# Patient Record
Sex: MALE | Race: WHITE | NOT HISPANIC OR LATINO
[De-identification: names, ages, dates, MRNs, and addresses within clinical notes are randomized per-mention and may not be internally consistent; named-entity substitution may affect disease eponyms.]

---

## 2017-01-23 ENCOUNTER — APPOINTMENT (OUTPATIENT)
Dept: SURGERY | Facility: CLINIC | Age: 62
End: 2017-01-23

## 2017-01-23 VITALS
RESPIRATION RATE: 15 BRPM | TEMPERATURE: 98.5 F | HEART RATE: 71 BPM | SYSTOLIC BLOOD PRESSURE: 111 MMHG | OXYGEN SATURATION: 95 % | DIASTOLIC BLOOD PRESSURE: 74 MMHG

## 2017-01-23 DIAGNOSIS — K40.90 UNILATERAL INGUINAL HERNIA, W/OUT OBSTRUCTION OR GANGRENE, NOT SPECIFIED AS RECURRENT: ICD-10-CM

## 2017-02-11 ENCOUNTER — EMERGENCY (EMERGENCY)
Facility: HOSPITAL | Age: 62
LOS: 1 days | Discharge: HOME | End: 2017-02-11
Admitting: EMERGENCY MEDICINE
Payer: COMMERCIAL

## 2017-02-11 PROCEDURE — 72040 X-RAY EXAM NECK SPINE 2-3 VW: CPT

## 2017-02-11 PROCEDURE — 72040 X-RAY EXAM NECK SPINE 2-3 VW: CPT | Mod: 26

## 2017-02-11 PROCEDURE — 99284 EMERGENCY DEPT VISIT MOD MDM: CPT | Mod: 25

## 2017-02-11 PROCEDURE — 99284 EMERGENCY DEPT VISIT MOD MDM: CPT

## 2017-02-11 PROCEDURE — 72070 X-RAY EXAM THORAC SPINE 2VWS: CPT

## 2017-02-11 PROCEDURE — 72070 X-RAY EXAM THORAC SPINE 2VWS: CPT | Mod: 26

## 2017-03-01 ENCOUNTER — FORM ENCOUNTER (OUTPATIENT)
Age: 62
End: 2017-03-01

## 2017-03-02 ENCOUNTER — OUTPATIENT (OUTPATIENT)
Dept: OUTPATIENT SERVICES | Facility: HOSPITAL | Age: 62
LOS: 1 days | End: 2017-03-02
Payer: MEDICARE

## 2017-03-02 ENCOUNTER — APPOINTMENT (OUTPATIENT)
Dept: CT IMAGING | Facility: IMAGING CENTER | Age: 62
End: 2017-03-02

## 2017-03-02 DIAGNOSIS — K40.90 UNILATERAL INGUINAL HERNIA, WITHOUT OBSTRUCTION OR GANGRENE, NOT SPECIFIED AS RECURRENT: ICD-10-CM

## 2017-03-02 DIAGNOSIS — K43.9 VENTRAL HERNIA WITHOUT OBSTRUCTION OR GANGRENE: ICD-10-CM

## 2017-03-02 PROCEDURE — 71250 CT THORAX DX C-: CPT

## 2017-05-19 ENCOUNTER — APPOINTMENT (OUTPATIENT)
Dept: SURGERY | Facility: CLINIC | Age: 62
End: 2017-05-19

## 2017-06-01 ENCOUNTER — OUTPATIENT (OUTPATIENT)
Dept: OUTPATIENT SERVICES | Facility: HOSPITAL | Age: 62
LOS: 1 days | End: 2017-06-01

## 2017-06-02 ENCOUNTER — INPATIENT (INPATIENT)
Facility: HOSPITAL | Age: 62
LOS: 5 days | Discharge: PSYCHIATRIC OR SUBSTANCE USE FACILITY | End: 2017-06-08
Attending: HOSPITALIST | Admitting: HOSPITALIST
Payer: MEDICARE

## 2017-06-02 VITALS
HEART RATE: 84 BPM | RESPIRATION RATE: 18 BRPM | SYSTOLIC BLOOD PRESSURE: 113 MMHG | DIASTOLIC BLOOD PRESSURE: 68 MMHG | OXYGEN SATURATION: 95 % | TEMPERATURE: 98 F

## 2017-06-02 DIAGNOSIS — G93.40 ENCEPHALOPATHY, UNSPECIFIED: ICD-10-CM

## 2017-06-02 LAB
ALBUMIN SERPL ELPH-MCNC: 4 G/DL — SIGNIFICANT CHANGE UP (ref 3.3–5)
ALP SERPL-CCNC: 65 U/L — SIGNIFICANT CHANGE UP (ref 40–120)
ALT FLD-CCNC: 28 U/L — SIGNIFICANT CHANGE UP (ref 4–41)
APAP SERPL-MCNC: < 15 UG/ML — LOW (ref 15–25)
APTT BLD: 30.8 SEC — SIGNIFICANT CHANGE UP (ref 27.5–37.4)
AST SERPL-CCNC: 26 U/L — SIGNIFICANT CHANGE UP (ref 4–40)
BARBITURATES MEASUREMENT: NEGATIVE — SIGNIFICANT CHANGE UP
BASOPHILS # BLD AUTO: 0.12 K/UL — SIGNIFICANT CHANGE UP (ref 0–0.2)
BASOPHILS NFR BLD AUTO: 0.9 % — SIGNIFICANT CHANGE UP (ref 0–2)
BASOPHILS NFR SPEC: 0 % — SIGNIFICANT CHANGE UP (ref 0–2)
BENZODIAZ SERPL-MCNC: NEGATIVE — SIGNIFICANT CHANGE UP
BILIRUB SERPL-MCNC: 0.3 MG/DL — SIGNIFICANT CHANGE UP (ref 0.2–1.2)
BUN SERPL-MCNC: 25 MG/DL — HIGH (ref 7–23)
CALCIUM SERPL-MCNC: 9.8 MG/DL — SIGNIFICANT CHANGE UP (ref 8.4–10.5)
CHLORIDE SERPL-SCNC: 94 MMOL/L — LOW (ref 98–107)
CO2 SERPL-SCNC: 30 MMOL/L — SIGNIFICANT CHANGE UP (ref 22–31)
CREAT SERPL-MCNC: 0.9 MG/DL — SIGNIFICANT CHANGE UP (ref 0.5–1.3)
EOSINOPHIL # BLD AUTO: 0.29 K/UL — SIGNIFICANT CHANGE UP (ref 0–0.5)
EOSINOPHIL NFR BLD AUTO: 2.1 % — SIGNIFICANT CHANGE UP (ref 0–6)
EOSINOPHIL NFR FLD: 5 % — SIGNIFICANT CHANGE UP (ref 0–6)
ETHANOL BLD-MCNC: < 10 MG/DL — SIGNIFICANT CHANGE UP
GLUCOSE SERPL-MCNC: 98 MG/DL — SIGNIFICANT CHANGE UP (ref 70–99)
HCT VFR BLD CALC: 47.7 % — SIGNIFICANT CHANGE UP (ref 39–50)
HGB BLD-MCNC: 15.4 G/DL — SIGNIFICANT CHANGE UP (ref 13–17)
IMM GRANULOCYTES NFR BLD AUTO: 4.4 % — HIGH (ref 0–1.5)
INR BLD: 1.04 — SIGNIFICANT CHANGE UP (ref 0.88–1.17)
LYMPHOCYTES # BLD AUTO: 14.9 % — SIGNIFICANT CHANGE UP (ref 13–44)
LYMPHOCYTES # BLD AUTO: 2.1 K/UL — SIGNIFICANT CHANGE UP (ref 1–3.3)
LYMPHOCYTES NFR SPEC AUTO: 11 % — LOW (ref 13–44)
MANUAL SMEAR VERIFICATION: SIGNIFICANT CHANGE UP
MCHC RBC-ENTMCNC: 27.6 PG — SIGNIFICANT CHANGE UP (ref 27–34)
MCHC RBC-ENTMCNC: 32.3 % — SIGNIFICANT CHANGE UP (ref 32–36)
MCV RBC AUTO: 85.5 FL — SIGNIFICANT CHANGE UP (ref 80–100)
MONOCYTES # BLD AUTO: 1.39 K/UL — HIGH (ref 0–0.9)
MONOCYTES NFR BLD AUTO: 9.9 % — SIGNIFICANT CHANGE UP (ref 2–14)
MONOCYTES NFR BLD: 12 % — HIGH (ref 2–9)
MORPHOLOGY BLD-IMP: SIGNIFICANT CHANGE UP
NEUTROPHIL AB SER-ACNC: 70 % — SIGNIFICANT CHANGE UP (ref 43–77)
NEUTROPHILS # BLD AUTO: 9.53 K/UL — HIGH (ref 1.8–7.4)
NEUTROPHILS NFR BLD AUTO: 67.8 % — SIGNIFICANT CHANGE UP (ref 43–77)
NEUTS BAND # BLD: 2 % — SIGNIFICANT CHANGE UP (ref 0–6)
PLATELET # BLD AUTO: 223 K/UL — SIGNIFICANT CHANGE UP (ref 150–400)
PLATELET COUNT - ESTIMATE: NORMAL — SIGNIFICANT CHANGE UP
PMV BLD: 9.3 FL — SIGNIFICANT CHANGE UP (ref 7–13)
POTASSIUM SERPL-MCNC: 3.8 MMOL/L — SIGNIFICANT CHANGE UP (ref 3.5–5.3)
POTASSIUM SERPL-SCNC: 3.8 MMOL/L — SIGNIFICANT CHANGE UP (ref 3.5–5.3)
PROT SERPL-MCNC: 8 G/DL — SIGNIFICANT CHANGE UP (ref 6–8.3)
PROTHROM AB SERPL-ACNC: 11.7 SEC — SIGNIFICANT CHANGE UP (ref 9.8–13.1)
RBC # BLD: 5.58 M/UL — SIGNIFICANT CHANGE UP (ref 4.2–5.8)
RBC # FLD: 17.1 % — HIGH (ref 10.3–14.5)
SALICYLATES SERPL-MCNC: < 5 MG/DL — LOW (ref 15–30)
SODIUM SERPL-SCNC: 140 MMOL/L — SIGNIFICANT CHANGE UP (ref 135–145)
VALPROATE SERPL-MCNC: 116.1 UG/ML — HIGH (ref 50–100)
WBC # BLD: 14.05 K/UL — HIGH (ref 3.8–10.5)
WBC # FLD AUTO: 14.05 K/UL — HIGH (ref 3.8–10.5)

## 2017-06-02 PROCEDURE — 70450 CT HEAD/BRAIN W/O DYE: CPT | Mod: 26

## 2017-06-02 RX ORDER — HALOPERIDOL DECANOATE 100 MG/ML
5 INJECTION INTRAMUSCULAR ONCE
Qty: 0 | Refills: 0 | Status: COMPLETED | OUTPATIENT
Start: 2017-06-02 | End: 2017-06-02

## 2017-06-02 RX ADMIN — HALOPERIDOL DECANOATE 5 MILLIGRAM(S): 100 INJECTION INTRAMUSCULAR at 22:35

## 2017-06-02 RX ADMIN — Medication 1 MILLIGRAM(S): at 22:35
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
## 2017-06-02 NOTE — ED PROVIDER NOTE - ATTENDING CONTRIBUTION TO CARE
I performed a face-to-face evaluation of the patient and performed a history and physical examination. I agree with the history and physical examination.      Middle-age man with sarcoidosis was admitted for two weeks at the [facility] for pneumonia and seizures. Was given Keppra. Was discharged yesterday to [facility] The patient has residual psychiatric deficits consisting of delusions, hallucinations, and non-stop talking. The family took him from the rehabilitation center to [facility] because the rehabilitation center was concerned the patient had acute alteration in mental status. The patient is demonstrating hallucinations, delusions, and aggressive behavior. His vital signs are stable. Other than altered mentation and aggression, he has no gross deficits on neuro examination, meaning he's able to move all extremities with full strength. Will sedate and get records from [facility] and admit to the medicine service with psychiatric consultation.

## 2017-06-02 NOTE — ED PROVIDER NOTE - CRITICAL CARE PROVIDED
consultation with other physicians/additional history taking/documentation/direct patient care (not related to procedure)/Long discussion with family and [facility] Supervising nurse Angel.

## 2017-06-02 NOTE — ED ADULT NURSE REASSESSMENT NOTE - NS ED NURSE REASSESS COMMENT FT1
Pt. becoming agitated, yelling, cursing, trying to get out of bed, putting his clothes on stating, "I'm getting ready leave," confused, unable to be redirected, and not following commands. Medicated as ordered. Placed on 1:1 constant observation for patient safety, risk of harm to self and risk for elopement. Pt. placed back into bed, positioned with pillows, placed on CM, side rails up for safety. Endorsed to primary RN.

## 2017-06-02 NOTE — ED PROVIDER NOTE - MEDICAL DECISION MAKING DETAILS
Zofia:   Middle-age man with sarcoidosis was admitted for two weeks at the [facility] for pneumonia and seizures. Was given Keppra. Was discharged yesterday to [facility] The patient has residual psychiatric deficits consisting of delusions, hallucinations, and non-stop talking. The family took him from the rehabilitation center to [facility] because the rehabilitation center was concerned the patient had acute alteration in mental status. The patient is demonstrating hallucinations, delusions, and aggressive behavior. His vital signs are stable. Other than altered mentation and aggression, he has no gross deficits on neuro examination, meaning he's able to move all extremities with full strength. Will sedate and get records from [facility] and admit to the medicine service with psychiatric consultation.

## 2017-06-03 DIAGNOSIS — Z93.3 COLOSTOMY STATUS: Chronic | ICD-10-CM

## 2017-06-03 DIAGNOSIS — S06.5X9A TRAUMATIC SUBDURAL HEMORRHAGE WITH LOSS OF CONSCIOUSNESS OF UNSPECIFIED DURATION, INITIAL ENCOUNTER: Chronic | ICD-10-CM

## 2017-06-03 DIAGNOSIS — Z90.49 ACQUIRED ABSENCE OF OTHER SPECIFIED PARTS OF DIGESTIVE TRACT: Chronic | ICD-10-CM

## 2017-06-03 DIAGNOSIS — Z98.890 OTHER SPECIFIED POSTPROCEDURAL STATES: Chronic | ICD-10-CM

## 2017-06-03 DIAGNOSIS — I82.90 ACUTE EMBOLISM AND THROMBOSIS OF UNSPECIFIED VEIN: ICD-10-CM

## 2017-06-03 DIAGNOSIS — R13.10 DYSPHAGIA, UNSPECIFIED: ICD-10-CM

## 2017-06-03 DIAGNOSIS — D86.9 SARCOIDOSIS, UNSPECIFIED: ICD-10-CM

## 2017-06-03 DIAGNOSIS — R41.0 DISORIENTATION, UNSPECIFIED: ICD-10-CM

## 2017-06-03 DIAGNOSIS — J96.01 ACUTE RESPIRATORY FAILURE WITH HYPOXIA: ICD-10-CM

## 2017-06-03 DIAGNOSIS — R56.9 UNSPECIFIED CONVULSIONS: ICD-10-CM

## 2017-06-03 LAB
AMMONIA BLD-MCNC: 32 UMOL/L — SIGNIFICANT CHANGE UP (ref 11–55)
APPEARANCE UR: CLEAR — SIGNIFICANT CHANGE UP
BASE EXCESS BLDA CALC-SCNC: 7 MMOL/L — SIGNIFICANT CHANGE UP
BASE EXCESS BLDA CALC-SCNC: 9.6 MMOL/L — SIGNIFICANT CHANGE UP
BASE EXCESS BLDA CALC-SCNC: 9.7 MMOL/L — SIGNIFICANT CHANGE UP
BASOPHILS # BLD AUTO: 0.11 K/UL — SIGNIFICANT CHANGE UP (ref 0–0.2)
BASOPHILS NFR BLD AUTO: 1 % — SIGNIFICANT CHANGE UP (ref 0–2)
BILIRUB UR-MCNC: NEGATIVE — SIGNIFICANT CHANGE UP
BLOOD UR QL VISUAL: NEGATIVE — SIGNIFICANT CHANGE UP
BUN SERPL-MCNC: 27 MG/DL — HIGH (ref 7–23)
CALCIUM SERPL-MCNC: 9.4 MG/DL — SIGNIFICANT CHANGE UP (ref 8.4–10.5)
CHLORIDE BLDA-SCNC: 104 MMOL/L — SIGNIFICANT CHANGE UP (ref 96–108)
CHLORIDE BLDA-SCNC: 99 MMOL/L — SIGNIFICANT CHANGE UP (ref 96–108)
CHLORIDE SERPL-SCNC: 93 MMOL/L — LOW (ref 98–107)
CO2 SERPL-SCNC: 30 MMOL/L — SIGNIFICANT CHANGE UP (ref 22–31)
COLOR SPEC: YELLOW — SIGNIFICANT CHANGE UP
CREAT BLDA-MCNC: 0.58 MG/DL — SIGNIFICANT CHANGE UP (ref 0.5–1.3)
CREAT BLDA-MCNC: 0.6 MG/DL — SIGNIFICANT CHANGE UP (ref 0.5–1.3)
CREAT SERPL-MCNC: 0.8 MG/DL — SIGNIFICANT CHANGE UP (ref 0.5–1.3)
EOSINOPHIL # BLD AUTO: 0.1 K/UL — SIGNIFICANT CHANGE UP (ref 0–0.5)
EOSINOPHIL NFR BLD AUTO: 0.9 % — SIGNIFICANT CHANGE UP (ref 0–6)
GLUCOSE BLDA-MCNC: 104 MG/DL — HIGH (ref 70–99)
GLUCOSE BLDA-MCNC: 146 MG/DL — HIGH (ref 70–99)
GLUCOSE SERPL-MCNC: 130 MG/DL — HIGH (ref 70–99)
GLUCOSE UR-MCNC: NEGATIVE — SIGNIFICANT CHANGE UP
HCO3 BLDA-SCNC: 29 MMOL/L — HIGH (ref 22–26)
HCO3 BLDA-SCNC: 32 MMOL/L — HIGH (ref 22–26)
HCO3 BLDA-SCNC: 32 MMOL/L — HIGH (ref 22–26)
HCT VFR BLD CALC: 47.6 % — SIGNIFICANT CHANGE UP (ref 39–50)
HCT VFR BLDA CALC: 41.3 % — SIGNIFICANT CHANGE UP (ref 39–51)
HCT VFR BLDA CALC: 44.4 % — SIGNIFICANT CHANGE UP (ref 39–51)
HGB BLD-MCNC: 15.1 G/DL — SIGNIFICANT CHANGE UP (ref 13–17)
HGB BLDA-MCNC: 13.5 G/DL — SIGNIFICANT CHANGE UP (ref 13–17)
HGB BLDA-MCNC: 14.5 G/DL — SIGNIFICANT CHANGE UP (ref 13–17)
HIV1 AG SER QL: SIGNIFICANT CHANGE UP
HIV1+2 AB SPEC QL: SIGNIFICANT CHANGE UP
IMM GRANULOCYTES NFR BLD AUTO: 5.7 % — HIGH (ref 0–1.5)
KETONES UR-MCNC: SIGNIFICANT CHANGE UP
LACTATE BLDA-SCNC: 0.6 MMOL/L — SIGNIFICANT CHANGE UP (ref 0.5–2)
LACTATE BLDA-SCNC: 0.7 MMOL/L — SIGNIFICANT CHANGE UP (ref 0.5–2)
LEUKOCYTE ESTERASE UR-ACNC: HIGH
LYMPHOCYTES # BLD AUTO: 1.07 K/UL — SIGNIFICANT CHANGE UP (ref 1–3.3)
LYMPHOCYTES # BLD AUTO: 9.7 % — LOW (ref 13–44)
MAGNESIUM SERPL-MCNC: 2.3 MG/DL — SIGNIFICANT CHANGE UP (ref 1.6–2.6)
MCHC RBC-ENTMCNC: 27.6 PG — SIGNIFICANT CHANGE UP (ref 27–34)
MCHC RBC-ENTMCNC: 31.7 % — LOW (ref 32–36)
MCV RBC AUTO: 86.9 FL — SIGNIFICANT CHANGE UP (ref 80–100)
MONOCYTES # BLD AUTO: 0.96 K/UL — HIGH (ref 0–0.9)
MONOCYTES NFR BLD AUTO: 8.7 % — SIGNIFICANT CHANGE UP (ref 2–14)
MUCOUS THREADS # UR AUTO: SIGNIFICANT CHANGE UP
NEUTROPHILS # BLD AUTO: 8.19 K/UL — HIGH (ref 1.8–7.4)
NEUTROPHILS NFR BLD AUTO: 74 % — SIGNIFICANT CHANGE UP (ref 43–77)
NITRITE UR-MCNC: NEGATIVE — SIGNIFICANT CHANGE UP
PCO2 BLDA: 56 MMHG — HIGH (ref 35–48)
PCO2 BLDA: 60 MMHG — HIGH (ref 35–48)
PCO2 BLDA: 67 MMHG — HIGH (ref 35–48)
PH BLDA: 7.31 PH — LOW (ref 7.35–7.45)
PH BLDA: 7.38 PH — SIGNIFICANT CHANGE UP (ref 7.35–7.45)
PH BLDA: 7.41 PH — SIGNIFICANT CHANGE UP (ref 7.35–7.45)
PH UR: 6 — SIGNIFICANT CHANGE UP (ref 4.6–8)
PHOSPHATE SERPL-MCNC: 5.1 MG/DL — HIGH (ref 2.5–4.5)
PLATELET # BLD AUTO: 178 K/UL — SIGNIFICANT CHANGE UP (ref 150–400)
PMV BLD: 9.1 FL — SIGNIFICANT CHANGE UP (ref 7–13)
PO2 BLDA: 185 MMHG — HIGH (ref 83–108)
PO2 BLDA: 92 MMHG — SIGNIFICANT CHANGE UP (ref 83–108)
PO2 BLDA: 94 MMHG — SIGNIFICANT CHANGE UP (ref 83–108)
POTASSIUM BLDA-SCNC: 3.5 MMOL/L — SIGNIFICANT CHANGE UP (ref 3.4–4.5)
POTASSIUM BLDA-SCNC: 3.6 MMOL/L — SIGNIFICANT CHANGE UP (ref 3.4–4.5)
POTASSIUM SERPL-MCNC: 4.9 MMOL/L — SIGNIFICANT CHANGE UP (ref 3.5–5.3)
POTASSIUM SERPL-SCNC: 4.9 MMOL/L — SIGNIFICANT CHANGE UP (ref 3.5–5.3)
PROT UR-MCNC: 30 — SIGNIFICANT CHANGE UP
RBC # BLD: 5.48 M/UL — SIGNIFICANT CHANGE UP (ref 4.2–5.8)
RBC # FLD: 17.1 % — HIGH (ref 10.3–14.5)
RBC CASTS # UR COMP ASSIST: SIGNIFICANT CHANGE UP (ref 0–?)
SAO2 % BLDA: 96.6 % — SIGNIFICANT CHANGE UP (ref 95–99)
SAO2 % BLDA: 97.4 % — SIGNIFICANT CHANGE UP (ref 95–99)
SAO2 % BLDA: 99.2 % — HIGH (ref 95–99)
SODIUM BLDA-SCNC: 134 MMOL/L — LOW (ref 136–146)
SODIUM BLDA-SCNC: 135 MMOL/L — LOW (ref 136–146)
SODIUM SERPL-SCNC: 139 MMOL/L — SIGNIFICANT CHANGE UP (ref 135–145)
SP GR SPEC: 1.02 — SIGNIFICANT CHANGE UP (ref 1–1.03)
T PALLIDUM AB TITR SER: NEGATIVE — SIGNIFICANT CHANGE UP
TSH SERPL-MCNC: 5.58 UIU/ML — HIGH (ref 0.27–4.2)
UROBILINOGEN FLD QL: NORMAL E.U. — SIGNIFICANT CHANGE UP (ref 0.1–0.2)
VIT B12 SERPL-MCNC: 1069 PG/ML — HIGH (ref 200–900)
WBC # BLD: 11.06 K/UL — HIGH (ref 3.8–10.5)
WBC # FLD AUTO: 11.06 K/UL — HIGH (ref 3.8–10.5)
WBC CLUMPS #/AREA URNS HPF: PRESENT — HIGH (ref 0–?)
WBC UR QL: SIGNIFICANT CHANGE UP (ref 0–?)

## 2017-06-03 PROCEDURE — 76937 US GUIDE VASCULAR ACCESS: CPT | Mod: 26

## 2017-06-03 PROCEDURE — 99291 CRITICAL CARE FIRST HOUR: CPT

## 2017-06-03 PROCEDURE — 76604 US EXAM CHEST: CPT | Mod: 26,GC

## 2017-06-03 PROCEDURE — 99223 1ST HOSP IP/OBS HIGH 75: CPT | Mod: GC

## 2017-06-03 PROCEDURE — 71010: CPT | Mod: 26

## 2017-06-03 RX ORDER — IPRATROPIUM/ALBUTEROL SULFATE 18-103MCG
3 AEROSOL WITH ADAPTER (GRAM) INHALATION EVERY 6 HOURS
Qty: 0 | Refills: 0 | Status: DISCONTINUED | OUTPATIENT
Start: 2017-06-03 | End: 2017-06-08

## 2017-06-03 RX ORDER — SODIUM CHLORIDE 9 MG/ML
1000 INJECTION INTRAMUSCULAR; INTRAVENOUS; SUBCUTANEOUS
Qty: 0 | Refills: 0 | Status: DISCONTINUED | OUTPATIENT
Start: 2017-06-03 | End: 2017-06-03

## 2017-06-03 RX ORDER — BUDESONIDE AND FORMOTEROL FUMARATE DIHYDRATE 160; 4.5 UG/1; UG/1
2 AEROSOL RESPIRATORY (INHALATION)
Qty: 0 | Refills: 0 | Status: DISCONTINUED | OUTPATIENT
Start: 2017-06-03 | End: 2017-06-08

## 2017-06-03 RX ORDER — VALPROIC ACID (AS SODIUM SALT) 250 MG/5ML
750 SOLUTION, ORAL ORAL EVERY 12 HOURS
Qty: 0 | Refills: 0 | Status: DISCONTINUED | OUTPATIENT
Start: 2017-06-03 | End: 2017-06-07

## 2017-06-03 RX ORDER — SODIUM CHLORIDE 9 MG/ML
1000 INJECTION INTRAMUSCULAR; INTRAVENOUS; SUBCUTANEOUS
Qty: 0 | Refills: 0 | Status: DISCONTINUED | OUTPATIENT
Start: 2017-06-03 | End: 2017-06-08

## 2017-06-03 RX ORDER — TAMSULOSIN HYDROCHLORIDE 0.4 MG/1
0.4 CAPSULE ORAL AT BEDTIME
Qty: 0 | Refills: 0 | Status: DISCONTINUED | OUTPATIENT
Start: 2017-06-03 | End: 2017-06-08

## 2017-06-03 RX ORDER — HEPARIN SODIUM 5000 [USP'U]/ML
5000 INJECTION INTRAVENOUS; SUBCUTANEOUS EVERY 8 HOURS
Qty: 0 | Refills: 0 | Status: DISCONTINUED | OUTPATIENT
Start: 2017-06-03 | End: 2017-06-06

## 2017-06-03 RX ADMIN — Medication 3 MILLILITER(S): at 02:50

## 2017-06-03 RX ADMIN — HEPARIN SODIUM 5000 UNIT(S): 5000 INJECTION INTRAVENOUS; SUBCUTANEOUS at 23:02

## 2017-06-03 RX ADMIN — BUDESONIDE AND FORMOTEROL FUMARATE DIHYDRATE 2 PUFF(S): 160; 4.5 AEROSOL RESPIRATORY (INHALATION) at 22:28

## 2017-06-03 RX ADMIN — SODIUM CHLORIDE 75 MILLILITER(S): 9 INJECTION INTRAMUSCULAR; INTRAVENOUS; SUBCUTANEOUS at 13:36

## 2017-06-03 RX ADMIN — Medication 3 MILLILITER(S): at 22:24

## 2017-06-03 RX ADMIN — HEPARIN SODIUM 5000 UNIT(S): 5000 INJECTION INTRAVENOUS; SUBCUTANEOUS at 05:25

## 2017-06-03 RX ADMIN — SODIUM CHLORIDE 75 MILLILITER(S): 9 INJECTION INTRAMUSCULAR; INTRAVENOUS; SUBCUTANEOUS at 03:27

## 2017-06-03 RX ADMIN — HEPARIN SODIUM 5000 UNIT(S): 5000 INJECTION INTRAVENOUS; SUBCUTANEOUS at 13:36

## 2017-06-03 RX ADMIN — TAMSULOSIN HYDROCHLORIDE 0.4 MILLIGRAM(S): 0.4 CAPSULE ORAL at 23:02

## 2017-06-03 RX ADMIN — Medication 3 MILLILITER(S): at 15:48

## 2017-06-03 RX ADMIN — Medication 28.75 MILLIGRAM(S): at 19:21

## 2017-06-03 RX ADMIN — Medication 3 MILLILITER(S): at 09:39

## 2017-06-03 RX ADMIN — SODIUM CHLORIDE 75 MILLILITER(S): 9 INJECTION INTRAMUSCULAR; INTRAVENOUS; SUBCUTANEOUS at 05:23

## 2017-06-03 NOTE — CHART NOTE - NSCHARTNOTEFT_GEN_A_CORE
: Dr. Chan    INDICATION: hypoxia    PROCEDURE:  [ ] LIMITED ECHO  [x] LIMITED CHEST  [ ] LIMITED RETROPERITONEAL  [ ] LIMITED ABDOMINAL  [ ] LIMITED DVT  [ ] NEEDLE GUIDANCE VASCULAR  [ ] NEEDLE GUIDANCE THORACENTESIS  [ ] NEEDLE GUIDANCE PARACENTESIS  [ ] NEEDLE GUIDANCE PERICARDIOCENTESIS  [ ] OTHER    FINDINGS:  - focal B lines with lumpy bumpy pleura    INTERPRETATION:  Lung ultrasound consistent with known pulmonary disease  No focal consolidation to suggest PNA : Dr. Chan    INDICATION: hypoxia    PROCEDURE:  [ ] LIMITED ECHO  [x] LIMITED CHEST  [ ] LIMITED RETROPERITONEAL  [ ] LIMITED ABDOMINAL  [ ] LIMITED DVT  [ ] NEEDLE GUIDANCE VASCULAR  [ ] NEEDLE GUIDANCE THORACENTESIS  [ ] NEEDLE GUIDANCE PARACENTESIS  [ ] NEEDLE GUIDANCE PERICARDIOCENTESIS  [ ] OTHER    FINDINGS:  - focal B lines with lumpy bumpy pleura    INTERPRETATION:  Lung ultrasound consistent with known pulmonary disease   No focal consolidation to suggest PNA

## 2017-06-03 NOTE — H&P ADULT - PSH
Bilateral subdural hematomas    Status post cholecystectomy    Status post Bright's procedure    Status post inguinal hernia repair

## 2017-06-03 NOTE — H&P ADULT - PROBLEM SELECTOR PLAN 1
Likely 2/2 sarcoidosis c/b ILD and sedation. Pt received IV haldol 5mg and IV ativan 1mg in the ED for psychosis and aggressive behavior. Afterwards, pt became hypoxic to the 80s on 4L NC with intermittent apneic episodes while heavily sedated. Also noted to be using accessory muscles.  - Pt placed on BiPAP, initially with settings of FiO2 50% and 12/6, now on FiO2 30% and 12/4  - ABG: pH 7.31, pCO2 67, pO2 185, HCO3 29, O2 sat 97%  - MICU consulted and evaluated pt, will be transferred to MICU for monitoring Multifactorial 2/2 sarcoidosis c/b ILD and sedation. Pt received IV haldol 5mg and IV ativan 1mg in the ED for psychosis and aggressive behavior. Afterwards, pt became hypoxic to the 80s on 4L NC with intermittent apneic episodes while heavily sedated. Also noted to be using accessory muscles.  - Pt placed on BiPAP, initially with settings of FiO2 50% and 12/6, now on FiO2 30% and 12/4  - ABG: pH 7.31, pCO2 67, pO2 185, HCO3 29, O2 sat 97%  - MICU consulted and evaluated pt, will be transferred to MICU for monitoring  - NPO for now  - Hold non essential oral medications while on BiPAP

## 2017-06-03 NOTE — H&P ADULT - ASSESSMENT
60 yo Male with complicated medical/surgical history including sarcoidosis c/b ILD, bronchiectasis, ?rheumatoid arthritis, cholecystectomy, perforated diverticulitis s/p Bright procedure and reversal, b/l SDH s/p surgery, R inguinal hernia repair with mesh, and recent 2-week admission (5/14/17 - 6/1/17) at [place] for new-onset generalized tonic-clonic seizures presents from [facility] with altered mental status, s/p IV haldol 5mg and IV ativan 1mg in the ED for psychosis and aggressive behavior, now with acute hypoxic respiratory failure 2/2 sarcoidosis c/b ILD and sedation. 60 yo Male with complicated medical/surgical history including sarcoidosis c/b ILD, bronchiectasis, ?rheumatoid arthritis, cholecystectomy, perforated diverticulitis s/p Bright procedure and reversal, b/l SDH s/p surgery, R inguinal hernia repair with mesh, and recent 2-week admission (5/14/17 - 6/1/17) at [place] for new-onset generalized tonic-clonic seizures presents from [facility] with altered mental status, s/p IV haldol 5mg and IV ativan 1mg in the ED for psychosis and aggressive behavior, now with acute hypoxic respiratory failure of multifactorial etiology;

## 2017-06-03 NOTE — H&P ADULT - PROBLEM SELECTOR PLAN 3
New-onset GTC seizures in mid-May requiring intubation at OSH. 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 Now on depakote.   - VPA level 116. Will need to hold depakote and check VPA level in AM.  - Will do ativan PRN for recurrent seizures. Responded to ativan 2mg at OSH.  - Will need Neurology consult New-onset GTC seizures in mid-May requiring intubation at OSH. 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 Now on Depakote.   - VPA level 116. Will need to hold Depakote and check VPA level in AM.  - Will do ativan PRN for recurrent seizures. Responded to ativan 2mg at OSH.  - Will need Neurology consult New-onset GTC seizures in mid-May requiring intubation at OSH. 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 Now on Depakote.   - VPA level 116. Will need to hold Depakote and check VPA level in AM.  - Will do ativan PRN for recurrent seizures. Responded to ativan 2mg at OSH.  - Will need Neurology consult - concerned for Neurologic sarcoidosis (the pt is not on steroids)

## 2017-06-03 NOTE — PROCEDURE NOTE - NSPROCDETAILS_GEN_ALL_CORE
flushes easily/blood seen on insertion/dressing applied/ultrasound utilization/location identified, draped/prepped, sterile technique used/secured in place/sterile technique, catheter placed

## 2017-06-03 NOTE — CONSULT NOTE ADULT - SUBJECTIVE AND OBJECTIVE BOX
CHIEF COMPLAINT:    HPI: 61 year old male with history of ICH and Sarcoidosis presents from  Rehab with altered mental status. Patient was aggressive and uncooperative in the ED and was given 1mg IV ativan and 5mg IV haldol. Patient was transferred to the floor, where he was evaluated by the medicine team who noted him to be lethargic, hypoxic with increased work of breathing. Patient was placed on bipap and micu was called for further evaluation.  History obtained from outside records. Patient was transferred to [facility] one day prior after prolonged hospital stay at [facility] where he was treated for new onset seizure requiring intubation. Patient underwent extensive work up including MRI (negative for focal source of seizure) LP negative for acute bacterial infection HSV or VZV. During that hospitalization records indicate a normal wbc and elevated serum bicarb. Patient underwent CT chest suggestive of sarcoidosis.     PAST MEDICAL & SURGICAL HISTORY:  Inguinal hernia  Subdural hematoma  Diverticulitis  Bronchiectasis  Sarcoid  DM (diabetes mellitus)  No pertinent past medical history  Status post inguinal hernia repair  Status post cholecystectomy  Bilateral subdural hematomas  Status post Bright&#x27;s procedure  No significant past surgical history      FAMILY HISTORY:  No pertinent family history in first degree relatives      SOCIAL HISTORY: unable to assess 2/2 ams  Smoking: __ packs x ___ years  EtOH Use:  Marital Status:  Occupation:  Recent Travel:  Country of Birth:  Advance Directives:    Allergies    Keppra (Other (Severe))  penicillin (Angioedema)  penicillins (Unknown)    Intolerances        HOME MEDICATIONS:    REVIEW OF SYSTEMS:  Constitutional:   Eyes:  ENT:  CV:  Resp:  GI:  :  MSK:  Integumentary:  Neurological:  Psychiatric:  Endocrine:  Hematologic/Lymphatic:  Allergic/Immunologic:  [ ] All other systems negative  [ ] Unable to assess ROS because ams    OBJECTIVE:  ICU Vital Signs Last 24 Hrs  T(C): 36.5, Max: 36.6 (06-02 @ 20:16)  T(F): 97.7, Max: 97.8 (06-02 @ 20:16)  HR: 79 (79 - 86)  BP: 141/91 (102/76 - 141/91)  BP(mean): --  ABP: --  ABP(mean): --  RR: 19 (18 - 22)  SpO2: 95% (95% - 97%)        CAPILLARY BLOOD GLUCOSE      PHYSICAL EXAM:  General: lethargic tachypneic on bipap  Neck:   Respiratory: anteriorly coarse breath sounds  Cardiovascular: rate regular rhythm regular  Abdomen: soft multiple abdominal incisions bowel sounds present  Extremities: dry warm no lesions presents  Skin:   Neurological: lethargic arousable to painful stimuli, moving all extremities    Psychiatry: unable to assess    HOSPITAL MEDICATIONS:  MEDICATIONS  (STANDING):  heparin  Injectable 5000Unit(s) SubCutaneous every 8 hours  ALBUTerol/ipratropium for Nebulization 3milliLiter(s) Nebulizer every 6 hours  buDESOnide 160 MICROgram(s)/formoterol 4.5 MICROgram(s) Inhaler 2Puff(s) Inhalation two times a day  sodium chloride 0.9%. 1000milliLiter(s) IV Continuous <Continuous>    MEDICATIONS  (PRN):      LABS:                        15.4   14.05 )-----------( 223      ( 02 Jun 2017 21:42 )             47.7     06-02    140  |  94<L>  |  25<H>  ----------------------------<  98  3.8   |  30  |  0.90    Ca    9.8      02 Jun 2017 21:42    TPro  8.0  /  Alb  4.0  /  TBili  0.3  /  DBili  x   /  AST  26  /  ALT  28  /  AlkPhos  65  06-02    PT/INR - ( 02 Jun 2017 21:42 )   PT: 11.7 SEC;   INR: 1.04          PTT - ( 02 Jun 2017 21:42 )  PTT:30.8 SEC    Arterial Blood Gas:  06-03 @ 02:18  7.31/67/185/29/99.2/7.0  ABG lactate: 0.6        MICROBIOLOGY:     RADIOLOGY:  [ ] Reviewed and interpreted by me    EKG: CHIEF COMPLAINT:    HPI: 61 year old male with history of ICH and Sarcoidosis presents from  Rehab with altered mental status. Patient was aggressive and uncooperative in the ED and was given 1mg IV ativan and 5mg IV haldol. Patient was transferred to the floor, where he was evaluated by the medicine team who noted him to be lethargic, hypoxic with increased work of breathing. Patient was placed on bipap and micu was called for further evaluation.  History obtained from outside records. Patient was transferred to [facility] one day prior after prolonged hospital stay at [facility] where he was treated for new onset seizure requiring intubation. Patient underwent extensive work up including MRI (negative for focal source of seizure) LP negative for acute bacterial infection HSV or VZV. During that hospitalization records indicate a normal wbc and elevated serum bicarb. Patient underwent CT chest suggestive of sarcoidosis.     PAST MEDICAL & SURGICAL HISTORY:  Inguinal hernia  Subdural hematoma  Diverticulitis  Bronchiectasis  Sarcoid  DM (diabetes mellitus)  No pertinent past medical history  Status post inguinal hernia repair  Status post cholecystectomy  Bilateral subdural hematomas  Status post Bright&#x27;s procedure  No significant past surgical history      FAMILY HISTORY:  No pertinent family history in first degree relatives      SOCIAL HISTORY: unable to assess 2/2 ams  Smoking: __ packs x ___ years  EtOH Use:  Marital Status:  Occupation:  Recent Travel:  Country of Birth:  Advance Directives:    Allergies    Keppra (Other (Severe))  penicillin (Angioedema)  penicillins (Unknown)    Intolerances        HOME MEDICATIONS:    REVIEW OF SYSTEMS:  Constitutional:   Eyes:  ENT:  CV:  Resp:  GI:  :  MSK:  Integumentary:  Neurological:  Psychiatric:  Endocrine:  Hematologic/Lymphatic:  Allergic/Immunologic:  [ ] All other systems negative  [ ] Unable to assess ROS because ams    OBJECTIVE:  ICU Vital Signs Last 24 Hrs  T(C): 36.5, Max: 36.6 (06-02 @ 20:16)  T(F): 97.7, Max: 97.8 (06-02 @ 20:16)  HR: 79 (79 - 86)  BP: 141/91 (102/76 - 141/91)  BP(mean): --  ABP: --  ABP(mean): --  RR: 19 (18 - 22)  SpO2: 95% (95% - 97%)        CAPILLARY BLOOD GLUCOSE      PHYSICAL EXAM:  General: lethargic tachypneic on bipap  Neck:   Respiratory: anteriorly coarse breath sounds  Cardiovascular: rate regular rhythm regular  Abdomen: soft multiple abdominal incisions bowel sounds present  Extremities: dry warm no lesions presents  Skin:   Neurological: lethargic arousable to painful stimuli, moving all extremities    Psychiatry: unable to assess    HOSPITAL MEDICATIONS:  MEDICATIONS  (STANDING):  heparin  Injectable 5000Unit(s) SubCutaneous every 8 hours  ALBUTerol/ipratropium for Nebulization 3milliLiter(s) Nebulizer every 6 hours  buDESOnide 160 MICROgram(s)/formoterol 4.5 MICROgram(s) Inhaler 2Puff(s) Inhalation two times a day  sodium chloride 0.9%. 1000milliLiter(s) IV Continuous <Continuous>    MEDICATIONS  (PRN):      LABS:                        15.4   14.05 )-----------( 223      ( 02 Jun 2017 21:42 )             47.7     06-02    140  |  94<L>  |  25<H>  ----------------------------<  98  3.8   |  30  |  0.90    Ca    9.8      02 Jun 2017 21:42    TPro  8.0  /  Alb  4.0  /  TBili  0.3  /  DBili  x   /  AST  26  /  ALT  28  /  AlkPhos  65  06-02    PT/INR - ( 02 Jun 2017 21:42 )   PT: 11.7 SEC;   INR: 1.04          PTT - ( 02 Jun 2017 21:42 )  PTT:30.8 SEC    Arterial Blood Gas:  06-03 @ 02:18  7.31/67/185/29/99.2/7.0  ABG lactate: 0.6        MICROBIOLOGY:     RADIOLOGY:  [ ] Reviewed and interpreted by me    EKG: sinus CHIEF COMPLAINT:    HPI: 61 year old male with history of ICH and Sarcoidosis presents from  Rehab with altered mental status. Patient was aggressive and uncooperative in the ED and was given 1mg IV ativan and 5mg IV haldol. Patient was transferred to the floor, where he was evaluated by the medicine team who noted him to be lethargic, hypoxic with increased work of breathing. Patient was placed on bipap and micu was called for further evaluation.  History obtained from outside records. Patient was transferred to [facility] one day prior after prolonged hospital stay at [facility] where he was treated for new onset seizure requiring intubation. Patient underwent extensive work up including MRI (negative for focal source of seizure) LP negative for acute bacterial infection HSV or VZV. During that hospitalization records indicate a normal wbc and elevated serum bicarb. Patient underwent CT chest suggestive of sarcoidosis. He was eventually discharged to rehab on oxygen therapy.    PAST MEDICAL & SURGICAL HISTORY:  Inguinal hernia  Subdural hematoma  Diverticulitis  Bronchiectasis  Sarcoid  DM (diabetes mellitus)  No pertinent past medical history  Status post inguinal hernia repair  Status post cholecystectomy  Bilateral subdural hematomas  Status post Bright&#x27;s procedure  No significant past surgical history      FAMILY HISTORY:  No pertinent family history in first degree relatives      SOCIAL HISTORY: unable to assess 2/2 ams  Smoking: __ packs x ___ years  EtOH Use:  Marital Status:  Occupation:  Recent Travel:  Country of Birth:  Advance Directives:    Allergies    Keppra (Other (Severe))  penicillin (Angioedema)  penicillins (Unknown)    Intolerances        HOME MEDICATIONS:    REVIEW OF SYSTEMS:  Constitutional:   Eyes:  ENT:  CV:  Resp:  GI:  :  MSK:  Integumentary:  Neurological:  Psychiatric:  Endocrine:  Hematologic/Lymphatic:  Allergic/Immunologic:  [ ] All other systems negative  [ ] Unable to assess ROS because ams    OBJECTIVE:  ICU Vital Signs Last 24 Hrs  T(C): 36.5, Max: 36.6 (06-02 @ 20:16)  T(F): 97.7, Max: 97.8 (06-02 @ 20:16)  HR: 79 (79 - 86)  BP: 141/91 (102/76 - 141/91)  BP(mean): --  ABP: --  ABP(mean): --  RR: 19 (18 - 22)  SpO2: 95% (95% - 97%)        CAPILLARY BLOOD GLUCOSE      PHYSICAL EXAM:  General: lethargic tachypneic on bipap  Neck:   Respiratory: anteriorly coarse breath sounds  Cardiovascular: rate regular rhythm regular  Abdomen: soft multiple abdominal incisions bowel sounds present  Extremities: dry warm no lesions presents  Skin:   Neurological: lethargic arousable to painful stimuli, moving all extremities    Psychiatry: unable to assess    HOSPITAL MEDICATIONS:  MEDICATIONS  (STANDING):  heparin  Injectable 5000Unit(s) SubCutaneous every 8 hours  ALBUTerol/ipratropium for Nebulization 3milliLiter(s) Nebulizer every 6 hours  buDESOnide 160 MICROgram(s)/formoterol 4.5 MICROgram(s) Inhaler 2Puff(s) Inhalation two times a day  sodium chloride 0.9%. 1000milliLiter(s) IV Continuous <Continuous>    MEDICATIONS  (PRN):      LABS:                        15.4   14.05 )-----------( 223      ( 02 Jun 2017 21:42 )             47.7     06-02    140  |  94<L>  |  25<H>  ----------------------------<  98  3.8   |  30  |  0.90    Ca    9.8      02 Jun 2017 21:42    TPro  8.0  /  Alb  4.0  /  TBili  0.3  /  DBili  x   /  AST  26  /  ALT  28  /  AlkPhos  65  06-02    PT/INR - ( 02 Jun 2017 21:42 )   PT: 11.7 SEC;   INR: 1.04          PTT - ( 02 Jun 2017 21:42 )  PTT:30.8 SEC    Arterial Blood Gas:  06-03 @ 02:18  7.31/67/185/29/99.2/7.0  ABG lactate: 0.6        MICROBIOLOGY:     RADIOLOGY:  [ ] Reviewed and interpreted by me    EKG: sinus

## 2017-06-03 NOTE — H&P ADULT - NSHPLABSRESULTS_GEN_ALL_CORE
15.4   14.05 )-----------( 223      ( 02 Jun 2017 21:42 )             47.7     06-02    140  |  94<L>  |  25<H>  ----------------------------<  98  3.8   |  30  |  0.90    Ca    9.8      02 Jun 2017 21:42    TPro  8.0  /  Alb  4.0  /  TBili  0.3  /  DBili  x   /  AST  26  /  ALT  28  /  AlkPhos  65  06-02 15.4   14.05 )-----------( 223      ( 02 Jun 2017 21:42 )             47.7     06-02    140  |  94<L>  |  25<H>  ----------------------------<  98  3.8   |  30  |  0.90    Ca    9.8      02 Jun 2017 21:42    TPro  8.0  /  Alb  4.0  /  TBili  0.3  /  DBili  x   /  AST  26  /  ALT  28  /  AlkPhos  65  06-02    NSR, 81bpm, qtc 434, no acute Tw or ST changes - my reading

## 2017-06-03 NOTE — H&P ADULT - NSHPPHYSICALEXAM_GEN_ALL_CORE
PHYSICAL EXAM:  GENERAL: No acute distress  EYES: EOMI, PERRLA, conjunctiva and sclera clear  NECK: supple, no JVD  CHEST/LUNG: clear to auscultation bilaterally; no wheezing/rales/rhonchi  HEART: regular rate and rhythm; no murmurs, rubs, or gallops  ABDOMEN: bowel sounds present, soft, non tender, non distended  EXTREMITIES:  no edema, no clubbing or cyanosis, 2+ DP pulses  PSYCH: AAOx3  NEUROLOGY: no focal deficits  SKIN: warm and dry, no rashes or lesions PHYSICAL EXAM:  GENERAL: Heavily sedated, barely arousable to noxious stimuli  EYES: PERRL, conjunctiva and sclera clear  NECK: supple, no JVD, thick neck with considerable subQ fat  CHEST/LUNG: scattered coarse breath sounds, no wheezes or crackles, using accessory muscles for breathing  HEART: regular rate and rhythm; no murmurs, rubs, or gallops  ABDOMEN: bowel sounds present, soft, non tender, distended, extensive old surgical scars from Bright's procedure  EXTREMITIES:  no edema, no clubbing or cyanosis, 2+ DP pulses  PSYCH: heavily sedated  NEUROLOGY: unable to perform, pt unable to follow commands  SKIN: warm and dry, no rashes or lesions

## 2017-06-03 NOTE — H&P ADULT - PMH
Bronchiectasis    Diverticulitis    DM (diabetes mellitus)    Inguinal hernia    Sarcoid    Subdural hematoma

## 2017-06-03 NOTE — H&P ADULT - ATTENDING COMMENTS
Reached out to PGY2 regarding status of the pt; Resident stated that the pt is desaturating to 80s on RA, verbally responsive and being started on BiPAP by Resp therapist; Suspect multifactorial etiology of acute hypercarbic respiratory failure due to Haldol, Ativan, chronic lung disease;   -BiPAP  -ABG  -CXR  -MICU team notified Reached out to PGY2 regarding status of the pt; Resident stated that the pt is desaturating to 80s on RA, verbally responsive and being started on BiPAP by Resp therapist; Suspect multifactorial etiology of acute hypoxemic respiratory failure due to sedation (Haldol, Ativan) and chronic lung disease as well as elevated level of Valproic acid;   -BiPAP  -ABG  -CXR  -MICU team notified (the pt to be transferred to ICU for monitoring)

## 2017-06-03 NOTE — H&P ADULT - LYMPHATIC
anterior cervical L/supraclavicular L/anterior cervical R/posterior cervical R/supraclavicular R/posterior cervical L

## 2017-06-03 NOTE — ED ADULT NURSE REASSESSMENT NOTE - NS ED NURSE REASSESS COMMENT FT1
pt. asleep but arousable, calm at this time, in NAD. as per MD Lowe, pt. no longer needs CO. CO discontinued. pt. admitted, with bed. report given to CHOLO Gillespie.

## 2017-06-03 NOTE — CONSULT NOTE ADULT - ASSESSMENT
61 year old male with history of ICH, Sarcoidosis, Diverticulitis s/p resection presents with altered mental status complicated by acute hypoxic respiratory failure  NEURO: lethargic, arousable to painful stimuli, not following commands, mental status may be secondary to medication, holding valproic acid 2/2 elevated levels  CVS: BP stable   RENAL   RESP acute on chronic respiratory failure requiring bipap, patient may require intubation repeat abg to assess for improvment in hypercarbia on bipap  PSYCH holding additional sedative agents check ammonia level in am, 61 year old male with history of ICH, Sarcoidosis, Diverticulitis s/p resection presents with altered mental status complicated by acute hypoxic respiratory failure  NEURO: lethargic, arousable to painful stimuli, not following commands, mental status may be secondary to medication, holding valproic acid 2/2 elevated levels  CVS: BP stable   RENAL stable monitor lytes and volume status, patient required lasix iv during most recent hospitalization  RESP acute on chronic respiratory failure requiring bipap, patient may require intubation repeat abg to assess for improvement in hypercarbia on bipap  PSYCH holding additional sedative agents check ammonia level in am  ID leukocytosis of unclear etiology, check blood cultures, ua cxr and pocus, monitor of abx unless clinical status deteriorates

## 2017-06-03 NOTE — H&P ADULT - PROBLEM SELECTOR PLAN 2
Likely 2/2 valproic acid intoxication with VPA level elevated to 116, though other etiologies, including infectious, need to be worked up.  - Check VPA level in AM  - Will hold dose of depakote and dose by level when appropriate  - Will need Neurology consult  - CXR pending  - F/u blood cxs, UA Likely 2/2 valproic acid intoxication with VPA level elevated to 116, though other etiologies, including infectious, need to be worked up.  - Check VPA level in AM  - Will hold dose of Depakote and dose by level when appropriate  - Will need Neurology consult once respiratory failure resolved   - CXR pending  - F/u blood cxs, UA

## 2017-06-03 NOTE — H&P ADULT - PROBLEM SELECTOR PLAN 4
C/b chronic interstitial lung disease. O2 dependent now. Not on any steroids currently.  - Will need Rheum and Pulm consults   - C/w mike   - Start prednisone, but will need monitoring for S/S of any psychosis from steroid use C/b chronic interstitial lung disease. O2 dependent now. Not on any steroids currently.  - Will need Rheum and Pulm consults   - C/w Symbicort

## 2017-06-03 NOTE — CONSULT NOTE ADULT - ATTENDING COMMENTS
Patient seen and examined, agree with resident exam, assessment, and plan.  61M sent in from [facility] for agitation, delirium, and combativeness.  Following haldol and ativan, patient became obtunded and found hypoxic down to 80% on the floors- following placement on bipap at 12/6/50%, oxygen saturation improved to 96% and there was slight improvement in mental status.  ABG revealed a slightly compensated chronic respiratory acidosis and hypercapnea- pH of 7.31 with a pCO2 of 67; a serum bicarbonate chronically elevated to 30-35, and a pO2 of 185 while on 50% FiO2.  Patient is status post hospitalization for new onset seizures and hypoxic respiratory failure with known background of sarcoidosis and bronchiectasis- he required mechanical ventilation and initiation of antiepileptics.  Patient eventually discharged to [facility] with oxygen therapy and valproate for seizures. Patient seen and examined, agree with resident exam, assessment, and plan.  61M sent in from [facility] for agitation, delirium, and combativeness.  Following haldol and ativan, patient became obtunded and found hypoxic down to 80% on the floors- following placement on bipap at 12/6/50%, oxygen saturation improved to 96% and there was some improvement in mental status.  ABG revealed a slightly compensated chronic respiratory acidosis and hypercapnia- pH of 7.31 with a pCO2 of 67; a serum bicarbonate chronically elevated to 30-35, and a pO2 of 185 while on 50% FiO2.  Patient is status post hospitalization for new onset seizures and hypoxic respiratory failure with known background of sarcoidosis and bronchiectasis- he required mechanical ventilation and initiation of antiepileptics.  Patient eventually discharged to [facility] with oxygen therapy and valproate for seizures.  Will transfer to MICU for further airway monitoring while on bipap- if mental status does not improve or blood gases deteriorate, patient may require mechanical ventilation.  Will contact previous providers at [facility] regarding details of seizure workup- in the meantime valproate found to be supratherapeutic, will check levels prior to redosing.

## 2017-06-03 NOTE — H&P ADULT - HISTORY OF PRESENT ILLNESS
62 yo M with complicated medical/surgical history including sarcoidosis, bronchiectasis, ?rheumatoid arthritis, cholecystectomy, perforated diverticulitis s/p Bright procedure and reversal, b/l SDH s/p surgery, R inguinal hernia repair with mesh, and recent 2-week admission at [facility] for PNA and seizures [facility] with altered mental status.     In the ED, T 97.8, HR 84, /68, RR 18, O2 Sat 95% RA. Labs: leukocytosis with WBC 14.05, 2% bands. CMP with Na, K, and Ca wnl, HCO3 30, BUN 25, Cr 0.9. Serum tox screen negative. Got IV haldol 5mg x 1 and IV ativan 1mg x 1. 60 yo M with complicated medical/surgical history including sarcoidosis, bronchiectasis, ?rheumatoid arthritis, cholecystectomy, perforated diverticulitis s/p Bright procedure and reversal, b/l SDH s/p surgery, R inguinal hernia repair with mesh, and recent 2-week admission (5/14/17 - 6/1/17) at [facility] for new-onset generalized tonic-clonic seizures presents from [facility] with altered mental status.     Notes from [facility] personally reviewed.  According to the notes, pt was transferred from [facility] in [place] to [facility] on 5/14/17 for further management of new-onset generalized tonic-clonic seizures. Pt was driving with his girlfriend and when they had an argument in the car, pt's speech suddenly became unintelligible and stuttering. Pt then tipped his head back and became unresponsive, so his girlfriend maneuvered the car to the curb. Pt then appeared to be alert and back at his baseline. Pt was taken to [facility], and at that hospital, had a witness GTC seizure lasting 1 minute and given ativan 2mg. However, pt became apneic and was intubated and loaded with Keppra 3g. Head CT at [facility] was normal.    At [facility], pt's hospital course was complicated by     In the ED, T 97.8, HR 84, /68, RR 18, O2 Sat 95% RA. Labs: leukocytosis with WBC 14.05, 2% bands. CMP with Na, K, and Ca wnl, HCO3 30, BUN 25, Cr 0.9. Serum tox screen negative. Got IV haldol 5mg x 1 and IV ativan 1mg x 1. 62 yo M with complicated medical/surgical history including sarcoidosis, bronchiectasis, ?rheumatoid arthritis, cholecystectomy, perforated diverticulitis s/p Bright procedure and reversal, b/l SDH s/p surgery, R inguinal hernia repair with mesh, and recent 2-week admission (5/14/17 - 6/1/17) at [place] for new-onset generalized tonic-clonic seizures presents from [facility] with altered mental status.     Notes from [place] personally reviewed.  According to the notes, pt was transferred from [facility] in [place] to [place] [facility] ICU on 5/14/17 for further management of new-onset GTC seizures. Pt was driving with his girlfriend and when they had an argument in the car, pt's speech suddenly became unintelligible and stuttering. Pt then tipped his head back and became unresponsive, so his girlfriend maneuvered the car to the curb. Pt then appeared to be alert and back at his baseline. Pt was taken to [facility], and at that hospital, had a witness GTC seizure lasting 1 minute and given ativan 2mg. However, pt became apneic and was intubated and loaded with Keppra 3g. Head CT at [facility] was normal.    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 CT chest at [place] showed central peribronchial fibrosis with air trapping with basilar groundglass opacity and small pleural effusions. 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 The trigger for pt's seizures was not able to be determined despite various workup, including LP (5/14: glucose 89, protein 45, WBC 0, VZV and HSV negative), MRI brain, autoimmune and paraneoplastic panel, and check of heavy metals, mercury, and vitamin B12. Pt's continuous EEG showed no epileptiform activity. Pt was transferred from the Neuro ICU to the Medicine service on 5/21/17, and upon improvement in pt's delirium and psychosis (only exhibiting confabulations), was discharged on 6/1/17 to [facility].     At [facility], however, pt was demonstrating delusions, hallucinations, and aggressive behavior, so pt was transferred to [facility] for evaluation of altered mental status.    In the ED, T 97.8, HR 84, /68, RR 18, O2 Sat 95% RA. Labs: leukocytosis with WBC 14.05, 2% bands. CMP with Na, K, and Ca wnl, HCO3 30, BUN 25, Cr 0.9. Serum tox screen negative. Got IV haldol 5mg x 1 and IV ativan 1mg x 1. 60 yo M with complicated medical/surgical history including sarcoidosis, bronchiectasis, ?rheumatoid arthritis, cholecystectomy, perforated diverticulitis s/p Bright procedure and reversal, b/l SDH s/p surgery, R inguinal hernia repair with mesh, and recent 2-week admission (5/14/17 - 6/1/17) at [place] for new-onset generalized tonic-clonic seizures presents from [facility] with altered mental status.     Notes from [place] personally reviewed.  According to the notes, pt was transferred from [facility] in [place] to [place] [facility] ICU on 5/14/17 for further management of new-onset GTC seizures. Pt was driving with his girlfriend and when they had an argument in the car, pt's speech suddenly became unintelligible and stuttering. Pt then tipped his head back and became unresponsive, so his girlfriend maneuvered the car to the curb. Pt then appeared to be alert and back at his baseline. Pt was taken to [facility], and at that hospital, had a witness GTC seizure lasting 1 minute and given ativan 2mg. However, pt became apneic and was intubated and loaded with Keppra 3g. Head CT at [facility] was normal.    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 CT chest at [place] showed central peribronchial fibrosis with air trapping with basilar groundglass opacity and small pleural effusions. 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 The trigger for pt's seizures was not able to be determined despite various workup, including LP (5/14: glucose 89, protein 45, WBC 0, VZV and HSV negative), MRI brain, autoimmune and paraneoplastic panel, and check of heavy metals, mercury, and vitamin B12. Pt's continuous EEG showed no epileptiform activity. Pt was transferred from the Neuro ICU to the Medicine service on 5/21/17, and upon improvement in pt's delirium and psychosis (only exhibiting confabulations), was discharged on 6/1/17 to [facility].     At [facility], however, pt was demonstrating delusions, hallucinations, and aggressive behavior, so pt was transferred to [facility] for evaluation of altered mental status.    HPI unable to be obtained from pt, as pt sedated with IV haldol and ativan in the ED. Pt    In the ED, T 97.8, HR 84, /68, RR 18, O2 Sat 95% RA. Labs: leukocytosis with WBC 14.05, 2% bands. CMP with Na, K, and Ca wnl, HCO3 30, BUN 25, Cr 0.9. Serum tox screen negative. Got IV haldol 5mg x 1 and IV ativan 1mg x 1. 60 yo Male with complicated medical/surgical history including sarcoidosis, bronchiectasis, ?rheumatoid arthritis, cholecystectomy, perforated diverticulitis s/p Bright procedure and reversal, b/l SDH s/p surgery, R inguinal hernia repair with mesh, and recent 2-week admission (5/14/17 - 6/1/17) at [place] for new-onset generalized tonic-clonic seizures presents from [facility] with altered mental status.     Notes from [place] personally reviewed.  According to the notes, pt was transferred from [facility] in [place] to [place] [facility] ICU on 5/14/17 for further management of new-onset GTC seizures. Pt was driving with his girlfriend and when they had an argument in the car, pt's speech suddenly became unintelligible and stuttering. Pt then tipped his head back and became unresponsive, so his girlfriend maneuvered the car to the curb. Pt then appeared to be alert and back at his baseline. Pt was taken to [facility], and at that hospital, had a witness GTC seizure lasting 1 minute and given ativan 2mg. However, pt became apneic and was intubated and loaded with Keppra 3g. Head CT at [facility] was normal.    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 CT chest at [place] showed central peribronchial fibrosis with air trapping with basilar groundglass opacity and small pleural effusions. 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 The trigger for pt's seizures was not able to be determined despite various workup, including LP (5/14: glucose 89, protein 45, WBC 0, VZV and HSV negative), MRI brain, autoimmune and paraneoplastic panel, and check of heavy metals, mercury, and vitamin B12. Pt's continuous EEG showed no epileptiform activity. Pt was transferred from the Neuro ICU to the Medicine service on 5/21/17, and upon improvement in pt's delirium and psychosis (only exhibiting confabulations), was discharged on 6/1/17 to [facility].     At [facility], however, pt was demonstrating delusions, hallucinations, and aggressive behavior, so pt was transferred to [facility] for evaluation of altered mental status.    HPI unable to be obtained from pt, as pt sedated with IV haldol and ativan in the ED. Pt    In the ED, T 97.8, HR 84, /68, RR 18, O2 Sat 95% RA. Labs: leukocytosis with WBC 14.05, 2% bands. CMP with Na, K, and Ca wnl, HCO3 30, BUN 25, Cr 0.9. Serum tox screen negative. Got IV haldol 5mg x 1 and IV ativan 1mg x 1. 60 yo Male with complicated medical/surgical history including sarcoidosis, bronchiectasis, ?rheumatoid arthritis, cholecystectomy, perforated diverticulitis s/p Bright procedure and reversal, b/l SDH s/p surgery, R inguinal hernia repair with mesh, and recent 2-week admission (5/14/17 - 6/1/17) at [place] for new-onset generalized tonic-clonic seizures presents from [facility] with altered mental status.     Notes from [place] personally reviewed.  According to the notes, pt was transferred from [facility] in [place] to [place] [facility] ICU on 5/14/17 for further management of new-onset GTC seizures. Pt was driving with his girlfriend and when they had an argument in the car, pt's speech suddenly became unintelligible and stuttering. Pt then tipped his head back and became unresponsive, so his girlfriend maneuvered the car to the curb. Pt then appeared to be alert and back at his baseline. Pt was taken to [facility], and at that hospital, had a witness GTC seizure lasting 1 minute and given ativan 2mg. However, pt became apneic and was intubated and loaded with Keppra 3g. Head CT at [facility] was normal.    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 CT chest at [place] showed central peribronchial fibrosis with air trapping with basilar groundglass opacity and small pleural effusions. 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 The trigger for pt's seizures was not able to be determined despite various workup, including LP (5/14: glucose 89, protein 45, WBC 0, VZV and HSV negative), MRI brain, autoimmune and paraneoplastic panel, and check of heavy metals, mercury, and vitamin B12. Pt's continuous EEG showed no epileptiform activity. Pt was transferred from the Neuro ICU to the Medicine service on 5/21/17, and upon improvement in pt's delirium and psychosis (only exhibiting confabulations), was discharged on 6/1/17 to [facility].     At [facility], however, pt was demonstrating delusions, hallucinations, and aggressive behavior, so pt was transferred to [facility] for evaluation of altered mental status.    HPI unable to be obtained from pt, as pt heavily sedated with IV haldol and ativan in the ED. Several attempts to contact pt's sister by phone for collateral information. However, pt's sister unreachable, and VM left.     In the ED, T 97.8, HR 84, /68, RR 18, O2 Sat 95% RA. Labs: leukocytosis with WBC 14.05, 2% bands. CMP with Na, K, and Ca wnl, HCO3 30, BUN 25, Cr 0.9. Serum tox screen negative. Got IV haldol 5mg x 1 and IV ativan 1mg x 1.

## 2017-06-03 NOTE — H&P ADULT - PROBLEM SELECTOR PLAN 5
- On chopped diet with honey thickened liquids  - Obtain S&S eval - On chopped diet with honey thickened liquids once off BiPAP  - Obtain S&S eval

## 2017-06-03 NOTE — H&P ADULT - NSHPREVIEWOFSYSTEMS_GEN_ALL_CORE
REVIEW OF SYSTEMS:    CONSTITUTIONAL: No fevers, no chilss, no weakness, no weight loss  EYES/ENT: No visual changes;  No dizziness/vertigo or throat pain   NECK: No pain or stiffness  RESPIRATORY: No shortness of breath, no cough or wheezing   CARDIOVASCULAR: No chest pain or palpitations, no dyspnea on exertion  GASTROINTESTINAL: No nausea/vomiting/diarrhea, no abdominal pain, no melena or BRBPR, no constipation  GENITOURINARY: No dysuria, increased frequency or hematuria  NEUROLOGICAL: No numbness or weakness  SKIN: No itching, rashes, or lesions   All other review of systems is negative unless indicated above Pt heavily sedated. Unable to obtain.

## 2017-06-04 LAB
ALBUMIN SERPL ELPH-MCNC: 3 G/DL — LOW (ref 3.3–5)
ALP SERPL-CCNC: 53 U/L — SIGNIFICANT CHANGE UP (ref 40–120)
ALT FLD-CCNC: 29 U/L — SIGNIFICANT CHANGE UP (ref 4–41)
AST SERPL-CCNC: 26 U/L — SIGNIFICANT CHANGE UP (ref 4–40)
BASE EXCESS BLDA CALC-SCNC: 9.6 MMOL/L — SIGNIFICANT CHANGE UP
BASOPHILS # BLD AUTO: 0.06 K/UL — SIGNIFICANT CHANGE UP (ref 0–0.2)
BASOPHILS NFR BLD AUTO: 0.8 % — SIGNIFICANT CHANGE UP (ref 0–2)
BILIRUB SERPL-MCNC: 0.2 MG/DL — SIGNIFICANT CHANGE UP (ref 0.2–1.2)
BUN SERPL-MCNC: 21 MG/DL — SIGNIFICANT CHANGE UP (ref 7–23)
CALCIUM SERPL-MCNC: 8.9 MG/DL — SIGNIFICANT CHANGE UP (ref 8.4–10.5)
CHLORIDE BLDA-SCNC: 105 MMOL/L — SIGNIFICANT CHANGE UP (ref 96–108)
CHLORIDE SERPL-SCNC: 101 MMOL/L — SIGNIFICANT CHANGE UP (ref 98–107)
CO2 SERPL-SCNC: 31 MMOL/L — SIGNIFICANT CHANGE UP (ref 22–31)
CREAT BLDA-MCNC: 0.67 MG/DL — SIGNIFICANT CHANGE UP (ref 0.5–1.3)
CREAT SERPL-MCNC: 0.67 MG/DL — SIGNIFICANT CHANGE UP (ref 0.5–1.3)
EOSINOPHIL # BLD AUTO: 0.35 K/UL — SIGNIFICANT CHANGE UP (ref 0–0.5)
EOSINOPHIL NFR BLD AUTO: 4.5 % — SIGNIFICANT CHANGE UP (ref 0–6)
GLUCOSE BLDA-MCNC: 121 MG/DL — HIGH (ref 70–99)
GLUCOSE SERPL-MCNC: 121 MG/DL — HIGH (ref 70–99)
HCO3 BLDA-SCNC: 32 MMOL/L — HIGH (ref 22–26)
HCT VFR BLD CALC: 42.2 % — SIGNIFICANT CHANGE UP (ref 39–50)
HCT VFR BLDA CALC: 40.8 % — SIGNIFICANT CHANGE UP (ref 39–51)
HGB BLD-MCNC: 13 G/DL — SIGNIFICANT CHANGE UP (ref 13–17)
HGB BLDA-MCNC: 13.3 G/DL — SIGNIFICANT CHANGE UP (ref 13–17)
IMM GRANULOCYTES NFR BLD AUTO: 3.9 % — HIGH (ref 0–1.5)
LACTATE BLDA-SCNC: 0.8 MMOL/L — SIGNIFICANT CHANGE UP (ref 0.5–2)
LYMPHOCYTES # BLD AUTO: 1.34 K/UL — SIGNIFICANT CHANGE UP (ref 1–3.3)
LYMPHOCYTES # BLD AUTO: 17.3 % — SIGNIFICANT CHANGE UP (ref 13–44)
MAGNESIUM SERPL-MCNC: 2.2 MG/DL — SIGNIFICANT CHANGE UP (ref 1.6–2.6)
MCHC RBC-ENTMCNC: 27.1 PG — SIGNIFICANT CHANGE UP (ref 27–34)
MCHC RBC-ENTMCNC: 30.8 % — LOW (ref 32–36)
MCV RBC AUTO: 88.1 FL — SIGNIFICANT CHANGE UP (ref 80–100)
MONOCYTES # BLD AUTO: 0.91 K/UL — HIGH (ref 0–0.9)
MONOCYTES NFR BLD AUTO: 11.8 % — SIGNIFICANT CHANGE UP (ref 2–14)
NEUTROPHILS # BLD AUTO: 4.77 K/UL — SIGNIFICANT CHANGE UP (ref 1.8–7.4)
NEUTROPHILS NFR BLD AUTO: 61.7 % — SIGNIFICANT CHANGE UP (ref 43–77)
PCO2 BLDA: 57 MMHG — HIGH (ref 35–48)
PH BLDA: 7.4 PH — SIGNIFICANT CHANGE UP (ref 7.35–7.45)
PHOSPHATE SERPL-MCNC: 4.1 MG/DL — SIGNIFICANT CHANGE UP (ref 2.5–4.5)
PLATELET # BLD AUTO: 146 K/UL — LOW (ref 150–400)
PMV BLD: 8.9 FL — SIGNIFICANT CHANGE UP (ref 7–13)
PO2 BLDA: 125 MMHG — HIGH (ref 83–108)
POTASSIUM BLDA-SCNC: 4.1 MMOL/L — SIGNIFICANT CHANGE UP (ref 3.4–4.5)
POTASSIUM SERPL-MCNC: 4.5 MMOL/L — SIGNIFICANT CHANGE UP (ref 3.5–5.3)
POTASSIUM SERPL-SCNC: 4.5 MMOL/L — SIGNIFICANT CHANGE UP (ref 3.5–5.3)
PROT SERPL-MCNC: 6.1 G/DL — SIGNIFICANT CHANGE UP (ref 6–8.3)
RBC # BLD: 4.79 M/UL — SIGNIFICANT CHANGE UP (ref 4.2–5.8)
RBC # FLD: 17.3 % — HIGH (ref 10.3–14.5)
SAO2 % BLDA: 98.8 % — SIGNIFICANT CHANGE UP (ref 95–99)
SODIUM BLDA-SCNC: 134 MMOL/L — LOW (ref 136–146)
SODIUM SERPL-SCNC: 142 MMOL/L — SIGNIFICANT CHANGE UP (ref 135–145)
SPECIMEN SOURCE: SIGNIFICANT CHANGE UP
WBC # BLD: 7.73 K/UL — SIGNIFICANT CHANGE UP (ref 3.8–10.5)
WBC # FLD AUTO: 7.73 K/UL — SIGNIFICANT CHANGE UP (ref 3.8–10.5)

## 2017-06-04 PROCEDURE — 99233 SBSQ HOSP IP/OBS HIGH 50: CPT | Mod: GC

## 2017-06-04 RX ORDER — INSULIN LISPRO 100/ML
VIAL (ML) SUBCUTANEOUS
Qty: 0 | Refills: 0 | Status: DISCONTINUED | OUTPATIENT
Start: 2017-06-04 | End: 2017-06-08

## 2017-06-04 RX ORDER — DEXTROSE 50 % IN WATER 50 %
25 SYRINGE (ML) INTRAVENOUS ONCE
Qty: 0 | Refills: 0 | Status: DISCONTINUED | OUTPATIENT
Start: 2017-06-04 | End: 2017-06-08

## 2017-06-04 RX ORDER — GLUCAGON INJECTION, SOLUTION 0.5 MG/.1ML
1 INJECTION, SOLUTION SUBCUTANEOUS ONCE
Qty: 0 | Refills: 0 | Status: DISCONTINUED | OUTPATIENT
Start: 2017-06-04 | End: 2017-06-08

## 2017-06-04 RX ORDER — DEXTROSE 50 % IN WATER 50 %
12.5 SYRINGE (ML) INTRAVENOUS ONCE
Qty: 0 | Refills: 0 | Status: DISCONTINUED | OUTPATIENT
Start: 2017-06-04 | End: 2017-06-08

## 2017-06-04 RX ORDER — SODIUM CHLORIDE 9 MG/ML
1000 INJECTION, SOLUTION INTRAVENOUS
Qty: 0 | Refills: 0 | Status: DISCONTINUED | OUTPATIENT
Start: 2017-06-04 | End: 2017-06-08

## 2017-06-04 RX ORDER — DEXTROSE 50 % IN WATER 50 %
1 SYRINGE (ML) INTRAVENOUS ONCE
Qty: 0 | Refills: 0 | Status: DISCONTINUED | OUTPATIENT
Start: 2017-06-04 | End: 2017-06-08

## 2017-06-04 RX ADMIN — HEPARIN SODIUM 5000 UNIT(S): 5000 INJECTION INTRAVENOUS; SUBCUTANEOUS at 13:55

## 2017-06-04 RX ADMIN — Medication 3 MILLILITER(S): at 21:44

## 2017-06-04 RX ADMIN — Medication 3 MILLILITER(S): at 09:41

## 2017-06-04 RX ADMIN — BUDESONIDE AND FORMOTEROL FUMARATE DIHYDRATE 2 PUFF(S): 160; 4.5 AEROSOL RESPIRATORY (INHALATION) at 09:40

## 2017-06-04 RX ADMIN — TAMSULOSIN HYDROCHLORIDE 0.4 MILLIGRAM(S): 0.4 CAPSULE ORAL at 21:49

## 2017-06-04 RX ADMIN — HEPARIN SODIUM 5000 UNIT(S): 5000 INJECTION INTRAVENOUS; SUBCUTANEOUS at 05:51

## 2017-06-04 RX ADMIN — Medication 28.75 MILLIGRAM(S): at 18:15

## 2017-06-04 RX ADMIN — Medication 28.75 MILLIGRAM(S): at 05:51

## 2017-06-04 RX ADMIN — Medication 3 MILLILITER(S): at 15:41

## 2017-06-04 RX ADMIN — BUDESONIDE AND FORMOTEROL FUMARATE DIHYDRATE 2 PUFF(S): 160; 4.5 AEROSOL RESPIRATORY (INHALATION) at 21:49

## 2017-06-04 RX ADMIN — HEPARIN SODIUM 5000 UNIT(S): 5000 INJECTION INTRAVENOUS; SUBCUTANEOUS at 21:48

## 2017-06-04 RX ADMIN — Medication 3 MILLILITER(S): at 04:13

## 2017-06-04 NOTE — PROGRESS NOTE ADULT - ASSESSMENT
61M with sarcoidosis, bronchiectasis, chronic CO2 retention, perforated diverticulitis s/p Bright procedure and reversal, b/l SDH s/p surgery (2010), R inguinal hernia repair with mesh, and recent 2-week admission (5/14/17 - 6/1/17) at [facility] for new-onset generalized tonic-clonic seizures presented 6/2 from [facility] after 1 day stay with altered mental status. Pt was transferred to the MICU for lethargy and hypercarbic respiratory failure after receiving ativam 1mg x1 and haldol 5mg x1.    # Hypercarbic Respiratory Failure: resolved, ABG suggestive of chronic hypercarbia, saturating high 90s on room air  - Monitor off BIPAP  - Pt needs outpatient pulmonology evaluation for CO2 retention: LAMBERT?, sarcoidosis, restrictive lung disease  - Avoid benzos    # Encephalopathy: unclear origin, currently AAOx3 but inappropriate tangential speech, negative LP and unremarkable MRI at [facility]  - Pt's home olanzapine has been held since admission but has remained calm  - Obtain further records from [facility]: differential included keppra induced psychosis, undiagnosed psychiatric disease, but cause remains unclear    # Seizure Disorder: documented seizure history, but need to obtain further records from [facility]  - Continue depakote  - Consider neurology consult    # Urinary Retention: c/w flomax, [id number] with sarcoidosis, bronchiectasis, chronic CO2 retention, perforated diverticulitis s/p Bright procedure and reversal, b/l SDH s/p surgery (2010), R inguinal hernia repair with mesh, and recent 2-week admission (5/14/17 - 6/1/17) at [facility] for new-onset generalized tonic-clonic seizures presented 6/2 from [facility] after 1 day stay with altered mental status. Pt was transferred to the MICU for lethargy and hypercarbic respiratory failure after receiving ativam 1mg x1 and haldol 5mg x1. Pt was started on BIPAP with improvement of his acidosis and hypercarbia on repeat ABG. Pt became more alert and refused BIPAP but was successfully transitioned to room air. He was placed on BIPAP overnight but ABG in the AM was unchanged from prior ABG yesterday while on nasal canula. Pt continues to saturate in the high 90s on room air with otherwise normal vitals signs since transitioned to room air yesterday. He was continued on valproate antiepileptic therapy as pt was documented to have     # DVT ppx: heparin sc

## 2017-06-04 NOTE — PROGRESS NOTE ADULT - SUBJECTIVE AND OBJECTIVE BOX
CHIEF COMPLAINT: Patient is a 61y old  Male who presents with a chief complaint of AMS (2017 00:13)    Interval Events:    REVIEW OF SYSTEMS:  Constitutional:   Eyes:  ENT:  CV:  Resp:  GI:  :  MSK:  Integumentary:  Neurological:  Psychiatric:  Endocrine:  Hematologic/Lymphatic:  Allergic/Immunologic:  [ ] All other systems negative  [ ] Unable to assess ROS because ________    OBJECTIVE:  ICU Vital Signs Last 24 Hrs  T(C): 36.3, Max: 36.3 ( @ 04:00)  T(F): 97.4, Max: 97.4 ( @ 08:00)  HR: 79 (54 - 79)  BP: 101/55 (86/49 - 132/77)  BP(mean): 66 (56 - 89)  ABP: --  ABP(mean): --  RR: 15 (12 - 20)  SpO2: 93% (92% - 100%)        I & Os for current day (as of  @ 08:29)  =============================================  IN: 1785 ml / OUT: 960 ml / NET: 825 ml    CAPILLARY BLOOD GLUCOSE  108 (2017 05:53)      PHYSICAL EXAM:  General:   HEENT:   Lymph Nodes:  Neck:   Respiratory:   Cardiovascular:   Abdomen:   Extremities:   Skin:   Neurological:  Psychiatry:    HOSPITAL MEDICATIONS:  MEDICATIONS  (STANDING):  heparin  Injectable 5000Unit(s) SubCutaneous every 8 hours  ALBUTerol/ipratropium for Nebulization 3milliLiter(s) Nebulizer every 6 hours  buDESOnide 160 MICROgram(s)/formoterol 4.5 MICROgram(s) Inhaler 2Puff(s) Inhalation two times a day  sodium chloride 0.9%. 1000milliLiter(s) IV Continuous <Continuous>  valproate sodium IVPB 750milliGRAM(s) IV Intermittent every 12 hours  tamsulosin 0.4milliGRAM(s) Oral at bedtime  insulin lispro (HumaLOG) corrective regimen sliding scale  SubCutaneous three times a day before meals  dextrose 5%. 1000milliLiter(s) IV Continuous <Continuous>  dextrose 50% Injectable 12.5Gram(s) IV Push once  dextrose 50% Injectable 25Gram(s) IV Push once  dextrose 50% Injectable 25Gram(s) IV Push once    MEDICATIONS  (PRN):  dextrose Gel 1Dose(s) Oral once PRN Blood Glucose LESS THAN 70 milliGRAM(s)/deciliter  glucagon  Injectable 1milliGRAM(s) IntraMuscular once PRN Glucose LESS THAN 70 milligrams/deciliter      LABS:  ( @ 04:25)                        13.0  7.73 )-----------( 146                 42.2    Neutrophils = 4.77 (61.7%)  Lymphocytes = 1.34 (17.3%)  Eosinophils = 0.35 (4.5%)  Basophils = 0.06 (0.8%)  Monocytes = 0.91 (11.8%)  Bands = --%    WBC Trend: 7.73<--, 11.06<--, 14.05<--  Hb Trend: 13.0<--, 15.1<--, 15.4<--  Plt Trend: 146<--, 178<--, 223<--  06-04    142  |  101  |  21  ----------------------------<  121<H>  4.5   |  31  |  0.67    Ca    8.9      2017 04:25  Phos  4.1     06-04  Mg     2.2     06-04    TPro  6.1  /  Alb  3.0<L>  /  TBili  0.2  /  DBili  x   /  AST  26  /  ALT  29  /  AlkPhos  53  06-04    Creatinine Trend: 0.67<--, 0.80<--, 0.90<--  PT/INR - ( 2017 21:42 )   PT: 11.7 SEC;   INR: 1.04          PTT - ( 2017 21:42 )  PTT:30.8 SEC  Urinalysis Basic - ( 2017 18:08 )    Color: YELLOW / Appearance: CLEAR / S.022 / pH: 6.0  Gluc: NEGATIVE / Ketone: TRACE  / Bili: NEGATIVE / Urobili: NORMAL E.U.   Blood: NEGATIVE / Protein: 30 / Nitrite: NEGATIVE   Leuk Esterase: SMALL / RBC: 0-2 / WBC 10-25   Sq Epi: x / Non Sq Epi: x / Bacteria: x      Arterial Blood Gas:   @ 04:25  7.40/57/125/32/98.8/9.6  ABG lactate: 0.8  Arterial Blood Gas:   @ 15:34  7.41/56/94/32/97.4/9.7  ABG lactate: 0.7  Arterial Blood Gas:   @ 09:26  7.38/60/92/32/96.6/9.6  ABG lactate: --  Arterial Blood Gas:   @ 02:18  7.31/67/185/29/99.2/7.0  ABG lactate: 0.6            MICROBIOLOGY:   Blood Cx:  Urine Cx:  Sputum Cx:  Legionella:  RVP:    RADIOLOGY:  X Ray:  CT:  MRI:  Ultrasound:  [ ] Reviewed and interpreted by me    EKG: CHIEF COMPLAINT: Lethargy, Hypercarbic Respiratory Failure    Interval Events: No events. Pt was on BIPAP overnight but now cotmfortable on nasal canula saturating in high 90s on nasal canula. ABG same on BIPAP as on nasal canula yesterday.     REVIEW OF SYSTEMS:  Constitutional: no fever  Eyes: no eye pain or blurred vision  ENT: no nasal congestion  CV: no chest pain or palpitations  Resp: no dyspnea or cough  GI: no abdominal pain or diarrhea  : haq in place, mild discomfort  MSK: no arthralgia or myalgias  Integumentary: no rash  Neurological: no headache  Psychiatric: reports good mood  Endocrine:  Hematologic/Lymphatic: no bleeding  Allergic/Immunologic:  [ ] All other systems negative  [ ] Unable to assess ROS because ________    OBJECTIVE:  ICU Vital Signs Last 24 Hrs  T(C): 36.3, Max: 36.3 ( @ 04:00)  T(F): 97.4, Max: 97.4 ( @ 08:00)  HR: 79 (54 - 79)  BP: 101/55 (86/49 - 132/77)  BP(mean): 66 (56 - 89)  ABP: --  ABP(mean): --  RR: 15 (12 - 20)  SpO2: 93% (92% - 100%)        I & Os for current day (as of  @ 08:29)  =============================================  IN: 1785 ml / OUT: 960 ml / NET: 825 ml    CAPILLARY BLOOD GLUCOSE  108 (2017 05:53)      PHYSICAL EXAM:  General: NAD  HEENT: PERRL, EOMI  Lymph Nodes: no cervical lymphadenopathy  Neck: no JVD  Respiratory: mild crackles at bases  Cardiovascular: RRR, no murmurs  Abdomen: soft, NTND, +BS, surgical scar  Extremities: trace pedal edema  Skin: no rashes  Neurological: AAOx3, nonfocal, documented gait instability but not tested  Psychiatry: euthymic, tangential and inappropriate speech    HOSPITAL MEDICATIONS:  MEDICATIONS  (STANDING):  heparin  Injectable 5000Unit(s) SubCutaneous every 8 hours  ALBUTerol/ipratropium for Nebulization 3milliLiter(s) Nebulizer every 6 hours  buDESOnide 160 MICROgram(s)/formoterol 4.5 MICROgram(s) Inhaler 2Puff(s) Inhalation two times a day  sodium chloride 0.9%. 1000milliLiter(s) IV Continuous <Continuous>  valproate sodium IVPB 750milliGRAM(s) IV Intermittent every 12 hours  tamsulosin 0.4milliGRAM(s) Oral at bedtime  insulin lispro (HumaLOG) corrective regimen sliding scale  SubCutaneous three times a day before meals  dextrose 5%. 1000milliLiter(s) IV Continuous <Continuous>  dextrose 50% Injectable 12.5Gram(s) IV Push once  dextrose 50% Injectable 25Gram(s) IV Push once  dextrose 50% Injectable 25Gram(s) IV Push once    MEDICATIONS  (PRN):  dextrose Gel 1Dose(s) Oral once PRN Blood Glucose LESS THAN 70 milliGRAM(s)/deciliter  glucagon  Injectable 1milliGRAM(s) IntraMuscular once PRN Glucose LESS THAN 70 milligrams/deciliter      LABS:  ( @ 04:25)                        13.0  7.73 )-----------( 146                 42.2    Neutrophils = 4.77 (61.7%)  Lymphocytes = 1.34 (17.3%)  Eosinophils = 0.35 (4.5%)  Basophils = 0.06 (0.8%)  Monocytes = 0.91 (11.8%)  Bands = --%    WBC Trend: 7.73<--, 11.06<--, 14.05<--  Hb Trend: 13.0<--, 15.1<--, 15.4<--  Plt Trend: 146<--, 178<--, 223<--  06-04    142  |  101  |  21  ----------------------------<  121<H>  4.5   |  31  |  0.67    Ca    8.9      2017 04:25  Phos  4.1     06-04  Mg     2.2     06-04    TPro  6.1  /  Alb  3.0<L>  /  TBili  0.2  /  DBili  x   /  AST  26  /  ALT  29  /  AlkPhos  53  06-04    Creatinine Trend: 0.67<--, 0.80<--, 0.90<--  PT/INR - ( 2017 21:42 )   PT: 11.7 SEC;   INR: 1.04          PTT - ( 2017 21:42 )  PTT:30.8 SEC  Urinalysis Basic - ( 2017 18:08 )    Color: YELLOW / Appearance: CLEAR / S.022 / pH: 6.0  Gluc: NEGATIVE / Ketone: TRACE  / Bili: NEGATIVE / Urobili: NORMAL E.U.   Blood: NEGATIVE / Protein: 30 / Nitrite: NEGATIVE   Leuk Esterase: SMALL / RBC: 0-2 / WBC 10-25   Sq Epi: x / Non Sq Epi: x / Bacteria: x      Arterial Blood Gas:   @ 04:25  7.40/57/125/32/98.8/9.6  ABG lactate: 0.8  Arterial Blood Gas:   @ 15:34  7.41/56/94/32/97.4/9.7  ABG lactate: 0.7  Arterial Blood Gas:   @ 09:26  7.38/60/92/32/96.6/9.6  ABG lactate: --  Arterial Blood Gas:   @ 02:18  7.31/67/185/29/99.2/7.0  ABG lactate: 0.6            MICROBIOLOGY:   Blood Cx: negative   Urine Cx:  Sputum Cx:  Legionella:  RVP:    RADIOLOGY:  X Ray:  CT:  MRI:  Ultrasound:  [ ] Reviewed and interpreted by me    EKG:

## 2017-06-04 NOTE — CHART NOTE - NSCHARTNOTEFT_GEN_A_CORE
61M with sarcoidosis, bronchiectasis, chronic CO2 retention, perforated diverticulitis s/p Bright procedure and reversal, b/l SDH s/p surgery (2010), R inguinal hernia repair with mesh, and recent 2-week admission (5/14/17 - 6/1/17) at [facility] for new-onset generalized tonic-clonic seizures presents from [facility] with altered mental status.     Notes from [facility] personally reviewed.  According to the notes, pt was transferred from [facility] in [place] to [facility] ICU on 5/14/17 for further management of new-onset GTC seizures. Pt was driving with his girlfriend and when they had an argument in the car, pt's speech suddenly became unintelligible and stuttering. Pt then tipped his head back and became unresponsive, so his girlfriend maneuvered the car to the curb. Pt then appeared to be alert and back at his baseline. Pt was taken to [facility], and at that hospital, had a witness GTC seizure lasting 1 minute and given ativan 2mg. However, pt became apneic and was intubated and loaded with Keppra 3g. Head CT at [facility] was normal.    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 CT chest at [facility] showed central peribronchial fibrosis with air trapping with basilar groundglass opacity and small pleural effusions. 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 The trigger for pt's seizures was not able to be determined despite various workup, including LP (5/14: glucose 89, protein 45, WBC 0, VZV and HSV negative), MRI brain, autoimmune and paraneoplastic panel, and check of heavy metals, mercury, and vitamin B12. Pt's continuous EEG showed no epileptiform activity. Pt was transferred from the Neuro ICU to the Medicine service on 5/21/17, and upon improvement in pt's delirium and psychosis (only exhibiting confabulations), was discharged on 6/1/17 to [facility].     At [facility], however, pt was demonstrating delusions, hallucinations, and aggressive behavior, so pt was transferred to [facility] for evaluation of altered mental status.    HPI unable to be obtained from pt, as pt heavily sedated with IV haldol and ativan in the ED. Several attempts to contact pt's sister by phone for collateral information. However, pt's sister unreachable, and VM left. 61M with sarcoidosis, bronchiectasis, chronic CO2 retention, perforated diverticulitis s/p Bright procedure and reversal, b/l SDH s/p surgery (2010), R inguinal hernia repair with mesh, and recent 2-week admission (5/14/17 - 6/1/17) at [place] for new-onset generalized tonic-clonic seizures presented [facility] after 1 day stay with altered mental status. Pt was transferred to the MICU for lethargy and hypercarbic respiratory failure after receiving ativam 1mg x1 and haldol 5mg x1. Pt was started on BIPAP with improvement of his acidosis and hypercarbia on repeat ABG. Pt became more alert and refused BIPAP but was successfully transitioned to room air. He was placed on BIPAP overnight but ABG in the AM was unchanged from prior ABG yesterday while on nasal canula. Pt continues to saturate in the high 90s on room air with otherwise normal vitals signs since transitioned to room air yesterday. He was continued on valproate antiepileptic therapy as pt was documented to have     Notes from [place] personally reviewed.  According to the notes, pt was transferred from [facility] in [place] to [place] [facility] ICU on 5/14/17 for further management of new-onset GTC seizures. Pt was driving with his girlfriend and when they had an argument in the car, pt's speech suddenly became unintelligible and stuttering. Pt then tipped his head back and became unresponsive, so his girlfriend maneuvered the car to the curb. Pt then appeared to be alert and back at his baseline. Pt was taken to [facility], and at that hospital, had a witness GTC seizure lasting 1 minute and given ativan 2mg. However, pt became apneic and was intubated and loaded with Keppra 3g. Head CT at [facility] was normal.    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 CT chest at [facility] showed central peribronchial fibrosis with air trapping with basilar groundglass opacity and small pleural effusions. 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 The trigger for pt's seizures was not able to be determined despite various workup, including LP (5/14: glucose 89, protein 45, WBC 0, VZV and HSV negative), MRI brain, autoimmune and paraneoplastic panel, and check of heavy metals, mercury, and vitamin B12. Pt's continuous EEG showed no epileptiform activity. Pt was transferred from the Neuro ICU to the Medicine service on 5/21/17, and upon improvement in pt's delirium and psychosis (only exhibiting confabulations), was discharged on 6/1/17 to [facility].     At [facility], however, pt was demonstrating delusions, hallucinations, and aggressive behavior, so pt was transferred to [facility] for evaluation of altered mental status.    HPI unable to be obtained from pt, as pt heavily sedated with IV haldol and ativan in the ED. Several attempts to contact pt's sister by phone for collateral information. However, pt's sister unreachable, and VM left. 61M with sarcoidosis, bronchiectasis, chronic CO2 retention, perforated diverticulitis s/p Bright procedure and reversal, b/l SDH s/p surgery (2010), R inguinal hernia repair with mesh, and recent 2-week admission (5/14/17 - 6/1/17) at [facility] for new-onset generalized tonic-clonic seizures [facility] day stay with altered mental status. Pt was transferred to the MICU for lethargy and hypercarbic respiratory failure after receiving ativam 1mg x1 and haldol 5mg x1. Olanzapine was held. Pt was started on BIPAP with improvement of his acidosis and hypercarbia on repeat ABG. Pt became more alert and refused BIPAP but was successfully transitioned to room air. He was placed on BIPAP overnight but ABG in the AM was unchanged from prior ABG yesterday while on nasal canula. Pt continues to saturate in the high 90s on room air with otherwise normal vitals signs since transitioned to room air yesterday. He was continued on valproate antiepileptic therapy as pt was documented to have witnessed seizure per notes from [facility]. Pt is currently AAOx3 and conversant but is tangential with inappropriate speech. Per pt's sister, his mental status is similar from the time of discharge from [facility] but is by no means at baseline as he was fully functional prior to his hospitalization at [facility], where chart notes document normal LP and unremarkable MRI brain. Further records could not be obtained from [facility] as medical records office was closed. Pt also had urinary retention requiring haq placement with 400cc urine output; flomax was restarted. Pt is currently stable for transfer to the medicine floor.    # Hypercarbic Respiratory Failure: resolved, ABG suggestive of chronic hypercarbia, saturating high 90s on room air  - Monitor off BIPAP  - Pt needs outpatient pulmonology evaluation for CO2 retention: LAMBERT?, sarcoidosis, restrictive lung disease  - Avoid benzos    # Encephalopathy: unclear origin, currently AAOx3 but inappropriate tangential speech, negative LP and unremarkable MRI at [facility]  - Pt's home olanzapine has been held since admission but has remained calm  - Obtain further records from [facility]: differential included keppra induced psychosis, undiagnosed psychiatric disease, but cause remains unclear    # Seizure Disorder: documented seizure history, but need to obtain further records from [facility]  - Continue depakote  - Consider neurology consult    # Urinary Retention: c/w flomax, TOV

## 2017-06-05 DIAGNOSIS — G93.40 ENCEPHALOPATHY, UNSPECIFIED: ICD-10-CM

## 2017-06-05 DIAGNOSIS — Z29.9 ENCOUNTER FOR PROPHYLACTIC MEASURES, UNSPECIFIED: ICD-10-CM

## 2017-06-05 DIAGNOSIS — R33.9 RETENTION OF URINE, UNSPECIFIED: ICD-10-CM

## 2017-06-05 DIAGNOSIS — G40.909 EPILEPSY, UNSPECIFIED, NOT INTRACTABLE, WITHOUT STATUS EPILEPTICUS: ICD-10-CM

## 2017-06-05 LAB
BUN SERPL-MCNC: 18 MG/DL — SIGNIFICANT CHANGE UP (ref 7–23)
CALCIUM SERPL-MCNC: 8.7 MG/DL — SIGNIFICANT CHANGE UP (ref 8.4–10.5)
CHLORIDE SERPL-SCNC: 101 MMOL/L — SIGNIFICANT CHANGE UP (ref 98–107)
CO2 SERPL-SCNC: 29 MMOL/L — SIGNIFICANT CHANGE UP (ref 22–31)
CREAT SERPL-MCNC: 0.64 MG/DL — SIGNIFICANT CHANGE UP (ref 0.5–1.3)
GLUCOSE SERPL-MCNC: 99 MG/DL — SIGNIFICANT CHANGE UP (ref 70–99)
HBA1C BLD-MCNC: 6.1 % — HIGH (ref 4–5.6)
HCT VFR BLD CALC: 39.4 % — SIGNIFICANT CHANGE UP (ref 39–50)
HGB BLD-MCNC: 12.4 G/DL — LOW (ref 13–17)
MAGNESIUM SERPL-MCNC: 2 MG/DL — SIGNIFICANT CHANGE UP (ref 1.6–2.6)
MCHC RBC-ENTMCNC: 27.4 PG — SIGNIFICANT CHANGE UP (ref 27–34)
MCHC RBC-ENTMCNC: 31.5 % — LOW (ref 32–36)
MCV RBC AUTO: 87.2 FL — SIGNIFICANT CHANGE UP (ref 80–100)
PHOSPHATE SERPL-MCNC: 2.8 MG/DL — SIGNIFICANT CHANGE UP (ref 2.5–4.5)
PLATELET # BLD AUTO: 120 K/UL — LOW (ref 150–400)
PMV BLD: 9.5 FL — SIGNIFICANT CHANGE UP (ref 7–13)
POTASSIUM SERPL-MCNC: 4.1 MMOL/L — SIGNIFICANT CHANGE UP (ref 3.5–5.3)
POTASSIUM SERPL-SCNC: 4.1 MMOL/L — SIGNIFICANT CHANGE UP (ref 3.5–5.3)
RBC # BLD: 4.52 M/UL — SIGNIFICANT CHANGE UP (ref 4.2–5.8)
RBC # FLD: 17.4 % — HIGH (ref 10.3–14.5)
SODIUM SERPL-SCNC: 141 MMOL/L — SIGNIFICANT CHANGE UP (ref 135–145)
WBC # BLD: 8.51 K/UL — SIGNIFICANT CHANGE UP (ref 3.8–10.5)
WBC # FLD AUTO: 8.51 K/UL — SIGNIFICANT CHANGE UP (ref 3.8–10.5)

## 2017-06-05 PROCEDURE — 99223 1ST HOSP IP/OBS HIGH 75: CPT

## 2017-06-05 PROCEDURE — 99233 SBSQ HOSP IP/OBS HIGH 50: CPT | Mod: GC

## 2017-06-05 RX ORDER — HALOPERIDOL DECANOATE 100 MG/ML
2.5 INJECTION INTRAMUSCULAR EVERY 6 HOURS
Qty: 0 | Refills: 0 | Status: DISCONTINUED | OUTPATIENT
Start: 2017-06-05 | End: 2017-06-08

## 2017-06-05 RX ORDER — SODIUM CHLORIDE 9 MG/ML
500 INJECTION INTRAMUSCULAR; INTRAVENOUS; SUBCUTANEOUS ONCE
Qty: 0 | Refills: 0 | Status: COMPLETED | OUTPATIENT
Start: 2017-06-05 | End: 2017-06-05

## 2017-06-05 RX ORDER — HALOPERIDOL DECANOATE 100 MG/ML
2.5 INJECTION INTRAMUSCULAR
Qty: 0 | Refills: 0 | Status: DISCONTINUED | OUTPATIENT
Start: 2017-06-05 | End: 2017-06-06

## 2017-06-05 RX ADMIN — HEPARIN SODIUM 5000 UNIT(S): 5000 INJECTION INTRAVENOUS; SUBCUTANEOUS at 05:42

## 2017-06-05 RX ADMIN — BUDESONIDE AND FORMOTEROL FUMARATE DIHYDRATE 2 PUFF(S): 160; 4.5 AEROSOL RESPIRATORY (INHALATION) at 22:39

## 2017-06-05 RX ADMIN — BUDESONIDE AND FORMOTEROL FUMARATE DIHYDRATE 2 PUFF(S): 160; 4.5 AEROSOL RESPIRATORY (INHALATION) at 09:20

## 2017-06-05 RX ADMIN — SODIUM CHLORIDE 1000 MILLILITER(S): 9 INJECTION INTRAMUSCULAR; INTRAVENOUS; SUBCUTANEOUS at 21:57

## 2017-06-05 RX ADMIN — Medication 28.75 MILLIGRAM(S): at 05:42

## 2017-06-05 RX ADMIN — HEPARIN SODIUM 5000 UNIT(S): 5000 INJECTION INTRAVENOUS; SUBCUTANEOUS at 22:39

## 2017-06-05 RX ADMIN — HALOPERIDOL DECANOATE 2.5 MILLIGRAM(S): 100 INJECTION INTRAMUSCULAR at 18:01

## 2017-06-05 RX ADMIN — HEPARIN SODIUM 5000 UNIT(S): 5000 INJECTION INTRAVENOUS; SUBCUTANEOUS at 13:37

## 2017-06-05 RX ADMIN — Medication 3 MILLILITER(S): at 21:28

## 2017-06-05 RX ADMIN — Medication 3 MILLILITER(S): at 10:40

## 2017-06-05 RX ADMIN — TAMSULOSIN HYDROCHLORIDE 0.4 MILLIGRAM(S): 0.4 CAPSULE ORAL at 22:38

## 2017-06-05 RX ADMIN — Medication 28.75 MILLIGRAM(S): at 18:01

## 2017-06-05 RX ADMIN — Medication 3 MILLILITER(S): at 15:27

## 2017-06-05 NOTE — PROGRESS NOTE ADULT - SUBJECTIVE AND OBJECTIVE BOX
Patient is a 61y old  Male who presents with a chief complaint of AMS (2017 00:13)      SUBJECTIVE / OVERNIGHT EVENTS: No acute events O/N. Patient denies HA, CP, cough, SOB; Denies N/V/D/C. Reports abdominal scar from Soriano's/reversal has given him chronic issues, healed poorly.     MEDICATIONS  (STANDING):  heparin  Injectable 5000Unit(s) SubCutaneous every 8 hours  ALBUTerol/ipratropium for Nebulization 3milliLiter(s) Nebulizer every 6 hours  buDESOnide 160 MICROgram(s)/formoterol 4.5 MICROgram(s) Inhaler 2Puff(s) Inhalation two times a day  sodium chloride 0.9%. 1000milliLiter(s) IV Continuous <Continuous>  valproate sodium IVPB 750milliGRAM(s) IV Intermittent every 12 hours  tamsulosin 0.4milliGRAM(s) Oral at bedtime  insulin lispro (HumaLOG) corrective regimen sliding scale  SubCutaneous three times a day before meals  dextrose 5%. 1000milliLiter(s) IV Continuous <Continuous>  dextrose 50% Injectable 12.5Gram(s) IV Push once  dextrose 50% Injectable 25Gram(s) IV Push once  dextrose 50% Injectable 25Gram(s) IV Push once    MEDICATIONS  (PRN):  dextrose Gel 1Dose(s) Oral once PRN Blood Glucose LESS THAN 70 milliGRAM(s)/deciliter  glucagon  Injectable 1milliGRAM(s) IntraMuscular once PRN Glucose LESS THAN 70 milligrams/deciliter      Vital Signs Last 24 Hrs  T(C): 36.5, Max: 36.9 (- @ 21:52)  HR: 68 (65 - 84)  BP: 94/55 (94/55 - 108/70)  RR: 19 (18 - 19)  SpO2: 95% (95% - 99%)  Wt(kg): --  CAPILLARY BLOOD GLUCOSE  94 (2017 08:43)  122 (2017 21:52)  100 (2017 17:14)    I&O's Summary    I & Os for current day (as of 2017 13:00)  =============================================  IN: 0 ml / OUT: 1400 ml / NET: -1400 ml      PHYSICAL EXAM:  GENERAL: NAD, well-developed. Patient AO x 3 but with vidual/auditory hallucinations  HEAD:  Atraumatic, Normocephalic  EYES: EOMI, PERRLA, conjunctiva and sclera clear  NECK: Supple, No JVD  CHEST/LUNG: Clear to auscultation bilaterally; No wheeze  HEART: Regular rate and rhythm; No murmurs, rubs, or gallops  ABDOMEN: Soft, Nontender, Nondistended; Bowel sounds present. Midline scar from Soriano's.  EXTREMITIES:  2+ Peripheral Pulses, No clubbing, cyanosis, or edema  PSYCH: AAOx3. Patient reports receiving messages from "[employer]" through phone from [employer], [employer], appears to report visual/auditory hallucinations  NEUROLOGY: non-focal  SKIN: Midline scar abdomen, not an open wound.    LABS:                        12.4   8.51  )-----------( 120      ( 2017 06:55 )             39.4     06-05    141  |  101  |  18  ----------------------------<  99  4.1   |  29  |  0.64    Ca    8.7      2017 06:55  Phos  2.8     06-05  Mg     2.0     06-05    TPro  6.1  /  Alb  3.0<L>  /  TBili  0.2  /  DBili  x   /  AST  26  /  ALT  29  /  AlkPhos  53  06-04          Urinalysis Basic - ( 2017 18:08 )    Color: YELLOW / Appearance: CLEAR / S.022 / pH: 6.0  Gluc: NEGATIVE / Ketone: TRACE  / Bili: NEGATIVE / Urobili: NORMAL E.U.   Blood: NEGATIVE / Protein: 30 / Nitrite: NEGATIVE   Leuk Esterase: SMALL / RBC: 0-2 / WBC 10-25   Sq Epi: x / Non Sq Epi: x / Bacteria: x        RADIOLOGY & ADDITIONAL TESTS:   CT Head w/o contrast  IMPRESSION:      Examination is moderately motion degraded. Within the limitations of the   examination, there is no evidence of acute intracranial hemorrhage or   mass effect.    If clinical symptoms persist or worsen, more sensitive evaluation with   brain MRI may be obtained, if no contraindications exist.      EXAM:  RAD CHEST PORTABLE URGENT        PROCEDURE DATE:  Moustapha  3 2017         INTERPRETATION:  COMPARISON:     CLINICAL INDICATION: Respiratory failure. Seizure.    Impression:  Portable chest dated 6/3 at 0031 hours shows stable chronic changeswith   bronchiectasis especially in upper lobes. No new infiltrates are seen  No evidence of pleural effusion or pneumothorax.   Heart and mediastinum are within normal limits.    Imaging Personally Reviewed:    Consultant(s) Notes Reviewed:      Care Discussed with Consultants/Other Providers: Patient is a 61y old  Male who presents with a chief complaint of AMS (2017 00:13)    CC: Follow up for respiratory failure    SUBJECTIVE / OVERNIGHT EVENTS: No acute events O/N. Patient denies HA, CP, cough, SOB; Denies N/V/D/C. Reports abdominal scar from Soriano's/reversal has given him chronic issues, healed poorly.     MEDICATIONS  (STANDING):  heparin  Injectable 5000Unit(s) SubCutaneous every 8 hours  ALBUTerol/ipratropium for Nebulization 3milliLiter(s) Nebulizer every 6 hours  buDESOnide 160 MICROgram(s)/formoterol 4.5 MICROgram(s) Inhaler 2Puff(s) Inhalation two times a day  sodium chloride 0.9%. 1000milliLiter(s) IV Continuous <Continuous>  valproate sodium IVPB 750milliGRAM(s) IV Intermittent every 12 hours  tamsulosin 0.4milliGRAM(s) Oral at bedtime  insulin lispro (HumaLOG) corrective regimen sliding scale  SubCutaneous three times a day before meals  dextrose 5%. 1000milliLiter(s) IV Continuous <Continuous>  dextrose 50% Injectable 12.5Gram(s) IV Push once  dextrose 50% Injectable 25Gram(s) IV Push once  dextrose 50% Injectable 25Gram(s) IV Push once    MEDICATIONS  (PRN):  dextrose Gel 1Dose(s) Oral once PRN Blood Glucose LESS THAN 70 milliGRAM(s)/deciliter  glucagon  Injectable 1milliGRAM(s) IntraMuscular once PRN Glucose LESS THAN 70 milligrams/deciliter      Vital Signs Last 24 Hrs  T(C): 36.5, Max: 36.9 ( @ 21:52)  HR: 68 (65 - 84)  BP: 94/55 (94/55 - 108/70)  RR: 19 (18 - 19)  SpO2: 95% (95% - 99%)  Wt(kg): --  CAPILLARY BLOOD GLUCOSE  94 (2017 08:43)  122 (2017 21:52)  100 (2017 17:14)    I&O's Summary    I & Os for current day (as of 2017 13:00)  =============================================  IN: 0 ml / OUT: 1400 ml / NET: -1400 ml      PHYSICAL EXAM:  GENERAL: NAD, well-developed. Patient AO x 3 but with visual/auditory hallucinations  HEAD:  Atraumatic, Normocephalic  EYES: EOMI, PERRLA, conjunctiva and sclera clear  NECK: Supple, No JVD  CHEST/LUNG: Clear to auscultation bilaterally; No wheeze  HEART: Regular rate and rhythm; No murmurs, rubs, or gallops  ABDOMEN: Soft, Nontender, Nondistended; Bowel sounds present. Midline scar from Soriano's.  EXTREMITIES:  2+ Peripheral Pulses, No clubbing, cyanosis, or edema  PSYCH: AAOx3. Patient reports receiving messages from "[employer]" through phone from [employer], [employer], appears to report visual/auditory hallucinations  NEUROLOGY: non-focal  SKIN: Midline scar abdomen, not an open wound.    LABS:                        12.4   8.51  )-----------( 120      ( 2017 06:55 )             39.4     06-05    141  |  101  |  18  ----------------------------<  99  4.1   |  29  |  0.64    Ca    8.7      2017 06:55  Phos  2.8     06-05  Mg     2.0     06-05    TPro  6.1  /  Alb  3.0<L>  /  TBili  0.2  /  DBili  x   /  AST  26  /  ALT  29  /  AlkPhos  53  06-04          Urinalysis Basic - ( 2017 18:08 )    Color: YELLOW / Appearance: CLEAR / S.022 / pH: 6.0  Gluc: NEGATIVE / Ketone: TRACE  / Bili: NEGATIVE / Urobili: NORMAL E.U.   Blood: NEGATIVE / Protein: 30 / Nitrite: NEGATIVE   Leuk Esterase: SMALL / RBC: 0-2 / WBC 10-25   Sq Epi: x / Non Sq Epi: x / Bacteria: x        RADIOLOGY & ADDITIONAL TESTS:   CT Head w/o contrast  IMPRESSION:      Examination is moderately motion degraded. Within the limitations of the   examination, there is no evidence of acute intracranial hemorrhage or   mass effect.    If clinical symptoms persist or worsen, more sensitive evaluation with   brain MRI may be obtained, if no contraindications exist.      EXAM:  RAD CHEST PORTABLE URGENT        PROCEDURE DATE:  Moustapha  3 2017         INTERPRETATION:  COMPARISON:     CLINICAL INDICATION: Respiratory failure. Seizure.    Impression:  Portable chest dated 6/3 at 0031 hours shows stable chronic changeswith   bronchiectasis especially in upper lobes. No new infiltrates are seen  No evidence of pleural effusion or pneumothorax.   Heart and mediastinum are within normal limits.    Imaging Personally Reviewed:    Consultant(s) Notes Reviewed:      Care Discussed with Consultants/Other Providers:

## 2017-06-05 NOTE — SWALLOW BEDSIDE ASSESSMENT ADULT - ASR SWALLOW RECOMMEND DIAG
if there are concerns for aspiration; consider cinesophagram to objectively rule out silent aspiration./VFSS/MBS

## 2017-06-05 NOTE — PROGRESS NOTE ADULT - PROBLEM SELECTOR PLAN 1
resolved, ABG suggestive of chronic hypercarbia, saturating high 90s on room air  - Monitor off BIPAP  - Pt needs outpatient pulmonology evaluation for CO2 retention: LAMBERT?, sarcoidosis, restrictive lung disease  - Avoid benzos resolved, ABG suggestive of chronic hypercarbia, saturating high 90s on room air  - Monitor off BIPAP  - Pt needs outpatient pulmonology evaluation for CO2 retention: LAMBERT?, sarcoidosis, restrictive lung disease  - Avoid benzos - likely the contributing factor to his respiratory failure

## 2017-06-05 NOTE — PROGRESS NOTE ADULT - PROBLEM SELECTOR PLAN 2
unclear origin, currently AAOx3 but inappropriate tangential speech, negative LP and unremarkable MRI at [place]  - Pt's home olanzapine has been held since admission but has remained calm. - Psych c/s, Restart home olanzapine.   - Obtain further records from [place]: differential included keppra induced psychosis, undiagnosed psychiatric disease, but cause remains unclear unclear origin, currently AAOx3 but inappropriate tangential speech, negative LP and unremarkable MRI at [place]  - Pt's home olanzapine has been held since admission but has remained calm. - Psych c/s, Restart home olanzapine.   - Obtain further records from [place]: differential included keppra induced psychosis, undiagnosed psychiatric disease, but cause remains unclear  - suspicious for worsening psychiatric condition due to prolonged hospitalization

## 2017-06-05 NOTE — PROGRESS NOTE ADULT - ASSESSMENT
61M with sarcoidosis, bronchiectasis, chronic CO2 retention, perforated diverticulitis s/p Bright procedure and reversal, b/l SDH s/p surgery (2010), R inguinal hernia repair with mesh, and recent 2-week admission (5/14/17 - 6/1/17) at [facility] for new-onset generalized tonic-clonic seizures presented 6/2 from [facility] after 1 day stay with altered mental status. Pt was transferred to the MICU for lethargy and hypercarbic respiratory failure after receiving ativan 1mg x1 and haldol 5mg x1, trated in MICU w/ BiPAP, now transferred to floor, breathing comfortably on room air AO x3 but reporting visual/auditory hallucinations.     # Urinary Retention: 61M with sarcoidosis, bronchiectasis, chronic CO2 retention, perforated diverticulitis s/p Bright procedure and reversal, b/l SDH s/p surgery (2010), R inguinal hernia repair with mesh, and recent 2-week admission (5/14/17 - 6/1/17) at [facility] for new-onset generalized tonic-clonic seizures presented 6/2 from [facility] after 1 day stay with altered mental status. Pt was transferred to the MICU for lethargy and hypercarbic respiratory failure after receiving ativan 1mg x1 and haldol 5mg x1, trated in MICU w/ BiPAP, now transferred to floor, breathing comfortably on room air AO x3 but reporting visual/auditory hallucinations.

## 2017-06-05 NOTE — PROGRESS NOTE ADULT - PROBLEM SELECTOR PLAN 3
documented seizure history, but need to obtain further records from [place]  - Continue depakote  - Consider neurology consult

## 2017-06-05 NOTE — SWALLOW BEDSIDE ASSESSMENT ADULT - SWALLOW EVAL: DIAGNOSIS
Patient presents with functional oral and pharyngeal stage swallowing mechanism characterized by adequate oral containment, adequate chewing for solids, adequate bolus manipulation and transport with adequate oral clearance. There is laryngeal elevation upon palpation, initiation of the pharyngeal swallow. There were no overt signs of impaired airway protection for thin liquids; although silent aspiration cannot be ruled out.

## 2017-06-05 NOTE — SWALLOW BEDSIDE ASSESSMENT ADULT - COMMENTS
Dx: acute on chronic hypercarbic respiratory failure, AMS; Patient is a 61M sarcoidosis, b/l SDH 2/2 trauma in distant past, DM2, s/p colon resection for diverticulitis, new onset seizure disorder presents from rehab with ams 2/2 ativan and haldol for agitation complicated by acute on chronic hypercarbic resp failur.    Patient is from Nursing Home Facility with baseline diet of Chopped Consistency with Honey Thick Liquids as per documentation.     Upon chart review. Provider Hand Off indicate that patient has been drinking thin liquids at Nursing Home which is not consistent with NH Documentation for Honey Thick Liquids. Patient declined thickened liquid trials. Dx: acute on chronic hypercarbic respiratory failure, AMS; Patient is a 61M sarcoidosis, b/l SDH 2/2 trauma in distant past, DM2, s/p colon resection for diverticulitis, new onset seizure disorder presents from rehab with ams 2/2 ativan and haldol for agitation complicated by acute on chronic hypercarbic resp failure.     Patient is from Nursing Home Facility with baseline diet of Chopped Consistency with Honey Thick Liquids as per documentation.     Upon chart review. Provider Hand Off indicate that patient has been drinking thin liquids at Nursing Home which is not consistent with NH Documentation for Honey Thick Liquids.    Portable chest dated 6/3 at 0031 hours shows stable chronic changes with bronchiectasis especially in upper lobes. No new infiltrates are seen. No evidence of pleural effusion or pneumothorax. Heart and mediastinum are within normal limits. Dx: acute on chronic hypercarbic respiratory failure, AMS; Patient is a 61M sarcoidosis, b/l SDH 2/2 trauma in distant past, DM2, s/p colon resection for diverticulitis, new onset seizure disorder presents from rehab with ams 2/2 ativan and haldol for agitation complicated by acute on chronic hypercarbic resp failure.     Patient is from Nursing Home Facility with baseline diet of Chopped Consistency with Honey Thick Liquids as per documentation.     Upon chart review. Provider Hand Off indicate that patient/family report has been drinking thin liquids at Nursing Home which is not consistent with NH Documentation for Honey Thick Liquids.    Portable chest dated 6/3 at 0031 hours shows stable chronic changes with bronchiectasis especially in upper lobes. No new infiltrates are seen. No evidence of pleural effusion or pneumothorax. Heart and mediastinum are within normal limits.

## 2017-06-06 LAB
BASOPHILS # BLD AUTO: 0.07 K/UL — SIGNIFICANT CHANGE UP (ref 0–0.2)
BASOPHILS NFR BLD AUTO: 1.1 % — SIGNIFICANT CHANGE UP (ref 0–2)
BASOPHILS NFR SPEC: 1.7 % — SIGNIFICANT CHANGE UP (ref 0–2)
BUN SERPL-MCNC: 17 MG/DL — SIGNIFICANT CHANGE UP (ref 7–23)
CALCIUM SERPL-MCNC: 8.8 MG/DL — SIGNIFICANT CHANGE UP (ref 8.4–10.5)
CHLORIDE SERPL-SCNC: 102 MMOL/L — SIGNIFICANT CHANGE UP (ref 98–107)
CO2 SERPL-SCNC: 28 MMOL/L — SIGNIFICANT CHANGE UP (ref 22–31)
CREAT SERPL-MCNC: 0.65 MG/DL — SIGNIFICANT CHANGE UP (ref 0.5–1.3)
EOSINOPHIL # BLD AUTO: 0.22 K/UL — SIGNIFICANT CHANGE UP (ref 0–0.5)
EOSINOPHIL NFR BLD AUTO: 3.4 % — SIGNIFICANT CHANGE UP (ref 0–6)
EOSINOPHIL NFR FLD: 0.9 % — SIGNIFICANT CHANGE UP (ref 0–6)
GLUCOSE SERPL-MCNC: 108 MG/DL — HIGH (ref 70–99)
HCT VFR BLD CALC: 40.6 % — SIGNIFICANT CHANGE UP (ref 39–50)
HGB BLD-MCNC: 12.7 G/DL — LOW (ref 13–17)
IMM GRANULOCYTES NFR BLD AUTO: 4.5 % — HIGH (ref 0–1.5)
LYMPHOCYTES # BLD AUTO: 1.42 K/UL — SIGNIFICANT CHANGE UP (ref 1–3.3)
LYMPHOCYTES # BLD AUTO: 21.9 % — SIGNIFICANT CHANGE UP (ref 13–44)
LYMPHOCYTES NFR SPEC AUTO: 18.3 % — SIGNIFICANT CHANGE UP (ref 13–44)
MAGNESIUM SERPL-MCNC: 2 MG/DL — SIGNIFICANT CHANGE UP (ref 1.6–2.6)
MCHC RBC-ENTMCNC: 27.1 PG — SIGNIFICANT CHANGE UP (ref 27–34)
MCHC RBC-ENTMCNC: 31.3 % — LOW (ref 32–36)
MCV RBC AUTO: 86.8 FL — SIGNIFICANT CHANGE UP (ref 80–100)
METAMYELOCYTES # FLD: 1.7 % — HIGH (ref 0–1)
MONOCYTES # BLD AUTO: 0.8 K/UL — SIGNIFICANT CHANGE UP (ref 0–0.9)
MONOCYTES NFR BLD AUTO: 12.3 % — SIGNIFICANT CHANGE UP (ref 2–14)
MONOCYTES NFR BLD: 15.7 % — HIGH (ref 2–9)
MORPHOLOGY BLD-IMP: NORMAL — SIGNIFICANT CHANGE UP
MYELOCYTES NFR BLD: 3.5 % — HIGH (ref 0–0)
NEUTROPHIL AB SER-ACNC: 55.6 % — SIGNIFICANT CHANGE UP (ref 43–77)
NEUTROPHILS # BLD AUTO: 3.69 K/UL — SIGNIFICANT CHANGE UP (ref 1.8–7.4)
NEUTROPHILS NFR BLD AUTO: 56.8 % — SIGNIFICANT CHANGE UP (ref 43–77)
PHOSPHATE SERPL-MCNC: 3.5 MG/DL — SIGNIFICANT CHANGE UP (ref 2.5–4.5)
PLATELET # BLD AUTO: 113 K/UL — LOW (ref 150–400)
PLATELET COUNT - ESTIMATE: SIGNIFICANT CHANGE UP
PMV BLD: 9.2 FL — SIGNIFICANT CHANGE UP (ref 7–13)
POTASSIUM SERPL-MCNC: 4.3 MMOL/L — SIGNIFICANT CHANGE UP (ref 3.5–5.3)
POTASSIUM SERPL-SCNC: 4.3 MMOL/L — SIGNIFICANT CHANGE UP (ref 3.5–5.3)
RBC # BLD: 4.68 M/UL — SIGNIFICANT CHANGE UP (ref 4.2–5.8)
RBC # FLD: 17.6 % — HIGH (ref 10.3–14.5)
SODIUM SERPL-SCNC: 143 MMOL/L — SIGNIFICANT CHANGE UP (ref 135–145)
VARIANT LYMPHS # BLD: 2.6 % — SIGNIFICANT CHANGE UP
WBC # BLD: 6.49 K/UL — SIGNIFICANT CHANGE UP (ref 3.8–10.5)
WBC # FLD AUTO: 6.49 K/UL — SIGNIFICANT CHANGE UP (ref 3.8–10.5)

## 2017-06-06 PROCEDURE — 99233 SBSQ HOSP IP/OBS HIGH 50: CPT

## 2017-06-06 PROCEDURE — 99233 SBSQ HOSP IP/OBS HIGH 50: CPT | Mod: GC

## 2017-06-06 RX ORDER — HALOPERIDOL DECANOATE 100 MG/ML
5 INJECTION INTRAMUSCULAR AT BEDTIME
Qty: 0 | Refills: 0 | Status: DISCONTINUED | OUTPATIENT
Start: 2017-06-06 | End: 2017-06-07

## 2017-06-06 RX ORDER — HALOPERIDOL DECANOATE 100 MG/ML
2.5 INJECTION INTRAMUSCULAR
Qty: 0 | Refills: 0 | Status: DISCONTINUED | OUTPATIENT
Start: 2017-06-06 | End: 2017-06-07

## 2017-06-06 RX ORDER — HALOPERIDOL DECANOATE 100 MG/ML
2.5 INJECTION INTRAMUSCULAR ONCE
Qty: 0 | Refills: 0 | Status: COMPLETED | OUTPATIENT
Start: 2017-06-06 | End: 2017-06-06

## 2017-06-06 RX ADMIN — Medication 3 MILLILITER(S): at 15:28

## 2017-06-06 RX ADMIN — Medication 3 MILLILITER(S): at 21:41

## 2017-06-06 RX ADMIN — BUDESONIDE AND FORMOTEROL FUMARATE DIHYDRATE 2 PUFF(S): 160; 4.5 AEROSOL RESPIRATORY (INHALATION) at 22:45

## 2017-06-06 RX ADMIN — HEPARIN SODIUM 5000 UNIT(S): 5000 INJECTION INTRAVENOUS; SUBCUTANEOUS at 05:50

## 2017-06-06 RX ADMIN — HALOPERIDOL DECANOATE 2.5 MILLIGRAM(S): 100 INJECTION INTRAMUSCULAR at 18:45

## 2017-06-06 RX ADMIN — BUDESONIDE AND FORMOTEROL FUMARATE DIHYDRATE 2 PUFF(S): 160; 4.5 AEROSOL RESPIRATORY (INHALATION) at 12:38

## 2017-06-06 RX ADMIN — HALOPERIDOL DECANOATE 2.5 MILLIGRAM(S): 100 INJECTION INTRAMUSCULAR at 05:51

## 2017-06-06 RX ADMIN — Medication 3 MILLILITER(S): at 10:07

## 2017-06-06 RX ADMIN — Medication 28.75 MILLIGRAM(S): at 18:45

## 2017-06-06 RX ADMIN — Medication 3 MILLILITER(S): at 03:26

## 2017-06-06 RX ADMIN — TAMSULOSIN HYDROCHLORIDE 0.4 MILLIGRAM(S): 0.4 CAPSULE ORAL at 22:45

## 2017-06-06 RX ADMIN — HALOPERIDOL DECANOATE 2.5 MILLIGRAM(S): 100 INJECTION INTRAMUSCULAR at 16:19

## 2017-06-06 RX ADMIN — HALOPERIDOL DECANOATE 5 MILLIGRAM(S): 100 INJECTION INTRAMUSCULAR at 22:45

## 2017-06-06 RX ADMIN — Medication 28.75 MILLIGRAM(S): at 05:51

## 2017-06-06 NOTE — CONSULT NOTE ADULT - SUBJECTIVE AND OBJECTIVE BOX
Neurology Consult    Patient is a 61y old  Male who presents with a chief complaint of AMS (03 Jun 2017 00:13)      HPI:  62 yo Male with complicated medical/surgical history including sarcoidosis, bronchiectasis, ?rheumatoid arthritis, cholecystectomy, perforated diverticulitis s/p Bright procedure and reversal, b/l SDH s/p surgery, R inguinal hernia repair with mesh, and recent 2-week admission (5/14/17 - 6/1/17) at [facility] for new-onset generalized tonic-clonic seizures presents from [facility] with altered mental status.     Notes from [facility] personally reviewed.  According to the notes, pt was transferred from [facility] in [place] to [facility] ICU on 5/14/17 for further management of new-onset GTC seizures. Pt was driving with his girlfriend and when they had an argument in the car, pt's speech suddenly became unintelligible and stuttering. Pt then tipped his head back and became unresponsive, so his girlfriend maneuvered the car to the curb. Pt then appeared to be alert and back at his baseline. Pt was taken to [facility], and at that hospital, had a witness GTC seizure lasting 1 minute and given ativan 2mg. However, pt became apneic and was intubated and loaded with Keppra 3g. Head CT at [facility] was normal.    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 CT chest at [facility] showed central peribronchial fibrosis with air trapping with basilar groundglass opacity and small pleural effusions. 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 The trigger for pt's seizures was not able to be determined despite various workup, including LP (5/14: glucose 89, protein 45, WBC 0, VZV and HSV negative), MRI brain, autoimmune and paraneoplastic panel, and check of heavy metals, mercury, and vitamin B12. Pt's continuous EEG showed no epileptiform activity. Pt was transferred from the Neuro ICU to the Medicine service on 5/21/17, and upon improvement in pt's delirium and psychosis (only exhibiting confabulations), was discharged on 6/1/17 to [facility].     At [facility], however, pt was demonstrating delusions, hallucinations, and aggressive behavior, so pt was transferred to [facility] for evaluation of altered mental status.    **History obtained from sister, patient and friend. Pt unsure why he is in the hospital. As per friend, pt did not have GTC in car, but became altered after an argument and passed out, becoming blue after. Prior to this, patient has always had a "vivid imagination," but denies delusions, hallucinations or difficulty w/ ADLs. Worked as a , denies car accidents or difficulty w/ driving. At [facility], was given Keppra, later became agitated, delusional, aggressive towards staff and with pressured speech.       PAST MEDICAL & SURGICAL HISTORY:  Inguinal hernia  Subdural hematoma  Diverticulitis  Bronchiectasis  Sarcoid  DM (diabetes mellitus)  No pertinent past medical history  Status post inguinal hernia repair  Status post cholecystectomy  Bilateral subdural hematomas  Status post Bright&#x27;s procedure  No significant past surgical history      Allergies    Keppra (Other (Severe))  penicillin (Angioedema)  penicillins (Unknown)    Intolerances        MEDICATIONS  (STANDING):  ALBUTerol/ipratropium for Nebulization 3milliLiter(s) Nebulizer every 6 hours  buDESOnide 160 MICROgram(s)/formoterol 4.5 MICROgram(s) Inhaler 2Puff(s) Inhalation two times a day  sodium chloride 0.9%. 1000milliLiter(s) IV Continuous <Continuous>  valproate sodium IVPB 750milliGRAM(s) IV Intermittent every 12 hours  tamsulosin 0.4milliGRAM(s) Oral at bedtime  insulin lispro (HumaLOG) corrective regimen sliding scale  SubCutaneous three times a day before meals  dextrose 5%. 1000milliLiter(s) IV Continuous <Continuous>  dextrose 50% Injectable 12.5Gram(s) IV Push once  dextrose 50% Injectable 25Gram(s) IV Push once  dextrose 50% Injectable 25Gram(s) IV Push once  haloperidol     Tablet 2.5milliGRAM(s) Oral two times a day    MEDICATIONS  (PRN):  dextrose Gel 1Dose(s) Oral once PRN Blood Glucose LESS THAN 70 milliGRAM(s)/deciliter  glucagon  Injectable 1milliGRAM(s) IntraMuscular once PRN Glucose LESS THAN 70 milligrams/deciliter  haloperidol     Tablet 2.5milliGRAM(s) Oral every 6 hours PRN Agitation  haloperidol    Injectable 2.5milliGRAM(s) IV Push every 6 hours PRN Agitation  haloperidol    Injectable 2.5milliGRAM(s) IntraMuscular every 6 hours PRN Agitation      Social History: Denies tob, EtOH use     FAMILY HISTORY:  No pertinent family history in first degree relatives      Review of Systems:  CONSTITUTIONAL:  No weight loss, fever, chills, weakness or fatigue.  HEENT:  Eyes:  No visual loss, blurred vision, double vision or yellow sclerae. Ears, Nose, Throat:  No hearing loss, sneezing, congestion, runny nose or sore throat.  SKIN:  No rash or itching.  CARDIOVASCULAR:  No chest pain, chest pressure or chest discomfort. No palpitations or edema.  RESPIRATORY:  No shortness of breath, cough or sputum.  GASTROINTESTINAL:  No anorexia, nausea, vomiting or diarrhea. No abdominal pain or blood.  NEUROLOGICAL:  No headache, dizziness, syncope, paralysis, ataxia, numbness or tingling in the extremities. No change in bowel or bladder control.  MUSCULOSKELETAL:  No muscle, back pain, joint pain or stiffness.  PSYCHIATRIC:  as per HPI  ENDOCRINOLOGIC:  No reports of sweating, cold or heat intolerance. No polyuria or polydipsia.      Physical Exam:   Vital Signs Last 24 Hrs  T(C): 35.6, Max: 36.8 (06-05 @ 21:43)  T(F): 96, Max: 98.3 (06-05 @ 21:43)  HR: 80 (68 - 80)  BP: 131/78 (91/60 - 131/78)  BP(mean): --  RR: 18 (17 - 18)  SpO2: 92% (92% - 98%)    General Exam:   General appearance: No acute distress, calm, conversative. Low implanted ears, elongated face, small jaw.   Neurological Exam:  Mental Status: AAOx3, fluent speech, pressured, recent and remote memory intact, deviates easily from topic of conversation  Cranial Nerves: EOMI, PERRL, VFF, V1-V3 intact, facial symmetric, no dysarthria, tongue midline  Motor: strength 5/5 throughout. No drift x4  Sensation: Intact to LT throughout  Coordination: FTN intact b/l  Reflexes: 1+ bilateral biceps, brachioradialis, patellar and ankle  Gait: normal and stable.      Labs:     CBC Full  -  ( 06 Jun 2017 06:40 )  WBC Count : 6.49 K/uL  Hemoglobin : 12.7 g/dL  Hematocrit : 40.6 %  Platelet Count - Automated : 113 K/uL  Mean Cell Volume : 86.8 fL  Mean Cell Hemoglobin : 27.1 pg  Mean Cell Hemoglobin Concentration : 31.3 %  Auto Neutrophil # : 3.69 K/uL  Auto Lymphocyte # : 1.42 K/uL  Auto Monocyte # : 0.80 K/uL  Auto Eosinophil # : 0.22 K/uL  Auto Basophil # : 0.07 K/uL  Auto Neutrophil % : 56.8 %  Auto Lymphocyte % : 21.9 %  Auto Monocyte % : 12.3 %  Auto Eosinophil % : 3.4 %  Auto Basophil % : 1.1 %    06-06    143  |  102  |  17  ----------------------------<  108<H>  4.3   |  28  |  0.65    Ca    8.8      06 Jun 2017 06:40  Phos  3.5     06-06  Mg     2.0     06-06

## 2017-06-06 NOTE — PROGRESS NOTE ADULT - SUBJECTIVE AND OBJECTIVE BOX
Patient is a 61y old  Male who presents with a chief complaint of AMS (03 Jun 2017 00:13)      SUBJECTIVE / OVERNIGHT EVENTS:    MEDICATIONS  (STANDING):  heparin  Injectable 5000Unit(s) SubCutaneous every 8 hours  ALBUTerol/ipratropium for Nebulization 3milliLiter(s) Nebulizer every 6 hours  buDESOnide 160 MICROgram(s)/formoterol 4.5 MICROgram(s) Inhaler 2Puff(s) Inhalation two times a day  sodium chloride 0.9%. 1000milliLiter(s) IV Continuous <Continuous>  valproate sodium IVPB 750milliGRAM(s) IV Intermittent every 12 hours  tamsulosin 0.4milliGRAM(s) Oral at bedtime  insulin lispro (HumaLOG) corrective regimen sliding scale  SubCutaneous three times a day before meals  dextrose 5%. 1000milliLiter(s) IV Continuous <Continuous>  dextrose 50% Injectable 12.5Gram(s) IV Push once  dextrose 50% Injectable 25Gram(s) IV Push once  dextrose 50% Injectable 25Gram(s) IV Push once  haloperidol     Tablet 2.5milliGRAM(s) Oral two times a day    MEDICATIONS  (PRN):  dextrose Gel 1Dose(s) Oral once PRN Blood Glucose LESS THAN 70 milliGRAM(s)/deciliter  glucagon  Injectable 1milliGRAM(s) IntraMuscular once PRN Glucose LESS THAN 70 milligrams/deciliter  haloperidol     Tablet 2.5milliGRAM(s) Oral every 6 hours PRN Agitation  haloperidol    Injectable 2.5milliGRAM(s) IV Push every 6 hours PRN Agitation  haloperidol    Injectable 2.5milliGRAM(s) IntraMuscular every 6 hours PRN Agitation      Vital Signs Last 24 Hrs  T(C): 36.6, Max: 36.8 (06-05 @ 21:43)  HR: 72 (68 - 78)  BP: 101/62 (87/57 - 108/71)  RR: 18 (17 - 19)  SpO2: 97% (93% - 98%)  Wt(kg): --  CAPILLARY BLOOD GLUCOSE  94 (05 Jun 2017 08:43)    I&O's Summary    I & Os for current day (as of 06 Jun 2017 07:18)  =============================================  IN: 900 ml / OUT: 800 ml / NET: 100 ml      PHYSICAL EXAM:  GENERAL: NAD, well-developed  HEAD:  Atraumatic, Normocephalic  EYES: EOMI, PERRLA, conjunctiva and sclera clear  NECK: Supple, No JVD  CHEST/LUNG: Clear to auscultation bilaterally; No wheeze  HEART: Regular rate and rhythm; No murmurs, rubs, or gallops  ABDOMEN: Soft, Nontender, Nondistended; Bowel sounds present  EXTREMITIES:  2+ Peripheral Pulses, No clubbing, cyanosis, or edema  PSYCH: AAOx3  NEUROLOGY: non-focal  SKIN: No rashes or lesions    LABS:                        12.4   8.51  )-----------( 120      ( 05 Jun 2017 06:55 )             39.4     06-05    141  |  101  |  18  ----------------------------<  99  4.1   |  29  |  0.64    Ca    8.7      05 Jun 2017 06:55  Phos  2.8     06-05  Mg     2.0     06-05                RADIOLOGY & ADDITIONAL TESTS:    Imaging Personally Reviewed:    Consultant(s) Notes Reviewed:      Care Discussed with Consultants/Other Providers: Patient is a 61y old  Male who presents with a chief complaint of AMS (03 Jun 2017 00:13)      SUBJECTIVE / OVERNIGHT EVENTS: No acute O/N events. Patient disruptive with staff this am, insulting medical assistants/nurses. Demands discharge, threatens to leave AMA, demands private room. Accepted evening and morning haldol as prescribed by Psych. Denies HA/CP/SOB/Abd Pain/myaglia/Arthralgia, denies F/C/N/V. Denies dysuria, diarrhea, constipation.    MEDICATIONS  (STANDING):  heparin  Injectable 5000Unit(s) SubCutaneous every 8 hours  ALBUTerol/ipratropium for Nebulization 3milliLiter(s) Nebulizer every 6 hours  buDESOnide 160 MICROgram(s)/formoterol 4.5 MICROgram(s) Inhaler 2Puff(s) Inhalation two times a day  sodium chloride 0.9%. 1000milliLiter(s) IV Continuous <Continuous>  valproate sodium IVPB 750milliGRAM(s) IV Intermittent every 12 hours  tamsulosin 0.4milliGRAM(s) Oral at bedtime  insulin lispro (HumaLOG) corrective regimen sliding scale  SubCutaneous three times a day before meals  dextrose 5%. 1000milliLiter(s) IV Continuous <Continuous>  dextrose 50% Injectable 12.5Gram(s) IV Push once  dextrose 50% Injectable 25Gram(s) IV Push once  dextrose 50% Injectable 25Gram(s) IV Push once  haloperidol     Tablet 2.5milliGRAM(s) Oral two times a day    MEDICATIONS  (PRN):  dextrose Gel 1Dose(s) Oral once PRN Blood Glucose LESS THAN 70 milliGRAM(s)/deciliter  glucagon  Injectable 1milliGRAM(s) IntraMuscular once PRN Glucose LESS THAN 70 milligrams/deciliter  haloperidol     Tablet 2.5milliGRAM(s) Oral every 6 hours PRN Agitation  haloperidol    Injectable 2.5milliGRAM(s) IV Push every 6 hours PRN Agitation  haloperidol    Injectable 2.5milliGRAM(s) IntraMuscular every 6 hours PRN Agitation      Vital Signs Last 24 Hrs  T(C): 36.6, Max: 36.8 (06-05 @ 21:43)  HR: 72 (68 - 78)  BP: 101/62 (87/57 - 108/71)  RR: 18 (17 - 19)  SpO2: 97% (93% - 98%)  Wt(kg): --  CAPILLARY BLOOD GLUCOSE  94 (05 Jun 2017 08:43)    I&O's Summary    I & Os for current day (as of 06 Jun 2017 07:18)  =============================================  IN: 900 ml / OUT: 800 ml / NET: 100 ml      PHYSICAL EXAM:  GENERAL: NAD, well-developed. Patient AO x 3 with pressured speech  HEAD:  Atraumatic, Normocephalic  EYES: EOMI, PERRLA, conjunctiva and sclera clear  NECK: Supple, No JVD  CHEST/LUNG: Clear to auscultation bilaterally; No wheeze  HEART: Regular rate and rhythm; No murmurs, rubs, or gallops  ABDOMEN: Soft, Nontender, Nondistended; Bowel sounds present. Midline scar from Soriano's.  EXTREMITIES:  2+ Peripheral Pulses, No clubbing, cyanosis, or edema  PSYCH: AAOx3. Pressured speech, insulting medical assistants, threatening to leave AMA  NEUROLOGY: non-focal  SKIN: Midline scar abdomen, not an open wound.    LABS:                        12.4   8.51  )-----------( 120      ( 05 Jun 2017 06:55 )             39.4     06-05    141  |  101  |  18  ----------------------------<  99  4.1   |  29  |  0.64    Ca    8.7      05 Jun 2017 06:55  Phos  2.8     06-05  Mg     2.0     06-05                RADIOLOGY & ADDITIONAL TESTS:    Imaging Personally Reviewed:    Consultant(s) Notes Reviewed:      Care Discussed with Consultants/Other Providers: Patient is a 61y old  Male who presents with a chief complaint of AMS (03 Jun 2017 00:13)      SUBJECTIVE / OVERNIGHT EVENTS: No acute O/N events. Patient disruptive with staff this am, insulting medical assistants/nurses. Demands discharge, threatens to leave AMA, demands private room. Accepted evening and morning haldol as prescribed by Psych. Denies HA/CP/SOB/Abd Pain/myaglia/Arthralgia, denies F/C/N/V. Denies dysuria, diarrhea, constipation.    MEDICATIONS  (STANDING):  heparin  Injectable 5000Unit(s) SubCutaneous every 8 hours  ALBUTerol/ipratropium for Nebulization 3milliLiter(s) Nebulizer every 6 hours  buDESOnide 160 MICROgram(s)/formoterol 4.5 MICROgram(s) Inhaler 2Puff(s) Inhalation two times a day  sodium chloride 0.9%. 1000milliLiter(s) IV Continuous <Continuous>  valproate sodium IVPB 750milliGRAM(s) IV Intermittent every 12 hours  tamsulosin 0.4milliGRAM(s) Oral at bedtime  insulin lispro (HumaLOG) corrective regimen sliding scale  SubCutaneous three times a day before meals  dextrose 5%. 1000milliLiter(s) IV Continuous <Continuous>  dextrose 50% Injectable 12.5Gram(s) IV Push once  dextrose 50% Injectable 25Gram(s) IV Push once  dextrose 50% Injectable 25Gram(s) IV Push once  haloperidol     Tablet 2.5milliGRAM(s) Oral two times a day    MEDICATIONS  (PRN):  dextrose Gel 1Dose(s) Oral once PRN Blood Glucose LESS THAN 70 milliGRAM(s)/deciliter  glucagon  Injectable 1milliGRAM(s) IntraMuscular once PRN Glucose LESS THAN 70 milligrams/deciliter  haloperidol     Tablet 2.5milliGRAM(s) Oral every 6 hours PRN Agitation  haloperidol    Injectable 2.5milliGRAM(s) IV Push every 6 hours PRN Agitation  haloperidol    Injectable 2.5milliGRAM(s) IntraMuscular every 6 hours PRN Agitation      Vital Signs Last 24 Hrs  T(C): 36.6, Max: 36.8 (06-05 @ 21:43)  HR: 72 (68 - 78)  BP: 101/62 (87/57 - 108/71)  RR: 18 (17 - 19)  SpO2: 97% (93% - 98%)  Wt(kg): --  CAPILLARY BLOOD GLUCOSE  94 (05 Jun 2017 08:43)    I&O's Summary    I & Os for current day (as of 06 Jun 2017 07:18)  =============================================  IN: 900 ml / OUT: 800 ml / NET: 100 ml      PHYSICAL EXAM:  GENERAL: NAD, well-developed. Patient AO x 3 with pressured speech  HEAD:  Atraumatic, Normocephalic  EYES: EOMI, PERRLA, conjunctiva and sclera clear  NECK: Supple, No JVD  CHEST/LUNG: Clear to auscultation bilaterally; No wheeze  HEART: Regular rate and rhythm; No murmurs, rubs, or gallops  ABDOMEN: Soft, Nontender, Nondistended; Bowel sounds present. Midline scar from Soriano's.  EXTREMITIES:  2+ Peripheral Pulses, No clubbing, cyanosis, or edema  PSYCH: AAOx3. Pressured speech, insulting medical assistants, threatening to leave AMA  NEUROLOGY: non-focal  SKIN: Midline scar abdomen, not an open wound.    LABS:  Complete Blood Count + Automated Diff (06.06.17 @ 06:40)    WBC Count: 6.49 K/uL    RBC Count: 4.68 M/uL    Hemoglobin: 12.7 g/dL    Hematocrit: 40.6 %    Mean Cell Volume: 86.8 fL    Mean Cell Hemoglobin: 27.1 pg    Mean Cell Hemoglobin Conc: 31.3 %    Red Cell Distrib Width: 17.6 %    Platelet Count - Automated: 113 K/uL    MPV: 9.2 fl    Auto Neutrophil #: 3.69 K/uL    Auto Lymphocyte #: 1.42 K/uL    Auto Monocyte #: 0.80 K/uL    Auto Eosinophil #: 0.22 K/uL    Auto Basophil #: 0.07 K/uL    Auto Neutrophil %: 56.8 %    Auto Lymphocyte %: 21.9 %    Auto Monocyte %: 12.3 %    Auto Eosinophil %: 3.4 %    Auto Basophil %: 1.1 %    Auto Immature Granulocyte %: 4.5: (Includes meta, myelo and promyelocytes) %    Neutrophils %: 55.6 %    Lymphocytes %: 18.3 %    Monocytes %: 15.7 %    Eosinophils %: 0.9 %    Basophils %: 1.7 %    Reactive Lymphocytes %: 2.6 %    Metamyelocytes %: 1.7 %    Myelocytes %: 3.5 %    Platelet Count - Estimate: DECREASED    Morphology Normal: NORMAL    Basic Metabolic Panel (06.06.17 @ 06:40)    Sodium, Serum: 143 mmol/L    Potassium, Serum: 4.3 mmol/L    Chloride, Serum: 102 mmol/L    Carbon Dioxide, Serum: 28 mmol/L    Blood Urea Nitrogen, Serum: 17 mg/dL    Creatinine, Serum: 0.65 mg/dL    Glucose, Serum: 108 mg/dL    Calcium, Total Serum: 8.8 mg/dL    eGFR if Non : 104: The units for eGFR are ml/min/1.73m2 (normalized body  surface area). The eGFR is calculated from a serum  creatinine using the CKD-EPI equation. Other variables  required for calculation are race, age and sex. Among  patients with chronic kidney [id number]: ase (CKD), the eGFR is  useful in determining the stage of disease according to  KDOQI CKD classification. All eGFR results are reported  numerically with the following interpretation.    GFR  (ml/min/1.73 m2)          W/KIDNEY DAMAGE    W/O KIDNEY : G  ==========================================================  >= 90.......................Stage 1..............Normal  60-89.......................Stage 2...........Decreased GFR  30-59.......................Stage 3..............Stage 3  15-29.......104: ................Stage 4..............Stage 4  < 15........................Stage 5..............Stage 5    Each stage of CKD assumes that the associated GFR level  has been in effect for at least 3 months. Determination of  stages one and two (with [id number]: R > 59ml/min/m2) requires  estimation of kidney damage for at least 3 months as  defined by structural or functional abnormalities.    Limitations: All estimates of GFR will be less accurate  for patients at extremes of muscle mass (including but  [id number]:  limited to frail elderly, critically ill, or cancer  patients), those with unusual diets, and those with  conditions associated with reduced secretion or  extrarenal elimination of creatinine. The eGFR equation  is not recommended for use in [id number]:  with unstable  creatinine levels. mL/min    eGFR if : 121 mL/min    Consultant(s) Notes Reviewed:  Neuro, psych

## 2017-06-06 NOTE — CONSULT NOTE ADULT - ASSESSMENT
62 y/o M w/ multiple medical problems p/w new onset seizures and psychiatric manifestations of unknown etiology. Had extensive w/u at OSH as per primary team w/ paraneoplastic, infectious and autoimmune etiologies ruled out. Physical features (club foot, elongated face, low implanted ears, micrognatia) could be associated, however, given patient's age and presentation, unlikely genetic etiology. Would attempt to obtain workup at OSH results (imaging, LP, serology) to avoid sending same workup.

## 2017-06-06 NOTE — PROGRESS NOTE ADULT - PROBLEM SELECTOR PLAN 1
unclear etiology, currently AAOx3 but inappropriate tangential speech, negative LP and unremarkable MRI at [facility]. Keppra induced psychosis on differential dx.  - Pt's home olanzapine has been held since admission but has remained calm. - per Psych c/s, haldol 5mg PO bid   - Obtain further records from [facility]: differential included keppra induced psychosis, undiagnosed psychiatric disease, but cause remains unclear  - suspicious for worsening psychiatric condition due to prolonged hospitalization.

## 2017-06-06 NOTE — PROGRESS NOTE ADULT - PROBLEM SELECTOR PLAN 2
Resolved, ABG suggestive of chronic hypercarbia, saturating high 90s on room air  - Monitor off BIPAP  - Pt needs outpatient pulmonology evaluation for CO2 retention: LAMBERT?, sarcoidosis, restrictive lung disease hx.  - Avoid benzos - likely the contributing factor to his respiratory failure requiring MICU stay

## 2017-06-06 NOTE — CONSULT NOTE ADULT - ATTENDING COMMENTS
This is a 62 yo M who presented from a rehab with worsening encephalopathy in the setting of an extensive outpatient workup for a possible limbic encephalitis, that was negative.  He is currently at his baseline on a new PO Haldol regimen.  Records of his previous workup are to be obtained.  There is no further inpatient neurologic workup required at this time.

## 2017-06-06 NOTE — PROGRESS NOTE ADULT - ASSESSMENT
61M with sarcoidosis, bronchiectasis, chronic CO2 retention, perforated diverticulitis s/p Bright procedure and reversal, b/l SDH s/p surgery (2010), R inguinal hernia repair with mesh, and recent 2-week admission (5/14/17 - 6/1/17) at [place] for new-onset generalized tonic-clonic seizures presented 6/2 from [facility] after 1 day stay with altered mental status. Pt was transferred to the MICU for lethargy and hypercarbic respiratory failure after receiving ativan 1mg x1 and haldol 5mg x1, treated in MICU w/ BiPAP, now downgraded to floor, breathing comfortably on room air AO x 3, s/p TOV w/ psych following for visual/auditory hallucinations.      61M with sarcoidosis, bronchiectasis, chronic CO2 retention, perforated diverticulitis s/p Bright procedure and reversal, b/l SDH s/p surgery (2010), R inguinal hernia repair with mesh, and recent 2-week admission (5/14/17 - 6/1/17) at [place] for new-onset generalized tonic-clonic seizures presented 6/2 from [facility] after 1 day stay with altered mental status. Pt was transferred to the MICU for lethargy and hypercarbic respiratory failure after receiving ativan 1mg x1 and haldol 5mg x1, trated in MICU w/ BiPAP, now transferred to floor, breathing comfortably on room air AO x3 but reporting visual/auditory hallucinations.     # Urinary Retention:    Problem/Plan - 1:  ·  Problem: Acute respiratory failure with hypoxia.  Plan:         Problem/Plan - 4:  ·  Problem: Urinary retention.  Plan: - c/w flomax, TOV.     Problem/Plan - 5:  ·  Problem: Prophylactic measure.  Plan: DVT PPx: HSQ.

## 2017-06-07 DIAGNOSIS — F29 UNSPECIFIED PSYCHOSIS NOT DUE TO A SUBSTANCE OR KNOWN PHYSIOLOGICAL CONDITION: ICD-10-CM

## 2017-06-07 DIAGNOSIS — D69.6 THROMBOCYTOPENIA, UNSPECIFIED: ICD-10-CM

## 2017-06-07 LAB
BASOPHILS # BLD AUTO: 0.04 K/UL — SIGNIFICANT CHANGE UP (ref 0–0.2)
BASOPHILS NFR BLD AUTO: 0.4 % — SIGNIFICANT CHANGE UP (ref 0–2)
BUN SERPL-MCNC: 20 MG/DL — SIGNIFICANT CHANGE UP (ref 7–23)
CALCIUM SERPL-MCNC: 9.2 MG/DL — SIGNIFICANT CHANGE UP (ref 8.4–10.5)
CHLORIDE SERPL-SCNC: 101 MMOL/L — SIGNIFICANT CHANGE UP (ref 98–107)
CO2 SERPL-SCNC: 28 MMOL/L — SIGNIFICANT CHANGE UP (ref 22–31)
CREAT SERPL-MCNC: 0.71 MG/DL — SIGNIFICANT CHANGE UP (ref 0.5–1.3)
EOSINOPHIL # BLD AUTO: 0.28 K/UL — SIGNIFICANT CHANGE UP (ref 0–0.5)
EOSINOPHIL NFR BLD AUTO: 3.1 % — SIGNIFICANT CHANGE UP (ref 0–6)
GLUCOSE SERPL-MCNC: 117 MG/DL — HIGH (ref 70–99)
HCT VFR BLD CALC: 43 % — SIGNIFICANT CHANGE UP (ref 39–50)
HEPARIN CF II AG PPP-ACNC: 0.33 — SIGNIFICANT CHANGE UP (ref 0–0.39)
HGB BLD-MCNC: 13.6 G/DL — SIGNIFICANT CHANGE UP (ref 13–17)
IMM GRANULOCYTES NFR BLD AUTO: 2.9 % — HIGH (ref 0–1.5)
LYMPHOCYTES # BLD AUTO: 1.23 K/UL — SIGNIFICANT CHANGE UP (ref 1–3.3)
LYMPHOCYTES # BLD AUTO: 13.7 % — SIGNIFICANT CHANGE UP (ref 13–44)
MAGNESIUM SERPL-MCNC: 2 MG/DL — SIGNIFICANT CHANGE UP (ref 1.6–2.6)
MCHC RBC-ENTMCNC: 27.5 PG — SIGNIFICANT CHANGE UP (ref 27–34)
MCHC RBC-ENTMCNC: 31.6 % — LOW (ref 32–36)
MCV RBC AUTO: 86.9 FL — SIGNIFICANT CHANGE UP (ref 80–100)
MONOCYTES # BLD AUTO: 0.87 K/UL — SIGNIFICANT CHANGE UP (ref 0–0.9)
MONOCYTES NFR BLD AUTO: 9.7 % — SIGNIFICANT CHANGE UP (ref 2–14)
NEUTROPHILS # BLD AUTO: 6.31 K/UL — SIGNIFICANT CHANGE UP (ref 1.8–7.4)
NEUTROPHILS NFR BLD AUTO: 70.2 % — SIGNIFICANT CHANGE UP (ref 43–77)
PHOSPHATE SERPL-MCNC: 3.8 MG/DL — SIGNIFICANT CHANGE UP (ref 2.5–4.5)
PLATELET # BLD AUTO: 95 K/UL — LOW (ref 150–400)
PMV BLD: 8.9 FL — SIGNIFICANT CHANGE UP (ref 7–13)
POTASSIUM SERPL-MCNC: 4.5 MMOL/L — SIGNIFICANT CHANGE UP (ref 3.5–5.3)
POTASSIUM SERPL-SCNC: 4.5 MMOL/L — SIGNIFICANT CHANGE UP (ref 3.5–5.3)
RBC # BLD: 4.95 M/UL — SIGNIFICANT CHANGE UP (ref 4.2–5.8)
RBC # FLD: 17.5 % — HIGH (ref 10.3–14.5)
SODIUM SERPL-SCNC: 141 MMOL/L — SIGNIFICANT CHANGE UP (ref 135–145)
WBC # BLD: 8.99 K/UL — SIGNIFICANT CHANGE UP (ref 3.8–10.5)
WBC # FLD AUTO: 8.99 K/UL — SIGNIFICANT CHANGE UP (ref 3.8–10.5)

## 2017-06-07 PROCEDURE — 99232 SBSQ HOSP IP/OBS MODERATE 35: CPT

## 2017-06-07 PROCEDURE — 99233 SBSQ HOSP IP/OBS HIGH 50: CPT | Mod: GC

## 2017-06-07 RX ORDER — DIVALPROEX SODIUM 500 MG/1
750 TABLET, DELAYED RELEASE ORAL EVERY 12 HOURS
Qty: 0 | Refills: 0 | Status: DISCONTINUED | OUTPATIENT
Start: 2017-06-07 | End: 2017-06-08

## 2017-06-07 RX ORDER — HALOPERIDOL DECANOATE 100 MG/ML
5 INJECTION INTRAMUSCULAR
Qty: 0 | Refills: 0 | Status: DISCONTINUED | OUTPATIENT
Start: 2017-06-07 | End: 2017-06-08

## 2017-06-07 RX ADMIN — Medication 28.75 MILLIGRAM(S): at 07:18

## 2017-06-07 RX ADMIN — BUDESONIDE AND FORMOTEROL FUMARATE DIHYDRATE 2 PUFF(S): 160; 4.5 AEROSOL RESPIRATORY (INHALATION) at 09:18

## 2017-06-07 RX ADMIN — HALOPERIDOL DECANOATE 2.5 MILLIGRAM(S): 100 INJECTION INTRAMUSCULAR at 21:50

## 2017-06-07 RX ADMIN — TAMSULOSIN HYDROCHLORIDE 0.4 MILLIGRAM(S): 0.4 CAPSULE ORAL at 21:50

## 2017-06-07 RX ADMIN — HALOPERIDOL DECANOATE 2.5 MILLIGRAM(S): 100 INJECTION INTRAMUSCULAR at 13:35

## 2017-06-07 RX ADMIN — DIVALPROEX SODIUM 750 MILLIGRAM(S): 500 TABLET, DELAYED RELEASE ORAL at 17:41

## 2017-06-07 RX ADMIN — BUDESONIDE AND FORMOTEROL FUMARATE DIHYDRATE 2 PUFF(S): 160; 4.5 AEROSOL RESPIRATORY (INHALATION) at 21:56

## 2017-06-07 RX ADMIN — HALOPERIDOL DECANOATE 5 MILLIGRAM(S): 100 INJECTION INTRAMUSCULAR at 17:41

## 2017-06-07 RX ADMIN — Medication 3 MILLILITER(S): at 11:36

## 2017-06-07 RX ADMIN — HALOPERIDOL DECANOATE 2.5 MILLIGRAM(S): 100 INJECTION INTRAMUSCULAR at 07:17

## 2017-06-07 NOTE — DISCHARGE NOTE ADULT - PLAN OF CARE
Medical Management Please continue on Zyprexa 2.5mg every morning and 5mg at bedtime. Haldol is prescribed as needed for agitation. Follow up with Dr. Mcnulty as an outpatient for treatment, and medication modification/renewal. Resolution and prevention Please avoid benzodiazepenes which put you at risk for respiratory failure due to respiratory suppression. Continue to use symbicort and albuterol/ipratropium as prescribed. If you become SOB, BiPAP can be used to assist your breathing. Please follow up with Dr. Patrick your Pulmonologist as an outpatient. Surveillance and Medical Management Please follow up with Dr. Patrick to review your hospitalization, optimize your treatment and renew/modify necessary medications. Resolution You experienced urinary retention during this admission requiring a haq catheter. This problem resolved prior to discharge. Please continue to take flomax as prescribed. Medical Management and prevention Please continue to take depakote as prescribed, and follow up with Dr. Nissenbaum at neurology clinic to review your hospitalization, optimize management and renew/modify necessary medications. Dietary modification Please continue to eat a dysphagia 2, mechanical soft/thin liquid diet to avoid aspiration risks.

## 2017-06-07 NOTE — PROGRESS NOTE BEHAVIORAL HEALTH - SUMMARY
Patient is 61 years old man with no pph and pmh of sarcoidosis, perforated diverticulitis, cholecystectomy, presented with AMS. Patient was found to be delusional with pressured speech likely due to Keppra. Today, patient more linear and AAOX3, denies AH and VH, denies SI and HI. No delusions elicited though has some referential thoughts.  Able to have coherent conversation. Will continue haldol for psychosis. Will follow  Case discussed with primary team and recs. given

## 2017-06-07 NOTE — DISCHARGE NOTE ADULT - CARE PROVIDER_API CALL
Fausto Patrick), Critical Care Medicine; Internal Medicine; Pulmonary Disease; Sleep Medicine  [address] [address]  Phone: [phone]  Fax: [phone] Fausto Patrick), Critical Care Medicine; Internal Medicine; Pulmonary Disease; Sleep Medicine  [address] [facility] [address]  [place] [address]  Phone: [phone]  Fax: [phone]    Nissenbaum, Michael A (MD), Neurology  [address] [facility] [address]  [place] [address]  Phone: [phone]  Fax: [phone]    Sarah Mcnulty (JAM), Psych  [facility]  [address] [place] [address]  Phone: [phone]  Fax: [phone]

## 2017-06-07 NOTE — PROGRESS NOTE ADULT - SUBJECTIVE AND OBJECTIVE BOX
Patient is a 61y old  Male who presents with a chief complaint of AMS (03 Jun 2017 00:13)      SUBJECTIVE / OVERNIGHT EVENTS:    MEDICATIONS  (STANDING):  ALBUTerol/ipratropium for Nebulization 3milliLiter(s) Nebulizer every 6 hours  buDESOnide 160 MICROgram(s)/formoterol 4.5 MICROgram(s) Inhaler 2Puff(s) Inhalation two times a day  sodium chloride 0.9%. 1000milliLiter(s) IV Continuous <Continuous>  valproate sodium IVPB 750milliGRAM(s) IV Intermittent every 12 hours  tamsulosin 0.4milliGRAM(s) Oral at bedtime  insulin lispro (HumaLOG) corrective regimen sliding scale  SubCutaneous three times a day before meals  dextrose 5%. 1000milliLiter(s) IV Continuous <Continuous>  dextrose 50% Injectable 12.5Gram(s) IV Push once  dextrose 50% Injectable 25Gram(s) IV Push once  dextrose 50% Injectable 25Gram(s) IV Push once  haloperidol     Tablet 5milliGRAM(s) Oral two times a day    MEDICATIONS  (PRN):  dextrose Gel 1Dose(s) Oral once PRN Blood Glucose LESS THAN 70 milliGRAM(s)/deciliter  glucagon  Injectable 1milliGRAM(s) IntraMuscular once PRN Glucose LESS THAN 70 milligrams/deciliter  haloperidol     Tablet 2.5milliGRAM(s) Oral every 6 hours PRN Agitation  haloperidol    Injectable 2.5milliGRAM(s) IV Push every 6 hours PRN Agitation  haloperidol    Injectable 2.5milliGRAM(s) IntraMuscular every 6 hours PRN Agitation      Vital Signs Last 24 Hrs  T(C): 36.3, Max: 36.6 (06-06 @ 19:50)  HR: 81 (70 - 81)  BP: 109/72 (101/63 - 131/78)  RR: 18 (18 - 18)  SpO2: 96% (92% - 96%)  Wt(kg): --  CAPILLARY BLOOD GLUCOSE  81 (06 Jun 2017 22:12)  124 (06 Jun 2017 17:28)  102 (06 Jun 2017 12:30)  85 (06 Jun 2017 08:36)    I&O's Summary    I & Os for current day (as of 07 Jun 2017 07:58)  =============================================  IN: 500 ml / OUT: 1250 ml / NET: -750 ml      PHYSICAL EXAM:  GENERAL: NAD, well-developed  HEAD:  Atraumatic, Normocephalic  EYES: EOMI, PERRLA, conjunctiva and sclera clear  NECK: Supple, No JVD  CHEST/LUNG: Clear to auscultation bilaterally; No wheeze  HEART: Regular rate and rhythm; No murmurs, rubs, or gallops  ABDOMEN: Soft, Nontender, Nondistended; Bowel sounds present  EXTREMITIES:  2+ Peripheral Pulses, No clubbing, cyanosis, or edema  PSYCH: AAOx3  NEUROLOGY: non-focal  SKIN: No rashes or lesions    LABS:                        12.7   6.49  )-----------( 113      ( 06 Jun 2017 06:40 )             40.6     06-06    143  |  102  |  17  ----------------------------<  108<H>  4.3   |  28  |  0.65    Ca    8.8      06 Jun 2017 06:40  Phos  3.5     06-06  Mg     2.0     06-06                RADIOLOGY & ADDITIONAL TESTS:    Imaging Personally Reviewed:    Consultant(s) Notes Reviewed:      Care Discussed with Consultants/Other Providers: Patient is a 61y old  Male who presents with a chief complaint of AMS (03 Jun 2017 00:13)      SUBJECTIVE / OVERNIGHT EVENTS: Pt reports feeling calmer, more like himself. Pt denies HA, CP, SOB, Abd Pain, dysuria, arthralgia, myalgia. yesterday patient disruptive with staff, today not disruptive. Patient Ao x 3 but states he would like to replace his current hands with those generated from 3-D printer, and asks what the cost would be.     MEDICATIONS  (STANDING):  ALBUTerol/ipratropium for Nebulization 3milliLiter(s) Nebulizer every 6 hours  buDESOnide 160 MICROgram(s)/formoterol 4.5 MICROgram(s) Inhaler 2Puff(s) Inhalation two times a day  sodium chloride 0.9%. 1000milliLiter(s) IV Continuous <Continuous>  valproate sodium IVPB 750milliGRAM(s) IV Intermittent every 12 hours  tamsulosin 0.4milliGRAM(s) Oral at bedtime  insulin lispro (HumaLOG) corrective regimen sliding scale  SubCutaneous three times a day before meals  dextrose 5%. 1000milliLiter(s) IV Continuous <Continuous>  dextrose 50% Injectable 12.5Gram(s) IV Push once  dextrose 50% Injectable 25Gram(s) IV Push once  dextrose 50% Injectable 25Gram(s) IV Push once  haloperidol     Tablet 5milliGRAM(s) Oral two times a day    MEDICATIONS  (PRN):  dextrose Gel 1Dose(s) Oral once PRN Blood Glucose LESS THAN 70 milliGRAM(s)/deciliter  glucagon  Injectable 1milliGRAM(s) IntraMuscular once PRN Glucose LESS THAN 70 milligrams/deciliter  haloperidol     Tablet 2.5milliGRAM(s) Oral every 6 hours PRN Agitation  haloperidol    Injectable 2.5milliGRAM(s) IV Push every 6 hours PRN Agitation  haloperidol    Injectable 2.5milliGRAM(s) IntraMuscular every 6 hours PRN Agitation      Vital Signs Last 24 Hrs  T(C): 36.3, Max: 36.6 (06-06 @ 19:50)  HR: 81 (70 - 81)  BP: 109/72 (101/63 - 131/78)  RR: 18 (18 - 18)  SpO2: 96% (92% - 96%)  Wt(kg): --  CAPILLARY BLOOD GLUCOSE  81 (06 Jun 2017 22:12)  124 (06 Jun 2017 17:28)  102 (06 Jun 2017 12:30)  85 (06 Jun 2017 08:36)    I&O's Summary    I & Os for current day (as of 07 Jun 2017 07:58)  =============================================  IN: 500 ml / OUT: 1250 ml / NET: -750 ml      PHYSICAL EXAM:  GENERAL: NAD, well-developed. Patient AO x 3, calmer than yesterday but with inappropriate speech  HEAD:  Atraumatic, Normocephalic  EYES: EOMI, PERRLA, conjunctiva and sclera clear  NECK: Supple, No JVD  CHEST/LUNG: Clear to auscultation bilaterally; No wheeze  HEART: Regular rate and rhythm; No murmurs, rubs, or gallops  ABDOMEN: Soft, Nontender, Nondistended; Bowel sounds present. Midline scar from Soriano's.  EXTREMITIES:  2+ Peripheral Pulses, No clubbing, cyanosis, or edema  PSYCH: AAOx3. In the middle of appropriate conversation, Patient states he would like a 3D printer to print our new hands so he can replace his current hands.  NEUROLOGY: non-focal  SKIN: Midline scar abdomen, not an open wound.      LABS:                        12.7   6.49  )-----------( 113      ( 06 Jun 2017 06:40 )             40.6     06-06    143  |  102  |  17  ----------------------------<  108<H>  4.3   |  28  |  0.65    Ca    8.8      06 Jun 2017 06:40  Phos  3.5     06-06  Mg     2.0     06-06                RADIOLOGY & ADDITIONAL TESTS:    Imaging Personally Reviewed:    Consultant(s) Notes Reviewed:      Care Discussed with Consultants/Other Providers: Patient is a 61y old  Male who presents with a chief complaint of AMS (03 Jun 2017 00:13)      SUBJECTIVE / OVERNIGHT EVENTS: Pt reports feeling calmer, more like himself. Pt denies HA, CP, SOB, Abd Pain, dysuria, arthralgia, myalgia. yesterday patient disruptive with staff, today not disruptive. Patient Ao x 3 but states he would like to replace his current hands with those generated from 3-D printer, and asks what the cost would be.     MEDICATIONS  (STANDING):  ALBUTerol/ipratropium for Nebulization 3milliLiter(s) Nebulizer every 6 hours  buDESOnide 160 MICROgram(s)/formoterol 4.5 MICROgram(s) Inhaler 2Puff(s) Inhalation two times a day  sodium chloride 0.9%. 1000milliLiter(s) IV Continuous <Continuous>  valproate sodium IVPB 750milliGRAM(s) IV Intermittent every 12 hours  tamsulosin 0.4milliGRAM(s) Oral at bedtime  insulin lispro (HumaLOG) corrective regimen sliding scale  SubCutaneous three times a day before meals  dextrose 5%. 1000milliLiter(s) IV Continuous <Continuous>  dextrose 50% Injectable 12.5Gram(s) IV Push once  dextrose 50% Injectable 25Gram(s) IV Push once  dextrose 50% Injectable 25Gram(s) IV Push once  haloperidol     Tablet 5milliGRAM(s) Oral two times a day    MEDICATIONS  (PRN):  dextrose Gel 1Dose(s) Oral once PRN Blood Glucose LESS THAN 70 milliGRAM(s)/deciliter  glucagon  Injectable 1milliGRAM(s) IntraMuscular once PRN Glucose LESS THAN 70 milligrams/deciliter  haloperidol     Tablet 2.5milliGRAM(s) Oral every 6 hours PRN Agitation  haloperidol    Injectable 2.5milliGRAM(s) IV Push every 6 hours PRN Agitation  haloperidol    Injectable 2.5milliGRAM(s) IntraMuscular every 6 hours PRN Agitation      Vital Signs Last 24 Hrs  T(C): 36.3, Max: 36.6 (06-06 @ 19:50)  HR: 81 (70 - 81)  BP: 109/72 (101/63 - 131/78)  RR: 18 (18 - 18)  SpO2: 96% (92% - 96%)  Wt(kg): --  CAPILLARY BLOOD GLUCOSE  81 (06 Jun 2017 22:12)  124 (06 Jun 2017 17:28)  102 (06 Jun 2017 12:30)  85 (06 Jun 2017 08:36)    I&O's Summary    I & Os for current day (as of 07 Jun 2017 07:58)  =============================================  IN: 500 ml / OUT: 1250 ml / NET: -750 ml      PHYSICAL EXAM:  GENERAL: NAD, well-developed. Patient AO x 3, calmer than yesterday but with inappropriate speech  HEAD:  Atraumatic, Normocephalic  EYES: EOMI, PERRLA, conjunctiva and sclera clear  NECK: Supple, No JVD  CHEST/LUNG: Clear to auscultation bilaterally; No wheeze  HEART: Regular rate and rhythm; No murmurs, rubs, or gallops  ABDOMEN: Soft, Nontender, Nondistended; Bowel sounds present. Midline scar from Soriano's.  EXTREMITIES:  2+ Peripheral Pulses, No clubbing, cyanosis, or edema  PSYCH: AAOx3. In the middle of appropriate conversation, Patient states he would like a 3D printer to print our new hands so he can replace his current hands.  NEUROLOGY: non-focal  SKIN: Midline scar abdomen, not an open wound.      LABS:                        12.7   6.49  )-----------( 113      ( 06 Jun 2017 06:40 )             40.6     06-06    143  |  102  |  17  ----------------------------<  108<H>  4.3   |  28  |  0.65    Ca    8.8      06 Jun 2017 06:40  Phos  3.5     06-06  Mg     2.0     06-06    Care Discussed with Consultants/Other Providers: Neurology and Psychiatry.

## 2017-06-07 NOTE — PROGRESS NOTE ADULT - PROBLEM SELECTOR PLAN 3
s/p TOV  - continue flomax, monitor for signs Urinary retention - continue flomax, monitor for signs Urinary retention

## 2017-06-07 NOTE — PROGRESS NOTE ADULT - ASSESSMENT
61M with sarcoidosis, bronchiectasis, chronic CO2 retention, perforated diverticulitis s/p Bright procedure and reversal, b/l SDH s/p surgery (2010), R inguinal hernia repair with mesh, and recent 2-week admission (5/14/17 - 6/1/17) at [facility] for new-onset generalized tonic-clonic seizures presented 6/2 from [facility] after 1 day stay with altered mental status. Pt was transferred to the MICU for lethargy and hypercarbic respiratory failure after receiving ativan 1mg x1 and haldol 5mg x1, treated in MICU w/ BiPAP, now downgraded to floor, breathing comfortably on room air AO x 3, s/p TOV w/ psych following for visual/auditory hallucinations and neuro following for hx of seizures.    61M with sarcoidosis, bronchiectasis, chronic CO2 retention, perforated diverticulitis s/p Bright procedure and reversal, b/l SDH s/p surgery (2010), R inguinal hernia repair with mesh, and recent 2-week admission (5/14/17 - 6/1/17) at [facility] for new-onset generalized tonic-clonic seizures presented 6/2 from [facility] after 1 day stay with altered mental status. Pt was transferred to the MICU for lethargy and hypercarbic respiratory failure after receiving ativan 1mg x1 and haldol 5mg x1, trated in MICU w/ BiPAP, now transferred to floor, breathing comfortably on room air AO x3 but reporting visual/auditory hallucinations.     # Urinary Retention:

## 2017-06-07 NOTE — PROGRESS NOTE ADULT - PROBLEM SELECTOR PLAN 4
50% reduction since admission, with recent Hospitalization at OSH.  - F/U HIT panel, hold heparin 50% reduction since admission, with recent Hospitalization at OSH.  - F/U HIT panel, d/c heparin

## 2017-06-07 NOTE — DISCHARGE NOTE ADULT - HOSPITAL COURSE
62 yo Male with complicated medical/surgical history including sarcoidosis, bronchiectasis, ?rheumatoid arthritis, cholecystectomy, perforated diverticulitis s/p Bright procedure and reversal, b/l SDH s/p surgery, R inguinal hernia repair with mesh, and recent 2-week admission (5/14/17 - 6/1/17) at [facility] for new-onset generalized tonic-clonic seizures presents from [facility] with altered mental status.     Notes from [place] personally reviewed.  According to the notes, pt was transferred from [facility] in [place] to [place] [facility] ICU on 5/14/17 for further management of new-onset GTC seizures. Pt was driving with his girlfriend and when they had an argument in the car, pt's speech suddenly became unintelligible and stuttering. Pt then tipped his head back and became unresponsive, so his girlfriend maneuvered the car to the curb. Pt then appeared to be alert and back at his baseline. Pt was taken to [facility], and at that hospital, had a witness GTC seizure lasting 1 minute and given ativan 2mg. However, pt became apneic and was intubated and loaded with Keppra 3g. Head CT at [facility] was normal.    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 CT chest at [place] showed central peribronchial fibrosis with air trapping with basilar groundglass opacity and small pleural effusions. Pt had no additional seizures while at [facility], and pt was tapered off Keppra due to presumed Keppra-induced psychosis and switched to valproic acid instead. The trigger for pt's seizures was not able to be determined despite various workup, including LP (5/14: glucose 89, protein 45, WBC 0, VZV and HSV negative), MRI brain, autoimmune and paraneoplastic panel, and check of heavy metals, mercury, and vitamin B12. Pt's continuous EEG showed no epileptiform activity. Pt was transferred from the Neuro ICU to the Medicine service on 5/21/17, and upon improvement in pt's delirium and psychosis (only exhibiting confabulations), was discharged on 6/1/17 to [facility].     At [facility], however, pt was demonstrating delusions, hallucinations, and aggressive behavior, so pt was transferred to [facility] for evaluation of altered mental status.    HPI unable to be obtained from pt, as pt heavily sedated with IV haldol and ativan in the ED. Several attempts to contact pt's sister by phone for collateral information. However, pt's sister unreachable, and VM left.     In the ED, T 97.8, HR 84, /68, RR 18, O2 Sat 95% RA. Labs: leukocytosis with WBC 14.05, 2% bands. CMP with Na, K, and Ca wnl, HCO3 30, BUN 25, Cr 0.9. Serum tox screen negative. Got IV haldol 5mg x 1 and IV ativan 1mg x 1.     Hospital Course  # Acute respiratory failure with hypoxia.  Plan: Multifactorial 2/2 sarcoidosis c/b ILD and sedation. Pt received IV haldol 5mg and IV ativan 1mg in the ED for psychosis and aggressive behavior. Afterwards, pt became hypoxic to the 80s on 4L NC with intermittent apneic episodes while heavily sedated. Also noted to be using accessory muscles.  - Pt placed on BiPAP, initially with settings of FiO2 50% and 12/6, now on FiO2 30% and 12/4  - ABG: pH 7.31, pCO2 67, pO2 185, HCO3 29, O2 sat 97%  - MICU consulted and evaluated pt, will be transferred to MICU for monitoring  - NPO for now  - Hold non essential oral medications while on BiPAP.     # Delirium.  Plan: Likely 2/2 valproic acid intoxication with VPA level elevated to 116, though other etiologies, including infectious, need to be worked up.  - Check VPA level in AM  - Will hold dose of Depakote and dose by level when appropriate  - Will need Neurology consult once respiratory failure resolved   - CXR pending  - F/u blood cxs, UA.     # Seizure.  Plan: New-onset GTC seizures in mid-May requiring intubation at OSH. 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 Now on Depakote.   - VPA level 116. Will need to hold Depakote and check VPA level in AM.  - Will do ativan PRN for recurrent seizures. Responded to ativan 2mg at OSH.  - Will need Neurology consult - concerned for Neurologic sarcoidosis (the pt is not on steroids)New-onset GTC seizures in mid-May requiring intubation at OSH. 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 Now on Depakote.   - VPA level 116. Will need to hold Depakote and check VPA level in AM.  - Will do ativan PRN for recurrent seizures. Responded to ativan 2mg at OSH.  - Will need Neurology consult  #Sarcoid.  Plan: C/b chronic interstitial lung disease. O2 dependent now. Not on any steroids currently.  - Will need Rheum and Pulm consults   - C/w Symbicort.     # Dysphagia.  Plan: - On chopped diet with honey thickened liquids once off BiPAP  - Obtain S&S eval.     #: Prophylactic use of unfractionated heparin for venous thromboembolism. Plan: - HSQ. 60 yo Male with complicated medical/surgical history including sarcoidosis, bronchiectasis, ?rheumatoid arthritis, cholecystectomy, perforated diverticulitis s/p Bright procedure and reversal, b/l SDH s/p surgery, R inguinal hernia repair with mesh, and recent 2-week admission (5/14/17 - 6/1/17) at [facility] for new-onset generalized tonic-clonic seizures presents from [facility] with altered mental status.     Notes from [facility] personally reviewed.  According to the notes, pt was transferred from [facility] in [place] to [place] [facility] ICU on 5/14/17 for further management of new-onset GTC seizures. Pt was driving with his girlfriend and when they had an argument in the car, pt's speech suddenly became unintelligible and stuttering. Pt then tipped his head back and became unresponsive, so his girlfriend maneuvered the car to the curb. Pt then appeared to be alert and back at his baseline. Pt was taken to [facility], and at that hospital, had a witness GTC seizure lasting 1 minute and given ativan 2mg. However, pt became apneic and was intubated and loaded with Keppra 3g. Head CT at [facility] was normal.    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 CT chest at [facility] showed central peribronchial fibrosis with air trapping with basilar groundglass opacity and small pleural effusions. 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 The trigger for pt's seizures was not able to be determined despite various workup, including LP (5/14: glucose 89, protein 45, WBC 0, VZV and HSV negative), MRI brain, autoimmune and paraneoplastic panel, and check of heavy metals, mercury, and vitamin B12. Pt's continuous EEG showed no epileptiform activity. Pt was transferred from the Neuro ICU to the Medicine service on 5/21/17, and upon improvement in pt's delirium and psychosis (only exhibiting confabulations), was discharged on 6/1/17 to [facility].     At [facility], however, pt was demonstrating delusions, hallucinations, and aggressive behavior, so pt was transferred to [facility] for evaluation of altered mental status.    HPI unable to be obtained from pt, as pt heavily sedated with IV haldol and ativan in the ED. Several attempts to contact pt's sister by phone for collateral information. However, pt's sister unreachable, and VM left.     In the ED, T 97.8, HR 84, /68, RR 18, O2 Sat 95% RA. Labs: leukocytosis with WBC 14.05, 2% bands. CMP with Na, K, and Ca wnl, HCO3 30, BUN 25, Cr 0.9. Serum tox screen negative. Got IV haldol 5mg x 1 and IV ativan 1mg x 1.     Hospital Course  # Acute respiratory failure with hypoxia:  Hypoxia was Multifactorial 2/2 sarcoidosis c/b ILD and sedation. Pt received IV haldol 5mg and IV ativan 1mg in the ED for psychosis and aggressive behavior. Afterwards, pt became hypoxic to the 80s on 4L NC with intermittent apneic episodes while heavily sedated. Also noted to be using accessory muscles. Pt was placed on BiPAP, initially with settings of FiO2 50% and 12/6, then FiO2 30% and 12/4. ABG showed acute on chronic hypercapnia; patient was transferred to MICU for respiratory failure and weaned from BiPAP. Benzos contraindicated. His respiratory function improved and pt was discharged from MICU to floors where he remained stable on room air, breathing comfortably for remainder of hospitalization. Continued on symbicort and duonebs throughout hospitalization.    # Psychosis with behavioral disturbance: Several contributing factors per neuro and psych consults, including valproic acid intoxication with VPA level elevated to 116, anoxic brain injury not seen on imaging, keppra induced psychosis, hospital induced delirium after prolonged and complex hospitalizations at multiple facilities, as well exacerbation of underlying disorder that preceded hospitalization. Bl cx (-), CTH (-) for acute findings, EEG (-), MRI/ CSF studies (-) at [facility]. Patient AO x 3 but displayed persistent tangential inappropriate speech with paranoid delusions and hallucinations. Neurology determined patient optimized for discharge from neurologic perspective, Psych recommended haldol initially for behavioral disturbance then transitioned to standing zyprexa with haldol PRN and advised transfer to psychiatric facility for observation and management.       # Seizure.  New-onset GTC seizures in mid-May required intubation at OSH. 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 Maintained on depakote during this hospitalization without new onset of seizures. VPA level elevated on presentation, held until therapeutic. Received ativan in ED possibly contributing to respiratory suppression, thereafter Ativan and other Benzos contraindicated in patient d/t hx CO2 retention, respiratory failure. Maintained on 750 depakote q12.     #Sarcoid. c/b chronic interstitial lung disease, initially O2 dependent requiring BiPAP in MICU, but weaned and comfortable on RA for rest of admission. Continued on symbicort and duonebs throughout hospitalization, not on any other steroid treatment. Follows with Dr. Patrick, Pulmonology as outpatient.    # Dysphagia.  Maintained on mechanical soft/thin liquid diet per Speech & Swallow recommendations.    # Urinary Retention: Patient developed urinary retention in MICU requiring Herrera catheter. he subsequently passes a TOV and continued on Flomax.    # Thrombocytopenia: Patient platelets dropped over course of hospitalization by 50% to calvin of 95. Hep discontinued, HIT panel immunoassay < 0.40, 4T score 3, low risk. Patient on SCDs for DVT PPx.

## 2017-06-07 NOTE — DISCHARGE NOTE ADULT - MEDICATION SUMMARY - MEDICATIONS TO STOP TAKING
I will STOP taking the medications listed below when I get home from the hospital:    Milk of Magnesia 8% oral suspension  -- 15 milliliter(s) by mouth once a day, As Needed    MiraLax oral powder for reconstitution  -- 17 gram(s) by mouth once a day    Ventolin HFA 90 mcg/inh inhalation aerosol  -- 2 puff(s) inhaled 4 times a day, As Needed    acetaminophen 650 mg oral tablet  -- 650 milligram(s) by mouth 4 times a day, As Needed

## 2017-06-07 NOTE — DISCHARGE NOTE ADULT - CARE PLAN
Principal Discharge DX:	Psychosis, unspecified psychosis type  Goal:	Medical Management  Instructions for follow-up, activity and diet:	Please continue on Zyprexa 2.5mg every morning and 5mg at bedtime. Haldol is prescribed as needed for agitation. Follow up with Dr. Mcnulty as an outpatient for treatment, and medication modification/renewal.  Secondary Diagnosis:	Acute respiratory failure with hypoxia  Goal:	Resolution and prevention  Instructions for follow-up, activity and diet:	Please avoid benzodiazepenes which put you at risk for respiratory failure due to respiratory suppression. Continue to use symbicort and albuterol/ipratropium as prescribed. If you become SOB, BiPAP can be used to assist your breathing. Please follow up with Dr. Patrick your Pulmonologist as an outpatient.  Secondary Diagnosis:	Sarcoid  Goal:	Surveillance and Medical Management  Instructions for follow-up, activity and diet:	Please follow up with Dr. Patrick to review your hospitalization, optimize your treatment and renew/modify necessary medications.  Secondary Diagnosis:	Urinary retention  Goal:	Resolution  Instructions for follow-up, activity and diet:	You experienced urinary retention during this admission requiring a haq catheter. This problem resolved prior to discharge. Please continue to take flomax as prescribed.  Secondary Diagnosis:	Seizure disorder  Goal:	Medical Management and prevention  Instructions for follow-up, activity and diet:	Please continue to take depakote as prescribed, and follow up with Dr. Nissenbaum at neurology clinic to review your hospitalization, optimize management and renew/modify necessary medications.  Secondary Diagnosis:	Dysphagia  Goal:	Dietary modification  Instructions for follow-up, activity and diet:	Please continue to eat a dysphagia 2, mechanical soft/thin liquid diet to avoid aspiration risks.

## 2017-06-07 NOTE — PROGRESS NOTE ADULT - PROBLEM SELECTOR PLAN 2
Resolved, ABG suggestive of chronic hypercarbia, saturating high 90s on room air  - Monitor off BIPAP  - Pt needs outpatient pulmonology evaluation for CO2 retention: LAMBERT?, sarcoidosis, restrictive lung disease hx.  - Avoid benzos - likely the contributing factor to his respiratory failure requiring MICU stay.

## 2017-06-07 NOTE — DISCHARGE NOTE ADULT - MEDICATION SUMMARY - MEDICATIONS TO CHANGE
I will SWITCH the dose or number of times a day I take the medications listed below when I get home from the hospital:    OLANZapine 7.5 mg oral tablet  -- 1 tab(s) by mouth once a day

## 2017-06-07 NOTE — DISCHARGE NOTE ADULT - PATIENT PORTAL LINK FT
“You can access the Follow[facility] Patient Portal, offered by [facility], by registering with the following website: [email or web address]”

## 2017-06-07 NOTE — DISCHARGE NOTE ADULT - MEDICATION SUMMARY - MEDICATIONS TO TAKE
I will START or STAY ON the medications listed below when I get home from the hospital:    tamsulosin 0.4 mg oral capsule  -- 1 cap(s) by mouth once a day (at bedtime)  -- Indication: For Urinary retention    divalproex sodium 250 mg oral delayed release tablet  -- 3 tab(s) by mouth every 12 hours  -- Indication: For Seizure disorder    haloperidol 5 mg oral tablet  -- 1 tab(s) by mouth every 6 hours, As needed, agitation  -- Indication: For Psychosis, unspecified psychosis type    haloperidol  -- 5 milligram(s) intravenous every 6 hours, As Needed for agitation if patient cannot tolerate PO  -- Indication: For Psychosis, unspecified psychosis type    haloperidol  -- 5 milligram(s) intramuscular every 6 hours, As Needed for agitation if patient cannot accept PO or IV  -- Indication: For Psychosis, unspecified psychosis type    ZyPREXA 2.5 mg oral tablet  -- 1 tab(s) by mouth once a day in the morning  -- Indication: For Psychosis, unspecified psychosis type    ZyPREXA 5 mg oral tablet  -- 1 tab(s) by mouth once a day (at bedtime)  -- Indication: For Psychosis, unspecified psychosis type    Symbicort 160 mcg-4.5 mcg/inh inhalation aerosol  -- 2 puff(s) inhaled 2 times a day  -- Indication: For Dyspnea    albuterol-ipratropium 2.5 mg-0.5 mg/3 mL inhalation solution  -- 3 milliliter(s) inhaled every 6 hours  -- Indication: For Dyspnea

## 2017-06-07 NOTE — PHYSICAL THERAPY INITIAL EVALUATION ADULT - PERTINENT HX OF CURRENT PROBLEM, REHAB EVAL
Pt is a 60 y/o male who presented with new-onset generalized tonic-clonic seizures from [facility]. Pt also presented with altered mental status.

## 2017-06-08 ENCOUNTER — INPATIENT (INPATIENT)
Facility: HOSPITAL | Age: 62
LOS: 14 days | Discharge: HOME | End: 2017-06-23
Attending: PSYCHIATRY & NEUROLOGY | Admitting: STUDENT IN AN ORGANIZED HEALTH CARE EDUCATION/TRAINING PROGRAM
Payer: MEDICARE

## 2017-06-08 VITALS — OXYGEN SATURATION: 95 % | RESPIRATION RATE: 18 BRPM

## 2017-06-08 VITALS
RESPIRATION RATE: 18 BRPM | OXYGEN SATURATION: 98 % | SYSTOLIC BLOOD PRESSURE: 106 MMHG | DIASTOLIC BLOOD PRESSURE: 69 MMHG | HEART RATE: 78 BPM | TEMPERATURE: 97 F

## 2017-06-08 DIAGNOSIS — Z90.49 ACQUIRED ABSENCE OF OTHER SPECIFIED PARTS OF DIGESTIVE TRACT: Chronic | ICD-10-CM

## 2017-06-08 DIAGNOSIS — S06.5X9A TRAUMATIC SUBDURAL HEMORRHAGE WITH LOSS OF CONSCIOUSNESS OF UNSPECIFIED DURATION, INITIAL ENCOUNTER: Chronic | ICD-10-CM

## 2017-06-08 DIAGNOSIS — Z93.3 COLOSTOMY STATUS: Chronic | ICD-10-CM

## 2017-06-08 DIAGNOSIS — F29 UNSPECIFIED PSYCHOSIS NOT DUE TO A SUBSTANCE OR KNOWN PHYSIOLOGICAL CONDITION: ICD-10-CM

## 2017-06-08 DIAGNOSIS — Z98.890 OTHER SPECIFIED POSTPROCEDURAL STATES: Chronic | ICD-10-CM

## 2017-06-08 PROBLEM — E11.9 TYPE 2 DIABETES MELLITUS WITHOUT COMPLICATIONS: Chronic | Status: ACTIVE | Noted: 2017-06-02

## 2017-06-08 LAB
BACTERIA BLD CULT: SIGNIFICANT CHANGE UP
BASOPHILS # BLD AUTO: 0.03 K/UL — SIGNIFICANT CHANGE UP (ref 0–0.2)
BASOPHILS NFR BLD AUTO: 0.4 % — SIGNIFICANT CHANGE UP (ref 0–2)
BUN SERPL-MCNC: 19 MG/DL — SIGNIFICANT CHANGE UP (ref 7–23)
CALCIUM SERPL-MCNC: 9.1 MG/DL — SIGNIFICANT CHANGE UP (ref 8.4–10.5)
CHLORIDE SERPL-SCNC: 99 MMOL/L — SIGNIFICANT CHANGE UP (ref 98–107)
CO2 SERPL-SCNC: 28 MMOL/L — SIGNIFICANT CHANGE UP (ref 22–31)
CREAT SERPL-MCNC: 0.68 MG/DL — SIGNIFICANT CHANGE UP (ref 0.5–1.3)
EOSINOPHIL # BLD AUTO: 0.33 K/UL — SIGNIFICANT CHANGE UP (ref 0–0.5)
EOSINOPHIL NFR BLD AUTO: 3.9 % — SIGNIFICANT CHANGE UP (ref 0–6)
GLUCOSE SERPL-MCNC: 102 MG/DL — HIGH (ref 70–99)
HCT VFR BLD CALC: 44.1 % — SIGNIFICANT CHANGE UP (ref 39–50)
HGB BLD-MCNC: 13.5 G/DL — SIGNIFICANT CHANGE UP (ref 13–17)
IMM GRANULOCYTES NFR BLD AUTO: 2.8 % — HIGH (ref 0–1.5)
LYMPHOCYTES # BLD AUTO: 1.32 K/UL — SIGNIFICANT CHANGE UP (ref 1–3.3)
LYMPHOCYTES # BLD AUTO: 15.5 % — SIGNIFICANT CHANGE UP (ref 13–44)
MAGNESIUM SERPL-MCNC: 2 MG/DL — SIGNIFICANT CHANGE UP (ref 1.6–2.6)
MANUAL SMEAR VERIFICATION: SIGNIFICANT CHANGE UP
MCHC RBC-ENTMCNC: 27.2 PG — SIGNIFICANT CHANGE UP (ref 27–34)
MCHC RBC-ENTMCNC: 30.6 % — LOW (ref 32–36)
MCV RBC AUTO: 88.7 FL — SIGNIFICANT CHANGE UP (ref 80–100)
MONOCYTES # BLD AUTO: 1.59 K/UL — HIGH (ref 0–0.9)
MONOCYTES NFR BLD AUTO: 18.6 % — HIGH (ref 2–14)
NEUTROPHILS # BLD AUTO: 5.03 K/UL — SIGNIFICANT CHANGE UP (ref 1.8–7.4)
NEUTROPHILS NFR BLD AUTO: 58.8 % — SIGNIFICANT CHANGE UP (ref 43–77)
PHOSPHATE SERPL-MCNC: 3.8 MG/DL — SIGNIFICANT CHANGE UP (ref 2.5–4.5)
PLATELET # BLD AUTO: 98 K/UL — LOW (ref 150–400)
PMV BLD: 9.5 FL — SIGNIFICANT CHANGE UP (ref 7–13)
POTASSIUM SERPL-MCNC: 4.5 MMOL/L — SIGNIFICANT CHANGE UP (ref 3.5–5.3)
POTASSIUM SERPL-SCNC: 4.5 MMOL/L — SIGNIFICANT CHANGE UP (ref 3.5–5.3)
RBC # BLD: 4.97 M/UL — SIGNIFICANT CHANGE UP (ref 4.2–5.8)
RBC # FLD: 18.1 % — HIGH (ref 10.3–14.5)
SODIUM SERPL-SCNC: 139 MMOL/L — SIGNIFICANT CHANGE UP (ref 135–145)
SRA INTERP SER-IMP: SIGNIFICANT CHANGE UP
WBC # BLD: 8.54 K/UL — SIGNIFICANT CHANGE UP (ref 3.8–10.5)
WBC # FLD AUTO: 8.54 K/UL — SIGNIFICANT CHANGE UP (ref 3.8–10.5)

## 2017-06-08 PROCEDURE — 99233 SBSQ HOSP IP/OBS HIGH 50: CPT

## 2017-06-08 PROCEDURE — 99239 HOSP IP/OBS DSCHRG MGMT >30: CPT

## 2017-06-08 RX ORDER — HALOPERIDOL DECANOATE 100 MG/ML
5 INJECTION INTRAMUSCULAR EVERY 6 HOURS
Qty: 0 | Refills: 0 | Status: DISCONTINUED | OUTPATIENT
Start: 2017-06-08 | End: 2017-06-08

## 2017-06-08 RX ORDER — DIVALPROEX SODIUM 500 MG/1
750 TABLET, DELAYED RELEASE ORAL EVERY 12 HOURS
Qty: 0 | Refills: 0 | Status: DISCONTINUED | OUTPATIENT
Start: 2017-06-08 | End: 2017-06-09

## 2017-06-08 RX ORDER — OLANZAPINE 15 MG/1
1 TABLET, FILM COATED ORAL
Qty: 0 | Refills: 0 | COMMUNITY
Start: 2017-06-08

## 2017-06-08 RX ORDER — INSULIN LISPRO 100/ML
VIAL (ML) SUBCUTANEOUS
Qty: 0 | Refills: 0 | Status: DISCONTINUED | OUTPATIENT
Start: 2017-06-08 | End: 2017-06-09

## 2017-06-08 RX ORDER — TIOTROPIUM BROMIDE 18 UG/1
1 CAPSULE ORAL; RESPIRATORY (INHALATION) EVERY 6 HOURS
Qty: 0 | Refills: 0 | Status: DISCONTINUED | OUTPATIENT
Start: 2017-06-08 | End: 2017-06-23

## 2017-06-08 RX ORDER — SODIUM CHLORIDE 9 MG/ML
1000 INJECTION, SOLUTION INTRAVENOUS
Qty: 0 | Refills: 0 | Status: DISCONTINUED | OUTPATIENT
Start: 2017-06-08 | End: 2017-06-08

## 2017-06-08 RX ORDER — ALBUTEROL 90 UG/1
2 AEROSOL, METERED ORAL EVERY 6 HOURS
Qty: 0 | Refills: 0 | Status: DISCONTINUED | OUTPATIENT
Start: 2017-06-08 | End: 2017-06-23

## 2017-06-08 RX ORDER — DIVALPROEX SODIUM 500 MG/1
3 TABLET, DELAYED RELEASE ORAL
Qty: 0 | Refills: 0 | COMMUNITY

## 2017-06-08 RX ORDER — IPRATROPIUM/ALBUTEROL SULFATE 18-103MCG
3 AEROSOL WITH ADAPTER (GRAM) INHALATION
Qty: 0 | Refills: 0 | COMMUNITY
Start: 2017-06-08

## 2017-06-08 RX ORDER — HALOPERIDOL DECANOATE 100 MG/ML
5 INJECTION INTRAMUSCULAR
Qty: 0 | Refills: 0 | COMMUNITY
Start: 2017-06-08

## 2017-06-08 RX ORDER — INSULIN LISPRO 100/ML
VIAL (ML) SUBCUTANEOUS AT BEDTIME
Qty: 0 | Refills: 0 | Status: DISCONTINUED | OUTPATIENT
Start: 2017-06-08 | End: 2017-06-09

## 2017-06-08 RX ORDER — DEXTROSE 50 % IN WATER 50 %
25 SYRINGE (ML) INTRAVENOUS ONCE
Qty: 0 | Refills: 0 | Status: DISCONTINUED | OUTPATIENT
Start: 2017-06-08 | End: 2017-06-09

## 2017-06-08 RX ORDER — TAMSULOSIN HYDROCHLORIDE 0.4 MG/1
0.4 CAPSULE ORAL AT BEDTIME
Qty: 0 | Refills: 0 | Status: DISCONTINUED | OUTPATIENT
Start: 2017-06-08 | End: 2017-06-23

## 2017-06-08 RX ORDER — HALOPERIDOL DECANOATE 100 MG/ML
1 INJECTION INTRAMUSCULAR
Qty: 0 | Refills: 0 | COMMUNITY
Start: 2017-06-08

## 2017-06-08 RX ORDER — DEXTROSE 50 % IN WATER 50 %
25 SYRINGE (ML) INTRAVENOUS ONCE
Qty: 0 | Refills: 0 | Status: DISCONTINUED | OUTPATIENT
Start: 2017-06-08 | End: 2017-06-08

## 2017-06-08 RX ORDER — SODIUM CHLORIDE 9 MG/ML
1000 INJECTION, SOLUTION INTRAVENOUS
Qty: 0 | Refills: 0 | Status: DISCONTINUED | OUTPATIENT
Start: 2017-06-08 | End: 2017-06-09

## 2017-06-08 RX ORDER — TAMSULOSIN HYDROCHLORIDE 0.4 MG/1
1 CAPSULE ORAL
Qty: 0 | Refills: 0 | COMMUNITY

## 2017-06-08 RX ORDER — HALOPERIDOL DECANOATE 100 MG/ML
2 INJECTION INTRAMUSCULAR ONCE
Qty: 0 | Refills: 0 | Status: DISCONTINUED | OUTPATIENT
Start: 2017-06-08 | End: 2017-06-23

## 2017-06-08 RX ORDER — TAMSULOSIN HYDROCHLORIDE 0.4 MG/1
1 CAPSULE ORAL
Qty: 0 | Refills: 0 | COMMUNITY
Start: 2017-06-08

## 2017-06-08 RX ORDER — GLUCAGON INJECTION, SOLUTION 0.5 MG/.1ML
1 INJECTION, SOLUTION SUBCUTANEOUS ONCE
Qty: 0 | Refills: 0 | Status: DISCONTINUED | OUTPATIENT
Start: 2017-06-08 | End: 2017-06-08

## 2017-06-08 RX ORDER — DEXTROSE 50 % IN WATER 50 %
1 SYRINGE (ML) INTRAVENOUS ONCE
Qty: 0 | Refills: 0 | Status: DISCONTINUED | OUTPATIENT
Start: 2017-06-08 | End: 2017-06-08

## 2017-06-08 RX ORDER — INSULIN LISPRO 100/ML
VIAL (ML) SUBCUTANEOUS AT BEDTIME
Qty: 0 | Refills: 0 | Status: DISCONTINUED | OUTPATIENT
Start: 2017-06-08 | End: 2017-06-08

## 2017-06-08 RX ORDER — DEXTROSE 50 % IN WATER 50 %
12.5 SYRINGE (ML) INTRAVENOUS ONCE
Qty: 0 | Refills: 0 | Status: DISCONTINUED | OUTPATIENT
Start: 2017-06-08 | End: 2017-06-08

## 2017-06-08 RX ORDER — DEXTROSE 50 % IN WATER 50 %
1 SYRINGE (ML) INTRAVENOUS ONCE
Qty: 0 | Refills: 0 | Status: DISCONTINUED | OUTPATIENT
Start: 2017-06-08 | End: 2017-06-09

## 2017-06-08 RX ORDER — MAGNESIUM HYDROXIDE 400 MG/1
15 TABLET, CHEWABLE ORAL
Qty: 0 | Refills: 0 | COMMUNITY

## 2017-06-08 RX ORDER — INSULIN LISPRO 100/ML
VIAL (ML) SUBCUTANEOUS
Qty: 0 | Refills: 0 | Status: DISCONTINUED | OUTPATIENT
Start: 2017-06-08 | End: 2017-06-08

## 2017-06-08 RX ORDER — GLUCAGON INJECTION, SOLUTION 0.5 MG/.1ML
1 INJECTION, SOLUTION SUBCUTANEOUS ONCE
Qty: 0 | Refills: 0 | Status: DISCONTINUED | OUTPATIENT
Start: 2017-06-08 | End: 2017-06-09

## 2017-06-08 RX ORDER — BUDESONIDE AND FORMOTEROL FUMARATE DIHYDRATE 160; 4.5 UG/1; UG/1
1 AEROSOL RESPIRATORY (INHALATION)
Qty: 0 | Refills: 0 | Status: DISCONTINUED | OUTPATIENT
Start: 2017-06-08 | End: 2017-06-23

## 2017-06-08 RX ORDER — HALOPERIDOL DECANOATE 100 MG/ML
2 INJECTION INTRAMUSCULAR EVERY 6 HOURS
Qty: 0 | Refills: 0 | Status: DISCONTINUED | OUTPATIENT
Start: 2017-06-08 | End: 2017-06-23

## 2017-06-08 RX ORDER — DIPHENHYDRAMINE HCL 50 MG
50 CAPSULE ORAL EVERY 6 HOURS
Qty: 0 | Refills: 0 | Status: DISCONTINUED | OUTPATIENT
Start: 2017-06-08 | End: 2017-06-09

## 2017-06-08 RX ORDER — OLANZAPINE 15 MG/1
5 TABLET, FILM COATED ORAL AT BEDTIME
Qty: 0 | Refills: 0 | Status: DISCONTINUED | OUTPATIENT
Start: 2017-06-08 | End: 2017-06-09

## 2017-06-08 RX ORDER — OLANZAPINE 15 MG/1
2.5 TABLET, FILM COATED ORAL
Qty: 0 | Refills: 0 | Status: DISCONTINUED | OUTPATIENT
Start: 2017-06-08 | End: 2017-06-08

## 2017-06-08 RX ORDER — OLANZAPINE 15 MG/1
2.5 TABLET, FILM COATED ORAL ONCE
Qty: 0 | Refills: 0 | Status: COMPLETED | OUTPATIENT
Start: 2017-06-08 | End: 2017-06-08

## 2017-06-08 RX ORDER — DIVALPROEX SODIUM 500 MG/1
3 TABLET, DELAYED RELEASE ORAL
Qty: 0 | Refills: 0 | COMMUNITY
Start: 2017-06-08

## 2017-06-08 RX ORDER — DEXTROSE 50 % IN WATER 50 %
12.5 SYRINGE (ML) INTRAVENOUS ONCE
Qty: 0 | Refills: 0 | Status: DISCONTINUED | OUTPATIENT
Start: 2017-06-08 | End: 2017-06-09

## 2017-06-08 RX ORDER — OLANZAPINE 15 MG/1
5 TABLET, FILM COATED ORAL AT BEDTIME
Qty: 0 | Refills: 0 | Status: DISCONTINUED | OUTPATIENT
Start: 2017-06-08 | End: 2017-06-08

## 2017-06-08 RX ORDER — POLYETHYLENE GLYCOL 3350 17 G/17G
17 POWDER, FOR SOLUTION ORAL
Qty: 0 | Refills: 0 | COMMUNITY

## 2017-06-08 RX ORDER — ACETAMINOPHEN 500 MG
650 TABLET ORAL
Qty: 0 | Refills: 0 | COMMUNITY

## 2017-06-08 RX ORDER — OLANZAPINE 15 MG/1
2.5 TABLET, FILM COATED ORAL DAILY
Qty: 0 | Refills: 0 | Status: DISCONTINUED | OUTPATIENT
Start: 2017-06-08 | End: 2017-06-11

## 2017-06-08 RX ORDER — OLANZAPINE 15 MG/1
1 TABLET, FILM COATED ORAL
Qty: 0 | Refills: 0 | COMMUNITY

## 2017-06-08 RX ORDER — ALBUTEROL 90 UG/1
2 AEROSOL, METERED ORAL
Qty: 0 | Refills: 0 | COMMUNITY

## 2017-06-08 RX ADMIN — DIVALPROEX SODIUM 750 MILLIGRAM(S): 500 TABLET, DELAYED RELEASE ORAL at 22:21

## 2017-06-08 RX ADMIN — DIVALPROEX SODIUM 750 MILLIGRAM(S): 500 TABLET, DELAYED RELEASE ORAL at 06:11

## 2017-06-08 RX ADMIN — BUDESONIDE AND FORMOTEROL FUMARATE DIHYDRATE 2 PUFF(S): 160; 4.5 AEROSOL RESPIRATORY (INHALATION) at 09:10

## 2017-06-08 RX ADMIN — OLANZAPINE 2.5 MILLIGRAM(S): 15 TABLET, FILM COATED ORAL at 12:48

## 2017-06-08 RX ADMIN — OLANZAPINE 5 MILLIGRAM(S): 15 TABLET, FILM COATED ORAL at 22:21

## 2017-06-08 RX ADMIN — Medication 3 MILLILITER(S): at 04:33

## 2017-06-08 RX ADMIN — BUDESONIDE AND FORMOTEROL FUMARATE DIHYDRATE 1 PUFF(S): 160; 4.5 AEROSOL RESPIRATORY (INHALATION) at 22:21

## 2017-06-08 RX ADMIN — Medication 3 MILLILITER(S): at 10:41

## 2017-06-08 RX ADMIN — TAMSULOSIN HYDROCHLORIDE 0.4 MILLIGRAM(S): 0.4 CAPSULE ORAL at 22:21

## 2017-06-08 NOTE — PROGRESS NOTE ADULT - PROBLEM SELECTOR PLAN 4
HIT panel immunoassay < 0.40, therefore HIT excluded.  - Platelets stable at 98, CTM daily CBC. No transfusion at this time HIT panel immunoassay < 0.40, serotonin releasing assay still pending. 4T score 3, low risk.  - Platelets stable at 98, CTM daily CBC. No transfusion at this time HIT panel immunoassay < 0.40, serotonin releasing assay still pending. 4T score 3, low risk. - no HIT.  - Platelets stable at 98, CTM daily CBC. No transfusion at this time

## 2017-06-08 NOTE — PROGRESS NOTE BEHAVIORAL HEALTH - SUMMARY
60 y/o male with psychosis NOS, was recently in the hospital at [facility] and started on Keppra for seizures. Since his admission at [facility], patient has been psychotic, delusional, disorganized, racing thoughts, not sleeping, scared that somebody is after him to kill him. Pt denies SI/HI, denies AH/VH. Pt needs psychiatric admission for safety and inability to care for himself.

## 2017-06-08 NOTE — PROGRESS NOTE BEHAVIORAL HEALTH - NSBHCHARTREVIEWLAB_PSY_A_CORE FT
CBC Full  -  ( 07 Jun 2017 07:53 )  WBC Count : 8.99 K/uL  Hemoglobin : 13.6 g/dL  Hematocrit : 43.0 %  Platelet Count - Automated : 95 K/uL  Mean Cell Volume : 86.9 fL  Mean Cell Hemoglobin : 27.5 pg  Mean Cell Hemoglobin Concentration : 31.6 %  Auto Neutrophil # : 6.31 K/uL  Auto Lymphocyte # : 1.23 K/uL  Auto Monocyte # : 0.87 K/uL  Auto Eosinophil # : 0.28 K/uL  Auto Basophil # : 0.04 K/uL  Auto Neutrophil % : 70.2 %  Auto Lymphocyte % : 13.7 %  Auto Monocyte % : 9.7 %  Auto Eosinophil % : 3.1 %  Auto Basophil % : 0.4 %
CBC Full  -  ( 08 Jun 2017 05:30 )  WBC Count : 8.54 K/uL  Hemoglobin : 13.5 g/dL  Hematocrit : 44.1 %  Platelet Count - Automated : 98 K/uL  Mean Cell Volume : 88.7 fL  Mean Cell Hemoglobin : 27.2 pg  Mean Cell Hemoglobin Concentration : 30.6 %  Auto Neutrophil # : 5.03 K/uL  Auto Lymphocyte # : 1.32 K/uL  Auto Monocyte # : 1.59 K/uL  Auto Eosinophil # : 0.33 K/uL  Auto Basophil # : 0.03 K/uL  Auto Neutrophil % : 58.8 %  Auto Lymphocyte % : 15.5 %  Auto Monocyte % : 18.6 %  Auto Eosinophil % : 3.9 %  Auto Basophil % : 0.4 %

## 2017-06-08 NOTE — PROGRESS NOTE BEHAVIORAL HEALTH - NSBHCONSULTPRIMARYDISCUSSYES_PSY_A_CORE FT
Disposition and further medication management  Will transfer to [facility] under 2PC

## 2017-06-08 NOTE — PROGRESS NOTE ADULT - SUBJECTIVE AND OBJECTIVE BOX
Patient is a 61y old  Male who presents with a chief complaint of AMS (07 Jun 2017 15:36)      SUBJECTIVE / OVERNIGHT EVENTS:    MEDICATIONS  (STANDING):  ALBUTerol/ipratropium for Nebulization 3milliLiter(s) Nebulizer every 6 hours  buDESOnide 160 MICROgram(s)/formoterol 4.5 MICROgram(s) Inhaler 2Puff(s) Inhalation two times a day  sodium chloride 0.9%. 1000milliLiter(s) IV Continuous <Continuous>  tamsulosin 0.4milliGRAM(s) Oral at bedtime  insulin lispro (HumaLOG) corrective regimen sliding scale  SubCutaneous three times a day before meals  dextrose 5%. 1000milliLiter(s) IV Continuous <Continuous>  dextrose 50% Injectable 12.5Gram(s) IV Push once  dextrose 50% Injectable 25Gram(s) IV Push once  dextrose 50% Injectable 25Gram(s) IV Push once  haloperidol     Tablet 5milliGRAM(s) Oral two times a day  diVALproex DR 750milliGRAM(s) Oral every 12 hours    MEDICATIONS  (PRN):  dextrose Gel 1Dose(s) Oral once PRN Blood Glucose LESS THAN 70 milliGRAM(s)/deciliter  glucagon  Injectable 1milliGRAM(s) IntraMuscular once PRN Glucose LESS THAN 70 milligrams/deciliter  haloperidol     Tablet 2.5milliGRAM(s) Oral every 6 hours PRN Agitation  haloperidol    Injectable 2.5milliGRAM(s) IV Push every 6 hours PRN Agitation  haloperidol    Injectable 2.5milliGRAM(s) IntraMuscular every 6 hours PRN Agitation      Vital Signs Last 24 Hrs  T(C): 36.7, Max: 37.1 (06-07 @ 10:15)  HR: 71 (71 - 87)  BP: 109/68 (99/61 - 126/82)  RR: 18 (18 - 18)  SpO2: 96% (89% - 99%)  Wt(kg): --  CAPILLARY BLOOD GLUCOSE  91 (07 Jun 2017 21:55)  100 (07 Jun 2017 17:18)  106 (07 Jun 2017 12:24)  132 (07 Jun 2017 08:25)    I&O's Summary    I & Os for current day (as of 08 Jun 2017 07:59)  =============================================  IN: 0 ml / OUT: 700 ml / NET: -700 ml      PHYSICAL EXAM:  GENERAL: NAD, well-developed  HEAD:  Atraumatic, Normocephalic  EYES: EOMI, PERRLA, conjunctiva and sclera clear  NECK: Supple, No JVD  CHEST/LUNG: Clear to auscultation bilaterally; No wheeze  HEART: Regular rate and rhythm; No murmurs, rubs, or gallops  ABDOMEN: Soft, Nontender, Nondistended; Bowel sounds present  EXTREMITIES:  2+ Peripheral Pulses, No clubbing, cyanosis, or edema  PSYCH: AAOx3  NEUROLOGY: non-focal  SKIN: No rashes or lesions    LABS:                        13.5   8.54  )-----------( 98       ( 08 Jun 2017 05:30 )             44.1     06-08    139  |  99  |  19  ----------------------------<  102<H>  4.5   |  28  |  0.68    Ca    9.1      08 Jun 2017 05:30  Phos  3.8     06-08  Mg     2.0     06-08                RADIOLOGY & ADDITIONAL TESTS:    Imaging Personally Reviewed:    Consultant(s) Notes Reviewed:      Care Discussed with Consultants/Other Providers: Patient is a 61y old  Male who presents with a chief complaint of AMS (07 Jun 2017 15:36)      SUBJECTIVE / OVERNIGHT EVENTS: No acute events overnight. Now on haldol 5 bid, patient appears sleepier than on previous days. Denies HA, CP, SOB, Abd pain.    MEDICATIONS  (STANDING):  ALBUTerol/ipratropium for Nebulization 3milliLiter(s) Nebulizer every 6 hours  buDESOnide 160 MICROgram(s)/formoterol 4.5 MICROgram(s) Inhaler 2Puff(s) Inhalation two times a day  sodium chloride 0.9%. 1000milliLiter(s) IV Continuous <Continuous>  tamsulosin 0.4milliGRAM(s) Oral at bedtime  insulin lispro (HumaLOG) corrective regimen sliding scale  SubCutaneous three times a day before meals  dextrose 5%. 1000milliLiter(s) IV Continuous <Continuous>  dextrose 50% Injectable 12.5Gram(s) IV Push once  dextrose 50% Injectable 25Gram(s) IV Push once  dextrose 50% Injectable 25Gram(s) IV Push once  haloperidol     Tablet 5milliGRAM(s) Oral two times a day  diVALproex DR 750milliGRAM(s) Oral every 12 hours    MEDICATIONS  (PRN):  dextrose Gel 1Dose(s) Oral once PRN Blood Glucose LESS THAN 70 milliGRAM(s)/deciliter  glucagon  Injectable 1milliGRAM(s) IntraMuscular once PRN Glucose LESS THAN 70 milligrams/deciliter  haloperidol     Tablet 2.5milliGRAM(s) Oral every 6 hours PRN Agitation  haloperidol    Injectable 2.5milliGRAM(s) IV Push every 6 hours PRN Agitation  haloperidol    Injectable 2.5milliGRAM(s) IntraMuscular every 6 hours PRN Agitation      Vital Signs Last 24 Hrs  T(C): 36.7, Max: 37.1 (06-07 @ 10:15)  HR: 71 (71 - 87)  BP: 109/68 (99/61 - 126/82)  RR: 18 (18 - 18)  SpO2: 96% (89% - 99%)  Wt(kg): --  CAPILLARY BLOOD GLUCOSE  91 (07 Jun 2017 21:55)  100 (07 Jun 2017 17:18)  106 (07 Jun 2017 12:24)  132 (07 Jun 2017 08:25)    I&O's Summary    I & Os for current day (as of 08 Jun 2017 07:59)  =============================================  IN: 0 ml / OUT: 700 ml / NET: -700 ml      PHYSICAL EXAM:  GENERAL: NAD, well-developed. Patient AO x 3, sleepy and much calmer than yesterday.  HEAD:  Atraumatic, Normocephalic  EYES: EOMI, PERRLA, conjunctiva and sclera clear  NECK: Supple, No JVD  CHEST/LUNG: Clear to auscultation bilaterally; No wheeze  HEART: Regular rate and rhythm; No murmurs, rubs, or gallops  ABDOMEN: Soft, Nontender, Nondistended; Bowel sounds present. Midline scar from Soriano's.  EXTREMITIES:  2+ Peripheral Pulses, No clubbing, cyanosis, or edema  PSYCH: AAOx3. Sleepy.  NEUROLOGY: non-focal  SKIN: Midline scar abdomen, not an open wound.    LABS:                        13.5   8.54  )-----------( 98       ( 08 Jun 2017 05:30 )             44.1     06-08    139  |  99  |  19  ----------------------------<  102<H>  4.5   |  28  |  0.68    Ca    9.1      08 Jun 2017 05:30  Phos  3.8     06-08  Mg     2.0     06-08                RADIOLOGY & ADDITIONAL TESTS:    Imaging Personally Reviewed:    Consultant(s) Notes Reviewed:      Care Discussed with Consultants/Other Providers: Patient is a 61y old  Male who presents with a chief complaint of AMS (07 Jun 2017 15:36)      SUBJECTIVE / OVERNIGHT EVENTS: Patient called 911 and reported persecution overnight, exhibiting paranoia; pt received additional PRN doses of haldol. Now on haldol 5 bid, patient appears sleepier this morning than on previous days. Continues to be delusional and paranoid. Denies HA, CP, SOB, Abd pain.    MEDICATIONS  (STANDING):  ALBUTerol/ipratropium for Nebulization 3milliLiter(s) Nebulizer every 6 hours  buDESOnide 160 MICROgram(s)/formoterol 4.5 MICROgram(s) Inhaler 2Puff(s) Inhalation two times a day  sodium chloride 0.9%. 1000milliLiter(s) IV Continuous <Continuous>  tamsulosin 0.4milliGRAM(s) Oral at bedtime  insulin lispro (HumaLOG) corrective regimen sliding scale  SubCutaneous three times a day before meals  dextrose 5%. 1000milliLiter(s) IV Continuous <Continuous>  dextrose 50% Injectable 12.5Gram(s) IV Push once  dextrose 50% Injectable 25Gram(s) IV Push once  dextrose 50% Injectable 25Gram(s) IV Push once  haloperidol     Tablet 5milliGRAM(s) Oral two times a day  diVALproex DR 750milliGRAM(s) Oral every 12 hours    MEDICATIONS  (PRN):  dextrose Gel 1Dose(s) Oral once PRN Blood Glucose LESS THAN 70 milliGRAM(s)/deciliter  glucagon  Injectable 1milliGRAM(s) IntraMuscular once PRN Glucose LESS THAN 70 milligrams/deciliter  haloperidol     Tablet 2.5milliGRAM(s) Oral every 6 hours PRN Agitation  haloperidol    Injectable 2.5milliGRAM(s) IV Push every 6 hours PRN Agitation  haloperidol    Injectable 2.5milliGRAM(s) IntraMuscular every 6 hours PRN Agitation      Vital Signs Last 24 Hrs  T(C): 36.7, Max: 37.1 (06-07 @ 10:15)  HR: 71 (71 - 87)  BP: 109/68 (99/61 - 126/82)  RR: 18 (18 - 18)  SpO2: 96% (89% - 99%)  Wt(kg): --  CAPILLARY BLOOD GLUCOSE  91 (07 Jun 2017 21:55)  100 (07 Jun 2017 17:18)  106 (07 Jun 2017 12:24)  132 (07 Jun 2017 08:25)    I&O's Summary    I & Os for current day (as of 08 Jun 2017 07:59)  =============================================  IN: 0 ml / OUT: 700 ml / NET: -700 ml      PHYSICAL EXAM:  GENERAL: NAD, well-developed. Patient AO x 3, sleepy.  HEAD:  Atraumatic, Normocephalic  EYES: EOMI, PERRLA, conjunctiva and sclera clear  NECK: Supple, No JVD  CHEST/LUNG: Clear to auscultation bilaterally; No wheeze  HEART: Regular rate and rhythm; No murmurs, rubs, or gallops  ABDOMEN: Soft, Nontender, Nondistended; Bowel sounds present. Midline scar from Soriano's.  EXTREMITIES:  2+ Peripheral Pulses, No clubbing, cyanosis, or edema  PSYCH: AAOx3. Sleepy, c/o persecution and fears hospital workers will harm him, displaying paranoia and delusion.   NEUROLOGY: non-focal  SKIN: Midline scar abdomen, not an open wound.    LABS:                        13.5   8.54  )-----------( 98       ( 08 Jun 2017 05:30 )             44.1     06-08    139  |  99  |  19  ----------------------------<  102<H>  4.5   |  28  |  0.68    Ca    9.1      08 Jun 2017 05:30  Phos  3.8     06-08  Mg     2.0     06-08                RADIOLOGY & ADDITIONAL TESTS:    Imaging Personally Reviewed:    Consultant(s) Notes Reviewed:      Care Discussed with Consultants/Other Providers: Patient is a 61y old  Male who presents with a chief complaint of AMS (07 Jun 2017 15:36)      SUBJECTIVE / OVERNIGHT EVENTS: Patient called 911 and reported persecution overnight, exhibiting paranoia; pt received additional PRN doses of haldol. Now on haldol 5 bid, patient appears sleepier this morning than on previous days. Continues to be delusional and paranoid. Denies HA, CP, SOB, Abd pain.    MEDICATIONS  (STANDING):  ALBUTerol/ipratropium for Nebulization 3milliLiter(s) Nebulizer every 6 hours  buDESOnide 160 MICROgram(s)/formoterol 4.5 MICROgram(s) Inhaler 2Puff(s) Inhalation two times a day  sodium chloride 0.9%. 1000milliLiter(s) IV Continuous <Continuous>  tamsulosin 0.4milliGRAM(s) Oral at bedtime  insulin lispro (HumaLOG) corrective regimen sliding scale  SubCutaneous three times a day before meals  dextrose 5%. 1000milliLiter(s) IV Continuous <Continuous>  dextrose 50% Injectable 12.5Gram(s) IV Push once  dextrose 50% Injectable 25Gram(s) IV Push once  dextrose 50% Injectable 25Gram(s) IV Push once  haloperidol     Tablet 5milliGRAM(s) Oral two times a day  diVALproex DR 750milliGRAM(s) Oral every 12 hours    MEDICATIONS  (PRN):  dextrose Gel 1Dose(s) Oral once PRN Blood Glucose LESS THAN 70 milliGRAM(s)/deciliter  glucagon  Injectable 1milliGRAM(s) IntraMuscular once PRN Glucose LESS THAN 70 milligrams/deciliter  haloperidol     Tablet 2.5milliGRAM(s) Oral every 6 hours PRN Agitation  haloperidol    Injectable 2.5milliGRAM(s) IV Push every 6 hours PRN Agitation  haloperidol    Injectable 2.5milliGRAM(s) IntraMuscular every 6 hours PRN Agitation      Vital Signs Last 24 Hrs  T(C): 36.7, Max: 37.1 (06-07 @ 10:15)  HR: 71 (71 - 87)  BP: 109/68 (99/61 - 126/82)  RR: 18 (18 - 18)  SpO2: 96% (89% - 99%)  Wt(kg): --  CAPILLARY BLOOD GLUCOSE  91 (07 Jun 2017 21:55)  100 (07 Jun 2017 17:18)  106 (07 Jun 2017 12:24)  132 (07 Jun 2017 08:25)    I&O's Summary    I & Os for current day (as of 08 Jun 2017 07:59)  =============================================  IN: 0 ml / OUT: 700 ml / NET: -700 ml      PHYSICAL EXAM:  GENERAL: NAD, well-developed. Patient AO x 3, sleepy.  HEAD:  Atraumatic, Normocephalic  EYES: EOMI, PERRLA, conjunctiva and sclera clear  NECK: Supple, No JVD  CHEST/LUNG: Clear to auscultation bilaterally; No wheeze  HEART: Regular rate and rhythm; No murmurs, rubs, or gallops  ABDOMEN: Soft, Nontender, Nondistended; Bowel sounds present. Midline scar from Soriano's.  EXTREMITIES:  2+ Peripheral Pulses, No clubbing, cyanosis, or edema  PSYCH: AAOx3. Sleepy, c/o persecution and fears hospital workers will harm him, displaying paranoia and delusion.   NEUROLOGY: non-focal  SKIN: Midline scar abdomen, not an open wound.    LABS:                        13.5   8.54  )-----------( 98       ( 08 Jun 2017 05:30 )             44.1     06-08    139  |  99  |  19  ----------------------------<  102<H>  4.5   |  28  |  0.68    Ca    9.1      08 Jun 2017 05:30  Phos  3.8     06-08  Mg     2.0     06-08        Care Discussed with Consultants/Other Providers: Psychiatry, neurology.

## 2017-06-08 NOTE — PROGRESS NOTE ADULT - PROBLEM SELECTOR PLAN 2
Resolved, ABG suggestive of chronic hypercarbia, saturating high 90s on room air  - Pt needs outpatient pulmonology evaluation for CO2 retention: LAMBERT?, sarcoidosis, restrictive lung disease hx. Follows with Dr. Patrick, Pulmonology.  - Avoid benzos - likely the contributing factor to his respiratory failure requiring MICU stay. Resolved  - Pt needs outpatient pulmonology evaluation for CO2 retention: LAMBERT?, sarcoidosis, restrictive lung disease hx. Follows with Dr. Patrick, Pulmonology.  - Avoid benzos - likely the contributing factor to his respiratory failure requiring MICU stay.

## 2017-06-08 NOTE — PROGRESS NOTE BEHAVIORAL HEALTH - NSBHFUPINTERVALHXFT_PSY_A_CORE
Pt. seen and evaluated, chart reviewed. Today patient's mental status has improved. AA0X3, more linear and coherent. Pt. reported that he was admitted to hospital in [place] for seizures and was on keppra and then sent to [facility] rehab. Patient onofre reported that he is aware that he was not himself for the last few days and stated that he thinks it was due to keppra. Patient denies si and hi, he denies AH and VH, no delusions elicited, has some referential thoughts.
Overnight, patient called 911 saying he had received messages from terrorist groups who knew where he was and that they were trying to kill him. Patient remains psychotic, agitated, delusional. Patient was A+O x3. Reported poor sleep, racing thoughts, and scared for his life.   Collateral obtained from sister Kristin, who collaborated with presented. Reported that brother has been delusional, psychotic, unable to function at home. Agrees with plan for psychiatric admission

## 2017-06-08 NOTE — PROGRESS NOTE ADULT - PROBLEM SELECTOR PLAN 1
Currently AAOx3 but inappropriate tangential speech, negative LP and unremarkable MRI at [facility]. 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 Patient may have underlying personality disorder exacerbated by anoxic injury, keppra psychosis, hospital delirium.   - per Psych c/s, haldol 5mg PO bid - no indication for in-patient psychiatry admission at this time, Psych will reevaluate today.   - EKG to eval QTc  - suspicious for worsening psychiatric condition due to prolonged hospitalization. Currently AAOx3 but inappropriate tangential speech, negative LP and unremarkable MRI at [place]. 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 Patient may have underlying personality disorder exacerbated by anoxic injury, keppra psychosis, hospital delirium.   - per Psych c/s, D/C standing haldol, switch to zyprexa bid. Also increase PRN haldol.  Will go to inpatient psychiatric facility.    - EKG to eval QTc  - suspicious for worsening psychiatric condition due to prolonged hospitalization. Currently AAOx3 but inappropriate tangential speech, negative LP and unremarkable MRI at [place]. 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 Patient may have underlying personality disorder exacerbated by anoxic injury, keppra psychosis, hospital delirium.   - per Psych c/s, D/C standing haldol, switch to zyprexa bid. Also increase PRN haldol.  Medically optimized for transfer to inpatient psychiatric facility.

## 2017-06-08 NOTE — PROGRESS NOTE ADULT - ASSESSMENT
61M with sarcoidosis, bronchiectasis, chronic CO2 retention, perforated diverticulitis s/p Bright procedure and reversal, b/l SDH s/p surgery (2010), R inguinal hernia repair with mesh, and recent 2-week admission (5/14/17 - 6/1/17) at [place] for new-onset generalized tonic-clonic seizures presented 6/2 from [facility] after 1 day stay with altered mental status. Pt was transferred to the MICU for lethargy and hypercarbic respiratory failure after receiving ativan 1mg x1 and haldol 5mg x1, treated in MICU w/ BiPAP, now downgraded to floor, breathing comfortably on room air AO x 3, s/p TOV, behavioral disturbances resolving with standing haldol and depakote.61M with sarcoidosis, bronchiectasis, chronic CO2 retention, perforated diverticulitis s/p Bright procedure and reversal, b/l SDH s/p surgery (2010), R inguinal hernia repair with mesh, and recent 2-week admission (5/14/17 - 6/1/17) at [place] for new-onset generalized tonic-clonic seizures presented 6/2 from [facility] after 1 day stay with altered mental status. Pt was transferred to the MICU for lethargy and hypercarbic respiratory failure after receiving ativan 1mg x1 and haldol 5mg x1, trated in MICU w/ BiPAP, now transferred to floor, breathing comfortably on room air AO x3 but reporting visual/auditory hallucinations.     # Urinary Retention: 61M with sarcoidosis, bronchiectasis, chronic CO2 retention, perforated diverticulitis s/p Bright procedure and reversal, b/l SDH s/p surgery (2010), R inguinal hernia repair with mesh, and recent 2-week admission (5/14/17 - 6/1/17) at [place] for new-onset generalized tonic-clonic seizures presented 6/2 from [facility] after 1 day stay with altered mental status. Pt was transferred to the MICU for lethargy and hypercarbic respiratory failure after receiving ativan 1mg x1 and haldol 5mg x1, treated in MICU w/ BiPAP, now downgraded to floor, breathing comfortably on room air AO x 3, s/p TOV, exhibiting delusional and paranoid behavior on standing haldol and depakote.61M with sarcoidosis, bronchiectasis, chronic CO2 retention, perforated diverticulitis s/p Bright procedure and reversal, b/l SDH s/p surgery (2010), R inguinal hernia repair with mesh, and recent 2-week admission (5/14/17 - 6/1/17) at [place] for new-onset generalized tonic-clonic seizures presented 6/2 from [facility] after 1 day stay with altered mental status. Pt was transferred to the MICU for lethargy and hypercarbic respiratory failure after receiving ativan 1mg x1 and haldol 5mg x1, trated in MICU w/ BiPAP, now transferred to floor, breathing comfortably on room air AO x3 but reporting visual/auditory hallucinations.     # Urinary Retention:

## 2017-06-08 NOTE — PROGRESS NOTE ADULT - ATTENDING COMMENTS
61M with sarcoidosis, bronchiectasis, chronic CO2 retention, perforated diverticulitis s/p Bright procedure and reversal, b/l SDH s/p surgery (2010), R inguinal hernia repair with mesh, and recent 2-week admission (5/14/17 - 6/1/17) at [place] for new-onset generalized tonic-clonic seizures presented [facility] after 1 day stay with altered mental status. Pt was transferred to the MICU for lethargy and hypercarbic respiratory failure after receiving ativan 1mg x1 and haldol 5mg x1. Today, more alert, with improvement in Hypercarbic Respiratory Failure. He does have chronic hypercapnia - should get evaluation as outpatient for LAMBERT/OHS, Bilevel prn. Neurologic workup pending to evaluate etiology of delirium. ?Neurologic sarcoidosis? Would need MRI. Need to also get records from [facility] to see what exactly was worked up. Today, pt is eligible for medical floor, no need for MICU at this time.
patient seen and examined.  Agree with resident note.  61M acute respiratory failure due to chronic CO2 retention, bronchiectasis and benzo use in ED complicated by progressive encephalopathy of unclear origins with high suspicion for progressive psychiatric disorder suggestive of schizophrenia.  Psych C/S pending. Avoid benzos for mood control. D/C severino.
patient seen and examined.  Agree with resident note.  61M acute respiratory failure due to chronic CO2 retention, bronchiectasis and benzo use in ED complicated by progressive encephalopathy - high suspicion for keppra induced psychosis and progressive psychiatric disorder suggestive of schizophrenia.  Psych C/S - haldol BID. Avoid benzos for mood control. Neuro consult appreciated.  PT tommy LACKEY. WIll discuss with consultants about optimization for DARYA.
patient seen and examined.  Agree with resident note.  61M acute respiratory failure due to chronic CO2 retention, bronchiectasis and benzo use in ED complicated by progressive encephalopathy of unclear origins with high suspicion for progressive psychiatric disorder suggestive of schizophrenia.  Psych C/S pending. Avoid benzos for mood control. Neuro consult appreciated.  Spoke at length with sister on a phone. Answered all the questions.
patient seen and examined.  Agree with resident note.  61M acute respiratory failure due to chronic CO2 retention, bronchiectasis and benzo use in ED complicated by progressive encephalopathy - high suspicion for keppra induced psychosis and progressive psychiatric disorder suggestive of schizophrenia.  Psych C/S - haldol BID. Avoid benzos for mood control. Neuro consult appreciated.  Medically optimized for transfer to in-patient psychiatry hospitalization.  Discharge planning 40 minutes - d/w patient, family and consultants.

## 2017-06-08 NOTE — PROGRESS NOTE BEHAVIORAL HEALTH - NSBHCHARTREVIEWVS_PSY_A_CORE FT
Vital Signs Last 24 Hrs  T(C): 37.1, Max: 37.1 (06-07 @ 10:15)  T(F): 98.8, Max: 98.8 (06-07 @ 10:15)  HR: 84 (71 - 87)  BP: 99/61 (99/61 - 113/71)  BP(mean): --  RR: 18 (18 - 18)  SpO2: 94% (89% - 96%)
ICU Vital Signs Last 24 Hrs  T(C): 36.7, Max: 36.8 (06-07 @ 21:45)  T(F): 98.1, Max: 98.2 (06-07 @ 21:45)  HR: 78 (71 - 89)  BP: 109/68 (101/68 - 126/82)  BP(mean): --  ABP: --  ABP(mean): --  RR: 18 (18 - 18)  SpO2: 97% (94% - 99%)

## 2017-06-09 DIAGNOSIS — G40.909 EPILEPSY, UNSPECIFIED, NOT INTRACTABLE, WITHOUT STATUS EPILEPTICUS: ICD-10-CM

## 2017-06-09 DIAGNOSIS — D69.6 THROMBOCYTOPENIA, UNSPECIFIED: ICD-10-CM

## 2017-06-09 DIAGNOSIS — D86.9 SARCOIDOSIS, UNSPECIFIED: ICD-10-CM

## 2017-06-09 DIAGNOSIS — J96.12 CHRONIC RESPIRATORY FAILURE WITH HYPERCAPNIA: ICD-10-CM

## 2017-06-09 DIAGNOSIS — E11.9 TYPE 2 DIABETES MELLITUS WITHOUT COMPLICATIONS: ICD-10-CM

## 2017-06-09 LAB
ALBUMIN SERPL ELPH-MCNC: 3.2 G/DL — LOW (ref 3.3–5)
ALP SERPL-CCNC: 57 U/L — SIGNIFICANT CHANGE UP (ref 40–120)
ALT FLD-CCNC: 23 U/L — SIGNIFICANT CHANGE UP (ref 4–41)
ANISOCYTOSIS BLD QL: SLIGHT — SIGNIFICANT CHANGE UP
AST SERPL-CCNC: 22 U/L — SIGNIFICANT CHANGE UP (ref 4–40)
BASOPHILS # BLD AUTO: 0.07 K/UL — SIGNIFICANT CHANGE UP (ref 0–0.2)
BASOPHILS NFR BLD AUTO: 1 % — SIGNIFICANT CHANGE UP (ref 0–2)
BASOPHILS NFR SPEC: 0.9 % — SIGNIFICANT CHANGE UP (ref 0–2)
BILIRUB SERPL-MCNC: 0.3 MG/DL — SIGNIFICANT CHANGE UP (ref 0.2–1.2)
BUN SERPL-MCNC: 22 MG/DL — SIGNIFICANT CHANGE UP (ref 7–23)
CALCIUM SERPL-MCNC: 8.8 MG/DL — SIGNIFICANT CHANGE UP (ref 8.4–10.5)
CHLORIDE SERPL-SCNC: 102 MMOL/L — SIGNIFICANT CHANGE UP (ref 98–107)
CO2 SERPL-SCNC: 29 MMOL/L — SIGNIFICANT CHANGE UP (ref 22–31)
CREAT SERPL-MCNC: 0.76 MG/DL — SIGNIFICANT CHANGE UP (ref 0.5–1.3)
EOSINOPHIL # BLD AUTO: 0.31 K/UL — SIGNIFICANT CHANGE UP (ref 0–0.5)
EOSINOPHIL NFR BLD AUTO: 4.6 % — SIGNIFICANT CHANGE UP (ref 0–6)
EOSINOPHIL NFR FLD: 3.5 % — SIGNIFICANT CHANGE UP (ref 0–6)
GIANT PLATELETS BLD QL SMEAR: PRESENT — SIGNIFICANT CHANGE UP
GLUCOSE SERPL-MCNC: 102 MG/DL — HIGH (ref 70–99)
HCT VFR BLD CALC: 41.7 % — SIGNIFICANT CHANGE UP (ref 39–50)
HGB BLD-MCNC: 13.1 G/DL — SIGNIFICANT CHANGE UP (ref 13–17)
IMM GRANULOCYTES NFR BLD AUTO: 3.5 % — HIGH (ref 0–1.5)
LYMPHOCYTES # BLD AUTO: 1.22 K/UL — SIGNIFICANT CHANGE UP (ref 1–3.3)
LYMPHOCYTES # BLD AUTO: 17.9 % — SIGNIFICANT CHANGE UP (ref 13–44)
LYMPHOCYTES NFR SPEC AUTO: 14.9 % — SIGNIFICANT CHANGE UP (ref 13–44)
MACROCYTES BLD QL: SLIGHT — SIGNIFICANT CHANGE UP
MAGNESIUM SERPL-MCNC: 2 MG/DL — SIGNIFICANT CHANGE UP (ref 1.6–2.6)
MCHC RBC-ENTMCNC: 27.5 PG — SIGNIFICANT CHANGE UP (ref 27–34)
MCHC RBC-ENTMCNC: 31.4 % — LOW (ref 32–36)
MCV RBC AUTO: 87.6 FL — SIGNIFICANT CHANGE UP (ref 80–100)
MONOCYTES # BLD AUTO: 1.36 K/UL — HIGH (ref 0–0.9)
MONOCYTES NFR BLD AUTO: 20 % — HIGH (ref 2–14)
MONOCYTES NFR BLD: 14 % — HIGH (ref 2–9)
MYELOCYTES NFR BLD: 1.8 % — HIGH (ref 0–0)
NEUTROPHIL AB SER-ACNC: 62.3 % — SIGNIFICANT CHANGE UP (ref 43–77)
NEUTROPHILS # BLD AUTO: 3.61 K/UL — SIGNIFICANT CHANGE UP (ref 1.8–7.4)
NEUTROPHILS NFR BLD AUTO: 53 % — SIGNIFICANT CHANGE UP (ref 43–77)
OVALOCYTES BLD QL SMEAR: SLIGHT — SIGNIFICANT CHANGE UP
PHOSPHATE SERPL-MCNC: 4 MG/DL — SIGNIFICANT CHANGE UP (ref 2.5–4.5)
PLATELET # BLD AUTO: 88 K/UL — LOW (ref 150–400)
PLATELET COUNT - ESTIMATE: SIGNIFICANT CHANGE UP
PMV BLD: 9.6 FL — SIGNIFICANT CHANGE UP (ref 7–13)
POIKILOCYTOSIS BLD QL AUTO: SLIGHT — SIGNIFICANT CHANGE UP
POTASSIUM SERPL-MCNC: 4.4 MMOL/L — SIGNIFICANT CHANGE UP (ref 3.5–5.3)
POTASSIUM SERPL-SCNC: 4.4 MMOL/L — SIGNIFICANT CHANGE UP (ref 3.5–5.3)
PROT SERPL-MCNC: 6.4 G/DL — SIGNIFICANT CHANGE UP (ref 6–8.3)
RBC # BLD: 4.76 M/UL — SIGNIFICANT CHANGE UP (ref 4.2–5.8)
RBC # FLD: 17.6 % — HIGH (ref 10.3–14.5)
SODIUM SERPL-SCNC: 142 MMOL/L — SIGNIFICANT CHANGE UP (ref 135–145)
VALPROATE SERPL-MCNC: 48.2 UG/ML — LOW (ref 50–100)
VARIANT LYMPHS # BLD: 2.6 % — SIGNIFICANT CHANGE UP
WBC # BLD: 6.81 K/UL — SIGNIFICANT CHANGE UP (ref 3.8–10.5)
WBC # FLD AUTO: 6.81 K/UL — SIGNIFICANT CHANGE UP (ref 3.8–10.5)

## 2017-06-09 PROCEDURE — 99233 SBSQ HOSP IP/OBS HIGH 50: CPT | Mod: GC

## 2017-06-09 PROCEDURE — 99223 1ST HOSP IP/OBS HIGH 75: CPT

## 2017-06-09 RX ORDER — DIVALPROEX SODIUM 500 MG/1
1000 TABLET, DELAYED RELEASE ORAL AT BEDTIME
Qty: 0 | Refills: 0 | Status: DISCONTINUED | OUTPATIENT
Start: 2017-06-09 | End: 2017-06-20

## 2017-06-09 RX ORDER — OLANZAPINE 15 MG/1
7.5 TABLET, FILM COATED ORAL
Qty: 0 | Refills: 0 | Status: DISCONTINUED | OUTPATIENT
Start: 2017-06-09 | End: 2017-06-09

## 2017-06-09 RX ORDER — OLANZAPINE 15 MG/1
7.5 TABLET, FILM COATED ORAL
Qty: 0 | Refills: 0 | Status: DISCONTINUED | OUTPATIENT
Start: 2017-06-09 | End: 2017-06-14

## 2017-06-09 RX ORDER — DIVALPROEX SODIUM 500 MG/1
750 TABLET, DELAYED RELEASE ORAL DAILY
Qty: 0 | Refills: 0 | Status: DISCONTINUED | OUTPATIENT
Start: 2017-06-09 | End: 2017-06-20

## 2017-06-09 RX ORDER — HEPARIN SODIUM 5000 [USP'U]/ML
5000 INJECTION INTRAVENOUS; SUBCUTANEOUS EVERY 12 HOURS
Qty: 0 | Refills: 0 | Status: DISCONTINUED | OUTPATIENT
Start: 2017-06-09 | End: 2017-06-12

## 2017-06-09 RX ORDER — LANOLIN ALCOHOL/MO/W.PET/CERES
3 CREAM (GRAM) TOPICAL AT BEDTIME
Qty: 0 | Refills: 0 | Status: DISCONTINUED | OUTPATIENT
Start: 2017-06-09 | End: 2017-06-23

## 2017-06-09 RX ADMIN — HEPARIN SODIUM 5000 UNIT(S): 5000 INJECTION INTRAVENOUS; SUBCUTANEOUS at 22:05

## 2017-06-09 RX ADMIN — BUDESONIDE AND FORMOTEROL FUMARATE DIHYDRATE 1 PUFF(S): 160; 4.5 AEROSOL RESPIRATORY (INHALATION) at 09:24

## 2017-06-09 RX ADMIN — DIVALPROEX SODIUM 1000 MILLIGRAM(S): 500 TABLET, DELAYED RELEASE ORAL at 22:05

## 2017-06-09 RX ADMIN — DIVALPROEX SODIUM 750 MILLIGRAM(S): 500 TABLET, DELAYED RELEASE ORAL at 09:19

## 2017-06-09 RX ADMIN — OLANZAPINE 2.5 MILLIGRAM(S): 15 TABLET, FILM COATED ORAL at 09:19

## 2017-06-09 RX ADMIN — TAMSULOSIN HYDROCHLORIDE 0.4 MILLIGRAM(S): 0.4 CAPSULE ORAL at 22:05

## 2017-06-09 RX ADMIN — Medication 3 MILLIGRAM(S): at 22:05

## 2017-06-09 RX ADMIN — BUDESONIDE AND FORMOTEROL FUMARATE DIHYDRATE 1 PUFF(S): 160; 4.5 AEROSOL RESPIRATORY (INHALATION) at 22:05

## 2017-06-09 RX ADMIN — OLANZAPINE 7.5 MILLIGRAM(S): 15 TABLET, FILM COATED ORAL at 16:51

## 2017-06-09 NOTE — CONSULT NOTE ADULT - PROBLEM SELECTOR RECOMMENDATION 5
-HIT negative. Ok to be on HSQ until platelets <50K. Likely from depakote  -Trend CBC biweekly until platelets stabilize.
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
## 2017-06-09 NOTE — CONSULT NOTE ADULT - ASSESSMENT
60 yo M with including sarcoidosis, hx of bilateral SDH (2010) 2/2 fall since resolved, DM2, chronic CO2 retention of unclear etiology (being worked up as outpatient by pulmonologist, Dr. Sargent), schizophrenia, recent prolonged ICU stay at [place] for new-onset seizures admitted to [facility] for psychiatric stabilization. Pt chronically ill with multiple medical conditions but stable.

## 2017-06-10 PROCEDURE — 99232 SBSQ HOSP IP/OBS MODERATE 35: CPT

## 2017-06-10 RX ADMIN — DIVALPROEX SODIUM 1000 MILLIGRAM(S): 500 TABLET, DELAYED RELEASE ORAL at 21:41

## 2017-06-10 RX ADMIN — Medication 3 MILLIGRAM(S): at 21:41

## 2017-06-10 RX ADMIN — BUDESONIDE AND FORMOTEROL FUMARATE DIHYDRATE 1 PUFF(S): 160; 4.5 AEROSOL RESPIRATORY (INHALATION) at 21:41

## 2017-06-10 RX ADMIN — HEPARIN SODIUM 5000 UNIT(S): 5000 INJECTION INTRAVENOUS; SUBCUTANEOUS at 08:03

## 2017-06-10 RX ADMIN — OLANZAPINE 2.5 MILLIGRAM(S): 15 TABLET, FILM COATED ORAL at 08:53

## 2017-06-10 RX ADMIN — TAMSULOSIN HYDROCHLORIDE 0.4 MILLIGRAM(S): 0.4 CAPSULE ORAL at 21:41

## 2017-06-10 RX ADMIN — DIVALPROEX SODIUM 750 MILLIGRAM(S): 500 TABLET, DELAYED RELEASE ORAL at 08:53

## 2017-06-10 RX ADMIN — HEPARIN SODIUM 5000 UNIT(S): 5000 INJECTION INTRAVENOUS; SUBCUTANEOUS at 21:41

## 2017-06-10 RX ADMIN — BUDESONIDE AND FORMOTEROL FUMARATE DIHYDRATE 1 PUFF(S): 160; 4.5 AEROSOL RESPIRATORY (INHALATION) at 08:53

## 2017-06-10 RX ADMIN — OLANZAPINE 7.5 MILLIGRAM(S): 15 TABLET, FILM COATED ORAL at 16:57

## 2017-06-11 PROCEDURE — 99232 SBSQ HOSP IP/OBS MODERATE 35: CPT

## 2017-06-11 RX ADMIN — DIVALPROEX SODIUM 1000 MILLIGRAM(S): 500 TABLET, DELAYED RELEASE ORAL at 22:12

## 2017-06-11 RX ADMIN — BUDESONIDE AND FORMOTEROL FUMARATE DIHYDRATE 1 PUFF(S): 160; 4.5 AEROSOL RESPIRATORY (INHALATION) at 22:55

## 2017-06-11 RX ADMIN — OLANZAPINE 7.5 MILLIGRAM(S): 15 TABLET, FILM COATED ORAL at 17:02

## 2017-06-11 RX ADMIN — Medication 3 MILLIGRAM(S): at 22:12

## 2017-06-11 RX ADMIN — TAMSULOSIN HYDROCHLORIDE 0.4 MILLIGRAM(S): 0.4 CAPSULE ORAL at 22:12

## 2017-06-11 RX ADMIN — BUDESONIDE AND FORMOTEROL FUMARATE DIHYDRATE 1 PUFF(S): 160; 4.5 AEROSOL RESPIRATORY (INHALATION) at 09:24

## 2017-06-11 RX ADMIN — DIVALPROEX SODIUM 750 MILLIGRAM(S): 500 TABLET, DELAYED RELEASE ORAL at 09:24

## 2017-06-11 RX ADMIN — HEPARIN SODIUM 5000 UNIT(S): 5000 INJECTION INTRAVENOUS; SUBCUTANEOUS at 22:12

## 2017-06-11 RX ADMIN — HEPARIN SODIUM 5000 UNIT(S): 5000 INJECTION INTRAVENOUS; SUBCUTANEOUS at 09:24

## 2017-06-12 LAB
BASOPHILS # BLD AUTO: 0.05 K/UL — SIGNIFICANT CHANGE UP (ref 0–0.2)
BASOPHILS NFR BLD AUTO: 0.9 % — SIGNIFICANT CHANGE UP (ref 0–2)
EOSINOPHIL # BLD AUTO: 0.26 K/UL — SIGNIFICANT CHANGE UP (ref 0–0.5)
EOSINOPHIL NFR BLD AUTO: 4.7 % — SIGNIFICANT CHANGE UP (ref 0–6)
HCT VFR BLD CALC: 41 % — SIGNIFICANT CHANGE UP (ref 39–50)
HGB BLD-MCNC: 12.5 G/DL — LOW (ref 13–17)
IMM GRANULOCYTES NFR BLD AUTO: 3.4 % — HIGH (ref 0–1.5)
LYMPHOCYTES # BLD AUTO: 1.06 K/UL — SIGNIFICANT CHANGE UP (ref 1–3.3)
LYMPHOCYTES # BLD AUTO: 19.2 % — SIGNIFICANT CHANGE UP (ref 13–44)
MCHC RBC-ENTMCNC: 26.7 PG — LOW (ref 27–34)
MCHC RBC-ENTMCNC: 30.5 % — LOW (ref 32–36)
MCV RBC AUTO: 87.6 FL — SIGNIFICANT CHANGE UP (ref 80–100)
MONOCYTES # BLD AUTO: 0.92 K/UL — HIGH (ref 0–0.9)
MONOCYTES NFR BLD AUTO: 16.7 % — HIGH (ref 2–14)
NEUTROPHILS # BLD AUTO: 3.03 K/UL — SIGNIFICANT CHANGE UP (ref 1.8–7.4)
NEUTROPHILS NFR BLD AUTO: 55.1 % — SIGNIFICANT CHANGE UP (ref 43–77)
PLATELET # BLD AUTO: 107 K/UL — LOW (ref 150–400)
PMV BLD: 9 FL — SIGNIFICANT CHANGE UP (ref 7–13)
RBC # BLD: 4.68 M/UL — SIGNIFICANT CHANGE UP (ref 4.2–5.8)
RBC # FLD: 17.7 % — HIGH (ref 10.3–14.5)
WBC # BLD: 5.51 K/UL — SIGNIFICANT CHANGE UP (ref 3.8–10.5)
WBC # FLD AUTO: 5.51 K/UL — SIGNIFICANT CHANGE UP (ref 3.8–10.5)

## 2017-06-12 PROCEDURE — 99232 SBSQ HOSP IP/OBS MODERATE 35: CPT | Mod: GC

## 2017-06-12 RX ADMIN — Medication 3 MILLIGRAM(S): at 21:14

## 2017-06-12 RX ADMIN — DIVALPROEX SODIUM 1000 MILLIGRAM(S): 500 TABLET, DELAYED RELEASE ORAL at 21:14

## 2017-06-12 RX ADMIN — BUDESONIDE AND FORMOTEROL FUMARATE DIHYDRATE 1 PUFF(S): 160; 4.5 AEROSOL RESPIRATORY (INHALATION) at 09:41

## 2017-06-12 RX ADMIN — BUDESONIDE AND FORMOTEROL FUMARATE DIHYDRATE 1 PUFF(S): 160; 4.5 AEROSOL RESPIRATORY (INHALATION) at 21:14

## 2017-06-12 RX ADMIN — DIVALPROEX SODIUM 750 MILLIGRAM(S): 500 TABLET, DELAYED RELEASE ORAL at 09:41

## 2017-06-12 RX ADMIN — OLANZAPINE 7.5 MILLIGRAM(S): 15 TABLET, FILM COATED ORAL at 17:02

## 2017-06-12 RX ADMIN — TAMSULOSIN HYDROCHLORIDE 0.4 MILLIGRAM(S): 0.4 CAPSULE ORAL at 21:14

## 2017-06-12 RX ADMIN — HEPARIN SODIUM 5000 UNIT(S): 5000 INJECTION INTRAVENOUS; SUBCUTANEOUS at 09:41

## 2017-06-13 PROCEDURE — 99232 SBSQ HOSP IP/OBS MODERATE 35: CPT

## 2017-06-13 RX ORDER — BACITRACIN ZINC 500 UNIT/G
1 OINTMENT IN PACKET (EA) TOPICAL DAILY
Qty: 0 | Refills: 0 | Status: DISCONTINUED | OUTPATIENT
Start: 2017-06-13 | End: 2017-06-22

## 2017-06-13 RX ADMIN — DIVALPROEX SODIUM 750 MILLIGRAM(S): 500 TABLET, DELAYED RELEASE ORAL at 09:04

## 2017-06-13 RX ADMIN — DIVALPROEX SODIUM 1000 MILLIGRAM(S): 500 TABLET, DELAYED RELEASE ORAL at 21:07

## 2017-06-13 RX ADMIN — BUDESONIDE AND FORMOTEROL FUMARATE DIHYDRATE 1 PUFF(S): 160; 4.5 AEROSOL RESPIRATORY (INHALATION) at 09:04

## 2017-06-13 RX ADMIN — BUDESONIDE AND FORMOTEROL FUMARATE DIHYDRATE 1 PUFF(S): 160; 4.5 AEROSOL RESPIRATORY (INHALATION) at 21:07

## 2017-06-13 RX ADMIN — Medication 3 MILLIGRAM(S): at 21:07

## 2017-06-13 RX ADMIN — OLANZAPINE 7.5 MILLIGRAM(S): 15 TABLET, FILM COATED ORAL at 16:50

## 2017-06-13 RX ADMIN — TAMSULOSIN HYDROCHLORIDE 0.4 MILLIGRAM(S): 0.4 CAPSULE ORAL at 21:07

## 2017-06-14 LAB
APPEARANCE UR: SIGNIFICANT CHANGE UP
BILIRUB UR-MCNC: NEGATIVE — SIGNIFICANT CHANGE UP
BLOOD UR QL VISUAL: HIGH
COLOR SPEC: SIGNIFICANT CHANGE UP
GLUCOSE UR-MCNC: NEGATIVE — SIGNIFICANT CHANGE UP
KETONES UR-MCNC: NEGATIVE — SIGNIFICANT CHANGE UP
LEUKOCYTE ESTERASE UR-ACNC: HIGH
NITRITE UR-MCNC: POSITIVE — HIGH
NON-SQ EPI CELLS # UR AUTO: 1 — SIGNIFICANT CHANGE UP
PH UR: 6 — SIGNIFICANT CHANGE UP (ref 4.6–8)
PROT UR-MCNC: 10 — SIGNIFICANT CHANGE UP
RBC CASTS # UR COMP ASSIST: SIGNIFICANT CHANGE UP (ref 0–?)
SP GR SPEC: 1.02 — SIGNIFICANT CHANGE UP (ref 1–1.03)
UROBILINOGEN FLD QL: NORMAL E.U. — SIGNIFICANT CHANGE UP (ref 0.1–0.2)
WBC CLUMPS #/AREA URNS HPF: PRESENT — HIGH (ref 0–?)
WBC UR QL: >50 — HIGH (ref 0–?)

## 2017-06-14 PROCEDURE — 99232 SBSQ HOSP IP/OBS MODERATE 35: CPT

## 2017-06-14 RX ORDER — OLANZAPINE 15 MG/1
7.5 TABLET, FILM COATED ORAL
Qty: 0 | Refills: 0 | Status: DISCONTINUED | OUTPATIENT
Start: 2017-06-14 | End: 2017-06-14

## 2017-06-14 RX ORDER — OLANZAPINE 15 MG/1
10 TABLET, FILM COATED ORAL
Qty: 0 | Refills: 0 | Status: DISCONTINUED | OUTPATIENT
Start: 2017-06-14 | End: 2017-06-20

## 2017-06-14 RX ADMIN — Medication 3 MILLIGRAM(S): at 21:45

## 2017-06-14 RX ADMIN — TAMSULOSIN HYDROCHLORIDE 0.4 MILLIGRAM(S): 0.4 CAPSULE ORAL at 21:45

## 2017-06-14 RX ADMIN — BUDESONIDE AND FORMOTEROL FUMARATE DIHYDRATE 1 PUFF(S): 160; 4.5 AEROSOL RESPIRATORY (INHALATION) at 08:57

## 2017-06-14 RX ADMIN — BUDESONIDE AND FORMOTEROL FUMARATE DIHYDRATE 1 PUFF(S): 160; 4.5 AEROSOL RESPIRATORY (INHALATION) at 21:45

## 2017-06-14 RX ADMIN — Medication 1 APPLICATION(S): at 08:08

## 2017-06-14 RX ADMIN — DIVALPROEX SODIUM 750 MILLIGRAM(S): 500 TABLET, DELAYED RELEASE ORAL at 08:56

## 2017-06-14 RX ADMIN — OLANZAPINE 10 MILLIGRAM(S): 15 TABLET, FILM COATED ORAL at 16:55

## 2017-06-14 RX ADMIN — DIVALPROEX SODIUM 1000 MILLIGRAM(S): 500 TABLET, DELAYED RELEASE ORAL at 21:45

## 2017-06-15 DIAGNOSIS — N30.00 ACUTE CYSTITIS WITHOUT HEMATURIA: ICD-10-CM

## 2017-06-15 LAB
BASOPHILS # BLD AUTO: 0.06 K/UL — SIGNIFICANT CHANGE UP (ref 0–0.2)
BASOPHILS NFR BLD AUTO: 0.5 % — SIGNIFICANT CHANGE UP (ref 0–2)
EOSINOPHIL # BLD AUTO: 0.24 K/UL — SIGNIFICANT CHANGE UP (ref 0–0.5)
EOSINOPHIL NFR BLD AUTO: 2.2 % — SIGNIFICANT CHANGE UP (ref 0–6)
HCT VFR BLD CALC: 43 % — SIGNIFICANT CHANGE UP (ref 39–50)
HGB BLD-MCNC: 13.4 G/DL — SIGNIFICANT CHANGE UP (ref 13–17)
IMM GRANULOCYTES NFR BLD AUTO: 1.7 % — HIGH (ref 0–1.5)
LYMPHOCYTES # BLD AUTO: 1.22 K/UL — SIGNIFICANT CHANGE UP (ref 1–3.3)
LYMPHOCYTES # BLD AUTO: 11 % — LOW (ref 13–44)
MCHC RBC-ENTMCNC: 27.2 PG — SIGNIFICANT CHANGE UP (ref 27–34)
MCHC RBC-ENTMCNC: 31.2 % — LOW (ref 32–36)
MCV RBC AUTO: 87.4 FL — SIGNIFICANT CHANGE UP (ref 80–100)
MONOCYTES # BLD AUTO: 1.29 K/UL — HIGH (ref 0–0.9)
MONOCYTES NFR BLD AUTO: 11.6 % — SIGNIFICANT CHANGE UP (ref 2–14)
NEUTROPHILS # BLD AUTO: 8.13 K/UL — HIGH (ref 1.8–7.4)
NEUTROPHILS NFR BLD AUTO: 73 % — SIGNIFICANT CHANGE UP (ref 43–77)
PLATELET # BLD AUTO: 112 K/UL — LOW (ref 150–400)
PMV BLD: 9.1 FL — SIGNIFICANT CHANGE UP (ref 7–13)
RBC # BLD: 4.92 M/UL — SIGNIFICANT CHANGE UP (ref 4.2–5.8)
RBC # FLD: 18 % — HIGH (ref 10.3–14.5)
SPECIMEN SOURCE: SIGNIFICANT CHANGE UP
VALPROATE SERPL-MCNC: 88.5 UG/ML — SIGNIFICANT CHANGE UP (ref 50–100)
WBC # BLD: 11.13 K/UL — HIGH (ref 3.8–10.5)
WBC # FLD AUTO: 11.13 K/UL — HIGH (ref 3.8–10.5)

## 2017-06-15 PROCEDURE — 99232 SBSQ HOSP IP/OBS MODERATE 35: CPT

## 2017-06-15 PROCEDURE — 99233 SBSQ HOSP IP/OBS HIGH 50: CPT

## 2017-06-15 RX ORDER — CIPROFLOXACIN LACTATE 400MG/40ML
500 VIAL (ML) INTRAVENOUS EVERY 12 HOURS
Qty: 0 | Refills: 0 | Status: COMPLETED | OUTPATIENT
Start: 2017-06-15 | End: 2017-06-22

## 2017-06-15 RX ADMIN — Medication 3 MILLIGRAM(S): at 21:30

## 2017-06-15 RX ADMIN — Medication 500 MILLIGRAM(S): at 21:30

## 2017-06-15 RX ADMIN — BUDESONIDE AND FORMOTEROL FUMARATE DIHYDRATE 1 PUFF(S): 160; 4.5 AEROSOL RESPIRATORY (INHALATION) at 21:30

## 2017-06-15 RX ADMIN — OLANZAPINE 10 MILLIGRAM(S): 15 TABLET, FILM COATED ORAL at 17:09

## 2017-06-15 RX ADMIN — DIVALPROEX SODIUM 1000 MILLIGRAM(S): 500 TABLET, DELAYED RELEASE ORAL at 21:30

## 2017-06-15 RX ADMIN — TAMSULOSIN HYDROCHLORIDE 0.4 MILLIGRAM(S): 0.4 CAPSULE ORAL at 21:30

## 2017-06-15 RX ADMIN — Medication 1 APPLICATION(S): at 08:47

## 2017-06-15 RX ADMIN — Medication 500 MILLIGRAM(S): at 12:53

## 2017-06-15 RX ADMIN — BUDESONIDE AND FORMOTEROL FUMARATE DIHYDRATE 1 PUFF(S): 160; 4.5 AEROSOL RESPIRATORY (INHALATION) at 09:16

## 2017-06-15 RX ADMIN — DIVALPROEX SODIUM 750 MILLIGRAM(S): 500 TABLET, DELAYED RELEASE ORAL at 08:48

## 2017-06-15 NOTE — PROGRESS NOTE ADULT - PROBLEM SELECTOR PLAN 1
-Complicated acute cystitis given recent history of haq catheter  -urine culture with GNR  -Will start cipro 500BID. Follow up urine specificity and sensitivities.   -Given patient is on zyprexa and on fluoroquinolones, will monitor QTC. Currently QTC stable at 434

## 2017-06-15 NOTE — PROGRESS NOTE ADULT - SUBJECTIVE AND OBJECTIVE BOX
CC/Reason for Consult: UTI    SUBJECTIVE / OVERNIGHT EVENTS: Patient seen and examined. He c/o slight dysuria today but denies increased frequency, abdominal pain or suprapubic tenderness. States he had a haq catheter placed for urinary retention during his ICU stay. He denies any fevers or chills. No other concerns today. States he develops a rash with penicillin. Has tried cipro in past without any issues.     MEDICATIONS  (STANDING):  tamsulosin 0.4milliGRAM(s) Oral at bedtime  buDESOnide 160 MICROgram(s)/formoterol 4.5 MICROgram(s) Inhaler 1Puff(s) Inhalation two times a day  diVALproex DR 750milliGRAM(s) Oral daily  diVALproex DR 1000milliGRAM(s) Oral at bedtime  melatonin. 3milliGRAM(s) Oral at bedtime  BACItracin   Ointment 1Application(s) Topical daily  OLANZapine 10milliGRAM(s) Oral <User Schedule>  ciprofloxacin     Tablet 500milliGRAM(s) Oral every 12 hours    MEDICATIONS  (PRN):  ALBUTerol    90 MICROgram(s) HFA Inhaler 2Puff(s) Inhalation every 6 hours PRN Shortness of Breath and/or Wheezing  tiotropium 18 MICROgram(s) Capsule 1Capsule(s) Inhalation every 6 hours PRN Shortness of breath and or wheezing  haloperidol     Tablet 2milliGRAM(s) Oral every 6 hours PRN agitation  haloperidol    Injectable 2milliGRAM(s) IntraMuscular once PRN severe agitation      Vital Signs Last 24 Hrs  T(C): 36.6, Max: 36.6 (06-15 @ 06:37)  HR: --70  BP: --110/60  RR: 18 (17 - 18)  SpO2: 93% (92% - 93%)  Wt(kg): --  CAPILLARY BLOOD GLUCOSE        PHYSICAL EXAM:  GENERAL: NAD, well-developed  HEAD:  Atraumatic, Normocephalic  EYES: EOMI, PERRLA, conjunctiva and sclera clear  CHEST/LUNG: Clear to auscultation bilaterally; No wheeze  HEART: Regular rate and rhythm;   ABDOMEN: Soft, Nontender, Nondistended; Bowel sounds present  EXTREMITIES:  2+ Peripheral Pulses, No clubbing, cyanosis, or edema  PSYCH: AAOx3  NEUROLOGY: non-focal  MSK: ambulates with walker.     LABS:                        13.4   11.13 )-----------( 112      ( 15 Moustapha 2017 08:30 )             43.0       Urinalysis Basic - ( 2017 06:30 )    Color: PLYEL / Appearance: HAZY / S.016 / pH: 6.0  Gluc: NEGATIVE / Ketone: NEGATIVE  / Bili: NEGATIVE / Urobili: NORMAL E.U.   Blood: SMALL / Protein: 10 / Nitrite: POSITIVE   Leuk Esterase: LARGE / RBC: 10-25 / WBC >50   Sq Epi: x / Non Sq Epi: x / Bacteria: x

## 2017-06-16 LAB
-  AMIKACIN: SIGNIFICANT CHANGE UP
-  AMPICILLIN/SULBACTAM: SIGNIFICANT CHANGE UP
-  AMPICILLIN: SIGNIFICANT CHANGE UP
-  AZTREONAM: SIGNIFICANT CHANGE UP
-  CEFAZOLIN: SIGNIFICANT CHANGE UP
-  CEFEPIME: SIGNIFICANT CHANGE UP
-  CEFOXITIN: SIGNIFICANT CHANGE UP
-  CEFTAZIDIME: SIGNIFICANT CHANGE UP
-  CEFTRIAXONE: SIGNIFICANT CHANGE UP
-  CIPROFLOXACIN: SIGNIFICANT CHANGE UP
-  ERTAPENEM: SIGNIFICANT CHANGE UP
-  GENTAMICIN: SIGNIFICANT CHANGE UP
-  IMIPENEM: SIGNIFICANT CHANGE UP
-  LEVOFLOXACIN: SIGNIFICANT CHANGE UP
-  MEROPENEM: SIGNIFICANT CHANGE UP
-  NITROFURANTOIN: SIGNIFICANT CHANGE UP
-  PIPERACILLIN/TAZOBACTAM: SIGNIFICANT CHANGE UP
-  TIGECYCLINE: SIGNIFICANT CHANGE UP
-  TOBRAMYCIN: SIGNIFICANT CHANGE UP
-  TRIMETHOPRIM/SULFAMETHOXAZOLE: SIGNIFICANT CHANGE UP
BACTERIA UR CULT: SIGNIFICANT CHANGE UP
METHOD TYPE: SIGNIFICANT CHANGE UP
ORGANISM # SPEC MICROSCOPIC CNT: SIGNIFICANT CHANGE UP
ORGANISM # SPEC MICROSCOPIC CNT: SIGNIFICANT CHANGE UP

## 2017-06-16 PROCEDURE — 93010 ELECTROCARDIOGRAM REPORT: CPT

## 2017-06-16 PROCEDURE — 99232 SBSQ HOSP IP/OBS MODERATE 35: CPT

## 2017-06-16 RX ORDER — TETANUS TOXOID, REDUCED DIPHTHERIA TOXOID AND ACELLULAR PERTUSSIS VACCINE, ADSORBED 5; 2.5; 8; 8; 2.5 [IU]/.5ML; [IU]/.5ML; UG/.5ML; UG/.5ML; UG/.5ML
0.5 SUSPENSION INTRAMUSCULAR ONCE
Qty: 0 | Refills: 0 | Status: COMPLETED | OUTPATIENT
Start: 2017-06-16 | End: 2017-06-16

## 2017-06-16 RX ORDER — BACITRACIN ZINC 500 UNIT/G
1 OINTMENT IN PACKET (EA) TOPICAL DAILY
Qty: 0 | Refills: 0 | Status: DISCONTINUED | OUTPATIENT
Start: 2017-06-16 | End: 2017-06-22

## 2017-06-16 RX ORDER — TETANUS AND DIPHTHERIA TOXOIDS ADSORBED 2; 2 [LF]/.5ML; [LF]/.5ML
0.5 INJECTION INTRAMUSCULAR ONCE
Qty: 0 | Refills: 0 | Status: DISCONTINUED | OUTPATIENT
Start: 2017-06-16 | End: 2017-06-16

## 2017-06-16 RX ADMIN — BUDESONIDE AND FORMOTEROL FUMARATE DIHYDRATE 1 PUFF(S): 160; 4.5 AEROSOL RESPIRATORY (INHALATION) at 09:46

## 2017-06-16 RX ADMIN — Medication 1 APPLICATION(S): at 21:06

## 2017-06-16 RX ADMIN — DIVALPROEX SODIUM 1000 MILLIGRAM(S): 500 TABLET, DELAYED RELEASE ORAL at 21:05

## 2017-06-16 RX ADMIN — DIVALPROEX SODIUM 750 MILLIGRAM(S): 500 TABLET, DELAYED RELEASE ORAL at 09:46

## 2017-06-16 RX ADMIN — BUDESONIDE AND FORMOTEROL FUMARATE DIHYDRATE 1 PUFF(S): 160; 4.5 AEROSOL RESPIRATORY (INHALATION) at 21:05

## 2017-06-16 RX ADMIN — OLANZAPINE 10 MILLIGRAM(S): 15 TABLET, FILM COATED ORAL at 18:16

## 2017-06-16 RX ADMIN — Medication 500 MILLIGRAM(S): at 09:46

## 2017-06-16 RX ADMIN — Medication 1 APPLICATION(S): at 09:46

## 2017-06-16 RX ADMIN — Medication 3 MILLIGRAM(S): at 21:05

## 2017-06-16 RX ADMIN — TETANUS TOXOID, REDUCED DIPHTHERIA TOXOID AND ACELLULAR PERTUSSIS VACCINE, ADSORBED 0.5 MILLILITER(S): 5; 2.5; 8; 8; 2.5 SUSPENSION INTRAMUSCULAR at 21:35

## 2017-06-16 RX ADMIN — TAMSULOSIN HYDROCHLORIDE 0.4 MILLIGRAM(S): 0.4 CAPSULE ORAL at 21:05

## 2017-06-16 RX ADMIN — Medication 500 MILLIGRAM(S): at 21:05

## 2017-06-16 NOTE — CHART NOTE - NSCHARTNOTEFT_GEN_A_CORE
Patient sustained laceration on dorsum of L hand from his walker. On exam there is ecchymosis and superficial laceration to dorsum of L wrist, does not require stiches. Patient unsure of tetanus status. Will give ppx Tdap(Td not available in pharmacy), protective tape/padding applied to walker by staff, wound care will consist of daily bacitracin and gauze changes.

## 2017-06-17 PROCEDURE — 99232 SBSQ HOSP IP/OBS MODERATE 35: CPT

## 2017-06-17 RX ADMIN — Medication 3 MILLIGRAM(S): at 20:36

## 2017-06-17 RX ADMIN — Medication 500 MILLIGRAM(S): at 08:30

## 2017-06-17 RX ADMIN — BUDESONIDE AND FORMOTEROL FUMARATE DIHYDRATE 1 PUFF(S): 160; 4.5 AEROSOL RESPIRATORY (INHALATION) at 20:37

## 2017-06-17 RX ADMIN — BUDESONIDE AND FORMOTEROL FUMARATE DIHYDRATE 1 PUFF(S): 160; 4.5 AEROSOL RESPIRATORY (INHALATION) at 08:28

## 2017-06-17 RX ADMIN — Medication 500 MILLIGRAM(S): at 20:36

## 2017-06-17 RX ADMIN — OLANZAPINE 10 MILLIGRAM(S): 15 TABLET, FILM COATED ORAL at 18:13

## 2017-06-17 RX ADMIN — TAMSULOSIN HYDROCHLORIDE 0.4 MILLIGRAM(S): 0.4 CAPSULE ORAL at 20:36

## 2017-06-17 RX ADMIN — Medication 1 APPLICATION(S): at 08:29

## 2017-06-17 RX ADMIN — DIVALPROEX SODIUM 1000 MILLIGRAM(S): 500 TABLET, DELAYED RELEASE ORAL at 20:36

## 2017-06-17 RX ADMIN — DIVALPROEX SODIUM 750 MILLIGRAM(S): 500 TABLET, DELAYED RELEASE ORAL at 08:30

## 2017-06-18 DIAGNOSIS — M25.473 EFFUSION, UNSPECIFIED ANKLE: ICD-10-CM

## 2017-06-18 PROCEDURE — 99232 SBSQ HOSP IP/OBS MODERATE 35: CPT

## 2017-06-18 PROCEDURE — 99233 SBSQ HOSP IP/OBS HIGH 50: CPT

## 2017-06-18 RX ADMIN — TAMSULOSIN HYDROCHLORIDE 0.4 MILLIGRAM(S): 0.4 CAPSULE ORAL at 20:44

## 2017-06-18 RX ADMIN — DIVALPROEX SODIUM 750 MILLIGRAM(S): 500 TABLET, DELAYED RELEASE ORAL at 09:43

## 2017-06-18 RX ADMIN — Medication 500 MILLIGRAM(S): at 09:43

## 2017-06-18 RX ADMIN — BUDESONIDE AND FORMOTEROL FUMARATE DIHYDRATE 1 PUFF(S): 160; 4.5 AEROSOL RESPIRATORY (INHALATION) at 09:43

## 2017-06-18 RX ADMIN — DIVALPROEX SODIUM 1000 MILLIGRAM(S): 500 TABLET, DELAYED RELEASE ORAL at 20:44

## 2017-06-18 RX ADMIN — Medication 3 MILLIGRAM(S): at 20:44

## 2017-06-18 RX ADMIN — Medication 1 APPLICATION(S): at 09:43

## 2017-06-18 RX ADMIN — Medication 500 MILLIGRAM(S): at 20:44

## 2017-06-18 RX ADMIN — BUDESONIDE AND FORMOTEROL FUMARATE DIHYDRATE 1 PUFF(S): 160; 4.5 AEROSOL RESPIRATORY (INHALATION) at 20:44

## 2017-06-18 NOTE — PROGRESS NOTE ADULT - PROBLEM SELECTOR PLAN 2
-Complicated acute cystitis given recent history of haq catheter  -urine culture with klebsiella, sensi to cipro  -Will start cipro 500BID for 7 day course  -Given patient is on zyprexa and on fluoroquinolones, will monitor QTC. Currently QTC stable at 434
-Stable platelets.  -MULU negative.

## 2017-06-18 NOTE — PROGRESS NOTE ADULT - SUBJECTIVE AND OBJECTIVE BOX
CC: mild ankle edema    SUBJECTIVE / OVERNIGHT EVENTS:  Asked by psychiatry to see patient for ankle edema.    MEDICATIONS  (STANDING):  tamsulosin 0.4milliGRAM(s) Oral at bedtime  buDESOnide 160 MICROgram(s)/formoterol 4.5 MICROgram(s) Inhaler 1Puff(s) Inhalation two times a day  diVALproex DR 750milliGRAM(s) Oral daily  diVALproex DR 1000milliGRAM(s) Oral at bedtime  melatonin. 3milliGRAM(s) Oral at bedtime  BACItracin   Ointment 1Application(s) Topical daily  OLANZapine 10milliGRAM(s) Oral <User Schedule>  ciprofloxacin     Tablet 500milliGRAM(s) Oral every 12 hours  BACItracin   Ointment 1Application(s) Topical daily    MEDICATIONS  (PRN):  ALBUTerol    90 MICROgram(s) HFA Inhaler 2Puff(s) Inhalation every 6 hours PRN Shortness of Breath and/or Wheezing  tiotropium 18 MICROgram(s) Capsule 1Capsule(s) Inhalation every 6 hours PRN Shortness of breath and or wheezing  haloperidol     Tablet 2milliGRAM(s) Oral every 6 hours PRN agitation  haloperidol    Injectable 2milliGRAM(s) IntraMuscular once PRN severe agitation      Vital Signs Last 24 Hrs  T(C): 36.2, Max: 36.2 (06-17 @ 15:58)  HR: --  BP: 134/81  RR: 16 (14 - 16)  SpO2: 96% (95% - 96%)  Wt(kg): --  CAPILLARY BLOOD GLUCOSE    PHYSICAL EXAM:  GENERAL: NAD, well-developed  HEAD:  Atraumatic, Normocephalic  EYES: EOMI, PERRLA, conjunctiva and sclera clear  NECK: Supple, No JVD  CHEST/LUNG: Clear to auscultation bilaterally; No wheeze  HEART: Regular rate and rhythm; No murmurs, rubs, or gallops  ABDOMEN: Soft, Nontender, Nondistended; Bowel sounds present  EXTREMITIES:  2+ Peripheral Pulses, No clubbing, cyanosis, or edema  PSYCH: AAOx3  NEUROLOGY: non-focal  SKIN: No rashes or lesions    LABS:    6/15:  WBC 11.13    6/14:  UA positive    6/9:  Cr 0.76 CC: ankle edema    SUBJECTIVE / OVERNIGHT EVENTS:  Asked by psychiatry to see patient for ankle edema.    Patient states he has had ankle edema like this for over 10 years. It does not look any worse now than before. Does not limit his mobilization. Denies any unilateral swelling, pain, fever, chills.     MEDICATIONS  (STANDING):  tamsulosin 0.4milliGRAM(s) Oral at bedtime  buDESOnide 160 MICROgram(s)/formoterol 4.5 MICROgram(s) Inhaler 1Puff(s) Inhalation two times a day  diVALproex DR 750milliGRAM(s) Oral daily  diVALproex DR 1000milliGRAM(s) Oral at bedtime  melatonin. 3milliGRAM(s) Oral at bedtime  BACItracin   Ointment 1Application(s) Topical daily  OLANZapine 10milliGRAM(s) Oral <User Schedule>  ciprofloxacin     Tablet 500milliGRAM(s) Oral every 12 hours  BACItracin   Ointment 1Application(s) Topical daily    MEDICATIONS  (PRN):  ALBUTerol    90 MICROgram(s) HFA Inhaler 2Puff(s) Inhalation every 6 hours PRN Shortness of Breath and/or Wheezing  tiotropium 18 MICROgram(s) Capsule 1Capsule(s) Inhalation every 6 hours PRN Shortness of breath and or wheezing  haloperidol     Tablet 2milliGRAM(s) Oral every 6 hours PRN agitation  haloperidol    Injectable 2milliGRAM(s) IntraMuscular once PRN severe agitation      Vital Signs Last 24 Hrs  T(C): 36.2, Max: 36.2 (06-17 @ 15:58)  HR: --  BP: 134/81  RR: 16 (14 - 16)  SpO2: 96% (95% - 96%)  Wt(kg): --  CAPILLARY BLOOD GLUCOSE    PHYSICAL EXAM:  GENERAL: NAD  HEAD:  Atraumatic, Normocephalic  EYES: EOMI, PERRLA, conjunctiva and sclera clear  NECK: Supple, No JVD  CHEST/LUNG: Clear to auscultation bilaterally; No wheeze  HEART: Regular rate and rhythm; No murmurs, rubs, or gallops  ABDOMEN: Soft, Nontender, Nondistended; Bowel sounds present  EXTREMITIES:  2+ Peripheral Pulses, + b/l equal LE edema with chronic venous stasis changes  NEUROLOGY: non-focal  SKIN: abrasion on L hand, non cellulitic   LABS:    6/15:  WBC 11.13    6/14:  UA positive    6/9:  Cr 0.76

## 2017-06-19 PROCEDURE — 99232 SBSQ HOSP IP/OBS MODERATE 35: CPT | Mod: GC

## 2017-06-19 RX ADMIN — DIVALPROEX SODIUM 750 MILLIGRAM(S): 500 TABLET, DELAYED RELEASE ORAL at 11:32

## 2017-06-19 RX ADMIN — Medication 1 APPLICATION(S): at 11:31

## 2017-06-19 RX ADMIN — Medication 3 MILLIGRAM(S): at 21:21

## 2017-06-19 RX ADMIN — TAMSULOSIN HYDROCHLORIDE 0.4 MILLIGRAM(S): 0.4 CAPSULE ORAL at 21:21

## 2017-06-19 RX ADMIN — Medication 1 APPLICATION(S): at 11:32

## 2017-06-19 RX ADMIN — BUDESONIDE AND FORMOTEROL FUMARATE DIHYDRATE 1 PUFF(S): 160; 4.5 AEROSOL RESPIRATORY (INHALATION) at 11:32

## 2017-06-19 RX ADMIN — Medication 500 MILLIGRAM(S): at 21:21

## 2017-06-19 RX ADMIN — DIVALPROEX SODIUM 1000 MILLIGRAM(S): 500 TABLET, DELAYED RELEASE ORAL at 21:21

## 2017-06-19 RX ADMIN — BUDESONIDE AND FORMOTEROL FUMARATE DIHYDRATE 1 PUFF(S): 160; 4.5 AEROSOL RESPIRATORY (INHALATION) at 21:21

## 2017-06-19 RX ADMIN — Medication 500 MILLIGRAM(S): at 11:32

## 2017-06-20 PROCEDURE — 99232 SBSQ HOSP IP/OBS MODERATE 35: CPT | Mod: GC

## 2017-06-20 RX ORDER — OLANZAPINE 15 MG/1
5 TABLET, FILM COATED ORAL
Qty: 0 | Refills: 0 | Status: DISCONTINUED | OUTPATIENT
Start: 2017-06-21 | End: 2017-06-23

## 2017-06-20 RX ORDER — DIVALPROEX SODIUM 500 MG/1
1500 TABLET, DELAYED RELEASE ORAL AT BEDTIME
Qty: 0 | Refills: 0 | Status: DISCONTINUED | OUTPATIENT
Start: 2017-06-21 | End: 2017-06-23

## 2017-06-20 RX ORDER — OLANZAPINE 15 MG/1
7.5 TABLET, FILM COATED ORAL
Qty: 0 | Refills: 0 | Status: DISCONTINUED | OUTPATIENT
Start: 2017-06-20 | End: 2017-06-20

## 2017-06-20 RX ORDER — DIVALPROEX SODIUM 500 MG/1
1000 TABLET, DELAYED RELEASE ORAL AT BEDTIME
Qty: 0 | Refills: 0 | Status: COMPLETED | OUTPATIENT
Start: 2017-06-20 | End: 2017-06-20

## 2017-06-20 RX ADMIN — Medication 500 MILLIGRAM(S): at 21:25

## 2017-06-20 RX ADMIN — Medication 500 MILLIGRAM(S): at 08:25

## 2017-06-20 RX ADMIN — Medication 3 MILLIGRAM(S): at 21:25

## 2017-06-20 RX ADMIN — TAMSULOSIN HYDROCHLORIDE 0.4 MILLIGRAM(S): 0.4 CAPSULE ORAL at 21:25

## 2017-06-20 RX ADMIN — Medication 1 APPLICATION(S): at 08:24

## 2017-06-20 RX ADMIN — DIVALPROEX SODIUM 750 MILLIGRAM(S): 500 TABLET, DELAYED RELEASE ORAL at 08:25

## 2017-06-20 RX ADMIN — BUDESONIDE AND FORMOTEROL FUMARATE DIHYDRATE 1 PUFF(S): 160; 4.5 AEROSOL RESPIRATORY (INHALATION) at 08:58

## 2017-06-20 RX ADMIN — BUDESONIDE AND FORMOTEROL FUMARATE DIHYDRATE 1 PUFF(S): 160; 4.5 AEROSOL RESPIRATORY (INHALATION) at 21:25

## 2017-06-20 RX ADMIN — DIVALPROEX SODIUM 1000 MILLIGRAM(S): 500 TABLET, DELAYED RELEASE ORAL at 21:25

## 2017-06-21 PROCEDURE — 99232 SBSQ HOSP IP/OBS MODERATE 35: CPT

## 2017-06-21 RX ADMIN — BUDESONIDE AND FORMOTEROL FUMARATE DIHYDRATE 1 PUFF(S): 160; 4.5 AEROSOL RESPIRATORY (INHALATION) at 10:06

## 2017-06-21 RX ADMIN — BUDESONIDE AND FORMOTEROL FUMARATE DIHYDRATE 1 PUFF(S): 160; 4.5 AEROSOL RESPIRATORY (INHALATION) at 21:52

## 2017-06-21 RX ADMIN — DIVALPROEX SODIUM 1500 MILLIGRAM(S): 500 TABLET, DELAYED RELEASE ORAL at 21:52

## 2017-06-21 RX ADMIN — Medication 500 MILLIGRAM(S): at 10:05

## 2017-06-21 RX ADMIN — Medication 500 MILLIGRAM(S): at 21:52

## 2017-06-21 RX ADMIN — TAMSULOSIN HYDROCHLORIDE 0.4 MILLIGRAM(S): 0.4 CAPSULE ORAL at 21:52

## 2017-06-21 RX ADMIN — Medication 3 MILLIGRAM(S): at 21:52

## 2017-06-21 RX ADMIN — OLANZAPINE 5 MILLIGRAM(S): 15 TABLET, FILM COATED ORAL at 17:59

## 2017-06-22 PROCEDURE — 99232 SBSQ HOSP IP/OBS MODERATE 35: CPT | Mod: GC

## 2017-06-22 RX ORDER — DIVALPROEX SODIUM 500 MG/1
3 TABLET, DELAYED RELEASE ORAL
Qty: 90 | Refills: 1 | OUTPATIENT
Start: 2017-06-22 | End: 2017-08-20

## 2017-06-22 RX ORDER — OLANZAPINE 15 MG/1
1 TABLET, FILM COATED ORAL
Qty: 14 | Refills: 0 | OUTPATIENT
Start: 2017-06-22 | End: 2017-07-06

## 2017-06-22 RX ADMIN — Medication 3 MILLIGRAM(S): at 20:39

## 2017-06-22 RX ADMIN — BUDESONIDE AND FORMOTEROL FUMARATE DIHYDRATE 1 PUFF(S): 160; 4.5 AEROSOL RESPIRATORY (INHALATION) at 20:39

## 2017-06-22 RX ADMIN — TAMSULOSIN HYDROCHLORIDE 0.4 MILLIGRAM(S): 0.4 CAPSULE ORAL at 20:39

## 2017-06-22 RX ADMIN — Medication 1 APPLICATION(S): at 08:47

## 2017-06-22 RX ADMIN — DIVALPROEX SODIUM 1500 MILLIGRAM(S): 500 TABLET, DELAYED RELEASE ORAL at 20:39

## 2017-06-22 RX ADMIN — OLANZAPINE 5 MILLIGRAM(S): 15 TABLET, FILM COATED ORAL at 17:29

## 2017-06-22 RX ADMIN — Medication 500 MILLIGRAM(S): at 08:47

## 2017-06-22 RX ADMIN — BUDESONIDE AND FORMOTEROL FUMARATE DIHYDRATE 1 PUFF(S): 160; 4.5 AEROSOL RESPIRATORY (INHALATION) at 08:44

## 2017-06-23 VITALS — TEMPERATURE: 97 F

## 2017-06-23 LAB — VALPROATE SERPL-MCNC: 64.8 UG/ML — SIGNIFICANT CHANGE UP (ref 50–100)

## 2017-06-23 PROCEDURE — 99239 HOSP IP/OBS DSCHRG MGMT >30: CPT | Mod: GC

## 2017-06-23 RX ORDER — OLANZAPINE 15 MG/1
1 TABLET, FILM COATED ORAL
Qty: 0 | Refills: 0 | COMMUNITY
Start: 2017-06-23

## 2017-06-23 RX ORDER — TAMSULOSIN HYDROCHLORIDE 0.4 MG/1
1 CAPSULE ORAL
Qty: 0 | Refills: 0 | DISCHARGE
Start: 2017-06-23

## 2017-06-23 RX ORDER — TAMSULOSIN HYDROCHLORIDE 0.4 MG/1
1 CAPSULE ORAL
Qty: 30 | Refills: 1 | OUTPATIENT
Start: 2017-06-23 | End: 2017-08-21

## 2017-06-23 RX ORDER — DIVALPROEX SODIUM 500 MG/1
3 TABLET, DELAYED RELEASE ORAL
Qty: 0 | Refills: 0 | DISCHARGE
Start: 2017-06-23

## 2017-06-23 RX ORDER — TIOTROPIUM BROMIDE 18 UG/1
1 CAPSULE ORAL; RESPIRATORY (INHALATION)
Qty: 0 | Refills: 0 | COMMUNITY
Start: 2017-06-23

## 2017-06-23 RX ADMIN — BUDESONIDE AND FORMOTEROL FUMARATE DIHYDRATE 1 PUFF(S): 160; 4.5 AEROSOL RESPIRATORY (INHALATION) at 09:59

## 2017-06-26 ENCOUNTER — INPATIENT (INPATIENT)
Facility: HOSPITAL | Age: 62
LOS: 2 days | Discharge: HOME | DRG: 303 | End: 2017-06-29
Attending: INTERNAL MEDICINE | Admitting: HOSPITALIST
Payer: MEDICARE

## 2017-06-26 VITALS
DIASTOLIC BLOOD PRESSURE: 74 MMHG | SYSTOLIC BLOOD PRESSURE: 111 MMHG | TEMPERATURE: 98 F | OXYGEN SATURATION: 96 % | HEART RATE: 72 BPM | RESPIRATION RATE: 20 BRPM

## 2017-06-26 DIAGNOSIS — S06.5X9A TRAUMATIC SUBDURAL HEMORRHAGE WITH LOSS OF CONSCIOUSNESS OF UNSPECIFIED DURATION, INITIAL ENCOUNTER: Chronic | ICD-10-CM

## 2017-06-26 DIAGNOSIS — Z93.3 COLOSTOMY STATUS: Chronic | ICD-10-CM

## 2017-06-26 DIAGNOSIS — Z98.890 OTHER SPECIFIED POSTPROCEDURAL STATES: Chronic | ICD-10-CM

## 2017-06-26 DIAGNOSIS — Z90.49 ACQUIRED ABSENCE OF OTHER SPECIFIED PARTS OF DIGESTIVE TRACT: Chronic | ICD-10-CM

## 2017-06-26 LAB
ALBUMIN SERPL ELPH-MCNC: 3.6 G/DL — SIGNIFICANT CHANGE UP (ref 3.3–5)
ALP SERPL-CCNC: 65 U/L — SIGNIFICANT CHANGE UP (ref 40–120)
ALT FLD-CCNC: 14 U/L RC — SIGNIFICANT CHANGE UP (ref 10–45)
ANION GAP SERPL CALC-SCNC: 13 MMOL/L — SIGNIFICANT CHANGE UP (ref 5–17)
AST SERPL-CCNC: 32 U/L — SIGNIFICANT CHANGE UP (ref 10–40)
BASOPHILS # BLD AUTO: 0.1 K/UL — SIGNIFICANT CHANGE UP (ref 0–0.2)
BASOPHILS NFR BLD AUTO: 0.8 % — SIGNIFICANT CHANGE UP (ref 0–2)
BILIRUB SERPL-MCNC: 0.3 MG/DL — SIGNIFICANT CHANGE UP (ref 0.2–1.2)
BUN SERPL-MCNC: 29 MG/DL — HIGH (ref 7–23)
CALCIUM SERPL-MCNC: 8.6 MG/DL — SIGNIFICANT CHANGE UP (ref 8.4–10.5)
CHLORIDE SERPL-SCNC: 103 MMOL/L — SIGNIFICANT CHANGE UP (ref 96–108)
CK MB CFR SERPL CALC: 1 NG/ML — SIGNIFICANT CHANGE UP (ref 0–6.7)
CK SERPL-CCNC: 65 U/L — SIGNIFICANT CHANGE UP (ref 30–200)
CO2 SERPL-SCNC: 26 MMOL/L — SIGNIFICANT CHANGE UP (ref 22–31)
CREAT SERPL-MCNC: 0.73 MG/DL — SIGNIFICANT CHANGE UP (ref 0.5–1.3)
EOSINOPHIL # BLD AUTO: 0.5 K/UL — SIGNIFICANT CHANGE UP (ref 0–0.5)
EOSINOPHIL NFR BLD AUTO: 7.1 % — HIGH (ref 0–6)
GAS PNL BLDV: SIGNIFICANT CHANGE UP
GLUCOSE SERPL-MCNC: 113 MG/DL — HIGH (ref 70–99)
HCT VFR BLD CALC: 40.3 % — SIGNIFICANT CHANGE UP (ref 39–50)
HGB BLD-MCNC: 13.6 G/DL — SIGNIFICANT CHANGE UP (ref 13–17)
LYMPHOCYTES # BLD AUTO: 1.5 K/UL — SIGNIFICANT CHANGE UP (ref 1–3.3)
LYMPHOCYTES # BLD AUTO: 22 % — SIGNIFICANT CHANGE UP (ref 13–44)
MCHC RBC-ENTMCNC: 29.8 PG — SIGNIFICANT CHANGE UP (ref 27–34)
MCHC RBC-ENTMCNC: 33.7 GM/DL — SIGNIFICANT CHANGE UP (ref 32–36)
MCV RBC AUTO: 88.5 FL — SIGNIFICANT CHANGE UP (ref 80–100)
MONOCYTES # BLD AUTO: 0.9 K/UL — SIGNIFICANT CHANGE UP (ref 0–0.9)
MONOCYTES NFR BLD AUTO: 12.7 % — SIGNIFICANT CHANGE UP (ref 2–14)
NEUTROPHILS # BLD AUTO: 3.9 K/UL — SIGNIFICANT CHANGE UP (ref 1.8–7.4)
NEUTROPHILS NFR BLD AUTO: 57.5 % — SIGNIFICANT CHANGE UP (ref 43–77)
NT-PROBNP SERPL-SCNC: 86 PG/ML — SIGNIFICANT CHANGE UP (ref 0–300)
PLATELET # BLD AUTO: 115 K/UL — LOW (ref 150–400)
POTASSIUM SERPL-MCNC: 5.2 MMOL/L — SIGNIFICANT CHANGE UP (ref 3.5–5.3)
POTASSIUM SERPL-SCNC: 5.2 MMOL/L — SIGNIFICANT CHANGE UP (ref 3.5–5.3)
PROT SERPL-MCNC: 7.2 G/DL — SIGNIFICANT CHANGE UP (ref 6–8.3)
RBC # BLD: 4.55 M/UL — SIGNIFICANT CHANGE UP (ref 4.2–5.8)
RBC # FLD: 15.3 % — HIGH (ref 10.3–14.5)
SODIUM SERPL-SCNC: 142 MMOL/L — SIGNIFICANT CHANGE UP (ref 135–145)
TROPONIN T SERPL-MCNC: <0.01 NG/ML — SIGNIFICANT CHANGE UP (ref 0–0.06)
VALPROATE SERPL-MCNC: 54 UG/ML — SIGNIFICANT CHANGE UP (ref 50–100)
WBC # BLD: 6.9 K/UL — SIGNIFICANT CHANGE UP (ref 3.8–10.5)
WBC # FLD AUTO: 6.9 K/UL — SIGNIFICANT CHANGE UP (ref 3.8–10.5)

## 2017-06-26 PROCEDURE — 99285 EMERGENCY DEPT VISIT HI MDM: CPT | Mod: 25

## 2017-06-26 PROCEDURE — 93010 ELECTROCARDIOGRAM REPORT: CPT

## 2017-06-26 NOTE — ED PROVIDER NOTE - OBJECTIVE STATEMENT
Apoorva Godfrey M.D: 61yoM hx 'lung dz' Apoorva Godfrey M.D: 61yoM hx 'lung dz', seizures, no hx chf, no hx liver dzp/w LE edema. occurred over the last few days. denies any SOB, no CP, no abd pain no fevers/chills. visiting nurse came today to evaluate pt and saw legs and recommended pt be evaluated in ER for CHF  pt w/o PMD at this time.

## 2017-06-26 NOTE — ED ADULT NURSE NOTE - DISTAL EXTREMITY COLOR
color consistent with ethnicity/race color consistent with ethnicity/race/RLE with mild discoloration/redness

## 2017-06-26 NOTE — ED ADULT NURSE NOTE - OBJECTIVE STATEMENT
pt states "worsening swelling to bilat LE- sent by urgent care for r/o CHF. No CP/SOB/pain" pts sister at bedside and state that he was just discharged from [facility] on friday after having his medications readjusted following an admission in [place] post seizure and reaction to Keppra.

## 2017-06-26 NOTE — ED ADULT NURSE NOTE - CHPI ED SYMPTOMS NEG
no syncope/no shortness of breath/no fever/no chest pain/no diaphoresis/no vomiting/no cough/no back pain/no dizziness/no chills/no nausea

## 2017-06-26 NOTE — ED PROVIDER NOTE - FAMILY HISTORY
No pertinent family history in first degree relatives Father  Still living? Unknown  Family history of diabetes mellitus, Age at diagnosis: Age Unknown

## 2017-06-26 NOTE — ED PROVIDER NOTE - MEDICAL DECISION MAKING DETAILS
Apoorva Godfrey M.D: 61yoM hx lung dz (suspect COPD), seizure d/o p/w LE edema. on exam also ntoed to have crackles and JVD concerning for CHF vs cirrhosis. will check labs, ekg, cxr, cardiacs reeval. likely TBA

## 2017-06-26 NOTE — ED PROVIDER NOTE - ATTENDING CONTRIBUTION TO CARE
I have seen and evaluated this patient with the resident.   I agree with the findings  unless other wise stated.  I have made appropriate changes in documentations where needed, After my face to face bedside evaluation, I am further  notinyoM hx lung dz (suspect COPD), seizure d/o p/w LE edema. on exam also ntoed to have crackles and JVD concerning for CHF vs cirrhosis. will check labs, ekg, cxr, cardiacs reeval. admit--[place]es[place]

## 2017-06-27 DIAGNOSIS — G40.909 EPILEPSY, UNSPECIFIED, NOT INTRACTABLE, WITHOUT STATUS EPILEPTICUS: ICD-10-CM

## 2017-06-27 DIAGNOSIS — D86.9 SARCOIDOSIS, UNSPECIFIED: ICD-10-CM

## 2017-06-27 DIAGNOSIS — E11.9 TYPE 2 DIABETES MELLITUS WITHOUT COMPLICATIONS: ICD-10-CM

## 2017-06-27 DIAGNOSIS — N39.0 URINARY TRACT INFECTION, SITE NOT SPECIFIED: ICD-10-CM

## 2017-06-27 DIAGNOSIS — R60.0 LOCALIZED EDEMA: ICD-10-CM

## 2017-06-27 DIAGNOSIS — F29 UNSPECIFIED PSYCHOSIS NOT DUE TO A SUBSTANCE OR KNOWN PHYSIOLOGICAL CONDITION: ICD-10-CM

## 2017-06-27 DIAGNOSIS — N40.0 BENIGN PROSTATIC HYPERPLASIA WITHOUT LOWER URINARY TRACT SYMPTOMS: ICD-10-CM

## 2017-06-27 DIAGNOSIS — Z29.9 ENCOUNTER FOR PROPHYLACTIC MEASURES, UNSPECIFIED: ICD-10-CM

## 2017-06-27 DIAGNOSIS — D72.1 EOSINOPHILIA: ICD-10-CM

## 2017-06-27 DIAGNOSIS — R82.71 BACTERIURIA: ICD-10-CM

## 2017-06-27 LAB
ALBUMIN SERPL ELPH-MCNC: 3.2 G/DL — LOW (ref 3.3–5)
ALP SERPL-CCNC: 58 U/L — SIGNIFICANT CHANGE UP (ref 40–120)
ALT FLD-CCNC: 12 U/L RC — SIGNIFICANT CHANGE UP (ref 10–45)
ANION GAP SERPL CALC-SCNC: 7 MMOL/L — SIGNIFICANT CHANGE UP (ref 5–17)
AST SERPL-CCNC: 13 U/L — SIGNIFICANT CHANGE UP (ref 10–40)
BILIRUB SERPL-MCNC: 0.1 MG/DL — LOW (ref 0.2–1.2)
BUN SERPL-MCNC: 25 MG/DL — HIGH (ref 7–23)
CALCIUM SERPL-MCNC: 8.2 MG/DL — LOW (ref 8.4–10.5)
CHLORIDE SERPL-SCNC: 106 MMOL/L — SIGNIFICANT CHANGE UP (ref 96–108)
CO2 SERPL-SCNC: 29 MMOL/L — SIGNIFICANT CHANGE UP (ref 22–31)
CREAT SERPL-MCNC: 0.69 MG/DL — SIGNIFICANT CHANGE UP (ref 0.5–1.3)
GLUCOSE SERPL-MCNC: 115 MG/DL — HIGH (ref 70–99)
HCT VFR BLD CALC: 36.3 % — LOW (ref 39–50)
HGB BLD-MCNC: 12.2 G/DL — LOW (ref 13–17)
MAGNESIUM SERPL-MCNC: 2.1 MG/DL — SIGNIFICANT CHANGE UP (ref 1.6–2.6)
MCHC RBC-ENTMCNC: 30.1 PG — SIGNIFICANT CHANGE UP (ref 27–34)
MCHC RBC-ENTMCNC: 33.7 GM/DL — SIGNIFICANT CHANGE UP (ref 32–36)
MCV RBC AUTO: 89.2 FL — SIGNIFICANT CHANGE UP (ref 80–100)
PHOSPHATE SERPL-MCNC: 3.6 MG/DL — SIGNIFICANT CHANGE UP (ref 2.5–4.5)
PLATELET # BLD AUTO: 80 K/UL — LOW (ref 150–400)
POTASSIUM SERPL-MCNC: 4.2 MMOL/L — SIGNIFICANT CHANGE UP (ref 3.5–5.3)
POTASSIUM SERPL-SCNC: 4.2 MMOL/L — SIGNIFICANT CHANGE UP (ref 3.5–5.3)
PROT SERPL-MCNC: 6.1 G/DL — SIGNIFICANT CHANGE UP (ref 6–8.3)
RBC # BLD: 4.07 M/UL — LOW (ref 4.2–5.8)
RBC # FLD: 15.1 % — HIGH (ref 10.3–14.5)
SODIUM SERPL-SCNC: 142 MMOL/L — SIGNIFICANT CHANGE UP (ref 135–145)
WBC # BLD: 6.2 K/UL — SIGNIFICANT CHANGE UP (ref 3.8–10.5)
WBC # FLD AUTO: 6.2 K/UL — SIGNIFICANT CHANGE UP (ref 3.8–10.5)

## 2017-06-27 PROCEDURE — 76705 ECHO EXAM OF ABDOMEN: CPT | Mod: 26,RT

## 2017-06-27 PROCEDURE — 71010: CPT | Mod: 26

## 2017-06-27 PROCEDURE — 99223 1ST HOSP IP/OBS HIGH 75: CPT | Mod: AI,GC

## 2017-06-27 PROCEDURE — 93970 EXTREMITY STUDY: CPT | Mod: 26

## 2017-06-27 RX ORDER — OLANZAPINE 15 MG/1
5 TABLET, FILM COATED ORAL ONCE
Qty: 0 | Refills: 0 | Status: COMPLETED | OUTPATIENT
Start: 2017-06-27 | End: 2017-06-27

## 2017-06-27 RX ORDER — DEXTROSE 50 % IN WATER 50 %
12.5 SYRINGE (ML) INTRAVENOUS ONCE
Qty: 0 | Refills: 0 | Status: DISCONTINUED | OUTPATIENT
Start: 2017-06-27 | End: 2017-06-29

## 2017-06-27 RX ORDER — FUROSEMIDE 40 MG
40 TABLET ORAL DAILY
Qty: 0 | Refills: 0 | Status: DISCONTINUED | OUTPATIENT
Start: 2017-06-27 | End: 2017-06-29

## 2017-06-27 RX ORDER — DEXTROSE 50 % IN WATER 50 %
25 SYRINGE (ML) INTRAVENOUS ONCE
Qty: 0 | Refills: 0 | Status: DISCONTINUED | OUTPATIENT
Start: 2017-06-27 | End: 2017-06-29

## 2017-06-27 RX ORDER — TIOTROPIUM BROMIDE 18 UG/1
1 CAPSULE ORAL; RESPIRATORY (INHALATION) DAILY
Qty: 0 | Refills: 0 | Status: DISCONTINUED | OUTPATIENT
Start: 2017-06-27 | End: 2017-06-29

## 2017-06-27 RX ORDER — FUROSEMIDE 40 MG
20 TABLET ORAL EVERY 12 HOURS
Qty: 0 | Refills: 0 | Status: DISCONTINUED | OUTPATIENT
Start: 2017-06-27 | End: 2017-06-27

## 2017-06-27 RX ORDER — ALBUTEROL 90 UG/1
2 AEROSOL, METERED ORAL EVERY 6 HOURS
Qty: 0 | Refills: 0 | Status: DISCONTINUED | OUTPATIENT
Start: 2017-06-27 | End: 2017-06-29

## 2017-06-27 RX ORDER — INSULIN LISPRO 100/ML
VIAL (ML) SUBCUTANEOUS
Qty: 0 | Refills: 0 | Status: DISCONTINUED | OUTPATIENT
Start: 2017-06-27 | End: 2017-06-29

## 2017-06-27 RX ORDER — BUDESONIDE AND FORMOTEROL FUMARATE DIHYDRATE 160; 4.5 UG/1; UG/1
2 AEROSOL RESPIRATORY (INHALATION)
Qty: 0 | Refills: 0 | Status: DISCONTINUED | OUTPATIENT
Start: 2017-06-27 | End: 2017-06-29

## 2017-06-27 RX ORDER — DIVALPROEX SODIUM 500 MG/1
500 TABLET, DELAYED RELEASE ORAL AT BEDTIME
Qty: 0 | Refills: 0 | Status: DISCONTINUED | OUTPATIENT
Start: 2017-06-28 | End: 2017-06-28

## 2017-06-27 RX ORDER — DIVALPROEX SODIUM 500 MG/1
1500 TABLET, DELAYED RELEASE ORAL ONCE
Qty: 0 | Refills: 0 | Status: COMPLETED | OUTPATIENT
Start: 2017-06-27 | End: 2017-06-27

## 2017-06-27 RX ORDER — DEXTROSE 50 % IN WATER 50 %
1 SYRINGE (ML) INTRAVENOUS ONCE
Qty: 0 | Refills: 0 | Status: DISCONTINUED | OUTPATIENT
Start: 2017-06-27 | End: 2017-06-29

## 2017-06-27 RX ORDER — FUROSEMIDE 40 MG
20 TABLET ORAL ONCE
Qty: 0 | Refills: 0 | Status: COMPLETED | OUTPATIENT
Start: 2017-06-27 | End: 2017-06-27

## 2017-06-27 RX ORDER — SODIUM CHLORIDE 9 MG/ML
1000 INJECTION, SOLUTION INTRAVENOUS
Qty: 0 | Refills: 0 | Status: DISCONTINUED | OUTPATIENT
Start: 2017-06-27 | End: 2017-06-29

## 2017-06-27 RX ORDER — HEPARIN SODIUM 5000 [USP'U]/ML
5000 INJECTION INTRAVENOUS; SUBCUTANEOUS EVERY 8 HOURS
Qty: 0 | Refills: 0 | Status: DISCONTINUED | OUTPATIENT
Start: 2017-06-27 | End: 2017-06-27

## 2017-06-27 RX ORDER — GLUCAGON INJECTION, SOLUTION 0.5 MG/.1ML
1 INJECTION, SOLUTION SUBCUTANEOUS ONCE
Qty: 0 | Refills: 0 | Status: DISCONTINUED | OUTPATIENT
Start: 2017-06-27 | End: 2017-06-29

## 2017-06-27 RX ORDER — INSULIN LISPRO 100/ML
VIAL (ML) SUBCUTANEOUS AT BEDTIME
Qty: 0 | Refills: 0 | Status: DISCONTINUED | OUTPATIENT
Start: 2017-06-27 | End: 2017-06-29

## 2017-06-27 RX ORDER — OLANZAPINE 15 MG/1
5 TABLET, FILM COATED ORAL AT BEDTIME
Qty: 0 | Refills: 0 | Status: DISCONTINUED | OUTPATIENT
Start: 2017-06-28 | End: 2017-06-29

## 2017-06-27 RX ORDER — TIOTROPIUM BROMIDE 18 UG/1
1 CAPSULE ORAL; RESPIRATORY (INHALATION) EVERY 6 HOURS
Qty: 0 | Refills: 0 | Status: DISCONTINUED | OUTPATIENT
Start: 2017-06-27 | End: 2017-06-27

## 2017-06-27 RX ORDER — TAMSULOSIN HYDROCHLORIDE 0.4 MG/1
0.4 CAPSULE ORAL AT BEDTIME
Qty: 0 | Refills: 0 | Status: DISCONTINUED | OUTPATIENT
Start: 2017-06-27 | End: 2017-06-29

## 2017-06-27 RX ORDER — ENOXAPARIN SODIUM 100 MG/ML
40 INJECTION SUBCUTANEOUS DAILY
Qty: 0 | Refills: 0 | Status: DISCONTINUED | OUTPATIENT
Start: 2017-06-27 | End: 2017-06-29

## 2017-06-27 RX ADMIN — ALBUTEROL 2 PUFF(S): 90 AEROSOL, METERED ORAL at 06:17

## 2017-06-27 RX ADMIN — ALBUTEROL 2 PUFF(S): 90 AEROSOL, METERED ORAL at 18:17

## 2017-06-27 RX ADMIN — BUDESONIDE AND FORMOTEROL FUMARATE DIHYDRATE 2 PUFF(S): 160; 4.5 AEROSOL RESPIRATORY (INHALATION) at 18:17

## 2017-06-27 RX ADMIN — BUDESONIDE AND FORMOTEROL FUMARATE DIHYDRATE 2 PUFF(S): 160; 4.5 AEROSOL RESPIRATORY (INHALATION) at 06:22

## 2017-06-27 RX ADMIN — TAMSULOSIN HYDROCHLORIDE 0.4 MILLIGRAM(S): 0.4 CAPSULE ORAL at 21:37

## 2017-06-27 RX ADMIN — DIVALPROEX SODIUM 1500 MILLIGRAM(S): 500 TABLET, DELAYED RELEASE ORAL at 03:37

## 2017-06-27 RX ADMIN — ENOXAPARIN SODIUM 40 MILLIGRAM(S): 100 INJECTION SUBCUTANEOUS at 18:15

## 2017-06-27 RX ADMIN — OLANZAPINE 5 MILLIGRAM(S): 15 TABLET, FILM COATED ORAL at 03:37

## 2017-06-27 NOTE — PROGRESS NOTE ADULT - PROBLEM SELECTOR PLAN 7
Patient with urinary retention during last admission  - Will continue with tamsulosin  -monitor I's, O's Finger sticks and SSI. Will follow-up A1c.

## 2017-06-27 NOTE — H&P ADULT - HISTORY OF PRESENT ILLNESS
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 Patient was tapered off Keppra and switched to Valproic acid, no trigger to seizures identified), followed by a recent admission to [facility] (June 3rd - June 8th) for altered mental status (presented with delusions, hallucinations, and aggressive behavior. After haldol/ativan, became hypoxic requiring MICU to wean off BiPAP. Patient thought to have valproate intoxication vs delirium from multiple hospitalizations, placed on zyprexa and depakote, concern for HIT).   The patient presents today with worsening LE edema. Per last hospitalization, he was seen with some LE edema in the feet and ankles attributed to venous stasis. Over the past 2 days, he endorses significant increase in LE edema. Reportedly, his visiting nurse came in today and saw his legs and recommended he come to the ER. 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 Patient was tapered off Keppra and switched to Valproic acid, no trigger to seizures identified), followed by a recent admission to [facility] (June 3rd - June 8th) for altered mental status (presented with delusions, hallucinations, and aggressive behavior. After haldol/ativan, became hypoxic requiring MICU to wean off BiPAP. Patient thought to have valproate intoxication vs delirium from multiple hospitalizations, placed on zyprexa and depakote, concern for HIT).   The patient presents today with worsening LE edema. Per last hospitalization, he was seen with some LE edema in the feet and ankles attributed to venous stasis. Over the past 2 days, he endorses significant increase in LE edema. Reportedly, his visiting nurse came in today and saw his legs and recommended he come to the ER.  Patient denies any SOB, orthopnia, PND, palpitations, abdominal pain, jaundice, N/V, dysuria, hematuria, urinary frequency, dizziness, vertigo, new rash.    ED Vitals: T 97.3, HR 63, /74, RR 17, SpO2 99 on NC  Initial labs: WBC 6.9, Hgb 13.6, K 5.2, Cr .73, peripheral eosinophils 7.1%, Trop < .01, VBG Lactate 2.6, pCO2 49, HCO3 29, pro BNP 86.  CXR preliminary read demonstrates reticular opacities with associated bronchiectasis is seen in the bilateral upper lobes and lower lobes, as seen on 3/2/17 CT chest 61M PMH sarcoidosis, bronchiectasis, ?rheumatoid arthritis, cholecystectomy, perforated diverticulitis s/p Bright procedure and reversal, b/l SDH s/p surgery, R inguinal hernia repair with mesh, recent 2-week admission (5/14/17 - 6/1/17) at [place] for new-onset generalized tonic-clonic seizures followed by a recent admission to [facility] (June 3rd - June 8th) for altered mental status, who presents today with worsening LE edema. Per last hospitalization, he was seen with some LE edema in the feet and ankles attributed to venous stasis. Over the past 2 days, he endorses significant increase in LE edema. Reportedly, his visiting nurse came in today and saw his legs and recommended he come to the ER.  Patient denies any SOB, orthopnia, PND, palpitations, abdominal pain, jaundice, N/V, dysuria, hematuria, urinary frequency, dizziness, vertigo, new rash.    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 Patient was tapered off Keppra and switched to Valproic acid, no trigger to seizures identified  [facility] -  presented with delusions, hallucinations, and aggressive behavior. After haldol/ativan, became hypoxic requiring MICU to wean off BiPAP. Patient thought to have valproate intoxication vs delirium from multiple hospitalizations, placed on zyprexa and depakote, concern for HIT    ED Vitals: T 97.3, HR 63, /74, RR 17, SpO2 99 on NC  Initial labs: WBC 6.9, Hgb 13.6, K 5.2, Cr .73, peripheral eosinophils 7.1%, Trop < .01, VBG Lactate 2.6, pCO2 49, HCO3 29, pro BNP 86.  CXR preliminary read demonstrates reticular opacities with associated bronchiectasis is seen in the bilateral upper lobes and lower lobes, as seen on 3/2/17 CT chest

## 2017-06-27 NOTE — H&P ADULT - ATTENDING COMMENTS
61M PMH sarcoidosis, bronchiectasis, DM?, cholecystectomy, perforated diverticulitis s/p Bright procedure and reversal, b/l SDH s/p surgery, R inguinal hernia repair with mesh, recent admission for seizures, and recent admission for psychosis, presents with increasing LE edema 61M PMH sarcoidosis, bronchiectasis, DM?, cholecystectomy, h/o perforated diverticulitis  recent admission for seizures and psychosis which olanzapine and Depakote were started, p/w increasing LE edema x one week , exam with 3+ pitting edema not warm or tender, overlying stasis dermatitis , labs with negative cardiac enzyme BNP wnl , creatinine wnl , no leukocytosis , does have Eosinophilia and thrombocytopenia; Will obtain Vascular duplex to r/o DVT, can also check TTE to r/o right sided failure / phtn  which is less likely given nl BNP, no evidence of renal or liver dysfunction; Symptoms correlate with recent addition of antipsychotic medications which have peripheral edema as listed as adverse effect 3-8%, if work up non revealing would discuss with psychiatry and neurology if alternative regimen may offer better side effect profile

## 2017-06-27 NOTE — H&P ADULT - PROBLEM SELECTOR PLAN 1
Recently seen with concern for venous stasis. In the setting of rapid increase, would want to rule out cardiac, hepatologic, and renal causes.   - Less likely hepatic in setting of normal albumin and appropriate LFTs. Will check RUQ US to evaluate  - Less likely renal in setting of minimal protein loss in most recent U/A. Will check repeat U/A for this admission.  - May be cardiac with RHF in setting of chronic pulmonary disease. Will follow-up TTE. Recently seen with concern for venous stasis. In the setting of rapid increase, would want to rule out cardiac, hepatologic, and renal causes.   - Less likely hepatic in setting of normal albumin and appropriate LFTs. Will check RUQ US to evaluate  - Less likely renal in setting of minimal protein loss in most recent U/A. Will check repeat U/A for this admission.  - May be cardiac with RHF in setting of chronic pulmonary disease. Will follow-up TTE.  - 20 IV lasix for symptoms and to monitor effect.  - Consider onset in setting of new medications. Olanzapine associated w/ peripheral edema in 3% of cases. Depakote associated w/ peripheral edema in 1-8% of cases.

## 2017-06-27 NOTE — PROGRESS NOTE ADULT - PROBLEM SELECTOR PLAN 4
Continue with depakote Reticular opacities and bilateral bronchiectasis noted on CXR, attributed to sarcoidosis  - Continue with proventil and symbicort at this time  - Patient will likely need further eval in setting of possible RHF

## 2017-06-27 NOTE — H&P ADULT - PROBLEM SELECTOR PLAN 3
Continue with depakote Reticular opacities and bilateral bronchiectasis noted on CXR, attributed to sarcoid.  - Continue with proventil and symbicort at this time  - Patient will likely need further eval in setting of possible RHF

## 2017-06-27 NOTE — H&P ADULT - NSHPPHYSICALEXAM_GEN_ALL_CORE
.  VITAL SIGNS:  T(C): 36.3, Max: 36.7 (06-26 @ 20:10)  T(F): 97.3, Max: 98.1 (06-26 @ 20:10)  HR: 63 (60 - 72)  BP: 113/74 (102/68 - 135/95)  BP(mean): --  RR: 17 (17 - 20)  SpO2: 99% (90% - 99%)  Wt(kg): --    PHYSICAL EXAM:    Constitutional: WDWN resting comfortably in bed; NAD  Head: NC/AT  Eyes: PERRL, EOMI, anicteric sclera  ENT: no nasal discharge; uvula midline, poor dentition  Neck: supple, no JVD  Respiratory: CTA B/L; no W/R/R, no retractions  Cardiac: +S1/S2; RRR, no murmur  Gastrointestinal: soft, NT/ND; no rebound or guarding  Extremities: 3+ pitting edema  Musculoskeletal: NROM x4; no joint swelling  Dermatologic: spider veins diffusely throughout face and lower extremities.  Lymphatic: no submandibular or cervical LAD  Neurologic: AAOx3; No focal deficits  Psychiatric: affect and characteristics of appearance, verbalizations, behaviors are appropriate

## 2017-06-27 NOTE — PROGRESS NOTE ADULT - PROBLEM SELECTOR PLAN 3
Reticular opacities and bilateral bronchiectasis noted on CXR, attributed to sarcoidosis  - Continue with proventil and symbicort at this time  - Patient will likely need further eval in setting of possible RHF UA + leuk esterase + nitrite + WBC  Patient was currently on course of cipro for UTI during last hospital admission earlier in June 2017. Start antibiotics. UA + leuk esterase + nitrite + WBC   Patient was currently on course of cipro for UTI during last hospital admission earlier in June 2017. Currently asymptomatic, denies dysuria and hematuria. Monitor off of antibiotic  UA + leuk esterase + nitrite + WBC   Patient was currently on course of cipro for UTI during last hospital admission earlier in June 2017. Currently asymptomatic, denies dysuria and hematuria.

## 2017-06-27 NOTE — H&P ADULT - ASSESSMENT
61M PMH sarcoidosis, bronchiectasis, DM?, cholecystectomy, perforated diverticulitis s/p Bright procedure and reversal, b/l SDH s/p surgery, R inguinal hernia repair with mesh, recent admission for seizures, and recent admission for psychosis, presents with increasing LE edema

## 2017-06-27 NOTE — PROGRESS NOTE ADULT - PROBLEM SELECTOR PLAN 9
-SCDs  - Patient had concern for possible HIT during last admission due to decrease in platelets to less than half. Will hold lovenox for now. -SCDs  - Patient had concern for possible HIT during last admission due to decrease in platelets to less than half, however was HIT negative. Will start on ppx lovenox

## 2017-06-27 NOTE — PROGRESS NOTE ADULT - PROBLEM SELECTOR PLAN 8
-SCDs  - Patient had concern for possible HIT during last admission due to decrease in platelets to less than half. Will hold lovenox for now. Patient with urinary retention during last admission  - Will continue with tamsulosin  -monitor I's, O's

## 2017-06-27 NOTE — H&P ADULT - NSHPLABSRESULTS_GEN_ALL_CORE
Labs reviewed, radiology reviewed    WBC 6.9, Hgb 13.6, K 5.2, Cr .73, peripheral eosinophils 7.1%, Trop < .01, VBG Lactate 2.6, pCO2 49, HCO3 29, pro BNP 86.  CXR preliminary read demonstrates reticular opacities with associated bronchiectasis is seen in the bilateral upper lobes and lower lobes, as seen on 3/2/17 CT chest

## 2017-06-27 NOTE — H&P ADULT - PROBLEM SELECTOR PLAN 2
Reticular opacities and bilateral bronchiectasis noted on CXR, attributed to sarcoid.  - Continue with proventil and symbicort at this time  - Patient will likely need further eval in setting of possible RHF Unclear source. Patient does have history of recent UTI tx with Cipro, treatment finished last week  - Will trend

## 2017-06-27 NOTE — PROGRESS NOTE ADULT - PROBLEM SELECTOR PLAN 5
Continue with olanzapine.   - Will consider psychiatry follow up if psychosis develops, however currently patient denies visual or auditory hallucinations. Continue with depakote Continue with depakote  - Valproic acid level

## 2017-06-27 NOTE — H&P ADULT - PROBLEM SELECTOR PLAN 7
SCDs, will consider lovenox.  - Patient had concern for possible HIT during last admission due to decrease in platelets to less than half. Patient with urinary retention during last admission  - Will continue with tamsulosin.

## 2017-06-27 NOTE — PROGRESS NOTE ADULT - SUBJECTIVE AND OBJECTIVE BOX
Patient is a 61y old  Male who presents with a chief complaint of LE swelling (27 Jun 2017 04:54)        SUBJECTIVE / OVERNIGHT EVENTS:      MEDICATIONS  (STANDING):  tamsulosin 0.4 milliGRAM(s) Oral at bedtime  ALBUTerol    90 MICROgram(s) HFA Inhaler 2 Puff(s) Inhalation every 6 hours  tiotropium 18 MICROgram(s) Capsule 1 Capsule(s) Inhalation daily  buDESOnide 160 MICROgram(s)/formoterol 4.5 MICROgram(s) Inhaler 2 Puff(s) Inhalation two times a day  insulin lispro (HumaLOG) corrective regimen sliding scale   SubCutaneous three times a day before meals  insulin lispro (HumaLOG) corrective regimen sliding scale   SubCutaneous at bedtime  dextrose 5%. 1000 milliLiter(s) (50 mL/Hr) IV Continuous <Continuous>  dextrose 50% Injectable 12.5 Gram(s) IV Push once  dextrose 50% Injectable 25 Gram(s) IV Push once  dextrose 50% Injectable 25 Gram(s) IV Push once  furosemide   Injectable 20 milliGRAM(s) IV Push once    MEDICATIONS  (PRN):  dextrose Gel 1 Dose(s) Oral once PRN Blood Glucose LESS THAN 70 milliGRAM(s)/deciliter  glucagon  Injectable 1 milliGRAM(s) IntraMuscular once PRN Glucose LESS THAN 70 milligrams/deciliter      Vital Signs Last 24 Hrs  T(C): 36.4 (06-27-17 @ 07:31), Max: 36.7 (06-26-17 @ 20:10)  HR: 68 (06-27-17 @ 07:31) (60 - 72)  BP: 93/59 (06-27-17 @ 07:31) (93/59 - 135/95)  RR: 18 (06-27-17 @ 07:31) (17 - 20)  SpO2: 99% (06-27-17 @ 07:31) (90% - 99%)  CAPILLARY BLOOD GLUCOSE  102 (27 Jun 2017 08:36)        I&O's Summary      PHYSICAL EXAM  GENERAL: NAD, well-developed  HEAD:  Atraumatic, Normocephalic  EYES: EOMI, PERRLA, conjunctiva and sclera clear  NECK: Supple, No JVD  CHEST/LUNG: Clear to auscultation bilaterally; No wheeze  HEART: Regular rate and rhythm; No murmurs, rubs, or gallops  ABDOMEN: Soft, Nontender, Nondistended; Bowel sounds present  EXTREMITIES:  2+ Peripheral Pulses, No clubbing, cyanosis, or edema  PSYCH: AAOx3  SKIN: No rashes or lesions    LABS:                        12.2   6.2   )-----------( 80       ( 27 Jun 2017 06:12 )             36.3     06-27    142  |  106  |  25<H>  ----------------------------<  115<H>  4.2   |  29  |  0.69    Ca    8.2<L>      27 Jun 2017 06:12  Phos  3.6     06-27  Mg     2.1     06-27    TPro  6.1  /  Alb  3.2<L>  /  TBili  0.1<L>  /  DBili  x   /  AST  13  /  ALT  12  /  AlkPhos  58  06-27      CARDIAC MARKERS ( 26 Jun 2017 23:13 )  x     / <0.01 ng/mL / 65 U/L / x     / 1.0 ng/mL          RADIOLOGY & ADDITIONAL TESTS:  Patient is a 61y old  Male who presents with a chief complaint of LE swelling (27 Jun 2017 04:54)        SUBJECTIVE / OVERNIGHT EVENTS:      MEDICATIONS  (STANDING):  tamsulosin 0.4 milliGRAM(s) Oral at bedtime  ALBUTerol    90 MICROgram(s) HFA Inhaler 2 Puff(s) Inhalation every 6 hours  tiotropium 18 MICROgram(s) Capsule 1 Capsule(s) Inhalation daily  buDESOnide 160 MICROgram(s)/formoterol 4.5 MICROgram(s) Inhaler 2 Puff(s) Inhalation two times a day  insulin lispro (HumaLOG) corrective regimen sliding scale   SubCutaneous three times a day before meals  insulin lispro (HumaLOG) corrective regimen sliding scale   SubCutaneous at bedtime  dextrose 5%. 1000 milliLiter(s) (50 mL/Hr) IV Continuous <Continuous>  dextrose 50% Injectable 12.5 Gram(s) IV Push once  dextrose 50% Injectable 25 Gram(s) IV Push once  dextrose 50% Injectable 25 Gram(s) IV Push once  furosemide   Injectable 20 milliGRAM(s) IV Push once    MEDICATIONS  (PRN):  dextrose Gel 1 Dose(s) Oral once PRN Blood Glucose LESS THAN 70 milliGRAM(s)/deciliter  glucagon  Injectable 1 milliGRAM(s) IntraMuscular once PRN Glucose LESS THAN 70 milligrams/deciliter      Vital Signs Last 24 Hrs  T(C): 36.4 (06-27-17 @ 07:31), Max: 36.7 (06-26-17 @ 20:10)  HR: 68 (06-27-17 @ 07:31) (60 - 72)  BP: 93/59 (06-27-17 @ 07:31) (93/59 - 135/95)  RR: 18 (06-27-17 @ 07:31) (17 - 20)  SpO2: 99% (06-27-17 @ 07:31) (90% - 99%)  CAPILLARY BLOOD GLUCOSE  102 (27 Jun 2017 08:36)        I&O's Summary      PHYSICAL EXAM  GENERAL: NAD, well-developed  HEAD:  Atraumatic, Normocephalic  EYES: EOMI, PERRLA, conjunctiva and sclera clear  NECK: Supple, No JVD  CHEST/LUNG: Clear to auscultation bilaterally; No wheeze  HEART: Regular rate and rhythm; No murmurs, rubs, or gallops  ABDOMEN: Soft, Nontender, Nondistended; Bowel sounds present  EXTREMITIES:  2+ Peripheral Pulses, No clubbing, cyanosis, or edema  PSYCH: AAOx3  SKIN: No rashes or lesions    LABS:                        12.2   6.2   )-----------( 80       ( 27 Jun 2017 06:12 )             36.3     06-27    142  |  106  |  25<H>  ----------------------------<  115<H>  4.2   |  29  |  0.69    Ca    8.2<L>      27 Jun 2017 06:12  Phos  3.6     06-27  Mg     2.1     06-27    TPro  6.1  /  Alb  3.2<L>  /  TBili  0.1<L>  /  DBili  x   /  AST  13  /  ALT  12  /  AlkPhos  58  06-27    Urinalysis (06.14.17 @ 06:30)    Color: PLYEL    Urine Appearance: HAZY    Glucose: NEGATIVE    Bilirubin: NEGATIVE    Ketone - Urine: NEGATIVE    Specific Gravity: 1.016    Blood: SMALL    pH - Urine: 6.0    Protein, Urine: 10    Urobilinogen: NORMAL E.U.    Nitrite: POSITIVE    Leukocyte Esterase Concentration: LARGE    Red Blood Cell - Urine: 10-25    White Blood Cell - Urine: >50    White Blood Cell Clump: PRESENT    Non-Squamous Epithelial: 1      CARDIAC MARKERS ( 26 Jun 2017 23:13 )  x     / <0.01 ng/mL / 65 U/L / x     / 1.0 ng/mL Patient is a 61y old  Male who presents with a chief complaint of LE swelling (27 Jun 2017 04:54)        SUBJECTIVE / OVERNIGHT EVENTS:      MEDICATIONS  (STANDING):  tamsulosin 0.4 milliGRAM(s) Oral at bedtime  ALBUTerol    90 MICROgram(s) HFA Inhaler 2 Puff(s) Inhalation every 6 hours  tiotropium 18 MICROgram(s) Capsule 1 Capsule(s) Inhalation daily  buDESOnide 160 MICROgram(s)/formoterol 4.5 MICROgram(s) Inhaler 2 Puff(s) Inhalation two times a day  insulin lispro (HumaLOG) corrective regimen sliding scale   SubCutaneous three times a day before meals  insulin lispro (HumaLOG) corrective regimen sliding scale   SubCutaneous at bedtime  dextrose 5%. 1000 milliLiter(s) (50 mL/Hr) IV Continuous <Continuous>  dextrose 50% Injectable 12.5 Gram(s) IV Push once  dextrose 50% Injectable 25 Gram(s) IV Push once  dextrose 50% Injectable 25 Gram(s) IV Push once  furosemide   Injectable 20 milliGRAM(s) IV Push once    MEDICATIONS  (PRN):  dextrose Gel 1 Dose(s) Oral once PRN Blood Glucose LESS THAN 70 milliGRAM(s)/deciliter  glucagon  Injectable 1 milliGRAM(s) IntraMuscular once PRN Glucose LESS THAN 70 milligrams/deciliter      Vital Signs Last 24 Hrs  T(C): 36.4 (06-27-17 @ 07:31), Max: 36.7 (06-26-17 @ 20:10)  HR: 68 (06-27-17 @ 07:31) (60 - 72)  BP: 93/59 (06-27-17 @ 07:31) (93/59 - 135/95)  RR: 18 (06-27-17 @ 07:31) (17 - 20)  SpO2: 99% (06-27-17 @ 07:31) (90% - 99%)  CAPILLARY BLOOD GLUCOSE  102 (27 Jun 2017 08:36)        I&O's Summary      PHYSICAL EXAM  GENERAL: NAD, well-developed  HEAD:  Atraumatic, Normocephalic  EYES: EOMI, PERRLA, conjunctiva and sclera clear  NECK: Supple, No JVD  CHEST/LUNG: Clear to auscultation bilaterally; No wheeze  HEART: Regular rate and rhythm; No murmurs, rubs, or gallops  ABDOMEN: Soft, Nontender, Nondistended; Bowel sounds present  EXTREMITIES:  2+ Peripheral Pulses, No clubbing, cyanosis, or edema  PSYCH: AAOx3  SKIN: No rashes or lesions    LABS:                        12.2   6.2   )-----------( 80       ( 27 Jun 2017 06:12 )             36.3     06-27    142  |  106  |  25<H>  ----------------------------<  115<H>  4.2   |  29  |  0.69    Ca    8.2<L>      27 Jun 2017 06:12  Phos  3.6     06-27  Mg     2.1     06-27    TPro  6.1  /  Alb  3.2<L>  /  TBili  0.1<L>  /  DBili  x   /  AST  13  /  ALT  12  /  AlkPhos  58  06-27      CARDIAC MARKERS ( 26 Jun 2017 23:13 )  x     / <0.01 ng/mL / 65 U/L / x     / 1.0 ng/mL          RADIOLOGY & ADDITIONAL TESTS:  Patient is a 61y old  Male who presents with a chief complaint of LE swelling (27 Jun 2017 04:54)        SUBJECTIVE / OVERNIGHT EVENTS:      MEDICATIONS  (STANDING):  tamsulosin 0.4 milliGRAM(s) Oral at bedtime  ALBUTerol    90 MICROgram(s) HFA Inhaler 2 Puff(s) Inhalation every 6 hours  tiotropium 18 MICROgram(s) Capsule 1 Capsule(s) Inhalation daily  buDESOnide 160 MICROgram(s)/formoterol 4.5 MICROgram(s) Inhaler 2 Puff(s) Inhalation two times a day  insulin lispro (HumaLOG) corrective regimen sliding scale   SubCutaneous three times a day before meals  insulin lispro (HumaLOG) corrective regimen sliding scale   SubCutaneous at bedtime  dextrose 5%. 1000 milliLiter(s) (50 mL/Hr) IV Continuous <Continuous>  dextrose 50% Injectable 12.5 Gram(s) IV Push once  dextrose 50% Injectable 25 Gram(s) IV Push once  dextrose 50% Injectable 25 Gram(s) IV Push once  furosemide   Injectable 20 milliGRAM(s) IV Push once    MEDICATIONS  (PRN):  dextrose Gel 1 Dose(s) Oral once PRN Blood Glucose LESS THAN 70 milliGRAM(s)/deciliter  glucagon  Injectable 1 milliGRAM(s) IntraMuscular once PRN Glucose LESS THAN 70 milligrams/deciliter      Vital Signs Last 24 Hrs  T(C): 36.4 (06-27-17 @ 07:31), Max: 36.7 (06-26-17 @ 20:10)  HR: 68 (06-27-17 @ 07:31) (60 - 72)  BP: 93/59 (06-27-17 @ 07:31) (93/59 - 135/95)  RR: 18 (06-27-17 @ 07:31) (17 - 20)  SpO2: 99% (06-27-17 @ 07:31) (90% - 99%)  CAPILLARY BLOOD GLUCOSE  102 (27 Jun 2017 08:36)        I&O's Summary      PHYSICAL EXAM  GENERAL: NAD, well-developed, resting comfortably on stretcher  HEAD:  Atraumatic, Normocephalic  EYES: EOMI, PERRLA, conjunctiva and sclera clear  NECK: Supple, No JVD  CHEST/LUNG: Clear to auscultation bilaterally; No wheezes  HEART: Regular rate and rhythm; No murmurs, rubs, or gallops  ABDOMEN: Soft, Nontender, Nondistended; Bowel sounds present  EXTREMITIES:  2+ Peripheral Pulses, No clubbing, cyanosis. + Lower ext edema, nonpitting, R>L, skin red and warm to touch b/l  PSYCH: AAOx3  SKIN: No rashes or lesions    LABS:                        12.2   6.2   )-----------( 80       ( 27 Jun 2017 06:12 )             36.3     06-27    142  |  106  |  25<H>  ----------------------------<  115<H>  4.2   |  29  |  0.69    Ca    8.2<L>      27 Jun 2017 06:12  Phos  3.6     06-27  Mg     2.1     06-27    TPro  6.1  /  Alb  3.2<L>  /  TBili  0.1<L>  /  DBili  x   /  AST  13  /  ALT  12  /  AlkPhos  58  06-27    Urinalysis (06.14.17 @ 06:30)    Color: PLYEL    Urine Appearance: HAZY    Glucose: NEGATIVE    Bilirubin: NEGATIVE    Ketone - Urine: NEGATIVE    Specific Gravity: 1.016    Blood: SMALL    pH - Urine: 6.0    Protein, Urine: 10    Urobilinogen: NORMAL E.U.    Nitrite: POSITIVE    Leukocyte Esterase Concentration: LARGE    Red Blood Cell - Urine: 10-25    White Blood Cell - Urine: >50    White Blood Cell Clump: PRESENT    Non-Squamous Epithelial: 1 Patient is a 61y old  Male who presents with a chief complaint of LE swelling (27 Jun 2017 04:54)        SUBJECTIVE / OVERNIGHT EVENTS:  Patient slept a few hours in ED. He does not feel short of breath, No chest pain.    MEDICATIONS  (STANDING):  tamsulosin 0.4 milliGRAM(s) Oral at bedtime  ALBUTerol    90 MICROgram(s) HFA Inhaler 2 Puff(s) Inhalation every 6 hours  tiotropium 18 MICROgram(s) Capsule 1 Capsule(s) Inhalation daily  buDESOnide 160 MICROgram(s)/formoterol 4.5 MICROgram(s) Inhaler 2 Puff(s) Inhalation two times a day  insulin lispro (HumaLOG) corrective regimen sliding scale   SubCutaneous three times a day before meals  insulin lispro (HumaLOG) corrective regimen sliding scale   SubCutaneous at bedtime  dextrose 5%. 1000 milliLiter(s) (50 mL/Hr) IV Continuous <Continuous>  dextrose 50% Injectable 12.5 Gram(s) IV Push once  dextrose 50% Injectable 25 Gram(s) IV Push once  dextrose 50% Injectable 25 Gram(s) IV Push once  furosemide   Injectable 20 milliGRAM(s) IV Push once    MEDICATIONS  (PRN):  dextrose Gel 1 Dose(s) Oral once PRN Blood Glucose LESS THAN 70 milliGRAM(s)/deciliter  glucagon  Injectable 1 milliGRAM(s) IntraMuscular once PRN Glucose LESS THAN 70 milligrams/deciliter      Vital Signs Last 24 Hrs  T(C): 36.4 (06-27-17 @ 07:31), Max: 36.7 (06-26-17 @ 20:10)  HR: 68 (06-27-17 @ 07:31) (60 - 72)  BP: 93/59 (06-27-17 @ 07:31) (93/59 - 135/95)  RR: 18 (06-27-17 @ 07:31) (17 - 20)  SpO2: 99% (06-27-17 @ 07:31) (90% - 99%)  CAPILLARY BLOOD GLUCOSE  102 (27 Jun 2017 08:36)        I&O's Summary      PHYSICAL EXAM  GENERAL: NAD, well-developed  HEAD:  Atraumatic, Normocephalic  EYES: EOMI, PERRLA, conjunctiva and sclera clear  NECK: Supple, No JVD  CHEST/LUNG: Clear to auscultation bilaterally; No wheeze  HEART: Regular rate and rhythm; No murmurs, rubs, or gallops  ABDOMEN: Soft, Nontender, Nondistended; Bowel sounds present  EXTREMITIES:  2+ Peripheral Pulses, No clubbing, cyanosis, or edema  PSYCH: AAOx3  SKIN: No rashes or lesions    LABS:                        12.2   6.2   )-----------( 80       ( 27 Jun 2017 06:12 )             36.3     06-27    142  |  106  |  25<H>  ----------------------------<  115<H>  4.2   |  29  |  0.69    Ca    8.2<L>      27 Jun 2017 06:12  Phos  3.6     06-27  Mg     2.1     06-27    TPro  6.1  /  Alb  3.2<L>  /  TBili  0.1<L>  /  DBili  x   /  AST  13  /  ALT  12  /  AlkPhos  58  06-27      CARDIAC MARKERS ( 26 Jun 2017 23:13 )  x     / <0.01 ng/mL / 65 U/L / x     / 1.0 ng/mL          RADIOLOGY & ADDITIONAL TESTS:  Patient is a 61y old  Male who presents with a chief complaint of LE swelling (27 Jun 2017 04:54)        SUBJECTIVE / OVERNIGHT EVENTS:      MEDICATIONS  (STANDING):  tamsulosin 0.4 milliGRAM(s) Oral at bedtime  ALBUTerol    90 MICROgram(s) HFA Inhaler 2 Puff(s) Inhalation every 6 hours  tiotropium 18 MICROgram(s) Capsule 1 Capsule(s) Inhalation daily  buDESOnide 160 MICROgram(s)/formoterol 4.5 MICROgram(s) Inhaler 2 Puff(s) Inhalation two times a day  insulin lispro (HumaLOG) corrective regimen sliding scale   SubCutaneous three times a day before meals  insulin lispro (HumaLOG) corrective regimen sliding scale   SubCutaneous at bedtime  dextrose 5%. 1000 milliLiter(s) (50 mL/Hr) IV Continuous <Continuous>  dextrose 50% Injectable 12.5 Gram(s) IV Push once  dextrose 50% Injectable 25 Gram(s) IV Push once  dextrose 50% Injectable 25 Gram(s) IV Push once  furosemide   Injectable 20 milliGRAM(s) IV Push once    MEDICATIONS  (PRN):  dextrose Gel 1 Dose(s) Oral once PRN Blood Glucose LESS THAN 70 milliGRAM(s)/deciliter  glucagon  Injectable 1 milliGRAM(s) IntraMuscular once PRN Glucose LESS THAN 70 milligrams/deciliter      Vital Signs Last 24 Hrs  T(C): 36.4 (06-27-17 @ 07:31), Max: 36.7 (06-26-17 @ 20:10)  HR: 68 (06-27-17 @ 07:31) (60 - 72)  BP: 93/59 (06-27-17 @ 07:31) (93/59 - 135/95)  RR: 18 (06-27-17 @ 07:31) (17 - 20)  SpO2: 99% (06-27-17 @ 07:31) (90% - 99%)  CAPILLARY BLOOD GLUCOSE  102 (27 Jun 2017 08:36)        I&O's Summary      PHYSICAL EXAM  GENERAL: NAD, well-developed, resting comfortably on stretcher  HEAD:  Atraumatic, Normocephalic  EYES: EOMI, PERRLA, conjunctiva and sclera clear  NECK: Supple, No JVD  CHEST/LUNG: Clear to auscultation bilaterally; No wheezes  HEART: Regular rate and rhythm; No murmurs, rubs, or gallops  ABDOMEN: Soft, Nontender, Nondistended; Bowel sounds present  EXTREMITIES:  2+ Peripheral Pulses, No clubbing, cyanosis. + Lower ext edema, nonpitting, R>L, skin red and warm to touch b/l  PSYCH: AAOx3  SKIN: No rashes or lesions    LABS:                        12.2   6.2   )-----------( 80       ( 27 Jun 2017 06:12 )             36.3     06-27    142  |  106  |  25<H>  ----------------------------<  115<H>  4.2   |  29  |  0.69    Ca    8.2<L>      27 Jun 2017 06:12  Phos  3.6     06-27  Mg     2.1     06-27    TPro  6.1  /  Alb  3.2<L>  /  TBili  0.1<L>  /  DBili  x   /  AST  13  /  ALT  12  /  AlkPhos  58  06-27    Urinalysis (06.14.17 @ 06:30)    Color: PLYEL    Urine Appearance: HAZY    Glucose: NEGATIVE    Bilirubin: NEGATIVE    Ketone - Urine: NEGATIVE    Specific Gravity: 1.016    Blood: SMALL    pH - Urine: 6.0    Protein, Urine: 10    Urobilinogen: NORMAL E.U.    Nitrite: POSITIVE    Leukocyte Esterase Concentration: LARGE    Red Blood Cell - Urine: 10-25    White Blood Cell - Urine: >50    White Blood Cell Clump: PRESENT    Non-Squamous Epithelial: 1 Patient is a 61y old  Male who presents with a chief complaint of LE swelling (27 Jun 2017 04:54)    SUBJECTIVE / OVERNIGHT EVENTS:  Patient slept a few hours in ED. He does not feel short of breath, No chest pain.    MEDICATIONS  (STANDING):  tamsulosin 0.4 milliGRAM(s) Oral at bedtime  ALBUTerol    90 MICROgram(s) HFA Inhaler 2 Puff(s) Inhalation every 6 hours  tiotropium 18 MICROgram(s) Capsule 1 Capsule(s) Inhalation daily  buDESOnide 160 MICROgram(s)/formoterol 4.5 MICROgram(s) Inhaler 2 Puff(s) Inhalation two times a day  insulin lispro (HumaLOG) corrective regimen sliding scale   SubCutaneous three times a day before meals  insulin lispro (HumaLOG) corrective regimen sliding scale   SubCutaneous at bedtime  dextrose 5%. 1000 milliLiter(s) (50 mL/Hr) IV Continuous <Continuous>  dextrose 50% Injectable 12.5 Gram(s) IV Push once  dextrose 50% Injectable 25 Gram(s) IV Push once  dextrose 50% Injectable 25 Gram(s) IV Push once  furosemide   Injectable 20 milliGRAM(s) IV Push once    MEDICATIONS  (PRN):  dextrose Gel 1 Dose(s) Oral once PRN Blood Glucose LESS THAN 70 milliGRAM(s)/deciliter  glucagon  Injectable 1 milliGRAM(s) IntraMuscular once PRN Glucose LESS THAN 70 milligrams/deciliter      Vital Signs Last 24 Hrs  T(C): 36.4 (06-27-17 @ 07:31), Max: 36.7 (06-26-17 @ 20:10)  HR: 68 (06-27-17 @ 07:31) (60 - 72)  BP: 93/59 (06-27-17 @ 07:31) (93/59 - 135/95)  RR: 18 (06-27-17 @ 07:31) (17 - 20)  SpO2: 99% (06-27-17 @ 07:31) (90% - 99%)  CAPILLARY BLOOD GLUCOSE  102 (27 Jun 2017 08:36)        I&O's Summary      PHYSICAL EXAM  GENERAL: NAD, well-developed  HEAD:  Atraumatic, Normocephalic  EYES: EOMI, PERRLA, conjunctiva and sclera clear  NECK: Supple, No JVD  CHEST/LUNG: Clear to auscultation bilaterally; No wheeze  HEART: Regular rate and rhythm; No murmurs, rubs, or gallops  ABDOMEN: Soft, Nontender, Nondistended; Bowel sounds present  EXTREMITIES:  2+ Peripheral Pulses, No clubbing, cyanosis, or edema  PSYCH: AAOx3  SKIN: No rashes or lesions    LABS:                        12.2   6.2   )-----------( 80       ( 27 Jun 2017 06:12 )             36.3     06-27    142  |  106  |  25<H>  ----------------------------<  115<H>  4.2   |  29  |  0.69    Ca    8.2<L>      27 Jun 2017 06:12  Phos  3.6     06-27  Mg     2.1     06-27    TPro  6.1  /  Alb  3.2<L>  /  TBili  0.1<L>  /  DBili  x   /  AST  13  /  ALT  12  /  AlkPhos  58  06-27      CARDIAC MARKERS ( 26 Jun 2017 23:13 )  x     / <0.01 ng/mL / 65 U/L / x     / 1.0 ng/mL          RADIOLOGY & ADDITIONAL TESTS:  Patient is a 61y old  Male who presents with a chief complaint of LE swelling (27 Jun 2017 04:54)        SUBJECTIVE / OVERNIGHT EVENTS:      MEDICATIONS  (STANDING):  tamsulosin 0.4 milliGRAM(s) Oral at bedtime  ALBUTerol    90 MICROgram(s) HFA Inhaler 2 Puff(s) Inhalation every 6 hours  tiotropium 18 MICROgram(s) Capsule 1 Capsule(s) Inhalation daily  buDESOnide 160 MICROgram(s)/formoterol 4.5 MICROgram(s) Inhaler 2 Puff(s) Inhalation two times a day  insulin lispro (HumaLOG) corrective regimen sliding scale   SubCutaneous three times a day before meals  insulin lispro (HumaLOG) corrective regimen sliding scale   SubCutaneous at bedtime  dextrose 5%. 1000 milliLiter(s) (50 mL/Hr) IV Continuous <Continuous>  dextrose 50% Injectable 12.5 Gram(s) IV Push once  dextrose 50% Injectable 25 Gram(s) IV Push once  dextrose 50% Injectable 25 Gram(s) IV Push once  furosemide   Injectable 20 milliGRAM(s) IV Push once    MEDICATIONS  (PRN):  dextrose Gel 1 Dose(s) Oral once PRN Blood Glucose LESS THAN 70 milliGRAM(s)/deciliter  glucagon  Injectable 1 milliGRAM(s) IntraMuscular once PRN Glucose LESS THAN 70 milligrams/deciliter      Vital Signs Last 24 Hrs  T(C): 36.4 (06-27-17 @ 07:31), Max: 36.7 (06-26-17 @ 20:10)  HR: 68 (06-27-17 @ 07:31) (60 - 72)  BP: 93/59 (06-27-17 @ 07:31) (93/59 - 135/95)  RR: 18 (06-27-17 @ 07:31) (17 - 20)  SpO2: 99% (06-27-17 @ 07:31) (90% - 99%)  CAPILLARY BLOOD GLUCOSE  102 (27 Jun 2017 08:36)        I&O's Summary      PHYSICAL EXAM  GENERAL: NAD, well-developed, resting comfortably on stretcher  HEAD:  Atraumatic, Normocephalic  EYES: EOMI, PERRLA, conjunctiva and sclera clear  NECK: Supple, No JVD  CHEST/LUNG: Clear to auscultation bilaterally; No wheezes  HEART: Regular rate and rhythm; No murmurs, rubs, or gallops  ABDOMEN: Soft, Nontender, Nondistended; Bowel sounds present  EXTREMITIES:  2+ Peripheral Pulses, No clubbing, cyanosis. + Lower ext edema, nonpitting, R>L, skin red and warm to touch b/l  PSYCH: AAOx3  SKIN: No rashes or lesions    LABS:                        12.2   6.2   )-----------( 80       ( 27 Jun 2017 06:12 )             36.3     06-27    142  |  106  |  25<H>  ----------------------------<  115<H>  4.2   |  29  |  0.69    Ca    8.2<L>      27 Jun 2017 06:12  Phos  3.6     06-27  Mg     2.1     06-27    TPro  6.1  /  Alb  3.2<L>  /  TBili  0.1<L>  /  DBili  x   /  AST  13  /  ALT  12  /  AlkPhos  58  06-27    Urinalysis (06.14.17 @ 06:30)    Color: PLYEL    Urine Appearance: HAZY    Glucose: NEGATIVE    Bilirubin: NEGATIVE    Ketone - Urine: NEGATIVE    Specific Gravity: 1.016    Blood: SMALL    pH - Urine: 6.0    Protein, Urine: 10    Urobilinogen: NORMAL E.U.    Nitrite: POSITIVE    Leukocyte Esterase Concentration: LARGE    Red Blood Cell - Urine: 10-25    White Blood Cell - Urine: >50    White Blood Cell Clump: PRESENT    Non-Squamous Epithelial: 1    Imaging:  Abdominal US:   < from: US Abdomen Upper Quadrant Right (06.27.17 @ 10:44) >  Liver: Suggestion of hepatic steatosis.  Bile ducts: Normal caliber. Common hepatic duct measures 5 mm.   Gallbladder: Cholecystectomy.  2 cm cystic structure in the gallbladder   fossa again noted, possibly a dilated cystic duct.  Pancreas: Poorly visualized.  Right kidney: 12 cm. No hydronephrosis.  Ascites: None.  IVC: Visualized portions are within normal limits.    IMPRESSION:     Suggestion of hepatic steatosis.

## 2017-06-27 NOTE — H&P ADULT - NSHPSOCIALHISTORY_GEN_ALL_CORE
Just returned home from rehab, has visiting nurse.   Does not smoke, drink, or use recreational drugs

## 2017-06-27 NOTE — H&P ADULT - PROBLEM SELECTOR PLAN 5
Finger sticks and SSI. Will follow-up A1c Continue with olanzapine.   - Will consider psychiatry follow-up in AM

## 2017-06-27 NOTE — PROGRESS NOTE ADULT - PROBLEM SELECTOR PLAN 1
B/l edema, R>L, with venous stasis changes.  -R/o new onset heart failure, given patient has chronic pumonary hypertension which may lead to R. heart failure. Plan for TTE.  - Less likely renal in setting of minimal protein loss as seen in UA.  -Less likely hepatic in setting of normal albumin and appropriate LFTs. Will check RUQ US to evaluate.  -Patient was given 20 mg IV lasix. Consider increase to 20 mg IV BID.  -Patient was recently started on several medications that may be associated with edema.  Olanzapine associated w/ peripheral edema in 3% of cases. Depakote associated w/ peripheral edema in 1-8% of cases. If other causes of fluid overload are ruled out, can consider psych consult for med adjustment and hold 1 or both psych meds. B/l edema, R>L, with venous stasis changes.  -Consider R. Lower ext doppler U/S to rule out DVT  -R/o new onset heart failure, given patient has chronic pumonary hypertension which may lead to R. heart failure. Plan for TTE.  - Less likely renal in setting of minimal protein loss as seen in UA.  -Less likely hepatic in setting of normal albumin and appropriate LFTs. Will check RUQ US to evaluate.  -Patient was given 20 mg IV lasix. Consider increase to 20 mg IV BID.  -Patient was recently started on several medications that may be associated with edema.  Olanzapine associated w/ peripheral edema in 3% of cases. Depakote associated w/ peripheral edema in 1-8% of cases. If other causes of fluid overload are ruled out, can consider psych consult for med adjustment and hold 1 or both psych meds. B/l edema, R>L, with venous stasis changes.  -Consider b/l Lower ext doppler U/S to rule out DVT  -R/o new onset heart failure, given patient has chronic pumonary hypertension which may lead to R. heart failure. Plan for TTE.  - Less likely renal in setting of minimal protein loss as seen in UA.  -Less likely hepatic in setting of normal albumin and appropriate LFTs. Will check RUQ US to evaluate.  -Patient was given 20 mg IV lasix. Consider increase to 20 mg IV BID.  -Patient was recently started on several medications that may be associated with edema.  Olanzapine associated w/ peripheral edema in 3% of cases. Depakote associated w/ peripheral edema in 1-8% of cases. If other causes of fluid overload are ruled out, can consider psych consult for med adjustment and hold 1 or both psych meds. B/l edema, R>L, with venous stasis changes.  -Consider b/l Lower ext doppler U/S to rule out DVT  -R/o new onset heart failure, given patient has chronic pumonary hypertension which may lead to R. heart failure. Plan for TTE.  - Less likely renal in setting of minimal protein loss as seen in UA.  -Less likely hepatic in setting of normal albumin and appropriate LFTs. Will check RUQ US to evaluate.  - Lasix 40mg IV daily   -Patient was recently started on several medications that may be associated with edema.  Olanzapine associated w/ peripheral edema in 3% of cases. Depakote associated w/ peripheral edema in 1-8% of cases. If other causes of fluid overload are ruled out, can consider psych consult for med adjustment and hold 1 or both psych meds.  - Valproic acid level   - Psych consult

## 2017-06-27 NOTE — H&P ADULT - NSHPREVIEWOFSYSTEMS_GEN_ALL_CORE
Increased LE swelling Increased LE swelling  Full ROS reviewed, patient unable to provide accurate history at time of interview

## 2017-06-27 NOTE — H&P ADULT - PROBLEM SELECTOR PLAN 8
SCDs, will consider lovenox.  - Patient had concern for possible HIT during last admission due to decrease in platelets to less than half.

## 2017-06-27 NOTE — H&P ADULT - PROBLEM SELECTOR PLAN 6
Patient with urinary retention during last admission  - Will continue with tamsulosin. Finger sticks and SSI. Will follow-up A1c

## 2017-06-28 ENCOUNTER — APPOINTMENT (OUTPATIENT)
Dept: FAMILY MEDICINE | Facility: CLINIC | Age: 62
End: 2017-06-28

## 2017-06-28 DIAGNOSIS — R69 ILLNESS, UNSPECIFIED: ICD-10-CM

## 2017-06-28 DIAGNOSIS — F23 BRIEF PSYCHOTIC DISORDER: ICD-10-CM

## 2017-06-28 LAB
ALBUMIN SERPL ELPH-MCNC: 2.6 G/DL — LOW (ref 3.3–5)
ALP SERPL-CCNC: 55 U/L — SIGNIFICANT CHANGE UP (ref 40–120)
ALT FLD-CCNC: 10 U/L — SIGNIFICANT CHANGE UP (ref 10–45)
ANION GAP SERPL CALC-SCNC: 10 MMOL/L — SIGNIFICANT CHANGE UP (ref 5–17)
AST SERPL-CCNC: 23 U/L — SIGNIFICANT CHANGE UP (ref 10–40)
BASOPHILS # BLD AUTO: 0.03 K/UL — SIGNIFICANT CHANGE UP (ref 0–0.2)
BASOPHILS NFR BLD AUTO: 0.5 % — SIGNIFICANT CHANGE UP (ref 0–2)
BILIRUB SERPL-MCNC: 0.2 MG/DL — SIGNIFICANT CHANGE UP (ref 0.2–1.2)
BUN SERPL-MCNC: 20 MG/DL — SIGNIFICANT CHANGE UP (ref 7–23)
CALCIUM SERPL-MCNC: 8.2 MG/DL — LOW (ref 8.4–10.5)
CHLORIDE SERPL-SCNC: 104 MMOL/L — SIGNIFICANT CHANGE UP (ref 96–108)
CO2 SERPL-SCNC: 27 MMOL/L — SIGNIFICANT CHANGE UP (ref 22–31)
CREAT SERPL-MCNC: 0.62 MG/DL — SIGNIFICANT CHANGE UP (ref 0.5–1.3)
EOSINOPHIL # BLD AUTO: 0.35 K/UL — SIGNIFICANT CHANGE UP (ref 0–0.5)
EOSINOPHIL NFR BLD AUTO: 6 % — SIGNIFICANT CHANGE UP (ref 0–6)
GLUCOSE SERPL-MCNC: 95 MG/DL — SIGNIFICANT CHANGE UP (ref 70–99)
HCT VFR BLD CALC: 37.7 % — LOW (ref 39–50)
HGB BLD-MCNC: 11.5 G/DL — LOW (ref 13–17)
IMM GRANULOCYTES NFR BLD AUTO: 1.2 % — SIGNIFICANT CHANGE UP (ref 0–1.5)
LYMPHOCYTES # BLD AUTO: 1.16 K/UL — SIGNIFICANT CHANGE UP (ref 1–3.3)
LYMPHOCYTES # BLD AUTO: 19.8 % — SIGNIFICANT CHANGE UP (ref 13–44)
MAGNESIUM SERPL-MCNC: 2.1 MG/DL — SIGNIFICANT CHANGE UP (ref 1.6–2.6)
MCHC RBC-ENTMCNC: 27 PG — SIGNIFICANT CHANGE UP (ref 27–34)
MCHC RBC-ENTMCNC: 30.5 GM/DL — LOW (ref 32–36)
MCV RBC AUTO: 88.5 FL — SIGNIFICANT CHANGE UP (ref 80–100)
MONOCYTES # BLD AUTO: 0.85 K/UL — SIGNIFICANT CHANGE UP (ref 0–0.9)
MONOCYTES NFR BLD AUTO: 14.5 % — HIGH (ref 2–14)
NEUTROPHILS # BLD AUTO: 3.39 K/UL — SIGNIFICANT CHANGE UP (ref 1.8–7.4)
NEUTROPHILS NFR BLD AUTO: 58 % — SIGNIFICANT CHANGE UP (ref 43–77)
PHOSPHATE SERPL-MCNC: 3.7 MG/DL — SIGNIFICANT CHANGE UP (ref 2.5–4.5)
PLATELET # BLD AUTO: 102 K/UL — LOW (ref 150–400)
POTASSIUM SERPL-MCNC: 4.5 MMOL/L — SIGNIFICANT CHANGE UP (ref 3.5–5.3)
POTASSIUM SERPL-SCNC: 4.5 MMOL/L — SIGNIFICANT CHANGE UP (ref 3.5–5.3)
PROT SERPL-MCNC: 5.9 G/DL — LOW (ref 6–8.3)
RBC # BLD: 4.26 M/UL — SIGNIFICANT CHANGE UP (ref 4.2–5.8)
RBC # FLD: 17.9 % — HIGH (ref 10.3–14.5)
SODIUM SERPL-SCNC: 141 MMOL/L — SIGNIFICANT CHANGE UP (ref 135–145)
VALPROATE SERPL-MCNC: 49 UG/ML — LOW (ref 50–100)
WBC # BLD: 5.85 K/UL — SIGNIFICANT CHANGE UP (ref 3.8–10.5)
WBC # FLD AUTO: 5.85 K/UL — SIGNIFICANT CHANGE UP (ref 3.8–10.5)

## 2017-06-28 PROCEDURE — 99232 SBSQ HOSP IP/OBS MODERATE 35: CPT | Mod: GC

## 2017-06-28 PROCEDURE — 93306 TTE W/DOPPLER COMPLETE: CPT | Mod: 26

## 2017-06-28 PROCEDURE — 99222 1ST HOSP IP/OBS MODERATE 55: CPT

## 2017-06-28 RX ORDER — LISINOPRIL 2.5 MG/1
5 TABLET ORAL DAILY
Qty: 0 | Refills: 0 | Status: DISCONTINUED | OUTPATIENT
Start: 2017-06-28 | End: 2017-06-28

## 2017-06-28 RX ORDER — DIVALPROEX SODIUM 500 MG/1
1500 TABLET, DELAYED RELEASE ORAL AT BEDTIME
Qty: 0 | Refills: 0 | Status: DISCONTINUED | OUTPATIENT
Start: 2017-06-28 | End: 2017-06-29

## 2017-06-28 RX ADMIN — ENOXAPARIN SODIUM 40 MILLIGRAM(S): 100 INJECTION SUBCUTANEOUS at 12:21

## 2017-06-28 RX ADMIN — TAMSULOSIN HYDROCHLORIDE 0.4 MILLIGRAM(S): 0.4 CAPSULE ORAL at 22:43

## 2017-06-28 RX ADMIN — TIOTROPIUM BROMIDE 1 CAPSULE(S): 18 CAPSULE ORAL; RESPIRATORY (INHALATION) at 17:31

## 2017-06-28 RX ADMIN — DIVALPROEX SODIUM 1500 MILLIGRAM(S): 500 TABLET, DELAYED RELEASE ORAL at 22:43

## 2017-06-28 RX ADMIN — ALBUTEROL 2 PUFF(S): 90 AEROSOL, METERED ORAL at 05:58

## 2017-06-28 RX ADMIN — ALBUTEROL 2 PUFF(S): 90 AEROSOL, METERED ORAL at 02:13

## 2017-06-28 RX ADMIN — BUDESONIDE AND FORMOTEROL FUMARATE DIHYDRATE 2 PUFF(S): 160; 4.5 AEROSOL RESPIRATORY (INHALATION) at 05:58

## 2017-06-28 RX ADMIN — BUDESONIDE AND FORMOTEROL FUMARATE DIHYDRATE 2 PUFF(S): 160; 4.5 AEROSOL RESPIRATORY (INHALATION) at 17:31

## 2017-06-28 RX ADMIN — Medication 40 MILLIGRAM(S): at 05:57

## 2017-06-28 RX ADMIN — ALBUTEROL 2 PUFF(S): 90 AEROSOL, METERED ORAL at 12:21

## 2017-06-28 RX ADMIN — OLANZAPINE 5 MILLIGRAM(S): 15 TABLET, FILM COATED ORAL at 22:43

## 2017-06-28 RX ADMIN — ALBUTEROL 2 PUFF(S): 90 AEROSOL, METERED ORAL at 17:31

## 2017-06-28 NOTE — BEHAVIORAL HEALTH ASSESSMENT NOTE - NSBHCONSULTFOLLOWAFTERCARE_PSY_A_CORE FT
D/C home with outpatient follow-up with Dr. Hampton at [facility] clinic on 6/30/17 at 12 pm

## 2017-06-28 NOTE — BEHAVIORAL HEALTH ASSESSMENT NOTE - NSBHCHARTREVIEWVS_PSY_A_CORE FT
T(C): 36.7 (06-28-17 @ 04:10), Max: 36.7 (06-28-17 @ 04:10)  HR: 67 (06-28-17 @ 04:10) (62 - 86)  BP: 111/74 (06-28-17 @ 04:10) (106/61 - 112/66)  RR: 18 (06-28-17 @ 04:10) (18 - 20)  SpO2: 98% (06-28-17 @ 04:10) (95% - 98%)

## 2017-06-28 NOTE — PHYSICAL THERAPY INITIAL EVALUATION ADULT - PERTINENT HX OF CURRENT PROBLEM, REHAB EVAL
Pt is a 62 y/o male admitted to [facility] On 6/27/17 presents today with worsening LE edema. Per last hospitalization, he was seen with some LE edema in the feet and ankles attributed to venous stasis. Over the past 2 days, he endorses significant increase in LE edema. Reportedly, his visiting nurse came in today and saw his legs and recommended he come to the ER.

## 2017-06-28 NOTE — BEHAVIORAL HEALTH ASSESSMENT NOTE - HPI (INCLUDE ILLNESS QUALITY, SEVERITY, DURATION, TIMING, CONTEXT, MODIFYING FACTORS, ASSOCIATED SIGNS AND SYMPTOMS)
Patient is a 60 y/o male with PMH of sarcoidosis, bronchiectasis, rheumatoid arthritis, cholecystectomy, perforated diverticulitis s/p Bright procedure and reversal, b/l SDH s/p surgery, R inguinal hernia repair with mesh, recent 2-week admission (5/14/17 - 6/1/17) at [place] for new-onset generalized tonic-clonic seizures followed by a recent admission to [facility] (June 3rd - June 8th) for altered mental status and with one previous psych hospitalization In June 2017 discharged from [facility] on 6/23/17 who presents today with worsening LE edema.   Consult to r/o antipsychotic as cause of LE edema, it was also reported it happened at the same time his Zyprexa was switched to Olanzapine.    Patient seen at bedside, A & OX3. Reports he is feeling better now. No mood or psychotic symptoms. No SI/HI/hallucinations reported at this time. No delusions elicited. Patient reports stability since his discharge from [facility].  No prior psych history before [facility] admission.  No h/o suicidality  No h/o substance abuse  Multiple medical co-morbidities, he had SDH drainage in 2010 and was on anticonvulsants for few months at that time, no seizures until May 2017.  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 He is not sure exact number of days he had the swelling, but reports he went to the clinic when he noticed. This rules out swelling happening with start of Zyprexa which was earlier this month.  The likelihood of edema with antipsychotics is very little as data is limited. Patient has h/o recent florid psychotic episode and is not safe to D/C antipsychotic abruptly. Risks vs benefits explained to the patient. he also has concerns about other meds including Depakote. Advised to discuss that with Neurologist at his appointment tomorrow, who can recommend a different anticonvulsant if there are any concerns. Also has psych f/up appointment with Dr. Leandro Hampton at [facility] on Friday at noon. Encouraged to discuss the psychotropic medication, including risks, adverse effects and benefits, and also how long he needs to stay on antipsychotic.  Called Dr. Hampton to confirm appointment and discuss case. She also feels the antipsychotic does not need to be discontinued at this time, and she will monitor for adverse effects and discuss the plan with patient at next appointment.

## 2017-06-28 NOTE — BEHAVIORAL HEALTH ASSESSMENT NOTE - RISK ASSESSMENT
Patient has 1 psych admission, no prior psych history, no h/o suicidality, no substance abuse.   One psych admission related to Keppra use.   Patient has multiple medical issues. Limited social support. Recent psychiatric admission and started on antipsychotic.  Not a risk to self or others at this time.

## 2017-06-28 NOTE — PROGRESS NOTE ADULT - PROBLEM SELECTOR PLAN 8
Patient with urinary retention during last admission  - Will continue with tamsulosin  -monitor I's, O's

## 2017-06-28 NOTE — BEHAVIORAL HEALTH ASSESSMENT NOTE - AXIS III
sarcoidosis, bronchiectasis, rheumatoid arthritis, cholecystectomy, perforated diverticulitis s/p Bright procedure and reversal, b/l SDH s/p surgery, R inguinal hernia repair with mesh, new-onset generalized tonic-clonic seizures

## 2017-06-28 NOTE — DISCHARGE NOTE ADULT - CARE PROVIDERS DIRECT ADDRESSES
,DirectAddress_Unknown ,DirectAddress_Unknown,DirectAddress_Unknown ,DirectAddress_Unknown,DirectAddress_Unknown,[email or web address]

## 2017-06-28 NOTE — PROGRESS NOTE ADULT - PROBLEM SELECTOR PLAN 9
-ppx lovenox  - Patient had concern for possible HIT during last admission due to decrease in platelets to less than half, however was HIT negative.

## 2017-06-28 NOTE — PROGRESS NOTE ADULT - PROBLEM SELECTOR PLAN 6
Continue with olanzapine.   - Will consider psychiatry follow up if psychosis develops, however currently patient denies visual or auditory hallucinations.  - Psych consult Continue with olanzapine.   - Will consider psychiatry follow up if psychosis develops, however currently patient denies visual or auditory hallucinations.  - D/W Psych

## 2017-06-28 NOTE — DISCHARGE NOTE ADULT - OTHER SIGNIFICANT FINDINGS
Echo:   < from: Transthoracic Echocardiogram (06.28.17 @ 15:36) >  1. Normal left ventricular internal dimensions and wall  thicknesses.  2. Normal left ventricular systolic function. No segmental  wall motion abnormalities.  3. Normal diastolic function  4. Normal right ventricular size and function.  *** Compared with echocardiogram of 8/17/2009, no  significant changes noted.    < end of copied text >

## 2017-06-28 NOTE — DISCHARGE NOTE ADULT - CARE PROVIDER_API CALL
[facility],   [address] [place] [address]  Phone: [phone]  Fax: (   )    - [facility],   [address] [facility] [address]  Phone: [phone]  Fax: (   )    -    Leandro Sauceda), Geriatric Psychiatry; Psychiatry  [address] [place] [address]  Phone: [phone]  Fax: [phone] Leandro Sauceda), Geriatric Psychiatry; Psychiatry  [address] [place] [address]  Phone: [phone]  Fax: [phone]    Nafisa Faye), Neurology  [address] [place] [address]  Phone: [phone]  Fax: [phone]    Brian Gonsalves), Family Medicine; Geriatric Medicine  [address] [place] [address]  Phone: [phone]  Fax: [phone]

## 2017-06-28 NOTE — DISCHARGE NOTE ADULT - HOSPITAL COURSE
61M PMH sarcoidosis, bronchiectasis, ?rheumatoid arthritis, cholecystectomy, perforated diverticulitis s/p Bright procedure and reversal, b/l SDH s/p surgery, R inguinal hernia repair with mesh, recent 2-week admission (5/14/17 - 6/1/17) at [place] for new-onset generalized tonic-clonic seizures followed by a recent admission to [facility] (June 3rd - June 8th) for altered mental status, who presents today with worsening LE edema. Per last hospitalization, he was seen with some LE edema in the feet and ankles attributed to venous stasis. Over the past 2 days, he endorses significant increase in LE edema. Reportedly, his visiting nurse came in today and saw his legs and recommended he come to the ER.  Patient denies any SOB, orthopnia, PND, palpitations, abdominal pain, jaundice, N/V, dysuria, hematuria, urinary frequency, dizziness, vertigo, new rash.    Pt was started on IV lasix 40 once a day, with improvement in his leg edema. TTE was done and performed, and results will be followed up as an outpatient. Psychiatry was consulted for concerns of medication induced (olanzapine and depakote) edema however it was deemed that the medications were likely not the cause and pt was encouraged to stay on Zyprexa and discuss possible taper and outpatient follow up with Dr Hampton this friday. Pt is deemed stable for discharge. 61M PMH sarcoidosis, bronchiectasis, ?rheumatoid arthritis, cholecystectomy, perforated diverticulitis s/p Bright procedure and reversal, b/l SDH s/p surgery, R inguinal hernia repair with mesh, recent 2-week admission (5/14/17 - 6/1/17) at [place] for new-onset generalized tonic-clonic seizures followed by a recent admission to [facility] (June 3rd - June 8th) for altered mental status, who presents today with worsening LE edema. Per last hospitalization, he was seen with some LE edema in the feet and ankles attributed to venous stasis. Over the past 2 days, he endorses significant increase in LE edema. Reportedly, his visiting nurse came in today and saw his legs and recommended he come to the ER.  Patient denies any SOB, orthopnia, PND, palpitations, abdominal pain, jaundice, N/V, dysuria, hematuria, urinary frequency, dizziness, vertigo, new rash.    Pt was started on IV lasix 40 once a day, with improvement in his leg edema. TTE was done and performed which showed ____. Psychiatry was consulted for concerns of medication induced (olanzapine and depakote) edema however it was deemed that the medications were likely not the cause and pt was encouraged to stay on Zyprexa and discuss possible taper and outpatient follow up with Dr Hampton this friday. Over the course of hospital stay, pt remained hemodynamically stable and is deemed stable for discharge. 61M PMH sarcoidosis, bronchiectasis, ?rheumatoid arthritis, cholecystectomy, perforated diverticulitis s/p Bright procedure and reversal, b/l SDH s/p surgery, R inguinal hernia repair with mesh, recent 2-week admission (5/14/17 - 6/1/17) at [place] for new-onset generalized tonic-clonic seizures followed by a recent admission to [facility] (June 3rd - June 8th) for altered mental status, who presents today with worsening LE edema. Per last hospitalization, he was seen with some LE edema in the feet and ankles attributed to venous stasis. Over the past 2 days, he endorses significant increase in LE edema. Reportedly, his visiting nurse came in today and saw his legs and recommended he come to the ER.  Patient denies any SOB, orthopnia, PND, palpitations, abdominal pain, jaundice, N/V, dysuria, hematuria, urinary frequency, dizziness, vertigo, new rash.    Pt was started on IV lasix 40 once a day, with improvement in his leg edema. TTE was done. Psychiatry was consulted for concerns of medication induced (olanzapine and depakote) edema however it was deemed that the medications were likely not the cause and pt was encouraged to stay on Zyprexa and discuss possible taper and outpatient follow up with Dr Hampton this friday. Over the course of hospital stay, pt remained hemodynamically stable and is deemed stable for discharge.

## 2017-06-28 NOTE — PROGRESS NOTE ADULT - SUBJECTIVE AND OBJECTIVE BOX
Patient is a 61y old  Male who presents with a chief complaint of LE swelling (27 Jun 2017 04:54)        SUBJECTIVE / OVERNIGHT EVENTS:      MEDICATIONS  (STANDING):  tamsulosin 0.4 milliGRAM(s) Oral at bedtime  diVALproex  milliGRAM(s) Oral at bedtime  OLANZapine 5 milliGRAM(s) Oral at bedtime  ALBUTerol    90 MICROgram(s) HFA Inhaler 2 Puff(s) Inhalation every 6 hours  tiotropium 18 MICROgram(s) Capsule 1 Capsule(s) Inhalation daily  buDESOnide 160 MICROgram(s)/formoterol 4.5 MICROgram(s) Inhaler 2 Puff(s) Inhalation two times a day  insulin lispro (HumaLOG) corrective regimen sliding scale   SubCutaneous three times a day before meals  insulin lispro (HumaLOG) corrective regimen sliding scale   SubCutaneous at bedtime  dextrose 5%. 1000 milliLiter(s) (50 mL/Hr) IV Continuous <Continuous>  dextrose 50% Injectable 12.5 Gram(s) IV Push once  dextrose 50% Injectable 25 Gram(s) IV Push once  dextrose 50% Injectable 25 Gram(s) IV Push once  furosemide   Injectable 40 milliGRAM(s) IV Push daily  enoxaparin Injectable 40 milliGRAM(s) SubCutaneous daily    MEDICATIONS  (PRN):  dextrose Gel 1 Dose(s) Oral once PRN Blood Glucose LESS THAN 70 milliGRAM(s)/deciliter  glucagon  Injectable 1 milliGRAM(s) IntraMuscular once PRN Glucose LESS THAN 70 milligrams/deciliter      Vital Signs Last 24 Hrs  T(C): 36.7 (06-28-17 @ 04:10), Max: 36.7 (06-28-17 @ 04:10)  HR: 67 (06-28-17 @ 04:10) (62 - 86)  BP: 111/74 (06-28-17 @ 04:10) (106/61 - 112/66)  RR: 18 (06-28-17 @ 04:10) (18 - 20)  SpO2: 98% (06-28-17 @ 04:10) (95% - 98%)  CAPILLARY BLOOD GLUCOSE  121 (27 Jun 2017 21:53)  85 (27 Jun 2017 17:43)  103 (27 Jun 2017 12:29)  102 (27 Jun 2017 08:36)        I&O's Summary    27 Jun 2017 07:01 - 28 Jun 2017 07:00  --------------------------------------------------------  IN: 600 mL / OUT: 0 mL / NET: 600 mL        PHYSICAL EXAM  GENERAL: NAD, well-developed  HEAD:  Atraumatic, Normocephalic  EYES: EOMI, PERRLA, conjunctiva and sclera clear  NECK: Supple, No JVD  CHEST/LUNG: Clear to auscultation bilaterally; No wheeze  HEART: Regular rate and rhythm; No murmurs, rubs, or gallops  ABDOMEN: Soft, Nontender, Nondistended; Bowel sounds present  EXTREMITIES:  2+ Peripheral Pulses, No clubbing, cyanosis, or edema  PSYCH: AAOx3  SKIN: No rashes or lesions    LABS:                        12.2   6.2   )-----------( 80       ( 27 Jun 2017 06:12 )             36.3     06-27    142  |  106  |  25<H>  ----------------------------<  115<H>  4.2   |  29  |  0.69    Ca    8.2<L>      27 Jun 2017 06:12  Phos  3.6     06-27  Mg     2.1     06-27    TPro  6.1  /  Alb  3.2<L>  /  TBili  0.1<L>  /  DBili  x   /  AST  13  /  ALT  12  /  AlkPhos  58  06-27      CARDIAC MARKERS ( 26 Jun 2017 23:13 )  x     / <0.01 ng/mL / 65 U/L / x     / 1.0 ng/mL        RADIOLOGY & ADDITIONAL TESTS:      < from: VA Duplex Lower Ext Vein Scan, Bilat (06.27.17 @ 15:01) >  IMPRESSION:     No evidence of bilateral lower extremity deep venous thrombosis.        < end of copied text >< from: US Abdomen Upper Quadrant Right (06.27.17 @ 10:44) >  IMPRESSION:     Suggestion of hepatic steatosis.    < end of copied text > Patient is a 61y old  Male who presents with a chief complaint of LE swelling (27 Jun 2017 04:54)        SUBJECTIVE / OVERNIGHT EVENTS:  Patient seen and examined at bedside. Patient reports improvement in his lower extremities swelling. No nausea, no vomiting, no dizziness.    Review of Systems:  Unable to evaluate ROS due to: cognitive deficits.    MEDICATIONS  (STANDING):  tamsulosin 0.4 milliGRAM(s) Oral at bedtime  diVALproex  milliGRAM(s) Oral at bedtime  OLANZapine 5 milliGRAM(s) Oral at bedtime  ALBUTerol    90 MICROgram(s) HFA Inhaler 2 Puff(s) Inhalation every 6 hours  tiotropium 18 MICROgram(s) Capsule 1 Capsule(s) Inhalation daily  buDESOnide 160 MICROgram(s)/formoterol 4.5 MICROgram(s) Inhaler 2 Puff(s) Inhalation two times a day  insulin lispro (HumaLOG) corrective regimen sliding scale   SubCutaneous three times a day before meals  insulin lispro (HumaLOG) corrective regimen sliding scale   SubCutaneous at bedtime  dextrose 5%. 1000 milliLiter(s) (50 mL/Hr) IV Continuous <Continuous>  dextrose 50% Injectable 12.5 Gram(s) IV Push once  dextrose 50% Injectable 25 Gram(s) IV Push once  dextrose 50% Injectable 25 Gram(s) IV Push once  furosemide   Injectable 40 milliGRAM(s) IV Push daily  enoxaparin Injectable 40 milliGRAM(s) SubCutaneous daily    MEDICATIONS  (PRN):  dextrose Gel 1 Dose(s) Oral once PRN Blood Glucose LESS THAN 70 milliGRAM(s)/deciliter  glucagon  Injectable 1 milliGRAM(s) IntraMuscular once PRN Glucose LESS THAN 70 milligrams/deciliter      Vital Signs Last 24 Hrs  T(C): 36.7 (06-28-17 @ 04:10), Max: 36.7 (06-28-17 @ 04:10)  HR: 67 (06-28-17 @ 04:10) (62 - 86)  BP: 111/74 (06-28-17 @ 04:10) (106/61 - 112/66)  RR: 18 (06-28-17 @ 04:10) (18 - 20)  SpO2: 98% (06-28-17 @ 04:10) (95% - 98%)  CAPILLARY BLOOD GLUCOSE  121 (27 Jun 2017 21:53)  85 (27 Jun 2017 17:43)  103 (27 Jun 2017 12:29)  102 (27 Jun 2017 08:36)        I&O's Summary    27 Jun 2017 07:01  -  28 Jun 2017 07:00  --------------------------------------------------------  IN: 600 mL / OUT: 0 mL / NET: 600 mL        PHYSICAL EXAM  GENERAL: NAD, well-developed  HEAD:  Atraumatic, Normocephalic  EYES: EOMI, PERRLA, conjunctiva and sclera clear  NECK: Supple, No JVD  CHEST/LUNG: Clear to auscultation bilaterally; No wheeze  HEART: Regular rate and rhythm; No murmurs, rubs, or gallops  ABDOMEN: Soft, Nontender, Nondistended; Bowel sounds present  EXTREMITIES:  2+ Peripheral Pulses, No clubbing, cyanosis, or edema  PSYCH: AAOx3  SKIN: No rashes or lesions    LABS:                        12.2   6.2   )-----------( 80       ( 27 Jun 2017 06:12 )             36.3     06-27    142  |  106  |  25<H>  ----------------------------<  115<H>  4.2   |  29  |  0.69    Ca    8.2<L>      27 Jun 2017 06:12  Phos  3.6     06-27  Mg     2.1     06-27    TPro  6.1  /  Alb  3.2<L>  /  TBili  0.1<L>  /  DBili  x   /  AST  13  /  ALT  12  /  AlkPhos  58  06-27      CARDIAC MARKERS ( 26 Jun 2017 23:13 )  x     / <0.01 ng/mL / 65 U/L / x     / 1.0 ng/mL        RADIOLOGY & ADDITIONAL TESTS:      < from: VA Duplex Lower Ext Vein Scan, Bilat (06.27.17 @ 15:01) >  IMPRESSION:     No evidence of bilateral lower extremity deep venous thrombosis.    < end of copied text >< from: US Abdomen Upper Quadrant Right (06.27.17 @ 10:44) >  IMPRESSION:     Suggestion of hepatic steatosis.    < end of copied text >

## 2017-06-28 NOTE — DISCHARGE NOTE ADULT - CARE PLAN
Principal Discharge DX:	Bilateral edema of lower extremity  Goal:	resolution of edema  Instructions for follow-up, activity and diet:	You were found to have lower extremity edema likely secondary to venous stasis. We aggressively diuresed you with IV lasix and your edema improved significantly. You also had a echocardiogram of the heart during this hospital stay. Please follow up with your primary care provider or [address] [place] [address] Ramiro within 1 week of discharge for the result of the echocardiogram. Please also follow up with a cardiologist (info below) within 1 week of discharge for further work up of your lower extremity edema.  Secondary Diagnosis:	Benign prostatic hyperplasia, presence of lower urinary tract symptoms unspecified, unspecified morphology  Instructions for follow-up, activity and diet:	Please continue taking tamsulosin.  Secondary Diagnosis:	Psychosis, unspecified psychosis type  Instructions for follow-up, activity and diet:	Please continue taking olanzapine and depakote as instructed. Principal Discharge DX:	Bilateral edema of lower extremity  Goal:	resolution of edema  Instructions for follow-up, activity and diet:	You were found to have lower extremity edema likely secondary to venous stasis. We aggressively diuresed you with IV lasix and your edema improved significantly. You also had a echocardiogram of the heart during this hospital stay. Please follow up with your primary care provider or [address] [place] [address] Ramiro within 1 week of discharge for the result of the echocardiogram. Please also follow up with a cardiologist (info below) within 1 week of discharge for further work up of your lower extremity edema.  Secondary Diagnosis:	Benign prostatic hyperplasia, presence of lower urinary tract symptoms unspecified, unspecified morphology  Instructions for follow-up, activity and diet:	Please continue taking tamsulosin.  Secondary Diagnosis:	Psychosis, unspecified psychosis type  Instructions for follow-up, activity and diet:	Please continue taking olanzapine and depakote as instructed. Please follow up with Dr Ruggiero this friday for further management of your medications. Principal Discharge DX:	Bilateral edema of lower extremity  Goal:	resolution of edema  Instructions for follow-up, activity and diet:	You were found to have lower extremity edema likely secondary to venous stasis. We aggressively diuresed you with IV lasix and your edema improved significantly. You also had a echocardiogram of the heart during this hospital stay. Please follow up with your primary care provider or [address] [place] [address] [place]ami[place] within 1 week of discharge for the result of the echocardiogram. Please also follow up with a cardiologist (info below) within 1 week of discharge for further work up of your lower extremity edema.  Secondary Diagnosis:	Benign prostatic hyperplasia, presence of lower urinary tract symptoms unspecified, unspecified morphology  Instructions for follow-up, activity and diet:	Please continue taking tamsulosin.  Secondary Diagnosis:	Psychosis, unspecified psychosis type  Instructions for follow-up, activity and diet:	Please continue taking olanzapine and depakote as instructed. Please follow up with Dr Ruggiero this friday for further management of your medications. Principal Discharge DX:	Bilateral edema of lower extremity  Goal:	resolution of edema  Instructions for follow-up, activity and diet:	You were found to have lower extremity edema likely secondary to venous stasis. We aggressively diuresed you with IV lasix and your edema improved significantly. You also had a echocardiogram of the heart during this hospital stay. Please follow up with your primary care provider or [address] [place] [address] Ramiro within 1 week of discharge for the result of the echocardiogram. Please also follow up with a cardiologist (info below) within 1 week of discharge for further work up of your lower extremity edema.  Secondary Diagnosis:	Benign prostatic hyperplasia, presence of lower urinary tract symptoms unspecified, unspecified morphology  Instructions for follow-up, activity and diet:	Please continue taking tamsulosin.  Secondary Diagnosis:	Psychosis, unspecified psychosis type  Instructions for follow-up, activity and diet:	Please continue taking olanzapine and depakote as instructed. Please follow up with Dr Ruggiero this friday at noon for further management of your medications. Principal Discharge DX:	Bilateral edema of lower extremity  Goal:	resolution of edema  Instructions for follow-up, activity and diet:	You were found to have lower extremity edema likely secondary to venous stasis. We aggressively diuresed you with IV lasix and your edema improved significantly. You also had a echocardiogram of the heart during this hospital stay. Please follow up with your primary care provider or [address] [place] [address] Ramiro within 1 week of discharge for the result of the echocardiogram. Please also follow up with a cardiologist (info below) within 1 week of discharge for further work up of your lower extremity edema.  Secondary Diagnosis:	Benign prostatic hyperplasia, presence of lower urinary tract symptoms unspecified, unspecified morphology  Instructions for follow-up, activity and diet:	Please continue taking tamsulosin.  Secondary Diagnosis:	Psychosis, unspecified psychosis type  Instructions for follow-up, activity and diet:	Please continue taking olanzapine and depakote as instructed. Please follow up with Dr Ruggiero this friday at noon for further management of your medications. Principal Discharge DX:	Bilateral edema of lower extremity  Goal:	resolution of edema  Instructions for follow-up, activity and diet:	You were found to have lower extremity edema likely secondary to venous stasis. We  diuresed you with IV lasix and your edema improved significantly. It is important to exercise and maintain a healthy, low-sodium diet to help prevent accumulation of fluid.  You also had a echocardiogram during your hospital stay, which did not show abnormalities of your heart. Please follow up with your new PCP Dr. Galarza within one week of discharge.  Secondary Diagnosis:	Benign prostatic hyperplasia, presence of lower urinary tract symptoms unspecified, unspecified morphology  Instructions for follow-up, activity and diet:	Please continue taking tamsulosin.  Secondary Diagnosis:	Psychosis, unspecified psychosis type  Instructions for follow-up, activity and diet:	Please continue taking olanzapine and depakote as instructed. Please follow up with Dr Ruggiero this Friday 6/30/17 at 12:00  for further management of your medications. Please also see your neurologist Dr. Faye within one week of discharge. Principal Discharge DX:	Bilateral edema of lower extremity  Goal:	resolution of edema  Instructions for follow-up, activity and diet:	You were found to have lower extremity edema likely secondary to venous stasis. We  diuresed you with IV lasix and your edema improved significantly. It is important to exercise and maintain a healthy, low-sodium diet to help prevent accumulation of fluid.  You also had a echocardiogram during your hospital stay, which did not show abnormalities of your heart. Please take 40 mg lasix every day for now, and follow up with your new PCP Dr. Galarza within one week of discharge. Make sure to keep legs well moisturized with vaseline or aquaphor or other moisturizer. Wear compression stockings as much as possible during the day to help improve circulation in your legs.  Secondary Diagnosis:	Benign prostatic hyperplasia, presence of lower urinary tract symptoms unspecified, unspecified morphology  Instructions for follow-up, activity and diet:	Please continue taking tamsulosin.  Secondary Diagnosis:	Psychosis, unspecified psychosis type  Instructions for follow-up, activity and diet:	Please continue taking olanzapine and depakote as instructed. Please follow up with Dr Ruggiero this Friday 6/30/17 at 12:00  for further management of your medications. Please also see your neurologist Dr. Faye within one week of discharge. Principal Discharge DX:	Bilateral edema of lower extremity  Goal:	resolution of edema  Instructions for follow-up, activity and diet:	You were found to have lower extremity edema likely secondary to venous stasis. We  diuresed you with IV lasix and your edema improved significantly. It is important to exercise and maintain a healthy, low-sodium diet to help prevent accumulation of fluid.  You also had a echocardiogram during your hospital stay, which did not show abnormalities of your heart. Please take 40 mg lasix every day for now, and follow up with your new PCP Dr. Galarza within one week of discharge to determine how long you will require this medication. Make sure to keep legs well moisturized with vaseline or aquaphor or other moisturizer. Wear compression stockings as much as possible during the day to help improve circulation in your legs.  Secondary Diagnosis:	Benign prostatic hyperplasia, presence of lower urinary tract symptoms unspecified, unspecified morphology  Instructions for follow-up, activity and diet:	Please continue taking tamsulosin.  Secondary Diagnosis:	Psychosis, unspecified psychosis type  Instructions for follow-up, activity and diet:	Please continue taking olanzapine as instructed. Please follow up with Dr Ruggiero this Friday 6/30/17 at 12:00  for further management of your medications.  Secondary Diagnosis:	Seizure disorder  Instructions for follow-up, activity and diet:	Please continue to take depakote as prescribed. Please also see your neurologist Dr. Faye within one week of discharge.

## 2017-06-28 NOTE — BEHAVIORAL HEALTH ASSESSMENT NOTE - NSBHCHARTREVIEWLAB_PSY_A_CORE FT
CBC Full  -  ( 28 Jun 2017 07:17 )  WBC Count : 5.85 K/uL  Hemoglobin : 11.5 g/dL  Hematocrit : 37.7 %  Platelet Count - Automated : 102 K/uL  Mean Cell Volume : 88.5 fl  Mean Cell Hemoglobin : 27.0 pg  Mean Cell Hemoglobin Concentration : 30.5 gm/dL  Auto Neutrophil # : 3.39 K/uL  Auto Lymphocyte # : 1.16 K/uL  Auto Monocyte # : 0.85 K/uL  Auto Eosinophil # : 0.35 K/uL  Auto Basophil # : 0.03 K/uL  Auto Neutrophil % : 58.0 %  Auto Lymphocyte % : 19.8 %  Auto Monocyte % : 14.5 %  Auto Eosinophil % : 6.0 %  Auto Basophil % : 0.5 %    06-28    141  |  104  |  20  ----------------------------<  95  4.5   |  27  |  0.62    Ca    8.2<L>      28 Jun 2017 07:35  Phos  3.7     06-28  Mg     2.1     06-28    TPro  5.9<L>  /  Alb  2.6<L>  /  TBili  0.2  /  DBili  x   /  AST  23  /  ALT  10  /  AlkPhos  55  06-28

## 2017-06-28 NOTE — PHYSICAL THERAPY INITIAL EVALUATION ADULT - PRECAUTIONS/LIMITATIONS, REHAB EVAL
PMH sarcoidosis, bronchiectasis, ?rheumatoid arthritis, cholecystectomy, perforated diverticulitis s/p Bright procedure and reversal, b/l SDH s/p surgery, R inguinal hernia repair with mesh, recent 2-week admission (5/14/17 - 6/1/17) at [facility] for new-onset generalized tonic-clonic seizures followed by a recent admission to [facility] (June 3rd - June 8th) for altered mental status PMH sarcoidosis, bronchiectasis, ?rheumatoid arthritis, cholecystectomy, perforated diverticulitis s/p Bright procedure and reversal, b/l SDH s/p surgery, R inguinal hernia repair with mesh, recent 2-week admission (5/14/17 - 6/1/17) at [facility] for new-onset generalized tonic-clonic seizures followed by a recent admission to [facility] (June 3rd - June 8th) for altered mental status.  - [facility] Duplex BLE.

## 2017-06-28 NOTE — BEHAVIORAL HEALTH ASSESSMENT NOTE - NSBHCHARTREVIEWINVESTIGATE_PSY_A_CORE FT
< from: 12 Lead ECG (06.26.17 @ 23:13) >    Ventricular Rate 71 BPM    Atrial Rate 71 BPM    P-R Interval 158 ms    QRS Duration 80 ms     ms    QTc 408 ms    P Axis 88 degrees    R Axis -9 degrees    T Axis -8 degrees    Diagnosis Line NORMAL SINUS RHYTHM  NONSPECIFIC ST AND T WAVE ABNORMALITY  ABNORMAL ECG

## 2017-06-28 NOTE — DISCHARGE NOTE ADULT - MEDICATION SUMMARY - MEDICATIONS TO TAKE
I will START or STAY ON the medications listed below when I get home from the hospital:    tamsulosin 0.4 mg oral capsule  -- 1 cap(s) by mouth once a day (at bedtime)  -- Indication: For BPH (benign prostatic hyperplasia)    divalproex sodium 500 mg oral tablet, extended release  -- 3 tab(s) by mouth once a day (at bedtime)  -- Indication: For Brief psychotic disorder    OLANZapine 5 mg oral tablet  -- 1 tab(s) by mouth once a day (at bedtime)  -- Indication: For Brief psychotic disorder    Ventolin HFA 90 mcg/inh inhalation aerosol  -- 2 puff(s) inhaled 4 times a day, As Needed  -- Indication: For Asthma    Symbicort 160 mcg-4.5 mcg/inh inhalation aerosol  -- 2 puff(s) inhaled 2 times a day  -- Indication: For Asthma I will START or STAY ON the medications listed below when I get home from the hospital:    tamsulosin 0.4 mg oral capsule  -- 1 cap(s) by mouth once a day (at bedtime)  -- Indication: For BPH (benign prostatic hyperplasia)    divalproex sodium 500 mg oral tablet, extended release  -- 3 tab(s) by mouth once a day (at bedtime)  -- Indication: For Brief psychotic disorder    OLANZapine 5 mg oral tablet  -- 1 tab(s) by mouth once a day (at bedtime)  -- Indication: For Brief psychotic disorder    Ventolin HFA 90 mcg/inh inhalation aerosol  -- 2 puff(s) inhaled 4 times a day, As Needed  -- Indication: For Asthma    Symbicort 160 mcg-4.5 mcg/inh inhalation aerosol  -- 2 puff(s) inhaled 2 times a day  -- Indication: For Asthma    furosemide 40 mg oral tablet  -- 1 tab(s) by mouth once a day  -- Avoid prolonged or excessive exposure to direct and/or artificial sunlight while taking this medication.  It is very important that you take or use this exactly as directed.  Do not skip doses or discontinue unless directed by your doctor.  It may be advisable to drink a full glass orange juice or eat a banana daily while taking this medication.    -- Indication: For Leg swelling I will START or STAY ON the medications listed below when I get home from the hospital:    compression stockings  -- Apply on skin to affected area once a day  -- Indication: For Leg swelling    tamsulosin 0.4 mg oral capsule  -- 1 cap(s) by mouth once a day (at bedtime)  -- Indication: For BPH (benign prostatic hyperplasia)    divalproex sodium 500 mg oral tablet, extended release  -- 3 tab(s) by mouth once a day (at bedtime)  -- Indication: For Brief psychotic disorder    OLANZapine 5 mg oral tablet  -- 1 tab(s) by mouth once a day (at bedtime)  -- Indication: For Brief psychotic disorder    Ventolin HFA 90 mcg/inh inhalation aerosol  -- 2 puff(s) inhaled 4 times a day, As Needed  -- Indication: For Asthma    Symbicort 160 mcg-4.5 mcg/inh inhalation aerosol  -- 2 puff(s) inhaled 2 times a day  -- Indication: For Asthma    furosemide 40 mg oral tablet  -- 1 tab(s) by mouth once a day  -- Avoid prolonged or excessive exposure to direct and/or artificial sunlight while taking this medication.  It is very important that you take or use this exactly as directed.  Do not skip doses or discontinue unless directed by your doctor.  It may be advisable to drink a full glass orange juice or eat a banana daily while taking this medication.    -- Indication: For Leg swelling

## 2017-06-28 NOTE — PROGRESS NOTE ADULT - PROBLEM SELECTOR PLAN 4
Reticular opacities and bilateral bronchiectasis noted on CXR, attributed to sarcoidosis  - Continue with proventil and symbicort at this time  - Patient will likely need further eval in setting of possible RHF

## 2017-06-28 NOTE — DISCHARGE NOTE ADULT - HOME CARE AGENCY
Your home care services has been referred to [facility] ([phone]).  Please call them to inquire about  time of Registered Nurse visit.

## 2017-06-28 NOTE — PROGRESS NOTE ADULT - PROBLEM SELECTOR PLAN 1
B/l edema, R>L, with venous stasis changes.  -Consider b/l Lower ext doppler U/S to rule out DVT  -R/o new onset heart failure, given patient has chronic pumonary hypertension which may lead to R. heart failure. Plan for TTE.  - Less likely renal in setting of minimal protein loss as seen in UA.  -Less likely hepatic in setting of normal albumin and appropriate LFTs.  - Lasix 40mg IV daily   -Patient was recently started on several medications that may be associated with edema.  Olanzapine associated w/ peripheral edema in 3% of cases. Depakote associated w/ peripheral edema in 1-8% of cases. Psych consult called today for med adjustment.  -continue to hold  olanzapine for now B/l edema, R>L, with venous stasis changes.  -Consider b/l Lower ext doppler U/S to rule out DVT  -R/o new onset heart failure, given patient has chronic pumonary hypertension which may lead to R. heart failure. Plan for TTE.  - Less likely renal in setting of minimal protein loss as seen in UA.  - Less likely hepatic in setting of normal albumin and appropriate LFTs.  - Lasix 40mg IV daily   - D/W Psychiatry, less likely due to medications   - Continue with Olanzapine and Valproic acid

## 2017-06-28 NOTE — DISCHARGE NOTE ADULT - PROVIDER TOKENS
FREE:[LAST:[[facility]],PHONE:[phone]],FAX:[(   )    -],ADDRESS:[[address] [place] [address]]] FREE:[LAST:[[facility]],PHONE:[phone]],FAX:[(   )    -],ADDRESS:[[address] [place] [address]]],TOKEN:'[id number]' TOKEN:'[id number]:'[id number]:'[id number]'

## 2017-06-28 NOTE — BEHAVIORAL HEALTH ASSESSMENT NOTE - CASE SUMMARY
62 y/o male with multiple medical comorbidities and one psych admission due to a psychotic episode secondary to Keppra, came in with LE edema, consulted for med mgt, concern zyprexa may have caused le edema. Pt denies mood symptoms, no si/hi, no psychosis, no iker sx, unlikely edema related to zyprexa given timeline of medication, cont current meds, pt will f/u at [facility]

## 2017-06-28 NOTE — DISCHARGE NOTE ADULT - SECONDARY DIAGNOSIS.
Benign prostatic hyperplasia, presence of lower urinary tract symptoms unspecified, unspecified morphology Psychosis, unspecified psychosis type Seizure disorder

## 2017-06-28 NOTE — DISCHARGE NOTE ADULT - PLAN OF CARE
resolution of edema You were found to have lower extremity edema likely secondary to venous stasis. We aggressively diuresed you with IV lasix and your edema improved significantly. You also had a echocardiogram of the heart during this hospital stay. Please follow up with your primary care provider or [address] within 1 week of discharge for the result of the echocardiogram. Please also follow up with a cardiologist (info below) within 1 week of discharge for further work up of your lower extremity edema. Please continue taking tamsulosin. Please continue taking olanzapine and depakote as instructed. Please continue taking olanzapine and depakote as instructed. Please follow up with Dr Ruggiero this friday for further management of your medications. Please continue taking olanzapine and depakote as instructed. Please follow up with Dr Ruggiero this friday at noon for further management of your medications. You were found to have lower extremity edema likely secondary to venous stasis. We  diuresed you with IV lasix and your edema improved significantly. It is important to exercise and maintain a healthy, low-sodium diet to help prevent accumulation of fluid.  You also had a echocardiogram during your hospital stay, which did not show abnormalities of your heart. Please follow up with your new PCP Dr. Galarza within one week of discharge. Please continue taking olanzapine and depakote as instructed. Please follow up with Dr Ruggiero this Friday 6/30/17 at 12:00  for further management of your medications. Please also see your neurologist Dr. Faye within one week of discharge. You were found to have lower extremity edema likely secondary to venous stasis. We  diuresed you with IV lasix and your edema improved significantly. It is important to exercise and maintain a healthy, low-sodium diet to help prevent accumulation of fluid.  You also had a echocardiogram during your hospital stay, which did not show abnormalities of your heart. Please take 40 mg lasix every day for now, and follow up with your new PCP Dr. Galarza within one week of discharge. Make sure to keep legs well moisturized with vaseline or aquaphor or other moisturizer. Wear compression stockings as much as possible during the day to help improve circulation in your legs. Please continue to take depakote as prescribed. Please also see your neurologist Dr. Faye within one week of discharge. Please continue taking olanzapine as instructed. Please follow up with Dr Ruggiero this Friday 6/30/17 at 12:00  for further management of your medications. You were found to have lower extremity edema likely secondary to venous stasis. We  diuresed you with IV lasix and your edema improved significantly. It is important to exercise and maintain a healthy, low-sodium diet to help prevent accumulation of fluid.  You also had a echocardiogram during your hospital stay, which did not show abnormalities of your heart. Please take 40 mg lasix every day for now, and follow up with your new PCP Dr. Galarza within one week of discharge to determine how long you will require this medication. Make sure to keep legs well moisturized with vaseline or aquaphor or other moisturizer. Wear compression stockings as much as possible during the day to help improve circulation in your legs.

## 2017-06-28 NOTE — BEHAVIORAL HEALTH ASSESSMENT NOTE - NSBHCHARTREVIEWIMAGING_PSY_A_CORE FT
< from: Xray Chest 1 View AP/PA (06.27.17 @ 00:18) >    EXAM:  CHEST SINGLE AP OR PA                            PROCEDURE DATE:  06/27/2017            INTERPRETATION:  CLINICAL INFORMATION: Shortness of breath    TECHNIQUE: AP chest radiograph    COMPARISON: Chest radiograph performed Tania 3, 2017. CT chest performed   March 2, 2017.    FINDINGS:    There is redemonstration of reticular opacities with associated   bronchiectasis, predominantly in the bilateral upper lobe but also seen   in the lower lobes, similar in appearance to the 3/2/2017 CT chest. No   focal consolidations. No pleural effusions or pneumothoraces are seen.    IMPRESSION:    Biapical and bibasilar reticular opacities with associated   bronchiectasis, similar in appearance to the 3/2/2017 CT chest.    No focal consolidations

## 2017-06-28 NOTE — BEHAVIORAL HEALTH ASSESSMENT NOTE - SUMMARY
Patient is a 62 y/o male with multiple medical comorbidities and one psych admission due to a psychotic episode secondary to Keppra, came in with LE edema.  Medically stable now. Psychiatrically stable at this time.   Discussed risks vs benefits with patient and encouraged to stay on Zyprexa and discuss possible taper and schedule with outpatient psychiatrist Dr. Hampton at his outpatient [facility] appointment on Friday, he verbalized understanding.  Encouraged to discuss anticonvulsant concerns with neurologist at his outpatient appointment tomorrow.  Will not D/C meds during short inpatient stay as the regimen and effects need to be monitored, best done on outpatient basis with regular providers.

## 2017-06-29 VITALS
HEART RATE: 68 BPM | TEMPERATURE: 99 F | OXYGEN SATURATION: 98 % | DIASTOLIC BLOOD PRESSURE: 68 MMHG | RESPIRATION RATE: 18 BRPM | SYSTOLIC BLOOD PRESSURE: 100 MMHG

## 2017-06-29 LAB
ALBUMIN SERPL ELPH-MCNC: 3 G/DL — LOW (ref 3.3–5)
ALP SERPL-CCNC: 58 U/L — SIGNIFICANT CHANGE UP (ref 40–120)
ALT FLD-CCNC: 13 U/L — SIGNIFICANT CHANGE UP (ref 10–45)
ANION GAP SERPL CALC-SCNC: 11 MMOL/L — SIGNIFICANT CHANGE UP (ref 5–17)
AST SERPL-CCNC: 27 U/L — SIGNIFICANT CHANGE UP (ref 10–40)
BILIRUB SERPL-MCNC: 0.3 MG/DL — SIGNIFICANT CHANGE UP (ref 0.2–1.2)
BUN SERPL-MCNC: 21 MG/DL — SIGNIFICANT CHANGE UP (ref 7–23)
CALCIUM SERPL-MCNC: 8.5 MG/DL — SIGNIFICANT CHANGE UP (ref 8.4–10.5)
CHLORIDE SERPL-SCNC: 102 MMOL/L — SIGNIFICANT CHANGE UP (ref 96–108)
CO2 SERPL-SCNC: 27 MMOL/L — SIGNIFICANT CHANGE UP (ref 22–31)
CREAT ?TM UR-MCNC: 52 MG/DL — SIGNIFICANT CHANGE UP
CREAT SERPL-MCNC: 0.65 MG/DL — SIGNIFICANT CHANGE UP (ref 0.5–1.3)
GLUCOSE SERPL-MCNC: 93 MG/DL — SIGNIFICANT CHANGE UP (ref 70–99)
HBA1C BLD-MCNC: 5.6 % — SIGNIFICANT CHANGE UP (ref 4–5.6)
HCT VFR BLD CALC: 37.4 % — LOW (ref 39–50)
HGB BLD-MCNC: 11.9 G/DL — LOW (ref 13–17)
MAGNESIUM SERPL-MCNC: 2.2 MG/DL — SIGNIFICANT CHANGE UP (ref 1.6–2.6)
MCHC RBC-ENTMCNC: 27.7 PG — SIGNIFICANT CHANGE UP (ref 27–34)
MCHC RBC-ENTMCNC: 31.8 GM/DL — LOW (ref 32–36)
MCV RBC AUTO: 87 FL — SIGNIFICANT CHANGE UP (ref 80–100)
OSMOLALITY UR: 501 MOS/KG — SIGNIFICANT CHANGE UP (ref 50–1200)
PHOSPHATE SERPL-MCNC: 3.8 MG/DL — SIGNIFICANT CHANGE UP (ref 2.5–4.5)
PLATELET # BLD AUTO: 110 K/UL — LOW (ref 150–400)
POTASSIUM SERPL-MCNC: 4.4 MMOL/L — SIGNIFICANT CHANGE UP (ref 3.5–5.3)
POTASSIUM SERPL-SCNC: 4.4 MMOL/L — SIGNIFICANT CHANGE UP (ref 3.5–5.3)
PROT ?TM UR-MCNC: 7 MG/DL — SIGNIFICANT CHANGE UP (ref 0–12)
PROT SERPL-MCNC: 6.3 G/DL — SIGNIFICANT CHANGE UP (ref 6–8.3)
PROT/CREAT UR-RTO: 0.1 RATIO — SIGNIFICANT CHANGE UP (ref 0–0.2)
RBC # BLD: 4.3 M/UL — SIGNIFICANT CHANGE UP (ref 4.2–5.8)
RBC # FLD: 17.7 % — HIGH (ref 10.3–14.5)
SODIUM SERPL-SCNC: 140 MMOL/L — SIGNIFICANT CHANGE UP (ref 135–145)
SODIUM UR-SCNC: 156 MMOL/L — SIGNIFICANT CHANGE UP
UUN UR-MCNC: 498 MG/DL — SIGNIFICANT CHANGE UP
WBC # BLD: 6.15 K/UL — SIGNIFICANT CHANGE UP (ref 3.8–10.5)
WBC # FLD AUTO: 6.15 K/UL — SIGNIFICANT CHANGE UP (ref 3.8–10.5)

## 2017-06-29 PROCEDURE — 84100 ASSAY OF PHOSPHORUS: CPT

## 2017-06-29 PROCEDURE — 83735 ASSAY OF MAGNESIUM: CPT

## 2017-06-29 PROCEDURE — 99239 HOSP IP/OBS DSCHRG MGMT >30: CPT

## 2017-06-29 PROCEDURE — 85014 HEMATOCRIT: CPT

## 2017-06-29 PROCEDURE — 84484 ASSAY OF TROPONIN QUANT: CPT

## 2017-06-29 PROCEDURE — 93005 ELECTROCARDIOGRAM TRACING: CPT

## 2017-06-29 PROCEDURE — 83935 ASSAY OF URINE OSMOLALITY: CPT

## 2017-06-29 PROCEDURE — 93970 EXTREMITY STUDY: CPT

## 2017-06-29 PROCEDURE — 93306 TTE W/DOPPLER COMPLETE: CPT

## 2017-06-29 PROCEDURE — 82435 ASSAY OF BLOOD CHLORIDE: CPT

## 2017-06-29 PROCEDURE — 82330 ASSAY OF CALCIUM: CPT

## 2017-06-29 PROCEDURE — 71045 X-RAY EXAM CHEST 1 VIEW: CPT

## 2017-06-29 PROCEDURE — 82550 ASSAY OF CK (CPK): CPT

## 2017-06-29 PROCEDURE — 84300 ASSAY OF URINE SODIUM: CPT

## 2017-06-29 PROCEDURE — 80053 COMPREHEN METABOLIC PANEL: CPT

## 2017-06-29 PROCEDURE — 83880 ASSAY OF NATRIURETIC PEPTIDE: CPT

## 2017-06-29 PROCEDURE — 82803 BLOOD GASES ANY COMBINATION: CPT

## 2017-06-29 PROCEDURE — 99285 EMERGENCY DEPT VISIT HI MDM: CPT | Mod: 25

## 2017-06-29 PROCEDURE — 97161 PT EVAL LOW COMPLEX 20 MIN: CPT

## 2017-06-29 PROCEDURE — 94640 AIRWAY INHALATION TREATMENT: CPT

## 2017-06-29 PROCEDURE — 84295 ASSAY OF SERUM SODIUM: CPT

## 2017-06-29 PROCEDURE — 85027 COMPLETE CBC AUTOMATED: CPT

## 2017-06-29 PROCEDURE — 82947 ASSAY GLUCOSE BLOOD QUANT: CPT

## 2017-06-29 PROCEDURE — 83036 HEMOGLOBIN GLYCOSYLATED A1C: CPT

## 2017-06-29 PROCEDURE — 84132 ASSAY OF SERUM POTASSIUM: CPT

## 2017-06-29 PROCEDURE — 82553 CREATINE MB FRACTION: CPT

## 2017-06-29 PROCEDURE — 76705 ECHO EXAM OF ABDOMEN: CPT

## 2017-06-29 PROCEDURE — 80164 ASSAY DIPROPYLACETIC ACD TOT: CPT

## 2017-06-29 PROCEDURE — 84156 ASSAY OF PROTEIN URINE: CPT

## 2017-06-29 PROCEDURE — 84540 ASSAY OF URINE/UREA-N: CPT

## 2017-06-29 PROCEDURE — 83605 ASSAY OF LACTIC ACID: CPT

## 2017-06-29 RX ORDER — FUROSEMIDE 40 MG
1 TABLET ORAL
Qty: 30 | Refills: 0 | OUTPATIENT
Start: 2017-06-29 | End: 2017-07-29

## 2017-06-29 RX ADMIN — ALBUTEROL 2 PUFF(S): 90 AEROSOL, METERED ORAL at 11:28

## 2017-06-29 RX ADMIN — Medication 40 MILLIGRAM(S): at 05:59

## 2017-06-29 RX ADMIN — BUDESONIDE AND FORMOTEROL FUMARATE DIHYDRATE 2 PUFF(S): 160; 4.5 AEROSOL RESPIRATORY (INHALATION) at 05:59

## 2017-06-29 RX ADMIN — ALBUTEROL 2 PUFF(S): 90 AEROSOL, METERED ORAL at 01:14

## 2017-06-29 RX ADMIN — TIOTROPIUM BROMIDE 1 CAPSULE(S): 18 CAPSULE ORAL; RESPIRATORY (INHALATION) at 11:28

## 2017-06-29 RX ADMIN — ALBUTEROL 2 PUFF(S): 90 AEROSOL, METERED ORAL at 05:59

## 2017-06-29 NOTE — PROGRESS NOTE ADULT - PROBLEM SELECTOR PLAN 4
Reticular opacities and bilateral bronchiectasis noted on CXR, attributed to sarcoidosis  - Continue with proventil and symbicort at this time

## 2017-06-29 NOTE — PROGRESS NOTE ADULT - ATTENDING COMMENTS
I have performed a history and physical exam of this patient and discussed the management with the resident, . I have reviewed the resident note and agree with the documented findings and plan of care. The above recommendations were discussed with the patient and his family. The patient and his family had all questions answered and expressed understanding of the plan.  Discharge time: 40 min
I have performed a history and physical exam of this patient and discussed the management with the resident, Dr. Olvera . I have reviewed the resident note and agree with the documented findings and plan of care.
I have performed a history and physical exam of this patient and discussed the management with the resident, Dr. Olvera. I have reviewed the resident note and agree with the documented findings and plan of care.

## 2017-06-29 NOTE — PROGRESS NOTE ADULT - PROBLEM SELECTOR PLAN 6
Continue with olanzapine for now--lower extremity edema unlikely to be from medication, especially given patient says his legs have been edematous for months, prior to beginning this medication.  - Currently patient denies visual or auditory hallucinations.  - D/W Psych inpatient: patient has appt with outpatient psych Dr. Hampton tomorrow.

## 2017-06-29 NOTE — PROGRESS NOTE ADULT - PROBLEM SELECTOR PLAN 5
-c/w depakote 1500 mg qhs  - Valproic acid level wnl  -patient has appointment with Dr. Faye for f/u next week

## 2017-06-29 NOTE — PROGRESS NOTE ADULT - SUBJECTIVE AND OBJECTIVE BOX
Patient is a 61y old  Male who presents with a chief complaint of LE swelling (28 Jun 2017 12:47)      SUBJECTIVE / OVERNIGHT EVENTS: No overnight events. No complaints this AM. Patient denies CP, SOB. He is concerned that he should not be on psychiatric medications, however is amenable to discussing this with outpatient psychiatry. He feels his leg edema is much improved.      MEDICATIONS  (STANDING):  tamsulosin 0.4 milliGRAM(s) Oral at bedtime  OLANZapine 5 milliGRAM(s) Oral at bedtime  ALBUTerol    90 MICROgram(s) HFA Inhaler 2 Puff(s) Inhalation every 6 hours  tiotropium 18 MICROgram(s) Capsule 1 Capsule(s) Inhalation daily  buDESOnide 160 MICROgram(s)/formoterol 4.5 MICROgram(s) Inhaler 2 Puff(s) Inhalation two times a day  insulin lispro (HumaLOG) corrective regimen sliding scale   SubCutaneous three times a day before meals  insulin lispro (HumaLOG) corrective regimen sliding scale   SubCutaneous at bedtime  dextrose 5%. 1000 milliLiter(s) (50 mL/Hr) IV Continuous <Continuous>  dextrose 50% Injectable 12.5 Gram(s) IV Push once  dextrose 50% Injectable 25 Gram(s) IV Push once  dextrose 50% Injectable 25 Gram(s) IV Push once  furosemide   Injectable 40 milliGRAM(s) IV Push daily  enoxaparin Injectable 40 milliGRAM(s) SubCutaneous daily  diVALproex ER 1500 milliGRAM(s) Oral at bedtime    MEDICATIONS  (PRN):  dextrose Gel 1 Dose(s) Oral once PRN Blood Glucose LESS THAN 70 milliGRAM(s)/deciliter  glucagon  Injectable 1 milliGRAM(s) IntraMuscular once PRN Glucose LESS THAN 70 milligrams/deciliter      Vital Signs Last 24 Hrs  T(C): 37.1 (06-29-17 @ 11:02), Max: 37.1 (06-29-17 @ 11:02)  HR: 68 (06-29-17 @ 11:02) (68 - 85)  BP: 100/68 (06-29-17 @ 11:02) (100/68 - 122/83)  RR: 18 (06-29-17 @ 11:02) (18 - 18)  SpO2: 98% (06-29-17 @ 11:02) (96% - 98%)  CAPILLARY BLOOD GLUCOSE  98 (29 Jun 2017 07:24)  81 (28 Jun 2017 22:43)        I&O's Summary    28 Jun 2017 07:01  -  29 Jun 2017 07:00  --------------------------------------------------------  IN: 1200 mL / OUT: 650 mL / NET: 550 mL    29 Jun 2017 07:01  -  29 Jun 2017 11:28  --------------------------------------------------------  IN: 240 mL / OUT: 0 mL / NET: 240 mL        PHYSICAL EXAM  GENERAL: NAD, well-developed  NEURO: AO x3, PERRLA, EOMI, motor strength in tact in 4/4 extremities, sensation in tact  HEAD:  Atraumatic, Normocephalic  EYES: conjunctiva and sclera clear  NECK: Supple, No JVD, no lymphadenopathy, no thyromegaly  CHEST/LUNG: Clear to auscultation bilaterally; No wheezes, rales or rhonchi  HEART: Regular rate and rhythm; No murmurs, rubs, or gallops  ABDOMEN: Soft, Nontender, Nondistended; Bowel sounds present, no masses.  EXTREMITIES:  2+ Peripheral Pulses, No clubbing, cyanosis, or edema  SKIN: Warm, dry, in tact, no rashes or lesions  PSYCH: affect appropriate    LABS:                        11.9   6.15  )-----------( 110      ( 29 Jun 2017 07:08 )             37.4     06-29    140  |  102  |  21  ----------------------------<  93  4.4   |  27  |  0.65    Ca    8.5      29 Jun 2017 07:19  Phos  3.8     06-29  Mg     2.2     06-29    TPro  6.3  /  Alb  3.0<L>  /  TBili  0.3  /  DBili  x   /  AST  27  /  ALT  13  /  AlkPhos  58  06-29        CAPILLARY BLOOD GLUCOSE  98 (29 Jun 2017 07:24)  81 (28 Jun 2017 22:43)    ASHISH Olvera MD  PGY1  p#[phone]

## 2017-06-29 NOTE — PROGRESS NOTE ADULT - ASSESSMENT
61 male with PMH sarcoidosis, bronchiectasis, DM, cholecystectomy, perforated diverticulitis s/p Bright procedure and reversal,  R inguinal hernia repair with mesh, recent admission for seizures, and recent admission for psychosis, presents with increasing LE edema x 1 week, 2/2 new heart failure vs. more likely medication-related edema, given improvement after holding new psych meds x2 days
61 male with PMH sarcoidosis, bronchiectasis, DM, cholecystectomy, perforated diverticulitis s/p Bright procedure and reversal,  R inguinal hernia repair with mesh, recent admission for seizures, and recent admission for psychosis, presents with increasing LE edema x 1 week, 2/2 new heart failure vs. medication-related edema.
61 male with PMH sarcoidosis, bronchiectasis, DM, cholecystectomy, perforated diverticulitis s/p Bright procedure and reversal,  R inguinal hernia repair with mesh, recent admission for seizures, and recent admission for psychosis, presents with increasing LE edema x 1 week, 2/2 new heart failure vs. more likely medication-related edema, given improvement after holding new psych meds x2 days

## 2017-06-29 NOTE — PROGRESS NOTE ADULT - PROBLEM SELECTOR PLAN 1
B/l edema, R>L, with venous stasis changes, most likely due to venous stasis, given that echocardiogram was wnl and patient has no signs or symptoms to suggest new onset CHF. DVT has been r/o with leg dopplers. Less likely renal in setting of minimal protein loss as seen in UA. Less likely hepatic in setting of normal albumin and appropriate LFTs.  - Patient will be discharged on Lasix 40mg PO qd for now and will f/u with outpatient PMD, patient was advised to use vaseline/moisturizers daily as well as wear compression stockings.

## 2017-06-30 ENCOUNTER — OUTPATIENT (OUTPATIENT)
Dept: OUTPATIENT SERVICES | Facility: HOSPITAL | Age: 62
LOS: 1 days | Discharge: HOME | End: 2017-06-30
Payer: MEDICARE

## 2017-06-30 ENCOUNTER — MEDICATION RENEWAL (OUTPATIENT)
Age: 62
End: 2017-06-30

## 2017-06-30 DIAGNOSIS — S06.5X9A TRAUMATIC SUBDURAL HEMORRHAGE WITH LOSS OF CONSCIOUSNESS OF UNSPECIFIED DURATION, INITIAL ENCOUNTER: Chronic | ICD-10-CM

## 2017-06-30 DIAGNOSIS — Z98.890 OTHER SPECIFIED POSTPROCEDURAL STATES: Chronic | ICD-10-CM

## 2017-06-30 DIAGNOSIS — Z90.49 ACQUIRED ABSENCE OF OTHER SPECIFIED PARTS OF DIGESTIVE TRACT: Chronic | ICD-10-CM

## 2017-06-30 DIAGNOSIS — Z93.3 COLOSTOMY STATUS: Chronic | ICD-10-CM

## 2017-06-30 PROCEDURE — 90792 PSYCH DIAG EVAL W/MED SRVCS: CPT

## 2017-07-03 ENCOUNTER — TRANSCRIPTION ENCOUNTER (OUTPATIENT)
Age: 62
End: 2017-07-03

## 2017-07-03 DIAGNOSIS — F05 DELIRIUM DUE TO KNOWN PHYSIOLOGICAL CONDITION: ICD-10-CM

## 2017-07-06 ENCOUNTER — APPOINTMENT (OUTPATIENT)
Dept: FAMILY MEDICINE | Facility: CLINIC | Age: 62
End: 2017-07-06

## 2017-07-06 VITALS
RESPIRATION RATE: 14 BRPM | HEIGHT: 75 IN | DIASTOLIC BLOOD PRESSURE: 61 MMHG | OXYGEN SATURATION: 94 % | BODY MASS INDEX: 28.05 KG/M2 | WEIGHT: 225.6 LBS | SYSTOLIC BLOOD PRESSURE: 92 MMHG | HEART RATE: 72 BPM

## 2017-07-06 DIAGNOSIS — R06.89 OTHER ABNORMALITIES OF BREATHING: ICD-10-CM

## 2017-07-07 LAB
ALBUMIN SERPL ELPH-MCNC: 4 G/DL
ALP BLD-CCNC: 72 U/L
ALT SERPL-CCNC: 12 U/L
ANION GAP SERPL CALC-SCNC: 16 MMOL/L
APPEARANCE: CLEAR
AST SERPL-CCNC: 17 U/L
BACTERIA: NEGATIVE
BASOPHILS # BLD AUTO: 0.03 K/UL
BASOPHILS NFR BLD AUTO: 0.5 %
BILIRUB SERPL-MCNC: 0.3 MG/DL
BILIRUBIN URINE: NEGATIVE
BLOOD URINE: NEGATIVE
BUN SERPL-MCNC: 34 MG/DL
CALCIUM SERPL-MCNC: 9.1 MG/DL
CHLORIDE SERPL-SCNC: 102 MMOL/L
CHOLEST SERPL-MCNC: 119 MG/DL
CHOLEST/HDLC SERPL: 2.8 RATIO
CO2 SERPL-SCNC: 29 MMOL/L
COLOR: ABNORMAL
CREAT SERPL-MCNC: 0.85 MG/DL
EOSINOPHIL # BLD AUTO: 0.39 K/UL
EOSINOPHIL NFR BLD AUTO: 6.9 %
GLUCOSE QUALITATIVE U: NORMAL MG/DL
GLUCOSE SERPL-MCNC: 90 MG/DL
HCT VFR BLD CALC: 42 %
HDLC SERPL-MCNC: 43 MG/DL
HGB BLD-MCNC: 13 G/DL
IMM GRANULOCYTES NFR BLD AUTO: 0.5 %
KETONES URINE: NEGATIVE
LDLC SERPL CALC-MCNC: 56 MG/DL
LEUKOCYTE ESTERASE URINE: NEGATIVE
LYMPHOCYTES # BLD AUTO: 1.18 K/UL
LYMPHOCYTES NFR BLD AUTO: 20.9 %
MAN DIFF?: NORMAL
MCHC RBC-ENTMCNC: 26.7 PG
MCHC RBC-ENTMCNC: 31 GM/DL
MCV RBC AUTO: 86.2 FL
MICROSCOPIC-UA: NORMAL
MONOCYTES # BLD AUTO: 0.69 K/UL
MONOCYTES NFR BLD AUTO: 12.2 %
NEUTROPHILS # BLD AUTO: 3.33 K/UL
NEUTROPHILS NFR BLD AUTO: 59 %
NITRITE URINE: NEGATIVE
NT-PROBNP SERPL-MCNC: 32 PG/ML
PH URINE: 6
PLATELET # BLD AUTO: 142 K/UL
POTASSIUM SERPL-SCNC: 4.5 MMOL/L
PROT SERPL-MCNC: 7 G/DL
PROTEIN URINE: NEGATIVE MG/DL
RBC # BLD: 4.87 M/UL
RBC # FLD: 16.9 %
RED BLOOD CELLS URINE: 4 /HPF
SODIUM SERPL-SCNC: 147 MMOL/L
SPECIFIC GRAVITY URINE: 1.03
SQUAMOUS EPITHELIAL CELLS: 2 /HPF
TRIGL SERPL-MCNC: 98 MG/DL
TSH SERPL-ACNC: 7.27 UIU/ML
UROBILINOGEN URINE: NORMAL MG/DL
VALPROATE SERPL-MCNC: 59 UG/ML
WBC # FLD AUTO: 5.65 K/UL
WHITE BLOOD CELLS URINE: 7 /HPF

## 2017-07-14 PROCEDURE — 99214 OFFICE O/P EST MOD 30 MIN: CPT

## 2017-07-25 ENCOUNTER — MEDICATION RENEWAL (OUTPATIENT)
Age: 62
End: 2017-07-25

## 2017-07-28 ENCOUNTER — APPOINTMENT (OUTPATIENT)
Dept: SURGERY | Facility: CLINIC | Age: 62
End: 2017-07-28

## 2017-08-09 ENCOUNTER — OTHER (OUTPATIENT)
Age: 62
End: 2017-08-09

## 2017-08-24 PROCEDURE — 99214 OFFICE O/P EST MOD 30 MIN: CPT

## 2017-08-25 ENCOUNTER — APPOINTMENT (OUTPATIENT)
Dept: PULMONOLOGY | Facility: CLINIC | Age: 62
End: 2017-08-25
Payer: MEDICARE

## 2017-08-25 VITALS
OXYGEN SATURATION: 94 % | TEMPERATURE: 97.8 F | BODY MASS INDEX: 28.1 KG/M2 | HEIGHT: 75 IN | RESPIRATION RATE: 16 BRPM | DIASTOLIC BLOOD PRESSURE: 64 MMHG | SYSTOLIC BLOOD PRESSURE: 106 MMHG | HEART RATE: 66 BPM | WEIGHT: 226 LBS

## 2017-08-25 DIAGNOSIS — Z87.891 PERSONAL HISTORY OF NICOTINE DEPENDENCE: ICD-10-CM

## 2017-08-25 PROCEDURE — 99214 OFFICE O/P EST MOD 30 MIN: CPT

## 2017-08-25 PROCEDURE — 99204 OFFICE O/P NEW MOD 45 MIN: CPT

## 2017-08-25 RX ORDER — ALBUTEROL SULFATE 90 UG/1
108 (90 BASE) AEROSOL, METERED RESPIRATORY (INHALATION)
Qty: 18 | Refills: 0 | Status: DISCONTINUED | COMMUNITY
Start: 2017-02-06 | End: 2017-08-25

## 2017-08-25 RX ORDER — FUROSEMIDE 40 MG/1
40 TABLET ORAL
Qty: 30 | Refills: 0 | Status: DISCONTINUED | COMMUNITY
Start: 2017-06-29 | End: 2017-08-25

## 2017-09-14 ENCOUNTER — APPOINTMENT (OUTPATIENT)
Dept: FAMILY MEDICINE | Facility: CLINIC | Age: 62
End: 2017-09-14
Payer: MEDICARE

## 2017-09-14 VITALS
OXYGEN SATURATION: 98 % | BODY MASS INDEX: 30.62 KG/M2 | HEIGHT: 73 IN | SYSTOLIC BLOOD PRESSURE: 104 MMHG | RESPIRATION RATE: 15 BRPM | WEIGHT: 231 LBS | HEART RATE: 78 BPM | DIASTOLIC BLOOD PRESSURE: 64 MMHG

## 2017-09-14 PROCEDURE — 90670 PCV13 VACCINE IM: CPT

## 2017-09-14 PROCEDURE — 99214 OFFICE O/P EST MOD 30 MIN: CPT | Mod: 25

## 2017-09-14 PROCEDURE — G0009: CPT

## 2017-09-21 PROCEDURE — 99214 OFFICE O/P EST MOD 30 MIN: CPT

## 2017-12-28 PROCEDURE — 99214 OFFICE O/P EST MOD 30 MIN: CPT

## 2018-01-24 ENCOUNTER — RX RENEWAL (OUTPATIENT)
Age: 63
End: 2018-01-24

## 2018-01-29 NOTE — PATIENT PROFILE ADULT. - COMFORT LEVEL, ACCEPTABLE
ANTICOAGULATION FOLLOW-UP CLINIC VISIT    Patient Name:  Markie Shepard  Date:  1/29/2018  Contact Type:  Face to Face    SUBJECTIVE:     Patient Findings     Positives No Problem Findings           OBJECTIVE    INR Protime   Date Value Ref Range Status   01/29/2018 2.3 (A) 0.86 - 1.14 Final       ASSESSMENT / PLAN  INR assessment THER    Recheck INR In: 6 WEEKS    INR Location Clinic      Anticoagulation Summary as of 1/29/2018     INR goal 2.0-3.0   Today's INR 2.3   Maintenance plan 7.5 mg (5 mg x 1.5) on Mon, Fri; 5 mg (5 mg x 1) all other days   Full instructions 7.5 mg on Mon, Fri; 5 mg all other days   Weekly total 40 mg   No change documented Chelsie Encinas RN   Plan last modified Chelsie Encinas RN (3/21/2016)   Next INR check 3/12/2018   Priority INR   Target end date     Indications   Long-term (current) use of anticoagulants [Z79.01] [Z79.01]  Atrial fibrillation (H) [I48.91]         Anticoagulation Episode Summary     INR check location     Preferred lab     Send INR reminders to [facility]    Comments PREFERS MARKIE!      Anticoagulation Care Providers     Provider Role Specialty Phone number    Mali Patel MD [facility] Internal Medicine [phone]            See the Encounter Report to view Anticoagulation Flowsheet and Dosing Calendar (Go to Encounters tab in chart review, and find the Anticoagulation Therapy Visit)    Patient made aware if signs of clotting (pain, tenderness, swelling, or color change in any extremity) AND/OR bleeding occur (nosebleeds, bleeding gums, bruising, or blood in stool or urine) to notify provider & seek medical attention. If severe symptoms develop, such as major bleeding, chest pain, shortness of breath, fall, trauma or s/s of stroke, patient to call 911 immediately.       Chelsie Encinas RN               
3

## 2018-01-30 ENCOUNTER — APPOINTMENT (OUTPATIENT)
Dept: ORTHOPEDIC SURGERY | Facility: CLINIC | Age: 63
End: 2018-01-30
Payer: MEDICARE

## 2018-01-30 VITALS
WEIGHT: 234 LBS | HEIGHT: 73 IN | SYSTOLIC BLOOD PRESSURE: 113 MMHG | BODY MASS INDEX: 31.01 KG/M2 | DIASTOLIC BLOOD PRESSURE: 76 MMHG | HEART RATE: 69 BPM

## 2018-01-30 DIAGNOSIS — M25.572 PAIN IN RIGHT ANKLE AND JOINTS OF RIGHT FOOT: ICD-10-CM

## 2018-01-30 DIAGNOSIS — M25.571 PAIN IN RIGHT ANKLE AND JOINTS OF RIGHT FOOT: ICD-10-CM

## 2018-01-30 PROCEDURE — 99204 OFFICE O/P NEW MOD 45 MIN: CPT

## 2018-01-30 PROCEDURE — 73630 X-RAY EXAM OF FOOT: CPT | Mod: 50

## 2018-01-30 PROCEDURE — 73610 X-RAY EXAM OF ANKLE: CPT | Mod: 50

## 2018-02-16 ENCOUNTER — APPOINTMENT (OUTPATIENT)
Dept: FAMILY MEDICINE | Facility: CLINIC | Age: 63
End: 2018-02-16
Payer: MEDICARE

## 2018-02-16 VITALS
DIASTOLIC BLOOD PRESSURE: 64 MMHG | SYSTOLIC BLOOD PRESSURE: 104 MMHG | RESPIRATION RATE: 16 BRPM | BODY MASS INDEX: 30.89 KG/M2 | OXYGEN SATURATION: 98 % | WEIGHT: 233.06 LBS | HEIGHT: 73 IN | HEART RATE: 78 BPM

## 2018-02-16 PROCEDURE — 99213 OFFICE O/P EST LOW 20 MIN: CPT

## 2018-03-26 ENCOUNTER — RX RENEWAL (OUTPATIENT)
Age: 63
End: 2018-03-26

## 2018-03-29 PROCEDURE — 99214 OFFICE O/P EST MOD 30 MIN: CPT

## 2018-04-05 ENCOUNTER — RX RENEWAL (OUTPATIENT)
Age: 63
End: 2018-04-05

## 2018-05-29 ENCOUNTER — CLINICAL ADVICE (OUTPATIENT)
Age: 63
End: 2018-05-29

## 2018-05-31 ENCOUNTER — RX RENEWAL (OUTPATIENT)
Age: 63
End: 2018-05-31

## 2018-06-01 ENCOUNTER — MEDICATION RENEWAL (OUTPATIENT)
Age: 63
End: 2018-06-01

## 2018-06-13 ENCOUNTER — APPOINTMENT (OUTPATIENT)
Dept: FAMILY MEDICINE | Facility: CLINIC | Age: 63
End: 2018-06-13

## 2018-06-18 ENCOUNTER — APPOINTMENT (OUTPATIENT)
Dept: FAMILY MEDICINE | Facility: CLINIC | Age: 63
End: 2018-06-18
Payer: MEDICARE

## 2018-06-18 VITALS
DIASTOLIC BLOOD PRESSURE: 74 MMHG | RESPIRATION RATE: 16 BRPM | HEIGHT: 73 IN | HEART RATE: 74 BPM | SYSTOLIC BLOOD PRESSURE: 92 MMHG | BODY MASS INDEX: 30.39 KG/M2 | WEIGHT: 229.31 LBS | OXYGEN SATURATION: 97 %

## 2018-06-18 DIAGNOSIS — N40.0 BENIGN PROSTATIC HYPERPLASIA WITHOUT LOWER URINARY TRACT SYMPMS: ICD-10-CM

## 2018-06-18 PROCEDURE — 99213 OFFICE O/P EST LOW 20 MIN: CPT

## 2018-06-18 NOTE — PHYSICAL EXAM
[No Acute Distress] : no acute distress [Well Nourished] : well nourished [Well Developed] : well developed [Well-Appearing] : well-appearing [PERRL] : pupils equal round and reactive to light [Normal Oropharynx] : the oropharynx was normal [No JVD] : no jugular venous distention [Supple] : supple [No Lymphadenopathy] : no lymphadenopathy [Thyroid Normal, No Nodules] : the thyroid was normal and there were no nodules present [No Respiratory Distress] : no respiratory distress  [No Accessory Muscle Use] : no accessory muscle use [Regular Rhythm] : with a regular rhythm [No Murmur] : no murmur heard [No Edema] : there was no peripheral edema [Soft] : abdomen soft [Non Tender] : non-tender [No HSM] : no HSM [Normal Supraclavicular Nodes] : no supraclavicular lymphadenopathy [Normal Posterior Cervical Nodes] : no posterior cervical lymphadenopathy [Normal Anterior Cervical Nodes] : no anterior cervical lymphadenopathy [No CVA Tenderness] : no CVA  tenderness [No Spinal Tenderness] : no spinal tenderness [Speech Grossly Normal] : speech grossly normal [Normal Mood] : the mood was normal [de-identified] : Rheumatoid defoties

## 2018-06-18 NOTE — HISTORY OF PRESENT ILLNESS
[FreeTextEntry1] : Thrombocytopenia, BHP, ED [de-identified] : Patient is here in followup on his thrombocytopenia also rheumatoid arthritis and he is complaining of some difficulty urinating in the evenings also some erectile dysfunction otherwise he feels well he has followed up with the hematology with regard to his platelet issue which has apparently adjusted itself with the removal of some of his medications by rheumatology. Review of systems otherwise is unremarkable

## 2018-06-18 NOTE — ASSESSMENT
[FreeTextEntry1] : The patient will have routine laboratory PT particularly his CBC and TSH level TSH had been elevated last time it was checked but not severely so. He also referred to urology for evaluation of his BHP and ED via systems otherwise unremarkable patient followup in several months or as needed

## 2018-07-09 PROCEDURE — 99213 OFFICE O/P EST LOW 20 MIN: CPT

## 2018-07-18 ENCOUNTER — APPOINTMENT (OUTPATIENT)
Dept: FAMILY MEDICINE | Facility: CLINIC | Age: 63
End: 2018-07-18

## 2018-07-30 ENCOUNTER — APPOINTMENT (OUTPATIENT)
Dept: SURGERY | Facility: CLINIC | Age: 63
End: 2018-07-30
Payer: MEDICARE

## 2018-07-30 ENCOUNTER — RX RENEWAL (OUTPATIENT)
Age: 63
End: 2018-07-30

## 2018-07-30 VITALS
HEART RATE: 53 BPM | SYSTOLIC BLOOD PRESSURE: 134 MMHG | RESPIRATION RATE: 16 BRPM | OXYGEN SATURATION: 98 % | DIASTOLIC BLOOD PRESSURE: 75 MMHG | TEMPERATURE: 97.6 F

## 2018-07-30 DIAGNOSIS — Z87.39 PERSONAL HISTORY OF OTHER DISEASES OF THE MUSCULOSKELETAL SYSTEM AND CONNECTIVE TISSUE: ICD-10-CM

## 2018-07-30 DIAGNOSIS — T14.8XXA OTHER INJURY OF UNSPECIFIED BODY REGION, INITIAL ENCOUNTER: ICD-10-CM

## 2018-07-30 DIAGNOSIS — Z86.79 PERSONAL HISTORY OF OTHER DISEASES OF THE CIRCULATORY SYSTEM: ICD-10-CM

## 2018-07-30 PROBLEM — I62.00 NONTRAUMATIC SUBDURAL HEMORRHAGE, UNSPECIFIED: Chronic | Status: ACTIVE | Noted: 2017-06-03

## 2018-07-30 PROBLEM — J47.9 BRONCHIECTASIS, UNCOMPLICATED: Chronic | Status: ACTIVE | Noted: 2017-06-03

## 2018-07-30 PROBLEM — K40.90 UNILATERAL INGUINAL HERNIA, WITHOUT OBSTRUCTION OR GANGRENE, NOT SPECIFIED AS RECURRENT: Chronic | Status: ACTIVE | Noted: 2017-06-03

## 2018-07-30 PROBLEM — D86.9 SARCOIDOSIS, UNSPECIFIED: Chronic | Status: ACTIVE | Noted: 2017-06-03

## 2018-07-30 PROCEDURE — 99215 OFFICE O/P EST HI 40 MIN: CPT

## 2018-08-16 ENCOUNTER — APPOINTMENT (OUTPATIENT)
Dept: FAMILY MEDICINE | Facility: CLINIC | Age: 63
End: 2018-08-16
Payer: MEDICARE

## 2018-08-16 VITALS — HEART RATE: 68 BPM | RESPIRATION RATE: 12 BRPM

## 2018-08-16 VITALS — DIASTOLIC BLOOD PRESSURE: 80 MMHG | SYSTOLIC BLOOD PRESSURE: 122 MMHG

## 2018-08-16 DIAGNOSIS — K43.9 VENTRAL HERNIA W/OUT OBSTRUCTION OR GANGRENE: ICD-10-CM

## 2018-08-16 PROCEDURE — 99213 OFFICE O/P EST LOW 20 MIN: CPT

## 2018-08-16 NOTE — HISTORY OF PRESENT ILLNESS
[FreeTextEntry8] : Patient here as a walk-in requesting to be seen because of some dizziness and abdominal discomfort of note is that he has recently seen Dr. Carey a surgeon for a nonhealing old surgical wound and surgical revision has been recommended he also has had long-standing dizziness since he had a seizure sometime in the bases particularly exacerbated when he gets out of bed suddenly or moves quickly there has been no fever or chills or systemic symptoms no nausea or vomiting some abdominal discomfort but no abdominal pain

## 2018-08-16 NOTE — REVIEW OF SYSTEMS
[Fever] : no fever [Chills] : no chills [Fatigue] : no fatigue [Night Sweats] : no night sweats [Sore Throat] : no sore throat [Chest Pain] : no chest pain [Palpitations] : no palpitations [Claudication] : no  leg claudication [Lower Ext Edema] : no lower extremity edema [Paroysmal Nocturnal Dyspnea] : no paroysmal nocturnal dyspnea [Wheezing] : no wheezing [Cough] : no cough [Dyspnea on Exertion] : not dyspnea on exertion [Abdominal Pain] : no abdominal pain [Nausea] : no nausea [Vomiting] : no vomiting [Headache] : no headache [Dizziness] : dizziness

## 2018-08-16 NOTE — PHYSICAL EXAM
[No Acute Distress] : no acute distress [Well Nourished] : well nourished [Well Developed] : well developed [Well-Appearing] : well-appearing [Normal Sclera/Conjunctiva] : normal sclera/conjunctiva [PERRL] : pupils equal round and reactive to light [Normal Outer Ear/Nose] : the outer ears and nose were normal in appearance [Normal Oropharynx] : the oropharynx was normal [No JVD] : no jugular venous distention [Supple] : supple [No Respiratory Distress] : no respiratory distress  [Clear to Auscultation] : lungs were clear to auscultation bilaterally [Normal Rate] : normal rate  [Regular Rhythm] : with a regular rhythm [Soft] : abdomen soft [Non Tender] : non-tender [Non-distended] : non-distended [No HSM] : no HSM [Normal Bowel Sounds] : normal bowel sounds [Normal Supraclavicular Nodes] : no supraclavicular lymphadenopathy [Normal Anterior Cervical Nodes] : no anterior cervical lymphadenopathy [No CVA Tenderness] : no CVA  tenderness [de-identified] : wound disruption at old suture line

## 2018-08-16 NOTE — ASSESSMENT
[FreeTextEntry1] : Patient to here complaining of some mild abdominal discomfort some irregularity of his bowels and dizziness. All of these symptoms are chronic in nature the patient has been evaluated recently by surgery and told he needs a revision of his hernia repair and scar as this has broken down. This has been an issue since his hepatic encephalopathy he will be seeing his neurologist next week. Examination reveals no focal findings there is a coin-shaped ulcer at the old incision line which has been described by surgery in and out there is no sign of active infection the abdomen is nontender the patient has no systemic symptoms. He has been told to followup with his neurologist with regard to the dizziness and to followup with surgeon with regard to the abdominal discomfort and disruption of the wound recent laboratory work approximately one month ago was unremarkable he did have a mild elevation of TSH T7 and change and has been seeing an endocrinologist. These findings were discussed with his sister who spoke to me after the visit patient has been told for the symptoms worse and has been reiterated with the sister that they should call back and we'll proceed to the emergency room diet should be kept light

## 2018-08-29 ENCOUNTER — APPOINTMENT (OUTPATIENT)
Dept: FAMILY MEDICINE | Facility: CLINIC | Age: 63
End: 2018-08-29
Payer: MEDICARE

## 2018-08-29 VITALS
HEART RATE: 72 BPM | DIASTOLIC BLOOD PRESSURE: 68 MMHG | HEIGHT: 73 IN | BODY MASS INDEX: 31.42 KG/M2 | OXYGEN SATURATION: 97 % | WEIGHT: 237.06 LBS | SYSTOLIC BLOOD PRESSURE: 100 MMHG | RESPIRATION RATE: 12 BRPM

## 2018-08-29 PROCEDURE — G0439: CPT

## 2018-08-29 RX ORDER — DIVALPROEX SODIUM 500 MG/1
500 TABLET, DELAYED RELEASE ORAL
Refills: 0 | Status: ACTIVE | COMMUNITY

## 2018-08-29 RX ORDER — SILVER SULFADIAZINE 10 MG/G
1 CREAM TOPICAL
Qty: 1 | Refills: 2 | Status: ACTIVE | COMMUNITY
Start: 2017-09-14 | End: 1900-01-01

## 2018-08-29 NOTE — ASSESSMENT
[FreeTextEntry1] : PatientHas recently been started on Synthroid due to a TSH level of over 11 otherwise unremarkable he will be referred for colonoscopy which she has never had also he'll be given a single prong cane and be given a prescription for the Zostrix immunization series

## 2018-08-29 NOTE — HISTORY OF PRESENT ILLNESS
[FreeTextEntry1] : BG [de-identified] : Patient is here for an annual wellness visit he is generally feeling well his main issues revolve around a hypoxic encephalopathy and seizure disorder and also a nonhealing surgical wound in the abdomen he is quite functional review of systems is unremarkable other than his major diagnoses

## 2018-08-29 NOTE — PHYSICAL EXAM
[No Acute Distress] : no acute distress [Well Nourished] : well nourished [Well Developed] : well developed [Normal Sclera/Conjunctiva] : normal sclera/conjunctiva [PERRL] : pupils equal round and reactive to light [Normal Outer Ear/Nose] : the outer ears and nose were normal in appearance [Normal Oropharynx] : the oropharynx was normal [No JVD] : no jugular venous distention [Supple] : supple [No Respiratory Distress] : no respiratory distress  [Clear to Auscultation] : lungs were clear to auscultation bilaterally [No Accessory Muscle Use] : no accessory muscle use [Normal Rate] : normal rate  [Regular Rhythm] : with a regular rhythm [Normal S1, S2] : normal S1 and S2 [No Murmur] : no murmur heard [No Edema] : there was no peripheral edema [Soft] : abdomen soft [de-identified] : Ulcer in unhealed scar

## 2018-08-29 NOTE — HEALTH RISK ASSESSMENT
[Fair] : ~his/her~ current health as fair  [Good] : ~his/her~  mood as  good [One fall no injury in past year] : Patient reported one fall in the past year without injury [0] : 2) Feeling down, depressed, or hopeless: Not at all (0) [None] : None [Alone] : lives alone [Single] : single [Feels Safe at Home] : Feels safe at home [Fully functional (bathing, dressing, toileting, transferring, walking, feeding)] : Fully functional (bathing, dressing, toileting, transferring, walking, feeding) [Fully functional (using the telephone, shopping, preparing meals, housekeeping, doing laundry, using] : Fully functional and needs no help or supervision to perform IADLs (using the telephone, shopping, preparing meals, housekeeping, doing laundry, using transportation, managing medications and managing finances) [Sunscreen] : uses sunscreen [Discussed at today's visit] : Advance Directives Discussed at today's visit [Designated Healthcare Proxy] : Designated healthcare proxy [Relationship: ___] : Relationship: [unfilled] [] : No [Change in mental status noted] : No change in mental status noted [Language] : denies difficulty with language [Behavior] : denies difficulty with behavior [Learning/Retaining New Information] : denies difficulty learning/retaining new information [Handling Complex Tasks] : denies difficulty handling complex tasks [Reasoning] : denies difficulty with reasoning [Spatial Ability and Orientation] : denies difficulty with spatial ability and orientation [Reports changes in hearing] : Reports no changes in hearing [Reports changes in vision] : Reports no changes in vision [Reports changes in dental health] : Reports no changes in dental health [Smoke Detector] : no smoke detector [Carbon Monoxide Detector] : no carbon monoxide detector [Guns at Home] : no guns at home [Seat Belt] : does not use seat belt [MammogramComments] : na [PapSmearComments] : na [BoneDensityComments] : na [ColonoscopyComments] : will schedule [HIVDate] : 2005 [HepatitisCDate] : 2005 [FreeTextEntry4] : will discuss further

## 2018-09-24 ENCOUNTER — APPOINTMENT (OUTPATIENT)
Dept: SURGERY | Facility: CLINIC | Age: 63
End: 2018-09-24

## 2018-10-01 PROCEDURE — 99214 OFFICE O/P EST MOD 30 MIN: CPT

## 2018-10-02 ENCOUNTER — RX RENEWAL (OUTPATIENT)
Age: 63
End: 2018-10-02

## 2018-10-08 ENCOUNTER — APPOINTMENT (OUTPATIENT)
Dept: ENDOCRINOLOGY | Facility: CLINIC | Age: 63
End: 2018-10-08

## 2018-12-03 ENCOUNTER — RX RENEWAL (OUTPATIENT)
Age: 63
End: 2018-12-03

## 2018-12-06 ENCOUNTER — RX RENEWAL (OUTPATIENT)
Age: 63
End: 2018-12-06

## 2018-12-06 PROCEDURE — 99213 OFFICE O/P EST LOW 20 MIN: CPT

## 2018-12-20 ENCOUNTER — EMERGENCY (EMERGENCY)
Facility: HOSPITAL | Age: 63
LOS: 1 days | Discharge: HOME | End: 2018-12-20
Attending: EMERGENCY MEDICINE
Payer: MEDICARE

## 2018-12-20 VITALS
HEART RATE: 78 BPM | TEMPERATURE: 97 F | SYSTOLIC BLOOD PRESSURE: 114 MMHG | HEIGHT: 74 IN | WEIGHT: 274.92 LBS | RESPIRATION RATE: 18 BRPM | OXYGEN SATURATION: 94 % | DIASTOLIC BLOOD PRESSURE: 80 MMHG

## 2018-12-20 DIAGNOSIS — Z93.3 COLOSTOMY STATUS: Chronic | ICD-10-CM

## 2018-12-20 DIAGNOSIS — Z98.890 OTHER SPECIFIED POSTPROCEDURAL STATES: Chronic | ICD-10-CM

## 2018-12-20 DIAGNOSIS — S06.5X9A TRAUMATIC SUBDURAL HEMORRHAGE WITH LOSS OF CONSCIOUSNESS OF UNSPECIFIED DURATION, INITIAL ENCOUNTER: Chronic | ICD-10-CM

## 2018-12-20 DIAGNOSIS — Z90.49 ACQUIRED ABSENCE OF OTHER SPECIFIED PARTS OF DIGESTIVE TRACT: Chronic | ICD-10-CM

## 2018-12-20 PROCEDURE — 99284 EMERGENCY DEPT VISIT MOD MDM: CPT | Mod: 25

## 2018-12-20 NOTE — ED ADULT TRIAGE NOTE - CHIEF COMPLAINT QUOTE
Pt reports L lower leg swelling, redness and pain.  Pt states he noticed some discharge from the wound on L lower leg.  Pt states he doesn't remember hitting his leg on anything

## 2018-12-21 ENCOUNTER — APPOINTMENT (OUTPATIENT)
Dept: FAMILY MEDICINE | Facility: CLINIC | Age: 63
End: 2018-12-21
Payer: MEDICARE

## 2018-12-21 VITALS
SYSTOLIC BLOOD PRESSURE: 100 MMHG | DIASTOLIC BLOOD PRESSURE: 67 MMHG | TEMPERATURE: 98 F | HEART RATE: 71 BPM | OXYGEN SATURATION: 96 % | RESPIRATION RATE: 19 BRPM

## 2018-12-21 VITALS
BODY MASS INDEX: 29.34 KG/M2 | HEIGHT: 73 IN | OXYGEN SATURATION: 96 % | DIASTOLIC BLOOD PRESSURE: 60 MMHG | RESPIRATION RATE: 16 BRPM | HEART RATE: 72 BPM | SYSTOLIC BLOOD PRESSURE: 92 MMHG | WEIGHT: 221.38 LBS

## 2018-12-21 DIAGNOSIS — I87.8 OTHER SPECIFIED DISORDERS OF VEINS: ICD-10-CM

## 2018-12-21 LAB
ALBUMIN SERPL ELPH-MCNC: 3.8 G/DL — SIGNIFICANT CHANGE UP (ref 3.3–5)
ALP SERPL-CCNC: 65 U/L — SIGNIFICANT CHANGE UP (ref 40–120)
ALT FLD-CCNC: 27 U/L — SIGNIFICANT CHANGE UP (ref 10–45)
ANION GAP SERPL CALC-SCNC: 14 MMOL/L — SIGNIFICANT CHANGE UP (ref 5–17)
APTT BLD: 32.6 SEC — SIGNIFICANT CHANGE UP (ref 27.5–36.3)
AST SERPL-CCNC: 49 U/L — HIGH (ref 10–40)
BASOPHILS # BLD AUTO: 0 K/UL — SIGNIFICANT CHANGE UP (ref 0–0.2)
BASOPHILS NFR BLD AUTO: 0.4 % — SIGNIFICANT CHANGE UP (ref 0–2)
BILIRUB SERPL-MCNC: 0.4 MG/DL — SIGNIFICANT CHANGE UP (ref 0.2–1.2)
BUN SERPL-MCNC: 22 MG/DL — SIGNIFICANT CHANGE UP (ref 7–23)
CALCIUM SERPL-MCNC: 9.1 MG/DL — SIGNIFICANT CHANGE UP (ref 8.4–10.5)
CHLORIDE SERPL-SCNC: 100 MMOL/L — SIGNIFICANT CHANGE UP (ref 96–108)
CO2 SERPL-SCNC: 29 MMOL/L — SIGNIFICANT CHANGE UP (ref 22–31)
CREAT SERPL-MCNC: 0.78 MG/DL — SIGNIFICANT CHANGE UP (ref 0.5–1.3)
EOSINOPHIL # BLD AUTO: 0.2 K/UL — SIGNIFICANT CHANGE UP (ref 0–0.5)
EOSINOPHIL NFR BLD AUTO: 2.2 % — SIGNIFICANT CHANGE UP (ref 0–6)
GAS PNL BLDV: SIGNIFICANT CHANGE UP
GLUCOSE SERPL-MCNC: 129 MG/DL — HIGH (ref 70–99)
HCT VFR BLD CALC: 46.2 % — SIGNIFICANT CHANGE UP (ref 39–50)
HGB BLD-MCNC: 14.9 G/DL — SIGNIFICANT CHANGE UP (ref 13–17)
INR BLD: 1.01 RATIO — SIGNIFICANT CHANGE UP (ref 0.88–1.16)
LACTATE BLDV-MCNC: 1.6 MMOL/L — SIGNIFICANT CHANGE UP (ref 0.7–2)
LYMPHOCYTES # BLD AUTO: 1.7 K/UL — SIGNIFICANT CHANGE UP (ref 1–3.3)
LYMPHOCYTES # BLD AUTO: 22.1 % — SIGNIFICANT CHANGE UP (ref 13–44)
MCHC RBC-ENTMCNC: 29.8 PG — SIGNIFICANT CHANGE UP (ref 27–34)
MCHC RBC-ENTMCNC: 32.3 GM/DL — SIGNIFICANT CHANGE UP (ref 32–36)
MCV RBC AUTO: 92.2 FL — SIGNIFICANT CHANGE UP (ref 80–100)
MONOCYTES # BLD AUTO: 0.8 K/UL — SIGNIFICANT CHANGE UP (ref 0–0.9)
MONOCYTES NFR BLD AUTO: 11 % — SIGNIFICANT CHANGE UP (ref 2–14)
NEUTROPHILS # BLD AUTO: 4.8 K/UL — SIGNIFICANT CHANGE UP (ref 1.8–7.4)
NEUTROPHILS NFR BLD AUTO: 64.2 % — SIGNIFICANT CHANGE UP (ref 43–77)
PLATELET # BLD AUTO: 83 K/UL — LOW (ref 150–400)
POTASSIUM SERPL-MCNC: 3.5 MMOL/L — SIGNIFICANT CHANGE UP (ref 3.5–5.3)
POTASSIUM SERPL-SCNC: 3.5 MMOL/L — SIGNIFICANT CHANGE UP (ref 3.5–5.3)
PROT SERPL-MCNC: 7 G/DL — SIGNIFICANT CHANGE UP (ref 6–8.3)
PROTHROM AB SERPL-ACNC: 11.6 SEC — SIGNIFICANT CHANGE UP (ref 10–12.9)
RBC # BLD: 5.01 M/UL — SIGNIFICANT CHANGE UP (ref 4.2–5.8)
RBC # FLD: 12.9 % — SIGNIFICANT CHANGE UP (ref 10.3–14.5)
SODIUM SERPL-SCNC: 143 MMOL/L — SIGNIFICANT CHANGE UP (ref 135–145)
WBC # BLD: 7.5 K/UL — SIGNIFICANT CHANGE UP (ref 3.8–10.5)
WBC # FLD AUTO: 7.5 K/UL — SIGNIFICANT CHANGE UP (ref 3.8–10.5)

## 2018-12-21 PROCEDURE — 84132 ASSAY OF SERUM POTASSIUM: CPT

## 2018-12-21 PROCEDURE — 93971 EXTREMITY STUDY: CPT

## 2018-12-21 PROCEDURE — 82803 BLOOD GASES ANY COMBINATION: CPT

## 2018-12-21 PROCEDURE — 73590 X-RAY EXAM OF LOWER LEG: CPT

## 2018-12-21 PROCEDURE — 73590 X-RAY EXAM OF LOWER LEG: CPT | Mod: 26,LT

## 2018-12-21 PROCEDURE — 82435 ASSAY OF BLOOD CHLORIDE: CPT

## 2018-12-21 PROCEDURE — 83880 ASSAY OF NATRIURETIC PEPTIDE: CPT

## 2018-12-21 PROCEDURE — 82330 ASSAY OF CALCIUM: CPT

## 2018-12-21 PROCEDURE — 85610 PROTHROMBIN TIME: CPT

## 2018-12-21 PROCEDURE — 85027 COMPLETE CBC AUTOMATED: CPT

## 2018-12-21 PROCEDURE — 85730 THROMBOPLASTIN TIME PARTIAL: CPT

## 2018-12-21 PROCEDURE — 99284 EMERGENCY DEPT VISIT MOD MDM: CPT

## 2018-12-21 PROCEDURE — 87040 BLOOD CULTURE FOR BACTERIA: CPT

## 2018-12-21 PROCEDURE — 85014 HEMATOCRIT: CPT

## 2018-12-21 PROCEDURE — 80053 COMPREHEN METABOLIC PANEL: CPT

## 2018-12-21 PROCEDURE — 93005 ELECTROCARDIOGRAM TRACING: CPT

## 2018-12-21 PROCEDURE — 83605 ASSAY OF LACTIC ACID: CPT

## 2018-12-21 PROCEDURE — 84295 ASSAY OF SERUM SODIUM: CPT

## 2018-12-21 PROCEDURE — 99213 OFFICE O/P EST LOW 20 MIN: CPT

## 2018-12-21 PROCEDURE — 93971 EXTREMITY STUDY: CPT | Mod: 26

## 2018-12-21 PROCEDURE — 82947 ASSAY GLUCOSE BLOOD QUANT: CPT

## 2018-12-21 RX ORDER — VANCOMYCIN HCL 1 G
1000 VIAL (EA) INTRAVENOUS ONCE
Qty: 0 | Refills: 0 | Status: DISCONTINUED | OUTPATIENT
Start: 2018-12-21 | End: 2018-12-21

## 2018-12-21 RX ORDER — CEFEPIME 1 G/1
1000 INJECTION, POWDER, FOR SOLUTION INTRAMUSCULAR; INTRAVENOUS ONCE
Qty: 0 | Refills: 0 | Status: DISCONTINUED | OUTPATIENT
Start: 2018-12-21 | End: 2018-12-21

## 2018-12-21 NOTE — ED PROVIDER NOTE - ATTENDING CONTRIBUTION TO CARE
63 yom pmhx dm, sarcoid, prior sdh, p/w left leg swelling and bruising to anteiror portion of left lower shin. pt denies signif pain, states he doesn't recall any injuries to the area but is not sure. states noted it this am. states does spend a lot of time in bed. no cp or sob. no ac use. does have some chornic swelling for which he takes water pills. no f/c.     ROS:   constitutional - no fever, no chills  eyes - no visual changes, no redness  eent - no sore throat, no nasal congestion  cvs - no chest pain, + leg swelling  resp - no shortness of breath, no cough  gi - no abdominal pain, no vomiting, no diarrhea  gu - no dysuria, no hematuria  msk - no acute back pain, no joint swelling  skin - no rashes, no jaundice  neuro - no headache, no focal weakness  psych - no acute mental health issue     Physical Exam:   constitutional - well appearing, awake and alert, oriented x3  head - no external evidence of trauma  cvs - rrr, no murmurs, no peripheral edema  resp - breath sounds clear and equal bilat  gi - abdomen soft and nontender, no rigidity, guarding or rebound, bowel sounds present  msk - moving all extremities spontaneously  neuro - alert and oriented x3, no focal deficits, CNs 2-12 grossly intact  skin- no jaundice, warm and dry  psych - mood and affect wnl, no apparent risk to self or others   LLE - anterior skin tear which appears days old and healing w surrounding ecchymosis and mild swelling. no signif distal swelling, no calf tenderness.     likely old trauma perhaps few days ago w skin tear and surrounding ecchymosis/ contusion that is healing; pt likely does not recall trauma.   initially concern for nec fasc by my resident but low on my differential when seen by me, abx cancelled. xray w/o fx or subq air, no f/c to suggest infectious cause, dvt study neg. additional verbal instructions regarding diagnosis, return precautions and follow up plan given to pt and/or family. HARVEY Quinonez MD

## 2018-12-21 NOTE — ED PROVIDER NOTE - PHYSICAL EXAMINATION
Gen: AAOx3, non-toxic  Head: NCAT  HEENT: EOMI, oral mucosa moist, normal conjunctiva  Lung: CTAB, no respiratory distress, no wheezes/rhonchi/rales B/L, speaking in full sentences  CV: RRR, no murmurs, rubs or gallops  Abd: Obese abdomen, soft, no guarding,   MSK: no visible deformities  Neuro: No focal sensory or motor deficits  Skin: Bilateral LLE edema with worsening LLE edema and ecchymosis with erythema   Psych: normal affect.   ~Prem Delaney M.D. Resident

## 2018-12-21 NOTE — REVIEW OF SYSTEMS
[Lower Ext Edema] : lower extremity edema [Fever] : no fever [Chills] : no chills [Night Sweats] : no night sweats [Recent Change In Weight] : ~T no recent weight change [Chest Pain] : no chest pain [Palpitations] : no palpitations [Claudication] : no  leg claudication [Orthopena] : no orthopnea [Paroysmal Nocturnal Dyspnea] : no paroysmal nocturnal dyspnea [Shortness Of Breath] : no shortness of breath [Wheezing] : no wheezing [Cough] : no cough [Dyspnea on Exertion] : not dyspnea on exertion [Abdominal Pain] : no abdominal pain [Nausea] : no nausea [Constipation] : no constipation [Diarrhea] : no diarrhea [Vomiting] : no vomiting [Heartburn] : no heartburn [Melena] : no melena [Dysuria] : no dysuria [Incontinence] : no incontinence [Hematuria] : no hematuria [Frequency] : no frequency [de-identified] : skin tear L shin, edema and ecchymoses

## 2018-12-21 NOTE — ED ADULT NURSE NOTE - OBJECTIVE STATEMENT
63y male with hx of seizures presents to the ER c/o left leg swelling since this am. pt is alert and oriented x 4 and speaking coherently. pt ambulated with a cane at home. pt came to the ER tn due to the worsening swelling to the left leg. pt has a noted small 2cm blackened area to his left shin with a large area of swelling. pt denies any trauma to the leg. pt in nad. pt ambulatory. pt denies cp, sob, n/v/d, fevers. pt states he had chills at home. md sanders completed. will reassess.

## 2018-12-21 NOTE — PHYSICAL EXAM
[No Acute Distress] : no acute distress [Well Nourished] : well nourished [Well Developed] : well developed [Well-Appearing] : well-appearing [Normal Sclera/Conjunctiva] : normal sclera/conjunctiva [PERRL] : pupils equal round and reactive to light [No JVD] : no jugular venous distention [Supple] : supple [No Respiratory Distress] : no respiratory distress  [Clear to Auscultation] : lungs were clear to auscultation bilaterally [No Accessory Muscle Use] : no accessory muscle use [Normal Rate] : normal rate  [Regular Rhythm] : with a regular rhythm [No Murmur] : no murmur heard [Soft] : abdomen soft [Non Tender] : non-tender [No HSM] : no HSM [Normal Posterior Cervical Nodes] : no posterior cervical lymphadenopathy [Normal Anterior Cervical Nodes] : no anterior cervical lymphadenopathy [No Focal Deficits] : no focal deficits [de-identified] : bilateral edema [de-identified] : Contains a skin tear of the left shin with surrounding ecchymoses a year scan has started to form the area is not red or warm there is no drainage no sign of infection

## 2018-12-21 NOTE — ED PROVIDER NOTE - MEDICAL DECISION MAKING DETAILS
63yM presents with Left Lower Leg pain, edema and bruising with erythema.  Plan: Labs, Xray of LLE and U/S  r/o: Nec Fasc 63yM presents with Left Lower Leg pain, edema and bruising with erythema.  Plan: Labs, Xray of LLE and U/S  r/o: Leg trauma vs Nec Fasc vs Cellulitis

## 2018-12-21 NOTE — ED ADULT NURSE NOTE - NSIMPLEMENTINTERV_GEN_ALL_ED
Implemented All Universal Safety Interventions:  [place] to call system. Call bell, personal items and telephone within reach. Instruct patient to call for assistance. Room bathroom lighting operational. Non-slip footwear when patient is off stretcher. Physically safe environment: no spills, clutter or unnecessary equipment. Stretcher in lowest position, wheels locked, appropriate side rails in place.

## 2018-12-21 NOTE — ED PROVIDER NOTE - OBJECTIVE STATEMENT
63yM presents with Left Lower Leg pain, edema and bruising. Patient states that he noticed the swelling and bruising this morning without any history of trauma. Patient denied fevers, chest pain, sob, calf tenderness, numbness or tingling.

## 2018-12-21 NOTE — ASSESSMENT
[FreeTextEntry1] : Patient is a skin tear of the left pretibial area with surrounding ecchymoses of this apparently was from some minor trauma suffered while he was sleeping he has no recollection of anything hitting a more striking his leg. He was seen in the [facility] emergency room last night and a negative study for DVT and lab work was fairly unremarkable there was a mild thrombocytopenia 80,000. Patient will be referred to vascular surgery for apparent stasis venous dermatitis and chronic edema he also will be instructed to continue taking his Lasix which he had stopped spontaneously several months ago he will call in several days with a progress report there is no sign of infection the wound is dressed with a year nonadherent sterile dressing and a elastic wrap. will need follow up on thrombocytopenia

## 2018-12-21 NOTE — ED PROVIDER NOTE - NS ED ROS FT
GENERAL: No fever or chills, EYES: no change in vision, HEENT: no trouble swallowing or speaking, CARDIAC: no chest pain, PULMONARY: no cough or SOB, GI: no abdominal pain, no nausea, no vomiting, no diarrhea or constipation, : No changes in urination, SKIN: LLE swelling and bruising, NEURO: no headache,  MSK: No joint pain ~Prem Delaney M.D. Resident

## 2018-12-26 LAB
CULTURE RESULTS: SIGNIFICANT CHANGE UP
CULTURE RESULTS: SIGNIFICANT CHANGE UP
SPECIMEN SOURCE: SIGNIFICANT CHANGE UP
SPECIMEN SOURCE: SIGNIFICANT CHANGE UP

## 2018-12-27 ENCOUNTER — INPATIENT (INPATIENT)
Facility: HOSPITAL | Age: 63
LOS: 2 days | Discharge: HOME | DRG: 605 | End: 2018-12-30
Attending: STUDENT IN AN ORGANIZED HEALTH CARE EDUCATION/TRAINING PROGRAM | Admitting: HOSPITALIST
Payer: MEDICARE

## 2018-12-27 ENCOUNTER — APPOINTMENT (OUTPATIENT)
Dept: FAMILY MEDICINE | Facility: CLINIC | Age: 63
End: 2018-12-27
Payer: MEDICARE

## 2018-12-27 VITALS
WEIGHT: 220.02 LBS | TEMPERATURE: 96 F | SYSTOLIC BLOOD PRESSURE: 122 MMHG | DIASTOLIC BLOOD PRESSURE: 88 MMHG | OXYGEN SATURATION: 97 % | HEART RATE: 64 BPM | HEIGHT: 71 IN | RESPIRATION RATE: 16 BRPM

## 2018-12-27 VITALS
DIASTOLIC BLOOD PRESSURE: 64 MMHG | TEMPERATURE: 97.8 F | RESPIRATION RATE: 15 BRPM | HEART RATE: 69 BPM | SYSTOLIC BLOOD PRESSURE: 95 MMHG | OXYGEN SATURATION: 96 %

## 2018-12-27 DIAGNOSIS — S06.5X9A TRAUMATIC SUBDURAL HEMORRHAGE WITH LOSS OF CONSCIOUSNESS OF UNSPECIFIED DURATION, INITIAL ENCOUNTER: Chronic | ICD-10-CM

## 2018-12-27 DIAGNOSIS — E03.9 HYPOTHYROIDISM, UNSPECIFIED: ICD-10-CM

## 2018-12-27 DIAGNOSIS — R09.89 OTHER SPECIFIED SYMPTOMS AND SIGNS INVOLVING THE CIRCULATORY AND RESPIRATORY SYSTEMS: ICD-10-CM

## 2018-12-27 DIAGNOSIS — Z90.49 ACQUIRED ABSENCE OF OTHER SPECIFIED PARTS OF DIGESTIVE TRACT: Chronic | ICD-10-CM

## 2018-12-27 DIAGNOSIS — L03.116 CELLULITIS OF LEFT LOWER LIMB: ICD-10-CM

## 2018-12-27 DIAGNOSIS — R56.9 UNSPECIFIED CONVULSIONS: ICD-10-CM

## 2018-12-27 DIAGNOSIS — D69.6 THROMBOCYTOPENIA, UNSPECIFIED: ICD-10-CM

## 2018-12-27 DIAGNOSIS — Z93.3 COLOSTOMY STATUS: Chronic | ICD-10-CM

## 2018-12-27 DIAGNOSIS — S31.109S UNSPECIFIED OPEN WOUND OF ABDOMINAL WALL, UNSPECIFIED QUADRANT WITHOUT PENETRATION INTO PERITONEAL CAVITY, SEQUELA: ICD-10-CM

## 2018-12-27 DIAGNOSIS — Z98.890 OTHER SPECIFIED POSTPROCEDURAL STATES: Chronic | ICD-10-CM

## 2018-12-27 LAB
ALBUMIN SERPL ELPH-MCNC: 2.8 G/DL — LOW (ref 3.3–5)
ALP SERPL-CCNC: 60 U/L — SIGNIFICANT CHANGE UP (ref 40–120)
ALT FLD-CCNC: 37 U/L DA — SIGNIFICANT CHANGE UP (ref 10–45)
ANION GAP SERPL CALC-SCNC: 3 MMOL/L — LOW (ref 5–17)
AST SERPL-CCNC: 46 U/L — HIGH (ref 10–40)
BASOPHILS # BLD AUTO: 0 K/UL — SIGNIFICANT CHANGE UP (ref 0–0.2)
BASOPHILS NFR BLD AUTO: 0.6 % — SIGNIFICANT CHANGE UP (ref 0–2)
BILIRUB SERPL-MCNC: 0.5 MG/DL — SIGNIFICANT CHANGE UP (ref 0.2–1.2)
BUN SERPL-MCNC: 30 MG/DL — HIGH (ref 7–23)
CALCIUM SERPL-MCNC: 8.9 MG/DL — SIGNIFICANT CHANGE UP (ref 8.4–10.5)
CHLORIDE SERPL-SCNC: 105 MMOL/L — SIGNIFICANT CHANGE UP (ref 96–108)
CO2 SERPL-SCNC: 35 MMOL/L — HIGH (ref 22–31)
CREAT SERPL-MCNC: 0.9 MG/DL — SIGNIFICANT CHANGE UP (ref 0.5–1.3)
EOSINOPHIL # BLD AUTO: 0.2 K/UL — SIGNIFICANT CHANGE UP (ref 0–0.5)
EOSINOPHIL NFR BLD AUTO: 3 % — SIGNIFICANT CHANGE UP (ref 0–6)
GLUCOSE SERPL-MCNC: 100 MG/DL — HIGH (ref 70–99)
HCT VFR BLD CALC: 43.5 % — SIGNIFICANT CHANGE UP (ref 39–50)
HGB BLD-MCNC: 14.5 G/DL — SIGNIFICANT CHANGE UP (ref 13–17)
LACTATE SERPL-SCNC: 1.8 MMOL/L — SIGNIFICANT CHANGE UP (ref 0.7–2)
LYMPHOCYTES # BLD AUTO: 1.3 K/UL — SIGNIFICANT CHANGE UP (ref 1–3.3)
LYMPHOCYTES # BLD AUTO: 23.3 % — SIGNIFICANT CHANGE UP (ref 13–44)
MCHC RBC-ENTMCNC: 31.7 PG — SIGNIFICANT CHANGE UP (ref 27–34)
MCHC RBC-ENTMCNC: 33.3 GM/DL — SIGNIFICANT CHANGE UP (ref 32–36)
MCV RBC AUTO: 95.2 FL — SIGNIFICANT CHANGE UP (ref 80–100)
MONOCYTES # BLD AUTO: 0.8 K/UL — SIGNIFICANT CHANGE UP (ref 0–0.9)
MONOCYTES NFR BLD AUTO: 13.8 % — HIGH (ref 1–9)
NEUTROPHILS # BLD AUTO: 3.4 K/UL — SIGNIFICANT CHANGE UP (ref 1.8–7.4)
NEUTROPHILS NFR BLD AUTO: 59.4 % — SIGNIFICANT CHANGE UP (ref 43–77)
PLATELET # BLD AUTO: 88 K/UL — LOW (ref 150–400)
POTASSIUM SERPL-MCNC: 4.1 MMOL/L — SIGNIFICANT CHANGE UP (ref 3.5–5.3)
POTASSIUM SERPL-SCNC: 4.1 MMOL/L — SIGNIFICANT CHANGE UP (ref 3.5–5.3)
PROT SERPL-MCNC: 6.8 G/DL — SIGNIFICANT CHANGE UP (ref 6–8.3)
RBC # BLD: 4.57 M/UL — SIGNIFICANT CHANGE UP (ref 4.2–5.8)
RBC # FLD: 13.2 % — SIGNIFICANT CHANGE UP (ref 10.3–14.5)
SODIUM SERPL-SCNC: 143 MMOL/L — SIGNIFICANT CHANGE UP (ref 135–145)
WBC # BLD: 5.7 K/UL — SIGNIFICANT CHANGE UP (ref 3.8–10.5)
WBC # FLD AUTO: 5.7 K/UL — SIGNIFICANT CHANGE UP (ref 3.8–10.5)

## 2018-12-27 PROCEDURE — 99213 OFFICE O/P EST LOW 20 MIN: CPT

## 2018-12-27 PROCEDURE — 93010 ELECTROCARDIOGRAM REPORT: CPT

## 2018-12-27 PROCEDURE — 93971 EXTREMITY STUDY: CPT | Mod: 26,LT

## 2018-12-27 PROCEDURE — 99223 1ST HOSP IP/OBS HIGH 75: CPT

## 2018-12-27 PROCEDURE — 71045 X-RAY EXAM CHEST 1 VIEW: CPT | Mod: 26

## 2018-12-27 PROCEDURE — 99285 EMERGENCY DEPT VISIT HI MDM: CPT

## 2018-12-27 RX ORDER — VANCOMYCIN HCL 1 G
1000 VIAL (EA) INTRAVENOUS ONCE
Qty: 0 | Refills: 0 | Status: COMPLETED | OUTPATIENT
Start: 2018-12-27 | End: 2018-12-27

## 2018-12-27 RX ORDER — DIVALPROEX SODIUM 500 MG/1
1500 TABLET, DELAYED RELEASE ORAL AT BEDTIME
Qty: 0 | Refills: 0 | Status: DISCONTINUED | OUTPATIENT
Start: 2018-12-27 | End: 2018-12-30

## 2018-12-27 RX ORDER — LEVOTHYROXINE SODIUM 125 MCG
25 TABLET ORAL DAILY
Qty: 0 | Refills: 0 | Status: DISCONTINUED | OUTPATIENT
Start: 2018-12-27 | End: 2018-12-30

## 2018-12-27 RX ORDER — ACETAMINOPHEN 500 MG
650 TABLET ORAL EVERY 6 HOURS
Qty: 0 | Refills: 0 | Status: DISCONTINUED | OUTPATIENT
Start: 2018-12-27 | End: 2018-12-30

## 2018-12-27 RX ORDER — VANCOMYCIN HCL 1 G
1000 VIAL (EA) INTRAVENOUS EVERY 12 HOURS
Qty: 0 | Refills: 0 | Status: DISCONTINUED | OUTPATIENT
Start: 2018-12-28 | End: 2018-12-30

## 2018-12-27 RX ORDER — ENOXAPARIN SODIUM 100 MG/ML
40 INJECTION SUBCUTANEOUS EVERY 24 HOURS
Qty: 0 | Refills: 0 | Status: DISCONTINUED | OUTPATIENT
Start: 2018-12-27 | End: 2018-12-30

## 2018-12-27 RX ORDER — BUDESONIDE AND FORMOTEROL FUMARATE DIHYDRATE 160; 4.5 UG/1; UG/1
2 AEROSOL RESPIRATORY (INHALATION)
Qty: 0 | Refills: 0 | Status: DISCONTINUED | OUTPATIENT
Start: 2018-12-27 | End: 2018-12-30

## 2018-12-27 RX ORDER — SODIUM CHLORIDE 9 MG/ML
1000 INJECTION INTRAMUSCULAR; INTRAVENOUS; SUBCUTANEOUS ONCE
Qty: 0 | Refills: 0 | Status: COMPLETED | OUTPATIENT
Start: 2018-12-27 | End: 2018-12-27

## 2018-12-27 RX ORDER — TAMSULOSIN HYDROCHLORIDE 0.4 MG/1
0.4 CAPSULE ORAL AT BEDTIME
Qty: 0 | Refills: 0 | Status: DISCONTINUED | OUTPATIENT
Start: 2018-12-27 | End: 2018-12-30

## 2018-12-27 RX ORDER — FUROSEMIDE 40 MG
40 TABLET ORAL DAILY
Qty: 0 | Refills: 0 | Status: DISCONTINUED | OUTPATIENT
Start: 2018-12-27 | End: 2018-12-30

## 2018-12-27 RX ORDER — ALBUTEROL 90 UG/1
2 AEROSOL, METERED ORAL
Qty: 0 | Refills: 0 | COMMUNITY

## 2018-12-27 RX ADMIN — SODIUM CHLORIDE 1000 MILLILITER(S): 9 INJECTION INTRAMUSCULAR; INTRAVENOUS; SUBCUTANEOUS at 16:55

## 2018-12-27 RX ADMIN — Medication 1000 MILLIGRAM(S): at 16:15

## 2018-12-27 RX ADMIN — BUDESONIDE AND FORMOTEROL FUMARATE DIHYDRATE 2 PUFF(S): 160; 4.5 AEROSOL RESPIRATORY (INHALATION) at 20:16

## 2018-12-27 RX ADMIN — TAMSULOSIN HYDROCHLORIDE 0.4 MILLIGRAM(S): 0.4 CAPSULE ORAL at 21:59

## 2018-12-27 RX ADMIN — DIVALPROEX SODIUM 1500 MILLIGRAM(S): 500 TABLET, DELAYED RELEASE ORAL at 21:59

## 2018-12-27 RX ADMIN — Medication 250 MILLIGRAM(S): at 14:57

## 2018-12-27 RX ADMIN — SODIUM CHLORIDE 2000 MILLILITER(S): 9 INJECTION INTRAMUSCULAR; INTRAVENOUS; SUBCUTANEOUS at 14:16

## 2018-12-27 RX ADMIN — Medication 40 MILLIGRAM(S): at 17:16

## 2018-12-27 RX ADMIN — ENOXAPARIN SODIUM 40 MILLIGRAM(S): 100 INJECTION SUBCUTANEOUS at 17:36

## 2018-12-27 NOTE — H&P ADULT - FAMILY HISTORY
Family history of diabetes mellitus     Grandparent  Still living? Unknown  Family history of pulmonary embolism, Age at diagnosis: Age Unknown

## 2018-12-27 NOTE — PATIENT PROFILE ADULT - ANY SIGNIFICANT CHANGE IN ABILITY TO PERFORM THE FOLLOWING ADL SINCE THE ONSET OF PRESENT ILLNESS?
Pt called regarding information that provider was requesting and pt states that found out can take hormone cream.  Pharm[facility] to be sent to [facility].  Please call back to advise.    no

## 2018-12-27 NOTE — H&P ADULT - NSHPLABSRESULTS_GEN_ALL_CORE
LABS:                        14.5   5.7   )-----------( 88       ( 27 Dec 2018 14:00 )             43.5     12-27    143  |  105  |  30<H>  ----------------------------<  100<H>  4.1   |  35<H>  |  0.90    Ca    8.9      27 Dec 2018 14:00    TPro  6.8  /  Alb  2.8<L>  /  TBili  0.5  /  DBili  x   /  AST  46<H>  /  ALT  37  /  AlkPhos  60  12-27    CAPILLARY BLOOD GLUCOSE    RADIOLOGY & ADDITIONAL TESTS:    Consultant(s) Notes Reviewed:  [x ] YES  [ ] NO  Care Discussed with Consultants/Other Providers [ x] YES  [ ] NO  Imaging Personally Reviewed:  [ ] YES  [ ] NO

## 2018-12-27 NOTE — ASSESSMENT
[FreeTextEntry1] : The patient is expanding cellulitis in the left lower extremity there is significant edema here secondary to this and chronic venous insufficiency he'll be sent to the emergency room for further evaluation we have spoken with personnel there and expressed the opinion that he should be admitted and placed on intravenous antibiotics.

## 2018-12-27 NOTE — REVIEW OF SYSTEMS
[Fever] : no fever [Chills] : no chills [Night Sweats] : no night sweats [Chest Pain] : no chest pain [Shortness Of Breath] : no shortness of breath [Wheezing] : no wheezing [Cough] : no cough [Dyspnea on Exertion] : not dyspnea on exertion [Negative] : Genitourinary

## 2018-12-27 NOTE — ED ADULT NURSE NOTE - OBJECTIVE STATEMENT
I have a wound on my leg and it is getting worse.   Lefty lower leg 7cm length x 5cm width dark ecchymosis and redness surrounding area.  Dr Gonsalves recommended ER today for IV antibx.

## 2018-12-27 NOTE — ED ADULT TRIAGE NOTE - CHIEF COMPLAINT QUOTE
Pt c/o left ankle swelling, redness and pain x1 week and was sent by Dr. Galarza to be treated for cellulitis.

## 2018-12-27 NOTE — PHYSICAL EXAM
[No Acute Distress] : no acute distress [Well Developed] : well developed [Well-Appearing] : well-appearing [Normal Sclera/Conjunctiva] : normal sclera/conjunctiva [PERRL] : pupils equal round and reactive to light [Normal Outer Ear/Nose] : the outer ears and nose were normal in appearance [Normal Oropharynx] : the oropharynx was normal [No JVD] : no jugular venous distention [Supple] : supple [No Respiratory Distress] : no respiratory distress  [Clear to Auscultation] : lungs were clear to auscultation bilaterally [de-identified] : 3+ edema left lower extremity [de-identified] : Apparent cellulitis left lower extremi

## 2018-12-27 NOTE — H&P ADULT - HISTORY OF PRESENT ILLNESS
63M PMH seizure disorder, venous stasis, BPH, copd, hypothyroidism presents for left lower extremity pain, swelling open wound.  patient saw Dr. Gonsalves today who sent patient in to be admitted for IV antibiotics.  Patient feels okay but does state tenderness in posterior calf.  Denies chest pain, sob, nausea, vomiting, diarrhea, fever, headache.

## 2018-12-27 NOTE — ED PROVIDER NOTE - OBJECTIVE STATEMENT
This is the fourth visit for patient who developed redness and swelling of left lower ext. Seen by Dr Gonsalves and sent for admission

## 2018-12-27 NOTE — HISTORY OF PRESENT ILLNESS
[FreeTextEntry1] : skin tear L leg [de-identified] : Patient seen in followup on his skin tear of the left leg this did not appear to be infected several days ago however today there is apparent cellulitis that has spread this significantly both upwards and downwards from the site of the wound on the left foot is quite swollen there is a chronic venous stasis dermatitis on both sides but the left leg is much worse appearing than the right patient has had no systemic symptoms no fevers or chills

## 2018-12-27 NOTE — ED ADULT NURSE NOTE - PMH
Bronchiectasis    Cellulitis    Diverticulitis    DM (diabetes mellitus)    Hypothyroid    Inguinal hernia    Sarcoid    Seizure    Subdural hematoma

## 2018-12-27 NOTE — H&P ADULT - NSHPPHYSICALEXAM_GEN_ALL_CORE
T(C): 35.8 (12-27-18 @ 13:20), Max: 35.8 (12-27-18 @ 13:20)  HR: 64 (12-27-18 @ 13:20) (64 - 64)  BP: 122/88 (12-27-18 @ 13:20) (122/88 - 122/88)  RR: 16 (12-27-18 @ 13:20) (16 - 16)  SpO2: 97% (12-27-18 @ 13:20) (97% - 97%)  Wt(kg): --Vital Signs Last 24 Hrs  T(C): 35.8 (27 Dec 2018 13:20), Max: 35.8 (27 Dec 2018 13:20)  T(F): 96.5 (27 Dec 2018 13:20), Max: 96.5 (27 Dec 2018 13:20)  HR: 64 (27 Dec 2018 13:20) (64 - 64)  BP: 122/88 (27 Dec 2018 13:20) (122/88 - 122/88)  BP(mean): --  RR: 16 (27 Dec 2018 13:20) (16 - 16)  SpO2: 97% (27 Dec 2018 13:20) (97% - 97%)    PHYSICAL EXAM:  GENERAL: NAD, well-groomed, well-developed  HEAD:  Atraumatic, Normocephalic  EYES: EOMI, PERRLA, conjunctiva and sclera clear  ENMT: No tonsillar erythema, exudates, or enlargement; Moist mucous membranes, Good dentition, No lesions  NECK: Supple, No JVD, Normal thyroid  NERVOUS SYSTEM:  Alert & Oriented X3, Good concentration; Motor Strength 5/5 B/L upper and lower extremities; DTRs 2+ intact and symmetric  CHEST/LUNG: Clear to percussion bilaterally; No rales, rhonchi, wheezing, or rubs  HEART: Regular rate and rhythm; No murmurs, rubs, or gallops  ABDOMEN: Soft, Nontender, Nondistended; open wound noted in suprapubic area underneath scar, no active bleeding, no tenderness noted in abdomen,   EXTREMITIES:  bilateral edema, left lower extremity erythema in calf with small  1cm open wound, not actively bleeding, tenderness to palpation of posterior calf on left   LYMPH: No lymphadenopathy noted  SKIN: No rashes or lesions

## 2018-12-27 NOTE — H&P ADULT - ASSESSMENT
63M PMH seizure disorder, venous stasis, BPH, copd, hypothyroidism presents for left lower extremity pain, swelling open wound, likely cellulitis, rule out dvt.     CAPRINI SCORE [CLOT]    AGE RELATED RISK FACTORS                                                       MOBILITY RELATED FACTORS  [ ] Age 41-60 years                                            (1 Point)                  [ X] Bed rest                                                        (1 Point)  [X ] Age: 61-74 years                                           (2 Points)                 [ ] Plaster cast                                                   (2 Points)  [ ] Age= 75 years                                              (3 Points)                 [ ] Bed bound for more than 72 hours                 (2 Points)    DISEASE RELATED RISK FACTORS                                               GENDER SPECIFIC FACTORS  [X ] Edema in the lower extremities                       (1 Point)                  [ ] Pregnancy                                                     (1 Point)  [ ] Varicose veins                                               (1 Point)                  [ ] Post-partum < 6 weeks                                   (1 Point)             X[ ] BMI > 25 Kg/m2                                            (1 Point)                  [ ] Hormonal therapy  or oral contraception          (1 Point)                 [ ] Sepsis (in the previous month)                        (1 Point)                  [ ] History of pregnancy complications                 (1 point)  [ ] Pneumonia or serious lung disease                                               [ ] Unexplained or recurrent                     (1 Point)           (in the previous month)                               (1 Point)  [ ] Abnormal pulmonary function test                     (1 Point)                 SURGERY RELATED RISK FACTORS  [ ] Acute myocardial infarction                              (1 Point)                 [ ]  Section                                             (1 Point)  [ ] Congestive heart failure (in the previous month)  (1 Point)               [ ] Minor surgery                                                  (1 Point)   [ ] Inflammatory bowel disease                             (1 Point)                 [ ] Arthroscopic surgery                                        (2 Points)  [ ] Central venous access                                      (2 Points)                [ ] General surgery lasting more than 45 minutes   (2 Points)       [ ] Stroke (in the previous month)                          (5 Points)               [ ] Elective arthroplasty                                         (5 Points)                                                                                                                                               HEMATOLOGY RELATED FACTORS                                                 TRAUMA RELATED RISK FACTORS  [ ] Prior episodes of VTE                                     (3 Points)                [ ] Fracture of the hip, pelvis, or leg                       (5 Points)  [ ] Positive family history for VTE                         (3 Points)                 [ ] Acute spinal cord injury (in the previous month)  (5 Points)  [ ] Prothrombin 65721 A                                     (3 Points)                 [ ] Paralysis  (less than 1 month)                             (5 Points)  [ ] Factor V Leiden                                             (3 Points)                  [ ] Multiple Trauma within 1 month                        (5 Points)  [ ] Lupus anticoagulants                                     (3 Points)                                                           [ ] Anticardiolipin antibodies                               (3 Points)                                                       [ ] High homocysteine in the blood                      (3 Points)                                             [ ] Other congenital or acquired thrombophilia      (3 Points)                                                [ ] Heparin induced thrombocytopenia                  (3 Points)                                          Total Score [    5      ]

## 2018-12-28 LAB
ANION GAP SERPL CALC-SCNC: 7 MMOL/L — SIGNIFICANT CHANGE UP (ref 5–17)
BUN SERPL-MCNC: 23 MG/DL — SIGNIFICANT CHANGE UP (ref 7–23)
CALCIUM SERPL-MCNC: 8.6 MG/DL — SIGNIFICANT CHANGE UP (ref 8.4–10.5)
CHLORIDE SERPL-SCNC: 105 MMOL/L — SIGNIFICANT CHANGE UP (ref 96–108)
CO2 SERPL-SCNC: 30 MMOL/L — SIGNIFICANT CHANGE UP (ref 22–31)
CREAT SERPL-MCNC: 0.85 MG/DL — SIGNIFICANT CHANGE UP (ref 0.5–1.3)
GLUCOSE SERPL-MCNC: 123 MG/DL — HIGH (ref 70–99)
HCT VFR BLD CALC: 39.6 % — SIGNIFICANT CHANGE UP (ref 39–50)
HGB BLD-MCNC: 13.5 G/DL — SIGNIFICANT CHANGE UP (ref 13–17)
MCHC RBC-ENTMCNC: 32.3 PG — SIGNIFICANT CHANGE UP (ref 27–34)
MCHC RBC-ENTMCNC: 34 GM/DL — SIGNIFICANT CHANGE UP (ref 32–36)
MCV RBC AUTO: 95 FL — SIGNIFICANT CHANGE UP (ref 80–100)
PLATELET # BLD AUTO: 73 K/UL — LOW (ref 150–400)
POTASSIUM SERPL-MCNC: 3.5 MMOL/L — SIGNIFICANT CHANGE UP (ref 3.5–5.3)
POTASSIUM SERPL-SCNC: 3.5 MMOL/L — SIGNIFICANT CHANGE UP (ref 3.5–5.3)
RBC # BLD: 4.17 M/UL — LOW (ref 4.2–5.8)
RBC # FLD: 13.3 % — SIGNIFICANT CHANGE UP (ref 10.3–14.5)
SODIUM SERPL-SCNC: 142 MMOL/L — SIGNIFICANT CHANGE UP (ref 135–145)
TSH SERPL-MCNC: 5.96 UIU/ML — HIGH (ref 0.27–4.2)
WBC # BLD: 5.9 K/UL — SIGNIFICANT CHANGE UP (ref 3.8–10.5)
WBC # FLD AUTO: 5.9 K/UL — SIGNIFICANT CHANGE UP (ref 3.8–10.5)

## 2018-12-28 PROCEDURE — 99233 SBSQ HOSP IP/OBS HIGH 50: CPT

## 2018-12-28 RX ADMIN — TAMSULOSIN HYDROCHLORIDE 0.4 MILLIGRAM(S): 0.4 CAPSULE ORAL at 21:46

## 2018-12-28 RX ADMIN — Medication 250 MILLIGRAM(S): at 05:29

## 2018-12-28 RX ADMIN — BUDESONIDE AND FORMOTEROL FUMARATE DIHYDRATE 2 PUFF(S): 160; 4.5 AEROSOL RESPIRATORY (INHALATION) at 09:30

## 2018-12-28 RX ADMIN — Medication 25 MICROGRAM(S): at 05:29

## 2018-12-28 RX ADMIN — Medication 250 MILLIGRAM(S): at 17:52

## 2018-12-28 RX ADMIN — DIVALPROEX SODIUM 1500 MILLIGRAM(S): 500 TABLET, DELAYED RELEASE ORAL at 21:46

## 2018-12-28 RX ADMIN — BUDESONIDE AND FORMOTEROL FUMARATE DIHYDRATE 2 PUFF(S): 160; 4.5 AEROSOL RESPIRATORY (INHALATION) at 21:08

## 2018-12-28 RX ADMIN — ENOXAPARIN SODIUM 40 MILLIGRAM(S): 100 INJECTION SUBCUTANEOUS at 17:52

## 2018-12-28 NOTE — PROGRESS NOTE ADULT - SUBJECTIVE AND OBJECTIVE BOX
CC: Patient is a 63y old  Male who presents with a chief complaint of cellulitis (27 Dec 2018 15:52)      S: No f/c/n/v/pain    Patient seen and examined at bedside.    ALLERGIES:  benzodiazepines (Other)  Keppra (Other (Severe))  penicillin (Angioedema)  penicillins (Unknown)      MEDICATIONS:  acetaminophen   Tablet .. 650 milliGRAM(s) Oral every 6 hours PRN  buDESOnide 160 MICROgram(s)/formoterol 4.5 MICROgram(s) Inhaler 2 Puff(s) Inhalation two times a day  diVALproex ER 1500 milliGRAM(s) Oral at bedtime  enoxaparin Injectable 40 milliGRAM(s) SubCutaneous every 24 hours  furosemide    Tablet 40 milliGRAM(s) Oral daily  levothyroxine 25 MICROGram(s) Oral daily  tamsulosin 0.4 milliGRAM(s) Oral at bedtime  vancomycin  IVPB 1000 milliGRAM(s) IV Intermittent every 12 hours        Vital Signs Last 24 Hrs  T(F): 97.8 (28 Dec 2018 05:23), Max: 98.3 (27 Dec 2018 17:56)  HR: 56 (28 Dec 2018 05:23) (55 - 64)  BP: 93/66 (28 Dec 2018 05:23) (93/66 - 122/88)  RR: 15 (28 Dec 2018 05:23) (15 - 16)  SpO2: 96% (28 Dec 2018 05:23) (96% - 100%)  I&O's Summary    27 Dec 2018 07:01  -  28 Dec 2018 07:00  --------------------------------------------------------  IN: 730 mL / OUT: 1450 mL / NET: -720 mL        PHYSICAL EXAM:  General: NAD  ENT: MMM  Neck: Supple, No JVD  Lungs: Clear to auscultation bilaterally  Cardio: RRR, S1/S2, No murmurs  Abdomen: Soft, Nontender, Nondistended; Bowel sounds present  Extremities: No cyanosis, No edema  Neuro: no new deficits  Skin: no rashes  Psych: AAO    LABS:                        13.5   5.9   )-----------( 73       ( 28 Dec 2018 05:40 )             39.6     12-28    142  |  105  |  23  ----------------------------<  123  3.5   |  30  |  0.85    Ca    8.6      28 Dec 2018 05:40    TPro  6.8  /  Alb  2.8  /  TBili  0.5  /  DBili  x   /  AST  46  /  ALT  37  /  AlkPhos  60  12-27    eGFR if Non African American: 92 mL/min/1.73M2 (12-28-18 @ 05:40)  eGFR if : 107 mL/min/1.73M2 (12-28-18 @ 05:40)      Lactate, Blood: 1.8 mmol/L (12-27 @ 14:00)                CAPILLARY BLOOD GLUCOSE                RADIOLOGY & ADDITIONAL TESTS:    Care Discussed with Consultants/Other Providers:

## 2018-12-28 NOTE — PROGRESS NOTE ADULT - ASSESSMENT
63 y o M PMH seizure disorder, venous stasis, BPH, copd, hypothyroidism presents for left lower extremity pain, swelling near open wound, admitted for cellulitis, rule out DVT, negative doppler study, now in IV abx.

## 2018-12-29 LAB
ANION GAP SERPL CALC-SCNC: 6 MMOL/L — SIGNIFICANT CHANGE UP (ref 5–17)
BUN SERPL-MCNC: 22 MG/DL — SIGNIFICANT CHANGE UP (ref 7–23)
CALCIUM SERPL-MCNC: 9 MG/DL — SIGNIFICANT CHANGE UP (ref 8.4–10.5)
CHLORIDE SERPL-SCNC: 104 MMOL/L — SIGNIFICANT CHANGE UP (ref 96–108)
CO2 SERPL-SCNC: 31 MMOL/L — SIGNIFICANT CHANGE UP (ref 22–31)
CREAT SERPL-MCNC: 0.87 MG/DL — SIGNIFICANT CHANGE UP (ref 0.5–1.3)
GLUCOSE SERPL-MCNC: 139 MG/DL — HIGH (ref 70–99)
HCT VFR BLD CALC: 43.7 % — SIGNIFICANT CHANGE UP (ref 39–50)
HGB BLD-MCNC: 14.7 G/DL — SIGNIFICANT CHANGE UP (ref 13–17)
MCHC RBC-ENTMCNC: 31.7 PG — SIGNIFICANT CHANGE UP (ref 27–34)
MCHC RBC-ENTMCNC: 33.6 GM/DL — SIGNIFICANT CHANGE UP (ref 32–36)
MCV RBC AUTO: 94.1 FL — SIGNIFICANT CHANGE UP (ref 80–100)
PLATELET # BLD AUTO: 99 K/UL — LOW (ref 150–400)
POTASSIUM SERPL-MCNC: 3.6 MMOL/L — SIGNIFICANT CHANGE UP (ref 3.5–5.3)
POTASSIUM SERPL-SCNC: 3.6 MMOL/L — SIGNIFICANT CHANGE UP (ref 3.5–5.3)
RBC # BLD: 4.64 M/UL — SIGNIFICANT CHANGE UP (ref 4.2–5.8)
RBC # FLD: 13.3 % — SIGNIFICANT CHANGE UP (ref 10.3–14.5)
SODIUM SERPL-SCNC: 141 MMOL/L — SIGNIFICANT CHANGE UP (ref 135–145)
VANCOMYCIN TROUGH SERPL-MCNC: 10.9 UG/ML — SIGNIFICANT CHANGE UP (ref 10–20)
WBC # BLD: 6.2 K/UL — SIGNIFICANT CHANGE UP (ref 3.8–10.5)
WBC # FLD AUTO: 6.2 K/UL — SIGNIFICANT CHANGE UP (ref 3.8–10.5)

## 2018-12-29 PROCEDURE — 73700 CT LOWER EXTREMITY W/O DYE: CPT | Mod: 26,LT

## 2018-12-29 PROCEDURE — 99233 SBSQ HOSP IP/OBS HIGH 50: CPT

## 2018-12-29 RX ORDER — AZTREONAM 2 G
1000 VIAL (EA) INJECTION EVERY 8 HOURS
Qty: 0 | Refills: 0 | Status: DISCONTINUED | OUTPATIENT
Start: 2018-12-29 | End: 2018-12-30

## 2018-12-29 RX ORDER — AZTREONAM 2 G
VIAL (EA) INJECTION
Qty: 0 | Refills: 0 | Status: DISCONTINUED | OUTPATIENT
Start: 2018-12-29 | End: 2018-12-30

## 2018-12-29 RX ORDER — AZTREONAM 2 G
1000 VIAL (EA) INJECTION ONCE
Qty: 0 | Refills: 0 | Status: COMPLETED | OUTPATIENT
Start: 2018-12-29 | End: 2018-12-29

## 2018-12-29 RX ADMIN — BUDESONIDE AND FORMOTEROL FUMARATE DIHYDRATE 2 PUFF(S): 160; 4.5 AEROSOL RESPIRATORY (INHALATION) at 21:03

## 2018-12-29 RX ADMIN — Medication 650 MILLIGRAM(S): at 18:33

## 2018-12-29 RX ADMIN — Medication 50 MILLIGRAM(S): at 14:05

## 2018-12-29 RX ADMIN — Medication 50 MILLIGRAM(S): at 21:24

## 2018-12-29 RX ADMIN — ENOXAPARIN SODIUM 40 MILLIGRAM(S): 100 INJECTION SUBCUTANEOUS at 17:50

## 2018-12-29 RX ADMIN — Medication 25 MICROGRAM(S): at 06:13

## 2018-12-29 RX ADMIN — DIVALPROEX SODIUM 1500 MILLIGRAM(S): 500 TABLET, DELAYED RELEASE ORAL at 21:25

## 2018-12-29 RX ADMIN — TAMSULOSIN HYDROCHLORIDE 0.4 MILLIGRAM(S): 0.4 CAPSULE ORAL at 21:24

## 2018-12-29 RX ADMIN — Medication 650 MILLIGRAM(S): at 19:40

## 2018-12-29 RX ADMIN — Medication 40 MILLIGRAM(S): at 06:13

## 2018-12-29 RX ADMIN — BUDESONIDE AND FORMOTEROL FUMARATE DIHYDRATE 2 PUFF(S): 160; 4.5 AEROSOL RESPIRATORY (INHALATION) at 08:01

## 2018-12-29 RX ADMIN — Medication 250 MILLIGRAM(S): at 17:51

## 2018-12-29 RX ADMIN — Medication 250 MILLIGRAM(S): at 06:13

## 2018-12-29 NOTE — CONSULT NOTE ADULT - SUBJECTIVE AND OBJECTIVE BOX
HPI:   Patient is a 63y male with a past history of hypothyoidism, seizure disorder, bronchiectasis,sarcoid, subdural hematoma, Soriano's with reversal who was admitted with over 1 week of left shin pain and swelling.He denies any trauma to region although he does have venous stasis.He was admitted 12/26 and started on vancomycin, aztreonam added today as area of erythema felt to be spreading.No fever or chills.    REVIEW OF SYSTEMS:  All other review of systems negative (Comprehensive ROS)    PAST MEDICAL & SURGICAL HISTORY:  Hypothyroid  Seizure  Cellulitis  Inguinal hernia  Subdural hematoma  Diverticulitis  Bronchiectasis  Sarcoid  DM (diabetes mellitus)  Status post inguinal hernia repair  Status post cholecystectomy  Bilateral subdural hematomas  Status post Bright's procedure      Allergies    Keppra (Other (Severe))  penicillin (Angioedema)  penicillins (Unknown)    Intolerances    benzodiazepines (Other)      Antimicrobials Day #  :day 3  vancomycin  IVPB 1000 milliGRAM(s) IV Intermittent every 12 hours    Other Medications:  acetaminophen   Tablet .. 650 milliGRAM(s) Oral every 6 hours PRN  buDESOnide 160 MICROgram(s)/formoterol 4.5 MICROgram(s) Inhaler 2 Puff(s) Inhalation two times a day  diVALproex ER 1500 milliGRAM(s) Oral at bedtime  enoxaparin Injectable 40 milliGRAM(s) SubCutaneous every 24 hours  furosemide    Tablet 40 milliGRAM(s) Oral daily  levothyroxine 25 MICROGram(s) Oral daily  tamsulosin 0.4 milliGRAM(s) Oral at bedtime      FAMILY HISTORY:  Family history of pulmonary embolism (Grandparent)  Family history of diabetes mellitus      SOCIAL HISTORY:  Smoking: no    ETOH:no     Drug Use:no        T(F): 97.4 (12-29-18 @ 06:26), Max: 98.3 (12-28-18 @ 21:48)  HR: 55 (12-29-18 @ 08:03)  BP: 106/68 (12-29-18 @ 06:26)  RR: 14 (12-29-18 @ 06:26)  SpO2: 93% (12-29-18 @ 08:03)  Wt(kg): --    PHYSICAL EXAM:  General: alert, no acute distress  Eyes:  anicteric, no conjunctival injection, no discharge  Oropharynx: no lesions or injection 	  Neck: supple, without adenopathy  Lungs: clear to auscultation  Heart: regular rate and rhythm; no murmur, rubs or gallops  Abdomen: soft, nondistended, nontender, without mass or organomegaly, left sided abdominal wall hernia, periumbilical region with clean ulcer  Skin: B/L venous stasis changes  Extremities: no clubbing, cyanosis,+edema  Neurologic: alert, oriented, moves all extremities  Left shin with a 2x2 cm scab, mild swelling and erythema around along with erythema and induration of left achilles area.Central scab area with ? palpable fluid, tender and warm to palpation  LAB RESULTS:                        14.7   6.2   )-----------( 99       ( 29 Dec 2018 09:30 )             43.7     12-29    141  |  104  |  22  ----------------------------<  139<H>  3.6   |  31  |  0.87    Ca    9.0      29 Dec 2018 09:30    TPro  6.8  /  Alb  2.8<L>  /  TBili  0.5  /  DBili  x   /  AST  46<H>  /  ALT  37  /  AlkPhos  60  12-27    LIVER FUNCTIONS - ( 27 Dec 2018 14:00 )  Alb: 2.8 g/dL / Pro: 6.8 g/dL / ALK PHOS: 60 U/L / ALT: 37 U/L DA / AST: 46 U/L / GGT: x               MICROBIOLOGY:  RECENT CULTURES:  12-27 @ 14:00 .Blood Blood-Peripheral     No growth to date.            RADIOLOGY REVIEWED:  < from: Xray Chest 1 View AP/PA (12.27.18 @ 13:43) >    IMPRESSION: Extensive scarring again noted as above. No definite acute   lung finding at this time.    LE dopplers negative for a DVT

## 2018-12-29 NOTE — CONSULT NOTE ADULT - ASSESSMENT
62 yo male with venous stasis and a left leg hematoma with possible  secondary cellulitis .  While I am concerned about infection, he is afebrile with a normal wbc count and it is possible the leg could all reflect hematoma and bleeding into the skin.  However, I am concerned about pain and He may ultimately require drainage of fluid.  Suggest:  1.continue vancomycin(pcn allergic)  2.aztreonam 1 gr q8 given spread of erythema  3.Consider warm compresses, either CT or sonogram to see if fluid can be drained  4.He may need unroofing of scab, may need surgical drainage

## 2018-12-29 NOTE — PROGRESS NOTE ADULT - ASSESSMENT
63 y o M PMH seizure disorder, venous stasis, BPH, copd, hypothyroidism presents for left lower extremity pain, swelling near open wound, admitted for cellulitis, rule out DVT, negative doppler study, on IV abx but now with worsening erythema, tenderness and extension of ecchymoses down to heel consulted ID.

## 2018-12-29 NOTE — PROGRESS NOTE ADULT - PROBLEM SELECTOR PLAN 1
Cont antibiotics IV Vanc, will add Aztreonam for now until ID consult as erythema appears increased, difficult to determine how much is ecchymoses vs infection, there is tenderness everywhere, extension of ecchymoses down to heel, will get imaging of leg to r/o abscess vs hematoma

## 2018-12-29 NOTE — PROGRESS NOTE ADULT - SUBJECTIVE AND OBJECTIVE BOX
CC: Patient is a 63y old  Male who presents with a chief complaint of cellulitis (28 Dec 2018 07:27)      S: No f/c/n/v/pain    Patient seen and examined at bedside.    ALLERGIES:  benzodiazepines (Other)  Keppra (Other (Severe))  penicillin (Angioedema)  penicillins (Unknown)      MEDICATIONS:  acetaminophen   Tablet .. 650 milliGRAM(s) Oral every 6 hours PRN  buDESOnide 160 MICROgram(s)/formoterol 4.5 MICROgram(s) Inhaler 2 Puff(s) Inhalation two times a day  diVALproex ER 1500 milliGRAM(s) Oral at bedtime  enoxaparin Injectable 40 milliGRAM(s) SubCutaneous every 24 hours  furosemide    Tablet 40 milliGRAM(s) Oral daily  levothyroxine 25 MICROGram(s) Oral daily  tamsulosin 0.4 milliGRAM(s) Oral at bedtime  vancomycin  IVPB 1000 milliGRAM(s) IV Intermittent every 12 hours        Vital Signs Last 24 Hrs  T(F): 97.4 (29 Dec 2018 06:26), Max: 98.3 (28 Dec 2018 21:48)  HR: 55 (29 Dec 2018 08:03) (55 - 63)  BP: 106/68 (29 Dec 2018 06:26) (100/60 - 112/60)  RR: 14 (29 Dec 2018 06:26) (14 - 15)  SpO2: 93% (29 Dec 2018 08:03) (92% - 98%)  I&O's Summary    28 Dec 2018 07:01  -  29 Dec 2018 07:00  --------------------------------------------------------  IN: 1210 mL / OUT: 1200 mL / NET: 10 mL    29 Dec 2018 07:01  -  29 Dec 2018 10:44  --------------------------------------------------------  IN: 0 mL / OUT: 550 mL / NET: -550 mL        PHYSICAL EXAM:  General: NAD  ENT: MMM  Neck: Supple, No JVD  Lungs: Clear to auscultation bilaterally  Cardio: RRR, S1/S2, No murmurs  Abdomen: Soft, Nontender, Nondistended; Bowel sounds present  Extremities: No cyanosis, No edema  Neuro: no new deficits  Skin: left leg erythema increased, hematoma anterior lower leg, tender  Psych: AAO    LABS:                        14.7   6.2   )-----------( 99       ( 29 Dec 2018 09:30 )             43.7     12-29    141  |  104  |  22  ----------------------------<  139  3.6   |  31  |  0.87    Ca    9.0      29 Dec 2018 09:30    TPro  6.8  /  Alb  2.8  /  TBili  0.5  /  DBili  x   /  AST  46  /  ALT  37  /  AlkPhos  60  12-27    eGFR if Non African American: 91 mL/min/1.73M2 (12-29-18 @ 09:30)  eGFR if : 106 mL/min/1.73M2 (12-29-18 @ 09:30)      Lactate, Blood: 1.8 mmol/L (12-27 @ 14:00)          TSH 5.96   TSH with FT4 reflex --  Total T3 --        CAPILLARY BLOOD GLUCOSE              Culture - Blood (collected 27 Dec 2018 14:00)  Source: .Blood Blood-Peripheral  Preliminary Report (28 Dec 2018 19:01):    No growth to date.    Culture - Blood (collected 27 Dec 2018 14:00)  Source: .Blood Blood-Peripheral  Preliminary Report (28 Dec 2018 19:01):    No growth to date.        RADIOLOGY & ADDITIONAL TESTS:    Care Discussed with Consultants/Other Providers: CC: Patient is a 63y old  Male who presents with a chief complaint of cellulitis (28 Dec 2018 07:27)      S: No f/c/n/v, does have some pain in left leg, tenderness    Patient seen and examined at bedside.    ALLERGIES:  benzodiazepines (Other)  Keppra (Other (Severe))  penicillin (Angioedema)  penicillins (Unknown)      MEDICATIONS:  acetaminophen   Tablet .. 650 milliGRAM(s) Oral every 6 hours PRN  buDESOnide 160 MICROgram(s)/formoterol 4.5 MICROgram(s) Inhaler 2 Puff(s) Inhalation two times a day  diVALproex ER 1500 milliGRAM(s) Oral at bedtime  enoxaparin Injectable 40 milliGRAM(s) SubCutaneous every 24 hours  furosemide    Tablet 40 milliGRAM(s) Oral daily  levothyroxine 25 MICROGram(s) Oral daily  tamsulosin 0.4 milliGRAM(s) Oral at bedtime  vancomycin  IVPB 1000 milliGRAM(s) IV Intermittent every 12 hours        Vital Signs Last 24 Hrs  T(F): 97.4 (29 Dec 2018 06:26), Max: 98.3 (28 Dec 2018 21:48)  HR: 55 (29 Dec 2018 08:03) (55 - 63)  BP: 106/68 (29 Dec 2018 06:26) (100/60 - 112/60)  RR: 14 (29 Dec 2018 06:26) (14 - 15)  SpO2: 93% (29 Dec 2018 08:03) (92% - 98%)  I&O's Summary    28 Dec 2018 07:01  -  29 Dec 2018 07:00  --------------------------------------------------------  IN: 1210 mL / OUT: 1200 mL / NET: 10 mL    29 Dec 2018 07:01  -  29 Dec 2018 10:44  --------------------------------------------------------  IN: 0 mL / OUT: 550 mL / NET: -550 mL        PHYSICAL EXAM:  General: NAD  ENT: MMM  Neck: Supple, No JVD  Lungs: Clear to auscultation bilaterally  Cardio: RRR, S1/S2, No murmurs  Abdomen: Soft, Nontender, Nondistended; Bowel sounds present  Extremities: No cyanosis, No edema  Neuro: no new deficits  Skin: left leg erythema increased, hematoma anterior lower leg, tender  Psych: AAO    LABS:                        14.7   6.2   )-----------( 99       ( 29 Dec 2018 09:30 )             43.7     12-29    141  |  104  |  22  ----------------------------<  139  3.6   |  31  |  0.87    Ca    9.0      29 Dec 2018 09:30    TPro  6.8  /  Alb  2.8  /  TBili  0.5  /  DBili  x   /  AST  46  /  ALT  37  /  AlkPhos  60  12-27    eGFR if Non African American: 91 mL/min/1.73M2 (12-29-18 @ 09:30)  eGFR if : 106 mL/min/1.73M2 (12-29-18 @ 09:30)      Lactate, Blood: 1.8 mmol/L (12-27 @ 14:00)          TSH 5.96   TSH with FT4 reflex --  Total T3 --        CAPILLARY BLOOD GLUCOSE              Culture - Blood (collected 27 Dec 2018 14:00)  Source: .Blood Blood-Peripheral  Preliminary Report (28 Dec 2018 19:01):    No growth to date.    Culture - Blood (collected 27 Dec 2018 14:00)  Source: .Blood Blood-Peripheral  Preliminary Report (28 Dec 2018 19:01):    No growth to date.        RADIOLOGY & ADDITIONAL TESTS:    Care Discussed with Consultants/Other Providers:

## 2018-12-30 ENCOUNTER — TRANSCRIPTION ENCOUNTER (OUTPATIENT)
Age: 63
End: 2018-12-30

## 2018-12-30 VITALS — OXYGEN SATURATION: 95 %

## 2018-12-30 LAB
HCT VFR BLD CALC: 40.2 % — SIGNIFICANT CHANGE UP (ref 39–50)
HGB BLD-MCNC: 13.3 G/DL — SIGNIFICANT CHANGE UP (ref 13–17)
MCHC RBC-ENTMCNC: 31.2 PG — SIGNIFICANT CHANGE UP (ref 27–34)
MCHC RBC-ENTMCNC: 33.1 GM/DL — SIGNIFICANT CHANGE UP (ref 32–36)
MCV RBC AUTO: 94.4 FL — SIGNIFICANT CHANGE UP (ref 80–100)
PLATELET # BLD AUTO: 87 K/UL — LOW (ref 150–400)
RBC # BLD: 4.26 M/UL — SIGNIFICANT CHANGE UP (ref 4.2–5.8)
RBC # FLD: 13.2 % — SIGNIFICANT CHANGE UP (ref 10.3–14.5)
T3 SERPL-MCNC: 108 NG/DL — SIGNIFICANT CHANGE UP (ref 80–200)
T4 FREE SERPL-MCNC: 1.2 NG/DL — SIGNIFICANT CHANGE UP (ref 0.9–1.8)
WBC # BLD: 7.2 K/UL — SIGNIFICANT CHANGE UP (ref 3.8–10.5)
WBC # FLD AUTO: 7.2 K/UL — SIGNIFICANT CHANGE UP (ref 3.8–10.5)

## 2018-12-30 PROCEDURE — 93971 EXTREMITY STUDY: CPT

## 2018-12-30 PROCEDURE — 84439 ASSAY OF FREE THYROXINE: CPT

## 2018-12-30 PROCEDURE — 87040 BLOOD CULTURE FOR BACTERIA: CPT

## 2018-12-30 PROCEDURE — 80048 BASIC METABOLIC PNL TOTAL CA: CPT

## 2018-12-30 PROCEDURE — 73700 CT LOWER EXTREMITY W/O DYE: CPT

## 2018-12-30 PROCEDURE — 83605 ASSAY OF LACTIC ACID: CPT

## 2018-12-30 PROCEDURE — 84480 ASSAY TRIIODOTHYRONINE (T3): CPT

## 2018-12-30 PROCEDURE — 80202 ASSAY OF VANCOMYCIN: CPT

## 2018-12-30 PROCEDURE — 84443 ASSAY THYROID STIM HORMONE: CPT

## 2018-12-30 PROCEDURE — 96374 THER/PROPH/DIAG INJ IV PUSH: CPT

## 2018-12-30 PROCEDURE — 99239 HOSP IP/OBS DSCHRG MGMT >30: CPT

## 2018-12-30 PROCEDURE — 71045 X-RAY EXAM CHEST 1 VIEW: CPT

## 2018-12-30 PROCEDURE — 85027 COMPLETE CBC AUTOMATED: CPT

## 2018-12-30 PROCEDURE — 80053 COMPREHEN METABOLIC PANEL: CPT

## 2018-12-30 PROCEDURE — 94640 AIRWAY INHALATION TREATMENT: CPT

## 2018-12-30 PROCEDURE — 99285 EMERGENCY DEPT VISIT HI MDM: CPT | Mod: 25

## 2018-12-30 PROCEDURE — 93005 ELECTROCARDIOGRAM TRACING: CPT

## 2018-12-30 PROCEDURE — 99222 1ST HOSP IP/OBS MODERATE 55: CPT

## 2018-12-30 RX ORDER — ACETAMINOPHEN 500 MG
2 TABLET ORAL
Qty: 0 | Refills: 0 | COMMUNITY
Start: 2018-12-30

## 2018-12-30 RX ADMIN — Medication 25 MICROGRAM(S): at 05:39

## 2018-12-30 RX ADMIN — Medication 40 MILLIGRAM(S): at 05:39

## 2018-12-30 RX ADMIN — Medication 50 MILLIGRAM(S): at 05:39

## 2018-12-30 RX ADMIN — BUDESONIDE AND FORMOTEROL FUMARATE DIHYDRATE 2 PUFF(S): 160; 4.5 AEROSOL RESPIRATORY (INHALATION) at 08:36

## 2018-12-30 RX ADMIN — Medication 1 TABLET(S): at 11:20

## 2018-12-30 RX ADMIN — Medication 250 MILLIGRAM(S): at 06:37

## 2018-12-30 NOTE — DISCHARGE NOTE ADULT - PATIENT PORTAL LINK FT
You can access the [facility] Portal, offered by [facility], by registering with the following website: [email or web address] [place]

## 2018-12-30 NOTE — PROGRESS NOTE ADULT - PROBLEM SELECTOR PROBLEM 4
Open wound of anterior abdominal wall, sequela

## 2018-12-30 NOTE — PROGRESS NOTE ADULT - PROBLEM SELECTOR PLAN 1
LE Doppler: No DVT.   Appreciate ID recs.   c/w Vanco, Aztreonam.   CT LE: Nonspecific soft tissue density within the anterior cutaneous/subcutaneous tissues at the level of the mid tibial diaphysis measuring up to 4.4 cm.  Surgery Consult re: Surgical drainage. LE Doppler: No DVT.   Appreciate ID recs will stop Vanco, Aztreonam.   CT LE: Nonspecific soft tissue density within the anterior cutaneous/subcutaneous tissues at the level of the mid tibial diaphysis measuring up to 4.4 cm.  Surgery Consult re: Surgical drainage, no need for drainage

## 2018-12-30 NOTE — DISCHARGE NOTE ADULT - MEDICATION SUMMARY - MEDICATIONS TO TAKE
I will START or STAY ON the medications listed below when I get home from the hospital:    acetaminophen 325 mg oral tablet  -- 2 tab(s) by mouth every 6 hours, As needed, Temp greater or equal to 38C (100.4F), Mild Pain (1 - 3)  -- Indication: For Pain    tamsulosin 0.4 mg oral capsule  -- 1 cap(s) by mouth once a day (at bedtime)  -- Indication: For BPH    divalproex sodium 500 mg oral tablet, extended release  -- 3 tab(s) by mouth once a day (at bedtime)  -- Indication: For Seizure    Symbicort 160 mcg-4.5 mcg/inh inhalation aerosol  -- 2 puff(s) inhaled 2 times a day  -- Indication: For Bronchiectasis    furosemide 40 mg oral tablet  -- 1 tab(s) by mouth once a day  -- Avoid prolonged or excessive exposure to direct and/or artificial sunlight while taking this medication.  It is very important that you take or use this exactly as directed.  Do not skip doses or discontinue unless directed by your doctor.  It may be advisable to drink a full glass orange juice or eat a banana daily while taking this medication.    -- Indication: For Venous Statis    sulfamethoxazole-trimethoprim 800 mg-160 mg oral tablet  -- 1 tab(s) by mouth 2 times a day  -- Indication: For Cellulitis    levothyroxine 25 mcg (0.025 mg) oral tablet  -- 1 tab(s) by mouth once a day  -- Indication: For Hypothyroid

## 2018-12-30 NOTE — DISCHARGE NOTE ADULT - CARE PROVIDERS DIRECT ADDRESSES
[email or web address]

## 2018-12-30 NOTE — PROGRESS NOTE ADULT - ASSESSMENT
62yo male w. PMH Seizure disorder, Venous stasis, BPH, COPD, Hypothyroidism p/w LLE pain & swelling near open wound. Admitted for cellulitis. Course c/b worsening erythema, tenderness and extension of ecchymoses down to heel, ID & Surgery now following. 62yo male w. PMH Seizure disorder, Venous stasis, BPH, COPD, Hypothyroidism p/w LLE pain & swelling near open wound. Admitted for possible cellulitis foudn to have extension of ecchymoses down to heel, ID & Surgery following thought to be predominantly hematoma, covering for cellulitis but will switch to Bactrim on discharge for 3 more days and pt can be discahrged.

## 2018-12-30 NOTE — DISCHARGE NOTE ADULT - PLAN OF CARE
Infection tx Bactrim to complete 3 day course.   Hematoma w. no indication for I/D per surgery Disease Management Cont Synthroid Cont Valproic acid

## 2018-12-30 NOTE — CONSULT NOTE ADULT - SUBJECTIVE AND OBJECTIVE BOX
Hospitalist note:    63M PMH seizure disorder, venous stasis, BPH, copd, hypothyroidism presents for left lower extremity pain, swelling open wound.  patient saw Dr. Gonsalves today who sent patient in to be admitted for IV antibiotics.  Patient feels okay but does state tenderness in posterior calf.  Denies chest pain, sob, nausea, vomiting, diarrhea, fever, headache.      patient resting comfortably in bed    denies trauma to lle, denies diabetes    exam:    left lower extremity  calf-no compartment syndrome  good distal pulses    1+ edema(bilaterally)    hematoma anterior tibia-not fluctuant  surrounding cellulitis    labs:Complete Blood Count in AM (12.30.18 @ 06:00)    WBC Count: 7.2 K/uL    RBC Count: 4.26 M/uL    Hemoglobin: 13.3 g/dL    Hematocrit: 40.2 %    Mean Cell Volume: 94.4 fl    Mean Cell Hemoglobin: 31.2 pg    Mean Cell Hemoglobin Conc: 33.1 gm/dL    Red Cell Distrib Width: 13.2 %    Platelet Count - Automated: 87 K/uL    ct scan(I personally reviewed images)-no fracture

## 2018-12-30 NOTE — DISCHARGE NOTE ADULT - HOSPITAL COURSE
62yo male w. PMH Seizure disorder, Venous stasis, BPH, COPD, Hypothyroidism p/w LLE pain & swelling near open wound. Admitted for possible cellulitis foudn to have extension of ecchymoses down to heel, ID & Surgery following thought to be predominantly hematoma, covering for cellulitis but will switch to Bactrim on discharge.

## 2018-12-30 NOTE — DISCHARGE NOTE ADULT - CARE PROVIDER_API CALL
Brian Gonsalves), Family Medicine; Geriatric Medicine  [address] [place] [address]  Phone: [phone]  Fax: [phone] Brian Gonsalves), Family Medicine; Geriatric Medicine  [address] [place] [address]  Phone: [phone]  Fax: [phone]    Carloz Neal), Surgery  [address] [place] [address]  Phone: [phone]  Fax: [phone]

## 2018-12-30 NOTE — CONSULT NOTE ADULT - ASSESSMENT
hematoma/cellulitis of left lower extremity  no indication for Incision and drainage    suggest continued IV antibioitics  ? ID consult    leg needs to be elevated on two pillows  dvt prophylaxis      I will follow  thank you

## 2018-12-30 NOTE — DISCHARGE NOTE ADULT - CARE PLAN
Principal Discharge DX:	Cellulitis of left lower extremity  Goal:	Infection tx  Assessment and plan of treatment:	Bactrim to complete 3 day course.   Hematoma w. no indication for I/D per surgery  Secondary Diagnosis:	Hypothyroid  Goal:	Disease Management  Assessment and plan of treatment:	Cont Synthroid  Secondary Diagnosis:	Seizure  Goal:	Disease Management  Assessment and plan of treatment:	Cont Valproic acid

## 2018-12-30 NOTE — PROGRESS NOTE ADULT - PROBLEM SELECTOR PROBLEM 1
Cellulitis of left lower extremity

## 2018-12-30 NOTE — PROGRESS NOTE ADULT - PROBLEM SELECTOR PLAN 4
Follow up with outpatient surgery for closure
Follow up as outpatient for possible surgical closure
Follow up with outpatient surgery for closure

## 2018-12-30 NOTE — DISCHARGE NOTE ADULT - ADDITIONAL INSTRUCTIONS
Upon discharge please call to make follow up appointments with your Primary Care Physician and Surgery follow up.

## 2018-12-30 NOTE — PROGRESS NOTE ADULT - ATTENDING COMMENTS
I have personally seen and examined patient on the above date.  I discussed the case with NP and I agree with findings and plan as detailed per note above, which I have amended where appropriate.      Pt doing well, leg shows more ecchymoses, no fluctuance, CT shows no abscess, likely hematoma, no need for drainage per Surgery, discussed with ID Brieff can switch to Bactrim for 3 more days and discharge home. Attempted to reach sister and caretaker Kristin but no answer. We will try again.

## 2018-12-30 NOTE — PROGRESS NOTE ADULT - SUBJECTIVE AND OBJECTIVE BOX
Patient is a 63y old  Male who presents with a chief complaint of cellulitis (30 Dec 2018 08:59)    Patient seen and examined at bedside.  S: Denies new complaints. No f/c overnight. Pain controlled.     ALLERGIES:  benzodiazepines (Other)  Keppra (Other (Severe))  penicillin (Angioedema)  penicillins (Unknown)    MEDICATIONS:  acetaminophen   Tablet .. 650 milliGRAM(s) Oral every 6 hours PRN  aztreonam  IVPB 1000 milliGRAM(s) IV Intermittent every 8 hours  aztreonam  IVPB      buDESOnide 160 MICROgram(s)/formoterol 4.5 MICROgram(s) Inhaler 2 Puff(s) Inhalation two times a day  diVALproex ER 1500 milliGRAM(s) Oral at bedtime  enoxaparin Injectable 40 milliGRAM(s) SubCutaneous every 24 hours  furosemide    Tablet 40 milliGRAM(s) Oral daily  levothyroxine 25 MICROGram(s) Oral daily  tamsulosin 0.4 milliGRAM(s) Oral at bedtime  vancomycin  IVPB 1000 milliGRAM(s) IV Intermittent every 12 hours    Vital Signs Last 24 Hrs  T(F): 97.7 (30 Dec 2018 05:40), Max: 97.7 (30 Dec 2018 05:40)  HR: 67 (30 Dec 2018 08:37) (58 - 67)  BP: 115/65 (30 Dec 2018 05:40) (102/61 - 115/65)  RR: 14 (30 Dec 2018 05:40) (14 - 14)  SpO2: 95% (30 Dec 2018 08:37) (94% - 96%)  I&O's Summary    29 Dec 2018 07:01  -  30 Dec 2018 07:00  --------------------------------------------------------  IN: 1320 mL / OUT: 2270 mL / NET: -950 mL    30 Dec 2018 07:01  -  30 Dec 2018 09:45  --------------------------------------------------------  IN: 0 mL / OUT: 225 mL / NET: -225 mL      PHYSICAL EXAM:  General: NAD, A/O x 3  ENT: MMM  Lungs: Clear to auscultation bilaterally (Anteriorly)   Cardio: RR, S1/S2, No murmurs  Abdomen: Soft, NT/ND, Normal active Bowel Sounds   Extremities: No cyanosis. wwp. LLE: + Erythremic area noted, warm- within marked area. Scab intact. +Edema.      LABS:                        13.3   7.2   )-----------( 87       ( 30 Dec 2018 06:00 )             40.2     12-29    141  |  104  |  22  ----------------------------<  139  3.6   |  31  |  0.87    Ca    9.0      29 Dec 2018 09:30    TPro  6.8  /  Alb  2.8  /  TBili  0.5  /  DBili  x   /  AST  46  /  ALT  37  /  AlkPhos  60  12-27    eGFR if Non African American: 91 mL/min/1.73M2 (12-29-18 @ 09:30)  eGFR if : 106 mL/min/1.73M2 (12-29-18 @ 09:30)    Lactate, Blood: 1.8 mmol/L (12-27 @ 14:00)    TSH 5.96   TSH with FT4 reflex --  Total T3 --    Culture - Blood (collected 27 Dec 2018 14:00)  Source: .Blood Blood-Peripheral  Preliminary Report (28 Dec 2018 19:01):    No growth to date.    Culture - Blood (collected 27 Dec 2018 14:00)  Source: .Blood Blood-Peripheral  Preliminary Report (28 Dec 2018 19:01):    No growth to date.      RADIOLOGY & ADDITIONAL TESTS:  < from: CT Lower Extremity No Cont, Left (12.29.18 @ 16:35) >  Nonspecific soft tissue density within the anterior    cutaneous/subcutaneous tissues at the level of the mid tibial diaphysis   measuring up to 4.4 cm.    Care Discussed with Consultants/Other Providers: Dr. Prieto. Patient is a 63y old  Male who presents with a chief complaint of cellulitis (30 Dec 2018 08:59)    Patient seen and examined at bedside.  S: Denies new complaints. No f/c overnight. Pain controlled.     ALLERGIES:  benzodiazepines (Other)  Keppra (Other (Severe))  penicillin (Angioedema)  penicillins (Unknown)    MEDICATIONS:  acetaminophen   Tablet .. 650 milliGRAM(s) Oral every 6 hours PRN  aztreonam  IVPB 1000 milliGRAM(s) IV Intermittent every 8 hours  aztreonam  IVPB      buDESOnide 160 MICROgram(s)/formoterol 4.5 MICROgram(s) Inhaler 2 Puff(s) Inhalation two times a day  diVALproex ER 1500 milliGRAM(s) Oral at bedtime  enoxaparin Injectable 40 milliGRAM(s) SubCutaneous every 24 hours  furosemide    Tablet 40 milliGRAM(s) Oral daily  levothyroxine 25 MICROGram(s) Oral daily  tamsulosin 0.4 milliGRAM(s) Oral at bedtime  vancomycin  IVPB 1000 milliGRAM(s) IV Intermittent every 12 hours    Vital Signs Last 24 Hrs  T(F): 97.7 (30 Dec 2018 05:40), Max: 97.7 (30 Dec 2018 05:40)  HR: 67 (30 Dec 2018 08:37) (58 - 67)  BP: 115/65 (30 Dec 2018 05:40) (102/61 - 115/65)  RR: 14 (30 Dec 2018 05:40) (14 - 14)  SpO2: 95% (30 Dec 2018 08:37) (94% - 96%)  I&O's Summary    29 Dec 2018 07:01  -  30 Dec 2018 07:00  --------------------------------------------------------  IN: 1320 mL / OUT: 2270 mL / NET: -950 mL    30 Dec 2018 07:01  -  30 Dec 2018 09:45  --------------------------------------------------------  IN: 0 mL / OUT: 225 mL / NET: -225 mL      PHYSICAL EXAM:  General: NAD, A/O x 3  ENT: MMM  Lungs: Clear to auscultation bilaterally (Anteriorly)   Cardio: RR, S1/S2, No murmurs  Abdomen: Soft, NT/ND, Normal active Bowel Sounds   Extremities: No cyanosis. wwp. LLE: + Erythremic area noted, warm- within marked area. Scab intact. +Edema.    Neuro: lower extremity neuropathy, AAO, moves all extremities    LABS:                        13.3   7.2   )-----------( 87       ( 30 Dec 2018 06:00 )             40.2     12-29    141  |  104  |  22  ----------------------------<  139  3.6   |  31  |  0.87    Ca    9.0      29 Dec 2018 09:30    TPro  6.8  /  Alb  2.8  /  TBili  0.5  /  DBili  x   /  AST  46  /  ALT  37  /  AlkPhos  60  12-27    eGFR if Non African American: 91 mL/min/1.73M2 (12-29-18 @ 09:30)  eGFR if : 106 mL/min/1.73M2 (12-29-18 @ 09:30)    Lactate, Blood: 1.8 mmol/L (12-27 @ 14:00)    TSH 5.96   TSH with FT4 reflex --  Total T3 --    Culture - Blood (collected 27 Dec 2018 14:00)  Source: .Blood Blood-Peripheral  Preliminary Report (28 Dec 2018 19:01):    No growth to date.    Culture - Blood (collected 27 Dec 2018 14:00)  Source: .Blood Blood-Peripheral  Preliminary Report (28 Dec 2018 19:01):    No growth to date.      RADIOLOGY & ADDITIONAL TESTS:  < from: CT Lower Extremity No Cont, Left (12.29.18 @ 16:35) >  Nonspecific soft tissue density within the anterior    cutaneous/subcutaneous tissues at the level of the mid tibial diaphysis   measuring up to 4.4 cm.    Care Discussed with Consultants/Other Providers: Dr. Prieto.

## 2018-12-31 PROBLEM — R56.9 UNSPECIFIED CONVULSIONS: Chronic | Status: ACTIVE | Noted: 2018-12-27

## 2018-12-31 PROBLEM — E03.9 HYPOTHYROIDISM, UNSPECIFIED: Chronic | Status: ACTIVE | Noted: 2018-12-27

## 2019-01-02 ENCOUNTER — CHART COPY (OUTPATIENT)
Age: 64
End: 2019-01-02

## 2019-01-03 ENCOUNTER — APPOINTMENT (OUTPATIENT)
Dept: FAMILY MEDICINE | Facility: CLINIC | Age: 64
End: 2019-01-03
Payer: MEDICARE

## 2019-01-03 VITALS
OXYGEN SATURATION: 97 % | HEIGHT: 73 IN | DIASTOLIC BLOOD PRESSURE: 75 MMHG | RESPIRATION RATE: 12 BRPM | BODY MASS INDEX: 29.42 KG/M2 | WEIGHT: 222 LBS | TEMPERATURE: 97.4 F | SYSTOLIC BLOOD PRESSURE: 116 MMHG | HEART RATE: 74 BPM

## 2019-01-03 PROCEDURE — 99213 OFFICE O/P EST LOW 20 MIN: CPT

## 2019-01-03 NOTE — HISTORY OF PRESENT ILLNESS
[Post-hospitalization from ___ Hospital] : Post-hospitalization from [unfilled] Hospital [Admitted on: ___] : The patient was admitted on [unfilled] [Discharged on ___] : discharged on [unfilled] [Discharge Summary] : discharge summary [Discharge Med List] : discharge medication list [Patient Contacted By: ____] : and contacted by [unfilled] [FreeTextEntry2] : The patient was admitted to [facility] after an office visit here revealed worsening of a leg wound and surrounding cellulitis he was treated there with intravenous vancomycin with gradual improvement and discharged home on Bactrim double strength twice a day laboratory results at the hospital were unremarkable

## 2019-01-03 NOTE — PHYSICAL EXAM
[No Acute Distress] : no acute distress [Well Nourished] : well nourished [Well Developed] : well developed [Well-Appearing] : well-appearing [Normal Sclera/Conjunctiva] : normal sclera/conjunctiva [PERRL] : pupils equal round and reactive to light [EOMI] : extraocular movements intact [Normal Outer Ear/Nose] : the outer ears and nose were normal in appearance [Normal Oropharynx] : the oropharynx was normal [No JVD] : no jugular venous distention [Supple] : supple [No Lymphadenopathy] : no lymphadenopathy [No Respiratory Distress] : no respiratory distress  [Clear to Auscultation] : lungs were clear to auscultation bilaterally [Normal Rate] : normal rate  [de-identified] : bilateral edema [de-identified] : eschar and surrounding cellulitis improved since hospital discharge but still present

## 2019-01-03 NOTE — ASSESSMENT
[FreeTextEntry1] : The patient still has considerable cellulitis of the left leg and there is an eschar covering the original wound he has no systemic symptoms no fever no chills he feels well and does not have any pain he has however been driving a car and not keeping his leg elevated for the length of time that we would like me to do this again reinforced we also will continue his Bactrim double strength for another 3-4 days follow up visit here in 3 days

## 2019-01-07 ENCOUNTER — APPOINTMENT (OUTPATIENT)
Dept: FAMILY MEDICINE | Facility: CLINIC | Age: 64
End: 2019-01-07
Payer: MEDICARE

## 2019-01-07 VITALS
BODY MASS INDEX: 29.46 KG/M2 | SYSTOLIC BLOOD PRESSURE: 120 MMHG | DIASTOLIC BLOOD PRESSURE: 68 MMHG | HEART RATE: 60 BPM | OXYGEN SATURATION: 94 % | RESPIRATION RATE: 16 BRPM | HEIGHT: 73 IN | WEIGHT: 222.25 LBS

## 2019-01-07 PROCEDURE — 99212 OFFICE O/P EST SF 10 MIN: CPT

## 2019-01-07 NOTE — PHYSICAL EXAM
[No Acute Distress] : no acute distress [Well Nourished] : well nourished [Well Developed] : well developed [Well-Appearing] : well-appearing [Normal Sclera/Conjunctiva] : normal sclera/conjunctiva [PERRL] : pupils equal round and reactive to light [EOMI] : extraocular movements intact [Fundoscopic Exam Performed] : fundoscopic ~T exam ~C was performed [Normal Outer Ear/Nose] : the outer ears and nose were normal in appearance [No JVD] : no jugular venous distention [Supple] : supple [No Lymphadenopathy] : no lymphadenopathy [No Respiratory Distress] : no respiratory distress  [Clear to Auscultation] : lungs were clear to auscultation bilaterally [Normal Rate] : normal rate  [de-identified] : There is a skin tear of the left and the dorsum on the hand this does not appear to be infected it has been dressed

## 2019-01-07 NOTE — ASSESSMENT
[FreeTextEntry1] : Tendinitis improvede'll continue Bactrim double strength twice a day for another 24 hours followup telephonical

## 2019-01-07 NOTE — HISTORY OF PRESENT ILLNESS
[FreeTextEntry1] : cellulitis [de-identified] : Patient is seen here today in followup on cellulitis of his left leg this does appear improved over where it had been there is less edema and much less redness particularly he is going superiorly the patient has been keeping off the leg as much as possible he also comes in with a new injury to his left hand which he suffered ice landing and the car door into his eye this has been addressed by his daughter

## 2019-01-08 ENCOUNTER — APPOINTMENT (OUTPATIENT)
Dept: SURGERY | Facility: CLINIC | Age: 64
End: 2019-01-08
Payer: MEDICARE

## 2019-01-08 VITALS
SYSTOLIC BLOOD PRESSURE: 116 MMHG | HEART RATE: 70 BPM | OXYGEN SATURATION: 94 % | HEIGHT: 73 IN | DIASTOLIC BLOOD PRESSURE: 78 MMHG | WEIGHT: 222 LBS | RESPIRATION RATE: 16 BRPM | BODY MASS INDEX: 29.42 KG/M2

## 2019-01-08 DIAGNOSIS — L03.119 CELLULITIS OF UNSPECIFIED PART OF LIMB: ICD-10-CM

## 2019-01-08 PROCEDURE — 99212 OFFICE O/P EST SF 10 MIN: CPT

## 2019-01-16 DIAGNOSIS — F31.9 BIPOLAR DISORDER, UNSPECIFIED: ICD-10-CM

## 2019-02-04 ENCOUNTER — APPOINTMENT (OUTPATIENT)
Dept: FAMILY MEDICINE | Facility: CLINIC | Age: 64
End: 2019-02-04

## 2019-02-04 ENCOUNTER — APPOINTMENT (OUTPATIENT)
Dept: FAMILY MEDICINE | Facility: CLINIC | Age: 64
End: 2019-02-04
Payer: MEDICARE

## 2019-02-04 ENCOUNTER — RX RENEWAL (OUTPATIENT)
Age: 64
End: 2019-02-04

## 2019-02-04 VITALS
HEART RATE: 80 BPM | RESPIRATION RATE: 16 BRPM | TEMPERATURE: 97.4 F | HEIGHT: 73 IN | DIASTOLIC BLOOD PRESSURE: 68 MMHG | OXYGEN SATURATION: 95 % | BODY MASS INDEX: 29.82 KG/M2 | SYSTOLIC BLOOD PRESSURE: 110 MMHG | WEIGHT: 225 LBS

## 2019-02-04 PROCEDURE — 99214 OFFICE O/P EST MOD 30 MIN: CPT

## 2019-02-04 NOTE — PHYSICAL EXAM
[No Acute Distress] : no acute distress [Well Nourished] : well nourished [Well Developed] : well developed [Well-Appearing] : well-appearing [Normal Voice/Communication] : normal voice/communication [de-identified] : LLE wound etiology: cellulitis: 0.6 cm x 0.8 cm x unmeasurable due to necrotic tissue covering wound bed. +serosanguinous drainage. Lower mid abdominal wound etiology infection: 1.6 cm x 2.7 cm x 0.2; 100 % granulation tissue, serosanguinous exudate

## 2019-02-04 NOTE — ASSESSMENT
[FreeTextEntry1] : 1) abdominal wound: cleansed with NS and clean gauze. dressed with bacitracin and dry dressing; continue current dressing at home with VNS until next week\par 2) LLE wound: wound cleansed with NS, pat dry. 2% lidocaine applied x 20 minutes for anesthesia (after procedure and risks explained, and consent signed). Wound debrided with #15 scalpel to reomve necrotic tissue and expose granulation tissue, less than 3 ml blood loss, adequate hemostasis achieved with 1 minute of light pressure. Patient tolerated procedure well, pain controlled. Continue Santyl and Mepilex daily after cleansing with NS. \par \par RTO 1 week for f/u wound treatment re-evaluation; keep legs elevated. Sister states they are seeing a plastic surgeon regarding abdominal wound in the coming week

## 2019-02-04 NOTE — HISTORY OF PRESENT ILLNESS
[FreeTextEntry8] : Patient presents with sister, Kristin, who is his caretaker for wounds of LLE that started after he had cellulitis, spent 2 days in [facility], and VNS is coming daily to change dressing: Santyl and Mepilex dressing. Also has lower abdominal wound which has been there about 3 years after diverticulitis and peritonitis as per patient and did not heal. Dressing is also Santyl and Mepilex daily. No other complaints, concerns today. Sister signs his consents for him as per patient and sister.

## 2019-02-11 ENCOUNTER — APPOINTMENT (OUTPATIENT)
Dept: SURGERY | Facility: CLINIC | Age: 64
End: 2019-02-11
Payer: MEDICARE

## 2019-02-11 VITALS
HEART RATE: 60 BPM | SYSTOLIC BLOOD PRESSURE: 113 MMHG | TEMPERATURE: 97.5 F | OXYGEN SATURATION: 98 % | DIASTOLIC BLOOD PRESSURE: 78 MMHG | RESPIRATION RATE: 15 BRPM

## 2019-02-11 DIAGNOSIS — R10.32 LEFT LOWER QUADRANT PAIN: ICD-10-CM

## 2019-02-11 PROCEDURE — 99213 OFFICE O/P EST LOW 20 MIN: CPT

## 2019-02-11 RX ORDER — NYSTATIN 100000 1/G
100000 POWDER TOPICAL
Qty: 1 | Refills: 3 | Status: ACTIVE | COMMUNITY
Start: 2019-02-11 | End: 1900-01-01

## 2019-02-11 RX ORDER — SULFAMETHOXAZOLE AND TRIMETHOPRIM 800; 160 MG/1; MG/1
800-160 TABLET ORAL TWICE DAILY
Qty: 10 | Refills: 0 | Status: DISCONTINUED | COMMUNITY
Start: 2019-01-03 | End: 2019-02-11

## 2019-02-11 NOTE — PLAN
[FreeTextEntry1] : 1. L groin pain\par -return to office if develop a bulge\par -if no improvements can consider repeat CT scan to eval for occult hernia\par \par 2. Fungal Infxn of Groin Creases\par -Rx for Nystatin powder\par \par 3. Chronic Abd wound\par -cont local wound care\par -cont plastic surgery follow up\par -consider hyperbaric rx\par \par Return to office PRN

## 2019-02-11 NOTE — ASSESSMENT
[FreeTextEntry1] : 63M with L groin pain but no evidence of hernia\par \par Has probable candida infxn in groin creases\par \par Persistent chronic wound of remote midline incision

## 2019-02-11 NOTE — PHYSICAL EXAM
[JVD] : no jugular venous distention  [de-identified] : nad [de-identified] : soft, non-tender, non-distended, midline scar, nickel-size area at inferior aspect of wound with ulcer, clean edges, no erythema [de-identified] : nl testis bl, no hernia appreciable, has rash in bl groin creases and rash on glans penis

## 2019-02-11 NOTE — HISTORY OF PRESENT ILLNESS
[de-identified] : Jose M is a 62 y/o male here for a consultation visit. Abdominal surgery was done in 1996 for diverticulitis and peritonitis. Patient is also s/p RIH repair with mesh on 7/7/16. He was last seen 7/30/18 with burning pain from an open wound. Today, patient reports intermittent L groin pain. Denies associated nausea or vomiting.  [de-identified] : Has occasional L groin pain\par Denies any bulge\par No change in GI fxn\par \par Also has this chronic wound (area of a previous midline laparotomy)\par It is still not healing despite local wound care (has VNS), followed by [facility]

## 2019-02-13 ENCOUNTER — APPOINTMENT (OUTPATIENT)
Dept: FAMILY MEDICINE | Facility: CLINIC | Age: 64
End: 2019-02-13
Payer: MEDICARE

## 2019-02-13 VITALS
WEIGHT: 225 LBS | DIASTOLIC BLOOD PRESSURE: 70 MMHG | OXYGEN SATURATION: 90 % | RESPIRATION RATE: 16 BRPM | SYSTOLIC BLOOD PRESSURE: 108 MMHG | TEMPERATURE: 98.6 F | HEART RATE: 73 BPM | HEIGHT: 73 IN | BODY MASS INDEX: 29.82 KG/M2

## 2019-02-13 DIAGNOSIS — S81.802A UNSPECIFIED OPEN WOUND, LEFT LOWER LEG, INITIAL ENCOUNTER: ICD-10-CM

## 2019-02-13 PROCEDURE — 99214 OFFICE O/P EST MOD 30 MIN: CPT

## 2019-02-14 NOTE — ASSESSMENT
[FreeTextEntry1] : elevate legs, do not tape to skin, tape to gently wrapped cling\par continue current dressings daily and prn \par wounds cleansed with NS, pat dry, wounds dressed with bacitracin, clean dry dressing and gently wrapped with cling. \par RTO 7-12 days for f/u wounds\par

## 2019-02-14 NOTE — PHYSICAL EXAM
[No Acute Distress] : no acute distress [Well Nourished] : well nourished [Well Developed] : well developed [Well-Appearing] : well-appearing [Normal Voice/Communication] : normal voice/communication [Normal Sclera/Conjunctiva] : normal sclera/conjunctiva [Normal Outer Ear/Nose] : the outer ears and nose were normal in appearance [de-identified] : upper LLE wound (present at last visit): 0.2 x 0.2 x scab, no drainage. New lower LLE wound (likely tape trauma): 1.1 x 2.4 x 0.2; 100% granulation tissue; abdomial wound improved as evidenced by decreased size: 1.5 x 1.8 x 0.2 cm 100% granulation tissue, small serosanguinous exudate

## 2019-02-14 NOTE — HISTORY OF PRESENT ILLNESS
[FreeTextEntry1] : Patient presents for f/u LLE and abdominal wounds, sister did not come today. Patient states he has upcoming plastic surgery appointment. Feels well, nurse has been coming to home to change dressings. No fever, no pain. \par \par ROS: negative except as noted above\par

## 2019-03-07 PROCEDURE — 99213 OFFICE O/P EST LOW 20 MIN: CPT

## 2019-03-11 ENCOUNTER — APPOINTMENT (OUTPATIENT)
Dept: FAMILY MEDICINE | Facility: CLINIC | Age: 64
End: 2019-03-11
Payer: MEDICARE

## 2019-03-11 VITALS
SYSTOLIC BLOOD PRESSURE: 120 MMHG | TEMPERATURE: 97.2 F | BODY MASS INDEX: 29.2 KG/M2 | DIASTOLIC BLOOD PRESSURE: 70 MMHG | HEIGHT: 73 IN | OXYGEN SATURATION: 95 % | WEIGHT: 220.31 LBS | RESPIRATION RATE: 16 BRPM | HEART RATE: 69 BPM

## 2019-03-11 DIAGNOSIS — S31.109A UNSPECIFIED OPEN WOUND OF ABDOMINAL WALL, UNSPECIFIED QUADRANT W/OUT PENETRATION INTO PERITONEAL CAVITY, INITIAL ENCOUNTER: ICD-10-CM

## 2019-03-11 PROCEDURE — 99214 OFFICE O/P EST MOD 30 MIN: CPT

## 2019-03-12 PROBLEM — S31.109A WOUND OF ABDOMEN: Status: ACTIVE | Noted: 2019-02-04

## 2019-03-12 NOTE — PHYSICAL EXAM
[No Acute Distress] : no acute distress [Well Nourished] : well nourished [Well Developed] : well developed [Well-Appearing] : well-appearing [Normal Voice/Communication] : normal voice/communication [de-identified] : abd wound markedly improved:  0.7 x 0.3 x scab dry; LLE: \par 4 scabbed area from trauma table

## 2019-03-12 NOTE — PHYSICAL EXAM
[No Acute Distress] : no acute distress [Well Nourished] : well nourished [Well Developed] : well developed [Well-Appearing] : well-appearing [Normal Voice/Communication] : normal voice/communication [de-identified] : abd wound markedly improved:  0.7 x 0.3 x scab dry; LLE: \par 4 scabbed area from trauma table

## 2019-03-12 NOTE — ASSESSMENT
[FreeTextEntry1] : LLE wound resolved; 4 smaller scabbed areas, dry, dressed with dry dressing\par abd wound: cleansed, dry dressing applied\par patient advised to continue nurse wound care daily \par RTO 1 week for f/u abdominal wound

## 2019-03-20 ENCOUNTER — APPOINTMENT (OUTPATIENT)
Dept: FAMILY MEDICINE | Facility: CLINIC | Age: 64
End: 2019-03-20

## 2019-04-05 ENCOUNTER — RX RENEWAL (OUTPATIENT)
Age: 64
End: 2019-04-05

## 2019-04-30 ENCOUNTER — APPOINTMENT (OUTPATIENT)
Dept: FAMILY MEDICINE | Facility: CLINIC | Age: 64
End: 2019-04-30
Payer: MEDICARE

## 2019-04-30 ENCOUNTER — INPATIENT (INPATIENT)
Facility: HOSPITAL | Age: 64
LOS: 2 days | Discharge: HOME | DRG: 192 | End: 2019-05-03
Attending: HOSPITALIST | Admitting: INTERNAL MEDICINE
Payer: MEDICARE

## 2019-04-30 ENCOUNTER — NON-APPOINTMENT (OUTPATIENT)
Age: 64
End: 2019-04-30

## 2019-04-30 VITALS
HEART RATE: 77 BPM | RESPIRATION RATE: 13 BRPM | SYSTOLIC BLOOD PRESSURE: 98 MMHG | TEMPERATURE: 98.2 F | OXYGEN SATURATION: 96 % | DIASTOLIC BLOOD PRESSURE: 60 MMHG

## 2019-04-30 VITALS
HEIGHT: 74 IN | OXYGEN SATURATION: 94 % | HEART RATE: 79 BPM | RESPIRATION RATE: 18 BRPM | DIASTOLIC BLOOD PRESSURE: 68 MMHG | WEIGHT: 229.94 LBS | SYSTOLIC BLOOD PRESSURE: 102 MMHG | TEMPERATURE: 98 F

## 2019-04-30 DIAGNOSIS — S06.5X9A TRAUMATIC SUBDURAL HEMORRHAGE WITH LOSS OF CONSCIOUSNESS OF UNSPECIFIED DURATION, INITIAL ENCOUNTER: Chronic | ICD-10-CM

## 2019-04-30 DIAGNOSIS — Z98.890 OTHER SPECIFIED POSTPROCEDURAL STATES: Chronic | ICD-10-CM

## 2019-04-30 DIAGNOSIS — Z93.3 COLOSTOMY STATUS: Chronic | ICD-10-CM

## 2019-04-30 DIAGNOSIS — Z90.49 ACQUIRED ABSENCE OF OTHER SPECIFIED PARTS OF DIGESTIVE TRACT: Chronic | ICD-10-CM

## 2019-04-30 DIAGNOSIS — R94.31 ABNORMAL ELECTROCARDIOGRAM [ECG] [EKG]: ICD-10-CM

## 2019-04-30 DIAGNOSIS — R42 DIZZINESS AND GIDDINESS: ICD-10-CM

## 2019-04-30 LAB
ALBUMIN SERPL ELPH-MCNC: 2.4 G/DL — LOW (ref 3.3–5)
ALP SERPL-CCNC: 59 U/L — SIGNIFICANT CHANGE UP (ref 40–120)
ALT FLD-CCNC: 25 U/L DA — SIGNIFICANT CHANGE UP (ref 10–45)
ANION GAP SERPL CALC-SCNC: 7 MMOL/L — SIGNIFICANT CHANGE UP (ref 5–17)
APPEARANCE UR: CLEAR — SIGNIFICANT CHANGE UP
APTT BLD: 32.2 SEC — SIGNIFICANT CHANGE UP (ref 27.5–36.3)
AST SERPL-CCNC: 63 U/L — HIGH (ref 10–40)
BACTERIA # UR AUTO: NEGATIVE /HPF — SIGNIFICANT CHANGE UP
BILIRUB SERPL-MCNC: 0.6 MG/DL — SIGNIFICANT CHANGE UP (ref 0.2–1.2)
BILIRUB UR-MCNC: NEGATIVE — SIGNIFICANT CHANGE UP
BUN SERPL-MCNC: 18 MG/DL — SIGNIFICANT CHANGE UP (ref 7–23)
CALCIUM SERPL-MCNC: 8.2 MG/DL — LOW (ref 8.4–10.5)
CHLORIDE SERPL-SCNC: 100 MMOL/L — SIGNIFICANT CHANGE UP (ref 96–108)
CK SERPL-CCNC: 37 U/L — SIGNIFICANT CHANGE UP (ref 30–200)
CO2 SERPL-SCNC: 29 MMOL/L — SIGNIFICANT CHANGE UP (ref 22–31)
COLOR SPEC: YELLOW — SIGNIFICANT CHANGE UP
CREAT SERPL-MCNC: 0.86 MG/DL — SIGNIFICANT CHANGE UP (ref 0.5–1.3)
DIFF PNL FLD: ABNORMAL
EPI CELLS # UR: SIGNIFICANT CHANGE UP
FLU A RESULT: SIGNIFICANT CHANGE UP
FLU A RESULT: SIGNIFICANT CHANGE UP
FLUAV AG NPH QL: SIGNIFICANT CHANGE UP
FLUBV AG NPH QL: SIGNIFICANT CHANGE UP
GLUCOSE SERPL-MCNC: 108 MG/DL — HIGH (ref 70–99)
GLUCOSE UR QL: NEGATIVE — SIGNIFICANT CHANGE UP
HCT VFR BLD CALC: 46 % — SIGNIFICANT CHANGE UP (ref 39–50)
HGB BLD-MCNC: 15.5 G/DL — SIGNIFICANT CHANGE UP (ref 13–17)
INR BLD: 1.17 RATIO — HIGH (ref 0.88–1.16)
KETONES UR-MCNC: NEGATIVE — SIGNIFICANT CHANGE UP
LACTATE SERPL-SCNC: 1.5 MMOL/L — SIGNIFICANT CHANGE UP (ref 0.7–2)
LEUKOCYTE ESTERASE UR-ACNC: NEGATIVE — SIGNIFICANT CHANGE UP
MCHC RBC-ENTMCNC: 31.6 PG — SIGNIFICANT CHANGE UP (ref 27–34)
MCHC RBC-ENTMCNC: 33.7 GM/DL — SIGNIFICANT CHANGE UP (ref 32–36)
MCV RBC AUTO: 93.7 FL — SIGNIFICANT CHANGE UP (ref 80–100)
NITRITE UR-MCNC: NEGATIVE — SIGNIFICANT CHANGE UP
NT-PROBNP SERPL-SCNC: 127 PG/ML — SIGNIFICANT CHANGE UP (ref 0–300)
PH UR: 5 — SIGNIFICANT CHANGE UP (ref 5–8)
PLATELET # BLD AUTO: 61 K/UL — LOW (ref 150–400)
POTASSIUM SERPL-MCNC: 4.8 MMOL/L — SIGNIFICANT CHANGE UP (ref 3.5–5.3)
POTASSIUM SERPL-SCNC: 4.8 MMOL/L — SIGNIFICANT CHANGE UP (ref 3.5–5.3)
PROT SERPL-MCNC: 6.6 G/DL — SIGNIFICANT CHANGE UP (ref 6–8.3)
PROT UR-MCNC: NEGATIVE — SIGNIFICANT CHANGE UP
PROTHROM AB SERPL-ACNC: 13.2 SEC — HIGH (ref 10–12.9)
RBC # BLD: 4.91 M/UL — SIGNIFICANT CHANGE UP (ref 4.2–5.8)
RBC # FLD: 13.5 % — SIGNIFICANT CHANGE UP (ref 10.3–14.5)
RBC CASTS # UR COMP ASSIST: SIGNIFICANT CHANGE UP /HPF (ref 0–4)
RSV RESULT: SIGNIFICANT CHANGE UP
RSV RNA RESP QL NAA+PROBE: SIGNIFICANT CHANGE UP
SODIUM SERPL-SCNC: 136 MMOL/L — SIGNIFICANT CHANGE UP (ref 135–145)
SP GR SPEC: 1.01 — SIGNIFICANT CHANGE UP (ref 1.01–1.02)
TROPONIN I SERPL-MCNC: <.017 NG/ML — LOW (ref 0.02–0.06)
TROPONIN I SERPL-MCNC: <.017 NG/ML — LOW (ref 0.02–0.06)
UROBILINOGEN FLD QL: NEGATIVE — SIGNIFICANT CHANGE UP
WBC # BLD: 8.1 K/UL — SIGNIFICANT CHANGE UP (ref 3.8–10.5)
WBC # FLD AUTO: 8.1 K/UL — SIGNIFICANT CHANGE UP (ref 3.8–10.5)
WBC UR QL: NEGATIVE /HPF — SIGNIFICANT CHANGE UP (ref 0–5)

## 2019-04-30 PROCEDURE — 99285 EMERGENCY DEPT VISIT HI MDM: CPT

## 2019-04-30 PROCEDURE — 71250 CT THORAX DX C-: CPT | Mod: 26

## 2019-04-30 PROCEDURE — 93010 ELECTROCARDIOGRAM REPORT: CPT | Mod: 76

## 2019-04-30 PROCEDURE — 71045 X-RAY EXAM CHEST 1 VIEW: CPT | Mod: 26

## 2019-04-30 PROCEDURE — 99214 OFFICE O/P EST MOD 30 MIN: CPT

## 2019-04-30 PROCEDURE — 99223 1ST HOSP IP/OBS HIGH 75: CPT

## 2019-04-30 RX ORDER — ACETAMINOPHEN 500 MG
650 TABLET ORAL ONCE
Qty: 0 | Refills: 0 | Status: COMPLETED | OUTPATIENT
Start: 2019-04-30 | End: 2019-04-30

## 2019-04-30 RX ORDER — SODIUM CHLORIDE 9 MG/ML
1000 INJECTION INTRAMUSCULAR; INTRAVENOUS; SUBCUTANEOUS
Qty: 0 | Refills: 0 | Status: DISCONTINUED | OUTPATIENT
Start: 2019-04-30 | End: 2019-05-03

## 2019-04-30 RX ORDER — DIVALPROEX SODIUM 500 MG/1
1500 TABLET, DELAYED RELEASE ORAL AT BEDTIME
Qty: 0 | Refills: 0 | Status: DISCONTINUED | OUTPATIENT
Start: 2019-04-30 | End: 2019-05-03

## 2019-04-30 RX ORDER — TAMSULOSIN HYDROCHLORIDE 0.4 MG/1
0.4 CAPSULE ORAL AT BEDTIME
Qty: 0 | Refills: 0 | Status: DISCONTINUED | OUTPATIENT
Start: 2019-04-30 | End: 2019-05-03

## 2019-04-30 RX ORDER — BUDESONIDE AND FORMOTEROL FUMARATE DIHYDRATE 160; 4.5 UG/1; UG/1
2 AEROSOL RESPIRATORY (INHALATION)
Qty: 0 | Refills: 0 | Status: DISCONTINUED | OUTPATIENT
Start: 2019-04-30 | End: 2019-05-03

## 2019-04-30 RX ORDER — IPRATROPIUM/ALBUTEROL SULFATE 18-103MCG
3 AEROSOL WITH ADAPTER (GRAM) INHALATION ONCE
Qty: 0 | Refills: 0 | Status: COMPLETED | OUTPATIENT
Start: 2019-04-30 | End: 2019-04-30

## 2019-04-30 RX ORDER — IPRATROPIUM/ALBUTEROL SULFATE 18-103MCG
3 AEROSOL WITH ADAPTER (GRAM) INHALATION ONCE
Qty: 0 | Refills: 0 | Status: DISCONTINUED | OUTPATIENT
Start: 2019-04-30 | End: 2019-04-30

## 2019-04-30 RX ORDER — IPRATROPIUM/ALBUTEROL SULFATE 18-103MCG
3 AEROSOL WITH ADAPTER (GRAM) INHALATION EVERY 6 HOURS
Qty: 0 | Refills: 0 | Status: DISCONTINUED | OUTPATIENT
Start: 2019-04-30 | End: 2019-05-03

## 2019-04-30 RX ORDER — SODIUM CHLORIDE 9 MG/ML
2500 INJECTION INTRAMUSCULAR; INTRAVENOUS; SUBCUTANEOUS ONCE
Qty: 0 | Refills: 0 | Status: COMPLETED | OUTPATIENT
Start: 2019-04-30 | End: 2019-04-30

## 2019-04-30 RX ORDER — ACETAMINOPHEN 500 MG
650 TABLET ORAL EVERY 6 HOURS
Qty: 0 | Refills: 0 | Status: DISCONTINUED | OUTPATIENT
Start: 2019-04-30 | End: 2019-05-03

## 2019-04-30 RX ORDER — LEVOTHYROXINE SODIUM 125 MCG
25 TABLET ORAL DAILY
Qty: 0 | Refills: 0 | Status: DISCONTINUED | OUTPATIENT
Start: 2019-04-30 | End: 2019-05-03

## 2019-04-30 RX ORDER — PANTOPRAZOLE SODIUM 20 MG/1
40 TABLET, DELAYED RELEASE ORAL
Qty: 0 | Refills: 0 | Status: DISCONTINUED | OUTPATIENT
Start: 2019-04-30 | End: 2019-05-03

## 2019-04-30 RX ADMIN — Medication 3 MILLILITER(S): at 18:39

## 2019-04-30 RX ADMIN — Medication 40 MILLIGRAM(S): at 23:20

## 2019-04-30 RX ADMIN — DIVALPROEX SODIUM 1500 MILLIGRAM(S): 500 TABLET, DELAYED RELEASE ORAL at 23:37

## 2019-04-30 RX ADMIN — Medication 650 MILLIGRAM(S): at 18:39

## 2019-04-30 RX ADMIN — SODIUM CHLORIDE 2500 MILLILITER(S): 9 INJECTION INTRAMUSCULAR; INTRAVENOUS; SUBCUTANEOUS at 18:55

## 2019-04-30 RX ADMIN — TAMSULOSIN HYDROCHLORIDE 0.4 MILLIGRAM(S): 0.4 CAPSULE ORAL at 23:20

## 2019-04-30 RX ADMIN — SODIUM CHLORIDE 2500 MILLILITER(S): 9 INJECTION INTRAMUSCULAR; INTRAVENOUS; SUBCUTANEOUS at 18:39

## 2019-04-30 RX ADMIN — SODIUM CHLORIDE 70 MILLILITER(S): 9 INJECTION INTRAMUSCULAR; INTRAVENOUS; SUBCUTANEOUS at 23:18

## 2019-04-30 RX ADMIN — Medication 650 MILLIGRAM(S): at 19:09

## 2019-04-30 NOTE — H&P ADULT - HISTORY OF PRESENT ILLNESS
63M hx of remote sarcoid, hx of RA untreated, hypothyroid, BPH, COPD, sz d/o, hx of traumatic bl SDH pw sxs of sneezing and coughing with thick yellow/green sputum x 4-5 days associated with SANDOVAL and chest pain with coughing and occ wheezing. Reported difficulty sleeping at night sec to cough and SOB. +associated  fevers, chills and generalized weakness. Reported watery diarrhea about 1-2 weeks prior to URI sxs and had sig decreased po intake since. No further diarrhea this past week. Went to see Dr Gonsalves today and sent to ER for fevers and EKG changes. In ED noted fever to 102.2, plt 61 ( hx of thrombocytopenia in 2018 in the 69-99), neg lactate. Pt feels much improved after mike

## 2019-04-30 NOTE — ED ADULT NURSE NOTE - INTERVENTIONS DEFINITIONS
Physically safe environment: no spills, clutter or unnecessary equipment/Provide visual clues: red socks/Room bathroom lighting operational/Monitor for mental status changes and reorient to person, place, and time/[facility] to call system/Non-slip footwear when patient is off stretcher/Review medications for side effects contributing to fall risk

## 2019-04-30 NOTE — H&P ADULT - NSHPREVIEWOFSYSTEMS_GEN_ALL_CORE
CONSTITUTIONAL: + fevers, +weight loss, +fatigue  EYES: No eye pain, visual disturbances, or discharge  ENMT:  No difficulty hearing, tinnitus, vertigo; No sinus or throat pain  NECK: No pain or stiffness  RESPIRATORY: + cough,+ wheezing, +chills no  hemoptysis; ++shortness of breath  CARDIOVASCULAR: + chest pain, no palpitations,+ dizziness, no leg swelling  GASTROINTESTINAL: No abdominal or epigastric pain. No nausea, vomiting, or hematemesis; + diarrhea resolved, no constipation. No melena or hematochezia.  GENITOURINARY: No dysuria, frequency, hematuria, or incontinence  NEUROLOGICAL: No headaches, memory loss, loss of strength, numbness, or tremors  SKIN: No itching, burning, rashes, or lesions   LYMPH NODES: No enlarged glands  ENDOCRINE: No heat or cold intolerance; No hair loss  MUSCULOSKELETAL: +chronic joint pains sec to RA  PSYCHIATRIC: No depression, anxiety, mood swings, or difficulty sleeping  HEME/LYMPH: No easy bruising, or bleeding gums  ALLERY AND IMMUNOLOGIC: No hives or eczema    IMPROVE VTE Individual Risk Assessment          RISK                                                          Points  [  ] Previous VTE                                                3  [  ] Thrombophilia                                             2  [  ] Lower limb paralysis                                   2        (unable to hold up >15 seconds)    [  ] Current Cancer                                             2         (within 6 months)  [ 1 ] Immobilization > 24 hrs                              1  [  ] ICU/CCU stay > 24 hours                             1  [ 1 ] Age > 60                                                         1    IMPROVE VTE Score:         [   2      ]    Total Risk Factor Score:    0 - 1:   Consider IPC  >2 - 3:  Thromboprophylaxis required (enoxaparin or SQ heparin)        >4:   High Risk: Thromboprophylaxis required (enoxaparin or SQ heparin), optional add IPC  **If CONTRAINDICATION to enoxaparin or SQ heparin, USE IPCs**

## 2019-04-30 NOTE — ED PROVIDER NOTE - OBJECTIVE STATEMENT
62 y/o M with PMH of seizure d/o, sarcoidosis, thyroid disease was sent to the ED by Dr. Galarza for EKG changes. Patient reports productive cough and generalized weakness x 3 days, reports CP with cough. Denies f/c, n/v, abd pain, urinary symptoms, sick contacts, recent travel, rash, exertional symptoms, extremity weakness, paresthesias or all other symptoms

## 2019-04-30 NOTE — ED PROVIDER NOTE - CARE PLAN
Principal Discharge DX:	EKG abnormality  Secondary Diagnosis:	SIRS (systemic inflammatory response syndrome)

## 2019-04-30 NOTE — REVIEW OF SYSTEMS
[Fever] : no fever [Fatigue] : fatigue [Chills] : no chills [Night Sweats] : no night sweats [Chest Pain] : no chest pain [Palpitations] : no palpitations [Orthopena] : no orthopnea [Shortness Of Breath] : shortness of breath [Paroysmal Nocturnal Dyspnea] : no paroysmal nocturnal dyspnea [Cough] : cough [Dyspnea on Exertion] : dyspnea on exertion [Abdominal Pain] : no abdominal pain [Nausea] : no nausea [Constipation] : no constipation [Diarrhea] : diarrhea [Vomiting] : no vomiting [Heartburn] : no heartburn [Dysuria] : no dysuria [Melena] : no melena [Incontinence] : no incontinence [Frequency] : no frequency [Hematuria] : no hematuria [Back Pain] : back pain [Joint Pain] : joint pain [Dizziness] : dizziness [Unsteady Walk] : ataxia [Memory Loss] : memory loss

## 2019-04-30 NOTE — H&P ADULT - NSICDXPASTSURGICALHX_GEN_ALL_CORE_FT
PAST SURGICAL HISTORY:  Bilateral subdural hematomas     Status post cholecystectomy     Status post Bright's procedure     Status post inguinal hernia repair

## 2019-04-30 NOTE — ED PROVIDER NOTE - ATTENDING CONTRIBUTION TO CARE
· HPI Objective Statement: 64 y/o M with PMH of seizure d/o, sarcoidosis, thyroid disease was sent to the ED by Dr. Galarza for EKG changes. Patient reports productive cough and generalized weakness x 3 days, reports CP with cough. Denies f/c, n/v, abd pain, urinary symptoms, sick contacts, recent travel, rash, exertional symptoms, extremity weakness, paresthesias or all other symptoms     pt spiked fever in ed rectal 102  bilateral rales   cxr bilateral fibrotic changes  unchanged planned to do ct chest rx with levaquin   iv fluids planned to admit for possible pneumonia   ekg st changes  case s/o dr whitaker

## 2019-04-30 NOTE — ASSESSMENT
[FreeTextEntry1] : Patient days increasing dizziness and fatigue cough cyanotic nailbeds and an EKG that is abnormal with which we have nothing to compare his gait is unsteady he is on Depakote for seizure disorder he will be sent over to the emergency room for further evaluation we have spoken with the physician there he will need x-ray lab work perhaps a CT of the head and an evaluation for his respiratory status

## 2019-04-30 NOTE — PHYSICAL EXAM
[Ill-Appearing] : ill-appearing [Normal Sclera/Conjunctiva] : normal sclera/conjunctiva [No Respiratory Distress] : no respiratory distress  [Normal Oropharynx] : the oropharynx was normal [Clear to Auscultation] : lungs were clear to auscultation bilaterally [No Accessory Muscle Use] : no accessory muscle use [Normal Rate] : normal rate  [Regular Rhythm] : with a regular rhythm [Soft] : abdomen soft [No Murmur] : no murmur heard [Non Tender] : non-tender [No Joint Swelling] : no joint swelling [No HSM] : no HSM [Speech Grossly Normal] : speech grossly normal [de-identified] : trace edemaq about ankles [de-identified] : unsteady gait [de-identified] : cyanotic nails

## 2019-04-30 NOTE — ED PROVIDER NOTE - PHYSICAL EXAMINATION
AAOx3  Heart: s1/s2, RRR  Lung: +crackles at b/l lung bases, patient coughing up thick yellow sputum on exam.   Abd: soft, NT/ND, no rebound or guarding, NCVAT  Extremity: 2+pedal edema b/l, no calf TTP   Neuro: patient moving all extremity equally,    rectal temp 102.2

## 2019-04-30 NOTE — ED ADULT NURSE NOTE - OBJECTIVE STATEMENT
pt AO x3, ambulatory with walker on baseline but recently feeling very weak for a week, so he couldn't move by himself.  also c/o severe cough with yellow sputum, and chest pain. As per sister of pt, pt sent by Dr Galarza for EKG changes, weakness, cough. Denies chill, fever, abdominal pain, or n/v/d. PMH of SOB (controlled by Symbicort) and seizure (also controlled by Depakote).  Evaluated by ARDEN Pabon. Cardiac monitor in place. Will continue to monitor. pt AO x3, ambulatory with walker on baseline but recently feeling very weak for a week, so he couldn't move by himself.  also c/o severe cough with yellow sputum, and chest pain. As per sister of pt, pt sent by Dr Galarza for EKG changes, weakness, cough. Crackles noted on his lungs bilateral. Swelling both legs usually. Denies chill, fever, abdominal pain, or n/v/d. PMH of SOB (controlled by Symbicort) and seizure (also controlled by Depakote).  Evaluated by ARDEN Pabon. Cardiac monitor in place. Will continue to monitor.

## 2019-04-30 NOTE — ED ADULT NURSE REASSESSMENT NOTE - NS ED NURSE REASSESS COMMENT FT1
as per pt, he feels better. due medication given as ordered. hands off to CHOLO Acosta. report given.

## 2019-04-30 NOTE — H&P ADULT - ASSESSMENT
63M hx of remote sarcoid, hx of RA untreated, hypothyroid, BPH, COPD, sz d/o, hx of traumatic bl SDH pw productive cough, SOB and chest pain with EKG changes.       # Bronchitis with COPD exac  CT chest negative for pna.   flu A/B/RSV negative. Check RVP  duonebs standing, prednisone  cont symbicort.   Pulm consult (Dr Guillen is his pulmonologist)    # Chest pain/EKG changes  prob atypical CP  trend trops, ECHO, card consult.   Hold Lasix for now until tolerating po    # sz d/o  cont depakote, check valproic acid levels    # Hypothyroid  cont synthroid. check tsh.     #DVT/GI proph  IPC (thrombocytopenia), PPI

## 2019-04-30 NOTE — H&P ADULT - NSICDXFAMILYHX_GEN_ALL_CORE_FT
FAMILY HISTORY:  Family history of diabetes mellitus    Grandparent  Still living? Unknown  Family history of pulmonary embolism, Age at diagnosis: Age Unknown

## 2019-04-30 NOTE — H&P ADULT - NSICDXPASTMEDICALHX_GEN_ALL_CORE_FT
PAST MEDICAL HISTORY:  Bronchiectasis     Cellulitis     Diverticulitis     DM (diabetes mellitus)     Hypothyroid     Inguinal hernia     Sarcoid     Seizure     Subdural hematoma

## 2019-04-30 NOTE — HISTORY OF PRESENT ILLNESS
[FreeTextEntry8] : he is here today yet because he is feeling dizzy this is usually a problem for him but this has been worse he also has had a cough he says for over a month some mild shortness of breath and he has had frequent bouts of diarrhea and several episodes of fecal incontinence he is in going to have a visit with gastroenterology in the next few days no fever and no chills his appetite has been poor no abdominal pain gait has been unsteady he has been using a walker

## 2019-04-30 NOTE — ED PROVIDER NOTE - CLINICAL SUMMARY MEDICAL DECISION MAKING FREE TEXT BOX
62 y/o M with PMH of seizure d/o, sarcoidosis, thyroid disease was sent to the ED by Dr. Galarza for EKG changes. Patient reports productive cough and generalized weakness x 3 days, reports CP with cough. Rectal temp 102.2. ED sepsis w/u initiated, abx started, IVF given based on idea body weight, CXR, labs, reassess 62 y/o M with PMH of seizure d/o, sarcoidosis, thyroid disease was sent to the ED by Dr. Galarza for EKG changes. Patient reports productive cough and generalized weakness x 3 days, reports CP with cough. Rectal temp 102.2. ED sepsis w/u initiated, abx started, IVF given based on idea body weight. labs reviewed trop and PBNP negative, CXR negative, Chest CT-- chronic fibrotic changes. UA sent and flu swab pending as source for fever. Spoke to Hospitalist Dr. Son, patient admitted

## 2019-04-30 NOTE — H&P ADULT - NSHPPHYSICALEXAM_GEN_ALL_CORE
Vital Signs Last 24 Hrs  T(C): 36.8 (30 Apr 2019 20:19), Max: 39 (30 Apr 2019 17:52)  T(F): 98.2 (30 Apr 2019 20:19), Max: 102.2 (30 Apr 2019 17:52)  HR: 60 (30 Apr 2019 21:52) (60 - 83)  BP: 107/70 (30 Apr 2019 21:52) (102/68 - 110/61)  BP(mean): --  RR: 16 (30 Apr 2019 21:52) (16 - 22)  SpO2: 96% (30 Apr 2019 21:52) (94% - 96%)  Daily Height in cm: 187.96 (30 Apr 2019 17:13)    Daily   CAPILLARY BLOOD GLUCOSE        I&O's Summary      GENERAL: NAD  HEAD:  Normocephalic  EYES: EOMI, PERRLA, conjunctiva and sclera clear  ENMT: No tonsillar erythema, exudates, or enlargement; dry mucous membranes, poor dentition.  No lesions  NECK: Supple, No JVD, no bruit, normal thyroid  NERVOUS SYSTEM:  Alert & Oriented X3, moves all fours equally, nonfocal  CHEST/LUNG: diffuse dry crackles, no expiratory wheeze.   HEART: Regular rate and rhythm; No murmurs, rubs, or gallops  ABDOMEN: Soft, Nontender, Nondistended; Bowel sounds present, + sig abd scars from hernia repair.   EXTREMITIES:  2+ Peripheral Pulses, No clubbing, cyanosis, or edema. + bl wrist and bl 2nd-3rd MCP synovitis, + boutoniere  LYMPH: No lymphadenopathy noted  SKIN: No rashes or lesions. mild erythema in bl shins - no cellultis.

## 2019-05-01 LAB
ALBUMIN SERPL ELPH-MCNC: 2.3 G/DL — LOW (ref 3.3–5)
ALP SERPL-CCNC: 58 U/L — SIGNIFICANT CHANGE UP (ref 40–120)
ALT FLD-CCNC: 23 U/L DA — SIGNIFICANT CHANGE UP (ref 10–45)
ANION GAP SERPL CALC-SCNC: 6 MMOL/L — SIGNIFICANT CHANGE UP (ref 5–17)
AST SERPL-CCNC: 29 U/L — SIGNIFICANT CHANGE UP (ref 10–40)
BILIRUB SERPL-MCNC: 0.4 MG/DL — SIGNIFICANT CHANGE UP (ref 0.2–1.2)
BUN SERPL-MCNC: 14 MG/DL — SIGNIFICANT CHANGE UP (ref 7–23)
CALCIUM SERPL-MCNC: 8.7 MG/DL — SIGNIFICANT CHANGE UP (ref 8.4–10.5)
CHLORIDE SERPL-SCNC: 102 MMOL/L — SIGNIFICANT CHANGE UP (ref 96–108)
CHOLEST SERPL-MCNC: 95 MG/DL — SIGNIFICANT CHANGE UP (ref 10–199)
CK SERPL-CCNC: 42 U/L — SIGNIFICANT CHANGE UP (ref 30–200)
CO2 SERPL-SCNC: 31 MMOL/L — SIGNIFICANT CHANGE UP (ref 22–31)
CREAT SERPL-MCNC: 0.75 MG/DL — SIGNIFICANT CHANGE UP (ref 0.5–1.3)
GLUCOSE SERPL-MCNC: 176 MG/DL — HIGH (ref 70–99)
HBA1C BLD-MCNC: 5.5 % — SIGNIFICANT CHANGE UP (ref 4–5.6)
HCOV PNL SPEC NAA+PROBE: DETECTED
HCT VFR BLD CALC: 40.5 % — SIGNIFICANT CHANGE UP (ref 39–50)
HCV AB S/CO SERPL IA: 0.09 S/CO — SIGNIFICANT CHANGE UP (ref 0–0.99)
HCV AB SERPL-IMP: SIGNIFICANT CHANGE UP
HDLC SERPL-MCNC: 28 MG/DL — LOW
HGB BLD-MCNC: 13.7 G/DL — SIGNIFICANT CHANGE UP (ref 13–17)
LIPID PNL WITH DIRECT LDL SERPL: 54 MG/DL — SIGNIFICANT CHANGE UP
MCHC RBC-ENTMCNC: 31.8 PG — SIGNIFICANT CHANGE UP (ref 27–34)
MCHC RBC-ENTMCNC: 34 GM/DL — SIGNIFICANT CHANGE UP (ref 32–36)
MCV RBC AUTO: 93.6 FL — SIGNIFICANT CHANGE UP (ref 80–100)
PLATELET # BLD AUTO: 46 K/UL — LOW (ref 150–400)
POTASSIUM SERPL-MCNC: 3.9 MMOL/L — SIGNIFICANT CHANGE UP (ref 3.5–5.3)
POTASSIUM SERPL-SCNC: 3.9 MMOL/L — SIGNIFICANT CHANGE UP (ref 3.5–5.3)
PROCALCITONIN SERPL-MCNC: 0.07 NG/ML — SIGNIFICANT CHANGE UP
PROT SERPL-MCNC: 6.6 G/DL — SIGNIFICANT CHANGE UP (ref 6–8.3)
RAPID RVP RESULT: DETECTED
RBC # BLD: 4.33 M/UL — SIGNIFICANT CHANGE UP (ref 4.2–5.8)
RBC # FLD: 13.2 % — SIGNIFICANT CHANGE UP (ref 10.3–14.5)
SODIUM SERPL-SCNC: 139 MMOL/L — SIGNIFICANT CHANGE UP (ref 135–145)
TOTAL CHOLESTEROL/HDL RATIO MEASUREMENT: 3.4 RATIO — SIGNIFICANT CHANGE UP (ref 3.4–9.6)
TRIGL SERPL-MCNC: 65 MG/DL — SIGNIFICANT CHANGE UP (ref 10–149)
TROPONIN I SERPL-MCNC: <.017 NG/ML — LOW (ref 0.02–0.06)
TSH SERPL-MCNC: 1.77 UIU/ML — SIGNIFICANT CHANGE UP (ref 0.27–4.2)
VALPROATE SERPL-MCNC: 87 UG/ML — SIGNIFICANT CHANGE UP (ref 50–100)
WBC # BLD: 3.4 K/UL — LOW (ref 3.8–10.5)
WBC # FLD AUTO: 3.4 K/UL — LOW (ref 3.8–10.5)

## 2019-05-01 PROCEDURE — 99223 1ST HOSP IP/OBS HIGH 75: CPT

## 2019-05-01 PROCEDURE — 99233 SBSQ HOSP IP/OBS HIGH 50: CPT

## 2019-05-01 RX ORDER — FUROSEMIDE 40 MG
40 TABLET ORAL DAILY
Qty: 0 | Refills: 0 | Status: DISCONTINUED | OUTPATIENT
Start: 2019-05-01 | End: 2019-05-03

## 2019-05-01 RX ORDER — IPRATROPIUM/ALBUTEROL SULFATE 18-103MCG
3 AEROSOL WITH ADAPTER (GRAM) INHALATION ONCE
Qty: 0 | Refills: 0 | Status: COMPLETED | OUTPATIENT
Start: 2019-05-01 | End: 2019-05-01

## 2019-05-01 RX ORDER — REGADENOSON 0.08 MG/ML
0.4 INJECTION, SOLUTION INTRAVENOUS ONCE
Qty: 0 | Refills: 0 | Status: COMPLETED | OUTPATIENT
Start: 2019-05-01 | End: 2019-05-02

## 2019-05-01 RX ADMIN — Medication 25 MICROGRAM(S): at 05:46

## 2019-05-01 RX ADMIN — TAMSULOSIN HYDROCHLORIDE 0.4 MILLIGRAM(S): 0.4 CAPSULE ORAL at 22:07

## 2019-05-01 RX ADMIN — BUDESONIDE AND FORMOTEROL FUMARATE DIHYDRATE 2 PUFF(S): 160; 4.5 AEROSOL RESPIRATORY (INHALATION) at 21:09

## 2019-05-01 RX ADMIN — Medication 3 MILLILITER(S): at 21:09

## 2019-05-01 RX ADMIN — Medication 50 MILLIGRAM(S): at 05:46

## 2019-05-01 RX ADMIN — Medication 40 MILLIGRAM(S): at 17:20

## 2019-05-01 RX ADMIN — Medication 3 MILLILITER(S): at 04:50

## 2019-05-01 RX ADMIN — Medication 3 MILLILITER(S): at 15:30

## 2019-05-01 RX ADMIN — Medication 3 MILLILITER(S): at 10:19

## 2019-05-01 RX ADMIN — Medication 3 MILLILITER(S): at 02:04

## 2019-05-01 RX ADMIN — PANTOPRAZOLE SODIUM 40 MILLIGRAM(S): 20 TABLET, DELAYED RELEASE ORAL at 05:46

## 2019-05-01 RX ADMIN — BUDESONIDE AND FORMOTEROL FUMARATE DIHYDRATE 2 PUFF(S): 160; 4.5 AEROSOL RESPIRATORY (INHALATION) at 10:18

## 2019-05-01 RX ADMIN — DIVALPROEX SODIUM 1500 MILLIGRAM(S): 500 TABLET, DELAYED RELEASE ORAL at 22:07

## 2019-05-01 NOTE — PATIENT PROFILE ADULT - BRADEN NUTRITION
software  And may cause contain errors related to that system including grammar, punctuation and spelling as well as words and phrases that may seem inappropriate. If there are questions or concerns please feel free to contact me to clarify. (3) adequate

## 2019-05-01 NOTE — CONSULT NOTE ADULT - ASSESSMENT
63 year old man admitted because of productive cough, dyspnea, fever and abnormal EKG.  He denies any complaints of chest pain.  Medical problems include sarcoid, RA, subdural hematoma, COPD, seizure disorder.  EKG does demonstrate ST depression in the anterior leads.  Cardiac enzymes are negative    Plan  - check echo  - favor ischemia evaluation with nuclear stress test   - antibiotics as per Medicine    discussed with patient and Medicine team

## 2019-05-01 NOTE — PROGRESS NOTE ADULT - ASSESSMENT
62yo M with hx of remote sarcoid, hx of RA untreated, hypothyroid, BPH, COPD, sz d/o, hx of traumatic bl SDH pw productive cough and SOB      # Bronchitis with COPD exac  CT chest negative for pna.   flu A/B/RSV negative. Check RVP  duonebs standing, prednisone  cont symbicort.   Pulm consult (Dr Guillen is his pulmonologist)    # Chest pain/EKG changes  prob atypical CP  trend trops, ECHO, card consult.   Hold Lasix for now until tolerating po    # sz d/o  cont depakote, check valproic acid levels    # Hypothyroid  cont synthroid. check tsh.     #DVT/GI proph  IPC (thrombocytopenia), PPI 64yo M with hx of remote sarcoid, hx of RA untreated, hypothyroid, BPH, bronchiectasis, , sz d/o, hx of traumatic bl SDH pw productive cough and SOB 2/2 bronchitis. Also with ST depressions in aterior leads. Awaiting stress.      # Bronchitis with Bronchiectasis:  CT chest negative for pna.   Flu negative  duonebs standing, prednisone 50 mg   cont symbicort.   Pulm consult (Dr Guillen is his pulmonologist)  Monitor O2 sat--cont supplemental O2    # Chest pain/EKG changes  trops negative  TTE  Stress test  Cardiac consult  tele monitoring  restart lasix  lipid profile acceptable     # sz d/o  cont depakote,   check valproic acid levels    # Hypothyroid  cont synthroid.   TSH 1,77    #THrombocytopenia:  chronic--baseline 60-99  COnt to closely monitor--slightly worse     DVT ppx: venodynes in setting of thrombocytopenia

## 2019-05-01 NOTE — CONSULT NOTE ADULT - SUBJECTIVE AND OBJECTIVE BOX
[facility] Consultation    CHIEF COMPLAINT: Patient is a 63y old  Male who presents with a chief complaint of cough, EKG changes. (30 Apr 2019 22:23)  chart reviewed and patient examined  history from patient... fair reliability      HPI:  63M hx of remote sarcoid, hx of RA untreated, hypothyroid, BPH, COPD, sz d/o, hx of traumatic bl SDH pw sxs of sneezing and coughing with thick yellow/green sputum x 4-5 days associated with SANDOVAL and chest pain with coughing and occ wheezing. Reported difficulty sleeping at night sec to cough and SOB. +associated  fevers, chills and generalized weakness. Reported watery diarrhea about 1-2 weeks prior to URI sxs and had sig decreased po intake since. No further diarrhea this past week. Went to see Dr Gonsalves today and sent to ER for fevers and EKG changes. In ED noted fever to 102.2, plt 61 ( hx of thrombocytopenia in 2018 in the 69-99), neg lactate. Pt feels much improved after duonebs (30 Apr 2019 22:23)      PAST MEDICAL & SURGICAL HISTORY  denies any history of CAD, MI, CHF, arrhythmia   Hypothyroid  Seizure  Cellulitis  Inguinal hernia  Subdural hematoma  Diverticulitis  Bronchiectasis  Sarcoid  DM (diabetes mellitus)  Status post inguinal hernia repair  Status post cholecystectomy  Bilateral subdural hematomas  Status post Bright's procedure      SOCIAL HISTORY non smoker     FAMILY HISTORY  Family history of pulmonary embolism (Grandparent)  Family history of diabetes mellitus      MEDICATIONS  (STANDING):  ALBUTerol/ipratropium for Nebulization 3 milliLiter(s) Nebulizer every 6 hours  buDESOnide 160 MICROgram(s)/formoterol 4.5 MICROgram(s) Inhaler 2 Puff(s) Inhalation two times a day  diVALproex ER 1500 milliGRAM(s) Oral at bedtime  levoFLOXacin  Tablet 500 milliGRAM(s) Oral daily  levothyroxine 25 MICROGram(s) Oral daily  pantoprazole    Tablet 40 milliGRAM(s) Oral before breakfast  predniSONE   Tablet 50 milliGRAM(s) Oral daily  sodium chloride 0.9%. 1000 milliLiter(s) (70 mL/Hr) IV Continuous <Continuous>  tamsulosin 0.4 milliGRAM(s) Oral at bedtime    MEDICATIONS  (PRN):  acetaminophen   Tablet .. 650 milliGRAM(s) Oral every 6 hours PRN Temp greater or equal to 38C (100.4F), Mild Pain (1 - 3)      Allergies    Keppra (Other (Severe))  penicillin (Angioedema)  penicillins (Unknown)    Intolerances    benzodiazepines (Other)      REVIEW OF SYSTEMS:    CONSTITUTIONAL:  weakness   EYES: No visual changes, No diplopia  ENMT: No throat pain , No exudate  NECK: No pain or stiffness  CARDIOVASCULAR: No chest pain or chest pressure.   No palpitations, dizziness, light headedness, syncope or near syncope.  No edema, no orthopnea.   GASTROINTESTINAL: No abdominal pain. No nausea, vomiting, or hematemesis; No diarrhea or constipation. No melena or hematochezia.  GENITOURINARY: No dysuria, frequency or hematuria  NEUROLOGICAL: No numbness or weakness  SKIN: No itching or rash  All other review of systems is negative unless indicated above    VITAL SIGNS:   Vital Signs Last 24 Hrs  T(C): 36.4 (01 May 2019 08:18), Max: 39 (30 Apr 2019 17:52)  T(F): 97.5 (01 May 2019 08:18), Max: 102.2 (30 Apr 2019 17:52)  HR: 55 (01 May 2019 10:21) (55 - 83)  BP: 102/57 (01 May 2019 08:18) (96/54 - 110/61)  BP(mean): --  RR: 16 (01 May 2019 08:18) (16 - 22)  SpO2: 98% (01 May 2019 10:21) (94% - 99%)    I&O's Summary    30 Apr 2019 07:01  -  01 May 2019 07:00  --------------------------------------------------------  IN: 0 mL / OUT: 750 mL / NET: -750 mL        PHYSICAL EXAM:    Constitutional:  older appearing man in NAD   Eyes:  EOMI,  Pupils round, no lesions  ENMT: no exudate or erythema  Pulmonary:  decreased breath sounds bilaterally   Cardiovascular: PMI not palpable non-displaced Regular S1 and S2 ll/Vl systolic murmur   Gastrointestinal: Bowel Sounds present, soft, nontender.   Lymph: No peripheral edema. No cervical lymphadenopathy.  Neurological: Alert, no focal deficits  Skin: No rashes. Changes of chronic venous stasis. No cyanosis.  Psych:  Mood & affect appropriate    LABS: All Labs Reviewed:                        13.7   3.4   )-----------( 46       ( 01 May 2019 06:48 )             40.5                         15.5   8.1   )-----------( 61       ( 30 Apr 2019 18:20 )             46.0     01 May 2019 06:48    139    |  102    |  14     ----------------------------<  176    3.9     |  31     |  0.75   30 Apr 2019 19:15    136    |  100    |  18     ----------------------------<  108    4.8     |  29     |  0.86     Ca    8.7        01 May 2019 06:48  Ca    8.2        30 Apr 2019 19:15    TPro  6.6    /  Alb  2.3    /  TBili  0.4    /  DBili  x      /  AST  29     /  ALT  23     /  AlkPhos  58     01 May 2019 06:48  TPro  6.6    /  Alb  2.4    /  TBili  0.6    /  DBili  x      /  AST  63     /  ALT  25     /  AlkPhos  59     30 Apr 2019 19:15    PT/INR - ( 30 Apr 2019 19:15 )   PT: 13.2 sec;   INR: 1.17 ratio         PTT - ( 30 Apr 2019 19:15 )  PTT:32.2 sec  CARDIAC MARKERS ( 01 May 2019 06:48 )  <.017 ng/mL / x     / 42 U/L / x     / x      CARDIAC MARKERS ( 30 Apr 2019 23:10 )  <.017 ng/mL / x     / 37 U/L / x     / x      CARDIAC MARKERS ( 30 Apr 2019 19:15 )  <.017 ng/mL / x     / x     / x     / x            04-30 @ 19:15  Pro Bnp 127    05-01 @ 06:48  TSH: 1.77    EKG:  < from: 12 Lead ECG (04.30.19 @ 23:28) >  Sinus bradycardia with occasional premature ventricular complexes  ST and T wave abnormality, consider anterior ischemia  Abnormal ECG  When compared with ECG of 30-APR-2019 17:21,  premature ventricular complexes are now present    < end of copied text >
HPI:HPI:  63M hx of remote sarcoid, hx of RA untreated, hypothyroid, BPH, COPD, sz d/o, hx of traumatic bl SDH pw sxs of sneezing and coughing with thick yellow/green sputum x 4-5 days associated with SANDOVAL and chest pain with coughing and occ wheezing. Reported difficulty sleeping at night sec to cough and SOB. +associated  fevers, chills and generalized weakness. Reported watery diarrhea about 1-2 weeks prior to URI sxs and had sig decreased po intake since. No further diarrhea this past week. Went to see Dr Gonsalves today and sent to ER for fevers and EKG changes. In ED noted fever to 102.2, plt 61 ( hx of thrombocytopenia in 2018 in the 69-99), neg lactate. Pt feels much improved after duonebs (2019 22:23)  Pulm;: Patient was told that he had sarcoidosis in . He does not remember any diagnostic workup from that time. He had CAT scans over the past 4 years which reveals bronchiectasis and sarcoid  like changes.  He does not recall if he had whooping cough as a child.    PAST MEDICAL & SURGICAL HISTORY:  Hypothyroid  Seizure  Cellulitis  Inguinal hernia  Subdural hematoma  Diverticulitis  Bronchiectasis  Sarcoid  DM (diabetes mellitus)  Status post inguinal hernia repair  Status post cholecystectomy  Bilateral subdural hematomas  Status post Bright's procedure      FAMILY HISTORY:  Family history of pulmonary embolism (Grandparent)  Family history of diabetes mellitus      SOCIAL HISTORY:  Smoking: _denies  EtOH Use: hx of etoh abuse      Allergies    Keppra (Other (Severe))  penicillin (Angioedema)  penicillins (Unknown)    Intolerances    benzodiazepines (Other)      HOME  MEDICATIONS:Home Medications:  divalproex sodium 500 mg oral tablet, extended release: 3 tab(s) orally once a day (at bedtime) (2019 19:54)  levothyroxine 25 mcg (0.025 mg) oral tablet: 1 tab(s) orally once a day (2019 19:54)  Symbicort 160 mcg-4.5 mcg/inh inhalation aerosol: 2 puff(s) inhaled 2 times a day (2019 19:54)  tamsulosin 0.4 mg oral capsule: 1 cap(s) orally once a day (at bedtime) (2019 19:54)      REVIEW OF SYSTEMS:  Constitutional: No fevers or chills. No weight loss. .  Eyes: No itching or discharge from the eyes  ENT: . No nasal congestion. No post nasal drip  CV: No chest pain. No palpitations. No lightheadedness or dizziness.   Resp: No dyspnea at rest. No dyspnea on exertion. .  GI: No nausea. No vomiting. No diarrhea.  MSK: No joint pain or pain in any extremities  Integumentary: No skin lesions. No pedal edema.  Neurological: No gross motor weakness. No sensory changes.      OBJECTIVE:    T(C): 36.4 (01 May 2019 08:18), Max: 39 (2019 17:52)  T(F): 97.5 (01 May 2019 08:18), Max: 102.2 (2019 17:52)  HR: 55 (01 May 2019 10:21) (55 - 83)  BP: 102/57 (01 May 2019 08:18) (96/54 - 110/61)  RR: 16 (01 May 2019 08:18) (16 - 22)  SpO2: 98% (01 May 2019 10:21) (94% - 99%)        30 @ 07:01  -  - @ 07:00  --------------------------------------------------------  IN: 0 mL / OUT: 750 mL / NET: -750 mL      CAPILLARY BLOOD GLUCOSE          PHYSICAL EXAM:  General: Awake, alert, oriented X 3.   HEENT: Atraumatic, normocephalic.                            .  Lymph Nodes: No palpable lymphadenopathy  Neck: No JVD. No carotid bruit.   Respiratory: scattered wheezing  Cardiovascular: S1 S2 normal. No murmurs, rubs or gallops.   Abdomen: Soft, non-tender,   Extremities: Warm to touch.    Skin: No rashes or skin lesions  Neurological: Motor and sensory examination equal and normal in all four extremities.  Psychiatry: Appropriate mood and affect.    HOSPITAL MEDICATIONS:  MEDICATIONS  (STANDING):  ALBUTerol/ipratropium for Nebulization 3 milliLiter(s) Nebulizer every 6 hours  buDESOnide 160 MICROgram(s)/formoterol 4.5 MICROgram(s) Inhaler 2 Puff(s) Inhalation two times a day  diVALproex ER 1500 milliGRAM(s) Oral at bedtime  furosemide    Tablet 40 milliGRAM(s) Oral daily  levoFLOXacin  Tablet 500 milliGRAM(s) Oral daily  levothyroxine 25 MICROGram(s) Oral daily  pantoprazole    Tablet 40 milliGRAM(s) Oral before breakfast  predniSONE   Tablet 50 milliGRAM(s) Oral daily  regadenoson Injectable 0.4 milliGRAM(s) IV Push once  sodium chloride 0.9%. 1000 milliLiter(s) (70 mL/Hr) IV Continuous <Continuous>  tamsulosin 0.4 milliGRAM(s) Oral at bedtime    MEDICATIONS  (PRN):  acetaminophen   Tablet .. 650 milliGRAM(s) Oral every 6 hours PRN Temp greater or equal to 38C (100.4F), Mild Pain (1 - 3)      LABS:                        13.7   3.4   )-----------( 46       ( 01 May 2019 06:48 )             40.5     05-    139  |  102  |  14  ----------------------------<  176<H>  3.9   |  31  |  0.75    Ca    8.7      01 May 2019 06:48    TPro  6.6  /  Alb  2.3<L>  /  TBili  0.4  /  DBili  x   /  AST  29  /  ALT  23  /  AlkPhos  58  05-01    PT/INR - ( 2019 19:15 )   PT: 13.2 sec;   INR: 1.17 ratio         PTT - ( 2019 19:15 )  PTT:32.2 sec  Urinalysis Basic - ( 2019 20:55 )    Color: Yellow / Appearance: Clear / S.010 / pH: x  Gluc: x / Ketone: Negative  / Bili: Negative / Urobili: Negative   Blood: x / Protein: Negative / Nitrite: Negative   Leuk Esterase: Negative / RBC: 0-4 /HPF / WBC Negative /HPF   Sq Epi: x / Non Sq Epi: Neg.-Few / Bacteria: Negative /HPF            MICROBIOLOGY:     RADIOLOGY:

## 2019-05-01 NOTE — PROGRESS NOTE ADULT - SUBJECTIVE AND OBJECTIVE BOX
Patient is a 63y old  Male who presents with a chief complaint of cough, EKG changes. (01 May 2019 11:34).  Trops negative. No acute events overnight. SOB improving       Patient seen and examined at bedside.    ALLERGIES:  benzodiazepines (Other)  Keppra (Other (Severe))  penicillin (Angioedema)  penicillins (Unknown)    MEDICATIONS  (STANDING):  ALBUTerol/ipratropium for Nebulization 3 milliLiter(s) Nebulizer every 6 hours  buDESOnide 160 MICROgram(s)/formoterol 4.5 MICROgram(s) Inhaler 2 Puff(s) Inhalation two times a day  diVALproex ER 1500 milliGRAM(s) Oral at bedtime  levoFLOXacin  Tablet 500 milliGRAM(s) Oral daily  levothyroxine 25 MICROGram(s) Oral daily  pantoprazole    Tablet 40 milliGRAM(s) Oral before breakfast  predniSONE   Tablet 50 milliGRAM(s) Oral daily  regadenoson Injectable 0.4 milliGRAM(s) IV Push once  sodium chloride 0.9%. 1000 milliLiter(s) (70 mL/Hr) IV Continuous <Continuous>  tamsulosin 0.4 milliGRAM(s) Oral at bedtime    MEDICATIONS  (PRN):  acetaminophen   Tablet .. 650 milliGRAM(s) Oral every 6 hours PRN Temp greater or equal to 38C (100.4F), Mild Pain (1 - 3)    Vital Signs Last 24 Hrs  T(F): 97.5 (01 May 2019 08:18), Max: 102.2 (2019 17:52)  HR: 55 (01 May 2019 10:21) (55 - 83)  BP: 102/57 (01 May 2019 08:18) (96/54 - 110/61)  RR: 16 (01 May 2019 08:18) (16 - 22)  SpO2: 98% (01 May 2019 10:21) (94% - 99%)  I&O's Summary    2019 07:01  -  01 May 2019 07:00  --------------------------------------------------------  IN: 0 mL / OUT: 750 mL / NET: -750 mL      BMI (kg/m2): 28.6 (19 @ 21:52)  PHYSICAL EXAM:  General: NAD, A/O x 3  ENT: MMM  Neck: Supple, No JVD  Lungs decreased BS bases bilaterally  Cardio: RRR, S1/S2, +systolic murmur   Abdomen: Soft, Nontender, Nondistended; Bowel sounds present  Extremities: No calf tenderness, No pitting edema    LABS:                        13.7   3.4   )-----------( 46       ( 01 May 2019 06:48 )             40.5           139  |  102  |  14  ----------------------------<  176  3.9   |  31  |  0.75    Ca    8.7      01 May 2019 06:48    TPro  6.6  /  Alb  2.3  /  TBili  0.4  /  DBili  x   /  AST  29  /  ALT  23  /  AlkPhos  58       eGFR if Non African American: 97 mL/min/1.73M2 (19 @ 06:48)  eGFR if : 112 mL/min/1.73M2 (19 @ 06:48)    PT/INR - ( 2019 19:15 )   PT: 13.2 sec;   INR: 1.17 ratio         PTT - ( 2019 19:15 )  PTT:32.2 sec   Lactate, Blood: 1.5 mmol/L ( @ 19:15)    CARDIAC MARKERS ( 01 May 2019 06:48 )  <.017 ng/mL / x     / 42 U/L / x     / x      CARDIAC MARKERS ( 2019 23:10 )  <.017 ng/mL / x     / 37 U/L / x     / x      CARDIAC MARKERS ( 2019 19:15 )  <.017 ng/mL / x     / x     / x     / x           Chol 95 mg/dL LDL 54 mg/dL HDL 28 mg/dL Trig 65 mg/dL  TSH 1.77   TSH with FT4 reflex --  Total T3 --        CAPILLARY BLOOD GLUCOSE          Urinalysis Basic - ( 2019 20:55 )    Color: Yellow / Appearance: Clear / S.010 / pH: x  Gluc: x / Ketone: Negative  / Bili: Negative / Urobili: Negative   Blood: x / Protein: Negative / Nitrite: Negative   Leuk Esterase: Negative / RBC: 0-4 /HPF / WBC Negative /HPF   Sq Epi: x / Non Sq Epi: Neg.-Few / Bacteria: Negative /HPF          RADIOLOGY & ADDITIONAL TESTS:  < from: CT Chest No Cont (19 @ 20:00) >    IMPRESSION: Chronic changes with scarring and bronchiectasis   predominating in the upper lobes seen on prior plain films. No evidence   of superimposed pneumonia        < end of copied text >      Care Discussed with Consultants/Other Providers:

## 2019-05-01 NOTE — CONSULT NOTE ADULT - ASSESSMENT
64yo M with hx of remote sarcoid, hx of RA untreated, hypothyroid, BPH, bronchiectasis, , sz d/o, hx of traumatic bl SDH pw productive cough and SOB 2/2 bronchitis. Also with ST depressions in aterior leads. Awaiting stress.      # Bronchitis with Bronchiectasis:  CT chest negative for pna.   Flu negative  duonebs standing, prednisone 50 mg   cont symbicort.   Monitor O2 sat--cont supplemental O2    # Chest pain/EKG changes  trops negative  TTE  Stress test  Cardiac consult  tele monitoring  restart lasix  lipid profile acceptable     # sz d/o  cont depakote,   check valproic acid levels    # Hypothyroid  cont synthroid.   TSH 1,77    #THrombocytopenia:  chronic--baseline 60-99  COnt to closely monitor--slightly worse     DVT ppx: venodynes in setting of thrombocytopenia 64yo M with hx of remote sarcoid, hx of RA untreated, hypothyroid, BPH, bronchiectasis, , sz d/o, hx of traumatic bl SDH pw productive cough and SOB 2/2 bronchitis and coronavirus. Also with ST depressions in aterior leads. Awaiting stress.      # Bronchitis with Bronchiectasis and coronavirus :  CT chest negative for pna.   Flu negative  duonebs standing, prednisone 50 mg   cont symbicort.   Monitor O2 sat--cont supplemental O2    # Chest pain/EKG changes  trops negative  TTE  Stress test  Cardiac consult  tele monitoring  restart lasix  lipid profile acceptable     # sz d/o  cont depakote,   check valproic acid levels    # Hypothyroid  cont synthroid.   TSH 1,77    #THrombocytopenia:  chronic--baseline 60-99  COnt to closely monitor--slightly worse     DVT ppx: venodynes in setting of thrombocytopenia

## 2019-05-02 ENCOUNTER — TRANSCRIPTION ENCOUNTER (OUTPATIENT)
Age: 64
End: 2019-05-02

## 2019-05-02 LAB
ANION GAP SERPL CALC-SCNC: 6 MMOL/L — SIGNIFICANT CHANGE UP (ref 5–17)
BUN SERPL-MCNC: 23 MG/DL — SIGNIFICANT CHANGE UP (ref 7–23)
CALCIUM SERPL-MCNC: 9 MG/DL — SIGNIFICANT CHANGE UP (ref 8.4–10.5)
CHLORIDE SERPL-SCNC: 103 MMOL/L — SIGNIFICANT CHANGE UP (ref 96–108)
CO2 SERPL-SCNC: 32 MMOL/L — HIGH (ref 22–31)
CREAT SERPL-MCNC: 0.83 MG/DL — SIGNIFICANT CHANGE UP (ref 0.5–1.3)
CULTURE RESULTS: NO GROWTH — SIGNIFICANT CHANGE UP
GLUCOSE SERPL-MCNC: 128 MG/DL — HIGH (ref 70–99)
HCT VFR BLD CALC: 42.6 % — SIGNIFICANT CHANGE UP (ref 39–50)
HGB BLD-MCNC: 14 G/DL — SIGNIFICANT CHANGE UP (ref 13–17)
MCHC RBC-ENTMCNC: 31 PG — SIGNIFICANT CHANGE UP (ref 27–34)
MCHC RBC-ENTMCNC: 33 GM/DL — SIGNIFICANT CHANGE UP (ref 32–36)
MCV RBC AUTO: 94 FL — SIGNIFICANT CHANGE UP (ref 80–100)
PLATELET # BLD AUTO: 56 K/UL — LOW (ref 150–400)
POTASSIUM SERPL-MCNC: 3.7 MMOL/L — SIGNIFICANT CHANGE UP (ref 3.5–5.3)
POTASSIUM SERPL-SCNC: 3.7 MMOL/L — SIGNIFICANT CHANGE UP (ref 3.5–5.3)
RBC # BLD: 4.53 M/UL — SIGNIFICANT CHANGE UP (ref 4.2–5.8)
RBC # FLD: 13.5 % — SIGNIFICANT CHANGE UP (ref 10.3–14.5)
SODIUM SERPL-SCNC: 141 MMOL/L — SIGNIFICANT CHANGE UP (ref 135–145)
SPECIMEN SOURCE: SIGNIFICANT CHANGE UP
WBC # BLD: 7.3 K/UL — SIGNIFICANT CHANGE UP (ref 3.8–10.5)
WBC # FLD AUTO: 7.3 K/UL — SIGNIFICANT CHANGE UP (ref 3.8–10.5)

## 2019-05-02 PROCEDURE — 99233 SBSQ HOSP IP/OBS HIGH 50: CPT

## 2019-05-02 PROCEDURE — 78452 HT MUSCLE IMAGE SPECT MULT: CPT | Mod: 26

## 2019-05-02 PROCEDURE — 93016 CV STRESS TEST SUPVJ ONLY: CPT

## 2019-05-02 PROCEDURE — 93018 CV STRESS TEST I&R ONLY: CPT

## 2019-05-02 PROCEDURE — 99233 SBSQ HOSP IP/OBS HIGH 50: CPT | Mod: 25

## 2019-05-02 RX ADMIN — Medication 3 MILLILITER(S): at 15:42

## 2019-05-02 RX ADMIN — REGADENOSON 0.4 MILLIGRAM(S): 0.08 INJECTION, SOLUTION INTRAVENOUS at 10:12

## 2019-05-02 RX ADMIN — Medication 3 MILLILITER(S): at 21:37

## 2019-05-02 RX ADMIN — PANTOPRAZOLE SODIUM 40 MILLIGRAM(S): 20 TABLET, DELAYED RELEASE ORAL at 05:58

## 2019-05-02 RX ADMIN — Medication 3 MILLILITER(S): at 04:22

## 2019-05-02 RX ADMIN — Medication 50 MILLIGRAM(S): at 05:58

## 2019-05-02 RX ADMIN — Medication 25 MICROGRAM(S): at 05:58

## 2019-05-02 RX ADMIN — TAMSULOSIN HYDROCHLORIDE 0.4 MILLIGRAM(S): 0.4 CAPSULE ORAL at 21:58

## 2019-05-02 RX ADMIN — BUDESONIDE AND FORMOTEROL FUMARATE DIHYDRATE 2 PUFF(S): 160; 4.5 AEROSOL RESPIRATORY (INHALATION) at 21:34

## 2019-05-02 RX ADMIN — Medication 40 MILLIGRAM(S): at 05:58

## 2019-05-02 RX ADMIN — DIVALPROEX SODIUM 1500 MILLIGRAM(S): 500 TABLET, DELAYED RELEASE ORAL at 21:58

## 2019-05-02 NOTE — DISCHARGE NOTE PROVIDER - NSDCCPCAREPLAN_GEN_ALL_CORE_FT
PRINCIPAL DISCHARGE DIAGNOSIS  Diagnosis: Bronchitis  Assessment and Plan of Treatment:       SECONDARY DISCHARGE DIAGNOSES  Diagnosis: SIRS (systemic inflammatory response syndrome)  Assessment and Plan of Treatment:

## 2019-05-02 NOTE — PHYSICAL THERAPY INITIAL EVALUATION ADULT - ADDITIONAL COMMENTS
pt lives alone in apartment, no delaney. pt uses straight cane and rolling walker, primarily uses cane. pt indpendent w/ADL's including driving

## 2019-05-02 NOTE — DISCHARGE NOTE PROVIDER - HOSPITAL COURSE
64yo M with hx of remote sarcoid, hx of RA untreated, hypothyroid, BPH, bronchiectasis, , sz d/o, hx of traumatic bl SDH pw productive cough and SOB 2/2 bronchitis. Also with ST depressions in aterior leads. Awaiting stress.            # Coronavirus     CT chest negative for pna    continue levaquin x 5 days total     Flu negative    duonebs standing, prednisone taper     cont symbicort.     Pulm following (Dr Guillen is his pulmonologist)    Monitor O2 sat--cont supplemental O2        # Chest pain/EKG changes    trops negative    TTE    Stress test done today     Cardio following     tele monitoring        # sz d/o    cont depakote,     check valproic acid levels        # Hypothyroid    cont synthroid.     TSH 1,77        #Thrombocytopenia:    chronic--baseline 60-99

## 2019-05-02 NOTE — PROGRESS NOTE ADULT - ASSESSMENT
64yo M with hx of remote sarcoid, hx of RA untreated, hypothyroid, BPH, bronchiectasis, , sz d/o, hx of traumatic bl SDH pw productive cough and SOB 2/2 bronchitis. Also with ST depressions in aterior leads. Awaiting stress.      # Coronavirus   CT chest negative for pna  continue levaquin x 5 days total   Flu negative  duonebs standing, prednisone taper   cont symbicort.   Pulm following (Dr Guillen is his pulmonologist)  Monitor O2 sat--cont supplemental O2    # Chest pain/EKG changes  trops negative  TTE  Stress test today   Cardiac following   tele monitoring    # sz d/o  cont depakote,   check valproic acid levels    # Hypothyroid  cont synthroid.   TSH 1,77    #THrombocytopenia:  chronic--baseline 60-99  COnt to closely monitor--slightly worse     DVT ppx: venodynes in setting of thrombocytopenia

## 2019-05-02 NOTE — PROGRESS NOTE ADULT - ASSESSMENT
62yo M with hx of remote sarcoid, hx of RA untreated, hypothyroid, BPH, bronchiectasis, , sz d/o, hx of traumatic bl SDH pw productive cough and SOB 2/2 bronchitis. Also with ST depressions in aterior leads. Awaiting stress.  DARYA appropriate.    # Coronavirus   CT chest negative for pna  continue levaquin x 5 days total   Flu negative  duonebs standing, prednisone taper   cont symbicort.   Pulm following (Dr Guillen is his pulmonologist)  Monitor O2 sat--cont supplemental O2    # Chest pain/EKG changes  trops negative  TTE  Stress test today   Cardiac following   tele monitoring    # sz d/o  cont depakote,   check valproic acid levels    # Hypothyroid  cont synthroid.   TSH 1,77    #THrombocytopenia:  chronic--baseline 60-99  COnt to closely monitor--slightly worse     DVT ppx: venodynes in setting of thrombocytopenia

## 2019-05-02 NOTE — PROGRESS NOTE ADULT - SUBJECTIVE AND OBJECTIVE BOX
JULY FORTE  63y  Male    Patient is a 63y old  Male who presents with a chief complaint of cough, EKG changes. (01 May 2019 13:03)    pt seen and examined  for stress test today     PAST MEDICAL & SURGICAL HISTORY:  Hypothyroid  Seizure  Cellulitis  Inguinal hernia  Subdural hematoma  Diverticulitis  Bronchiectasis  Sarcoid  DM (diabetes mellitus)  Status post inguinal hernia repair  Status post cholecystectomy  Bilateral subdural hematomas  Status post Bright's procedure        MedsMEDICATIONS  (STANDING):  ALBUTerol/ipratropium for Nebulization 3 milliLiter(s) Nebulizer every 6 hours  buDESOnide 160 MICROgram(s)/formoterol 4.5 MICROgram(s) Inhaler 2 Puff(s) Inhalation two times a day  diVALproex ER 1500 milliGRAM(s) Oral at bedtime  furosemide    Tablet 40 milliGRAM(s) Oral daily  levoFLOXacin  Tablet 500 milliGRAM(s) Oral daily  levothyroxine 25 MICROGram(s) Oral daily  pantoprazole    Tablet 40 milliGRAM(s) Oral before breakfast  predniSONE   Tablet 50 milliGRAM(s) Oral daily  regadenoson Injectable 0.4 milliGRAM(s) IV Push once  sodium chloride 0.9%. 1000 milliLiter(s) (70 mL/Hr) IV Continuous <Continuous>  tamsulosin 0.4 milliGRAM(s) Oral at bedtime    MEDICATIONS  (PRN):  acetaminophen   Tablet .. 650 milliGRAM(s) Oral every 6 hours PRN Temp greater or equal to 38C (100.4F), Mild Pain (1 - 3)      Vital Signs Last 24 Hrs  T(C): 36.3 (02 May 2019 05:19), Max: 37.4 (01 May 2019 20:29)  T(F): 97.3 (02 May 2019 05:19), Max: 99.4 (01 May 2019 20:29)  HR: 52 (02 May 2019 05:19) (52 - 58)  BP: 103/56 (02 May 2019 05:19) (103/56 - 114/51)  BP(mean): --  RR: 17 (02 May 2019 05:19) (16 - 17)  SpO2: 98% (02 May 2019 05:19) (97% - 100%)    PHYSICAL EXAM:  GENERAL: NAD  NERVOUS SYSTEM:  Alert & Oriented X3  CHEST/LUNG: Clear lungs b/l  HEART: S1S2, RRR   ABDOMEN: Soft, non-tender, non-distended, + bowel sounds      LABS:                          14.0   7.3   )-----------( 56       ( 02 May 2019 07:30 )             42.6           141  |  103  |  23  ----------------------------<  128<H>  3.7   |  32<H>  |  0.83    Ca    9.0      02 May 2019 07:30    TPro  6.6  /  Alb  2.3<L>  /  TBili  0.4  /  DBili  x   /  AST  29  /  ALT  23  /  AlkPhos  58  05      PT/INR - ( 2019 19:15 )   PT: 13.2 sec;   INR: 1.17 ratio         PTT - ( 2019 19:15 )  PTT:32.2 sec    CARDIAC MARKERS ( 01 May 2019 06:48 )  <.017 ng/mL / x     / 42 U/L / x     / x      CARDIAC MARKERS ( 2019 23:10 )  <.017 ng/mL / x     / 37 U/L / x     / x      CARDIAC MARKERS ( 2019 19:15 )  <.017 ng/mL / x     / x     / x     / x            Urinalysis Basic - ( 2019 20:55 )    Color: Yellow / Appearance: Clear / S.010 / pH: x  Gluc: x / Ketone: Negative  / Bili: Negative / Urobili: Negative   Blood: x / Protein: Negative / Nitrite: Negative   Leuk Esterase: Negative / RBC: 0-4 /HPF / WBC Negative /HPF   Sq Epi: x / Non Sq Epi: Neg.-Few / Bacteria: Negative /HPF              Culture - Urine (collected 2019 20:55)  Source: .Urine Clean Catch (Midstream)  Final Report (02 May 2019 03:32):    No growth    Culture - Blood (collected 2019 18:20)  Source: .Blood Blood-Peripheral  Preliminary Report (02 May 2019 03:01):    No growth to date.    Culture - Blood (collected 2019 18:20)  Source: .Blood Blood-Peripheral  Preliminary Report (02 May 2019 03:01):    No growth to date.            Culture - Blood (collected 19 @ 18:20)  Source: .Blood Blood-Peripheral  Preliminary Report (19 @ 03:01):    No growth to date.    Culture - Blood (collected 19 @ 18:20)  Source: .Blood Blood-Peripheral  Preliminary Report (19 @ 03:01):    No growth to date.        Culture - Urine (collected 19 @ 20:55)  Source: .Urine Clean Catch (Midstream)  Final Report (19 @ 03:32):    No growth        RADIOLOGY & ADDITIONAL TESTS:    Imaging Personally Reviewed:  [ x] YES  [ ] NO      HEALTH ISSUES - PROBLEM Dx:          Care Discussed with Consultants/Other Providers [ x] YES  [ ] NO

## 2019-05-02 NOTE — PROGRESS NOTE ADULT - SUBJECTIVE AND OBJECTIVE BOX
Follow up for  SUBJ:    no current cardiac complaints      PMH  Hypothyroid  Seizure  Cellulitis  Inguinal hernia  Subdural hematoma  Diverticulitis  Bronchiectasis  Sarcoid  DM (diabetes mellitus)  No pertinent past medical history      MEDICATIONS  (STANDING):  ALBUTerol/ipratropium for Nebulization 3 milliLiter(s) Nebulizer every 6 hours  buDESOnide 160 MICROgram(s)/formoterol 4.5 MICROgram(s) Inhaler 2 Puff(s) Inhalation two times a day  diVALproex ER 1500 milliGRAM(s) Oral at bedtime  furosemide    Tablet 40 milliGRAM(s) Oral daily  levoFLOXacin  Tablet 500 milliGRAM(s) Oral daily  levothyroxine 25 MICROGram(s) Oral daily  pantoprazole    Tablet 40 milliGRAM(s) Oral before breakfast  predniSONE   Tablet 40 milliGRAM(s) Oral daily  sodium chloride 0.9%. 1000 milliLiter(s) (70 mL/Hr) IV Continuous <Continuous>  tamsulosin 0.4 milliGRAM(s) Oral at bedtime    MEDICATIONS  (PRN):  acetaminophen   Tablet .. 650 milliGRAM(s) Oral every 6 hours PRN Temp greater or equal to 38C (100.4F), Mild Pain (1 - 3)        PHYSICAL EXAM:  Vital Signs Last 24 Hrs  T(C): 36.2 (02 May 2019 16:11), Max: 37.4 (01 May 2019 20:29)  T(F): 97.2 (02 May 2019 16:11), Max: 99.4 (01 May 2019 20:29)  HR: 73 (02 May 2019 16:11) (52 - 73)  BP: 98/56 (02 May 2019 16:11) (98/56 - 114/51)  BP(mean): --  RR: 16 (02 May 2019 16:11) (16 - 17)  SpO2: 98% (02 May 2019 16:11) (96% - 100%)    GENERAL: NAD, somewhat unkempt  HEAD:  Atraumatic, Normocephalic  EYES: EOMI, PERRLA, conjunctiva and sclera clear  ENT: Moist mucous membranes,  NECK: Supple, No JVD, no bruits  CHEST/LUNG: Clear to percussion bilaterally; No rales, rhonchi, wheezing, or rubs  HEART: Regular rate and rhythm; No murmurs, rubs, or gallops PMI non displaced.  ABDOMEN: Soft, Nontender, Nondistended; Bowel sounds present  EXTREMITIES:  2+ Peripheral Pulses, No clubbing, cyanosis, or edema  SKIN: No rashes or lesions  NERVOUS SYSTEM:  Cranial Nerves II-XII intact      TELEMETRY:    sb    ECG:    < from: 12 Lead ECG (04.30.19 @ 23:28) >  Ventricular Rate 53 BPM    Atrial Rate 53 BPM    P-R Interval 158 ms    QRS Duration 96 ms    Q-T Interval 448 ms    QTC Calculation(Bezet) 420 ms    P Axis 40 degrees    R Axis -4 degrees    T Axis 61 degrees    Diagnosis Line Sinus bradycardia with occasional premature ventricular complexes  ST and T wave abnormality, consider anterior ischemia  Abnormal ECG  When compared with ECG of 30-APR-2019 17:21,  premature ventricular complexes are now present  Confirmed by AMBER AVILA, JACOB S ([id number]) on 5/1/2019 8:26:20 AM    < end of copied text >    ECHO:    < from: Transthoracic Echocardiogram (06.28.17 @ 15:36) >  Conclusions:  1. Normal left ventricular internal dimensions and wall  thicknesses.  2. Normal left ventricular systolic function. No segmental  wall motion abnormalities.  3. Normal diastolic function  4. Normal right ventricular size and function.  *** Compared with echocardiogram of 8/17/2009, no  significant changes noted.  ------------------------------------------------------------------------  Confirmed on  6/28/2017 - 16:39:14 by Alejandra Cedeno M.D.    < end of copied text >      LABS:                        14.0   7.3   )-----------( 56       ( 02 May 2019 07:30 )             42.6     05-02    141  |  103  |  23  ----------------------------<  128<H>  3.7   |  32<H>  |  0.83    Ca    9.0      02 May 2019 07:30    TPro  6.6  /  Alb  2.3<L>  /  TBili  0.4  /  DBili  x   /  AST  29  /  ALT  23  /  AlkPhos  58  05-01    CARDIAC MARKERS ( 01 May 2019 06:48 )  <.017 ng/mL / x     / 42 U/L / x     / x      CARDIAC MARKERS ( 30 Apr 2019 23:10 )  <.017 ng/mL / x     / 37 U/L / x     / x      CARDIAC MARKERS ( 30 Apr 2019 19:15 )  <.017 ng/mL / x     / x     / x     / x          PT/INR - ( 30 Apr 2019 19:15 )   PT: 13.2 sec;   INR: 1.17 ratio         PTT - ( 30 Apr 2019 19:15 )  PTT:32.2 sec    I&O's Summary    01 May 2019 07:01  -  02 May 2019 07:00  --------------------------------------------------------  IN: 560 mL / OUT: 1150 mL / NET: -590 mL    02 May 2019 07:01  -  02 May 2019 19:10  --------------------------------------------------------  IN: 700 mL / OUT: 1500 mL / NET: -800 mL      BNP    RADIOLOGY & ADDITIONAL STUDIES:    < from: Xray Chest 1 View AP/PA (04.30.19 @ 19:12) >  EXAM:  XR CHEST AP OR PA 1V      PROCEDURE DATE:  04/30/2019        < from: NM Nuclear Stress Pharmacologic Multiple (05.02.19 @ 11:27) >  MPRESSION: Findings are compatible with anterolateral and inferolateral   ischemia. The fixed defect in the inferior wall with normal wall   thickening and brightening is most suggestive of inferior wall   attenuation.       Normal left ventricular wall motion with ejection fraction of 69 %   (normal: 50% or greater).       No regional wall motion abnormalities.       Please refer to cardiac stress test report.     Comparison: No prior study available.        NORM PERALTA M.D., ATTENDIN    < end of copied text >  INTERPRETATION:  AP erect chest on April 30, 2019 at 6:56 PM. Patient has   chest pain.    Heart is magnified by technique.    Extensive fibrotic changes of both upper lobes predominantly with hilar   retraction superiorly apical thickening again noted.    Most of these findings if not all are likely chronic.    Chest is similar to December 27, 2018.    IMPRESSION: Unchanged chest showing extensive fibrotic changes as above.        MARGARET DENSON M.D., ATTENDING RADIOLOGIST  This document has been electronically signed. Apr 30 2019  7:29PM    < end of copied text >

## 2019-05-02 NOTE — PROGRESS NOTE ADULT - SUBJECTIVE AND OBJECTIVE BOX
Follow-up Pulm Progress Note    Rik Guillen MD  [phone]    No new respiratory events overnight.  Denies SOB/CP.     Medications:  Vital Signs Last 24 Hrs  T(C): 36.2 (02 May 2019 16:11), Max: 37.4 (01 May 2019 20:29)  T(F): 97.2 (02 May 2019 16:11), Max: 99.4 (01 May 2019 20:29)  HR: 73 (02 May 2019 16:11) (52 - 73)  BP: 98/56 (02 May 2019 16:11) (98/56 - 114/51)  BP(mean): --  RR: 16 (02 May 2019 16:11) (16 - 17)  SpO2: 98% (02 May 2019 16:11) (96% - 100%)          05-01 @ 07:01  -  05-02 @ 07:00  --------------------------------------------------------  IN: 560 mL / OUT: 1150 mL / NET: -590 mL        LABS:                        14.0   7.3   )-----------( 56       ( 02 May 2019 07:30 )             42.6     05-02    141  |  103  |  23  ----------------------------<  128<H>  3.7   |  32<H>  |  0.83    Ca    9.0      02 May 2019 07:30    TPro  6.6  /  Alb  2.3<L>  /  TBili  0.4  /  DBili  x   /  AST  29  /  ALT  23  /  AlkPhos  58  05-01      PT/INR - ( 30 Apr 2019 19:15 )   PT: 13.2 sec;   INR: 1.17 ratio         PTT - ( 30 Apr 2019 19:15 )  PTT:32.2 sec  Procalcitonin, Serum: 0.07 ng/mL (04-30-19 @ 23:10)    Serum Pro-Brain Natriuretic Peptide: 127 pg/mL (04-30-19 @ 19:15)      CULTURES:  Culture Results:   No growth (04-30 @ 20:55)  Culture Results:   No growth to date. (04-30 @ 18:20)  Culture Results:   No growth to date. (04-30 @ 18:20)    Most recent blood culture -- 04-30 @ 20:55   -- -- .Urine Clean Catch (Midstream) 04-30 @ 20:55  Most recent blood culture -- 04-30 @ 18:20   -- -- .Blood Blood-Peripheral 04-30 @ 18:20      Physical Examination:  PULM: Clear to auscultation bilaterally, no significant sputum production  CVS: Regular rate and rhythm, no murmurs, rubs, or gallops  ABD: Soft, non-tender  EXT:  No clubbing, cyanosis, or edema    RADIOLOGY REVIEWED  CXR:    CT chest:    TTE:

## 2019-05-03 ENCOUNTER — INPATIENT (INPATIENT)
Facility: HOSPITAL | Age: 64
LOS: 12 days | Discharge: NURSING, REHAB OR LONG-TERM CARE | DRG: 286 | End: 2019-05-16
Attending: HOSPITALIST | Admitting: INTERNAL MEDICINE
Payer: MEDICARE

## 2019-05-03 ENCOUNTER — TRANSCRIPTION ENCOUNTER (OUTPATIENT)
Age: 64
End: 2019-05-03

## 2019-05-03 VITALS
HEART RATE: 50 BPM | DIASTOLIC BLOOD PRESSURE: 56 MMHG | WEIGHT: 235.01 LBS | TEMPERATURE: 98 F | SYSTOLIC BLOOD PRESSURE: 95 MMHG | HEIGHT: 75 IN | OXYGEN SATURATION: 99 % | RESPIRATION RATE: 16 BRPM

## 2019-05-03 VITALS
HEART RATE: 66 BPM | SYSTOLIC BLOOD PRESSURE: 102 MMHG | OXYGEN SATURATION: 99 % | DIASTOLIC BLOOD PRESSURE: 55 MMHG | RESPIRATION RATE: 16 BRPM | TEMPERATURE: 97 F

## 2019-05-03 DIAGNOSIS — R56.9 UNSPECIFIED CONVULSIONS: ICD-10-CM

## 2019-05-03 DIAGNOSIS — N40.0 BENIGN PROSTATIC HYPERPLASIA WITHOUT LOWER URINARY TRACT SYMPTOMS: ICD-10-CM

## 2019-05-03 DIAGNOSIS — J44.1 CHRONIC OBSTRUCTIVE PULMONARY DISEASE WITH (ACUTE) EXACERBATION: ICD-10-CM

## 2019-05-03 DIAGNOSIS — R94.30 ABNORMAL RESULT OF CARDIOVASCULAR FUNCTION STUDY, UNSPECIFIED: ICD-10-CM

## 2019-05-03 DIAGNOSIS — S81.819A LACERATION WITHOUT FOREIGN BODY, UNSPECIFIED LOWER LEG, INITIAL ENCOUNTER: ICD-10-CM

## 2019-05-03 DIAGNOSIS — K21.9 GASTRO-ESOPHAGEAL REFLUX DISEASE WITHOUT ESOPHAGITIS: ICD-10-CM

## 2019-05-03 DIAGNOSIS — Z98.890 OTHER SPECIFIED POSTPROCEDURAL STATES: Chronic | ICD-10-CM

## 2019-05-03 DIAGNOSIS — S06.5X9A TRAUMATIC SUBDURAL HEMORRHAGE WITH LOSS OF CONSCIOUSNESS OF UNSPECIFIED DURATION, INITIAL ENCOUNTER: Chronic | ICD-10-CM

## 2019-05-03 DIAGNOSIS — J40 BRONCHITIS, NOT SPECIFIED AS ACUTE OR CHRONIC: ICD-10-CM

## 2019-05-03 DIAGNOSIS — Z90.49 ACQUIRED ABSENCE OF OTHER SPECIFIED PARTS OF DIGESTIVE TRACT: Chronic | ICD-10-CM

## 2019-05-03 DIAGNOSIS — Z93.3 COLOSTOMY STATUS: Chronic | ICD-10-CM

## 2019-05-03 DIAGNOSIS — B34.2 CORONAVIRUS INFECTION, UNSPECIFIED: ICD-10-CM

## 2019-05-03 DIAGNOSIS — A41.9 SEPSIS, UNSPECIFIED ORGANISM: ICD-10-CM

## 2019-05-03 LAB
ANION GAP SERPL CALC-SCNC: 2 MMOL/L — LOW (ref 5–17)
BUN SERPL-MCNC: 32 MG/DL — HIGH (ref 7–23)
CALCIUM SERPL-MCNC: 9 MG/DL — SIGNIFICANT CHANGE UP (ref 8.4–10.5)
CHLORIDE SERPL-SCNC: 104 MMOL/L — SIGNIFICANT CHANGE UP (ref 96–108)
CO2 SERPL-SCNC: 36 MMOL/L — HIGH (ref 22–31)
CREAT SERPL-MCNC: 0.85 MG/DL — SIGNIFICANT CHANGE UP (ref 0.5–1.3)
GLUCOSE BLDC GLUCOMTR-MCNC: 109 MG/DL — HIGH (ref 70–99)
GLUCOSE SERPL-MCNC: 120 MG/DL — HIGH (ref 70–99)
HCT VFR BLD CALC: 40.3 % — SIGNIFICANT CHANGE UP (ref 39–50)
HGB BLD-MCNC: 13.1 G/DL — SIGNIFICANT CHANGE UP (ref 13–17)
MCHC RBC-ENTMCNC: 31 PG — SIGNIFICANT CHANGE UP (ref 27–34)
MCHC RBC-ENTMCNC: 32.5 GM/DL — SIGNIFICANT CHANGE UP (ref 32–36)
MCV RBC AUTO: 95.6 FL — SIGNIFICANT CHANGE UP (ref 80–100)
PLATELET # BLD AUTO: 63 K/UL — LOW (ref 150–400)
POTASSIUM SERPL-MCNC: 3.5 MMOL/L — SIGNIFICANT CHANGE UP (ref 3.5–5.3)
POTASSIUM SERPL-SCNC: 3.5 MMOL/L — SIGNIFICANT CHANGE UP (ref 3.5–5.3)
RBC # BLD: 4.22 M/UL — SIGNIFICANT CHANGE UP (ref 4.2–5.8)
RBC # FLD: 13.2 % — SIGNIFICANT CHANGE UP (ref 10.3–14.5)
SODIUM SERPL-SCNC: 142 MMOL/L — SIGNIFICANT CHANGE UP (ref 135–145)
WBC # BLD: 6.1 K/UL — SIGNIFICANT CHANGE UP (ref 3.8–10.5)
WBC # FLD AUTO: 6.1 K/UL — SIGNIFICANT CHANGE UP (ref 3.8–10.5)

## 2019-05-03 PROCEDURE — 81001 URINALYSIS AUTO W/SCOPE: CPT

## 2019-05-03 PROCEDURE — 80061 LIPID PANEL: CPT

## 2019-05-03 PROCEDURE — A9500: CPT

## 2019-05-03 PROCEDURE — 78452 HT MUSCLE IMAGE SPECT MULT: CPT

## 2019-05-03 PROCEDURE — 71045 X-RAY EXAM CHEST 1 VIEW: CPT

## 2019-05-03 PROCEDURE — 84145 PROCALCITONIN (PCT): CPT

## 2019-05-03 PROCEDURE — 93005 ELECTROCARDIOGRAM TRACING: CPT

## 2019-05-03 PROCEDURE — 87086 URINE CULTURE/COLONY COUNT: CPT

## 2019-05-03 PROCEDURE — 85027 COMPLETE CBC AUTOMATED: CPT

## 2019-05-03 PROCEDURE — 36415 COLL VENOUS BLD VENIPUNCTURE: CPT

## 2019-05-03 PROCEDURE — 94640 AIRWAY INHALATION TREATMENT: CPT

## 2019-05-03 PROCEDURE — 80053 COMPREHEN METABOLIC PANEL: CPT

## 2019-05-03 PROCEDURE — 82550 ASSAY OF CK (CPK): CPT

## 2019-05-03 PROCEDURE — 93017 CV STRESS TEST TRACING ONLY: CPT

## 2019-05-03 PROCEDURE — 87798 DETECT AGENT NOS DNA AMP: CPT

## 2019-05-03 PROCEDURE — 99233 SBSQ HOSP IP/OBS HIGH 50: CPT

## 2019-05-03 PROCEDURE — 87486 CHLMYD PNEUM DNA AMP PROBE: CPT

## 2019-05-03 PROCEDURE — 96365 THER/PROPH/DIAG IV INF INIT: CPT

## 2019-05-03 PROCEDURE — 97162 PT EVAL MOD COMPLEX 30 MIN: CPT

## 2019-05-03 PROCEDURE — 85610 PROTHROMBIN TIME: CPT

## 2019-05-03 PROCEDURE — 93010 ELECTROCARDIOGRAM REPORT: CPT

## 2019-05-03 PROCEDURE — 83036 HEMOGLOBIN GLYCOSYLATED A1C: CPT

## 2019-05-03 PROCEDURE — 86803 HEPATITIS C AB TEST: CPT

## 2019-05-03 PROCEDURE — 83880 ASSAY OF NATRIURETIC PEPTIDE: CPT

## 2019-05-03 PROCEDURE — 83605 ASSAY OF LACTIC ACID: CPT

## 2019-05-03 PROCEDURE — 99239 HOSP IP/OBS DSCHRG MGMT >30: CPT

## 2019-05-03 PROCEDURE — 85730 THROMBOPLASTIN TIME PARTIAL: CPT

## 2019-05-03 PROCEDURE — 93458 L HRT ARTERY/VENTRICLE ANGIO: CPT | Mod: 26,GC

## 2019-05-03 PROCEDURE — 87631 RESP VIRUS 3-5 TARGETS: CPT

## 2019-05-03 PROCEDURE — 87040 BLOOD CULTURE FOR BACTERIA: CPT

## 2019-05-03 PROCEDURE — 87581 M.PNEUMON DNA AMP PROBE: CPT

## 2019-05-03 PROCEDURE — 80164 ASSAY DIPROPYLACETIC ACD TOT: CPT

## 2019-05-03 PROCEDURE — 71250 CT THORAX DX C-: CPT

## 2019-05-03 PROCEDURE — 99152 MOD SED SAME PHYS/QHP 5/>YRS: CPT | Mod: GC

## 2019-05-03 PROCEDURE — 99223 1ST HOSP IP/OBS HIGH 75: CPT

## 2019-05-03 PROCEDURE — 84484 ASSAY OF TROPONIN QUANT: CPT

## 2019-05-03 PROCEDURE — 84443 ASSAY THYROID STIM HORMONE: CPT

## 2019-05-03 PROCEDURE — 99285 EMERGENCY DEPT VISIT HI MDM: CPT | Mod: 25

## 2019-05-03 PROCEDURE — 80048 BASIC METABOLIC PNL TOTAL CA: CPT

## 2019-05-03 PROCEDURE — 87633 RESP VIRUS 12-25 TARGETS: CPT

## 2019-05-03 RX ORDER — BUDESONIDE AND FORMOTEROL FUMARATE DIHYDRATE 160; 4.5 UG/1; UG/1
2 AEROSOL RESPIRATORY (INHALATION)
Qty: 0 | Refills: 0 | Status: DISCONTINUED | OUTPATIENT
Start: 2019-05-03 | End: 2019-05-16

## 2019-05-03 RX ORDER — ALBUTEROL 90 UG/1
2 AEROSOL, METERED ORAL EVERY 6 HOURS
Qty: 0 | Refills: 0 | Status: DISCONTINUED | OUTPATIENT
Start: 2019-05-03 | End: 2019-05-05

## 2019-05-03 RX ORDER — ENOXAPARIN SODIUM 100 MG/ML
40 INJECTION SUBCUTANEOUS DAILY
Qty: 0 | Refills: 0 | Status: DISCONTINUED | OUTPATIENT
Start: 2019-05-03 | End: 2019-05-04

## 2019-05-03 RX ORDER — DIVALPROEX SODIUM 500 MG/1
1500 TABLET, DELAYED RELEASE ORAL AT BEDTIME
Qty: 0 | Refills: 0 | Status: DISCONTINUED | OUTPATIENT
Start: 2019-05-03 | End: 2019-05-06

## 2019-05-03 RX ORDER — IPRATROPIUM/ALBUTEROL SULFATE 18-103MCG
3 AEROSOL WITH ADAPTER (GRAM) INHALATION EVERY 6 HOURS
Qty: 0 | Refills: 0 | Status: DISCONTINUED | OUTPATIENT
Start: 2019-05-03 | End: 2019-05-12

## 2019-05-03 RX ORDER — PANTOPRAZOLE SODIUM 20 MG/1
40 TABLET, DELAYED RELEASE ORAL
Qty: 0 | Refills: 0 | Status: DISCONTINUED | OUTPATIENT
Start: 2019-05-03 | End: 2019-05-06

## 2019-05-03 RX ORDER — LEVOTHYROXINE SODIUM 125 MCG
25 TABLET ORAL DAILY
Qty: 0 | Refills: 0 | Status: DISCONTINUED | OUTPATIENT
Start: 2019-05-03 | End: 2019-05-06

## 2019-05-03 RX ORDER — FUROSEMIDE 40 MG
1 TABLET ORAL
Qty: 30 | Refills: 0
Start: 2019-05-03 | End: 2019-06-01

## 2019-05-03 RX ORDER — FUROSEMIDE 40 MG
40 TABLET ORAL DAILY
Qty: 0 | Refills: 0 | Status: DISCONTINUED | OUTPATIENT
Start: 2019-05-03 | End: 2019-05-06

## 2019-05-03 RX ORDER — TAMSULOSIN HYDROCHLORIDE 0.4 MG/1
0.4 CAPSULE ORAL AT BEDTIME
Qty: 0 | Refills: 0 | Status: DISCONTINUED | OUTPATIENT
Start: 2019-05-03 | End: 2019-05-08

## 2019-05-03 RX ADMIN — PANTOPRAZOLE SODIUM 40 MILLIGRAM(S): 20 TABLET, DELAYED RELEASE ORAL at 05:25

## 2019-05-03 RX ADMIN — DIVALPROEX SODIUM 1500 MILLIGRAM(S): 500 TABLET, DELAYED RELEASE ORAL at 22:53

## 2019-05-03 RX ADMIN — Medication 40 MILLIGRAM(S): at 05:25

## 2019-05-03 RX ADMIN — BUDESONIDE AND FORMOTEROL FUMARATE DIHYDRATE 2 PUFF(S): 160; 4.5 AEROSOL RESPIRATORY (INHALATION) at 18:19

## 2019-05-03 RX ADMIN — Medication 3 MILLILITER(S): at 04:03

## 2019-05-03 RX ADMIN — Medication 40 MILLIGRAM(S): at 05:26

## 2019-05-03 RX ADMIN — Medication 25 MICROGRAM(S): at 05:26

## 2019-05-03 RX ADMIN — Medication 3 MILLILITER(S): at 22:53

## 2019-05-03 RX ADMIN — TAMSULOSIN HYDROCHLORIDE 0.4 MILLIGRAM(S): 0.4 CAPSULE ORAL at 22:53

## 2019-05-03 NOTE — DISCHARGE NOTE PROVIDER - NSDCCPCAREPLAN_GEN_ALL_CORE_FT
PRINCIPAL DISCHARGE DIAGNOSIS  Diagnosis: EKG abnormality  Assessment and Plan of Treatment: Transferring to [place] for cardiac cath due to abnormal nuclear stress      SECONDARY DISCHARGE DIAGNOSES  Diagnosis: Seizure disorder  Assessment and Plan of Treatment: continue seizure meds    Diagnosis: COPD with chronic bronchitis  Assessment and Plan of Treatment: continue abx and steroid wean for one more day  continue inhalors    Diagnosis: SIRS (systemic inflammatory response syndrome)  Assessment and Plan of Treatment: continu current meds PRINCIPAL DISCHARGE DIAGNOSIS  Diagnosis: EKG abnormality  Assessment and Plan of Treatment: Transferring to [place] for cardiac cath due to abnormal nuclear stress      SECONDARY DISCHARGE DIAGNOSES  Diagnosis: Hypothyroid  Assessment and Plan of Treatment:     Diagnosis: BPH without urinary obstruction  Assessment and Plan of Treatment: continue flomax    Diagnosis: Seizure disorder  Assessment and Plan of Treatment: continue seizure meds    Diagnosis: COPD with chronic bronchitis  Assessment and Plan of Treatment: continue abx and steroid wean for one more day  continue inhalors    Diagnosis: SIRS (systemic inflammatory response syndrome)  Assessment and Plan of Treatment: continu current meds

## 2019-05-03 NOTE — DISCHARGE NOTE PROVIDER - NSDCHC_MEDRECSTATUS_GEN_ALL_CORE
Admission Reconciliation is Completed  Discharge Reconciliation is Completed
Admission Reconciliation is Completed  Discharge Reconciliation is Completed

## 2019-05-03 NOTE — H&P CARDIOLOGY - PMH
Bronchiectasis    Cellulitis    Diverticulitis    DM (diabetes mellitus)    Hypothyroid    Inguinal hernia    Sarcoid    Seizure    Subdural hematoma Bronchiectasis    Cellulitis    Diverticulitis    DM (diabetes mellitus)    Hypothyroid    Inguinal hernia    Sarcoid    Seizure    Sleep apnea    Subdural hematoma

## 2019-05-03 NOTE — H&P CARDIOLOGY - GASTROINTESTINAL DETAILS
obese, scars present from previous surgeries but  well healed - no open wounds note don abdomen/normal

## 2019-05-03 NOTE — DISCHARGE NOTE PROVIDER - CARE PROVIDER_API CALL
Brian Gonsalves)  Family Medicine; Geriatric Medicine  [address] [place] [address]  Phone: [phone]  Fax: [phone]  Follow Up Time:     Rik Guillen)  Critical Care Medicine  [address] [place] [address], [address]  [place] [address]  Phone: [phone]  Fax: [phone]  Follow Up Time:     Bruce Becerril)  Cardiology; Internal Medicine  [address] [facility], [address]  [place] [address]  Phone: [phone]  Fax: [phone]  Follow Up Time:
Brian Gonsalves)  Family Medicine; Geriatric Medicine  [address] [place] [address]  Phone: [phone]  Fax: [phone]  Follow Up Time:

## 2019-05-03 NOTE — PROGRESS NOTE ADULT - SUBJECTIVE AND OBJECTIVE BOX
Patient is a 63y old  Male who presents with a chief complaint of cough, EKG changes. (02 May 2019 19:09)      Patient seen and examined at bedside.    ALLERGIES:  benzodiazepines (Other)  Keppra (Other (Severe))  penicillin (Angioedema)  penicillins (Unknown)    MEDICATIONS  (STANDING):  ALBUTerol/ipratropium for Nebulization 3 milliLiter(s) Nebulizer every 6 hours  buDESOnide 160 MICROgram(s)/formoterol 4.5 MICROgram(s) Inhaler 2 Puff(s) Inhalation two times a day  diVALproex ER 1500 milliGRAM(s) Oral at bedtime  furosemide    Tablet 40 milliGRAM(s) Oral daily  levoFLOXacin  Tablet 500 milliGRAM(s) Oral daily  levothyroxine 25 MICROGram(s) Oral daily  pantoprazole    Tablet 40 milliGRAM(s) Oral before breakfast  predniSONE   Tablet 40 milliGRAM(s) Oral daily  sodium chloride 0.9%. 1000 milliLiter(s) (70 mL/Hr) IV Continuous <Continuous>  tamsulosin 0.4 milliGRAM(s) Oral at bedtime    MEDICATIONS  (PRN):  acetaminophen   Tablet .. 650 milliGRAM(s) Oral every 6 hours PRN Temp greater or equal to 38C (100.4F), Mild Pain (1 - 3)    Vital Signs Last 24 Hrs  T(F): 97.3 (03 May 2019 05:29), Max: 97.5 (02 May 2019 19:52)  HR: 65 (03 May 2019 05:29) (56 - 73)  BP: 98/55 (03 May 2019 05:29) (98/55 - 112/79)  RR: 16 (03 May 2019 05:29) (16 - 16)  SpO2: 99% (03 May 2019 05:29) (95% - 100%)  I&O's Summary    02 May 2019 07:01  -  03 May 2019 07:00  --------------------------------------------------------  IN: 820 mL / OUT: 2650 mL / NET: -1830 mL      PHYSICAL EXAM:  General: NAD, A/O x 3  ENT: MMM  Neck: Supple, No JVD  Lungs: Clear to auscultation bilaterally  Cardio: RRR, S1/S2, No murmurs  Abdomen: Soft, Nontender, Nondistended; Bowel sounds present  Extremities: No calf tenderness, No pitting edema    LABS:                        13.1   6.1   )-----------( 63       ( 03 May 2019 06:19 )             40.3         142  |  104  |  32  ----------------------------<  120  3.5   |  36  |  0.85    Ca    9.0      03 May 2019 06:19    TPro  6.6  /  Alb  2.3  /  TBili  0.4  /  DBili  x   /  AST  29  /  ALT  23  /  AlkPhos  58      eGFR if Non African American: 92 mL/min/1.73M2 (19 @ 06:19)  eGFR if : 107 mL/min/1.73M2 (19 @ 06:19)    PT/INR - ( 2019 19:15 )   PT: 13.2 sec;   INR: 1.17 ratio         PTT - ( 2019 19:15 )  PTT:32.2 sec  Lactate, Blood: 1.5 mmol/L ( @ 19:15)    CARDIAC MARKERS ( 01 May 2019 06:48 )  <.017 ng/mL / x     / 42 U/L / x     / x      CARDIAC MARKERS ( 2019 23:10 )  <.017 ng/mL / x     / 37 U/L / x     / x      CARDIAC MARKERS ( 2019 19:15 )  <.017 ng/mL / x     / x     / x     / x           Chol 95 mg/dL LDL 54 mg/dL HDL 28 mg/dL Trig 65 mg/dL  TSH 1.77   TSH with FT4 reflex --  Total T3 --        CAPILLARY BLOOD GLUCOSE         [id number] 5.5    Urinalysis Basic - ( 2019 20:55 )    Color: Yellow / Appearance: Clear / S.010 / pH: x  Gluc: x / Ketone: Negative  / Bili: Negative / Urobili: Negative   Blood: x / Protein: Negative / Nitrite: Negative   Leuk Esterase: Negative / RBC: 0-4 /HPF / WBC Negative /HPF   Sq Epi: x / Non Sq Epi: Neg.-Few / Bacteria: Negative /HPF        Culture - Urine (collected 2019 20:55)  Source: .Urine Clean Catch (Midstream)  Final Report (02 May 2019 03:32):    No growth    Culture - Blood (collected 2019 18:20)  Source: .Blood Blood-Peripheral  Preliminary Report (02 May 2019 03:01):    No growth to date.    Culture - Blood (collected 2019 18:20)  Source: .Blood Blood-Peripheral  Preliminary Report (02 May 2019 03:01):    No growth to date.      RADIOLOGY & ADDITIONAL TESTS:    Care Discussed with Consultants/Other Providers: Patient is a 63y old  Male who presents with a chief complaint of cough, EKG changes.  Pt with complaint of continued cough today.  Pt had abnormal Nuclear stress test and today will be transfer to [place] for Cardiac cath.      Patient seen and examined at bedside.    ALLERGIES:  benzodiazepines (Other)  Keppra (Other (Severe))  penicillin (Angioedema)  penicillins (Unknown)    MEDICATIONS  (STANDING):  ALBUTerol/ipratropium for Nebulization 3 milliLiter(s) Nebulizer every 6 hours  buDESOnide 160 MICROgram(s)/formoterol 4.5 MICROgram(s) Inhaler 2 Puff(s) Inhalation two times a day  diVALproex ER 1500 milliGRAM(s) Oral at bedtime  furosemide    Tablet 40 milliGRAM(s) Oral daily  levoFLOXacin  Tablet 500 milliGRAM(s) Oral daily  levothyroxine 25 MICROGram(s) Oral daily  pantoprazole    Tablet 40 milliGRAM(s) Oral before breakfast  predniSONE   Tablet 40 milliGRAM(s) Oral daily  sodium chloride 0.9%. 1000 milliLiter(s) (70 mL/Hr) IV Continuous <Continuous>  tamsulosin 0.4 milliGRAM(s) Oral at bedtime    MEDICATIONS  (PRN):  acetaminophen   Tablet .. 650 milliGRAM(s) Oral every 6 hours PRN Temp greater or equal to 38C (100.4F), Mild Pain (1 - 3)    Vital Signs Last 24 Hrs  T(F): 97.3 (03 May 2019 05:29), Max: 97.5 (02 May 2019 19:52)  HR: 65 (03 May 2019 05:29) (56 - 73)  BP: 98/55 (03 May 2019 05:29) (98/55 - 112/79)  RR: 16 (03 May 2019 05:29) (16 - 16)  SpO2: 99% (03 May 2019 05:29) (95% - 100%)  I&O's Summary    02 May 2019 07:01  -  03 May 2019 07:00  --------------------------------------------------------  IN: 820 mL / OUT: 2650 mL / NET: -1830 mL      PHYSICAL EXAM:  General: NAD, A/O x 2  ENT: MMM  Neck: Supple, No JVD  Lungs: decreased breath sounds  bilaterally  Cardio: RRR, S1/S2, No murmurs  Abdomen: Soft, Nontender, distended; Bowel sounds present  Extremities: No calf tenderness, bilat  pitting edema    LABS:                        13.1   6.1   )-----------( 63       ( 03 May 2019 06:19 )             40.3         142  |  104  |  32  ----------------------------<  120  3.5   |  36  |  0.85    Ca    9.0      03 May 2019 06:19    TPro  6.6  /  Alb  2.3  /  TBili  0.4  /  DBili  x   /  AST  29  /  ALT  23  /  AlkPhos  58      eGFR if Non African American: 92 mL/min/1.73M2 (19 @ 06:19)  eGFR if : 107 mL/min/1.73M2 (19 @ 06:19)    PT/INR - ( 2019 19:15 )   PT: 13.2 sec;   INR: 1.17 ratio         PTT - ( 2019 19:15 )  PTT:32.2 sec  Lactate, Blood: 1.5 mmol/L ( @ 19:15)    CARDIAC MARKERS ( 01 May 2019 06:48 )  <.017 ng/mL / x     / 42 U/L / x     / x      CARDIAC MARKERS ( 2019 23:10 )  <.017 ng/mL / x     / 37 U/L / x     / x      CARDIAC MARKERS ( 2019 19:15 )  <.017 ng/mL / x     / x     / x     / x           Chol 95 mg/dL LDL 54 mg/dL HDL 28 mg/dL Trig 65 mg/dL  TSH 1.77   TSH with FT4 reflex --  Total T3 --        CAPILLARY BLOOD GLUCOSE         [id number] 5.5    Urinalysis Basic - ( 2019 20:55 )    Color: Yellow / Appearance: Clear / S.010 / pH: x  Gluc: x / Ketone: Negative  / Bili: Negative / Urobili: Negative   Blood: x / Protein: Negative / Nitrite: Negative   Leuk Esterase: Negative / RBC: 0-4 /HPF / WBC Negative /HPF   Sq Epi: x / Non Sq Epi: Neg.-Few / Bacteria: Negative /HPF        Culture - Urine (collected 2019 20:55)  Source: .Urine Clean Catch (Midstream)  Final Report (02 May 2019 03:32):    No growth    Culture - Blood (collected 2019 18:20)  Source: .Blood Blood-Peripheral  Preliminary Report (02 May 2019 03:01):    No growth to date.    Culture - Blood (collected 2019 18:20)  Source: .Blood Blood-Peripheral  Preliminary Report (02 May 2019 03:01):    No growth to date.      RADIOLOGY & ADDITIONAL TESTS:    Care Discussed with Consultants/Other Providers:

## 2019-05-03 NOTE — PROGRESS NOTE ADULT - REASON FOR ADMISSION
cough, EKG changes.

## 2019-05-03 NOTE — DISCHARGE NOTE PROVIDER - CARE PROVIDERS DIRECT ADDRESSES
[email or web address]
[email or web address]

## 2019-05-03 NOTE — DISCHARGE NOTE PROVIDER - HOSPITAL COURSE
62yo M with hx of remote sarcoid, hx of RA untreated, hypothyroid, BPH, bronchiectasis, , sz d/o, hx of traumatic bl SDH pw productive cough and SOB 2/2 bronchitis. Also with ST depressions in aterior leads. Seen by cardio, had serial trops < Echo and nuclear stress along with tele monitoring .Had abnormal nuclear stess test and will now be transferred to [facility] for Cardiac cath Dr Riya giron notified upon transferr to [place] 62yo M with hx of remote sarcoid, hx of RA untreated, hypothyroid, BPH, bronchiectasis, , sz d/o, hx of traumatic bl SDH pw productive cough and SOB 2/2 bronchitis. Also with ST depressions in aterior leads. Seen by cardio, had serial trops < Echo and nuclear stress along with tele monitoring .Had abnormal nuclear stess test and will now be transferred to [facility] for Cardiac cath Dr Ritter.         Dr giron notified upon transferr to [place]

## 2019-05-03 NOTE — H&P ADULT - NSHPLABSRESULTS_GEN_ALL_CORE
.  LABS:                         13.1   6.1   )-----------( 63       ( 03 May 2019 06:19 )             40.3     05-03    142  |  104  |  32<H>  ----------------------------<  120<H>  3.5   |  36<H>  |  0.85    Ca    9.0      03 May 2019 06:19                RADIOLOGY, EKG & ADDITIONAL TESTS: Reviewed.   ekg reviewed by me--> sinus bradycardia with PVCs  cxr reviewed by me-->Extensive fibrotic changes of both upper lobes- chronic

## 2019-05-03 NOTE — PROGRESS NOTE ADULT - ASSESSMENT
Patient with abnormal ekg, abnormal nuclear stress. Resolved fever.  Sarcoid.  DM  Former smoker.  Low platelets noted.    D/w patient and  regarding cardiac catheterization , possible revascularization if appropriate.  Patient amenable and called [facility] cath lab to arrange with Dr. Starks Patient with abnormal ekg, abnormal nuclear stress. Resolved fever.  Sarcoid.  DM  Former smoker.  Low platelets noted. Chronic , per patient, followed by Dr. Diana Freed    D/w patient and  regarding cardiac catheterization , possible revascularization if appropriate.  Patient amenable and called [facility] cath lab to arrange with Dr. Starks

## 2019-05-03 NOTE — PROGRESS NOTE ADULT - SUBJECTIVE AND OBJECTIVE BOX
Follow up for  abnormal  cardiac testing    SUBJ:   Feels ok. No fever chills, chest pain, dyspnea.  Had nuclear stress yesterday.    PMH  Hypothyroid  Seizure  Cellulitis  Inguinal hernia  Subdural hematoma  Diverticulitis  Bronchiectasis  Sarcoid  DM (diabetes mellitus)      MEDICATIONS  (STANDING):  ALBUTerol/ipratropium for Nebulization 3 milliLiter(s) Nebulizer every 6 hours  buDESOnide 160 MICROgram(s)/formoterol 4.5 MICROgram(s) Inhaler 2 Puff(s) Inhalation two times a day  diVALproex ER 1500 milliGRAM(s) Oral at bedtime  furosemide    Tablet 40 milliGRAM(s) Oral daily  levoFLOXacin  Tablet 500 milliGRAM(s) Oral daily  levothyroxine 25 MICROGram(s) Oral daily  pantoprazole    Tablet 40 milliGRAM(s) Oral before breakfast  predniSONE   Tablet 40 milliGRAM(s) Oral daily  sodium chloride 0.9%. 1000 milliLiter(s) (70 mL/Hr) IV Continuous <Continuous>  tamsulosin 0.4 milliGRAM(s) Oral at bedtime    MEDICATIONS  (PRN):  acetaminophen   Tablet .. 650 milliGRAM(s) Oral every 6 hours PRN Temp greater or equal to 38C (100.4F), Mild Pain (1 - 3)        PHYSICAL EXAM:  Vital Signs Last 24 Hrs  T(C): 36.1 (03 May 2019 07:55), Max: 36.4 (02 May 2019 19:52)  T(F): 97 (03 May 2019 07:55), Max: 97.5 (02 May 2019 19:52)  HR: 66 (03 May 2019 07:55) (56 - 73)  BP: 102/55 (03 May 2019 07:55) (98/55 - 112/79)  BP(mean): --  RR: 16 (03 May 2019 07:55) (16 - 16)  SpO2: 99% (03 May 2019 07:55) (95% - 100%)    GENERAL: NAD, well-groomed, well-developed  HEAD:  Atraumatic, Normocephalic  EYES: EOMI, PERRLA, conjunctiva and sclera clear  ENT: Moist mucous membranes,  NECK: Supple, No JVD, no bruits  CHEST/LUNG: Clear to percussion and auscultation bilaterally; No rales, rhonchi, wheezing, or rubs  HEART: Regular rate and rhythm; No murmurs, rubs, or gallops PMI non displaced.  ABDOMEN: Soft, Nontender, Nondistended; Bowel sounds present  EXTREMITIES:   No clubbing, cyanosis, or edema  SKIN: No rashes or lesions  NERVOUS SYSTEM:  Alert & Oriented      TELEMETRY:    sinus    ECG:    < from: 12 Lead ECG (04.30.19 @ 23:28) >    Ventricular Rate 53 BPM    Atrial Rate 53 BPM    P-R Interval 158 ms    QRS Duration 96 ms    Q-T Interval 448 ms    QTC Calculation(Bezet) 420 ms    P Axis 40 degrees    R Axis -4 degrees    T Axis 61 degrees    Diagnosis Line Sinus bradycardia with occasional premature ventricular complexes  ST and T wave abnormality, consider anterior ischemia  Abnormal ECG  When compared with ECG of 30-APR-2019 17:21,  premature ventricular complexes are now present  Confirmed by AMBER AVILA, JACOB STANLEY ([id number]) on 5/1/2019 8:26:20 AM    < end of copied text >      LABS:                        13.1   6.1   )-----------( 63       ( 03 May 2019 06:19 )             40.3     05-03    142  |  104  |  32<H>  ----------------------------<  120<H>  3.5   |  36<H>  |  0.85    Ca    9.0      03 May 2019 06:19      I&O's Summary    02 May 2019 07:01  -  03 May 2019 07:00  --------------------------------------------------------  IN: 820 mL / OUT: 2650 mL / NET: -1830 mL        RADIOLOGY & ADDITIONAL STUDIES:    < from: NM Nuclear Stress Pharmacologic Multiple (05.02.19 @ 11:27) >    EXAM:  NM NUCLEAR STRESS MULTI PHARM      PROCEDURE DATE:  05/02/2019        INTERPRETATION:  INTERPRETATION: RADIOPHARMACEUTICAL: 9.8 mCi Tc-99m ?   Sestamibi IV at Rest and    29.6 mCi Tc99m- Sestamibi (Regadenoson) IV at Stress    CLINICAL INFORMATION: The patient is a 63 year old man with chest pain,   referred for pharmacologic nuclear stress testing.       PATIENT PREPARATION: NPO after midnight, no caffeine.    STRESS PROTOCOL: Intravenous Regadenoson 5cc (0.4mg) was given rapidly   over10 seconds. Immediately thereafter Tc99m- Sestamibi was injected.     A 12 lead EKG was recorded every minute and blood pressure was also   recorded during the test. The study was stopped when the protocol was   completed.    Electrocardiograph was reported as no change    There no cardiac symptoms during the test.    SYMPTOMS TO INTERVENTION: None      TECHNIQUE:  At rest, 9.8 mCi Tc99m- sestamibi was injected intravenously   and SPECT myocardial perfusion imaging was performed approximately 1 hour   later. Immediately following the intravenous injection of Regadenoson,   29.6 mCi of Tc99m- Sestamibi was injected intravenously and gated SPECT   myocardial perfusion imaging was performed approximately 1 hour later.   Data were reconstructed in the cardiac standard short axis, horizontal   long axis and vertical long axis projections.    FINDINGS: The technical quality was of the resting and post stress   perfusion images was good.     Review of resting and post stress myocardial scintigrams revealed   abnormal left ventricular perfusion.     There is a fixed moderate sized area of reduced tracer activity in the   inferior wall of moderate to severe degree. Air is a moderate sized area   of mild to moderately decreased activity post vasodilator involving the   anterolateral and inferolateral segments which normalizes on resting   imaging.    Gated wall motion study revealed normal left ventricular wall motion.   There were no regional wall motion abnormalities or regions of decreased   systolic wall thickening. Transient ischemic dilation (TID) was absent.    Left ventricular ejection fraction was 69 %.      IMPRESSION: Findings are compatible with anterolateral and inferolateral   ischemia. The fixed defect in the inferior wall with normal wall   thickening and brightening is most suggestive of inferior wall   attenuation.       Normal left ventricular wall motion with ejection fraction of 69 %   (normal: 50% or greater).       No regional wall motion abnormalities.       Please refer to cardiac stress test report.     Comparison: No prior study available.    < end of copied text >      ECHO:

## 2019-05-03 NOTE — H&P ADULT - NSICDXPASTMEDICALHX_GEN_ALL_CORE_FT
PAST MEDICAL HISTORY:  BPH without urinary obstruction     Bronchiectasis     DM (diabetes mellitus)     Hypothyroid     Inguinal hernia     LAMBERT and COPD overlap syndrome     Sarcoid     Seizure     Sleep apnea     Subdural hematoma

## 2019-05-03 NOTE — DISCHARGE NOTE PROVIDER - NSDCCPGOAL_GEN_ALL_CORE_FT
To get better and follow your care plan as instructed.
To get better and follow your care plan as instructed.

## 2019-05-03 NOTE — DISCHARGE NOTE NURSING/CASE MANAGEMENT/SOCIAL WORK - NSDCDPATPORTLINK_GEN_ALL_CORE
You can access the [facility] Portal, offered by [facility], by registering with the following website: [email or web address]

## 2019-05-03 NOTE — H&P ADULT - HISTORY OF PRESENT ILLNESS
Patient is a 64 yo  male with PMH of remote sarcoid, rheumatoid arthritis, hypothyroidism, chronic thrombocytopenia, BPH, COPD, seizure disorder, prediabetes ( last A1C 5.5 on 5/1), hx of traumatic bilateral SDH, chronic bilateral lower extremity edema, obstructive sleep apnea non compliant with CPAP presents as a transfer from [facility] for a positive stress test. Patient remarks that for the last 2-3 days he has been feeling very weak. He also is having a worsening (of a chronic) cough that is purulent. He admits to subjective fevers and chills for the last three days. Denies diarrhea, pain or burning with urination. No recent travel or sick contacts. He went to see his PMD Dr. Galarza on 4/30 who sent him to the ED because he was found to have a fever of 102 and st depressions in the anterior leads on an EKG done in the office. He had a chest CT performed that did not reveal a pneumonia however his RVP was positive for coronavirus. He had negative troponins x 2. He was started on duonebs, antibiotics and steroids for a COPD exacerbation with bacterial bronchitis. He underwent a stress test on 5/2 which was found to have possible rob and inferolateral ischemia. He was transferred for [facility] for LHC. He undewent an LHC today- coronaries clean. He was seen by PT and was recommended to go to acute rehab. Patient now admitted pending discharge to short term rehabilitation.      Currently, patient continues to have productive cough however it is improving. Denies significant shortness of breath, wheezing, subjective fevers and chills. Sister at bedside. Taken off nasal canula and found to have o2 sat between 90-96%. Patient only complaint is two small skin tears on anterior surface of shin.             < from: NM Nuclear Stress Pharmacologic Multiple (05.02.19 @ 11:27) >  EXAM:  NM NUCLEAR STRESS MULTI PHARM      PROCEDURE DATE:  05/02/2019        INTERPRETATION:  INTERPRETATION: RADIOPHARMACEUTICAL: 9.8 mCi Tc-99m ?   Sestamibi IV at Rest and    29.6 mCi Tc99m- Sestamibi (Regadenoson) IV at Stress    CLINICAL INFORMATION: The patient is a 63 year old man with chest pain,   referred for pharmacologic nuclear stress testing.       PATIENT PREPARATION: NPO after midnight, no caffeine.    STRESS PROTOCOL: Intravenous Regadenoson 5cc (0.4mg) was given rapidly   over10 seconds. Immediately thereafter Tc99m- Sestamibi was injected.     A 12 lead EKG was recorded every minute and blood pressure was also   recorded during the test. The study was stopped when the protocol was   completed.    Electrocardiograph was reported as no change    There no cardiac symptoms during the test.    SYMPTOMS TO INTERVENTION: None      TECHNIQUE:  At rest, 9.8 mCi Tc99m- sestamibi was injected intravenously   and SPECT myocardial perfusion imaging was performed approximately 1 hour   later. Immediately following the intravenous injection of Regadenoson,   29.6 mCi of Tc99m- Sestamibi was injected intravenously and gated SPECT   myocardial perfusion imaging was performed approximately 1 hour later.   Data were reconstructed in the cardiac standard short axis, horizontal   long axis and vertical long axis projections.    FINDINGS: The technical quality was of the resting and post stress   perfusion images was good.     Review of resting and post stress myocardial scintigrams revealed   abnormal left ventricular perfusion.     There is a fixed moderate sized area of reduced tracer activity in the   inferior wall of moderate to severe degree. Air is a moderate sized area   of mild to moderately decreased activity post vasodilator involving the   anterolateral and inferolateral segments which normalizes on resting   imaging.    Gated wall motion study revealed normal left ventricular wall motion.   There were no regional wall motion abnormalities or regions of decreased   systolic wall thickening. Transient ischemic dilation (TID) was absent.    Left ventricular ejection fraction was 69 %.      IMPRESSION: Findings are compatible with anterolateral and inferolateral   ischemia. The fixed defect in the inferior wall with normal wall   thickening and brightening is most suggestive of inferior wall   attenuation.       Normal left ventricular wall motion with ejection fraction of 69 %   (normal: 50% or greater).       No regional wall motion abnormalities.       Please refer to cardiac stress test report.     < end of copied text >    < from: 12 Lead ECG (04.30.19 @ 23:28) >    Ventricular Rate 53 BPM    Atrial Rate 53 BPM    P-R Interval 158 ms    QRS Duration 96 ms    Q-T Interval 448 ms    QTC Calculation(Bezet) 420 ms    P Axis 40 degrees    R Axis -4 degrees    T Axis 61 degrees    Diagnosis Line Sinus bradycardia with occasional premature ventricular complexes  ST and T wave abnormality, consider anterior ischemia  Abnormal ECG  When compared with ECG of 30-APR-2019 17:21,  premature ventricular complexes are now present  Confirmed by AMBER AVILA, JACOB STANLEY ([id number]) on 5/1/2019 8:26:20 AM    < end of copied text >

## 2019-05-03 NOTE — H&P ADULT - PROBLEM SELECTOR PLAN 1
- COPD exacerbation 2' sepsis from bacterial bronchitis and coronavirus  - s/p levaquin 500 mg po once and prednisone 40 mg once, c/w levaquin 250 mg po daily for a total of 5 days and prednisone 40 mg po daily for a total of 5 days  - c/w duo nebs prn q6h  - saturating well off nasal canula- goal 88-92%  - per PT, will need rehab

## 2019-05-03 NOTE — H&P ADULT - ASSESSMENT
Patient is a 62 yo  male with PMH of remote sarcoid, rheumatoid arthritis, hypothyroidism, chronic thrombocytopenia, BPH, COPD, seizure disorder, prediabetes ( last A1C 5.5 on 5/1), hx of traumatic bilateral SDH, chronic bilateral lower extremity edema, obstructive sleep apnea non compliant with CPAP presents as a transfer from [facility] for a positive stress test; s/p unremarkable LHC. Admitted for COPD exacerbation 2' sepsis from bacterial bronchitis and coronavirus. Discharge pending placement to acute rehabilitation.

## 2019-05-03 NOTE — PROGRESS NOTE ADULT - SUBJECTIVE AND OBJECTIVE BOX
Follow-up Pulm Progress Note    Rik Guillen MD  [phone]    No new respiratory events overnight.  Denies SOB/CP.     Medications:  Vital Signs Last 24 Hrs  T(C): 36.4 (03 May 2019 15:35), Max: 36.4 (02 May 2019 19:52)  T(F): 97.6 (03 May 2019 15:35), Max: 97.6 (03 May 2019 15:05)  HR: 50 (03 May 2019 15:35) (50 - 66)  BP: 95/56 (03 May 2019 15:35) (95/56 - 112/79)  BP(mean): 69 (03 May 2019 15:35) (69 - 69)  RR: 16 (03 May 2019 15:35) (16 - 16)  SpO2: 98% (03 May 2019 15:35) (97% - 99%)          05-02 @ 07:01  -  05-03 @ 07:00  --------------------------------------------------------  IN: 820 mL / OUT: 2650 mL / NET: -1830 mL        LABS:                        13.1   6.1   )-----------( 63       ( 03 May 2019 06:19 )             40.3     05-03    142  |  104  |  32<H>  ----------------------------<  120<H>  3.5   |  36<H>  |  0.85    Ca    9.0      03 May 2019 06:19          Procalcitonin, Serum: 0.07 ng/mL (04-30-19 @ 23:10)    Serum Pro-Brain Natriuretic Peptide: 127 pg/mL (04-30-19 @ 19:15)      CULTURES:  Culture Results:   No growth (04-30 @ 20:55)  Culture Results:   No growth to date. (04-30 @ 18:20)  Culture Results:   No growth to date. (04-30 @ 18:20)    Most recent blood culture -- 04-30 @ 20:55   -- -- .Urine Clean Catch (Midstream) 04-30 @ 20:55  Most recent blood culture -- 04-30 @ 18:20   -- -- .Blood Blood-Peripheral 04-30 @ 18:20      Physical Examination:  PULM: Clear to auscultation bilaterally, no significant sputum production  CVS: Regular rate and rhythm, no murmurs, rubs, or gallops  ABD: Soft, non-tender  EXT:  No clubbing, cyanosis, or edema

## 2019-05-03 NOTE — PROGRESS NOTE ADULT - ATTENDING COMMENTS
dyspnea  portably multifactorial due to interstitial lung disease and ischemic heart disease. would consider patient for cardiac cath and angiogram. ? candidate for anticoagulants given a history of subdural hematoma.
I have personally seen and examined patient on the above date.  I discussed the case with FABIAN Benitez and I agree with findings and plan as detailed per note above, which I have amended where appropriate.      EKG changes, to go for stress test  cardio following   steroid taper   nebs
I have personally seen and examined patient on the above date.  I discussed the case with ARDEN Arora and I agree with findings and plan as detailed per note above, which I have amended where appropriate.      Chest pain with abnormal stress test  - will need transfer to [place] for [facility]    Coronavirus   - breathing improved  - day 4/5 of levaquin  - cont steroids

## 2019-05-03 NOTE — H&P CARDIOLOGY - HISTORY OF PRESENT ILLNESS
This is a 64 yo  male with PMH of remote sarcoid, RA untreated, hypothyroid, thrombocytopenia, BPH, COPD ( pulm Dr Guillen), seizure disorder, DM type 2 ( last A1C 5.5 on 5/1, well managed, well controlled as per patient) hx of traumatic bl SDH, chronic BL LE edema.  Presents to [facility] on 4/30 with c/c of  of sneezing and coughing with thick yellow/green sputum x 4-5 days associated with SANDOVAL and chest pain with coughing and  wheezing. IN addition pt also reported difficulty sleeping at night sec to cough and SOB. +associated  fevers, chills and generalized weakness. Reported watery diarrhea about 1-2 weeks prior to URI sxs and had sig decreased po intake since. No further diarrhea this past week. Went to see Dr Gonsalves on 4/30 ( his PCP)  and was sent  to ER for fevers and EKG changes ( ST depressions in aterior leads). In ED noted fever to 102.2, plt 61 ( hx of thrombocytopenia in 2018 in the 69-99, f/u with Dr. Diana Freed), neg lactate. Dyspnea improved with duonebs.  Dx COPD exacerbation with bronchitis ( on abx,- Levaquin for 4 more days- inhalers and prednisone taper- for 1 more day), + Coronavirus, FLU neg, CT neg for Pneumonia, now fevers resolved.  Trops negative.  Had abnormal NST on 5/2 ( see report below) , transferred to [facility] today for C.  On arrival to [facility] pt denies: chest pain, dyspnea, dizziness, palpitations, N&V, HA but cont to have + productive cough and coarse rhonchi throughout lung fields on ascultation.             < from: NM Nuclear Stress Pharmacologic Multiple (05.02.19 @ 11:27) >  EXAM:  NM NUCLEAR STRESS MULTI PHARM      PROCEDURE DATE:  05/02/2019        INTERPRETATION:  INTERPRETATION: RADIOPHARMACEUTICAL: 9.8 mCi Tc-99m ?   Sestamibi IV at Rest and    29.6 mCi Tc99m- Sestamibi (Regadenoson) IV at Stress    CLINICAL INFORMATION: The patient is a 63 year old man with chest pain,   referred for pharmacologic nuclear stress testing.       PATIENT PREPARATION: NPO after midnight, no caffeine.    STRESS PROTOCOL: Intravenous Regadenoson 5cc (0.4mg) was given rapidly   over10 seconds. Immediately thereafter Tc99m- Sestamibi was injected.     A 12 lead EKG was recorded every minute and blood pressure was also   recorded during the test. The study was stopped when the protocol was   completed.    Electrocardiograph was reported as no change    There no cardiac symptoms during the test.    SYMPTOMS TO INTERVENTION: None      TECHNIQUE:  At rest, 9.8 mCi Tc99m- sestamibi was injected intravenously   and SPECT myocardial perfusion imaging was performed approximately 1 hour   later. Immediately following the intravenous injection of Regadenoson,   29.6 mCi of Tc99m- Sestamibi was injected intravenously and gated SPECT   myocardial perfusion imaging was performed approximately 1 hour later.   Data were reconstructed in the cardiac standard short axis, horizontal   long axis and vertical long axis projections.    FINDINGS: The technical quality was of the resting and post stress   perfusion images was good.     Review of resting and post stress myocardial scintigrams revealed   abnormal left ventricular perfusion.     There is a fixed moderate sized area of reduced tracer activity in the   inferior wall of moderate to severe degree. Air is a moderate sized area   of mild to moderately decreased activity post vasodilator involving the   anterolateral and inferolateral segments which normalizes on resting   imaging.    Gated wall motion study revealed normal left ventricular wall motion.   There were no regional wall motion abnormalities or regions of decreased   systolic wall thickening. Transient ischemic dilation (TID) was absent.    Left ventricular ejection fraction was 69 %.      IMPRESSION: Findings are compatible with anterolateral and inferolateral   ischemia. The fixed defect in the inferior wall with normal wall   thickening and brightening is most suggestive of inferior wall   attenuation.       Normal left ventricular wall motion with ejection fraction of 69 %   (normal: 50% or greater).       No regional wall motion abnormalities.       Please refer to cardiac stress test report.     < end of copied text >    < from: 12 Lead ECG (04.30.19 @ 23:28) >    Ventricular Rate 53 BPM    Atrial Rate 53 BPM    P-R Interval 158 ms    QRS Duration 96 ms    Q-T Interval 448 ms    QTC Calculation(Bezet) 420 ms    P Axis 40 degrees    R Axis -4 degrees    T Axis 61 degrees    Diagnosis Line Sinus bradycardia with occasional premature ventricular complexes  ST and T wave abnormality, consider anterior ischemia  Abnormal ECG  When compared with ECG of 30-APR-2019 17:21,  premature ventricular complexes are now present  Confirmed by AMBER AVILA, JACOB STANLEY ([id number]) on 5/1/2019 8:26:20 AM    < end of copied text > This is a 62 yo  male with PMH of remote sarcoid, RA untreated, hypothyroid, thrombocytopenia, BPH, COPD ( pulm Dr Guillen), seizure disorder, DM type 2 ( last A1C 5.5 on 5/1, well managed, well controlled as per patient) hx of traumatic bl SDH, chronic BL LE edema, Sleep Apnea non compliant with CPAP.  [facility]/c of  of sneezing and coughing with thick yellow/green sputum x 4-5 days associated with SANDOVAL and chest pain with coughing and  wheezing. IN addition pt also reported difficulty sleeping at night sec to cough and SOB. +associated  fevers, chills and generalized weakness. Reported watery diarrhea about 1-2 weeks prior to URI sxs and had sig decreased po intake since. No further diarrhea this past week. Went to see Dr Gonsalves on 4/30 ( his PCP)  and was sent  to ER for fevers and EKG changes ( ST depressions in aterior leads). In ED noted fever to 102.2, plt 61 ( hx of thrombocytopenia in 2018 in the 69-99, f/u with Dr. Diana Freed), neg lactate. Dyspnea improved with duonebs.  Dx COPD exacerbation with bronchitis ( on abx,- Levaquin for 4 more days- inhalers and prednisone taper- for 1 more day), + Coronavirus, FLU neg, CT neg for Pneumonia, now fevers resolved.  Trops negative.  Had abnormal NST on 5/2 ( see report below) , transferred to [facility] today for [facility].  On arrival to [facility] pt denies: chest pain, dyspnea, dizziness, palpitations, N&V, HA but cont to have + productive cough and coarse rhonchi throughout lung fields on ascultation.             < from: NM Nuclear Stress Pharmacologic Multiple (05.02.19 @ 11:27) >  EXAM:  NM NUCLEAR STRESS MULTI PHARM      PROCEDURE DATE:  05/02/2019        INTERPRETATION:  INTERPRETATION: RADIOPHARMACEUTICAL: 9.8 mCi Tc-99m ?   Sestamibi IV at Rest and    29.6 mCi Tc99m- Sestamibi (Regadenoson) IV at Stress    CLINICAL INFORMATION: The patient is a 63 year old man with chest pain,   referred for pharmacologic nuclear stress testing.       PATIENT PREPARATION: NPO after midnight, no caffeine.    STRESS PROTOCOL: Intravenous Regadenoson 5cc (0.4mg) was given rapidly   over10 seconds. Immediately thereafter Tc99m- Sestamibi was injected.     A 12 lead EKG was recorded every minute and blood pressure was also   recorded during the test. The study was stopped when the protocol was   completed.    Electrocardiograph was reported as no change    There no cardiac symptoms during the test.    SYMPTOMS TO INTERVENTION: None      TECHNIQUE:  At rest, 9.8 mCi Tc99m- sestamibi was injected intravenously   and SPECT myocardial perfusion imaging was performed approximately 1 hour   later. Immediately following the intravenous injection of Regadenoson,   29.6 mCi of Tc99m- Sestamibi was injected intravenously and gated SPECT   myocardial perfusion imaging was performed approximately 1 hour later.   Data were reconstructed in the cardiac standard short axis, horizontal   long axis and vertical long axis projections.    FINDINGS: The technical quality was of the resting and post stress   perfusion images was good.     Review of resting and post stress myocardial scintigrams revealed   abnormal left ventricular perfusion.     There is a fixed moderate sized area of reduced tracer activity in the   inferior wall of moderate to severe degree. Air is a moderate sized area   of mild to moderately decreased activity post vasodilator involving the   anterolateral and inferolateral segments which normalizes on resting   imaging.    Gated wall motion study revealed normal left ventricular wall motion.   There were no regional wall motion abnormalities or regions of decreased   systolic wall thickening. Transient ischemic dilation (TID) was absent.    Left ventricular ejection fraction was 69 %.      IMPRESSION: Findings are compatible with anterolateral and inferolateral   ischemia. The fixed defect in the inferior wall with normal wall   thickening and brightening is most suggestive of inferior wall   attenuation.       Normal left ventricular wall motion with ejection fraction of 69 %   (normal: 50% or greater).       No regional wall motion abnormalities.       Please refer to cardiac stress test report.     < end of copied text >    < from: 12 Lead ECG (04.30.19 @ 23:28) >    Ventricular Rate 53 BPM    Atrial Rate 53 BPM    P-R Interval 158 ms    QRS Duration 96 ms    Q-T Interval 448 ms    QTC Calculation(Bezet) 420 ms    P Axis 40 degrees    R Axis -4 degrees    T Axis 61 degrees    Diagnosis Line Sinus bradycardia with occasional premature ventricular complexes  ST and T wave abnormality, consider anterior ischemia  Abnormal ECG  When compared with ECG of 30-APR-2019 17:21,  premature ventricular complexes are now present  Confirmed by AMBER AVILA, JACOB STANLEY ([id number]) on 5/1/2019 8:26:20 AM    < end of copied text >

## 2019-05-03 NOTE — H&P ADULT - ATTENDING COMMENTS
dvt ppx: start lovenox daily  communication: discussed with sister latasha at bedside    discussed with [facility] np luther

## 2019-05-03 NOTE — H&P ADULT - NSHPPHYSICALEXAM_GEN_ALL_CORE
Vital Signs Last 24 Hrs  T(C): 36.5 (03 May 2019 17:15), Max: 36.5 (03 May 2019 17:15)  T(F): 97.7 (03 May 2019 17:15), Max: 97.7 (03 May 2019 17:15)  HR: 62 (03 May 2019 19:00) (46 - 66)  BP: 91/54 (03 May 2019 19:00) (91/54 - 112/79)  BP(mean): 69 (03 May 2019 15:35) (69 - 69)  RR: 18 (03 May 2019 19:00) (16 - 18)  SpO2: 96% (03 May 2019 19:00) (92% - 100%)    GENERAL: NAD, obese, face with rosacea  HEAD:  Atraumatic, Normocephalic  EYES: EOMI, PERRLA, conjunctiva and sclera clear  ENT: Pharynx not erythematous  PULMONARY: scant rales b/l  CARDIOVASCULAR: Regular rate and rhythm; No murmurs, rubs, or gallops  ABDOMEN: Soft, Nontender, Nondistended; Bowel sounds present  EXTREMITIES:  2+ Peripheral Pulses, b/l venous stasis, +1 pitting edema up to shins  MUSCULOSKELETAL: No calf tenderness  PSYCH: AAOx3, normal affect  SKIN: warm and dry, No rashes or lesions

## 2019-05-03 NOTE — DISCHARGE NOTE PROVIDER - PROVIDER TOKENS
PROVIDER:[TOKEN:[[id number]]],PROVIDER:[TOKEN:[[id number]]],PROVIDER:[TOKEN:[[id number]]]
PROVIDER:[TOKEN:[[id number]]]

## 2019-05-03 NOTE — DISCHARGE NOTE PROVIDER - NSDCDCMDCOMP_GEN_ALL_CORE
This document is complete and the patient is ready for discharge.
This document is complete and the patient is ready for discharge.

## 2019-05-03 NOTE — PROGRESS NOTE ADULT - ASSESSMENT
62yo M with hx of remote sarcoid, hx of RA untreated, hypothyroid, BPH, bronchiectasis, , sz d/o, hx of traumatic bl SDH pw productive cough and SOB 2/2 bronchitis. Also with ST depressions in aterior leads. Awaiting stress.      # Coronavirus   CT chest negative for pna  continue levaquin x 5 days total   Flu negative  duonebs standing, prednisone taper   cont symbicort.   Pulm following (Dr Guillen is his pulmonologist)  Monitor O2 sat--cont supplemental O2    # Chest pain/EKG changes  trops negative  TTE  Stress test today   Cardiac following   tele monitoring    # sz d/o  cont depakote,   check valproic acid levels    # Hypothyroid  cont synthroid.   TSH 1,77    #THrombocytopenia:  chronic--baseline 60-99  COnt to closely monitor--slightly worse     DVT ppx: venodynes in setting of thrombocytopenia 62yo M with hx of remote sarcoid, hx of RA untreated, hypothyroid, BPH, bronchiectasis, , sz d/o, hx of traumatic bl SDH pw productive cough and SOB 2/2 bronchitis. Also with ST depressions in aterior leads. Had abnormal nuclear stess test and will now be [facility] to [facility] for Cardiac cath       # Coronavirus   CT chest negative for pna  continue levaquin x 5 days total--day 4/5 today   Flu negative  duonebs standing, prednisone taper   cont symbicort.   Pulm following (Dr Guillen is his pulmonologist)  Monitor O2 sat--cont supplemental O2    # Chest pain/EKG changes  trops negative  TTE  noted   Nuclear stress positive   cardiac cath at [facility]     # sz d/o  cont depakote,   valproic acid levels within range     # Hypothyroid  cont synthroid.   TSH 1.77    #THrombocytopenia:  chronic--baseline 60-99 pt currently at 62      DVT ppx: venodynes in setting of thrombocytopenia

## 2019-05-04 PROBLEM — L03.90 CELLULITIS, UNSPECIFIED: Chronic | Status: INACTIVE | Noted: 2018-12-27 | Resolved: 2019-05-03

## 2019-05-04 PROBLEM — K57.92 DIVERTICULITIS OF INTESTINE, PART UNSPECIFIED, WITHOUT PERFORATION OR ABSCESS WITHOUT BLEEDING: Chronic | Status: INACTIVE | Noted: 2017-06-03 | Resolved: 2019-05-03

## 2019-05-04 LAB
ANION GAP SERPL CALC-SCNC: 12 MMOL/L — SIGNIFICANT CHANGE UP (ref 5–17)
BASOPHILS # BLD AUTO: 0 K/UL — SIGNIFICANT CHANGE UP (ref 0–0.2)
BASOPHILS NFR BLD AUTO: 0 % — SIGNIFICANT CHANGE UP (ref 0–2)
BUN SERPL-MCNC: 32 MG/DL — HIGH (ref 7–23)
CALCIUM SERPL-MCNC: 9.5 MG/DL — SIGNIFICANT CHANGE UP (ref 8.4–10.5)
CHLORIDE SERPL-SCNC: 94 MMOL/L — LOW (ref 96–108)
CO2 SERPL-SCNC: 33 MMOL/L — HIGH (ref 22–31)
CREAT SERPL-MCNC: 0.82 MG/DL — SIGNIFICANT CHANGE UP (ref 0.5–1.3)
EOSINOPHIL # BLD AUTO: 0.1 K/UL — SIGNIFICANT CHANGE UP (ref 0–0.5)
EOSINOPHIL NFR BLD AUTO: 0.9 % — SIGNIFICANT CHANGE UP (ref 0–6)
GLUCOSE BLDC GLUCOMTR-MCNC: 128 MG/DL — HIGH (ref 70–99)
GLUCOSE SERPL-MCNC: 87 MG/DL — SIGNIFICANT CHANGE UP (ref 70–99)
HCT VFR BLD CALC: 44.4 % — SIGNIFICANT CHANGE UP (ref 39–50)
HGB BLD-MCNC: 15 G/DL — SIGNIFICANT CHANGE UP (ref 13–17)
LYMPHOCYTES # BLD AUTO: 1.5 K/UL — SIGNIFICANT CHANGE UP (ref 1–3.3)
LYMPHOCYTES # BLD AUTO: 22.8 % — SIGNIFICANT CHANGE UP (ref 13–44)
MCHC RBC-ENTMCNC: 31.7 PG — SIGNIFICANT CHANGE UP (ref 27–34)
MCHC RBC-ENTMCNC: 33.8 GM/DL — SIGNIFICANT CHANGE UP (ref 32–36)
MCV RBC AUTO: 94 FL — SIGNIFICANT CHANGE UP (ref 80–100)
MONOCYTES # BLD AUTO: 0.9 K/UL — SIGNIFICANT CHANGE UP (ref 0–0.9)
MONOCYTES NFR BLD AUTO: 14.1 % — HIGH (ref 2–14)
NEUTROPHILS # BLD AUTO: 4 K/UL — SIGNIFICANT CHANGE UP (ref 1.8–7.4)
NEUTROPHILS NFR BLD AUTO: 62.2 % — SIGNIFICANT CHANGE UP (ref 43–77)
PLATELET # BLD AUTO: 86 K/UL — LOW (ref 150–400)
POTASSIUM SERPL-MCNC: 4.2 MMOL/L — SIGNIFICANT CHANGE UP (ref 3.5–5.3)
POTASSIUM SERPL-SCNC: 4.2 MMOL/L — SIGNIFICANT CHANGE UP (ref 3.5–5.3)
RBC # BLD: 4.72 M/UL — SIGNIFICANT CHANGE UP (ref 4.2–5.8)
RBC # FLD: 13.5 % — SIGNIFICANT CHANGE UP (ref 10.3–14.5)
SODIUM SERPL-SCNC: 139 MMOL/L — SIGNIFICANT CHANGE UP (ref 135–145)
WBC # BLD: 6.4 K/UL — SIGNIFICANT CHANGE UP (ref 3.8–10.5)
WBC # FLD AUTO: 6.4 K/UL — SIGNIFICANT CHANGE UP (ref 3.8–10.5)

## 2019-05-04 PROCEDURE — 99233 SBSQ HOSP IP/OBS HIGH 50: CPT

## 2019-05-04 RX ADMIN — Medication 3 MILLILITER(S): at 13:36

## 2019-05-04 RX ADMIN — PANTOPRAZOLE SODIUM 40 MILLIGRAM(S): 20 TABLET, DELAYED RELEASE ORAL at 06:20

## 2019-05-04 RX ADMIN — DIVALPROEX SODIUM 1500 MILLIGRAM(S): 500 TABLET, DELAYED RELEASE ORAL at 21:29

## 2019-05-04 RX ADMIN — Medication 40 MILLIGRAM(S): at 06:20

## 2019-05-04 RX ADMIN — Medication 25 MICROGRAM(S): at 06:20

## 2019-05-04 RX ADMIN — BUDESONIDE AND FORMOTEROL FUMARATE DIHYDRATE 2 PUFF(S): 160; 4.5 AEROSOL RESPIRATORY (INHALATION) at 19:12

## 2019-05-04 RX ADMIN — Medication 3 MILLILITER(S): at 23:22

## 2019-05-04 RX ADMIN — BUDESONIDE AND FORMOTEROL FUMARATE DIHYDRATE 2 PUFF(S): 160; 4.5 AEROSOL RESPIRATORY (INHALATION) at 06:19

## 2019-05-04 RX ADMIN — Medication 3 MILLILITER(S): at 19:12

## 2019-05-04 RX ADMIN — TAMSULOSIN HYDROCHLORIDE 0.4 MILLIGRAM(S): 0.4 CAPSULE ORAL at 21:29

## 2019-05-04 RX ADMIN — Medication 3 MILLILITER(S): at 06:18

## 2019-05-04 NOTE — PROGRESS NOTE ADULT - SUBJECTIVE AND OBJECTIVE BOX
Patient is a 63y old  Male who presents with a chief complaint of transfer from [facility] given positive stress test (03 May 2019 19:09)      SUBJECTIVE / OVERNIGHT EVENTS: Feels better, cough better, no cp, abdominal pain,     MEDICATIONS  (STANDING):  ALBUTerol/ipratropium for Nebulization 3 milliLiter(s) Nebulizer every 6 hours  buDESOnide 160 MICROgram(s)/formoterol 4.5 MICROgram(s) Inhaler 2 Puff(s) Inhalation two times a day  diVALproex ER 1500 milliGRAM(s) Oral at bedtime  furosemide    Tablet 40 milliGRAM(s) Oral daily  levoFLOXacin  Tablet 250 milliGRAM(s) Oral every 24 hours  levothyroxine 25 MICROGram(s) Oral daily  pantoprazole    Tablet 40 milliGRAM(s) Oral before breakfast  predniSONE   Tablet 40 milliGRAM(s) Oral once  predniSONE   Tablet 40 milliGRAM(s) Oral daily  tamsulosin 0.4 milliGRAM(s) Oral at bedtime    MEDICATIONS  (PRN):  ALBUTerol    90 MICROgram(s) HFA Inhaler 2 Puff(s) Inhalation every 6 hours PRN Wheezing  benzonatate 100 milliGRAM(s) Oral three times a day PRN Cough  guaiFENesin    Syrup 100 milliGRAM(s) Oral every 6 hours PRN Cough        CAPILLARY BLOOD GLUCOSE      POCT Blood Glucose.: 128 mg/dL (04 May 2019 08:59)    I&O's Summary    04 May 2019 07:01  -  04 May 2019 17:26  --------------------------------------------------------  IN: 240 mL / OUT: 450 mL / NET: -210 mL        PHYSICAL EXAM:  GENERAL: NAD, well-developed  HEAD:  Atraumatic, Normocephalic  EYES: conjunctiva and sclera clear  NECK: Supple, No JVD  CHEST/LUNG: decreased breath sounds bl  HEART: Regular rate and rhythm; No murmurs, rubs, or gallops  ABDOMEN: Soft, Nontender, Nondistended; Bowel sounds present  EXTREMITIES:  2+ Peripheral Pulses, No clubbing, cyanosis, or edema  PSYCH: AAOx3  NEUROLOGY: non-focal  SKIN: No rashes or lesions    LABS:                        15.0   6.4   )-----------( 86       ( 04 May 2019 07:16 )             44.4     05-04    139  |  94<L>  |  32<H>  ----------------------------<  87  4.2   |  33<H>  |  0.82    Ca    9.5      04 May 2019 07:13                RADIOLOGY & ADDITIONAL TESTS:    Imaging Personally Reviewed:    Consultant(s) Notes Reviewed:      Care Discussed with Consultants/Other Providers:

## 2019-05-05 DIAGNOSIS — Z29.9 ENCOUNTER FOR PROPHYLACTIC MEASURES, UNSPECIFIED: ICD-10-CM

## 2019-05-05 LAB
ANION GAP SERPL CALC-SCNC: 12 MMOL/L — SIGNIFICANT CHANGE UP (ref 5–17)
BUN SERPL-MCNC: 24 MG/DL — HIGH (ref 7–23)
CALCIUM SERPL-MCNC: 9.3 MG/DL — SIGNIFICANT CHANGE UP (ref 8.4–10.5)
CHLORIDE SERPL-SCNC: 95 MMOL/L — LOW (ref 96–108)
CO2 SERPL-SCNC: 31 MMOL/L — SIGNIFICANT CHANGE UP (ref 22–31)
CREAT SERPL-MCNC: 0.7 MG/DL — SIGNIFICANT CHANGE UP (ref 0.5–1.3)
GLUCOSE SERPL-MCNC: 99 MG/DL — SIGNIFICANT CHANGE UP (ref 70–99)
HCT VFR BLD CALC: 46.6 % — SIGNIFICANT CHANGE UP (ref 39–50)
HGB BLD-MCNC: 14.9 G/DL — SIGNIFICANT CHANGE UP (ref 13–17)
MCHC RBC-ENTMCNC: 30 PG — SIGNIFICANT CHANGE UP (ref 27–34)
MCHC RBC-ENTMCNC: 32.1 GM/DL — SIGNIFICANT CHANGE UP (ref 32–36)
MCV RBC AUTO: 93.6 FL — SIGNIFICANT CHANGE UP (ref 80–100)
PLATELET # BLD AUTO: 85 K/UL — LOW (ref 150–400)
POTASSIUM SERPL-MCNC: 4.2 MMOL/L — SIGNIFICANT CHANGE UP (ref 3.5–5.3)
POTASSIUM SERPL-SCNC: 4.2 MMOL/L — SIGNIFICANT CHANGE UP (ref 3.5–5.3)
RBC # BLD: 4.98 M/UL — SIGNIFICANT CHANGE UP (ref 4.2–5.8)
RBC # FLD: 13.1 % — SIGNIFICANT CHANGE UP (ref 10.3–14.5)
SODIUM SERPL-SCNC: 138 MMOL/L — SIGNIFICANT CHANGE UP (ref 135–145)
WBC # BLD: 6.4 K/UL — SIGNIFICANT CHANGE UP (ref 3.8–10.5)
WBC # FLD AUTO: 6.4 K/UL — SIGNIFICANT CHANGE UP (ref 3.8–10.5)

## 2019-05-05 PROCEDURE — 99233 SBSQ HOSP IP/OBS HIGH 50: CPT

## 2019-05-05 RX ADMIN — Medication 3 MILLILITER(S): at 05:05

## 2019-05-05 RX ADMIN — Medication 40 MILLIGRAM(S): at 05:07

## 2019-05-05 RX ADMIN — Medication 3 MILLILITER(S): at 23:02

## 2019-05-05 RX ADMIN — Medication 100 MILLIGRAM(S): at 19:56

## 2019-05-05 RX ADMIN — PANTOPRAZOLE SODIUM 40 MILLIGRAM(S): 20 TABLET, DELAYED RELEASE ORAL at 05:05

## 2019-05-05 RX ADMIN — BUDESONIDE AND FORMOTEROL FUMARATE DIHYDRATE 2 PUFF(S): 160; 4.5 AEROSOL RESPIRATORY (INHALATION) at 16:38

## 2019-05-05 RX ADMIN — TAMSULOSIN HYDROCHLORIDE 0.4 MILLIGRAM(S): 0.4 CAPSULE ORAL at 21:47

## 2019-05-05 RX ADMIN — DIVALPROEX SODIUM 1500 MILLIGRAM(S): 500 TABLET, DELAYED RELEASE ORAL at 21:48

## 2019-05-05 RX ADMIN — Medication 3 MILLILITER(S): at 11:14

## 2019-05-05 RX ADMIN — Medication 3 MILLILITER(S): at 16:38

## 2019-05-05 RX ADMIN — BUDESONIDE AND FORMOTEROL FUMARATE DIHYDRATE 2 PUFF(S): 160; 4.5 AEROSOL RESPIRATORY (INHALATION) at 05:05

## 2019-05-05 RX ADMIN — Medication 25 MICROGRAM(S): at 05:06

## 2019-05-05 RX ADMIN — Medication 40 MILLIGRAM(S): at 05:06

## 2019-05-05 NOTE — PHYSICAL THERAPY INITIAL EVALUATION ADULT - PRECAUTIONS/LIMITATIONS, REHAB EVAL
He also is having a worsening (of a chronic) cough that is purulent. He admits to subjective fevers and chills for the last three days. Denies diarrhea, pain or burning with urination. No recent travel or sick contacts. He went to see his PMD Dr. Galarza on 4/30 who sent him to the ED because he was found to have a fever of 102 and st depressions in the anterior leads on an EKG done in the office. He had a chest CT performed that did not reveal a pneumonia however his RVP was positive for coronavirus. He had negative troponins x 2. He was started on duonebs, antibiotics and steroids for a COPD exacerbation with bacterial bronchitis. He underwent a stress test on 5/2 which was found to have possible rob and inferolateral ischemia. He was transferred for [facility] for LHC. He undewent an LHC today- coronaries clean. He also is having a worsening (of a chronic) cough that is purulent. He admits to subjective fevers and chills for the last three days. Denies diarrhea, pain or burning with urination. No recent travel or sick contacts. He went to see his PMD Dr. Galarza on 4/30 who sent him to the ED because he was found to have a fever of 102 and st depressions in the anterior leads on an EKG done in the office. He had a chest CT performed that did not reveal a pneumonia however his RVP was positive for coronavirus. He had negative troponins x 2. He was started on duonebs, antibiotics and steroids for a COPD exacerbation with bacterial bronchitis. He underwent a stress test on 5/2 which was found to have possible rob and inferolateral ischemia. He was transferred for [facility] for LHC. He undewent an LHC today- coronaries clean.  dmitted for COPD exacerbation 2' sepsis from bacterial bronchitis and coronavirus. fall precautions/He also is having a worsening (of a chronic) cough that is purulent. He admits to subjective fevers and chills for the last three days. Denies diarrhea, pain or burning with urination. No recent travel or sick contacts. He went to see his PMD Dr. Galarza on 4/30 who sent him to the ED because he was found to have a fever of 102 and st depressions in the anterior leads on an EKG done in the office. He had a chest CT performed that did not reveal a pneumonia however his RVP was positive for coronavirus. He had negative troponins x 2. He was started on duonebs, antibiotics and steroids for a COPD exacerbation with bacterial bronchitis. He underwent a stress test on 5/2 which was found to have possible rob and inferolateral ischemia. He was transferred for [facility] for LHC. He undewent an LHC today- coronaries clean.  dmitted for COPD exacerbation 2' sepsis from bacterial bronchitis and coronavirus. fall precautions/He also is having a worsening (of a chronic) cough that is purulent. He admits to subjective fevers and chills for the last three days. Denies diarrhea, pain or burning with urination. No recent travel or sick contacts. He went to see his PMD Dr. Galarza on 4/30 who sent him to the ED because he was found to have a fever of 102 and st depressions in the anterior leads on an EKG done in the office. He had a chest CT performed that did not reveal a pneumonia however his RVP was positive for coronavirus. He had negative troponins x 2. He was started on duonebs, antibiotics and steroids for a COPD exacerbation with bacterial bronchitis. He underwent a stress test on 5/2 which was found to have possible rob and inferolateral ischemia. He was transferred for [facility] for LHC. He undewent an LHC today- coronaries clean.  Admitted for COPD exacerbation 2' sepsis from bacterial bronchitis and coronavirus.

## 2019-05-05 NOTE — PROGRESS NOTE ADULT - PROBLEM SELECTOR PLAN 1
- s/p levaquin 500 mg po once and prednisone 40 mg once in ED  - c/w levaquin 250 mg po daily for a total of 5 days and prednisone 40 mg po daily for a total of 5 days  - c/w duo nebs q6h and symbicort  - saturating well off nasal canula- goal 88-92%

## 2019-05-05 NOTE — PROGRESS NOTE ADULT - SUBJECTIVE AND OBJECTIVE BOX
Patient is a 63y old  Male who presents with a chief complaint of transfer from [facility] given positive stress test (04 May 2019 17:25)      SUBJECTIVE / OVERNIGHT EVENTS: sinus 49-70s on tele, no cp, breathing is better, cough better, no abdominal pain, no chills     MEDICATIONS  (STANDING):  ALBUTerol/ipratropium for Nebulization 3 milliLiter(s) Nebulizer every 6 hours  buDESOnide 160 MICROgram(s)/formoterol 4.5 MICROgram(s) Inhaler 2 Puff(s) Inhalation two times a day  diVALproex ER 1500 milliGRAM(s) Oral at bedtime  furosemide    Tablet 40 milliGRAM(s) Oral daily  levoFLOXacin  Tablet 250 milliGRAM(s) Oral every 24 hours  levothyroxine 25 MICROGram(s) Oral daily  pantoprazole    Tablet 40 milliGRAM(s) Oral before breakfast  predniSONE   Tablet 40 milliGRAM(s) Oral once  predniSONE   Tablet 40 milliGRAM(s) Oral daily  tamsulosin 0.4 milliGRAM(s) Oral at bedtime    MEDICATIONS  (PRN):  ALBUTerol    90 MICROgram(s) HFA Inhaler 2 Puff(s) Inhalation every 6 hours PRN Wheezing  benzonatate 100 milliGRAM(s) Oral three times a day PRN Cough  guaiFENesin    Syrup 100 milliGRAM(s) Oral every 6 hours PRN Cough        CAPILLARY BLOOD GLUCOSE        I&O's Summary    04 May 2019 07:01  -  05 May 2019 07:00  --------------------------------------------------------  IN: 240 mL / OUT: 1950 mL / NET: -1710 mL    05 May 2019 07:01  -  05 May 2019 18:23  --------------------------------------------------------  IN: 480 mL / OUT: 1900 mL / NET: -1420 mL        PHYSICAL EXAM:  GENERAL: NAD, well-developed  HEAD:  Atraumatic, Normocephalic  EYES: conjunctiva and sclera clear  NECK: Supple, No JVD  CHEST/LUNG: decreased breath sounds bl  HEART: Regular rate and rhythm; No murmurs, rubs, or gallops  ABDOMEN: Soft, Nontender, Nondistended; Bowel sounds present  EXTREMITIES:  +1 le edema  PSYCH: AAOx3  NEUROLOGY: non-focal  SKIN: No rashes or lesions    LABS:                        14.9   6.4   )-----------( 85       ( 05 May 2019 06:43 )             46.6     05-05    138  |  95<L>  |  24<H>  ----------------------------<  99  4.2   |  31  |  0.70    Ca    9.3      05 May 2019 06:43                RADIOLOGY & ADDITIONAL TESTS:    Imaging Personally Reviewed:    Consultant(s) Notes Reviewed:      Care Discussed with Consultants/Other Providers:

## 2019-05-05 NOTE — PHYSICAL THERAPY INITIAL EVALUATION ADULT - PERTINENT HX OF CURRENT PROBLEM, REHAB EVAL
Pt is a 64 y/o male PMH of remote sarcoid, rheumatoid arthritis, hypothyroidism, chronic thrombocytopenia, BPH, COPD, seizure disorder, prediabetes ( last A1C 5.5 on 5/1), hx of traumatic bilateral SDH, chronic bilateral lower extremity edema, obstructive sleep apnea non compliant with CPAP presents as a transfer from [facility] for a positive stress test. Patient remarks that for the last 2-3 days he has been feeling very weak.

## 2019-05-06 LAB
ANION GAP SERPL CALC-SCNC: 13 MMOL/L — SIGNIFICANT CHANGE UP (ref 5–17)
BUN SERPL-MCNC: 29 MG/DL — HIGH (ref 7–23)
CALCIUM SERPL-MCNC: 9 MG/DL — SIGNIFICANT CHANGE UP (ref 8.4–10.5)
CHLORIDE SERPL-SCNC: 94 MMOL/L — LOW (ref 96–108)
CO2 SERPL-SCNC: 32 MMOL/L — HIGH (ref 22–31)
CREAT SERPL-MCNC: 0.67 MG/DL — SIGNIFICANT CHANGE UP (ref 0.5–1.3)
CULTURE RESULTS: SIGNIFICANT CHANGE UP
CULTURE RESULTS: SIGNIFICANT CHANGE UP
GLUCOSE SERPL-MCNC: 111 MG/DL — HIGH (ref 70–99)
HCT VFR BLD CALC: 46.2 % — SIGNIFICANT CHANGE UP (ref 39–50)
HGB BLD-MCNC: 14.6 G/DL — SIGNIFICANT CHANGE UP (ref 13–17)
MCHC RBC-ENTMCNC: 29.6 PG — SIGNIFICANT CHANGE UP (ref 27–34)
MCHC RBC-ENTMCNC: 31.7 GM/DL — LOW (ref 32–36)
MCV RBC AUTO: 93.4 FL — SIGNIFICANT CHANGE UP (ref 80–100)
PLATELET # BLD AUTO: 101 K/UL — LOW (ref 150–400)
POTASSIUM SERPL-MCNC: 4 MMOL/L — SIGNIFICANT CHANGE UP (ref 3.5–5.3)
POTASSIUM SERPL-SCNC: 4 MMOL/L — SIGNIFICANT CHANGE UP (ref 3.5–5.3)
RBC # BLD: 4.95 M/UL — SIGNIFICANT CHANGE UP (ref 4.2–5.8)
RBC # FLD: 13.2 % — SIGNIFICANT CHANGE UP (ref 10.3–14.5)
SODIUM SERPL-SCNC: 139 MMOL/L — SIGNIFICANT CHANGE UP (ref 135–145)
SPECIMEN SOURCE: SIGNIFICANT CHANGE UP
SPECIMEN SOURCE: SIGNIFICANT CHANGE UP
WBC # BLD: 8.6 K/UL — SIGNIFICANT CHANGE UP (ref 3.8–10.5)
WBC # FLD AUTO: 8.6 K/UL — SIGNIFICANT CHANGE UP (ref 3.8–10.5)

## 2019-05-06 PROCEDURE — 99233 SBSQ HOSP IP/OBS HIGH 50: CPT

## 2019-05-06 PROCEDURE — 74230 X-RAY XM SWLNG FUNCJ C+: CPT | Mod: 26

## 2019-05-06 RX ORDER — FUROSEMIDE 40 MG
20 TABLET ORAL DAILY
Qty: 0 | Refills: 0 | Status: DISCONTINUED | OUTPATIENT
Start: 2019-05-07 | End: 2019-05-08

## 2019-05-06 RX ORDER — VALPROIC ACID (AS SODIUM SALT) 250 MG/5ML
500 SOLUTION, ORAL ORAL EVERY 8 HOURS
Qty: 0 | Refills: 0 | Status: DISCONTINUED | OUTPATIENT
Start: 2019-05-06 | End: 2019-05-08

## 2019-05-06 RX ORDER — LEVOTHYROXINE SODIUM 125 MCG
12.5 TABLET ORAL AT BEDTIME
Qty: 0 | Refills: 0 | Status: DISCONTINUED | OUTPATIENT
Start: 2019-05-06 | End: 2019-05-08

## 2019-05-06 RX ORDER — SENNA PLUS 8.6 MG/1
2 TABLET ORAL AT BEDTIME
Qty: 0 | Refills: 0 | Status: DISCONTINUED | OUTPATIENT
Start: 2019-05-06 | End: 2019-05-16

## 2019-05-06 RX ORDER — DOCUSATE SODIUM 100 MG
100 CAPSULE ORAL THREE TIMES A DAY
Qty: 0 | Refills: 0 | Status: DISCONTINUED | OUTPATIENT
Start: 2019-05-06 | End: 2019-05-08

## 2019-05-06 RX ORDER — POLYETHYLENE GLYCOL 3350 17 G/17G
17 POWDER, FOR SOLUTION ORAL DAILY
Qty: 0 | Refills: 0 | Status: DISCONTINUED | OUTPATIENT
Start: 2019-05-06 | End: 2019-05-16

## 2019-05-06 RX ADMIN — BUDESONIDE AND FORMOTEROL FUMARATE DIHYDRATE 2 PUFF(S): 160; 4.5 AEROSOL RESPIRATORY (INHALATION) at 05:26

## 2019-05-06 RX ADMIN — Medication 40 MILLIGRAM(S): at 05:25

## 2019-05-06 RX ADMIN — POLYETHYLENE GLYCOL 3350 17 GRAM(S): 17 POWDER, FOR SOLUTION ORAL at 12:17

## 2019-05-06 RX ADMIN — Medication 3 MILLILITER(S): at 23:13

## 2019-05-06 RX ADMIN — Medication 100 MILLIGRAM(S): at 12:17

## 2019-05-06 RX ADMIN — Medication 3 MILLILITER(S): at 05:26

## 2019-05-06 RX ADMIN — Medication 3 MILLILITER(S): at 11:05

## 2019-05-06 RX ADMIN — PANTOPRAZOLE SODIUM 40 MILLIGRAM(S): 20 TABLET, DELAYED RELEASE ORAL at 05:25

## 2019-05-06 RX ADMIN — BUDESONIDE AND FORMOTEROL FUMARATE DIHYDRATE 2 PUFF(S): 160; 4.5 AEROSOL RESPIRATORY (INHALATION) at 16:47

## 2019-05-06 RX ADMIN — Medication 27.5 MILLIGRAM(S): at 21:30

## 2019-05-06 RX ADMIN — Medication 12.5 MICROGRAM(S): at 22:14

## 2019-05-06 RX ADMIN — Medication 25 MICROGRAM(S): at 05:25

## 2019-05-06 RX ADMIN — Medication 3 MILLILITER(S): at 16:47
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
## 2019-05-06 NOTE — SWALLOW BEDSIDE ASSESSMENT ADULT - SWALLOW EVAL: STRUCTURAL ABNORMALITIES
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

## 2019-05-06 NOTE — SWALLOW VFSS/MBS ASSESSMENT ADULT - ORAL PREP COMMENTS
Minimal spillage into the lateral sulcus A majority of oral prep took place off fluoro to reduce exposure

## 2019-05-06 NOTE — SWALLOW VFSS/MBS ASSESSMENT ADULT - MODE OF PRESENTATION
fed by clinician/spoon spoon/fed by clinician tsp and small single sip/self fed/fed by clinician/cup/spoon

## 2019-05-06 NOTE — SWALLOW VFSS/MBS ASSESSMENT ADULT - ORAL PHASE
Delayed oral transit time/Reduced anterior - posterior transport/Incomplete tongue to palate contact/Uncontrolled bolus / spillover in rand-pharynx/Residue in oral cavity/Uncontrolled bolus / spillover in hypopharynx Delayed oral transit time/Reduced anterior - posterior transport/Incomplete tongue to palate contact/Laryngeal penetration before swallow - silent/Residue in oral cavity/Uncontrolled bolus / spillover in rand-pharynx/Uncontrolled bolus / spillover in hypopharynx Delayed oral transit time/Residue in oral cavity/Reduced anterior - posterior transport/Incomplete tongue to palate contact Incomplete tongue to palate contact/Uncontrolled bolus / spillover in rand-pharynx/Reduced anterior - posterior transport/Delayed oral transit time/Residue in oral cavity Residue in oral cavity/Uncontrolled bolus / spillover in hypopharynx/Reduced anterior - posterior transport/Uncontrolled bolus / spillover in rand-pharynx/Delayed oral transit time/Incomplete tongue to palate contact Residue in oral cavity/Delayed oral transit time/Reduced anterior - posterior transport/Incomplete tongue to palate contact

## 2019-05-06 NOTE — SWALLOW BEDSIDE ASSESSMENT ADULT - PHARYNGEAL PHASE
evidence suggestive of pharyngeal residue, noted delayed and inconsistent post prandial cough, but also had baseline cough, so difficult to definitively rule in for aspiration./Delayed pharyngeal swallow

## 2019-05-06 NOTE — PROGRESS NOTE ADULT - SUBJECTIVE AND OBJECTIVE BOX
Patient is a 63y old  Male who presents with a chief complaint of transfer from [facility] given positive stress test (05 May 2019 18:23)        SUBJECTIVE / OVERNIGHT EVENTS: no acute complaints. reviewing history patient appears to have been on a dysphagia diet in the past        MEDICATIONS  (STANDING):  ALBUTerol/ipratropium for Nebulization 3 milliLiter(s) Nebulizer every 6 hours  buDESOnide 160 MICROgram(s)/formoterol 4.5 MICROgram(s) Inhaler 2 Puff(s) Inhalation two times a day  diVALproex ER 1500 milliGRAM(s) Oral at bedtime  docusate sodium 100 milliGRAM(s) Oral three times a day  furosemide    Tablet 40 milliGRAM(s) Oral daily  levoFLOXacin  Tablet 250 milliGRAM(s) Oral every 24 hours  levothyroxine 25 MICROGram(s) Oral daily  pantoprazole    Tablet 40 milliGRAM(s) Oral before breakfast  polyethylene glycol 3350 17 Gram(s) Oral daily  predniSONE   Tablet 40 milliGRAM(s) Oral once  tamsulosin 0.4 milliGRAM(s) Oral at bedtime    MEDICATIONS  (PRN):  benzonatate 100 milliGRAM(s) Oral three times a day PRN Cough  guaiFENesin    Syrup 100 milliGRAM(s) Oral every 6 hours PRN Cough  senna 2 Tablet(s) Oral at bedtime PRN Constipation      Vital Signs Last 24 Hrs  T(C): 36.6 (06 May 2019 11:34), Max: 36.6 (06 May 2019 11:34)  T(F): 97.8 (06 May 2019 11:34), Max: 97.8 (06 May 2019 11:34)  HR: 73 (06 May 2019 11:34) (59 - 73)  BP: 110/66 (06 May 2019 11:34) (99/64 - 110/66)  BP(mean): --  RR: 18 (06 May 2019 11:34) (18 - 18)  SpO2: 94% (06 May 2019 11:34) (90% - 95%)  CAPILLARY BLOOD GLUCOSE        I&O's Summary    05 May 2019 07:01  -  06 May 2019 07:00  --------------------------------------------------------  IN: 1080 mL / OUT: 2200 mL / NET: -1120 mL    06 May 2019 07:01  -  06 May 2019 16:28  --------------------------------------------------------  IN: 420 mL / OUT: 900 mL / NET: -480 mL        PHYSICAL EXAM:  GENERAL: NAD  HEAD:  Atraumatic, Normocephalic  EYES: conjunctiva and sclera clear  NECK: No JVD  CHEST/LUNG: CTA b/l  HEART: S1 S2 RRR  ABDOMEN: +BS Soft, NT/ND  EXTREMITIES:  2+ DP Pulses, No c/c/e  NEUROLOGY: AAOx3, no focal deficits   SKIN: No rashes or lesions    LABS:                        14.6   8.6   )-----------( 101      ( 06 May 2019 07:33 )             46.2     05-06    139  |  94<L>  |  29<H>  ----------------------------<  111<H>  4.0   |  32<H>  |  0.67    Ca    9.0      06 May 2019 07:33                RADIOLOGY & ADDITIONAL TESTS:    Imaging Personally Reviewed: CT chest reviewed - upper lobe scarring and bronchiectasis  Consultant(s) Notes Reviewed:    Care Discussed with Consultants/Other Providers: d/w swallow therapist - will schedule MBS Patient is a 63y old  Male who presents with a chief complaint of transfer from [facility] given positive stress test (05 May 2019 18:23)        SUBJECTIVE / OVERNIGHT EVENTS: no acute complaints. reviewing history patient appears to have been on a dysphagia diet in the past        MEDICATIONS  (STANDING):  ALBUTerol/ipratropium for Nebulization 3 milliLiter(s) Nebulizer every 6 hours  buDESOnide 160 MICROgram(s)/formoterol 4.5 MICROgram(s) Inhaler 2 Puff(s) Inhalation two times a day  diVALproex ER 1500 milliGRAM(s) Oral at bedtime  docusate sodium 100 milliGRAM(s) Oral three times a day  furosemide    Tablet 40 milliGRAM(s) Oral daily  levoFLOXacin  Tablet 250 milliGRAM(s) Oral every 24 hours  levothyroxine 25 MICROGram(s) Oral daily  pantoprazole    Tablet 40 milliGRAM(s) Oral before breakfast  polyethylene glycol 3350 17 Gram(s) Oral daily  predniSONE   Tablet 40 milliGRAM(s) Oral once  tamsulosin 0.4 milliGRAM(s) Oral at bedtime    MEDICATIONS  (PRN):  benzonatate 100 milliGRAM(s) Oral three times a day PRN Cough  guaiFENesin    Syrup 100 milliGRAM(s) Oral every 6 hours PRN Cough  senna 2 Tablet(s) Oral at bedtime PRN Constipation      Vital Signs Last 24 Hrs  T(C): 36.6 (06 May 2019 11:34), Max: 36.6 (06 May 2019 11:34)  T(F): 97.8 (06 May 2019 11:34), Max: 97.8 (06 May 2019 11:34)  HR: 73 (06 May 2019 11:34) (59 - 73)  BP: 110/66 (06 May 2019 11:34) (99/64 - 110/66)  BP(mean): --  RR: 18 (06 May 2019 11:34) (18 - 18)  SpO2: 94% (06 May 2019 11:34) (90% - 95%)  CAPILLARY BLOOD GLUCOSE        I&O's Summary    05 May 2019 07:01  -  06 May 2019 07:00  --------------------------------------------------------  IN: 1080 mL / OUT: 2200 mL / NET: -1120 mL    06 May 2019 07:01  -  06 May 2019 16:28  --------------------------------------------------------  IN: 420 mL / OUT: 900 mL / NET: -480 mL        PHYSICAL EXAM:  GENERAL: NAD  HEAD:  Atraumatic, Normocephalic  EYES: conjunctiva and sclera clear  NECK: No JVD  CHEST/LUNG: CTA b/l  HEART: S1 S2 RRR  ABDOMEN: +BS Soft, NT/ND  EXTREMITIES:  2+ DP Pulses, No c/c 1+ b/l LE edema  NEUROLOGY: AAOx3, no focal deficits   SKIN: No rashes or lesions    LABS:                        14.6   8.6   )-----------( 101      ( 06 May 2019 07:33 )             46.2     05-06    139  |  94<L>  |  29<H>  ----------------------------<  111<H>  4.0   |  32<H>  |  0.67    Ca    9.0      06 May 2019 07:33                RADIOLOGY & ADDITIONAL TESTS:    Imaging Personally Reviewed: CT chest reviewed - upper lobe scarring and bronchiectasis  Consultant(s) Notes Reviewed:    Care Discussed with Consultants/Other Providers: d/w swallow therapist - will schedule MBS

## 2019-05-06 NOTE — PROGRESS NOTE ADULT - PROBLEM SELECTOR PLAN 1
c/w levaquin 250mg qd and prednisone 40 mg daily - received tx in [place]  will complete treatment tomorrow.   - c/w duo nebs q6h and symbicort  - saturating well off nasal canula- goal 88-92%  report COPD with history of smoking though reviewing CT chest concern for recurrent aspiration pna - will check swallow eval with MBS c/w levaquin 250mg qd and prednisone 40 mg daily - received tx in [place]  will complete treatment tomorrow.   - c/w duo nebs q6h and symbicort  - saturating well off nasal canula- goal 88-92%  reported COPD with history of smoking though reviewing CT chest concern for recurrent aspiration pna - will check swallow eval with MBS

## 2019-05-06 NOTE — SWALLOW VFSS/MBS ASSESSMENT ADULT - DIAGNOSTIC IMPRESSIONS
Patient presents with rand-pharyngeal dysphagia with impaired oral management, delay in trigger of the swallow reflex, post swallow oral and pharyngeal residue, and impaired airway protection with silent laryngeal penetration/aspiration of the most conservative p.o. textures. Patient presents with rand-pharyngo-cervical esophageal dysphagia with impaired oral management, delay in trigger of the swallow reflex, post swallow oral and pharyngeal > cervical esophageal residue, and impaired airway protection with silent laryngeal penetration/aspiration of the most conservative p.o. textures.

## 2019-05-06 NOTE — SWALLOW BEDSIDE ASSESSMENT ADULT - SWALLOW EVAL: DIAGNOSIS
Pt with hx of dysphagia (was reportedly on a thick liquid, that pt self d/c'ed). Adm with respiratory issues and CT scan showed chronic b/l scarring and bronchiectasis. Exam was remarkable for rand-pharyngeal dysphagia with impaired oral management and s/s suggestive of post swallow residue. Pt with baseline cough that makes behavioral analysis of swallow function difficult to a degree. Would suggest objective testing to comprehensively assess swallow function given hx and CT findings.

## 2019-05-06 NOTE — SWALLOW BEDSIDE ASSESSMENT ADULT - ORAL PHASE
Delayed oral transit time/Stasis in anterior sulcus/Stasis in lateral sulci/Lingual stasis/Oral transit time noted to be >40  seconds for solids, required multiple swallows to clear, JONH for thin liquids 2-4 sec/Palatal stasis/Decreased anterior-posterior movement of the bolus

## 2019-05-06 NOTE — SWALLOW VFSS/MBS ASSESSMENT ADULT - ADDITIONAL INFORMATION
There was evidence suggestive of mandibular surgery (with small region of anterior hardware and missing mandibular condyles.  There was evidence suggestive of glossoptosis; however, the length of the BOT appeared to be elongated and this was noted to possibly displace the hyoid. Reviewed this with the radiology resident. He reviewed prior CT neck imaging from 2012. When the resident compared imaging, he stated that today's findings were consistent with 2012 CT. He indicated that BOT was large with pronounced adenoid tissue and missing mandibular condyles.   There was calcification of the thyroid cartilage region. The tip of the epiglottis was in close contact to the posterior pharyngeal wall and there were small anterior osteophytes at C5. There was calcification of the thyroid cartilage region. The tip of the epiglottis was in close contact to the posterior pharyngeal wall and there were small anterior osteophytes at C5.   There was evidence suggestive of mandibular surgery (with small region of anterior hardware and missing mandibular condyles), noted retrognathia with dental malocclusion.  Also noted evidence suggestive of glossoptosis; however, the length of the BOT appeared to be elongated and this was noted to possibly displace the hyoid. The anterior aspect of the hyoid was noted to be more posteriorly positioned as compared to the norm. This service conferred with the radiology resident re: H&N findings. He reviewed prior CT neck imaging from 2012. When the resident compared imaging, he stated that today's findings were consistent with 2012 CT. He indicated that BOT was large with pronounced adenoid tissue and missing mandibular condyles.     Disorders: dental malocclusion, reduced buccal tension, reduced lingual strength/ROM/Rate of motion, reduced BOT to posterior pharyngeal wall contact with a narrow column of contrast/air between structures, delay in trigger of the swallow reflex with low trigger points, markedly reduced anterior hyoid excursion, reduced laryngeal elevation, incomplete laryngeal vestibule closure, evidence of reduced velar to posterior pharyngeal wall contact, reduced supraglottic sensation, reduced subglottic sensation, evidence suggestive of a fatigue factor with impaired ability to generate successive swallows to aid in clearance of the bolus Etiology of cervical esophageal dysphagia is unclear can consider GI consult for assessment.       Strategies There was calcification of the thyroid cartilage region. The tip of the epiglottis was in close contact to the posterior pharyngeal wall and there were small anterior osteophytes at C5.   There was evidence suggestive of mandibular surgery (with small region of anterior hardware and missing mandibular condyles), noted retrognathia with dental malocclusion.  Also noted evidence suggestive of glossoptosis; however, the length of the BOT appeared to be elongated and this was noted to possibly displace the hyoid inferiorly. The anterior aspect of the hyoid was also noted to be more posteriorly positioned as compared to the norm. This service conferred with the radiology resident re: H&N findings. He reviewed prior CT neck imaging from 2012. When the resident compared imaging, he stated that today's findings were consistent with 2012 CT. He indicated that BOT was large with pronounced adenoid tissue and missing mandibular condyles.     Disorders: dental malocclusion, reduced buccal tension, reduced lingual strength/ROM/Rate of motion, reduced BOT to posterior pharyngeal wall contact with a narrow column of contrast/air between structures, delay in trigger of the swallow reflex with low trigger points, markedly reduced anterior hyoid excursion, reduced laryngeal elevation, incomplete laryngeal vestibule closure, evidence of reduced velar to posterior pharyngeal wall contact, reduced supraglottic sensation, reduced subglottic sensation, evidence suggestive of a fatigue factor with impaired ability to generate successive swallows to aid in clearance of the bolus Etiology of cervical esophageal dysphagia is unclear can consider GI consult for assessment.       Strategies: intake of controlled amounts (level tsp), slow rate of feeding allowing time for pt to generate successive swallows, texture alternation , cued effortful swallows, chin tuck, head rotation did not clear residue or improve a/w protection and the patient was noted to fatigue with reported increased difficulty There was calcification of the thyroid cartilage region. The tip of the epiglottis was in close contact to the posterior pharyngeal wall and there were small anterior osteophytes at C5.   There was evidence suggestive of mandibular surgery (with small region of anterior hardware and missing mandibular condyles), noted retrognathia with dental malocclusion.  Also noted evidence suggestive of glossoptosis; however, the length of the BOT appeared to be elongated and this was noted to possibly displace the hyoid inferiorly. The anterior aspect of the hyoid was also noted to be more posteriorly positioned as compared to the norm. This service conferred with the radiology resident re: H&N findings. He reviewed prior CT neck imaging from 2012. When the resident compared imaging, he stated that today's findings were consistent with 2012 CT. He indicated that BOT was large with pronounced adenoid tissue and missing mandibular condyles.     Disorders: dental malocclusion, reduced buccal tension, reduced lingual strength/ROM/Rate of motion, reduced BOT to posterior pharyngeal wall contact with a narrow column of contrast/air between structures, delay in trigger of the swallow reflex with low trigger points, markedly reduced anterior hyoid excursion, reduced laryngeal elevation, incomplete laryngeal vestibule closure, evidence of reduced velar to posterior pharyngeal wall contact, reduced supraglottic sensation, reduced subglottic sensation, evidence suggestive of a fatigue factor with impaired ability to generate successive swallows to aid in clearance of the bolus Etiology of cervical esophageal dysphagia is unclear can consider GI consult for assessment.   Strategies: intake of controlled amounts (level tsp), slow rate of feeding allowing time for pt to generate successive swallows, texture alternation ,cued effortful swallows, chin tuck, head rotation did not clear residue or improve a/w protection and the patient was noted to fatigue with repeat swallows and mild SANDOVAL noted There was calcification of the thyroid cartilage region. The tip of the epiglottis was in close contact to the posterior pharyngeal wall and there were small anterior osteophytes at C5.   There was evidence suggestive of mandibular surgery (with small region of anterior hardware and missing mandibular condyles), retrognathia with dental malocclusion, evidence suggestive of glossoptosis; however, the length of the BOT appeared to be elongated and this was noted to possibly displace the hyoid inferiorly. The anterior aspect of the hyoid was also noted to be more posteriorly positioned as compared to the norm. This service conferred with radiology resident re: H&N findings. He reviewed prior CT neck imaging from 2012. When the resident compared imaging, he stated that today's findings were consistent with 2012 CT. He indicated that BOT was large with pronounced adenoid tissue and missing mandibular condyles.     Disorders: dental malocclusion, reduced buccal tension, reduced lingual strength/ROM/Rate of motion, reduced BOT to posterior pharyngeal wall contact with a narrow column of contrast/air between structures, delay in trigger of the swallow reflex with low trigger points, markedly reduced anterior hyoid excursion (displaced posterior), reduced laryngeal elevation, incomplete laryngeal vestibule closure, evidence of reduced velar to posterior pharyngeal wall contact, reduced supraglottic sensation, reduced subglottic sensation, evidence suggestive of a fatigue factor with impaired ability to generate successive swallows to aid in clearance of the bolus Etiology of cervical esophageal dysphagia is unclear can consider GI consult for assessment.   Strategies: intake of controlled amounts (level tsp), slow rate of feeding allowing time for pt to generate successive swallows, texture alternation ,cued effortful swallows, chin tuck, head rotation did not clear residue or improve a/w protection and the patient was noted to fatigue with repeat swallows and mild SANDOVAL noted

## 2019-05-06 NOTE — CHART NOTE - NSCHARTNOTEFT_GEN_A_CORE
Patient s/p MBS today " swallow oral and pharyngeal residue, and impaired airway protection with silent laryngeal penetration/aspiration of the most conservative p.o. textures" . results discussed with patient and sister via phone- option of NGT  vs assuming the risk of aspiration including death. States he would like to discuss results with his family and get back to team with a decision. Will place the patient NPO for now. Medicine to follow up.

## 2019-05-06 NOTE — SWALLOW VFSS/MBS ASSESSMENT ADULT - ORAL PHASE COMMENTS
Oral transit time noted to be 1.9 seconds for the primary swallow. Moderate spillover to the level of the valleculae. Minimal diffuse oral residue with spillage to the oropharynx and along the a-e folds into the pyriform sinuses. A delayed secondary swallow was elicited. Total oral transit time was >7 sec Noted: Oral transit time noted to be 3.1 seconds for the primary swallow. Moderate spillover to the level of the valleculae and mid to moderate spillage to the pyriform sinuses. Minimal to moderate diffuse oral residue with spillage to the oropharynx and along the a-e folds into the pyriform sinuses and along the laryngeal surface of the epiglottis (minimal silent laryngeal penetration and subsequent silent aspiration)  A delayed secondary swallow was elicited. Total oral transit time was >12 sec Noted: Oral transit time noted to be 1.5 seconds for the primary swallow. Moderate spillover to the level of the valleculae and mid to moderate spillage to the pyriform sinuses. Minimal to moderate diffuse oral residue with spillage to the oropharynx and along the a-e folds into the pyriform sinuses.  A delayed secondary swallow was elicited. Total oral transit time was 6.3  sec. Oral transit time noted to be 1.7  seconds. Minimal spillage along the BOT. Mild diffuse residue was noted in the observed portion of the oral cavity. Oral transit time noted to be 1.7  seconds. Minimal spillage along the BOT. Mild diffuse residue was noted in the observed portion of the oral cavity on tsp. On single sip, there was maximal/diffuse residue. On single sip, there was maximal spillover to the level of the pyriform sinuses There was mild to moderate residue in the observed portion of the oral cavity Noted: Oral transit time noted to be 4 seconds for the primary swallow. Moderate spillover to the level of the valleculae and mid to moderate spillage to the pyriform sinuses. Minimal to moderate diffuse oral residue with spillage to the oropharynx and along the a-e folds into the pyriform sinuses.  A delayed secondary swallow was elicited. Total oral transit time was up to 12 sec.

## 2019-05-06 NOTE — PROGRESS NOTE ADULT - ATTENDING COMMENTS
d/c planning to [facility] once insurance auth  Dr. M. Luke  [facility] Hospitalist  [phone]

## 2019-05-06 NOTE — SWALLOW VFSS/MBS ASSESSMENT ADULT - THE ABOVE FINDINGS WERE DISCUSSED WITH
discussed at length with pt, paged Dr. Anderson to discuss (awaiting return call), d/w medicine NP [id number] discussed at length with pt, paged Dr. Anderson to discuss (awaiting return call), d/w medicine NP [id number], d/w RN Jasmin

## 2019-05-07 DIAGNOSIS — T17.908A UNSPECIFIED FOREIGN BODY IN RESPIRATORY TRACT, PART UNSPECIFIED CAUSING OTHER INJURY, INITIAL ENCOUNTER: ICD-10-CM

## 2019-05-07 PROCEDURE — 99223 1ST HOSP IP/OBS HIGH 75: CPT | Mod: GC

## 2019-05-07 PROCEDURE — 31575 DIAGNOSTIC LARYNGOSCOPY: CPT

## 2019-05-07 PROCEDURE — 99222 1ST HOSP IP/OBS MODERATE 55: CPT | Mod: 25

## 2019-05-07 PROCEDURE — 99233 SBSQ HOSP IP/OBS HIGH 50: CPT

## 2019-05-07 RX ORDER — SODIUM CHLORIDE 9 MG/ML
1000 INJECTION, SOLUTION INTRAVENOUS
Qty: 0 | Refills: 0 | Status: DISCONTINUED | OUTPATIENT
Start: 2019-05-07 | End: 2019-05-07

## 2019-05-07 RX ORDER — LACTOBACILLUS ACIDOPHILUS 100MM CELL
1 CAPSULE ORAL DAILY
Qty: 0 | Refills: 0 | Status: DISCONTINUED | OUTPATIENT
Start: 2019-05-07 | End: 2019-05-16

## 2019-05-07 RX ADMIN — BUDESONIDE AND FORMOTEROL FUMARATE DIHYDRATE 2 PUFF(S): 160; 4.5 AEROSOL RESPIRATORY (INHALATION) at 05:28

## 2019-05-07 RX ADMIN — Medication 27.5 MILLIGRAM(S): at 14:49

## 2019-05-07 RX ADMIN — Medication 3 MILLILITER(S): at 18:23

## 2019-05-07 RX ADMIN — Medication 27.5 MILLIGRAM(S): at 05:27

## 2019-05-07 RX ADMIN — Medication 100 MILLIGRAM(S): at 14:45

## 2019-05-07 RX ADMIN — Medication 3 MILLILITER(S): at 05:27

## 2019-05-07 RX ADMIN — Medication 20 MILLIGRAM(S): at 05:27

## 2019-05-07 RX ADMIN — Medication 3 MILLILITER(S): at 23:00

## 2019-05-07 RX ADMIN — Medication 27.5 MILLIGRAM(S): at 22:10

## 2019-05-07 RX ADMIN — Medication 1 TABLET(S): at 18:24

## 2019-05-07 RX ADMIN — Medication 12.5 MICROGRAM(S): at 22:10

## 2019-05-07 RX ADMIN — Medication 100 MILLIGRAM(S): at 21:43

## 2019-05-07 RX ADMIN — TAMSULOSIN HYDROCHLORIDE 0.4 MILLIGRAM(S): 0.4 CAPSULE ORAL at 21:43

## 2019-05-07 RX ADMIN — BUDESONIDE AND FORMOTEROL FUMARATE DIHYDRATE 2 PUFF(S): 160; 4.5 AEROSOL RESPIRATORY (INHALATION) at 18:25

## 2019-05-07 RX ADMIN — POLYETHYLENE GLYCOL 3350 17 GRAM(S): 17 POWDER, FOR SOLUTION ORAL at 14:45

## 2019-05-07 NOTE — CONSULT NOTE ADULT - SUBJECTIVE AND OBJECTIVE BOX
CC: Silent aspiration seen on MBS    HPI: Patient is a 62 yo  male with PMH of remote sarcoid, rheumatoid arthritis, hypothyroidism, chronic thrombocytopenia, BPH, COPD, seizure disorder, prediabetes ( last A1C 5.5 on 5/1), hx of traumatic bilateral SDH, chronic bilateral lower extremity edema, obstructive sleep apnea non compliant with CPAP presents as a transfer from [facility] for a positive stress test. Patient remarks that for the last 2-3 days he has been feeling very weak. He also is having a worsening (of a chronic) cough that is purulent. He admits to subjective fevers and chills for the last three days. Denies diarrhea, pain or burning with urination. No recent travel or sick contacts. He went to see his PMD Dr. Galarza on 4/30 who sent him to the ED because he was found to have a fever of 102 and st depressions in the anterior leads on an EKG done in the office. He had a chest CT performed that did not reveal a pneumonia however his RVP was positive for coronavirus. He had negative troponins x 2. He was started on duonebs, antibiotics and steroids for a COPD exacerbation with bacterial bronchitis. He underwent a stress test on 5/2 which was found to have possible rob and inferolateral ischemia. He was transferred for [facility] for cardiac cath which revealed normal coronaries. Pt. was evaluated by speech and swallow , underwent an MBS which revealed silent aspiration on all textures. ENT was called to evaluate vocal cord function.         PAST MEDICAL & SURGICAL HISTORY:  BPH without urinary obstruction  LAMBERT and COPD overlap syndrome  Sleep apnea  Hypothyroid  Seizure  Inguinal hernia  Subdural hematoma  Bronchiectasis  Sarcoid  DM (diabetes mellitus)  Status post inguinal hernia repair  Status post cholecystectomy  Bilateral subdural hematomas  Status post Bright's procedure    Allergies    Keppra (Other (Severe))  penicillin (Angioedema)  penicillins (Unknown)    Intolerances    benzodiazepines (Other)    MEDICATIONS  (STANDING):  ALBUTerol/ipratropium for Nebulization 3 milliLiter(s) Nebulizer every 6 hours  buDESOnide 160 MICROgram(s)/formoterol 4.5 MICROgram(s) Inhaler 2 Puff(s) Inhalation two times a day  dextrose 5% + sodium chloride 0.45%. 1000 milliLiter(s) (55 mL/Hr) IV Continuous <Continuous>  docusate sodium 100 milliGRAM(s) Oral three times a day  furosemide   Injectable 20 milliGRAM(s) IV Push daily  lactobacillus acidophilus 1 Tablet(s) Oral daily  levoFLOXacin IVPB 250 milliGRAM(s) IV Intermittent every 24 hours  levothyroxine Injectable 12.5 MICROGram(s) IV Push at bedtime  polyethylene glycol 3350 17 Gram(s) Oral daily  predniSONE   Tablet 40 milliGRAM(s) Oral once  tamsulosin 0.4 milliGRAM(s) Oral at bedtime  valproate sodium IVPB 500 milliGRAM(s) IV Intermittent every 8 hours    MEDICATIONS  (PRN):  benzonatate 100 milliGRAM(s) Oral three times a day PRN Cough  guaiFENesin    Syrup 100 milliGRAM(s) Oral every 6 hours PRN Cough  senna 2 Tablet(s) Oral at bedtime PRN Constipation      Social History: no previous hx of ETOH, drug or smoking hx    Family history: positive family hx of diabetes    ROS:   ENT: all negative except as noted in HPI   CV: denies palpitations  Pulm: denies SOB, cough, hemoptysis  GI: denies change in apetite, indigestion, n/v  : denies pertinent urinary symptoms, urgency  Neuro: denies numbness/tingling, loss of sensation  Psych: denies anxiety  MS: denies muscle weakness, instability  Heme: denies easy bruising or bleeding  Endo: denies heat/cold intolerance, excessive sweating  Vascular: denies LE edema    Vital Signs Last 24 Hrs  T(C): 36.6 (07 May 2019 12:20), Max: 36.6 (06 May 2019 21:49)  T(F): 97.9 (07 May 2019 12:20), Max: 97.9 (07 May 2019 12:20)  HR: 57 (07 May 2019 12:20) (57 - 67)  BP: 96/64 (07 May 2019 12:20) (91/63 - 114/72)  BP(mean): --  RR: 18 (07 May 2019 12:20) (18 - 18)  SpO2: 92% (07 May 2019 12:20) (92% - 95%)                          14.6   8.6   )-----------( 101      ( 06 May 2019 07:33 )             46.2    05-06    139  |  94<L>  |  29<H>  ----------------------------<  111<H>  4.0   |  32<H>  |  0.67    Ca    9.0      06 May 2019 07:33         PHYSICAL EXAM:  Gen: NAD  Skin: No rashes, bruises, or lesions  Head: Normocephalic, Atraumatic  Face: no edema, erythema, or fluctuance. Parotid glands soft without mass  Eyes: no scleral injection  Ears: Right - ear canal clear, TM intact without effusion or erythema. No evidence of any fluid drainage. No mastoid tenderness, erythema, or ear bulging            Left - ear canal clear, TM intact without effusion or erythema. No evidence of any fluid drainage. No mastoid tenderness, erythema, or ear bulging  Nose: Nares bilaterally patent, no discharge  Mouth: No Stridor / Drooling / Trismus.  Mucosa moist, tongue/uvula midline, oropharynx clear  Neck: Flat, supple, no lymphadenopathy, trachea midline, no masses  Lymphatic: No lymphadenopathy  Resp: breathing easily, no stridor  CV: no peripheral edema/cyanosis  GI: nondistended   Peripheral vascular: no JVD or edema  Neuro: facial nerve intact, no facial droop        Diagnostic Nasal Endoscopy: (Scope #2 used)    Fiberoptic Indirect laryngoscopy:  (Scope #2 used)        IMAGING/ADDITIONAL STUDIES: CC: Silent aspiration seen on MBS    HPI: Patient is a 64 yo  male with PMH of remote sarcoid, rheumatoid arthritis, hypothyroidism, chronic thrombocytopenia, BPH, COPD, seizure disorder, prediabetes ( last A1C 5.5 on 5/1), hx of traumatic bilateral SDH, chronic bilateral lower extremity edema, obstructive sleep apnea non compliant with CPAP presents as a transfer from [facility] for a positive stress test. Patient remarks that for the last 2-3 days he has been feeling very weak. He also is having a worsening (of a chronic) cough that is purulent. He admits to subjective fevers and chills for the last three days. Denies diarrhea, pain or burning with urination. No recent travel or sick contacts. He went to see his PMD Dr. Galarza on 4/30 who sent him to the ED because he was found to have a fever of 102 and st depressions in the anterior leads on an EKG done in the office. He had a chest CT performed that did not reveal a pneumonia however his RVP was positive for coronavirus. He had negative troponins x 2. He was started on duonebs, antibiotics and steroids for a COPD exacerbation with bacterial bronchitis. He underwent a stress test on 5/2 which was found to have possible rob and inferolateral ischemia. He was transferred for [facility] for cardiac cath which revealed normal coronaries. Pt. was evaluated by speech and swallow , underwent an MBS which revealed silent aspiration on all textures. ENT was called to evaluate vocal cord function.  Pts. family was at bedside and very involved with his care. they explained to us that he has dysphagia now for many months. They described his appearance as "his breathing competes with his eating". Pt. chokes , coughs and complains of SOB with solids and liquids.  Pts. family also describes multiple nasal surgeries (sinus surgery) and maxillary surgery in the past.        PAST MEDICAL & SURGICAL HISTORY:  BPH without urinary obstruction  LAMBERT and COPD overlap syndrome  Sleep apnea  Hypothyroid  Seizure  Inguinal hernia  Subdural hematoma  Bronchiectasis  Sarcoid  DM (diabetes mellitus)  Status post inguinal hernia repair  Status post cholecystectomy  Bilateral subdural hematomas  Status post Bright's procedure    Allergies    Keppra (Other (Severe))  penicillin (Angioedema)  penicillins (Unknown)    Intolerances    benzodiazepines (Other)    MEDICATIONS  (STANDING):  ALBUTerol/ipratropium for Nebulization 3 milliLiter(s) Nebulizer every 6 hours  buDESOnide 160 MICROgram(s)/formoterol 4.5 MICROgram(s) Inhaler 2 Puff(s) Inhalation two times a day  dextrose 5% + sodium chloride 0.45%. 1000 milliLiter(s) (55 mL/Hr) IV Continuous <Continuous>  docusate sodium 100 milliGRAM(s) Oral three times a day  furosemide   Injectable 20 milliGRAM(s) IV Push daily  lactobacillus acidophilus 1 Tablet(s) Oral daily  levoFLOXacin IVPB 250 milliGRAM(s) IV Intermittent every 24 hours  levothyroxine Injectable 12.5 MICROGram(s) IV Push at bedtime  polyethylene glycol 3350 17 Gram(s) Oral daily  predniSONE   Tablet 40 milliGRAM(s) Oral once  tamsulosin 0.4 milliGRAM(s) Oral at bedtime  valproate sodium IVPB 500 milliGRAM(s) IV Intermittent every 8 hours    MEDICATIONS  (PRN):  benzonatate 100 milliGRAM(s) Oral three times a day PRN Cough  guaiFENesin    Syrup 100 milliGRAM(s) Oral every 6 hours PRN Cough  senna 2 Tablet(s) Oral at bedtime PRN Constipation      Social History: no previous hx of ETOH, drug or smoking hx    Family history: positive family hx of diabetes    ROS:   ENT: all negative except as noted in HPI   CV: denies palpitations  Pulm: denies SOB, cough, hemoptysis  GI: denies change in apetite, indigestion, n/v  : denies pertinent urinary symptoms, urgency  Neuro: denies numbness/tingling, loss of sensation  Psych: denies anxiety  MS: denies muscle weakness, instability  Heme: denies easy bruising or bleeding  Endo: denies heat/cold intolerance, excessive sweating  Vascular: denies LE edema    Vital Signs Last 24 Hrs  T(C): 36.6 (07 May 2019 12:20), Max: 36.6 (06 May 2019 21:49)  T(F): 97.9 (07 May 2019 12:20), Max: 97.9 (07 May 2019 12:20)  HR: 57 (07 May 2019 12:20) (57 - 67)  BP: 96/64 (07 May 2019 12:20) (91/63 - 114/72)  BP(mean): --  RR: 18 (07 May 2019 12:20) (18 - 18)  SpO2: 92% (07 May 2019 12:20) (92% - 95%)                          14.6   8.6   )-----------( 101      ( 06 May 2019 07:33 )             46.2    05-06    139  |  94<L>  |  29<H>  ----------------------------<  111<H>  4.0   |  32<H>  |  0.67    Ca    9.0      06 May 2019 07:33         PHYSICAL EXAM:  Gen: NAD  Skin: No rashes, bruises, or lesions  Head: Normocephalic, Atraumatic  Face: no edema, erythema, or fluctuance. Parotid glands soft without mass  Eyes: no scleral injection  Nose: Nares bilaterally patent, no discharge  Mouth: No Stridor / Drooling / Trismus.  Mucosa moist, tongue/uvula midline, oropharynx with copious secretions  Neck: Flat, supple, no lymphadenopathy, trachea midline, no masses  Lymphatic: No lymphadenopathy  Resp: breathing comfortably, no stridor  CV: no peripheral edema/cyanosis  GI: nondistended   Peripheral vascular: no JVD or edema  Neuro: facial nerve intact, no facial droop        Diagnostic Nasal Endoscopy: (Scope #2 used)    Fiberoptic Indirect laryngoscopy:  (Scope #2 used)        IMAGING/ADDITIONAL STUDIES: CC: Silent aspiration seen on MBS    HPI: Patient is a 62 yo  male with PMH of remote sarcoid, rheumatoid arthritis, hypothyroidism, chronic thrombocytopenia, BPH, COPD, seizure disorder, prediabetes ( last A1C 5.5 on 5/1), hx of traumatic bilateral SDH, chronic bilateral lower extremity edema, obstructive sleep apnea non compliant with CPAP presents as a transfer from [facility] for a positive stress test. Patient remarks that for the last 2-3 days he has been feeling very weak. He also is having a worsening (of a chronic) cough that is purulent. He admits to subjective fevers and chills for the last three days. Denies diarrhea, pain or burning with urination. No recent travel or sick contacts. He went to see his PMD Dr. Galarza on 4/30 who sent him to the ED because he was found to have a fever of 102 and st depressions in the anterior leads on an EKG done in the office. He had a chest CT performed that did not reveal a pneumonia however his RVP was positive for coronavirus. He had negative troponins x 2. He was started on duonebs, antibiotics and steroids for a COPD exacerbation with bacterial bronchitis. He underwent a stress test on 5/2 which was found to have possible rob and inferolateral ischemia. He was transferred for [facility] for cardiac cath which revealed normal coronaries. Pt. was evaluated by speech and swallow , underwent an MBS which revealed silent aspiration on all textures. ENT was called to evaluate vocal cord function.  Pts. family was at bedside and very involved with his care. they explained to us that he has dysphagia now for many months. They described his appearance as "his breathing competes with his eating". Pt. chokes , coughs and complains of SOB with solids and liquids.  Pts. family also describes multiple nasal surgeries (sinus surgery) and maxillary surgery in the past.        PAST MEDICAL & SURGICAL HISTORY:  BPH without urinary obstruction  LAMBERT and COPD overlap syndrome  Sleep apnea  Hypothyroid  Seizure  Inguinal hernia  Subdural hematoma  Bronchiectasis  Sarcoid  DM (diabetes mellitus)  Status post inguinal hernia repair  Status post cholecystectomy  Bilateral subdural hematomas  Status post Bright's procedure    Allergies    Keppra (Other (Severe))  penicillin (Angioedema)  penicillins (Unknown)    Intolerances    benzodiazepines (Other)    MEDICATIONS  (STANDING):  ALBUTerol/ipratropium for Nebulization 3 milliLiter(s) Nebulizer every 6 hours  buDESOnide 160 MICROgram(s)/formoterol 4.5 MICROgram(s) Inhaler 2 Puff(s) Inhalation two times a day  dextrose 5% + sodium chloride 0.45%. 1000 milliLiter(s) (55 mL/Hr) IV Continuous <Continuous>  docusate sodium 100 milliGRAM(s) Oral three times a day  furosemide   Injectable 20 milliGRAM(s) IV Push daily  lactobacillus acidophilus 1 Tablet(s) Oral daily  levoFLOXacin IVPB 250 milliGRAM(s) IV Intermittent every 24 hours  levothyroxine Injectable 12.5 MICROGram(s) IV Push at bedtime  polyethylene glycol 3350 17 Gram(s) Oral daily  predniSONE   Tablet 40 milliGRAM(s) Oral once  tamsulosin 0.4 milliGRAM(s) Oral at bedtime  valproate sodium IVPB 500 milliGRAM(s) IV Intermittent every 8 hours    MEDICATIONS  (PRN):  benzonatate 100 milliGRAM(s) Oral three times a day PRN Cough  guaiFENesin    Syrup 100 milliGRAM(s) Oral every 6 hours PRN Cough  senna 2 Tablet(s) Oral at bedtime PRN Constipation      Social History: no previous hx of ETOH, drug or smoking hx    Family history: positive family hx of diabetes    ROS:   ENT: all negative except as noted in HPI   CV: denies palpitations  Pulm: denies SOB, cough, hemoptysis  GI: denies change in apetite, indigestion, n/v  : denies pertinent urinary symptoms, urgency  Neuro: denies numbness/tingling, loss of sensation  Psych: denies anxiety  MS: denies muscle weakness, instability  Heme: denies easy bruising or bleeding  Endo: denies heat/cold intolerance, excessive sweating  Vascular: denies LE edema    Vital Signs Last 24 Hrs  T(C): 36.6 (07 May 2019 12:20), Max: 36.6 (06 May 2019 21:49)  T(F): 97.9 (07 May 2019 12:20), Max: 97.9 (07 May 2019 12:20)  HR: 57 (07 May 2019 12:20) (57 - 67)  BP: 96/64 (07 May 2019 12:20) (91/63 - 114/72)  BP(mean): --  RR: 18 (07 May 2019 12:20) (18 - 18)  SpO2: 92% (07 May 2019 12:20) (92% - 95%)                          14.6   8.6   )-----------( 101      ( 06 May 2019 07:33 )             46.2    05-06    139  |  94<L>  |  29<H>  ----------------------------<  111<H>  4.0   |  32<H>  |  0.67    Ca    9.0      06 May 2019 07:33         PHYSICAL EXAM:  Gen: NAD  Skin: No rashes, bruises, or lesions  Head: Normocephalic, Atraumatic  Face: no edema, erythema, or fluctuance. Parotid glands soft without mass  Eyes: no scleral injection  Nose: Nares bilaterally patent, no discharge, septal perforation noted  Mouth: No Stridor / Drooling / Trismus.  Mucosa moist, tongue/uvula midline, oropharynx with copious secretions  Neck: Flat, supple, no lymphadenopathy, trachea midline, no masses  Lymphatic: No lymphadenopathy  Resp: breathing comfortably, no stridor  CV: no peripheral edema/cyanosis  GI: nondistended   Peripheral vascular: no JVD or edema  Neuro: facial nerve intact, no facial droop            Fiberoptic Indirect laryngoscopy:  (Scope #2 used)  Reason for Laryngoscopy:   Septal perrforation noted, post surgical changes in bilateral nares ,normal tongue base, nasopharynx, oropharynx, and hypopharynx clear, no bleeding.   nl.posterior pharyngeal wall, vallecula, epiglottis, and subglottis appear normal. No erythema, edema, pooling of secretions, masses or lesions. Airway patent, no foreign body visualized. No glottic/supraglottic edema. True vocal cords, arytenoids, vestibular folds, ventricles, pyriform sinuses, and aryepiglottic folds appear normal bilaterally. Vocal cords mobile with good contact b/l.            IMAGING/ADDITIONAL STUDIES:

## 2019-05-07 NOTE — PROGRESS NOTE ADULT - PROBLEM SELECTOR PLAN 1
extensive discussion with patient and sister - latasha (on phone) regarding MBS results and plan of care.     discussed that patient is aspirating with all consistences. discussed two options - either NPO and NGT for feeds with eventual plan for feeding tube vs pleasure feeds. patient states he does not want a feeding tube and wants pleasure feeds. He does not want a dysphagia diet with thickened liquids. He states he understands the risks of aspiration including developing pneumonia, respiratory distress, low oxygen levels, could require intubation, and lead to death. patient wants remain full code and would want to be placed on a ventilator if needed. will call ENT consult and pulmonary consult.

## 2019-05-07 NOTE — CONSULT NOTE ADULT - ASSESSMENT
Patient is a 64 yo  male with PMH of remote sarcoid, rheumatoid arthritis, hypothyroidism, chronic thrombocytopenia, BPH, COPD, seizure disorder, prediabetes ( last A1C 5.5 on 5/1), hx of traumatic bilateral SDH, chronic bilateral lower extremity edema, obstructive sleep apnea non compliant with CPAP presents as a transfer from [facility] for a positive stress test. Patient remarks that for the last 2-3 days he has been feeling very weak. He also is having a worsening (of a chronic) cough that is purulent. He admits to subjective fevers and chills for the last three days.  He went to see his PMD Dr. Galarza on 4/30 who sent him to the ED because he was found to have a fever of 102 and st depressions in the anterior leads on an EKG done in the office. He had a chest CT performed that did not reveal a pneumonia however his RVP was positive for coronavirus. He had negative troponins x 2. He was started on duonebs, antibiotics and steroids for a COPD exacerbation with bacterial bronchitis. He underwent a stress test on 5/2 which was found to have possible rob and inferolateral ischemia. He was transferred for [facility] for cardiac cath which revealed normal coronaries.  Pt. was evaluated by speech and swallow , underwent an MBS which revealed silent aspiration on all textures. ENT was called to evaluate vocal cord function. Patient is a 62 yo  male with PMH of remote sarcoid, rheumatoid arthritis, hypothyroidism, chronic thrombocytopenia, BPH, COPD, seizure disorder, prediabetes ( last A1C 5.5 on 5/1), hx of traumatic bilateral SDH, chronic bilateral lower extremity edema, obstructive sleep apnea non compliant with CPAP presents as a transfer from [facility] for a positive stress test. Patient remarks that for the last 2-3 days he has been feeling very weak. He also is having a worsening (of a chronic) cough that is purulent. He admits to subjective fevers and chills for the last three days.  He went to see his PMD Dr. Galarza on 4/30 who sent him to the ED because he was found to have a fever of 102 and st depressions in the anterior leads on an EKG done in the office. He had a chest CT performed that did not reveal a pneumonia however his RVP was positive for coronavirus. He had negative troponins x 2. He was started on duonebs, antibiotics and steroids for a COPD exacerbation with bacterial bronchitis. He underwent a stress test on 5/2 which was found to have possible rob and inferolateral ischemia. He was transferred for [facility] for cardiac cath which revealed normal coronaries.  Pt. was evaluated by speech and swallow , underwent an MBS which revealed silent aspiration on all textures. ENT was called to evaluate vocal cord function.  Pts. family was at bedside and very involved with his care. they explained to us that he has dysphagia now for many months. They described his appearance as "his breathing competes with his eating". Pt. chokes , coughs and complains of SOB with solids and liquids. Patient is a 64 yo  male with PMH of remote sarcoid, rheumatoid arthritis, hypothyroidism, chronic thrombocytopenia, BPH, COPD, seizure disorder, prediabetes ( last A1C 5.5 on 5/1), hx of traumatic bilateral SDH, chronic bilateral lower extremity edema, obstructive sleep apnea non compliant with CPAP presents as a transfer from [facility] for a positive stress test. Patient remarks that for the last 2-3 days he has been feeling very weak. He also is having a worsening (of a chronic) cough that is purulent. He admits to subjective fevers and chills for the last three days.  He went to see his PMD Dr. Galarza on 4/30 who sent him to the ED because he was found to have a fever of 102 and st depressions in the anterior leads on an EKG done in the office. He had a chest CT performed that did not reveal a pneumonia however his RVP was positive for coronavirus. He had negative troponins x 2. He was started on duonebs, antibiotics and steroids for a COPD exacerbation with bacterial bronchitis. He underwent a stress test on 5/2 which was found to have possible rob and inferolateral ischemia. He was transferred for [facility] for cardiac cath which revealed normal coronaries.  Pt. was evaluated by speech and swallow , underwent an MBS which revealed silent aspiration on all textures. ENT was called to evaluate vocal cord function.  Pts. family was at bedside and very involved with his care. they explained to us that he has dysphagia now for many months. They described his appearance as "his breathing competes with his eating". Pt. chokes , coughs and complains of SOB with solids and liquids.  Laryngoscope revealed : septal perrforation noted, post surgical changes in bilateral nares ,normal tongue base, nasopharynx, oropharynx, and hypopharynx clear, no bleeding.   nl.posterior pharyngeal wall, vallecula, epiglottis, and subglottis appear normal. No erythema, edema, pooling of secretions, masses or lesions. Airway patent, no foreign body visualized. No glottic/supraglottic edema. True vocal cords, arytenoids, vestibular folds, ventricles, pyriform sinuses, and aryepiglottic folds appear normal bilaterally. Vocal cords mobile with good contact b/l.
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
## 2019-05-07 NOTE — PROGRESS NOTE ADULT - PROBLEM SELECTOR PLAN 2
c/w levaquin 250mg qd and prednisone 40 mg daily - completes treatment today  - c/w duo nebs q6h and symbicort  - saturating well off nasal canula- goal 88-92%  monitor sats as patient with high aspiration risk requesting regular diet despite risks.   reported COPD with history of smoking though reviewing CT chest concern for recurrent aspiration  Pulmonary consult called

## 2019-05-07 NOTE — PROGRESS NOTE ADULT - PROBLEM SELECTOR PLAN 8
- c/w depakote qhs (discussed change to formulation with difficulty swallowing pill - patient wants to remain on current regimen)

## 2019-05-07 NOTE — CONSULT NOTE ADULT - ASSESSMENT
Assessment  63M with history of bronchiectasis, may have had sarcoidosis initially presented with chest discomfort, seems like sepsis from coronavirus.   Now that is resolved and he is asymptomatic.     He has a lot of underlying lung disease. CT scans from 2006 show bilateral upper lobe bronchiectasis and scarring. This is unchanged and there is some worse lower lobe disease, too. He is aspirating which can explain the lower lobe disease now.     He has low symptom burden at this point and is on stable inhaled steroid and LABA. He may or may not have had sarcoidosis in the past, but has nothing that looks active on CT now. His lung findings are not typical for lung manifestations of rheumatoid arthritis, which he also has.     He has stable but compromised lung function, probably from remote sarcoid and more recent subacute aspiration events.     Recs  - Continue symbicort 160/4.5 inhaled BID  - albuterol PRN  - Outpatient PFT with Dr. Guillen

## 2019-05-07 NOTE — PROGRESS NOTE ADULT - SUBJECTIVE AND OBJECTIVE BOX
extensive discussion with patient and sister - latasha regarding MBS results and plan of care.     discussed that patient is aspirating with all consistences. discussed two options - either NPO and NGT for feeds with eventual plan for feeding tube vs pleasure feeds. patient states he does not want a feeding tube and wants pleasure feeds. He does not want a dysphagia diet with thickened liquids. He states he understands the risks of aspiration including developing pneumonia, respiratory distress, low oxygen levels, could require intubation, and lead to death. patient wants remain full code and would want to be placed on a ventilator if needed. will call ENT consult and pulmonary consult. Patient is a 63y old  Male who presents with a chief complaint of transfer from [facility] given positive stress test (07 May 2019 14:44)        SUBJECTIVE / OVERNIGHT EVENTS:  extensive discussion with patient and sister - latasha (on phone) regarding MBS results and plan of care.     discussed that patient is aspirating with all consistences. discussed two options - either NPO and NGT for feeds with eventual plan for feeding tube vs pleasure feeds. patient states he does not want a feeding tube and wants pleasure feeds. He does not want a dysphagia diet with thickened liquids. He states he understands the risks of aspiration including developing pneumonia, respiratory distress, low oxygen levels, could require intubation, and lead to death. patient wants remain full code and would want to be placed on a ventilator if needed. will call ENT consult and pulmonary consult.         MEDICATIONS  (STANDING):  ALBUTerol/ipratropium for Nebulization 3 milliLiter(s) Nebulizer every 6 hours  buDESOnide 160 MICROgram(s)/formoterol 4.5 MICROgram(s) Inhaler 2 Puff(s) Inhalation two times a day  dextrose 5% + sodium chloride 0.45%. 1000 milliLiter(s) (55 mL/Hr) IV Continuous <Continuous>  docusate sodium 100 milliGRAM(s) Oral three times a day  furosemide   Injectable 20 milliGRAM(s) IV Push daily  lactobacillus acidophilus 1 Tablet(s) Oral daily  levoFLOXacin IVPB 250 milliGRAM(s) IV Intermittent every 24 hours  levothyroxine Injectable 12.5 MICROGram(s) IV Push at bedtime  polyethylene glycol 3350 17 Gram(s) Oral daily  predniSONE   Tablet 40 milliGRAM(s) Oral once  tamsulosin 0.4 milliGRAM(s) Oral at bedtime  valproate sodium IVPB 500 milliGRAM(s) IV Intermittent every 8 hours    MEDICATIONS  (PRN):  benzonatate 100 milliGRAM(s) Oral three times a day PRN Cough  guaiFENesin    Syrup 100 milliGRAM(s) Oral every 6 hours PRN Cough  senna 2 Tablet(s) Oral at bedtime PRN Constipation      Vital Signs Last 24 Hrs  T(C): 36.6 (07 May 2019 12:20), Max: 36.6 (06 May 2019 21:49)  T(F): 97.9 (07 May 2019 12:20), Max: 97.9 (07 May 2019 12:20)  HR: 57 (07 May 2019 12:20) (57 - 67)  BP: 96/64 (07 May 2019 12:20) (91/63 - 114/72)  BP(mean): --  RR: 18 (07 May 2019 12:20) (18 - 18)  SpO2: 92% (07 May 2019 12:20) (92% - 95%)  CAPILLARY BLOOD GLUCOSE        I&O's Summary    06 May 2019 07:01  -  07 May 2019 07:00  --------------------------------------------------------  IN: 540 mL / OUT: 2500 mL / NET: -1960 mL    07 May 2019 07:01  -  07 May 2019 16:56  --------------------------------------------------------  IN: 180 mL / OUT: 750 mL / NET: -570 mL        PHYSICAL EXAM:  GENERAL: NAD, gurgling at times  HEAD:  Atraumatic, Normocephalic  EYES: conjunctiva and sclera clear  NECK: No JVD  CHEST/LUNG: CTA b/l  HEART: S1 S2 RRR  ABDOMEN: +BS Soft, NT/ND  EXTREMITIES:  2+ DP Pulses, No c/c. 1+/b/l LE edema  NEUROLOGY: AAOx3, no focal deficits   SKIN: scabs on b/l LE    LABS:                        14.6   8.6   )-----------( 101      ( 06 May 2019 07:33 )             46.2     05-06    139  |  94<L>  |  29<H>  ----------------------------<  111<H>  4.0   |  32<H>  |  0.67    Ca    9.0      06 May 2019 07:33                RADIOLOGY & ADDITIONAL TESTS:    Imaging Personally Reviewed: MBS reviewed - Silent laryngeal penetration and aspiration detected for the   most conservative oral textures.    Consultant(s) Notes Reviewed:    Care Discussed with Consultants/Other Providers: d/w PUlmonary - Dr. Guillen and nakia pulm regarding respiratory status.

## 2019-05-08 LAB
ANION GAP SERPL CALC-SCNC: 12 MMOL/L — SIGNIFICANT CHANGE UP (ref 5–17)
BUN SERPL-MCNC: 34 MG/DL — HIGH (ref 7–23)
CALCIUM SERPL-MCNC: 9.2 MG/DL — SIGNIFICANT CHANGE UP (ref 8.4–10.5)
CHLORIDE SERPL-SCNC: 95 MMOL/L — LOW (ref 96–108)
CO2 SERPL-SCNC: 32 MMOL/L — HIGH (ref 22–31)
CREAT SERPL-MCNC: 0.81 MG/DL — SIGNIFICANT CHANGE UP (ref 0.5–1.3)
GLUCOSE SERPL-MCNC: 99 MG/DL — SIGNIFICANT CHANGE UP (ref 70–99)
HCT VFR BLD CALC: 48.8 % — SIGNIFICANT CHANGE UP (ref 39–50)
HGB BLD-MCNC: 16.7 G/DL — SIGNIFICANT CHANGE UP (ref 13–17)
MCHC RBC-ENTMCNC: 31.9 PG — SIGNIFICANT CHANGE UP (ref 27–34)
MCHC RBC-ENTMCNC: 34.2 GM/DL — SIGNIFICANT CHANGE UP (ref 32–36)
MCV RBC AUTO: 93.3 FL — SIGNIFICANT CHANGE UP (ref 80–100)
PLATELET # BLD AUTO: 123 K/UL — LOW (ref 150–400)
POTASSIUM SERPL-MCNC: 4.5 MMOL/L — SIGNIFICANT CHANGE UP (ref 3.5–5.3)
POTASSIUM SERPL-SCNC: 4.5 MMOL/L — SIGNIFICANT CHANGE UP (ref 3.5–5.3)
RBC # BLD: 5.23 M/UL — SIGNIFICANT CHANGE UP (ref 4.2–5.8)
RBC # FLD: 13.6 % — SIGNIFICANT CHANGE UP (ref 10.3–14.5)
SODIUM SERPL-SCNC: 139 MMOL/L — SIGNIFICANT CHANGE UP (ref 135–145)
WBC # BLD: 8 K/UL — SIGNIFICANT CHANGE UP (ref 3.8–10.5)
WBC # FLD AUTO: 8 K/UL — SIGNIFICANT CHANGE UP (ref 3.8–10.5)

## 2019-05-08 PROCEDURE — 99233 SBSQ HOSP IP/OBS HIGH 50: CPT

## 2019-05-08 PROCEDURE — 99222 1ST HOSP IP/OBS MODERATE 55: CPT | Mod: GC

## 2019-05-08 RX ORDER — PANTOPRAZOLE SODIUM 20 MG/1
40 TABLET, DELAYED RELEASE ORAL
Qty: 0 | Refills: 0 | Status: DISCONTINUED | OUTPATIENT
Start: 2019-05-08 | End: 2019-05-08

## 2019-05-08 RX ORDER — DOCUSATE SODIUM 100 MG
100 CAPSULE ORAL
Qty: 0 | Refills: 0 | Status: DISCONTINUED | OUTPATIENT
Start: 2019-05-08 | End: 2019-05-16

## 2019-05-08 RX ORDER — FUROSEMIDE 40 MG
40 TABLET ORAL DAILY
Qty: 0 | Refills: 0 | Status: DISCONTINUED | OUTPATIENT
Start: 2019-05-09 | End: 2019-05-16

## 2019-05-08 RX ORDER — DOXAZOSIN MESYLATE 4 MG
2 TABLET ORAL AT BEDTIME
Qty: 0 | Refills: 0 | Status: DISCONTINUED | OUTPATIENT
Start: 2019-05-08 | End: 2019-05-16

## 2019-05-08 RX ORDER — LEVOTHYROXINE SODIUM 125 MCG
25 TABLET ORAL DAILY
Qty: 0 | Refills: 0 | Status: DISCONTINUED | OUTPATIENT
Start: 2019-05-09 | End: 2019-05-16

## 2019-05-08 RX ORDER — PANTOPRAZOLE SODIUM 20 MG/1
40 TABLET, DELAYED RELEASE ORAL
Qty: 0 | Refills: 0 | Status: DISCONTINUED | OUTPATIENT
Start: 2019-05-08 | End: 2019-05-16

## 2019-05-08 RX ORDER — VALPROIC ACID (AS SODIUM SALT) 250 MG/5ML
500 SOLUTION, ORAL ORAL EVERY 8 HOURS
Refills: 0 | Status: DISCONTINUED | OUTPATIENT
Start: 2019-05-08 | End: 2019-05-10

## 2019-05-08 RX ADMIN — Medication 3 MILLILITER(S): at 13:33

## 2019-05-08 RX ADMIN — Medication 27.5 MILLIGRAM(S): at 13:33

## 2019-05-08 RX ADMIN — Medication 100 MILLIGRAM(S): at 22:09

## 2019-05-08 RX ADMIN — Medication 3 MILLILITER(S): at 23:15

## 2019-05-08 RX ADMIN — Medication 2 MILLIGRAM(S): at 22:09

## 2019-05-08 RX ADMIN — POLYETHYLENE GLYCOL 3350 17 GRAM(S): 17 POWDER, FOR SOLUTION ORAL at 13:33

## 2019-05-08 RX ADMIN — BUDESONIDE AND FORMOTEROL FUMARATE DIHYDRATE 2 PUFF(S): 160; 4.5 AEROSOL RESPIRATORY (INHALATION) at 18:40

## 2019-05-08 RX ADMIN — Medication 100 MILLIGRAM(S): at 05:06

## 2019-05-08 RX ADMIN — Medication 3 MILLILITER(S): at 05:05

## 2019-05-08 RX ADMIN — BUDESONIDE AND FORMOTEROL FUMARATE DIHYDRATE 2 PUFF(S): 160; 4.5 AEROSOL RESPIRATORY (INHALATION) at 05:06

## 2019-05-08 RX ADMIN — PANTOPRAZOLE SODIUM 40 MILLIGRAM(S): 20 TABLET, DELAYED RELEASE ORAL at 22:09

## 2019-05-08 RX ADMIN — Medication 20 MILLIGRAM(S): at 05:05

## 2019-05-08 RX ADMIN — Medication 500 MILLIGRAM(S): at 21:05

## 2019-05-08 RX ADMIN — Medication 3 MILLILITER(S): at 18:40

## 2019-05-08 RX ADMIN — Medication 1 TABLET(S): at 13:34

## 2019-05-08 RX ADMIN — Medication 27.5 MILLIGRAM(S): at 05:04

## 2019-05-08 RX ADMIN — Medication 100 MILLIGRAM(S): at 13:34

## 2019-05-08 NOTE — CONSULT NOTE ADULT - ASSESSMENT
63M with retrognathia and dysphagia    -no role for orthognathic surgery in the treatment of dysphagia  -however, there is a role for surgery in the treatment of LAMBERT  -pt may follow-up with Dr. Lala Beverly as an outpatient to further discuss; office [phone]    case discussed with Dr. Beverly

## 2019-05-08 NOTE — CONSULT NOTE ADULT - SUBJECTIVE AND OBJECTIVE BOX
HPI: 63M with history of retrognathia and what sounds like a bilateral sagittal split osteotomy in 1980 for mandibular advancement.  Pt was told at the time by his oral surgeon (Dr. Seth Park in [place]) that the advancement would eventually regress and need a repeat procedure.  Pt has has complex medical history since then and is now admitted for COPD exacerbation.  While admitted, was noted to have some element of dysphagia and had a formal MBS, which showed aspiration with all consistencies.  His team has recommended a PEG for feeding and pt is, at this point, refusing.  He says he does not notice any difficulty eating but that his family has been noting it for several years.  He doesn't think it is any different now than in the past and that people have just noticed now that he is in the hospital.  However, he thinks that a repeat mandibular advancement might help his dysphagia.    PMHx: BPH without urinary obstruction  LAMBERT and COPD overlap syndrome  Sleep apnea  Hypothyroid  Seizure  Cellulitis  Inguinal hernia  Subdural hematoma  Diverticulitis  Bronchiectasis  Sarcoid  DM (diabetes mellitus)  No pertinent past medical history    PSHx: Status post inguinal hernia repair  Status post cholecystectomy  Bilateral subdural hematomas  Status post Bright's procedure  No significant past surgical history    Medications (inpatient): ALBUTerol/ipratropium for Nebulization 3 milliLiter(s) Nebulizer every 6 hours  buDESOnide 160 MICROgram(s)/formoterol 4.5 MICROgram(s) Inhaler 2 Puff(s) Inhalation two times a day  docusate sodium 100 milliGRAM(s) Oral three times a day  lactobacillus acidophilus 1 Tablet(s) Oral daily  polyethylene glycol 3350 17 Gram(s) Oral daily  tamsulosin 0.4 milliGRAM(s) Oral at bedtime    Medications (PRN):benzonatate 100 milliGRAM(s) Oral three times a day PRN  guaiFENesin    Syrup 100 milliGRAM(s) Oral every 6 hours PRN  senna 2 Tablet(s) Oral at bedtime PRN    Allergies: Keppra (Other (Severe))  penicillin (Angioedema)  penicillins (Unknown)  (Intolerances: benzodiazepines (Other)  )  Social Hx:     Physical Exam  T(C): 36.7 (05-08-19 @ 11:48)  HR: 75 (05-08-19 @ 11:48) (68 - 78)  BP: 103/66 (05-08-19 @ 11:48) (97/67 - 109/70)  RR: 18 (05-08-19 @ 11:48) (18 - 18)  SpO2: 94% (05-08-19 @ 11:48) (91% - 94%)  Wt(kg): --  Tmax: T(C): , Max: 36.7 (05-08-19 @ 04:17)  Wt(kg): --    05-07-19  -  05-08-19  --------------------------------------------------------  IN:    Oral Fluid: 360 mL  Total IN: 360 mL    OUT:    Voided: 1550 mL  Total OUT: 1550 mL    Total NET: -1190 mL      05-08-19  -  05-08-19  --------------------------------------------------------  IN:    Oral Fluid: 480 mL  Total IN: 480 mL    OUT:    Voided: 600 mL  Total OUT: 600 mL    Total NET: -120 mL        General: well developed, well nourished, NAD  Neuro: alert and oriented, no focal deficits, moves all extremities spontaneously  HEENT: NCAT, EOMI, anicteric, mucosa moist; retrognathia, poor dentition, class II occlusion  Respiratory: airway patent, respirations unlabored      Labs:                        16.7   8.0   )-----------( 123      ( 08 May 2019 10:09 )             48.8       05-08    139  |  95<L>  |  34<H>  ----------------------------<  99  4.5   |  32<H>  |  0.81    Ca    9.2      08 May 2019 10:09              Imaging and other studies:

## 2019-05-08 NOTE — CONSULT NOTE ADULT - CONSULT REASON
Dysphagia
Silent Aspiration seen on MBS- evaluate vocal cords
optimize from pulmonary standpoint,
retrognathia

## 2019-05-08 NOTE — CONSULT NOTE ADULT - SUBJECTIVE AND OBJECTIVE BOX
HPI:  Patient is a 64 yo  male with PMH of remote sarcoid, rheumatoid arthritis, hypothyroidism, chronic thrombocytopenia, BPH, COPD, seizure disorder, prediabetes ( last A1C 5.5 on 5/1), hx of traumatic bilateral SDH, chronic bilateral lower extremity edema, obstructive sleep apnea non compliant with CPAP presents as a transfer from [facility] for a positive stress test. Patient remarks that for the last 2-3 days he has been feeling very weak. He also is having a worsening (of a chronic) cough that is purulent. He admits to subjective fevers and chills for the last three days. Denies diarrhea, pain or burning with urination. No recent travel or sick contacts. He went to see his PMD Dr. Galarza on 4/30 who sent him to the ED because he was found to have a fever of 102 and st depressions in the anterior leads on an EKG done in the office. He had a chest CT performed that did not reveal a pneumonia however his RVP was positive for coronavirus. He had negative troponins x 2. He was started on duonebs, antibiotics and steroids for a COPD exacerbation with bacterial bronchitis. He underwent a stress test on 5/2 which was found to have possible rob and inferolateral ischemia. He was transferred for [facility] for LHC. He undewent an LHC today- coronaries clean. He was seen by PT and was recommended to go to acute rehab. Patient now admitted pending discharge to short term rehabilitation.      Interval events:  Pt underwent S&S eval showing pt is aspirating on all consistencies, pt declined PEG tube or dysphagia diet, on regular diet. Pt reportedly does not feel a choking sensation/acid reflux/any difficulty with swallowing solids or liquids. Does not have any dietary restrictions and is able to eat all food without trouble. However, pt's family has noticed that pt has difficulty with breathing while eating and that at times is "is fighting between chewing and breathing, but does not have trouble swallowing." Pt denies any abdominal pain/nausea/vomiting/history of GERD. No previous colonoscopies/endoscopies. Pt had jaw surgery for retrognathia in 1980 after which his jaw was receded. Pt also has poor dentition which he believes is contributory to his difficulty chewing. Is eating a regular diet in the hospital, as pt declined dysphagia diet/NPO with NGT/PEG tube.           < from: NM Nuclear Stress Pharmacologic Multiple (05.02.19 @ 11:27) >  EXAM:  NM NUCLEAR STRESS MULTI PHARM      PROCEDURE DATE:  05/02/2019        INTERPRETATION:  INTERPRETATION: RADIOPHARMACEUTICAL: 9.8 mCi Tc-99m ?   Sestamibi IV at Rest and    29.6 mCi Tc99m- Sestamibi (Regadenoson) IV at Stress    CLINICAL INFORMATION: The patient is a 63 year old man with chest pain,   referred for pharmacologic nuclear stress testing.       PATIENT PREPARATION: NPO after midnight, no caffeine.    STRESS PROTOCOL: Intravenous Regadenoson 5cc (0.4mg) was given rapidly   over10 seconds. Immediately thereafter Tc99m- Sestamibi was injected.     A 12 lead EKG was recorded every minute and blood pressure was also   recorded during the test. The study was stopped when the protocol was   completed.    Electrocardiograph was reported as no change    There no cardiac symptoms during the test.    SYMPTOMS TO INTERVENTION: None      TECHNIQUE:  At rest, 9.8 mCi Tc99m- sestamibi was injected intravenously   and SPECT myocardial perfusion imaging was performed approximately 1 hour   later. Immediately following the intravenous injection of Regadenoson,   29.6 mCi of Tc99m- Sestamibi was injected intravenously and gated SPECT   myocardial perfusion imaging was performed approximately 1 hour later.   Data were reconstructed in the cardiac standard short axis, horizontal   long axis and vertical long axis projections.    FINDINGS: The technical quality was of the resting and post stress   perfusion images was good.     Review of resting and post stress myocardial scintigrams revealed   abnormal left ventricular perfusion.     There is a fixed moderate sized area of reduced tracer activity in the   inferior wall of moderate to severe degree. Air is a moderate sized area   of mild to moderately decreased activity post vasodilator involving the   anterolateral and inferolateral segments which normalizes on resting   imaging.    Gated wall motion study revealed normal left ventricular wall motion.   There were no regional wall motion abnormalities or regions of decreased   systolic wall thickening. Transient ischemic dilation (TID) was absent.    Left ventricular ejection fraction was 69 %.      IMPRESSION: Findings are compatible with anterolateral and inferolateral   ischemia. The fixed defect in the inferior wall with normal wall   thickening and brightening is most suggestive of inferior wall   attenuation.       Normal left ventricular wall motion with ejection fraction of 69 %   (normal: 50% or greater).       No regional wall motion abnormalities.       Please refer to cardiac stress test report.     < end of copied text >    < from: 12 Lead ECG (04.30.19 @ 23:28) >    Ventricular Rate 53 BPM    Atrial Rate 53 BPM    P-R Interval 158 ms    QRS Duration 96 ms    Q-T Interval 448 ms    QTC Calculation(Bezet) 420 ms    P Axis 40 degrees    R Axis -4 degrees    T Axis 61 degrees    Diagnosis Line Sinus bradycardia with occasional premature ventricular complexes  ST and T wave abnormality, consider anterior ischemia  Abnormal ECG  When compared with ECG of 30-APR-2019 17:21,  premature ventricular complexes are now present  Confirmed by AMBER AVILA, JACOB STANLEY ([id number]) on 5/1/2019 8:26:20 AM    < end of copied text > (03 May 2019 19:09)      PAST MEDICAL & SURGICAL HISTORY:  BPH without urinary obstruction  LAMBERT and COPD overlap syndrome  Sleep apnea  Hypothyroid  Seizure  Inguinal hernia  Subdural hematoma  Bronchiectasis  Sarcoid  DM (diabetes mellitus)  Status post inguinal hernia repair  Status post cholecystectomy  Bilateral subdural hematomas  Status post Bright's procedure        MEDICATIONS  (STANDING):  ALBUTerol/ipratropium for Nebulization 3 milliLiter(s) Nebulizer every 6 hours  buDESOnide 160 MICROgram(s)/formoterol 4.5 MICROgram(s) Inhaler 2 Puff(s) Inhalation two times a day  docusate sodium 100 milliGRAM(s) Oral three times a day  furosemide   Injectable 20 milliGRAM(s) IV Push daily  lactobacillus acidophilus 1 Tablet(s) Oral daily  levoFLOXacin IVPB 250 milliGRAM(s) IV Intermittent every 24 hours  levothyroxine Injectable 12.5 MICROGram(s) IV Push at bedtime  polyethylene glycol 3350 17 Gram(s) Oral daily  predniSONE   Tablet 40 milliGRAM(s) Oral once  tamsulosin 0.4 milliGRAM(s) Oral at bedtime  valproate sodium IVPB 500 milliGRAM(s) IV Intermittent every 8 hours    MEDICATIONS  (PRN):  benzonatate 100 milliGRAM(s) Oral three times a day PRN Cough  guaiFENesin    Syrup 100 milliGRAM(s) Oral every 6 hours PRN Cough  senna 2 Tablet(s) Oral at bedtime PRN Constipation      Allergies    Keppra (Other (Severe))  penicillin (Angioedema)  penicillins (Unknown)    Intolerances    benzodiazepines (Other)      SOCIAL HISTORY:    FAMILY HISTORY:  Family history of pulmonary embolism (Grandparent)  Family history of diabetes mellitus      Vital Signs Last 24 Hrs  T(C): 36.7 (08 May 2019 04:17), Max: 36.7 (08 May 2019 04:17)  T(F): 98 (08 May 2019 04:17), Max: 98 (08 May 2019 04:17)  HR: 68 (08 May 2019 04:17) (57 - 78)  BP: 109/70 (08 May 2019 04:17) (96/64 - 109/70)  BP(mean): --  RR: 18 (08 May 2019 04:17) (18 - 18)  SpO2: 92% (08 May 2019 04:17) (91% - 92%)    PHYSICAL EXAM:    PHYSICAL EXAM:  Vital Signs Last 24 Hrs  T(C): 36.7 (05-08-19 @ 04:17)  T(F): 98 (05-08-19 @ 04:17), Max: 98 (05-08-19 @ 04:17)  HR: 68 (05-08-19 @ 04:17) (57 - 78)  BP: 109/70 (05-08-19 @ 04:17)  BP(mean): --  RR: 18 (05-08-19 @ 04:17) (18 - 18)  SpO2: 92% (05-08-19 @ 04:17) (91% - 92%)  Wt(kg): --    05-07 @ 07:01  -  05-08 @ 07:00  --------------------------------------------------------  IN: 360 mL / OUT: 1550 mL / NET: -1190 mL      Constitutional: NAD  HEENT: EOMI, marked retrognathia. Poor dentition with missing teeth noted.   Respiratory: Breath sounds are clear bilaterally, No wheezing, rales or rhonchi  Cardiovascular: S1 and S2, regular rate and rhythm, no Murmurs, gallops or rubs, no JVD,    Gastrointestinal: Bowel Sounds present, soft, nontender, nondistended, no guarding, no rebound  Extremities: No cyanosis or clubbing; warm to touch  Vascular: 2+ peripheral pulses lower ex  Neurological: No focal deficits, CN II-XII intact bilaterally, sensation to light touch intact in all extremities, gait intact. Pupils are equally reactive to light and symmetrical in size.   Musculoskeletal: 5/5 strength b/l upper and lower extremities; no joint swelling.  Skin: No rashes  Psych: no depression or anhedonia, AAOx3  HEME: no bruises, no nose bleeds        LABS:                        16.7   8.0   )-----------( 123      ( 08 May 2019 10:09 )             48.8       Hemoglobin: 16.7 g/dL (05-08-19 @ 10:09)  Hemoglobin: 14.6 g/dL (05-06-19 @ 07:33)      05-08    139  |  95<L>  |  34<H>  ----------------------------<  99  4.5   |  32<H>  |  0.81    Ca    9.2      08 May 2019 10:09            RADIOLOGY & ADDITIONAL STUDIES:

## 2019-05-08 NOTE — CONSULT NOTE ADULT - ATTENDING COMMENTS
Anemia
Patient seen and examined. Agree with above.   Bilateral upper lobe scarring and bronchiectasis - likely sarcoid that is not causing symptoms, no mediastinal lymphadenopathy.   A/w cough, chest pain, found to have RVP+ coronavirus - treated with steroids and Levaquin.  Abnormal EKG and stress test - transferred for cardiac cath which was normal.    Denies significant pulmonary symptoms.  no signs/symptoms of new infection.  Concerned about new diagnosis of dysphagia - states repeatedly that he does not want a PEG tube. To be evaluated by ENT today.  On exam, has fine crackles b/l throughout. Not hypoxic.  Rec: c/w Symbicort and bronchodilators. Check O2 sat with ambulation.  PFTs and pulmonary follow up as an outpatient.   DVT ppx.   PT as patient is very deconditioned.    Thank you for the consult
62 yo M with significant retrognathia causing dysphagia and dyspnea, s/p corrective surgery in the 1980s, obesity with BMI 33, LAMBERT non-compliant with nocturnal CPAP, admitted with coronavirus PNA, found to have dysphagia with silent aspiration of all food modalities. The last prior speech and swallow evaluation was in 2017 and showed no silent aspiration.    - recommend re-evaluation by oromaxillary surgery  - PEG tube placement can be considered if surgery is not an option or fails and he develops recurrent clinically significant aspiration events. Pt does not want to consider a PEG tube as not eating would be a too severe change of his life, and reluctantly agreed to try dysphagia diet.
laryngoscopy without overt abnormalities. recommend diet per speech recs.

## 2019-05-08 NOTE — PROGRESS NOTE ADULT - PROBLEM SELECTOR PLAN 2
c/w levaquin 250mg qd and prednisone 40 mg daily - completes treatment today  - c/w duo nebs q6h and symbicort  - saturating well off nasal canula- goal 88-92%  monitor sats as patient with high aspiration risk requesting regular diet despite risks.   reported COPD with history of smoking though reviewing CT chest concern for recurrent aspiration  Pulmonary consult called s/p levaquin 250mg qd and prednisone 40 mg daily x 5days  - c/w duo nebs q6h and symbicort  - saturating well off nasal canula- goal 88-92%  monitor sats as patient with high aspiration risk requesting regular diet despite risks.   reported COPD with history of smoking though reviewing CT chest concern for recurrent aspiration  Pulmonary consult appreciated - c/w symbicort

## 2019-05-08 NOTE — PROGRESS NOTE ADULT - PROBLEM SELECTOR PLAN 1
extensive discussion with patient and sister - latasha (on phone) regarding MBS results and plan of care.     discussed that patient is aspirating with all consistences. discussed two options - either NPO and NGT for feeds with eventual plan for feeding tube vs pleasure feeds. patient states he does not want a feeding tube and wants pleasure feeds. He does not want a dysphagia diet with thickened liquids. He states he understands the risks of aspiration including developing pneumonia, respiratory distress, low oxygen levels, could require intubation, and lead to death. patient wants remain full code and would want to be placed on a ventilator if needed. will call ENT consult and pulmonary consult. extensive discussion with patient and sister - latasha at bedside regarding MBS results and plan of care.   discussed that patient is aspirating with all consistences. discussed two options - either NPO and NGT for feeds with eventual plan for feeding tube vs pleasure feeds. patient states he does not want a feeding tube and wants pleasure feeds. He states he understands the risks of aspiration including developing pneumonia, respiratory distress, low oxygen levels, could require intubation, and lead to death. patient wants remain full code and would want to be placed on a ventilator if needed.   patient now accepting dysphagia diet but does not want PEG tube  OMF surgery consult pending  ENT and GI consults appreciated

## 2019-05-08 NOTE — PROGRESS NOTE ADULT - PROBLEM SELECTOR PLAN 8
- c/w depakote qhs (discussed change to formulation with difficulty swallowing pill - patient wants to remain on current regimen) -depakote changed to Q8 equivalent dosing as patient now on dysphagia diet

## 2019-05-08 NOTE — CONSULT NOTE ADULT - ASSESSMENT
Patient is a 62 yo  male with PMH of remote sarcoid, rheumatoid arthritis, hypothyroidism, chronic thrombocytopenia, BPH, COPD, seizure disorder, prediabetes ( last A1C 5.5 on 5/1), hx of traumatic bilateral SDH, chronic bilateral lower extremity edema, obstructive sleep apnea non compliant with CPAP presenting with COPD exacerbation in the setting of coronavirus, found to have significant oropharyngeal dysphagia.     - No GI intervention as pt is declining PEG tube at this time  - Pt agreeable to dysphagia diet   - OMFS consult to eval for possible jaw surgery, as his significant retrognathia may be contributory  ATTENDING ATTESTATION TO FOLLOW Patient is a 62 yo  male with PMH of remote sarcoid, rheumatoid arthritis, hypothyroidism, chronic thrombocytopenia, BPH, COPD, seizure disorder, prediabetes ( last A1C 5.5 on 5/1), hx of traumatic bilateral SDH, chronic bilateral lower extremity edema, obstructive sleep apnea non compliant with CPAP presenting with COPD exacerbation in the setting of coronavirus, found to have significant oropharyngeal dysphagia.     - No GI intervention as pt is declining PEG tube at this time  - Pt agreeable to dysphagia diet   - OMFS consult to eval for possible repeat jaw surgery, as his significant retrognathia may be contributory  ATTENDING ATTESTATION TO FOLLOW

## 2019-05-08 NOTE — PROGRESS NOTE ADULT - SUBJECTIVE AND OBJECTIVE BOX
Patient is a 63y old  Male who presents with a chief complaint of transfer from [facility] given positive stress test (08 May 2019 11:15)        SUBJECTIVE / OVERNIGHT EVENTS: no acute complaints      MEDICATIONS  (STANDING):  ALBUTerol/ipratropium for Nebulization 3 milliLiter(s) Nebulizer every 6 hours  buDESOnide 160 MICROgram(s)/formoterol 4.5 MICROgram(s) Inhaler 2 Puff(s) Inhalation two times a day  docusate sodium 100 milliGRAM(s) Oral three times a day  furosemide   Injectable 20 milliGRAM(s) IV Push daily  lactobacillus acidophilus 1 Tablet(s) Oral daily  levoFLOXacin IVPB 250 milliGRAM(s) IV Intermittent every 24 hours  levothyroxine Injectable 12.5 MICROGram(s) IV Push at bedtime  polyethylene glycol 3350 17 Gram(s) Oral daily  predniSONE   Tablet 40 milliGRAM(s) Oral once  tamsulosin 0.4 milliGRAM(s) Oral at bedtime  valproate sodium IVPB 500 milliGRAM(s) IV Intermittent every 8 hours    MEDICATIONS  (PRN):  benzonatate 100 milliGRAM(s) Oral three times a day PRN Cough  guaiFENesin    Syrup 100 milliGRAM(s) Oral every 6 hours PRN Cough  senna 2 Tablet(s) Oral at bedtime PRN Constipation      Vital Signs Last 24 Hrs  T(C): 36.7 (08 May 2019 11:48), Max: 36.7 (08 May 2019 04:17)  T(F): 98.1 (08 May 2019 11:48), Max: 98.1 (08 May 2019 11:48)  HR: 75 (08 May 2019 11:48) (68 - 78)  BP: 103/66 (08 May 2019 11:48) (97/67 - 109/70)  BP(mean): --  RR: 18 (08 May 2019 11:48) (18 - 18)  SpO2: 94% (08 May 2019 11:48) (91% - 94%)  CAPILLARY BLOOD GLUCOSE        I&O's Summary    07 May 2019 07:01  -  08 May 2019 07:00  --------------------------------------------------------  IN: 360 mL / OUT: 1550 mL / NET: -1190 mL    08 May 2019 07:01  -  08 May 2019 16:29  --------------------------------------------------------  IN: 480 mL / OUT: 600 mL / NET: -120 mL        PHYSICAL EXAM:  GENERAL: NAD, coughing at times  HEAD:  Atraumatic, Normocephalic  EYES: conjunctiva and sclera clear  NECK: No JVD  CHEST/LUNG: CTA b/l  HEART: S1 S2 RRR  ABDOMEN: +BS Soft, NT/ND  EXTREMITIES:  2+ DP Pulses, No c/c. 1+/b/l LE edema  NEUROLOGY: AAOx3, no focal deficits   SKIN: scabs on b/l LE      LABS:                        16.7   8.0   )-----------( 123      ( 08 May 2019 10:09 )             48.8     05-08    139  |  95<L>  |  34<H>  ----------------------------<  99  4.5   |  32<H>  |  0.81    Ca    9.2      08 May 2019 10:09                RADIOLOGY & ADDITIONAL TESTS:    Imaging Personally Reviewed:  Consultant(s) Notes Reviewed:    Care Discussed with Consultants/Other Providers: Patient is a 63y old  Male who presents with a chief complaint of transfer from [facility] given positive stress test (08 May 2019 11:15)        SUBJECTIVE / OVERNIGHT EVENTS: no acute complaints      MEDICATIONS  (STANDING):  ALBUTerol/ipratropium for Nebulization 3 milliLiter(s) Nebulizer every 6 hours  buDESOnide 160 MICROgram(s)/formoterol 4.5 MICROgram(s) Inhaler 2 Puff(s) Inhalation two times a day  docusate sodium 100 milliGRAM(s) Oral three times a day  furosemide   Injectable 20 milliGRAM(s) IV Push daily  lactobacillus acidophilus 1 Tablet(s) Oral daily  levoFLOXacin IVPB 250 milliGRAM(s) IV Intermittent every 24 hours  levothyroxine Injectable 12.5 MICROGram(s) IV Push at bedtime  polyethylene glycol 3350 17 Gram(s) Oral daily  predniSONE   Tablet 40 milliGRAM(s) Oral once  tamsulosin 0.4 milliGRAM(s) Oral at bedtime  valproate sodium IVPB 500 milliGRAM(s) IV Intermittent every 8 hours    MEDICATIONS  (PRN):  benzonatate 100 milliGRAM(s) Oral three times a day PRN Cough  guaiFENesin    Syrup 100 milliGRAM(s) Oral every 6 hours PRN Cough  senna 2 Tablet(s) Oral at bedtime PRN Constipation      Vital Signs Last 24 Hrs  T(C): 36.7 (08 May 2019 11:48), Max: 36.7 (08 May 2019 04:17)  T(F): 98.1 (08 May 2019 11:48), Max: 98.1 (08 May 2019 11:48)  HR: 75 (08 May 2019 11:48) (68 - 78)  BP: 103/66 (08 May 2019 11:48) (97/67 - 109/70)  BP(mean): --  RR: 18 (08 May 2019 11:48) (18 - 18)  SpO2: 94% (08 May 2019 11:48) (91% - 94%)  CAPILLARY BLOOD GLUCOSE        I&O's Summary    07 May 2019 07:01  -  08 May 2019 07:00  --------------------------------------------------------  IN: 360 mL / OUT: 1550 mL / NET: -1190 mL    08 May 2019 07:01  -  08 May 2019 16:29  --------------------------------------------------------  IN: 480 mL / OUT: 600 mL / NET: -120 mL        PHYSICAL EXAM:  GENERAL: NAD, coughing at times  HEAD:  Atraumatic, Normocephalic  EYES: conjunctiva and sclera clear  NECK: No JVD  CHEST/LUNG: decreased bs at left base  HEART: S1 S2 RRR  ABDOMEN: +BS Soft, NT/ND  EXTREMITIES:  2+ DP Pulses, No c/c. 1+/b/l LE edema  NEUROLOGY: AAOx3, no focal deficits   SKIN: scabs on b/l LE, venous stasis changes b/l    LABS:                        16.7   8.0   )-----------( 123      ( 08 May 2019 10:09 )             48.8     05-08    139  |  95<L>  |  34<H>  ----------------------------<  99  4.5   |  32<H>  |  0.81    Ca    9.2      08 May 2019 10:09                RADIOLOGY & ADDITIONAL TESTS:    Imaging Personally Reviewed:  Consultant(s) Notes Reviewed:    Care Discussed with Consultants/Other Providers:

## 2019-05-08 NOTE — CONSULT NOTE ADULT - REASON FOR ADMISSION
transfer from [facility] given positive stress test
transfer from [facility] given positive stress test
transfer from [facility] given positive stress test
transfer from [facility] given positive stress test

## 2019-05-09 DIAGNOSIS — R73.03 PREDIABETES: ICD-10-CM

## 2019-05-09 LAB
ANION GAP SERPL CALC-SCNC: 11 MMOL/L — SIGNIFICANT CHANGE UP (ref 5–17)
BUN SERPL-MCNC: 33 MG/DL — HIGH (ref 7–23)
CALCIUM SERPL-MCNC: 9.1 MG/DL — SIGNIFICANT CHANGE UP (ref 8.4–10.5)
CHLORIDE SERPL-SCNC: 97 MMOL/L — SIGNIFICANT CHANGE UP (ref 96–108)
CO2 SERPL-SCNC: 31 MMOL/L — SIGNIFICANT CHANGE UP (ref 22–31)
CREAT SERPL-MCNC: 0.82 MG/DL — SIGNIFICANT CHANGE UP (ref 0.5–1.3)
GLUCOSE SERPL-MCNC: 100 MG/DL — HIGH (ref 70–99)
HCT VFR BLD CALC: 45.1 % — SIGNIFICANT CHANGE UP (ref 39–50)
HGB BLD-MCNC: 13.7 G/DL — SIGNIFICANT CHANGE UP (ref 13–17)
MCHC RBC-ENTMCNC: 28.4 PG — SIGNIFICANT CHANGE UP (ref 27–34)
MCHC RBC-ENTMCNC: 30.4 GM/DL — LOW (ref 32–36)
MCV RBC AUTO: 93.3 FL — SIGNIFICANT CHANGE UP (ref 80–100)
PLATELET # BLD AUTO: 113 K/UL — LOW (ref 150–400)
POTASSIUM SERPL-MCNC: 4.1 MMOL/L — SIGNIFICANT CHANGE UP (ref 3.5–5.3)
POTASSIUM SERPL-SCNC: 4.1 MMOL/L — SIGNIFICANT CHANGE UP (ref 3.5–5.3)
RBC # BLD: 4.83 M/UL — SIGNIFICANT CHANGE UP (ref 4.2–5.8)
RBC # FLD: 13.6 % — SIGNIFICANT CHANGE UP (ref 10.3–14.5)
SODIUM SERPL-SCNC: 139 MMOL/L — SIGNIFICANT CHANGE UP (ref 135–145)
WBC # BLD: 8.2 K/UL — SIGNIFICANT CHANGE UP (ref 3.8–10.5)
WBC # FLD AUTO: 8.2 K/UL — SIGNIFICANT CHANGE UP (ref 3.8–10.5)

## 2019-05-09 PROCEDURE — 99233 SBSQ HOSP IP/OBS HIGH 50: CPT

## 2019-05-09 RX ADMIN — BUDESONIDE AND FORMOTEROL FUMARATE DIHYDRATE 2 PUFF(S): 160; 4.5 AEROSOL RESPIRATORY (INHALATION) at 17:08

## 2019-05-09 RX ADMIN — Medication 500 MILLIGRAM(S): at 22:05

## 2019-05-09 RX ADMIN — Medication 3 MILLILITER(S): at 23:40

## 2019-05-09 RX ADMIN — Medication 1 TABLET(S): at 09:57

## 2019-05-09 RX ADMIN — Medication 3 MILLILITER(S): at 11:41

## 2019-05-09 RX ADMIN — Medication 500 MILLIGRAM(S): at 14:51

## 2019-05-09 RX ADMIN — Medication 3 MILLILITER(S): at 17:08

## 2019-05-09 RX ADMIN — Medication 100 MILLIGRAM(S): at 17:08

## 2019-05-09 RX ADMIN — Medication 25 MICROGRAM(S): at 05:21

## 2019-05-09 RX ADMIN — Medication 40 MILLIGRAM(S): at 05:21

## 2019-05-09 RX ADMIN — Medication 2 MILLIGRAM(S): at 22:06

## 2019-05-09 RX ADMIN — Medication 100 MILLIGRAM(S): at 05:21

## 2019-05-09 RX ADMIN — BUDESONIDE AND FORMOTEROL FUMARATE DIHYDRATE 2 PUFF(S): 160; 4.5 AEROSOL RESPIRATORY (INHALATION) at 05:21

## 2019-05-09 RX ADMIN — Medication 3 MILLILITER(S): at 05:21

## 2019-05-09 RX ADMIN — Medication 500 MILLIGRAM(S): at 06:41

## 2019-05-09 RX ADMIN — PANTOPRAZOLE SODIUM 40 MILLIGRAM(S): 20 TABLET, DELAYED RELEASE ORAL at 06:41

## 2019-05-09 NOTE — DIETITIAN INITIAL EVALUATION ADULT. - NS AS NUTRI INTERV ED CONTENT
provided pt with Pureed Nutrition Therapy and Thickened Liquid Consistency Nutrition Therapy as this was what he was on prior to MD liberalizing diet during assessment

## 2019-05-09 NOTE — PROGRESS NOTE ADULT - ATTENDING COMMENTS
d/c planning to rehab vs home  Dr. M. Luke  [facility] Hospitalist  [phone]

## 2019-05-09 NOTE — DIETITIAN INITIAL EVALUATION ADULT. - OTHER INFO
seen for consult for assessment and education, admitted for Reason for Admission: transfer from [facility] given positive stress test, consuming <25% intake secondary to dislike of modified diet.  denies nausea/vomit, denies difficulty chewing /swallow. last BM . NKFA. IBW +/- 10%= seen for consult for assessment and education, admitted from [facility] given positive stress test, consuming <25% intake secondary to dislike of modified diet. denies nausea/vomit, denies difficulty chewing /swallow even though MBS indicated need for NPO, with non-oral nutrition/hydration/medications. last BM today. NKFA. IBW +/- 10%=184pounds. pt insisting on being able to have hard boiled eggs, cold cereal and milk for breakfast. MD just liberalized diet.

## 2019-05-09 NOTE — PROGRESS NOTE ADULT - PROBLEM SELECTOR PLAN 1
extensive discussion again with patient regarding risks of aspiration  He states he understands the risks of aspiration including developing pneumonia, respiratory distress, low oxygen levels, could require intubation, and lead to death. patient wants remain full code and would want to be placed on a ventilator if needed.   patient now refusing dysphagia diet - requested swallow therapist to review appropriate aspiration reduction techniques. will place on regular diabetic diet.  OMF surgery consult appreciated - no surgery recommended at this time - outpatient f/u   ENT and GI consults appreciated  patient will need swallow therapy

## 2019-05-09 NOTE — PROGRESS NOTE ADULT - PROBLEM SELECTOR PLAN 2
sepsis with bronchitis due to coronavirus s/p levaquin 250mg qd and prednisone 40 mg daily x 5days  - c/w duo nebs q6h and symbicort  - saturating well off nasal canula- goal 88-92%  monitor sats as patient with high aspiration risk requesting regular diet despite risks.   reported COPD with history of smoking though reviewing CT chest concern for recurrent aspiration  Pulmonary consult appreciated - c/w symbicort

## 2019-05-09 NOTE — DIETITIAN INITIAL EVALUATION ADULT. - PERTINENT MEDS FT
ALBUTerol/ipratropium for Nebulization 3  benzonatate 100 PRN  buDESOnide 160 MICROgram(s)/formoterol 4.5 MICROgram(s) Inhaler 2  docusate sodium Liquid 100  doxazosin 2  furosemide    Tablet 40  guaiFENesin    Syrup 100 PRN  lactobacillus acidophilus 1  levothyroxine 25  pantoprazole   Suspension 40  polyethylene glycol 3350 17  senna 2 PRN  valproic  acid Syrup 500

## 2019-05-09 NOTE — PROGRESS NOTE ADULT - SUBJECTIVE AND OBJECTIVE BOX
Patient is a 63y old  Male who presents with a chief complaint of transfer from [facility] given positive stress test (08 May 2019 16:57)        SUBJECTIVE / OVERNIGHT EVENTS: unhappy with current diet states he cannot eat it   discussed risks of aspiration again and patient understands and willing to accept risks of aspiration, pneumonia, respiratory failure, and even death.       MEDICATIONS  (STANDING):  ALBUTerol/ipratropium for Nebulization 3 milliLiter(s) Nebulizer every 6 hours  buDESOnide 160 MICROgram(s)/formoterol 4.5 MICROgram(s) Inhaler 2 Puff(s) Inhalation two times a day  docusate sodium Liquid 100 milliGRAM(s) Oral two times a day  doxazosin 2 milliGRAM(s) Oral at bedtime  furosemide    Tablet 40 milliGRAM(s) Oral daily  lactobacillus acidophilus 1 Tablet(s) Oral daily  levothyroxine 25 MICROGram(s) Oral daily  pantoprazole   Suspension 40 milliGRAM(s) Oral before breakfast  polyethylene glycol 3350 17 Gram(s) Oral daily  valproic  acid Syrup 500 milliGRAM(s) Oral every 8 hours    MEDICATIONS  (PRN):  benzonatate 100 milliGRAM(s) Oral three times a day PRN Cough  guaiFENesin    Syrup 100 milliGRAM(s) Oral every 6 hours PRN Cough  senna 2 Tablet(s) Oral at bedtime PRN Constipation      Vital Signs Last 24 Hrs  T(C): 36.4 (09 May 2019 12:17), Max: 36.6 (08 May 2019 20:53)  T(F): 97.6 (09 May 2019 12:17), Max: 97.8 (08 May 2019 20:53)  HR: 67 (09 May 2019 12:17) (66 - 69)  BP: 114/82 (09 May 2019 12:17) (103/70 - 114/82)  BP(mean): --  RR: 18 (09 May 2019 12:17) (18 - 18)  SpO2: 96% (09 May 2019 12:17) (92% - 96%)  CAPILLARY BLOOD GLUCOSE        I&O's Summary    08 May 2019 07:01  -  09 May 2019 07:00  --------------------------------------------------------  IN: 1095 mL / OUT: 1500 mL / NET: -405 mL    09 May 2019 07:01  -  09 May 2019 15:28  --------------------------------------------------------  IN: 300 mL / OUT: 950 mL / NET: -650 mL      PHYSICAL EXAM:  GENERAL: NAD, coughing at times  HEAD:  Atraumatic, Normocephalic  EYES: conjunctiva and sclera clear  NECK: No JVD  CHEST/LUNG: slight crackles at right base, slight wheezing on left  HEART: S1 S2 RRR  ABDOMEN: +BS Soft, NT/ND  EXTREMITIES:  2+ DP Pulses, No c/c. 1+/b/l LE edema  NEUROLOGY: AAOx3, no focal deficits   SKIN: scabs on b/l LE, venous stasis changes b/l      LABS:                        13.7   8.2   )-----------( 113      ( 09 May 2019 06:37 )             45.1     05-09    139  |  97  |  33<H>  ----------------------------<  100<H>  4.1   |  31  |  0.82    Ca    9.1      09 May 2019 06:36                RADIOLOGY & ADDITIONAL TESTS:    Imaging Personally Reviewed:  Consultant(s) Notes Reviewed:    Care Discussed with Consultants/Other Providers:

## 2019-05-09 NOTE — DIETITIAN INITIAL EVALUATION ADULT. - NUTRITION INTERVENTION
Medical Food Supplements/Meals and Snack Nutrition Education/Medical Food Supplements/Meals and Snack

## 2019-05-10 PROCEDURE — 99232 SBSQ HOSP IP/OBS MODERATE 35: CPT

## 2019-05-10 RX ORDER — DIVALPROEX SODIUM 500 MG/1
1500 TABLET, DELAYED RELEASE ORAL AT BEDTIME
Refills: 0 | Status: DISCONTINUED | OUTPATIENT
Start: 2019-05-10 | End: 2019-05-16

## 2019-05-10 RX ORDER — HEPARIN SODIUM 5000 [USP'U]/ML
5000 INJECTION INTRAVENOUS; SUBCUTANEOUS EVERY 8 HOURS
Refills: 0 | Status: DISCONTINUED | OUTPATIENT
Start: 2019-05-10 | End: 2019-05-16

## 2019-05-10 RX ADMIN — POLYETHYLENE GLYCOL 3350 17 GRAM(S): 17 POWDER, FOR SOLUTION ORAL at 09:24

## 2019-05-10 RX ADMIN — Medication 100 MILLIGRAM(S): at 06:15

## 2019-05-10 RX ADMIN — Medication 3 MILLILITER(S): at 17:40

## 2019-05-10 RX ADMIN — Medication 2 MILLIGRAM(S): at 21:19

## 2019-05-10 RX ADMIN — BUDESONIDE AND FORMOTEROL FUMARATE DIHYDRATE 2 PUFF(S): 160; 4.5 AEROSOL RESPIRATORY (INHALATION) at 17:40

## 2019-05-10 RX ADMIN — DIVALPROEX SODIUM 1500 MILLIGRAM(S): 500 TABLET, DELAYED RELEASE ORAL at 21:20

## 2019-05-10 RX ADMIN — Medication 3 MILLILITER(S): at 11:33

## 2019-05-10 RX ADMIN — PANTOPRAZOLE SODIUM 40 MILLIGRAM(S): 20 TABLET, DELAYED RELEASE ORAL at 09:25

## 2019-05-10 RX ADMIN — Medication 500 MILLIGRAM(S): at 13:15

## 2019-05-10 RX ADMIN — Medication 40 MILLIGRAM(S): at 06:15

## 2019-05-10 RX ADMIN — Medication 500 MILLIGRAM(S): at 06:15

## 2019-05-10 RX ADMIN — Medication 3 MILLILITER(S): at 06:14

## 2019-05-10 RX ADMIN — BUDESONIDE AND FORMOTEROL FUMARATE DIHYDRATE 2 PUFF(S): 160; 4.5 AEROSOL RESPIRATORY (INHALATION) at 06:14

## 2019-05-10 RX ADMIN — Medication 25 MICROGRAM(S): at 06:15

## 2019-05-10 RX ADMIN — HEPARIN SODIUM 5000 UNIT(S): 5000 INJECTION INTRAVENOUS; SUBCUTANEOUS at 21:18

## 2019-05-10 RX ADMIN — Medication 100 MILLIGRAM(S): at 17:41

## 2019-05-10 RX ADMIN — Medication 1 TABLET(S): at 09:24

## 2019-05-10 NOTE — PROGRESS NOTE ADULT - PROBLEM SELECTOR PLAN 2
sepsis with bronchitis due to coronavirus s/p levaquin 250mg qd and prednisone 40 mg daily x 5days  - c/w duo nebs q6h and symbicort  - saturating well off nasal canula- goal 88-92%  reported COPD with history of smoking though reviewing CT chest concern for recurrent aspiration  monitor sats as patient with high aspiration risk requesting regular diet despite risks.   Pulmonary consult appreciated - c/w symbicort, nebs

## 2019-05-10 NOTE — PROGRESS NOTE ADULT - PROBLEM SELECTOR PLAN 1
extensive discussion again with patient regarding risks of aspiration  He states he understands the risks of aspiration including developing pneumonia, respiratory distress, low oxygen levels, could require intubation, and lead to death. patient wants remain full code.  patient also refusing dysphagia diet - requested swallow therapist to review appropriate aspiration reduction techniques. will place on regular diabetic diet.  OMF surgery consult (previous oral surgery) appreciated - no surgery recommended at this time as it would not improve dysphagia- outpatient f/u as surgery in future could improve LAMBERT   ENT consult appreciated -lots of secretions but no edema, no vocal cord dysfuntion  GI consult appreciated  patient will need swallow therapy

## 2019-05-10 NOTE — PROGRESS NOTE ADULT - PROBLEM SELECTOR PLAN 7
-depakote changed to Q8 equivalent dosing - patient refusing dysphagia diet and requesting change back to previous dosing of depakote - ER 1500mg qd

## 2019-05-10 NOTE — PROGRESS NOTE ADULT - SUBJECTIVE AND OBJECTIVE BOX
Patient is a 63y old  Male who presents with a chief complaint of transfer from [facility] given positive stress test (09 May 2019 15:28)        SUBJECTIVE / OVERNIGHT EVENTS: no acute complaints. states he is not coughing with diet and following recommendations from swallow therapist.       MEDICATIONS  (STANDING):  ALBUTerol/ipratropium for Nebulization 3 milliLiter(s) Nebulizer every 6 hours  buDESOnide 160 MICROgram(s)/formoterol 4.5 MICROgram(s) Inhaler 2 Puff(s) Inhalation two times a day  docusate sodium Liquid 100 milliGRAM(s) Oral two times a day  doxazosin 2 milliGRAM(s) Oral at bedtime  furosemide    Tablet 40 milliGRAM(s) Oral daily  lactobacillus acidophilus 1 Tablet(s) Oral daily  levothyroxine 25 MICROGram(s) Oral daily  pantoprazole   Suspension 40 milliGRAM(s) Oral before breakfast  polyethylene glycol 3350 17 Gram(s) Oral daily  valproic  acid Syrup 500 milliGRAM(s) Oral every 8 hours    MEDICATIONS  (PRN):  benzonatate 100 milliGRAM(s) Oral three times a day PRN Cough  guaiFENesin    Syrup 100 milliGRAM(s) Oral every 6 hours PRN Cough  senna 2 Tablet(s) Oral at bedtime PRN Constipation      Vital Signs Last 24 Hrs  T(C): 36.8 (10 May 2019 13:17), Max: 36.9 (10 May 2019 04:39)  T(F): 98.2 (10 May 2019 13:17), Max: 98.4 (10 May 2019 04:39)  HR: 78 (10 May 2019 13:17) (67 - 78)  BP: 101/69 (10 May 2019 13:17) (94/56 - 106/72)  BP(mean): --  RR: 18 (10 May 2019 13:17) (18 - 18)  SpO2: 93% (10 May 2019 13:17) (93% - 96%)  CAPILLARY BLOOD GLUCOSE        I&O's Summary    09 May 2019 07:01  -  10 May 2019 07:00  --------------------------------------------------------  IN: 580 mL / OUT: 1450 mL / NET: -870 mL        PHYSICAL EXAM:  GENERAL: NAD, coughing at times  HEAD:  Atraumatic, Normocephalic  EYES: conjunctiva and sclera clear  NECK: No JVD  CHEST/LUNG: slight crackles at right base, no wheezing today  HEART: S1 S2 RRR  ABDOMEN: +BS Soft, NT/ND  EXTREMITIES:  2+ DP Pulses, No c/c. 1+/b/l LE edema  NEUROLOGY: AAOx3, no focal deficits   SKIN: scabs on b/l LE, venous stasis changes b/l    LABS:                        13.7   8.2   )-----------( 113      ( 09 May 2019 06:37 )             45.1     05-09    139  |  97  |  33<H>  ----------------------------<  100<H>  4.1   |  31  |  0.82    Ca    9.1      09 May 2019 06:36                RADIOLOGY & ADDITIONAL TESTS:    Imaging Personally Reviewed:  Consultant(s) Notes Reviewed:    Care Discussed with Consultants/Other Providers:

## 2019-05-10 NOTE — PROGRESS NOTE ADULT - ATTENDING COMMENTS
d/c planning to rehab pending insurance auth  Dr. M. Luke  [facility] Hospitalist  [phone]

## 2019-05-11 LAB
ANION GAP SERPL CALC-SCNC: 11 MMOL/L — SIGNIFICANT CHANGE UP (ref 5–17)
BUN SERPL-MCNC: 36 MG/DL — HIGH (ref 7–23)
CALCIUM SERPL-MCNC: 9.1 MG/DL — SIGNIFICANT CHANGE UP (ref 8.4–10.5)
CHLORIDE SERPL-SCNC: 101 MMOL/L — SIGNIFICANT CHANGE UP (ref 96–108)
CO2 SERPL-SCNC: 29 MMOL/L — SIGNIFICANT CHANGE UP (ref 22–31)
CREAT SERPL-MCNC: 0.74 MG/DL — SIGNIFICANT CHANGE UP (ref 0.5–1.3)
GLUCOSE SERPL-MCNC: 105 MG/DL — HIGH (ref 70–99)
HCT VFR BLD CALC: 41.8 % — SIGNIFICANT CHANGE UP (ref 39–50)
HGB BLD-MCNC: 14 G/DL — SIGNIFICANT CHANGE UP (ref 13–17)
MCHC RBC-ENTMCNC: 31.4 PG — SIGNIFICANT CHANGE UP (ref 27–34)
MCHC RBC-ENTMCNC: 33.6 GM/DL — SIGNIFICANT CHANGE UP (ref 32–36)
MCV RBC AUTO: 93.7 FL — SIGNIFICANT CHANGE UP (ref 80–100)
PLATELET # BLD AUTO: 104 K/UL — LOW (ref 150–400)
POTASSIUM SERPL-MCNC: 4.1 MMOL/L — SIGNIFICANT CHANGE UP (ref 3.5–5.3)
POTASSIUM SERPL-SCNC: 4.1 MMOL/L — SIGNIFICANT CHANGE UP (ref 3.5–5.3)
RBC # BLD: 4.46 M/UL — SIGNIFICANT CHANGE UP (ref 4.2–5.8)
RBC # FLD: 13.6 % — SIGNIFICANT CHANGE UP (ref 10.3–14.5)
SODIUM SERPL-SCNC: 141 MMOL/L — SIGNIFICANT CHANGE UP (ref 135–145)
WBC # BLD: 7 K/UL — SIGNIFICANT CHANGE UP (ref 3.8–10.5)
WBC # FLD AUTO: 7 K/UL — SIGNIFICANT CHANGE UP (ref 3.8–10.5)

## 2019-05-11 PROCEDURE — 99233 SBSQ HOSP IP/OBS HIGH 50: CPT

## 2019-05-11 RX ADMIN — Medication 100 MILLIGRAM(S): at 06:20

## 2019-05-11 RX ADMIN — Medication 3 MILLILITER(S): at 00:06

## 2019-05-11 RX ADMIN — BUDESONIDE AND FORMOTEROL FUMARATE DIHYDRATE 2 PUFF(S): 160; 4.5 AEROSOL RESPIRATORY (INHALATION) at 06:21

## 2019-05-11 RX ADMIN — HEPARIN SODIUM 5000 UNIT(S): 5000 INJECTION INTRAVENOUS; SUBCUTANEOUS at 06:20

## 2019-05-11 RX ADMIN — Medication 3 MILLILITER(S): at 12:29

## 2019-05-11 RX ADMIN — Medication 3 MILLILITER(S): at 22:34

## 2019-05-11 RX ADMIN — HEPARIN SODIUM 5000 UNIT(S): 5000 INJECTION INTRAVENOUS; SUBCUTANEOUS at 22:34

## 2019-05-11 RX ADMIN — DIVALPROEX SODIUM 1500 MILLIGRAM(S): 500 TABLET, DELAYED RELEASE ORAL at 22:34

## 2019-05-11 RX ADMIN — Medication 25 MICROGRAM(S): at 06:20

## 2019-05-11 RX ADMIN — Medication 40 MILLIGRAM(S): at 06:20

## 2019-05-11 RX ADMIN — POLYETHYLENE GLYCOL 3350 17 GRAM(S): 17 POWDER, FOR SOLUTION ORAL at 12:29

## 2019-05-11 RX ADMIN — SENNA PLUS 2 TABLET(S): 8.6 TABLET ORAL at 22:34

## 2019-05-11 RX ADMIN — Medication 2 MILLIGRAM(S): at 22:35

## 2019-05-11 RX ADMIN — PANTOPRAZOLE SODIUM 40 MILLIGRAM(S): 20 TABLET, DELAYED RELEASE ORAL at 06:20

## 2019-05-11 RX ADMIN — BUDESONIDE AND FORMOTEROL FUMARATE DIHYDRATE 2 PUFF(S): 160; 4.5 AEROSOL RESPIRATORY (INHALATION) at 17:03

## 2019-05-11 RX ADMIN — Medication 3 MILLILITER(S): at 17:02

## 2019-05-11 RX ADMIN — HEPARIN SODIUM 5000 UNIT(S): 5000 INJECTION INTRAVENOUS; SUBCUTANEOUS at 15:04

## 2019-05-11 RX ADMIN — Medication 1 TABLET(S): at 12:29

## 2019-05-11 NOTE — PROGRESS NOTE ADULT - SUBJECTIVE AND OBJECTIVE BOX
Patient is a 63y old  Male who presents with a chief complaint of transfer from [facility] given positive stress test (10 May 2019 14:42)      SUBJECTIVE / OVERNIGHT EVENTS:    no overnight events. feeling well today without complaints. awaiting rakesh.    MEDICATIONS  (STANDING):  ALBUTerol/ipratropium for Nebulization 3 milliLiter(s) Nebulizer every 6 hours  buDESOnide 160 MICROgram(s)/formoterol 4.5 MICROgram(s) Inhaler 2 Puff(s) Inhalation two times a day  diVALproex ER 1500 milliGRAM(s) Oral at bedtime  docusate sodium Liquid 100 milliGRAM(s) Oral two times a day  doxazosin 2 milliGRAM(s) Oral at bedtime  furosemide    Tablet 40 milliGRAM(s) Oral daily  heparin  Injectable 5000 Unit(s) SubCutaneous every 8 hours  lactobacillus acidophilus 1 Tablet(s) Oral daily  levothyroxine 25 MICROGram(s) Oral daily  pantoprazole   Suspension 40 milliGRAM(s) Oral before breakfast  polyethylene glycol 3350 17 Gram(s) Oral daily    MEDICATIONS  (PRN):  benzonatate 100 milliGRAM(s) Oral three times a day PRN Cough  guaiFENesin    Syrup 100 milliGRAM(s) Oral every 6 hours PRN Cough  senna 2 Tablet(s) Oral at bedtime PRN Constipation        CAPILLARY BLOOD GLUCOSE        I&O's Summary    10 May 2019 07:01  -  11 May 2019 07:00  --------------------------------------------------------  IN: 500 mL / OUT: 775 mL / NET: -275 mL    11 May 2019 07:01  -  11 May 2019 17:49  --------------------------------------------------------  IN: 600 mL / OUT: 720 mL / NET: -120 mL        PHYSICAL EXAM:  GENERAL: NAD, coughing at times  HEAD:  Atraumatic, Normocephalic  EYES: conjunctiva and sclera clear  NECK: No JVD  CHEST/LUNG: slight crackles at right base, no wheezing today  HEART: S1 S2 RRR  ABDOMEN: +BS Soft, NT/ND  EXTREMITIES:  2+ DP Pulses, No c/c. 1+/b/l LE edema  NEUROLOGY: AAOx3, no focal deficits   SKIN: scabs on b/l LE, venous stasis changes b/l      LABS:                        14.0   7.0   )-----------( 104      ( 11 May 2019 06:26 )             41.8     05-11    141  |  101  |  36<H>  ----------------------------<  105<H>  4.1   |  29  |  0.74    Ca    9.1      11 May 2019 06:26                RADIOLOGY & ADDITIONAL TESTS:    Imaging Personally Reviewed:    Consultant(s) Notes Reviewed:      Care Discussed with Consultants/Other Providers: medicine np

## 2019-05-12 ENCOUNTER — TRANSCRIPTION ENCOUNTER (OUTPATIENT)
Age: 64
End: 2019-05-12

## 2019-05-12 PROCEDURE — 99233 SBSQ HOSP IP/OBS HIGH 50: CPT

## 2019-05-12 RX ORDER — IPRATROPIUM/ALBUTEROL SULFATE 18-103MCG
3 AEROSOL WITH ADAPTER (GRAM) INHALATION EVERY 6 HOURS
Refills: 0 | Status: DISCONTINUED | OUTPATIENT
Start: 2019-05-12 | End: 2019-05-16

## 2019-05-12 RX ADMIN — HEPARIN SODIUM 5000 UNIT(S): 5000 INJECTION INTRAVENOUS; SUBCUTANEOUS at 21:55

## 2019-05-12 RX ADMIN — Medication 2 MILLIGRAM(S): at 21:55

## 2019-05-12 RX ADMIN — Medication 25 MICROGRAM(S): at 06:15

## 2019-05-12 RX ADMIN — Medication 100 MILLIGRAM(S): at 21:57

## 2019-05-12 RX ADMIN — Medication 100 MILLIGRAM(S): at 06:16

## 2019-05-12 RX ADMIN — BUDESONIDE AND FORMOTEROL FUMARATE DIHYDRATE 2 PUFF(S): 160; 4.5 AEROSOL RESPIRATORY (INHALATION) at 17:31

## 2019-05-12 RX ADMIN — Medication 1 TABLET(S): at 12:34

## 2019-05-12 RX ADMIN — Medication 40 MILLIGRAM(S): at 06:15

## 2019-05-12 RX ADMIN — BUDESONIDE AND FORMOTEROL FUMARATE DIHYDRATE 2 PUFF(S): 160; 4.5 AEROSOL RESPIRATORY (INHALATION) at 06:15

## 2019-05-12 RX ADMIN — HEPARIN SODIUM 5000 UNIT(S): 5000 INJECTION INTRAVENOUS; SUBCUTANEOUS at 12:34

## 2019-05-12 RX ADMIN — PANTOPRAZOLE SODIUM 40 MILLIGRAM(S): 20 TABLET, DELAYED RELEASE ORAL at 06:16

## 2019-05-12 RX ADMIN — HEPARIN SODIUM 5000 UNIT(S): 5000 INJECTION INTRAVENOUS; SUBCUTANEOUS at 06:16

## 2019-05-12 RX ADMIN — DIVALPROEX SODIUM 1500 MILLIGRAM(S): 500 TABLET, DELAYED RELEASE ORAL at 21:55

## 2019-05-12 RX ADMIN — Medication 3 MILLILITER(S): at 06:15

## 2019-05-12 RX ADMIN — Medication 3 MILLILITER(S): at 12:34

## 2019-05-12 NOTE — PROGRESS NOTE ADULT - PROBLEM SELECTOR PLAN 2
sepsis with bronchitis due to coronavirus s/p levaquin 250mg qd and prednisone 40 mg daily x 5days  - c/w duo nebs q6h and symbicort. MAKE nebs PRN today  - saturating well off nasal canula- goal 88-92%  reported COPD with history of smoking though reviewing CT chest concern for recurrent aspiration  monitor sats as patient with high aspiration risk requesting regular diet despite risks.   Pulmonary consult appreciated - c/w symbicort, nebs

## 2019-05-12 NOTE — PROGRESS NOTE ADULT - SUBJECTIVE AND OBJECTIVE BOX
Patient is a 63y old  Male who presents with a chief complaint of transfer from [facility] given positive stress test (11 May 2019 17:49)      SUBJECTIVE / OVERNIGHT EVENTS:    no overnight events. awaiting DARYA. feels well today without complaints.    MEDICATIONS  (STANDING):  ALBUTerol/ipratropium for Nebulization 3 milliLiter(s) Nebulizer every 6 hours  buDESOnide 160 MICROgram(s)/formoterol 4.5 MICROgram(s) Inhaler 2 Puff(s) Inhalation two times a day  diVALproex ER 1500 milliGRAM(s) Oral at bedtime  docusate sodium Liquid 100 milliGRAM(s) Oral two times a day  doxazosin 2 milliGRAM(s) Oral at bedtime  furosemide    Tablet 40 milliGRAM(s) Oral daily  heparin  Injectable 5000 Unit(s) SubCutaneous every 8 hours  lactobacillus acidophilus 1 Tablet(s) Oral daily  levothyroxine 25 MICROGram(s) Oral daily  pantoprazole   Suspension 40 milliGRAM(s) Oral before breakfast  polyethylene glycol 3350 17 Gram(s) Oral daily    MEDICATIONS  (PRN):  benzonatate 100 milliGRAM(s) Oral three times a day PRN Cough  guaiFENesin    Syrup 100 milliGRAM(s) Oral every 6 hours PRN Cough  senna 2 Tablet(s) Oral at bedtime PRN Constipation        CAPILLARY BLOOD GLUCOSE        I&O's Summary    11 May 2019 07:01  -  12 May 2019 07:00  --------------------------------------------------------  IN: 1120 mL / OUT: 1570 mL / NET: -450 mL      T 97.6, P 55, /59, R 18, O2 95%RA  PHYSICAL EXAM:  GENERAL: NAD, coughing at times  HEAD:  Atraumatic, Normocephalic  EYES: conjunctiva and sclera clear  NECK: No JVD  CHEST/LUNG: slight crackles at right base, no wheezing today  HEART: S1 S2 RRR  ABDOMEN: +BS Soft, NT/ND  EXTREMITIES:  2+ DP Pulses, No c/c. 1+/b/l LE edema  NEUROLOGY: AAOx3, no focal deficits   SKIN: scabs on b/l LE, venous stasis changes b/l      LABS:                        14.0   7.0   )-----------( 104      ( 11 May 2019 06:26 )             41.8     05-11    141  |  101  |  36<H>  ----------------------------<  105<H>  4.1   |  29  |  0.74    Ca    9.1      11 May 2019 06:26                RADIOLOGY & ADDITIONAL TESTS:    Care Discussed with Consultants/Other Providers: yes, medicine NP

## 2019-05-12 NOTE — DISCHARGE NOTE PROVIDER - CARE PROVIDER_API CALL
Brian Gonsalves)  Family Medicine; Geriatric Medicine  [address] [place] [address]  Phone: [phone]  Fax: [phone]  Follow Up Time: 1 week

## 2019-05-12 NOTE — DISCHARGE NOTE PROVIDER - HOSPITAL COURSE
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  Patient was initially admitted with COPD exacerbation and sepsis from bacterial bronchitis and coronavirus – with CT chest showing chronic changes with scarring and bronchiectasis.  Patient underwent MBS for concern of aspiration - showed silent aspiration to conservative consistencies.  Physician had extensive discussion with patient regarding risks of aspiration, aspiration including developing pneumonia, respiratory distress, low oxygen levels, could require intubation, and lead to death – patient expressed understanding but refused dysphagia diet and is now on regular diet.  ENT was called to evaluate vocal cord function - laryngoscope revealed lots of secretions but no edema or no vocal cord dysfunction.  OMF was consulted due to prior surgery - no role for orthognathic surgery in the treatment of dysphagia – however, there is a role for surgery in the treatment of LAMBERT – patient can follow up as an outpatient.  Patient will need swallow therapy.  Discharge pending placement to acute rehabilitation. 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  Patient was initially admitted with COPD exacerbation and sepsis from bacterial bronchitis and coronavirus – with CT chest showing chronic changes with scarring and bronchiectasis.  Patient underwent MBS for concern of aspiration - showed silent aspiration to conservative consistencies.  Physician had extensive discussion with patient regarding risks of aspiration, aspiration including developing pneumonia, respiratory distress, low oxygen levels, could require intubation, and lead to death – patient expressed understanding but refused dysphagia diet and is now on regular diet.  ENT was called to evaluate vocal cord function - laryngoscope revealed lots of secretions but no edema or no vocal cord dysfunction.  OMF was consulted due to prior surgery - no role for orthognathic surgery in the treatment of dysphagia – however, there is a role for surgery in the treatment of LAMBERT – patient can follow up as an outpatient.  Patient is stable and cleared for discharge to [facility]. 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  Patient was initially admitted with COPD exacerbation and sepsis from bacterial bronchitis and coronavirus – with CT chest showing chronic changes with scarring and bronchiectasis.  Patient underwent MBS for concern of aspiration - showed silent aspiration to conservative consistencies.  Physician had extensive discussion with patient regarding risks of aspiration, aspiration including developing pneumonia, respiratory distress, low oxygen levels, could require intubation, and lead to death – patient expressed understanding but refused dysphagia diet and is now on regular diet.  ENT was called to evaluate vocal cord function - laryngoscope revealed lots of secretions but no edema or no vocal cord dysfunction.  OMF was consulted due to prior surgery - no role for orthognathic surgery in the treatment of dysphagia – however, there is a role for surgery in the treatment of LAMBERT – patient can follow up as an outpatient.  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  Patient was initially admitted with COPD exacerbation and sepsis from bacterial bronchitis and coronavirus – with CT chest showing chronic changes with scarring and bronchiectasis.  Patient underwent MBS for concern of aspiration - showed silent aspiration to conservative consistencies. Clinically undetermined if patient had aspiration pneumonitis or pneumonia. Most likely his symptoms were 2/2 bacterial bronchitis as above. Physician had extensive discussion with patient regarding risks of aspiration, aspiration including developing pneumonia, respiratory distress, low oxygen levels, could require intubation, and lead to death – patient expressed understanding but refused dysphagia diet and is now on regular diet.  ENT was called to evaluate vocal cord function - laryngoscope revealed lots of secretions but no edema or no vocal cord dysfunction.  OMF was consulted due to prior surgery - no role for orthognathic surgery in the treatment of dysphagia – however, there is a role for surgery in the treatment of LAMBERT – patient can follow up as an outpatient.  Patient is stable and cleared for discharge to [facility]    Aspiration diagnosis clinically undetermined.

## 2019-05-12 NOTE — DISCHARGE NOTE PROVIDER - NSDCCPCAREPLAN_GEN_ALL_CORE_FT
PRINCIPAL DISCHARGE DIAGNOSIS  Diagnosis: Abnormal stress test  Assessment and Plan of Treatment: Cardiac catheterization or coronary angiogram is done to understand how the heart is working and to look at the coronary arteries which supply oxygen to the heart muscles, to look at the heart chambers and the valves in the heart.  The doctor uses your groin or your arm to do the procedure  Your doctor will let you know when you can drive and do your usual physical activities  You will need to see your doctor within one week after discharge  Call your doctor if the insertion site bleeds a lot, if you get a fever or have pain, swelling, or redness where the tube went in, or if your leg feels weak, numb, or cold        SECONDARY DISCHARGE DIAGNOSES  Diagnosis: Unsteady gait  Assessment and Plan of Treatment: Pt with significant deconditioning while in hospital. he now requires assistance with walker and is high fall risk. discussed with PT and case management. Per PT recs pt would benefit from DARYA after hospitalization. He has hx of club feet and that is greatly increasing his risks of falls at home. Pt to go to subacute rehab.    Diagnosis: Dysphagia  Assessment and Plan of Treatment: Ongoing discussions held with patient regarding risks of aspiration  He states he understands the risks of aspiration including developing pneumonia, respiratory distress, low oxygen levels, could require intubation, and lead to death. patient wants remain full code.  now agreeable to dysphagia diet (dyspahgia III, nectar liquids)  appropriate aspiration reduction techniques have been instructed.   OMF surgery consult (previous oral surgery) appreciated - no surgery recommended at this time as it would not improve dysphagia- outpatient f/u as surgery in future could improve LAMBERT   ENT seen patient -lots of secretions but no edema, no vocal cord dysfunction  patient will need swallow therapy on discharge.      Diagnosis: Chronic obstructive pulmonary disease (COPD)  Assessment and Plan of Treatment: Call your Health Care provider upon arrival home to make a follow up appointment within one week.  Take all inhalers as prescribed by your Health Care Provider.  Take steroids as prescribed by your Health Care Provider.  If your cough increases infrequency and severity and/or you have shortness of breath or increased shortness of breath call your Health Care Provider.  If you develop fever, chills, night sweats, malaise, and/or change in mental status call your Health care Provider.  Nutrition is very important.  Eat small frequent meals.  Increase your activity as tolerated.  Do not stay in bed all day

## 2019-05-13 PROCEDURE — 99233 SBSQ HOSP IP/OBS HIGH 50: CPT

## 2019-05-13 RX ORDER — SODIUM CHLORIDE 0.65 %
1 AEROSOL, SPRAY (ML) NASAL
Refills: 0 | Status: DISCONTINUED | OUTPATIENT
Start: 2019-05-13 | End: 2019-05-16

## 2019-05-13 RX ADMIN — BUDESONIDE AND FORMOTEROL FUMARATE DIHYDRATE 2 PUFF(S): 160; 4.5 AEROSOL RESPIRATORY (INHALATION) at 05:41

## 2019-05-13 RX ADMIN — PANTOPRAZOLE SODIUM 40 MILLIGRAM(S): 20 TABLET, DELAYED RELEASE ORAL at 05:41

## 2019-05-13 RX ADMIN — Medication 1 SPRAY(S): at 18:04

## 2019-05-13 RX ADMIN — HEPARIN SODIUM 5000 UNIT(S): 5000 INJECTION INTRAVENOUS; SUBCUTANEOUS at 21:39

## 2019-05-13 RX ADMIN — Medication 100 MILLIGRAM(S): at 22:39

## 2019-05-13 RX ADMIN — Medication 2 MILLIGRAM(S): at 21:39

## 2019-05-13 RX ADMIN — HEPARIN SODIUM 5000 UNIT(S): 5000 INJECTION INTRAVENOUS; SUBCUTANEOUS at 05:42

## 2019-05-13 RX ADMIN — Medication 40 MILLIGRAM(S): at 05:41

## 2019-05-13 RX ADMIN — Medication 1 TABLET(S): at 10:16

## 2019-05-13 RX ADMIN — DIVALPROEX SODIUM 1500 MILLIGRAM(S): 500 TABLET, DELAYED RELEASE ORAL at 21:39

## 2019-05-13 RX ADMIN — Medication 100 MILLIGRAM(S): at 18:04

## 2019-05-13 RX ADMIN — Medication 3 MILLILITER(S): at 22:12

## 2019-05-13 RX ADMIN — Medication 25 MICROGRAM(S): at 05:41

## 2019-05-13 RX ADMIN — Medication 100 MILLIGRAM(S): at 05:41

## 2019-05-13 RX ADMIN — BUDESONIDE AND FORMOTEROL FUMARATE DIHYDRATE 2 PUFF(S): 160; 4.5 AEROSOL RESPIRATORY (INHALATION) at 18:04

## 2019-05-13 RX ADMIN — HEPARIN SODIUM 5000 UNIT(S): 5000 INJECTION INTRAVENOUS; SUBCUTANEOUS at 13:25

## 2019-05-13 NOTE — PROGRESS NOTE ADULT - PROBLEM SELECTOR PLAN 1
extensive discussion again with patient regarding risks of aspiration  He states he understands the risks of aspiration including developing pneumonia, respiratory distress, low oxygen levels, could require intubation, and lead to death. patient wants remain full code.  patient also refusing dysphagia diet - requested swallow therapist to review appropriate aspiration reduction techniques. on regular diabetic diet.  OMF surgery consult (previous oral surgery) appreciated - no surgery recommended at this time as it would not improve dysphagia- outpatient f/u as surgery in future could improve LAMBERT   ENT consult appreciated -lots of secretions but no edema, no vocal cord dysfunction  GI consult appreciated  patient will need swallow therapy on discharge.

## 2019-05-13 NOTE — PROGRESS NOTE ADULT - ATTENDING COMMENTS
d/c planning to rehab pending insurance auth  advised to pt again importance of dysphagia diet to minimize his risk of aspiration (although he is silently aspirating will all consistencies). pt will need swallowing therapy upon discharge. will ask Speech/Swallow to provide pt with necessary education and information for follow up.

## 2019-05-13 NOTE — PROGRESS NOTE ADULT - SUBJECTIVE AND OBJECTIVE BOX
Patient is a 63y old  Male who presents with a chief complaint of transfer from [facility] given positive stress test (12 May 2019 18:36)      SUBJECTIVE / OVERNIGHT EVENTS:    feeling fine today. awaiting dc. wants to get "his life back". tired of being in hospital.    MEDICATIONS  (STANDING):  buDESOnide 160 MICROgram(s)/formoterol 4.5 MICROgram(s) Inhaler 2 Puff(s) Inhalation two times a day  diVALproex ER 1500 milliGRAM(s) Oral at bedtime  docusate sodium Liquid 100 milliGRAM(s) Oral two times a day  doxazosin 2 milliGRAM(s) Oral at bedtime  furosemide    Tablet 40 milliGRAM(s) Oral daily  heparin  Injectable 5000 Unit(s) SubCutaneous every 8 hours  lactobacillus acidophilus 1 Tablet(s) Oral daily  levothyroxine 25 MICROGram(s) Oral daily  pantoprazole   Suspension 40 milliGRAM(s) Oral before breakfast  polyethylene glycol 3350 17 Gram(s) Oral daily  sodium chloride 0.65% Nasal 1 Spray(s) Both Nostrils two times a day    MEDICATIONS  (PRN):  ALBUTerol/ipratropium for Nebulization 3 milliLiter(s) Nebulizer every 6 hours PRN Shortness of Breath and/or Wheezing  benzonatate 100 milliGRAM(s) Oral three times a day PRN Cough  guaiFENesin    Syrup 100 milliGRAM(s) Oral every 6 hours PRN Cough  senna 2 Tablet(s) Oral at bedtime PRN Constipation        CAPILLARY BLOOD GLUCOSE        I&O's Summary    12 May 2019 07:01  -  13 May 2019 07:00  --------------------------------------------------------  IN: 800 mL / OUT: 600 mL / NET: 200 mL    13 May 2019 07:01  -  13 May 2019 15:30  --------------------------------------------------------  IN: 600 mL / OUT: 500 mL / NET: 100 mL      T 97.9, P 82, /67, R 18, O2 91-95% RA  PHYSICAL EXAM:  GENERAL: NAD, coughing at times  HEAD:  Atraumatic, Normocephalic  EYES: conjunctiva and sclera clear  NECK: No JVD  CHEST/LUNG: slight crackles at right base, no wheezing today  HEART: S1 S2 RRR  ABDOMEN: +BS Soft, NT/ND  EXTREMITIES:  2+ DP Pulses, No c/c. 1+/b/l LE edema  NEUROLOGY: AAOx3, no focal deficits   SKIN: scabs on b/l LE, venous stasis changes b/    LABS:                    RADIOLOGY & ADDITIONAL TESTS:      Care Discussed with Consultants/Other Providers: case management, medicine NP

## 2019-05-13 NOTE — PROGRESS NOTE ADULT - PROBLEM SELECTOR PLAN 2
sepsis with bronchitis due to coronavirus s/p levaquin 250mg qd and prednisone 40 mg daily x 5days  - c/w duo nebs prn q6h and symbicort.   - saturating well off nasal canula- goal 88-92%  reported COPD with history of smoking though reviewing CT chest concern for recurrent aspiration  monitor sats as patient with high aspiration risk requesting regular diet despite risks.   Pulmonary consult appreciated - c/w symbicort, nebs

## 2019-05-14 DIAGNOSIS — G47.33 OBSTRUCTIVE SLEEP APNEA (ADULT) (PEDIATRIC): ICD-10-CM

## 2019-05-14 PROCEDURE — 99233 SBSQ HOSP IP/OBS HIGH 50: CPT

## 2019-05-14 RX ADMIN — HEPARIN SODIUM 5000 UNIT(S): 5000 INJECTION INTRAVENOUS; SUBCUTANEOUS at 21:46

## 2019-05-14 RX ADMIN — BUDESONIDE AND FORMOTEROL FUMARATE DIHYDRATE 2 PUFF(S): 160; 4.5 AEROSOL RESPIRATORY (INHALATION) at 05:55

## 2019-05-14 RX ADMIN — Medication 1 SPRAY(S): at 05:55

## 2019-05-14 RX ADMIN — Medication 1 TABLET(S): at 12:38

## 2019-05-14 RX ADMIN — HEPARIN SODIUM 5000 UNIT(S): 5000 INJECTION INTRAVENOUS; SUBCUTANEOUS at 05:57

## 2019-05-14 RX ADMIN — HEPARIN SODIUM 5000 UNIT(S): 5000 INJECTION INTRAVENOUS; SUBCUTANEOUS at 15:33

## 2019-05-14 RX ADMIN — Medication 25 MICROGRAM(S): at 05:55

## 2019-05-14 RX ADMIN — Medication 2 MILLIGRAM(S): at 21:46

## 2019-05-14 RX ADMIN — Medication 1 SPRAY(S): at 17:25

## 2019-05-14 RX ADMIN — Medication 40 MILLIGRAM(S): at 05:56

## 2019-05-14 RX ADMIN — BUDESONIDE AND FORMOTEROL FUMARATE DIHYDRATE 2 PUFF(S): 160; 4.5 AEROSOL RESPIRATORY (INHALATION) at 17:25

## 2019-05-14 RX ADMIN — PANTOPRAZOLE SODIUM 40 MILLIGRAM(S): 20 TABLET, DELAYED RELEASE ORAL at 06:06

## 2019-05-14 RX ADMIN — DIVALPROEX SODIUM 1500 MILLIGRAM(S): 500 TABLET, DELAYED RELEASE ORAL at 21:46

## 2019-05-14 NOTE — PROGRESS NOTE ADULT - PROBLEM SELECTOR PLAN 2
sepsis with bronchitis due to coronavirus s/p levaquin 250mg qd and prednisone 40 mg daily x 5days  - c/w duo nebs prn q6h and symbicort.   - saturating well off nasal canula- goal 88-92%  reported COPD with history of smoking though reviewing CT chest concern for recurrent aspiration  Pulmonary consult appreciated - c/w symbicort, nebs

## 2019-05-14 NOTE — PROGRESS NOTE ADULT - PROBLEM SELECTOR PLAN 1
extensive discussion again with patient regarding risks of aspiration  He states he understands the risks of aspiration including developing pneumonia, respiratory distress, low oxygen levels, could require intubation, and lead to death. patient wants remain full code.  now agreeable to dysphagia diet (dyspahgia III, nectar liquids)  appropriate aspiration reduction techniques.   OMF surgery consult (previous oral surgery) appreciated - no surgery recommended at this time as it would not improve dysphagia- outpatient f/u as surgery in future could improve LAMBERT   ENT consult appreciated -lots of secretions but no edema, no vocal cord dysfunction  GI consult appreciated  patient will need swallow therapy on discharge.

## 2019-05-14 NOTE — PROGRESS NOTE ADULT - PROBLEM SELECTOR PROBLEM 5
Skin tear of lower leg without complication, unspecified laterality, initial encounter LAMBERT (obstructive sleep apnea)

## 2019-05-14 NOTE — PROGRESS NOTE ADULT - SUBJECTIVE AND OBJECTIVE BOX
Patient is a 63y old  Male who presents with a chief complaint of transfer from [facility] given positive stress test (13 May 2019 15:30)      SUBJECTIVE / OVERNIGHT EVENTS:    no overnight events. denies cp, sob. pt agreeable yesterday to dysphagia diet, but now he wants to only drink thin liquids- not the nectar.    MEDICATIONS  (STANDING):  buDESOnide 160 MICROgram(s)/formoterol 4.5 MICROgram(s) Inhaler 2 Puff(s) Inhalation two times a day  diVALproex ER 1500 milliGRAM(s) Oral at bedtime  docusate sodium Liquid 100 milliGRAM(s) Oral two times a day  doxazosin 2 milliGRAM(s) Oral at bedtime  furosemide    Tablet 40 milliGRAM(s) Oral daily  heparin  Injectable 5000 Unit(s) SubCutaneous every 8 hours  lactobacillus acidophilus 1 Tablet(s) Oral daily  levothyroxine 25 MICROGram(s) Oral daily  pantoprazole   Suspension 40 milliGRAM(s) Oral before breakfast  polyethylene glycol 3350 17 Gram(s) Oral daily  sodium chloride 0.65% Nasal 1 Spray(s) Both Nostrils two times a day    MEDICATIONS  (PRN):  ALBUTerol/ipratropium for Nebulization 3 milliLiter(s) Nebulizer every 6 hours PRN Shortness of Breath and/or Wheezing  benzonatate 100 milliGRAM(s) Oral three times a day PRN Cough  guaiFENesin    Syrup 100 milliGRAM(s) Oral every 6 hours PRN Cough  senna 2 Tablet(s) Oral at bedtime PRN Constipation        CAPILLARY BLOOD GLUCOSE        I&O's Summary    13 May 2019 07:01  -  14 May 2019 07:00  --------------------------------------------------------  IN: 980 mL / OUT: 800 mL / NET: 180 mL    14 May 2019 07:01  -  14 May 2019 15:18  --------------------------------------------------------  IN: 720 mL / OUT: 650 mL / NET: 70 mL      T 98.2 P 62, /74, R 18, O2 91% RA  PHYSICAL EXAM:  GENERAL: NAD, coughing at times  HEAD:  Atraumatic, Normocephalic  EYES: conjunctiva and sclera clear  NECK: No JVD  CHEST/LUNG: slight crackles at right base, no wheezing today  HEART: S1 S2 RRR  ABDOMEN: +BS Soft, NT/ND  EXTREMITIES:  2+ DP Pulses, No c/c. 1+/b/l LE edema  NEUROLOGY: AAOx3, no focal deficits   SKIN: scabs on b/l LE, venous stasis changes b/    LABS:                    RADIOLOGY & ADDITIONAL TESTS:    Care Discussed with Consultants/Other Providers: medicine NP, case management.

## 2019-05-14 NOTE — PROGRESS NOTE ADULT - ATTENDING COMMENTS
d/c planning to rehab pending insurance auth which currently has been denied. I called x 2 for peer to peer for patient and awaiting call back to discuss case. pt was previously independent and now requiring assistance with walker and is unsteady on his feet. He is a high fall risk and I believe would benefit tremendously from rehab prior to going home.  advised to pt again importance of dysphagia diet to minimize his risk of aspiration (although he is silently aspirating will all consistencies). he is agreeable to dysphagia III diet. pt will need swallowing therapy upon discharge.

## 2019-05-14 NOTE — PROGRESS NOTE ADULT - PROBLEM SELECTOR PLAN 5
-c/w bacitracin and gauze has cpap at home but does not use it. does not want to use one here. I encouraged f/u with pulmonary on dc

## 2019-05-15 DIAGNOSIS — R26.81 UNSTEADINESS ON FEET: ICD-10-CM

## 2019-05-15 PROCEDURE — 99232 SBSQ HOSP IP/OBS MODERATE 35: CPT

## 2019-05-15 RX ADMIN — DIVALPROEX SODIUM 1500 MILLIGRAM(S): 500 TABLET, DELAYED RELEASE ORAL at 22:09

## 2019-05-15 RX ADMIN — HEPARIN SODIUM 5000 UNIT(S): 5000 INJECTION INTRAVENOUS; SUBCUTANEOUS at 14:19

## 2019-05-15 RX ADMIN — Medication 40 MILLIGRAM(S): at 06:26

## 2019-05-15 RX ADMIN — HEPARIN SODIUM 5000 UNIT(S): 5000 INJECTION INTRAVENOUS; SUBCUTANEOUS at 06:26

## 2019-05-15 RX ADMIN — Medication 1 TABLET(S): at 12:12

## 2019-05-15 RX ADMIN — BUDESONIDE AND FORMOTEROL FUMARATE DIHYDRATE 2 PUFF(S): 160; 4.5 AEROSOL RESPIRATORY (INHALATION) at 19:04

## 2019-05-15 RX ADMIN — Medication 1 SPRAY(S): at 19:04

## 2019-05-15 RX ADMIN — HEPARIN SODIUM 5000 UNIT(S): 5000 INJECTION INTRAVENOUS; SUBCUTANEOUS at 22:09

## 2019-05-15 RX ADMIN — Medication 1 SPRAY(S): at 06:25

## 2019-05-15 RX ADMIN — Medication 25 MICROGRAM(S): at 06:26

## 2019-05-15 RX ADMIN — Medication 2 MILLIGRAM(S): at 22:10

## 2019-05-15 RX ADMIN — PANTOPRAZOLE SODIUM 40 MILLIGRAM(S): 20 TABLET, DELAYED RELEASE ORAL at 06:25

## 2019-05-15 RX ADMIN — BUDESONIDE AND FORMOTEROL FUMARATE DIHYDRATE 2 PUFF(S): 160; 4.5 AEROSOL RESPIRATORY (INHALATION) at 06:26

## 2019-05-15 NOTE — CHART NOTE - NSCHARTNOTEFT_GEN_A_CORE
Nutrition Follow Up Note  Patient seen for: follow up    · Reason for Admission	transfer from [facility] given positive stress test    62 yo  male with PMH of remote sarcoid?, rheumatoid arthritis, hypothyroidism, chronic thrombocytopenia, BPH, COPD?, seizure disorder, prediabetes ( last A1C 5.5 on ), hx of traumatic bilateral SDH, chronic bilateral lower extremity edema, obstructive sleep apnea non compliant with CPAP presents as a transfer from [facility] for a positive stress test; s/p unremarkable LHC. Admitted for COPD exacerbation and sepsis from bacterial bronchitis and coronavirus. With concern for aspiration, MBS done which showed silent aspiration to conservative consistencies.     -PT recommending DARYA.          Source: pt, chart, NP    Diet : DYS3NC  Glucerna 2 x daily    Patient reports: he is eating well other than he is complaining about the consistency of the fluid. he continues to request thin milk even though he has been told repeatedly that it is not safe.      PO intake : 15% of breakfast tray this morning       Source for PO intake: pt          Daily Weight in k.1 (05-14), Weight in k.8 (05-13), Weight in k.5 (05-12), Weight in k.5 (05-11), Weight in k (05-10), Weight in k.6 (-09), Weight in k.1 (05-09)  % Weight Change: 2% loss since previous assess on     Pertinent Medications: MEDICATIONS  (STANDING):  buDESOnide 160 MICROgram(s)/formoterol 4.5 MICROgram(s) Inhaler 2 Puff(s) Inhalation two times a day  diVALproex ER 1500 milliGRAM(s) Oral at bedtime  docusate sodium Liquid 100 milliGRAM(s) Oral two times a day  doxazosin 2 milliGRAM(s) Oral at bedtime  furosemide    Tablet 40 milliGRAM(s) Oral daily  heparin  Injectable 5000 Unit(s) SubCutaneous every 8 hours  lactobacillus acidophilus 1 Tablet(s) Oral daily  levothyroxine 25 MICROGram(s) Oral daily  pantoprazole   Suspension 40 milliGRAM(s) Oral before breakfast  polyethylene glycol 3350 17 Gram(s) Oral daily  sodium chloride 0.65% Nasal 1 Spray(s) Both Nostrils two times a day    MEDICATIONS  (PRN):  ALBUTerol/ipratropium for Nebulization 3 milliLiter(s) Nebulizer every 6 hours PRN Shortness of Breath and/or Wheezing  benzonatate 100 milliGRAM(s) Oral three times a day PRN Cough  guaiFENesin    Syrup 100 milliGRAM(s) Oral every 6 hours PRN Cough  senna 2 Tablet(s) Oral at bedtime PRN Constipation    Pertinent Labs: BUN 36, Glucose 105,           Hemoglobin A1C, Whole Blood: 5.5 % ( @ 06:48)            Skin per nursing documentation: no pressure injuries  Edema: +1 left foot, right foot, left ankle, right ankle    Estimated Needs:   [x ] no change since previous assessment  [ ] recalculated:     Previous Nutrition Diagnosis: Not ready for diet/lifestyle change  Nutrition Diagnosis is: ongoing-pt continues to request to be on diet that is not consistent with speech and swallow recommendations            Recommend  · Meals and Snacks: continue Dysphagia 3 nectar consistency    · Medical and Food Supplements: continue health shakes with meals    -Discontinue Glucerna 2 daily.       Monitoring and Evaluation:     Continue to monitor Nutritional intake, Tolerance to diet prescription, weights, labs, skin integrity    RD remains available upon request and will follow up per protocol  Taisha Feliciano MA, SHARAN, CDN #[phone]

## 2019-05-15 NOTE — PROGRESS NOTE ADULT - PROBLEM SELECTOR PLAN 5
has cpap at home but does not use it. does not want to use one here. I encouraged f/u with pulmonary on dc a1c 5.5  diabetic diet for now   encourage weight loss

## 2019-05-15 NOTE — PROGRESS NOTE ADULT - PROBLEM SELECTOR PLAN 4
a1c 5.5  diabetic diet for now   encourage weight loss -resolved  - supportive care   - robitussin and tessalon pearles prn

## 2019-05-15 NOTE — PROGRESS NOTE ADULT - SUBJECTIVE AND OBJECTIVE BOX
Patient is a 63y old  Male who presents with a chief complaint of transfer from [facility] given positive stress test (14 May 2019 15:18)      SUBJECTIVE / OVERNIGHT EVENTS:    no overnight events. pt is getting frustrated and wants to go home.   denies cp, sob, cough.    MEDICATIONS  (STANDING):  buDESOnide 160 MICROgram(s)/formoterol 4.5 MICROgram(s) Inhaler 2 Puff(s) Inhalation two times a day  diVALproex ER 1500 milliGRAM(s) Oral at bedtime  docusate sodium Liquid 100 milliGRAM(s) Oral two times a day  doxazosin 2 milliGRAM(s) Oral at bedtime  furosemide    Tablet 40 milliGRAM(s) Oral daily  heparin  Injectable 5000 Unit(s) SubCutaneous every 8 hours  lactobacillus acidophilus 1 Tablet(s) Oral daily  levothyroxine 25 MICROGram(s) Oral daily  pantoprazole   Suspension 40 milliGRAM(s) Oral before breakfast  polyethylene glycol 3350 17 Gram(s) Oral daily  sodium chloride 0.65% Nasal 1 Spray(s) Both Nostrils two times a day    MEDICATIONS  (PRN):  ALBUTerol/ipratropium for Nebulization 3 milliLiter(s) Nebulizer every 6 hours PRN Shortness of Breath and/or Wheezing  benzonatate 100 milliGRAM(s) Oral three times a day PRN Cough  guaiFENesin    Syrup 100 milliGRAM(s) Oral every 6 hours PRN Cough  senna 2 Tablet(s) Oral at bedtime PRN Constipation        CAPILLARY BLOOD GLUCOSE        I&O's Summary    14 May 2019 07:01  -  15 May 2019 07:00  --------------------------------------------------------  IN: 1100 mL / OUT: 1300 mL / NET: -200 mL      T 97.7, P 67, /64, R 18, O2 91% RA  PHYSICAL EXAM:  GENERAL: NAD, coughing at times  HEAD:  Atraumatic, Normocephalic, retracted jaw  EYES: conjunctiva and sclera clear  NECK: No JVD  CHEST/LUNG: coarse bs but no wheezing. when pt coughs lungs CTA.   HEART: S1 S2 RRR  ABDOMEN: +BS Soft, NT/ND  EXTREMITIES:  2+ DP Pulses, No c/c. 1+/b/l LE edema  NEUROLOGY: AAOx3, no focal deficits   SKIN: scabs on b/l LE, venous stasis changes b/l      LABS:                    RADIOLOGY & ADDITIONAL TESTS:    Care Discussed with Consultants/Other Providers: case management

## 2019-05-15 NOTE — PROGRESS NOTE ADULT - PROBLEM SELECTOR PLAN 1
ongoing discussions held with patient regarding risks of aspiration  He states he understands the risks of aspiration including developing pneumonia, respiratory distress, low oxygen levels, could require intubation, and lead to death. patient wants remain full code.  now agreeable to dysphagia diet (dyspahgia III, nectar liquids)  appropriate aspiration reduction techniques have been instructed.   OMF surgery consult (previous oral surgery) appreciated - no surgery recommended at this time as it would not improve dysphagia- outpatient f/u as surgery in future could improve LAMBERT   ENT consult appreciated -lots of secretions but no edema, no vocal cord dysfunction  GI consult appreciated  patient will need swallow therapy on discharge. pt with significant deconditioning while in hospital. he now requires assistance with walker and is high fall risk. discussed with PT and case management. Per PT recs pt would benefit from DARYA after hospitalization. He has hx of club feet and that is greatly increasing his risks of falls at home. Pt currently denied by insurance company for rehab- I called 3 times yesterday to medical director at [facility] for Peer to Peer to discuss insurance denial. I called again today and left voicemail with my call back. awaiting call back to discuss pts case. I believe it would be unsafe to let patient go home.

## 2019-05-15 NOTE — PROGRESS NOTE ADULT - ATTENDING COMMENTS
d/c planning to rehab pending insurance auth  advised to pt again importance of dysphagia diet to minimize his risk of aspiration (although he is silently aspirating will all consistencies). pt will need swallowing therapy upon discharge. will ask Speech/Swallow to provide pt with necessary education and information for follow up. d/c planning to rehab pending insurance auth- trying to do peer to peer as above (see problem 1). awaiting call back from medical director.   advised to pt again importance of dysphagia diet to minimize his risk of aspiration (although he is silently aspirating will all consistencies). pt will need swallowing therapy upon discharge. will ask Speech/Swallow to provide pt with necessary education and information for follow up.

## 2019-05-15 NOTE — PROGRESS NOTE ADULT - PROBLEM SELECTOR PLAN 6
- c/w flomax has cpap at home but does not use it. does not want to use one here. I encouraged f/u with pulmonary on dc

## 2019-05-15 NOTE — PROGRESS NOTE ADULT - PROBLEM SELECTOR PLAN 3
Ordering Physician: Myra Dean NP    Order Type: Verbal     During visit today patient received injection of depo to left glut. Patient tolerated well, no allergic reaction noted. Requested patient to remain 10 minutes after injection.     Pre-Pain Scale:None     Post Pain Scale:None   -resolved  - supportive care   - robitussin and tessalon pearles prn sepsis with bronchitis due to coronavirus s/p levaquin 250mg qd and prednisone 40 mg daily x 5days. now resolved.  - c/w duo nebs prn q6h and symbicort.   - saturating well off nasal canula- goal 88-92%  reported COPD with history of smoking though reviewing CT chest concern for recurrent aspiration  Pulmonary consult appreciated - c/w symbicort, nebs

## 2019-05-16 ENCOUNTER — TRANSCRIPTION ENCOUNTER (OUTPATIENT)
Age: 64
End: 2019-05-16

## 2019-05-16 VITALS
RESPIRATION RATE: 18 BRPM | HEART RATE: 79 BPM | TEMPERATURE: 98 F | DIASTOLIC BLOOD PRESSURE: 73 MMHG | OXYGEN SATURATION: 95 % | SYSTOLIC BLOOD PRESSURE: 107 MMHG

## 2019-05-16 DIAGNOSIS — R26.81 UNSTEADINESS ON FEET: ICD-10-CM

## 2019-05-16 PROCEDURE — C1769: CPT

## 2019-05-16 PROCEDURE — 93458 L HRT ARTERY/VENTRICLE ANGIO: CPT

## 2019-05-16 PROCEDURE — 92610 EVALUATE SWALLOWING FUNCTION: CPT

## 2019-05-16 PROCEDURE — 92526 ORAL FUNCTION THERAPY: CPT

## 2019-05-16 PROCEDURE — 97110 THERAPEUTIC EXERCISES: CPT

## 2019-05-16 PROCEDURE — C1887: CPT

## 2019-05-16 PROCEDURE — 94640 AIRWAY INHALATION TREATMENT: CPT

## 2019-05-16 PROCEDURE — 93005 ELECTROCARDIOGRAM TRACING: CPT

## 2019-05-16 PROCEDURE — 99152 MOD SED SAME PHYS/QHP 5/>YRS: CPT

## 2019-05-16 PROCEDURE — 99239 HOSP IP/OBS DSCHRG MGMT >30: CPT

## 2019-05-16 PROCEDURE — C1894: CPT

## 2019-05-16 PROCEDURE — 85027 COMPLETE CBC AUTOMATED: CPT

## 2019-05-16 PROCEDURE — 80048 BASIC METABOLIC PNL TOTAL CA: CPT

## 2019-05-16 PROCEDURE — 97162 PT EVAL MOD COMPLEX 30 MIN: CPT

## 2019-05-16 PROCEDURE — 97116 GAIT TRAINING THERAPY: CPT

## 2019-05-16 PROCEDURE — 82962 GLUCOSE BLOOD TEST: CPT

## 2019-05-16 PROCEDURE — 74230 X-RAY XM SWLNG FUNCJ C+: CPT

## 2019-05-16 RX ORDER — DOCUSATE SODIUM 100 MG
10 CAPSULE ORAL
Qty: 0 | Refills: 0 | DISCHARGE
Start: 2019-05-16

## 2019-05-16 RX ORDER — DOXAZOSIN MESYLATE 4 MG
1 TABLET ORAL
Qty: 0 | Refills: 0 | DISCHARGE
Start: 2019-05-16

## 2019-05-16 RX ORDER — SENNA PLUS 8.6 MG/1
2 TABLET ORAL
Qty: 0 | Refills: 0 | DISCHARGE
Start: 2019-05-16

## 2019-05-16 RX ORDER — DIVALPROEX SODIUM 500 MG/1
3 TABLET, DELAYED RELEASE ORAL
Qty: 0 | Refills: 0 | DISCHARGE
Start: 2019-05-16

## 2019-05-16 RX ORDER — FUROSEMIDE 40 MG
1 TABLET ORAL
Qty: 0 | Refills: 0 | DISCHARGE
Start: 2019-05-16

## 2019-05-16 RX ORDER — IPRATROPIUM/ALBUTEROL SULFATE 18-103MCG
3 AEROSOL WITH ADAPTER (GRAM) INHALATION
Qty: 0 | Refills: 0 | DISCHARGE
Start: 2019-05-16

## 2019-05-16 RX ORDER — IPRATROPIUM/ALBUTEROL SULFATE 18-103MCG
3 AEROSOL WITH ADAPTER (GRAM) INHALATION
Qty: 0 | Refills: 0 | DISCHARGE

## 2019-05-16 RX ORDER — SODIUM CHLORIDE 0.65 %
1 AEROSOL, SPRAY (ML) NASAL
Qty: 0 | Refills: 0 | DISCHARGE
Start: 2019-05-16

## 2019-05-16 RX ORDER — POLYETHYLENE GLYCOL 3350 17 G/17G
17 POWDER, FOR SOLUTION ORAL
Qty: 0 | Refills: 0 | DISCHARGE
Start: 2019-05-16

## 2019-05-16 RX ORDER — LACTOBACILLUS ACIDOPHILUS 100MM CELL
1 CAPSULE ORAL
Qty: 0 | Refills: 0 | DISCHARGE
Start: 2019-05-16

## 2019-05-16 RX ADMIN — Medication 1 SPRAY(S): at 06:12

## 2019-05-16 RX ADMIN — Medication 40 MILLIGRAM(S): at 06:11

## 2019-05-16 RX ADMIN — BUDESONIDE AND FORMOTEROL FUMARATE DIHYDRATE 2 PUFF(S): 160; 4.5 AEROSOL RESPIRATORY (INHALATION) at 06:12

## 2019-05-16 RX ADMIN — Medication 1 TABLET(S): at 12:49

## 2019-05-16 RX ADMIN — PANTOPRAZOLE SODIUM 40 MILLIGRAM(S): 20 TABLET, DELAYED RELEASE ORAL at 06:11

## 2019-05-16 RX ADMIN — HEPARIN SODIUM 5000 UNIT(S): 5000 INJECTION INTRAVENOUS; SUBCUTANEOUS at 06:11

## 2019-05-16 RX ADMIN — HEPARIN SODIUM 5000 UNIT(S): 5000 INJECTION INTRAVENOUS; SUBCUTANEOUS at 14:46

## 2019-05-16 RX ADMIN — Medication 25 MICROGRAM(S): at 06:11

## 2019-05-16 NOTE — DISCHARGE NOTE NURSING/CASE MANAGEMENT/SOCIAL WORK - NSDCDPATPORTLINK_GEN_ALL_CORE
You can access the [facility] Patient Portal, offered by [facility], by registering with the following website: [email or web address]

## 2019-05-16 NOTE — PROGRESS NOTE ADULT - ATTENDING COMMENTS
d/c planning to rehab pending insurance auth confirmation-- see update above. Denial was reversed based on my discussion with medical director yesterday.     KAREN Onofre  pager [phone]

## 2019-05-16 NOTE — PROGRESS NOTE ADULT - PROBLEM SELECTOR PLAN 10
-no pharm dvt ppx in the setting of thrombocytopenia   -PT recommending DARYA
dvt proph - hsq (monitor platelets)  -PT recommending DARYA
-no pharm dvt ppx in the setting of thrombocytopenia   -PT recommending DARYA
dvt proph - hsq (monitor platelets)  -PT recommending DARYA

## 2019-05-16 NOTE — PROGRESS NOTE ADULT - PROBLEM SELECTOR PLAN 3
sepsis with bronchitis due to coronavirus s/p levaquin 250mg qd and prednisone 40 mg daily x 5days. now resolved.  - c/w duo nebs prn q6h and symbicort.   - saturating well off nasal canula- goal 88-92%  reported COPD with history of smoking though reviewing CT chest concern for recurrent aspiration  Pulmonary consult appreciated - c/w symbicort, nebs

## 2019-05-16 NOTE — PROGRESS NOTE ADULT - PROBLEM SELECTOR PLAN 6
has cpap at home but does not use it. does not want to use one here. I encouraged f/u with pulmonary on dc

## 2019-05-16 NOTE — PROGRESS NOTE ADULT - ASSESSMENT
62 yo  male with PMH of remote sarcoid, rheumatoid arthritis, hypothyroidism, chronic thrombocytopenia, BPH, COPD, seizure disorder, prediabetes ( last A1C 5.5 on 5/1), hx of traumatic bilateral SDH, chronic bilateral lower extremity edema, obstructive sleep apnea non compliant with CPAP presents as a transfer from [facility] for a positive stress test; s/p unremarkable LHC. Admitted for COPD exacerbation and sepsis from bacterial bronchitis and coronavirus.
62 yo  male with PMH of remote sarcoid, rheumatoid arthritis, hypothyroidism, chronic thrombocytopenia, BPH, COPD, seizure disorder, prediabetes ( last A1C 5.5 on 5/1), hx of traumatic bilateral SDH, chronic bilateral lower extremity edema, obstructive sleep apnea non compliant with CPAP presents as a transfer from [facility] for a positive stress test; s/p unremarkable LHC. Admitted for COPD exacerbation and sepsis from bacterial bronchitis and coronavirus.
62 yo  male with PMH of remote sarcoid?, rheumatoid arthritis, hypothyroidism, chronic thrombocytopenia, BPH, COPD?, seizure disorder, prediabetes ( last A1C 5.5 on 5/1), hx of traumatic bilateral SDH, chronic bilateral lower extremity edema, obstructive sleep apnea non compliant with CPAP presents as a transfer from [facility] for a positive stress test; s/p unremarkable LHC. Admitted for COPD exacerbation and sepsis from bacterial bronchitis and coronavirus. With concern for aspiration, MBS done which showed silent aspiration to conservative consistencies.
62 yo  male with PMH of remote sarcoid?, rheumatoid arthritis, hypothyroidism, chronic thrombocytopenia, BPH, COPD?, seizure disorder, prediabetes ( last A1C 5.5 on 5/1), hx of traumatic bilateral SDH, chronic bilateral lower extremity edema, obstructive sleep apnea non compliant with CPAP presents as a transfer from [facility] for a positive stress test; s/p unremarkable LHC. Admitted for COPD exacerbation and sepsis from bacterial bronchitis and coronavirus. With concern for aspiration, MBS done which showed silent aspiration to conservative consistencies.
62 yo  male with PMH of remote sarcoid?, rheumatoid arthritis, hypothyroidism, chronic thrombocytopenia, BPH, COPD?, seizure disorder, prediabetes ( last A1C 5.5 on 5/1), hx of traumatic bilateral SDH, chronic bilateral lower extremity edema, obstructive sleep apnea non compliant with CPAP presents as a transfer from [facility] for a positive stress test; s/p unremarkable LHC. Admitted for COPD exacerbation and sepsis from bacterial bronchitis and coronavirus. With concern for aspiration, MBS done which showed silent aspiration to conservative consistencies.
62 yo  male with PMH of remote sarcoid?, rheumatoid arthritis, hypothyroidism, chronic thrombocytopenia, BPH, COPD?, seizure disorder, prediabetes ( last A1C 5.5 on 5/1), hx of traumatic bilateral SDH, chronic bilateral lower extremity edema, obstructive sleep apnea non compliant with CPAP presents as a transfer from [facility] for a positive stress test; s/p unremarkable LHC. Admitted for COPD exacerbation and sepsis from bacterial bronchitis and coronavirus. With concern for aspiration, MBS done which showed silent aspiration to conservative consistencies.
64 yo  male with PMH of remote sarcoid?, rheumatoid arthritis, hypothyroidism, chronic thrombocytopenia, BPH, COPD?, seizure disorder, prediabetes ( last A1C 5.5 on 5/1), hx of traumatic bilateral SDH, chronic bilateral lower extremity edema, obstructive sleep apnea non compliant with CPAP presents as a transfer from [facility] for a positive stress test; s/p unremarkable LHC. Admitted for COPD exacerbation and sepsis from bacterial bronchitis and coronavirus. With concern for aspiration, MBS done which showed silent aspiration to conservative consistencies.
64 yo  male with PMH of remote sarcoid?, rheumatoid arthritis, hypothyroidism, chronic thrombocytopenia, BPH, COPD?, seizure disorder, prediabetes ( last A1C 5.5 on 5/1), hx of traumatic bilateral SDH, chronic bilateral lower extremity edema, obstructive sleep apnea non compliant with CPAP presents as a transfer from [facility] for a positive stress test; s/p unremarkable LHC. Admitted for COPD exacerbation and sepsis from bacterial bronchitis and coronavirus. With concern for aspiration, MBS done which showed silent aspiration to conservative consistencies.
64 yo  male with PMH of remote sarcoid?, rheumatoid arthritis, hypothyroidism, chronic thrombocytopenia, BPH, COPD?, seizure disorder, prediabetes ( last A1C 5.5 on 5/1), hx of traumatic bilateral SDH, chronic bilateral lower extremity edema, obstructive sleep apnea non compliant with CPAP presents as a transfer from [facility] for a positive stress test; s/p unremarkable LHC. Admitted for COPD exacerbation and sepsis from bacterial bronchitis and coronavirus. With concern for aspiration, MBS done which showed silent aspiration to conservative consistencies.
Patient is a 64 yo  male with PMH of remote sarcoid, rheumatoid arthritis, hypothyroidism, chronic thrombocytopenia, BPH, COPD, seizure disorder, prediabetes ( last A1C 5.5 on 5/1), hx of traumatic bilateral SDH, chronic bilateral lower extremity edema, obstructive sleep apnea non compliant with CPAP presents as a transfer from [facility] for a positive stress test; s/p unremarkable LHC. Admitted for COPD exacerbation 2' sepsis from bacterial bronchitis and coronavirus. Discharge pending placement to acute rehabilitation.
64 yo  male with PMH of remote sarcoid?, rheumatoid arthritis, hypothyroidism, chronic thrombocytopenia, BPH, COPD?, seizure disorder, prediabetes ( last A1C 5.5 on 5/1), hx of traumatic bilateral SDH, chronic bilateral lower extremity edema, obstructive sleep apnea non compliant with CPAP presents as a transfer from [facility] for a positive stress test; s/p unremarkable LHC. Admitted for COPD exacerbation and sepsis from bacterial bronchitis and coronavirus. With concern for aspiration, MBS done which showed silent aspiration to conservative consistencies.
62 yo  male with PMH of remote sarcoid?, rheumatoid arthritis, hypothyroidism, chronic thrombocytopenia, BPH, COPD?, seizure disorder, prediabetes ( last A1C 5.5 on 5/1), hx of traumatic bilateral SDH, chronic bilateral lower extremity edema, obstructive sleep apnea non compliant with CPAP presents as a transfer from [facility] for a positive stress test; s/p unremarkable LHC. Admitted for COPD exacerbation and sepsis from bacterial bronchitis and coronavirus. With concern for aspiration, MBS done which showed silent aspiration to conservative consistencies.
62 yo  male with PMH of remote sarcoid?, rheumatoid arthritis, hypothyroidism, chronic thrombocytopenia, BPH, COPD?, seizure disorder, prediabetes ( last A1C 5.5 on 5/1), hx of traumatic bilateral SDH, chronic bilateral lower extremity edema, obstructive sleep apnea non compliant with CPAP presents as a transfer from [facility] for a positive stress test; s/p unremarkable LHC. Admitted for COPD exacerbation and sepsis from bacterial bronchitis and coronavirus. With concern for aspiration, MBS done which showed silent aspiration to conservative consistencies.

## 2019-05-16 NOTE — PROGRESS NOTE ADULT - PROBLEM SELECTOR PLAN 1
pt with significant deconditioning while in hospital. he now requires assistance with walker and is high fall risk. discussed with PT and case management. Per PT recs pt would benefit from DARYA after hospitalization. He has hx of club feet and that is greatly increasing his risks of falls at home. INsurance denial for short term rehab now revsersed after speaking with medical director at [facility]. Awaiting confirmation

## 2019-05-16 NOTE — PROGRESS NOTE ADULT - PROBLEM SELECTOR PLAN 2
ongoing discussions held with patient regarding risks of aspiration  He states he understands the risks of aspiration including developing pneumonia, respiratory distress, low oxygen levels, could require intubation, and lead to death. patient wants remain full code.  now agreeable to dysphagia diet (dyspahgia III) but refusing thickened liquids  appropriate aspiration reduction techniques have been instructed.   OMF surgery consult (previous oral surgery) appreciated - no surgery recommended at this time as it would not improve dysphagia- outpatient f/u as surgery in future could improve LAMBERT   ENT consult appreciated -lots of secretions but no edema, no vocal cord dysfunction  GI consult appreciated  patient will need swallow therapy on discharge.

## 2019-05-16 NOTE — DISCHARGE NOTE NURSING/CASE MANAGEMENT/SOCIAL WORK - NSDCPNINST_GEN_ALL_CORE
right wrist any bleeding hardness swelling go emergency room . any chest pain shortness of breath palpations go emergency room.

## 2019-05-16 NOTE — PROGRESS NOTE ADULT - SUBJECTIVE AND OBJECTIVE BOX
Patient is a 63y old  Male who presents with a chief complaint of transfer from [facility] given positive stress test (15 May 2019 12:46)      SUBJECTIVE / OVERNIGHT EVENTS:    no overnight events. no complaints today. continues to await insurance auth.  I spoke with medical director of [facility] yesterday. Dr Le who approved reversal of DARYA denial.  awaiting this confirmation from .    MEDICATIONS  (STANDING):  buDESOnide 160 MICROgram(s)/formoterol 4.5 MICROgram(s) Inhaler 2 Puff(s) Inhalation two times a day  diVALproex ER 1500 milliGRAM(s) Oral at bedtime  docusate sodium Liquid 100 milliGRAM(s) Oral two times a day  doxazosin 2 milliGRAM(s) Oral at bedtime  furosemide    Tablet 40 milliGRAM(s) Oral daily  heparin  Injectable 5000 Unit(s) SubCutaneous every 8 hours  lactobacillus acidophilus 1 Tablet(s) Oral daily  levothyroxine 25 MICROGram(s) Oral daily  pantoprazole   Suspension 40 milliGRAM(s) Oral before breakfast  polyethylene glycol 3350 17 Gram(s) Oral daily  sodium chloride 0.65% Nasal 1 Spray(s) Both Nostrils two times a day    MEDICATIONS  (PRN):  ALBUTerol/ipratropium for Nebulization 3 milliLiter(s) Nebulizer every 6 hours PRN Shortness of Breath and/or Wheezing  benzonatate 100 milliGRAM(s) Oral three times a day PRN Cough  guaiFENesin    Syrup 100 milliGRAM(s) Oral every 6 hours PRN Cough  senna 2 Tablet(s) Oral at bedtime PRN Constipation        CAPILLARY BLOOD GLUCOSE        I&O's Summary    15 May 2019 07:01  -  16 May 2019 07:00  --------------------------------------------------------  IN: 420 mL / OUT: 1600 mL / NET: -1180 mL      T 98.2 P 64, /73, R 18, O2 97% RA  PHYSICAL EXAM:  GENERAL: NAD, coughing at times  HEAD:  Atraumatic, Normocephalic, retracted jaw  EYES: conjunctiva and sclera clear  NECK: No JVD  CHEST/LUNG: coarse bs but no wheezing. when pt coughs lungs CTA.   HEART: S1 S2 RRR  ABDOMEN: +BS Soft, NT/ND  EXTREMITIES:  2+ DP Pulses, No c/c. 1+/b/l LE edema  NEUROLOGY: AAOx3, no focal deficits   SKIN: scabs on b/l LE, venous stasis changes b/l    LABS:                    RADIOLOGY & ADDITIONAL TESTS:      Care Discussed with Consultants/Other Providers: case management. medicine np.

## 2019-05-16 NOTE — PROGRESS NOTE ADULT - REASON FOR ADMISSION
transfer from [facility] given positive stress test
transfer from [facility] given positive stress test
transfer from [facility] given positive stress test
transfer from [facility] given positive stress test
transfer from [facility] given positive stress test
transfer from [facility] given positive stress test
transfer from [facility] given positive stress test
transfer from [facility] given positive stress test
transfer from [facility] given positive stress test
transfer from [facility] given positive stress test
transfer from [facility] given positive stress test
transfer from [facility] given positive stress test
transfer from [facility] given positive stress test

## 2019-05-16 NOTE — PROGRESS NOTE ADULT - PROVIDER SPECIALTY LIST ADULT
Hospitalist
Internal Medicine
Hospitalist
Internal Medicine

## 2019-05-17 NOTE — DISCUSSION/SUMMARY
[FreeTextEntry1] : Patient was discharged to: Sub-Acute rehab. [Rehab] : patient was discharged to rehab

## 2019-05-24 NOTE — CDI QUERY NOTE - NSCDIOTHERTXTBX_GEN_ALL_CORE_HH
Dear Dr. Anderson                   Date: 5/24/19    The Physician’s or Provider’s documentation of the patient’s presentation, evaluation and  medical management, as identified below, may support a diagnosis that is not documented in the medical record.  In order to accurately capture all diagnoses to the greatest degree of specificity reflecting the patient’s actual severity of illness, the documentation in this patient’s medical record requires additional clarification.  Please include more specific documentation, either known or suspected, of a corresponding diagnosis associated with the clinical information described below in your Progress Note and/or Discharge Summary.    Patient transferred to [facility] due to a positive stress test. Prior to transfer patient with COPD exacerbation, Sepsis, and coronavirus without pneumonia. During this admission, progress notes of 5/16/19 document: (patient) "With concern for aspiration, MBS done which showed silent aspiration to conservative consistencies." Patient noted with aspiration into airway.      Please include more specific documentation, either known or suspected, of a corresponding diagnosis associated with the clinical information described above, if known.    1.	Aspiration Pneumonia  2.	Aspiration Bronchitis  3.	Aspiration Pneumonitis  4.	Other  5.	Undetermined     Thank you

## 2019-05-30 PROBLEM — N40.0 BENIGN PROSTATIC HYPERPLASIA WITHOUT LOWER URINARY TRACT SYMPTOMS: Chronic | Status: ACTIVE | Noted: 2019-05-03

## 2019-05-30 PROBLEM — G47.33 OBSTRUCTIVE SLEEP APNEA (ADULT) (PEDIATRIC): Chronic | Status: ACTIVE | Noted: 2019-05-03

## 2019-05-30 PROBLEM — G47.30 SLEEP APNEA, UNSPECIFIED: Chronic | Status: ACTIVE | Noted: 2019-05-03

## 2019-06-01 ENCOUNTER — OUTPATIENT (OUTPATIENT)
Dept: OUTPATIENT SERVICES | Facility: HOSPITAL | Age: 64
LOS: 1 days | End: 2019-06-01
Payer: MEDICARE

## 2019-06-01 DIAGNOSIS — Z90.49 ACQUIRED ABSENCE OF OTHER SPECIFIED PARTS OF DIGESTIVE TRACT: Chronic | ICD-10-CM

## 2019-06-01 DIAGNOSIS — Z93.3 COLOSTOMY STATUS: Chronic | ICD-10-CM

## 2019-06-01 DIAGNOSIS — S06.5X9A TRAUMATIC SUBDURAL HEMORRHAGE WITH LOSS OF CONSCIOUSNESS OF UNSPECIFIED DURATION, INITIAL ENCOUNTER: Chronic | ICD-10-CM

## 2019-06-01 DIAGNOSIS — Z98.890 OTHER SPECIFIED POSTPROCEDURAL STATES: Chronic | ICD-10-CM

## 2019-06-01 PROCEDURE — G9001: CPT

## 2019-06-03 ENCOUNTER — APPOINTMENT (OUTPATIENT)
Dept: FAMILY MEDICINE | Facility: CLINIC | Age: 64
End: 2019-06-03

## 2019-06-11 DIAGNOSIS — Z71.89 OTHER SPECIFIED COUNSELING: ICD-10-CM

## 2019-06-12 ENCOUNTER — APPOINTMENT (OUTPATIENT)
Dept: FAMILY MEDICINE | Facility: CLINIC | Age: 64
End: 2019-06-12
Payer: MEDICARE

## 2019-06-12 VITALS
SYSTOLIC BLOOD PRESSURE: 120 MMHG | HEIGHT: 73 IN | DIASTOLIC BLOOD PRESSURE: 64 MMHG | HEART RATE: 68 BPM | TEMPERATURE: 97.4 F | RESPIRATION RATE: 16 BRPM | BODY MASS INDEX: 29.03 KG/M2 | OXYGEN SATURATION: 95 % | WEIGHT: 219 LBS

## 2019-06-12 DIAGNOSIS — J69.0 PNEUMONITIS DUE TO INHALATION OF FOOD AND VOMIT: ICD-10-CM

## 2019-06-12 PROCEDURE — 99214 OFFICE O/P EST MOD 30 MIN: CPT

## 2019-06-12 RX ORDER — PREDNISONE 5 MG/1
5 TABLET ORAL
Qty: 2 | Refills: 0 | Status: ACTIVE | COMMUNITY
Start: 2019-06-12 | End: 1900-01-01

## 2019-06-12 RX ORDER — POTASSIUM CHLORIDE 750 MG/1
10 TABLET, EXTENDED RELEASE ORAL
Qty: 90 | Refills: 0 | Status: ACTIVE | COMMUNITY
Start: 2019-06-12 | End: 1900-01-01

## 2019-06-12 RX ORDER — BUDESONIDE AND FORMOTEROL FUMARATE DIHYDRATE 160; 4.5 UG/1; UG/1
160-4.5 AEROSOL RESPIRATORY (INHALATION) TWICE DAILY
Qty: 1 | Refills: 5 | Status: ACTIVE | COMMUNITY
Start: 1900-01-01 | End: 1900-01-01

## 2019-06-12 RX ORDER — PANTOPRAZOLE 40 MG/1
40 TABLET, DELAYED RELEASE ORAL
Qty: 60 | Refills: 0 | Status: ACTIVE | COMMUNITY
Start: 2019-06-12 | End: 1900-01-01

## 2019-06-12 NOTE — HISTORY OF PRESENT ILLNESS
[FreeTextEntry1] : aspiration pneumonia [de-identified] : Patient is here today in followup from a hospitalization and a stent in January rehabilitation his problems revolve around an aspiration pneumonia and resulted in a prolonged workup and hospitalization with resultant muscular deconditioning for which he was sent to [facility] rehabilitation he also has dysphagia and is being treated with speech therapy to try to reduce and prevent this he has underlying COPD he underwent a cardiac catheter which revealed no coronary artery disease and he is alert and feels reasonably well but of course is concerned about maintaining his independence and being able teeth in a reasonable fashion his systems otherwise unremarkable

## 2019-06-12 NOTE — REVIEW OF SYSTEMS
[Joint Stiffness] : joint stiffness [Joint Pain] : joint pain [Back Pain] : back pain [Unsteady Walk] : ataxia [Suicidal] : not suicidal [Negative] : Gastrointestinal [de-identified] : Chronic Stasis dermatitis Lower extremities

## 2019-06-12 NOTE — ASSESSMENT
[FreeTextEntry1] : We have discussed the need for ongoing vigilance with regard to aspiration we have discussed his need for physical therapy and speech therapy and careful followup for his pulmonary condition the patient's hospital medications and all medications have been reconciled and thoroughly explained to the patient and to his sister he will be given referrals for vascular surgery and also for physical therapy and speech therapy his prescriptions have been refilled as needed to be revisited here in 2 weeks a thorough discussion and explanation of multiple topics at time of visit approximately 40 minutes over 50% of which was spent discussing the Of aspiration and stable gait and COPD

## 2019-06-12 NOTE — PHYSICAL EXAM
[No Acute Distress] : no acute distress [Well Developed] : well developed [Well Nourished] : well nourished [Well-Appearing] : well-appearing [PERRL] : pupils equal round and reactive to light [Normal Sclera/Conjunctiva] : normal sclera/conjunctiva [No JVD] : no jugular venous distention [Normal Oropharynx] : the oropharynx was normal [Supple] : supple [Thyroid Normal, No Nodules] : the thyroid was normal and there were no nodules present [No Lymphadenopathy] : no lymphadenopathy [No Respiratory Distress] : no respiratory distress  [Normal Rate] : normal rate  [No Murmur] : no murmur heard [Regular Rhythm] : with a regular rhythm [No HSM] : no HSM [Non Tender] : non-tender [Normal Supraclavicular Nodes] : no supraclavicular lymphadenopathy [No CVA Tenderness] : no CVA  tenderness [Normal Anterior Cervical Nodes] : no anterior cervical lymphadenopathy [Normal Mood] : the mood was normal [de-identified] : Few rales at bases [No Spinal Tenderness] : no spinal tenderness [de-identified] : Unsteady gait and [de-identified] : Bilateral chronic edema and chronic stasis dermatitis

## 2019-06-13 PROCEDURE — 99214 OFFICE O/P EST MOD 30 MIN: CPT

## 2019-06-16 ENCOUNTER — RX RENEWAL (OUTPATIENT)
Age: 64
End: 2019-06-16

## 2019-06-25 ENCOUNTER — APPOINTMENT (OUTPATIENT)
Dept: FAMILY MEDICINE | Facility: CLINIC | Age: 64
End: 2019-06-25
Payer: MEDICARE

## 2019-06-25 VITALS
TEMPERATURE: 97 F | WEIGHT: 209 LBS | SYSTOLIC BLOOD PRESSURE: 114 MMHG | OXYGEN SATURATION: 97 % | HEART RATE: 77 BPM | HEIGHT: 73 IN | BODY MASS INDEX: 27.7 KG/M2 | DIASTOLIC BLOOD PRESSURE: 62 MMHG

## 2019-06-25 PROCEDURE — G0439: CPT

## 2019-06-25 NOTE — PLAN
[FreeTextEntry1] : Outpatient medications have been reconciled we reviewed his immunizations his recent laboratory work and his clinical course. His followup for a repeat speech and swallow evaluation and a modified barium swallow have been arranged and appropriate physician spoke to be followed up here again in approximately one month

## 2019-06-25 NOTE — REVIEW OF SYSTEMS
[Joint Pain] : joint pain [Joint Stiffness] : joint stiffness [Back Pain] : back pain [Headache] : no headache [Joint Swelling] : joint swelling [Fainting] : no fainting [Dizziness] : dizziness [Unsteady Walk] : ataxia [Confusion] : no confusion [Anxiety] : anxiety [Memory Loss] : no memory loss [Negative] : Gastrointestinal

## 2019-06-25 NOTE — HISTORY OF PRESENT ILLNESS
[FreeTextEntry1] : And smokes recent problems with aspiration pneumonia status post CVA [de-identified] : Patient multiple medical problems including status post CVA aspiration pneumonia rheumatoid arthritis has been essentially stable he is living alone but has a great deal of assistance from his sister has been seeing multiple medical specialist and subspecialists

## 2019-06-25 NOTE — HEALTH RISK ASSESSMENT
[Fair] :  ~his/her~ mood as fair [FreeTextEntry1] : aspiration [Intercurrent hospitalizations] : was admitted to the hospital  [] : No [No] : No [No falls in past year] : Patient reported no falls in the past year [0] : 2) Feeling down, depressed, or hopeless: Not at all (0) [de-identified] : aspiration [de-identified] : limited rheumatoid arthritis [[id number]] : 0 [HIV test declined] : HIV test declined [Hepatitis C test declined] : Hepatitis C test declined [Change in mental status noted] : No change in mental status noted [Alone] : lives alone [None] : None [On disability] : on disability [Single] : single [Fully functional (bathing, dressing, toileting, transferring, walking, feeding)] : Fully functional (bathing, dressing, toileting, transferring, walking, feeding) [Fully functional (using the telephone, shopping, preparing meals, housekeeping, doing laundry, using] : Fully functional and needs no help or supervision to perform IADLs (using the telephone, shopping, preparing meals, housekeeping, doing laundry, using transportation, managing medications and managing finances) [Reports changes in hearing] : Reports no changes in hearing [Reports changes in dental health] : Reports changes in dental health [Reports changes in vision] : Reports no changes in vision [ColonoscopyComments] : scheduled [Designated Healthcare Proxy] : Designated healthcare proxy [Name: ___] : Health Care Proxy's Name: [unfilled]  [AdvancecareDate] : 06/2019

## 2019-06-25 NOTE — PHYSICAL EXAM
[No Acute Distress] : no acute distress [Well Nourished] : well nourished [Well Developed] : well developed [Well-Appearing] : well-appearing [Normal Outer Ear/Nose] : the outer ears and nose were normal in appearance [Normal Oropharynx] : the oropharynx was normal [Normal Nasal Mucosa] : the nasal mucosa was normal [No JVD] : no jugular venous distention [Supple] : supple [No Respiratory Distress] : no respiratory distress  [Clear to Auscultation] : lungs were clear to auscultation bilaterally [Normal Rate] : normal rate  [Regular Rhythm] : with a regular rhythm [No Murmur] : no murmur heard [No Edema] : there was no peripheral edema [Soft] : abdomen soft [Non Tender] : non-tender [Non-distended] : non-distended [No Masses] : no abdominal mass palpated

## 2019-07-03 ENCOUNTER — APPOINTMENT (OUTPATIENT)
Dept: FAMILY MEDICINE | Facility: CLINIC | Age: 64
End: 2019-07-03
Payer: MEDICARE

## 2019-07-03 VITALS
TEMPERATURE: 98.2 F | HEART RATE: 72 BPM | SYSTOLIC BLOOD PRESSURE: 102 MMHG | DIASTOLIC BLOOD PRESSURE: 64 MMHG | RESPIRATION RATE: 15 BRPM | HEIGHT: 73 IN | WEIGHT: 214 LBS | BODY MASS INDEX: 28.36 KG/M2 | OXYGEN SATURATION: 97 %

## 2019-07-03 PROCEDURE — 99213 OFFICE O/P EST LOW 20 MIN: CPT

## 2019-07-03 NOTE — ASSESSMENT
[FreeTextEntry1] : Patient is still undergoing further workup for aspiration he is due to have a modified barium swallow next week at his physical exam today is unremarkable he is not in distress respiratory or otherwise we will avoid antibiotics at this point he has been told to be careful about his eating habits to take only the diet as prescribed and should he have any severe shortness of breath fever or real respiratory difficulty to let us know otherwise medications remain of altered he's been asked to followup with us telephonically next week

## 2019-07-03 NOTE — REVIEW OF SYSTEMS
[Shortness Of Breath] : no shortness of breath [Cough] : cough [Wheezing] : no wheezing [Dyspnea on Exertion] : not dyspnea on exertion [Negative] : Genitourinary

## 2019-07-03 NOTE — HISTORY OF PRESENT ILLNESS
[FreeTextEntry1] : productive cough [de-identified] : Patient is here because over the past several days he's had a productive cough with a yellow with sputum no real dyspnea chest pain and no fever or chills he does have known and microaspiration and has had aspiration pneumonia in the past. He feels improved he has had also some laryngitis in the morning which does raise some concerns about the reflux and no abdominal pain or heartburn view of systems otherwise unremarkable

## 2019-07-03 NOTE — PHYSICAL EXAM
[Well Developed] : well developed [Well Nourished] : well nourished [No Acute Distress] : no acute distress [Normal Outer Ear/Nose] : the outer ears and nose were normal in appearance [Well-Appearing] : well-appearing [Normal Sclera/Conjunctiva] : normal sclera/conjunctiva [Normal Oropharynx] : the oropharynx was normal [No Lymphadenopathy] : no lymphadenopathy [No JVD] : no jugular venous distention [Normal Percussion] : the chest was normal to percussion [No Respiratory Distress] : no respiratory distress  [Clear to Auscultation] : lungs were clear to auscultation bilaterally [Normal S1, S2] : normal S1 and S2 [Normal] : soft, non-tender, non-distended, no masses palpated, no HSM and normal bowel sounds [No Murmur] : no murmur heard [de-identified] : Chronic venous stasis if legs

## 2019-07-08 PROCEDURE — 99213 OFFICE O/P EST LOW 20 MIN: CPT

## 2019-07-09 ENCOUNTER — RX CHANGE (OUTPATIENT)
Age: 64
End: 2019-07-09

## 2019-07-17 ENCOUNTER — FORM ENCOUNTER (OUTPATIENT)
Age: 64
End: 2019-07-17

## 2019-07-18 ENCOUNTER — OUTPATIENT (OUTPATIENT)
Dept: OUTPATIENT SERVICES | Facility: HOSPITAL | Age: 64
LOS: 1 days | End: 2019-07-18
Payer: MEDICARE

## 2019-07-18 DIAGNOSIS — R13.10 DYSPHAGIA, UNSPECIFIED: ICD-10-CM

## 2019-07-18 DIAGNOSIS — Z93.3 COLOSTOMY STATUS: Chronic | ICD-10-CM

## 2019-07-18 DIAGNOSIS — Z98.890 OTHER SPECIFIED POSTPROCEDURAL STATES: Chronic | ICD-10-CM

## 2019-07-18 DIAGNOSIS — Z90.49 ACQUIRED ABSENCE OF OTHER SPECIFIED PARTS OF DIGESTIVE TRACT: Chronic | ICD-10-CM

## 2019-07-18 DIAGNOSIS — S06.5X9A TRAUMATIC SUBDURAL HEMORRHAGE WITH LOSS OF CONSCIOUSNESS OF UNSPECIFIED DURATION, INITIAL ENCOUNTER: Chronic | ICD-10-CM

## 2019-07-18 PROCEDURE — 74230 X-RAY XM SWLNG FUNCJ C+: CPT | Mod: 26

## 2019-07-18 PROCEDURE — 92611 MOTION FLUOROSCOPY/SWALLOW: CPT

## 2019-07-18 PROCEDURE — 74230 X-RAY XM SWLNG FUNCJ C+: CPT

## 2019-08-03 ENCOUNTER — TRANSCRIPTION ENCOUNTER (OUTPATIENT)
Age: 64
End: 2019-08-03

## 2019-08-21 ENCOUNTER — APPOINTMENT (OUTPATIENT)
Dept: FAMILY MEDICINE | Facility: CLINIC | Age: 64
End: 2019-08-21
Payer: MEDICARE

## 2019-08-21 VITALS
OXYGEN SATURATION: 96 % | SYSTOLIC BLOOD PRESSURE: 110 MMHG | RESPIRATION RATE: 16 BRPM | HEART RATE: 58 BPM | HEIGHT: 73 IN | DIASTOLIC BLOOD PRESSURE: 64 MMHG | BODY MASS INDEX: 28.49 KG/M2 | WEIGHT: 215 LBS

## 2019-08-21 DIAGNOSIS — M21.079 VALGUS DEFORMITY, NOT ELSEWHERE CLASSIFIED, UNSPECIFIED ANKLE: ICD-10-CM

## 2019-08-21 PROCEDURE — 99213 OFFICE O/P EST LOW 20 MIN: CPT

## 2019-08-21 NOTE — PHYSICAL EXAM
[No Acute Distress] : no acute distress [Well Nourished] : well nourished [Well Developed] : well developed [Well-Appearing] : well-appearing [Normal Sclera/Conjunctiva] : normal sclera/conjunctiva [PERRL] : pupils equal round and reactive to light [Normal Oropharynx] : the oropharynx was normal [No Lymphadenopathy] : no lymphadenopathy [No JVD] : no jugular venous distention [No Respiratory Distress] : no respiratory distress  [No Accessory Muscle Use] : no accessory muscle use [Clear to Auscultation] : lungs were clear to auscultation bilaterally [Regular Rhythm] : with a regular rhythm [Normal Rate] : normal rate  [No Edema] : there was no peripheral edema [de-identified] : The patient is an well healed skin tear on the dorsum of his right hand and also a injury with a small amount of crust in the intertriginous areas between the third and fourth toe of the right foot this also seems to be healing well

## 2019-08-21 NOTE — HISTORY OF PRESENT ILLNESS
[FreeTextEntry8] : The patient is here for an acute visit 2-80 laceration he suffered to the dorsum of his right hand about 10 days ago which now appears to be healing and also a laceration between his third and fourth toe of his right foot when he tripped on a cord several days ago otherwise his medical status is stable he still has issues with dysphagia and unstable gait and is seeking a yellow walker with wheels and a seat a particular type of cold Rollator N jojoo

## 2019-08-27 ENCOUNTER — APPOINTMENT (OUTPATIENT)
Dept: ORTHOPEDIC SURGERY | Facility: CLINIC | Age: 64
End: 2019-08-27

## 2019-09-02 ENCOUNTER — FORM ENCOUNTER (OUTPATIENT)
Age: 64
End: 2019-09-02

## 2019-09-03 ENCOUNTER — OUTPATIENT (OUTPATIENT)
Dept: OUTPATIENT SERVICES | Facility: HOSPITAL | Age: 64
LOS: 1 days | End: 2019-09-03
Payer: MEDICARE

## 2019-09-03 DIAGNOSIS — R13.10 DYSPHAGIA, UNSPECIFIED: ICD-10-CM

## 2019-09-03 DIAGNOSIS — Z90.49 ACQUIRED ABSENCE OF OTHER SPECIFIED PARTS OF DIGESTIVE TRACT: Chronic | ICD-10-CM

## 2019-09-03 DIAGNOSIS — Z93.3 COLOSTOMY STATUS: Chronic | ICD-10-CM

## 2019-09-03 DIAGNOSIS — Z98.890 OTHER SPECIFIED POSTPROCEDURAL STATES: Chronic | ICD-10-CM

## 2019-09-03 DIAGNOSIS — S06.5X9A TRAUMATIC SUBDURAL HEMORRHAGE WITH LOSS OF CONSCIOUSNESS OF UNSPECIFIED DURATION, INITIAL ENCOUNTER: Chronic | ICD-10-CM

## 2019-09-03 PROCEDURE — 74230 X-RAY XM SWLNG FUNCJ C+: CPT | Mod: 26

## 2019-09-03 PROCEDURE — 92611 MOTION FLUOROSCOPY/SWALLOW: CPT

## 2019-09-03 PROCEDURE — 74230 X-RAY XM SWLNG FUNCJ C+: CPT

## 2019-09-13 PROCEDURE — 99213 OFFICE O/P EST LOW 20 MIN: CPT

## 2019-10-08 ENCOUNTER — MEDICATION RENEWAL (OUTPATIENT)
Age: 64
End: 2019-10-08

## 2019-10-21 ENCOUNTER — MEDICATION RENEWAL (OUTPATIENT)
Age: 64
End: 2019-10-21

## 2019-10-25 ENCOUNTER — EMERGENCY (EMERGENCY)
Facility: HOSPITAL | Age: 64
LOS: 1 days | Discharge: HOME | End: 2019-10-25
Attending: EMERGENCY MEDICINE | Admitting: EMERGENCY MEDICINE
Payer: MEDICARE

## 2019-10-25 VITALS
DIASTOLIC BLOOD PRESSURE: 70 MMHG | SYSTOLIC BLOOD PRESSURE: 110 MMHG | HEIGHT: 74 IN | TEMPERATURE: 98 F | HEART RATE: 68 BPM | WEIGHT: 214.95 LBS | RESPIRATION RATE: 24 BRPM | OXYGEN SATURATION: 95 %

## 2019-10-25 VITALS
TEMPERATURE: 98 F | OXYGEN SATURATION: 97 % | SYSTOLIC BLOOD PRESSURE: 103 MMHG | RESPIRATION RATE: 18 BRPM | DIASTOLIC BLOOD PRESSURE: 63 MMHG | HEART RATE: 78 BPM

## 2019-10-25 DIAGNOSIS — Z98.890 OTHER SPECIFIED POSTPROCEDURAL STATES: Chronic | ICD-10-CM

## 2019-10-25 DIAGNOSIS — Z90.49 ACQUIRED ABSENCE OF OTHER SPECIFIED PARTS OF DIGESTIVE TRACT: Chronic | ICD-10-CM

## 2019-10-25 DIAGNOSIS — Z93.3 COLOSTOMY STATUS: Chronic | ICD-10-CM

## 2019-10-25 DIAGNOSIS — S06.5X9A TRAUMATIC SUBDURAL HEMORRHAGE WITH LOSS OF CONSCIOUSNESS OF UNSPECIFIED DURATION, INITIAL ENCOUNTER: Chronic | ICD-10-CM

## 2019-10-25 LAB
ALBUMIN SERPL ELPH-MCNC: 2.5 G/DL — LOW (ref 3.3–5)
ALP SERPL-CCNC: 59 U/L — SIGNIFICANT CHANGE UP (ref 40–120)
ALT FLD-CCNC: 14 U/L — SIGNIFICANT CHANGE UP (ref 10–45)
ANION GAP SERPL CALC-SCNC: 4 MMOL/L — LOW (ref 5–17)
AST SERPL-CCNC: 30 U/L — SIGNIFICANT CHANGE UP (ref 10–40)
BASOPHILS # BLD AUTO: 0.02 K/UL — SIGNIFICANT CHANGE UP (ref 0–0.2)
BASOPHILS NFR BLD AUTO: 0.3 % — SIGNIFICANT CHANGE UP (ref 0–2)
BILIRUB SERPL-MCNC: 0.5 MG/DL — SIGNIFICANT CHANGE UP (ref 0.2–1.2)
BUN SERPL-MCNC: 23 MG/DL — SIGNIFICANT CHANGE UP (ref 7–23)
CALCIUM SERPL-MCNC: 8.9 MG/DL — SIGNIFICANT CHANGE UP (ref 8.4–10.5)
CHLORIDE SERPL-SCNC: 102 MMOL/L — SIGNIFICANT CHANGE UP (ref 96–108)
CO2 SERPL-SCNC: 31 MMOL/L — SIGNIFICANT CHANGE UP (ref 22–31)
CREAT SERPL-MCNC: 0.7 MG/DL — SIGNIFICANT CHANGE UP (ref 0.5–1.3)
EOSINOPHIL # BLD AUTO: 0.13 K/UL — SIGNIFICANT CHANGE UP (ref 0–0.5)
EOSINOPHIL NFR BLD AUTO: 2 % — SIGNIFICANT CHANGE UP (ref 0–6)
GLUCOSE SERPL-MCNC: 109 MG/DL — HIGH (ref 70–99)
HCT VFR BLD CALC: 40.5 % — SIGNIFICANT CHANGE UP (ref 39–50)
HGB BLD-MCNC: 13.4 G/DL — SIGNIFICANT CHANGE UP (ref 13–17)
IMM GRANULOCYTES NFR BLD AUTO: 0.6 % — SIGNIFICANT CHANGE UP (ref 0–1.5)
LIDOCAIN IGE QN: 65 U/L — LOW (ref 73–393)
LYMPHOCYTES # BLD AUTO: 1.09 K/UL — SIGNIFICANT CHANGE UP (ref 1–3.3)
LYMPHOCYTES # BLD AUTO: 16.5 % — SIGNIFICANT CHANGE UP (ref 13–44)
MCHC RBC-ENTMCNC: 30 PG — SIGNIFICANT CHANGE UP (ref 27–34)
MCHC RBC-ENTMCNC: 33.1 GM/DL — SIGNIFICANT CHANGE UP (ref 32–36)
MCV RBC AUTO: 90.6 FL — SIGNIFICANT CHANGE UP (ref 80–100)
MONOCYTES # BLD AUTO: 1 K/UL — HIGH (ref 0–0.9)
MONOCYTES NFR BLD AUTO: 15.2 % — HIGH (ref 2–14)
NEUTROPHILS # BLD AUTO: 4.32 K/UL — SIGNIFICANT CHANGE UP (ref 1.8–7.4)
NEUTROPHILS NFR BLD AUTO: 65.4 % — SIGNIFICANT CHANGE UP (ref 43–77)
NRBC # BLD: 0 /100 WBCS — SIGNIFICANT CHANGE UP (ref 0–0)
NT-PROBNP SERPL-SCNC: 212 PG/ML — SIGNIFICANT CHANGE UP (ref 0–300)
PLATELET # BLD AUTO: 58 K/UL — LOW (ref 150–400)
POTASSIUM SERPL-MCNC: 4 MMOL/L — SIGNIFICANT CHANGE UP (ref 3.5–5.3)
POTASSIUM SERPL-SCNC: 4 MMOL/L — SIGNIFICANT CHANGE UP (ref 3.5–5.3)
PROT SERPL-MCNC: 6.7 G/DL — SIGNIFICANT CHANGE UP (ref 6–8.3)
RBC # BLD: 4.47 M/UL — SIGNIFICANT CHANGE UP (ref 4.2–5.8)
RBC # FLD: 14.8 % — HIGH (ref 10.3–14.5)
SODIUM SERPL-SCNC: 137 MMOL/L — SIGNIFICANT CHANGE UP (ref 135–145)
WBC # BLD: 6.6 K/UL — SIGNIFICANT CHANGE UP (ref 3.8–10.5)
WBC # FLD AUTO: 6.6 K/UL — SIGNIFICANT CHANGE UP (ref 3.8–10.5)

## 2019-10-25 PROCEDURE — 99284 EMERGENCY DEPT VISIT MOD MDM: CPT | Mod: 25

## 2019-10-25 PROCEDURE — 83690 ASSAY OF LIPASE: CPT

## 2019-10-25 PROCEDURE — 71045 X-RAY EXAM CHEST 1 VIEW: CPT | Mod: 26

## 2019-10-25 PROCEDURE — 85027 COMPLETE CBC AUTOMATED: CPT

## 2019-10-25 PROCEDURE — 99285 EMERGENCY DEPT VISIT HI MDM: CPT

## 2019-10-25 PROCEDURE — 83880 ASSAY OF NATRIURETIC PEPTIDE: CPT

## 2019-10-25 PROCEDURE — 96374 THER/PROPH/DIAG INJ IV PUSH: CPT

## 2019-10-25 PROCEDURE — 36415 COLL VENOUS BLD VENIPUNCTURE: CPT

## 2019-10-25 PROCEDURE — 94640 AIRWAY INHALATION TREATMENT: CPT

## 2019-10-25 PROCEDURE — 71045 X-RAY EXAM CHEST 1 VIEW: CPT

## 2019-10-25 PROCEDURE — 80053 COMPREHEN METABOLIC PANEL: CPT

## 2019-10-25 RX ORDER — AZITHROMYCIN 500 MG/1
500 TABLET, FILM COATED ORAL ONCE
Refills: 0 | Status: COMPLETED | OUTPATIENT
Start: 2019-10-25 | End: 2019-10-25

## 2019-10-25 RX ORDER — AZITHROMYCIN 500 MG/1
1 TABLET, FILM COATED ORAL
Qty: 4 | Refills: 0
Start: 2019-10-25 | End: 2019-10-28

## 2019-10-25 RX ORDER — ONDANSETRON 8 MG/1
4 TABLET, FILM COATED ORAL ONCE
Refills: 0 | Status: COMPLETED | OUTPATIENT
Start: 2019-10-25 | End: 2019-10-25

## 2019-10-25 RX ORDER — SODIUM CHLORIDE 9 MG/ML
1000 INJECTION INTRAMUSCULAR; INTRAVENOUS; SUBCUTANEOUS ONCE
Refills: 0 | Status: DISCONTINUED | OUTPATIENT
Start: 2019-10-25 | End: 2019-10-25

## 2019-10-25 RX ORDER — IPRATROPIUM/ALBUTEROL SULFATE 18-103MCG
3 AEROSOL WITH ADAPTER (GRAM) INHALATION ONCE
Refills: 0 | Status: COMPLETED | OUTPATIENT
Start: 2019-10-25 | End: 2019-10-25

## 2019-10-25 RX ADMIN — ONDANSETRON 4 MILLIGRAM(S): 8 TABLET, FILM COATED ORAL at 17:46

## 2019-10-25 RX ADMIN — AZITHROMYCIN 500 MILLIGRAM(S): 500 TABLET, FILM COATED ORAL at 19:39

## 2019-10-25 RX ADMIN — Medication 3 MILLILITER(S): at 17:55

## 2019-10-25 NOTE — ED PROVIDER NOTE - ATTENDING CONTRIBUTION TO CARE
Dianne with [facility]. Patient presents to the ED after choking episode. Vitals WNL and pt xray is with chronic findings. Pt labs normal. Will tx with azithro prophylactically with at home inhaler use prn. Worsening, continued or ANY new concerning symptoms return to the emergency department.   I performed a face to face bedside interview with patient regarding history of present illness, review of symptoms and past medical history. I completed an independent physical exam.  I have discussed the patient's plan of care with Physician Assistant (PA). I agree with note as stated above, having amended the EMR as needed to reflect my findings.   This includes History of Present Illness, HIV, Past Medical/Surgical/Family/Social History, Allergies and Home Medications, Review of Systems, Physical Exam, and any Progress Notes during the time I functioned as the attending physician for this patient.

## 2019-10-25 NOTE — ED ADULT NURSE NOTE - PMH
BPH without urinary obstruction    Bronchiectasis    DM (diabetes mellitus)    Hypothyroid    Inguinal hernia    LAMBERT and COPD overlap syndrome    Sarcoid    Seizure    Sleep apnea    Subdural hematoma

## 2019-10-25 NOTE — ED PROVIDER NOTE - NSFOLLOWUPINSTRUCTIONS_ED_ALL_ED_FT
Cough    Coughing is a reflex that clears your throat and your airways. Coughing helps to heal and protect your lungs. It is normal to cough occasionally, but a cough that happens with other symptoms or lasts a long time may be a sign of a condition that needs treatment. Coughing may be caused by infections, asthma or COPD, smoking, postnasal drip, gastroesophageal reflux, as well as other medical conditions. Take medicines only as instructed by your health care provider. Avoid environments or triggers that causes you to cough at work or at home.    SEEK IMMEDIATE MEDICAL CARE IF YOU HAVE ANY OF THE FOLLOWING SYMPTOMS: coughing up blood, shortness of breath, rapid heart rate, chest pain, unexplained weight loss or night sweats.    Take Azithromycin 250mg daily for 4 days starting on 10/26.   Use your albuterol inhaler every 4-6 hours as needed.   Worsening, continued or ANY new concerning symptoms return to the emergency department.

## 2019-10-25 NOTE — ED ADULT TRIAGE NOTE - CHIEF COMPLAINT QUOTE
Pt c/o food poisoning on starting Wednesday with nausea, vomiting, weakness and body aches. Pt states onset of cough after the vomiting and is concerned that he has aspiration pneumonia. Pt states hx of aspiration pneumonia.

## 2019-10-25 NOTE — ED ADULT NURSE NOTE - OBJECTIVE STATEMENT
Pt presents from home A&Ox3 with his sister by his side, with c/c of chronic dry cough which has turned to wet cough the past 2 days.  States 2 days ago had food poisoning, recovered since, but today visited his psycho therapist who told pt to go to ED because of the wet cough suspecting pt may have aspiration pneumonia due to recent food poisoning with vomiting and history of aspiration pneumonia.   Denies dizziness, SOB, CP, n/v/d/c.   States since food poisoning resolved, still has been feeling weak with cough.

## 2019-10-25 NOTE — ED PROVIDER NOTE - OBJECTIVE STATEMENT
65 y/o M with PMH of seizure d/o, sarcoidosis, thyroid disease p/w coughing episode and concern for spiration yesterday. Patient reports productive cough and generalized weakness x 3 days. pt does have a chronic cough.   Denies f/c, n/v, abd pain, urinary symptoms, sick contacts, recent travel, rash, exertional symptoms, extremity weakness, paresthesias or all other symptoms

## 2019-10-25 NOTE — ED PROVIDER NOTE - PHYSICAL EXAMINATION
AAOx3  Heart: s1/s2, RRR  Lung: +crackles at b/l lung bases  Abd: soft, NT/ND, no rebound or guarding, NCVAT  Extremity: 2+pedal edema b/l, no calf TTP   Neuro: patient moving all extremity equally  Eyes: PERRL  Head:     NC/AT

## 2019-10-25 NOTE — ED ADULT TRIAGE NOTE - AS TEMP SITE
Biopsy Type: H and E Dressing: bandage Consent: Written consent was obtained and risks were reviewed including but not limited to scarring, infection, bleeding, scabbing, incomplete removal, nerve damage and allergy to anesthesia. Notification Instructions: Patient will be notified of biopsy results. However, patient instructed to call the office if not contacted within 2 weeks. Lab Facility: [address] [place] [address] Type Of Destruction Used: Curettage Electrodesiccation Text: The wound bed was treated with electrodesiccation after the biopsy was performed. X Size Of Lesion In Cm: 0 Body Location Override (Optional - Billing Will Still Be Based On Selected Body Map Location If Applicable): Left Lateral Low Back Depth Of Biopsy: dermis Detail Level: Detailed Bill For Surgical Tray: no Hemostasis: Electrocautery Electrodesiccation And Curettage Text: The wound bed was treated with electrodesiccation and curettage after the biopsy was performed. Render Post-Care Instructions In Note?: yes Path Notes (To The Dermatopathologist): Size: 0.8 cm R/O: DN Silver Nitrate Text: The wound bed was treated with silver nitrate after the biopsy was performed. Curettage Text: The wound bed was treated with curettage after the biopsy was performed. Biopsy Method: Dermablade Anesthesia Volume In Cc (Will Not Render If 0): 1 Wound Care: Bacitracin forehead Post-Care Instructions: I reviewed with the patient in detail post-care instructions. Patient is to keep the biopsy site dry overnight, and then apply bacitracin twice daily until healed. Patient may apply hydrogen peroxide soaks to remove any crusting. Size Of Lesion In Cm: 0.8 Lab: [id number] Billing Type: [employer] Cryotherapy Text: The wound bed was treated with cryotherapy after the biopsy was performed. Path Notes (To The Dermatopathologist): Size: 0.6 cm R/O: DN Size Of Lesion In Cm: 0.6 Billing Type: Third-Party Bill Lab: [id number] Body Location Override (Optional - Billing Will Still Be Based On Selected Body Map Location If Applicable): Left Medial Low Back Lab Facility: [id number]

## 2019-10-25 NOTE — ED PROVIDER NOTE - PATIENT PORTAL LINK FT
You can access the FollowMyHealth Patient Portal offered by [facility] by registering at the following website: [email or web address]. By joining [employer]’s FollowMyHealth portal, you will also be able to view your health information using other applications (apps) compatible with our system.

## 2019-10-25 NOTE — ED PROVIDER NOTE - CLINICAL SUMMARY MEDICAL DECISION MAKING FREE TEXT BOX
Patient presents to the ED after choking episode. Vitals WNL and pt xray is with chronic findings. Pt labs normal. Will tx with azithro prophylactically with at home inhaler use prn. Worsening, continued or ANY new concerning symptoms return to the emergency department.

## 2019-10-29 ENCOUNTER — APPOINTMENT (OUTPATIENT)
Dept: ORTHOPEDIC SURGERY | Facility: CLINIC | Age: 64
End: 2019-10-29
Payer: MEDICARE

## 2019-10-29 DIAGNOSIS — M21.079 VALGUS DEFORMITY, NOT ELSEWHERE CLASSIFIED, UNSPECIFIED ANKLE: ICD-10-CM

## 2019-10-29 PROCEDURE — 73610 X-RAY EXAM OF ANKLE: CPT | Mod: LT

## 2019-10-29 PROCEDURE — 73630 X-RAY EXAM OF FOOT: CPT | Mod: LT

## 2019-10-29 PROCEDURE — 99212 OFFICE O/P EST SF 10 MIN: CPT

## 2019-11-03 DIAGNOSIS — R05 COUGH: ICD-10-CM

## 2019-11-05 ENCOUNTER — APPOINTMENT (OUTPATIENT)
Dept: FAMILY MEDICINE | Facility: CLINIC | Age: 64
End: 2019-11-05
Payer: MEDICARE

## 2019-11-05 VITALS
BODY MASS INDEX: 26.77 KG/M2 | RESPIRATION RATE: 12 BRPM | HEART RATE: 59 BPM | DIASTOLIC BLOOD PRESSURE: 60 MMHG | HEIGHT: 73 IN | OXYGEN SATURATION: 95 % | SYSTOLIC BLOOD PRESSURE: 100 MMHG | WEIGHT: 202 LBS

## 2019-11-05 DIAGNOSIS — R13.10 DYSPHAGIA, UNSPECIFIED: ICD-10-CM

## 2019-11-05 PROCEDURE — 99214 OFFICE O/P EST MOD 30 MIN: CPT

## 2019-11-05 RX ORDER — LEVOFLOXACIN 500 MG/1
500 TABLET, FILM COATED ORAL DAILY
Qty: 7 | Refills: 0 | Status: ACTIVE | COMMUNITY
Start: 2019-11-05 | End: 1900-01-01

## 2019-11-05 NOTE — HISTORY OF PRESENT ILLNESS
[FreeTextEntry1] : coufh, leg swelling swallowing difficulty [de-identified] : Patient with multiple issues he has bilateral swelling of his ankles with some redness which appears to be chronic venous stasis he also is troubled with a cough that is now productive of yellow and greenish sputum and he has ongoing episodes of the dysphagia he states he is in need for a followup referral to speech and swallow. Review of systems otherwise fairly unremarkable no fever or chills no chest pain he is ventilating with the help of a walker he had a flu shot

## 2019-11-05 NOTE — ASSESSMENT
[FreeTextEntry1] : Patient with multiple problems he likely has an exacerbation of his bronchiectasis or COPD this will be treated with Levaquin 500 milligrams daily for 7 days he has had moderately severe chronic venous stasis dermatitis of active inflammation but no apparent infection he'll be sent to vascular surgery for followup annually given a followup visit with speech and swallow he is up-to-date on his immunizations

## 2019-11-05 NOTE — REVIEW OF SYSTEMS
[Fever] : no fever [Chills] : no chills [Night Sweats] : no night sweats [Vision Problems] : no vision problems [Sore Throat] : no sore throat [Chest Pain] : no chest pain [Palpitations] : no palpitations [Lower Ext Edema] : lower extremity edema [Orthopena] : no orthopnea [Paroxysmal Nocturnal Dyspnea] : no paroxysmal nocturnal dyspnea [Shortness Of Breath] : shortness of breath [Wheezing] : no wheezing [Cough] : cough [Dyspnea on Exertion] : not dyspnea on exertion [Abdominal Pain] : no abdominal pain [Nausea] : no nausea [Constipation] : no constipation [Vomiting] : no vomiting [Heartburn] : no heartburn [Melena] : no melena [Dysuria] : no dysuria [Incontinence] : no incontinence [Hesitancy] : no hesitancy [Nocturia] : no nocturia [Hematuria] : no hematuria [Frequency] : no frequency [Joint Pain] : joint pain [Joint Stiffness] : joint stiffness [Skin Rash] : skin rash

## 2019-11-05 NOTE — PHYSICAL EXAM
[No Acute Distress] : no acute distress [Well Nourished] : well nourished [Well Developed] : well developed [Well-Appearing] : well-appearing [Normal Sclera/Conjunctiva] : normal sclera/conjunctiva [Normal Oropharynx] : the oropharynx was normal [No JVD] : no jugular venous distention [No Lymphadenopathy] : no lymphadenopathy [Thyroid Normal, No Nodules] : the thyroid was normal and there were no nodules present [Normal Rate] : normal rate  [Regular Rhythm] : with a regular rhythm [No Murmur] : no murmur heard [Soft] : abdomen soft [Non Tender] : non-tender [Non-distended] : non-distended [No HSM] : no HSM [Normal Supraclavicular Nodes] : no supraclavicular lymphadenopathy [Normal Posterior Cervical Nodes] : no posterior cervical lymphadenopathy [Normal Anterior Cervical Nodes] : no anterior cervical lymphadenopathy [No CVA Tenderness] : no CVA  tenderness [No Spinal Tenderness] : no spinal tenderness [No Joint Swelling] : no joint swelling [Grossly Normal Strength/Tone] : grossly normal strength/tone [Coordination Grossly Intact] : coordination grossly intact [de-identified] : i. lateral rhonchi and occasional ralesdecreased breath sounds bilaterally [de-identified] : Chronic venous stasis dermatitis bothlower extremitieswith someactive inflammation no purulent exudate [de-identified] : Chronic venous stasiswith inflammation

## 2019-11-08 PROCEDURE — 99212 OFFICE O/P EST SF 10 MIN: CPT

## 2019-11-27 NOTE — PATIENT PROFILE ADULT. - DOES PATIENT HAVE ADVANCE DIRECTIVE
Pt was seen for AMW; he was given Influenza vaccine today and another fit kit provided. He is now having some new left neck issues and would like referral to PT; f/u appnt was made. He is also needing some annual labs; has not had labs in ~4 years  No

## 2019-12-18 ENCOUNTER — APPOINTMENT (OUTPATIENT)
Dept: FAMILY MEDICINE | Facility: CLINIC | Age: 64
End: 2019-12-18
Payer: MEDICARE

## 2019-12-18 VITALS
RESPIRATION RATE: 16 BRPM | WEIGHT: 200.44 LBS | SYSTOLIC BLOOD PRESSURE: 100 MMHG | DIASTOLIC BLOOD PRESSURE: 68 MMHG | BODY MASS INDEX: 26.57 KG/M2 | HEART RATE: 72 BPM | OXYGEN SATURATION: 96 % | HEIGHT: 73 IN

## 2019-12-18 DIAGNOSIS — M25.512 PAIN IN LEFT SHOULDER: ICD-10-CM

## 2019-12-18 PROCEDURE — 99213 OFFICE O/P EST LOW 20 MIN: CPT

## 2019-12-18 NOTE — HISTORY OF PRESENT ILLNESS
[FreeTextEntry1] : left shoulder pain [de-identified] : She is here complaining of six-week history of left shoulder pain this is worse on movement also he finds it necessary to elevate his shoulder on a pillow when he tried to sleep at night this is the site of prior surgery some 9 years ago at which time he was told he would eventually run into difficulties with movement and range of motion otherwise he feels reasonably well review systems unremarkable

## 2019-12-18 NOTE — PHYSICAL EXAM
[Well Nourished] : well nourished [No Acute Distress] : no acute distress [Well Developed] : well developed [Well-Appearing] : well-appearing [PERRL] : pupils equal round and reactive to light [Normal Sclera/Conjunctiva] : normal sclera/conjunctiva [Normal Outer Ear/Nose] : the outer ears and nose were normal in appearance [No JVD] : no jugular venous distention [No Lymphadenopathy] : no lymphadenopathy [Normal Oropharynx] : the oropharynx was normal [Normal Rate] : normal rate  [Regular Rhythm] : with a regular rhythm [No Murmur] : no murmur heard [Soft] : abdomen soft [Non Tender] : non-tender [Non-distended] : non-distended [No Spinal Tenderness] : no spinal tenderness [No CVA Tenderness] : no CVA  tenderness [No HSM] : no HSM [de-identified] : Patient is able to elevate his left arm reasonably well but he does have some discomfort

## 2019-12-18 NOTE — REVIEW OF SYSTEMS
[Joint Stiffness] : joint stiffness [Joint Pain] : joint pain [Negative] : Genitourinary [Headache] : no headache

## 2020-01-01 ENCOUNTER — RX RENEWAL (OUTPATIENT)
Age: 65
End: 2020-01-01

## 2020-01-01 ENCOUNTER — TRANSCRIPTION ENCOUNTER (OUTPATIENT)
Age: 65
End: 2020-01-01

## 2020-01-01 ENCOUNTER — RX CHANGE (OUTPATIENT)
Age: 65
End: 2020-01-01

## 2020-01-01 PROCEDURE — 99443: CPT

## 2020-01-01 RX ORDER — POTASSIUM CHLORIDE 750 MG/1
10 TABLET, EXTENDED RELEASE ORAL
Qty: 90 | Refills: 3 | Status: ACTIVE | COMMUNITY
Start: 2019-06-16 | End: 1900-01-01

## 2020-01-01 RX ORDER — IPRATROPIUM BROMIDE AND ALBUTEROL 20; 100 UG/1; UG/1
20-100 SPRAY, METERED RESPIRATORY (INHALATION)
Qty: 1 | Refills: 5 | Status: ACTIVE | COMMUNITY
Start: 2019-06-12 | End: 1900-01-01

## 2020-01-01 RX ORDER — ZOSTER VACCINE RECOMBINANT, ADJUVANTED 50 MCG/0.5
50 KIT INTRAMUSCULAR
Qty: 1 | Refills: 1 | Status: ACTIVE | COMMUNITY
Start: 2020-01-01 | End: 1900-01-01

## 2020-01-23 NOTE — BEHAVIORAL HEALTH ASSESSMENT NOTE - SOURCE OF INFORMATION
Patient reported SI in triage, then retracted, denies past suicide attempt or self harm will attempt to gather more information when pt is more organized

## 2020-02-07 PROCEDURE — 99213 OFFICE O/P EST LOW 20 MIN: CPT

## 2020-03-13 ENCOUNTER — RX RENEWAL (OUTPATIENT)
Age: 65
End: 2020-03-13

## 2020-03-25 ENCOUNTER — RX RENEWAL (OUTPATIENT)
Age: 65
End: 2020-03-25

## 2020-05-13 PROCEDURE — 99443: CPT

## 2020-05-22 ENCOUNTER — APPOINTMENT (OUTPATIENT)
Dept: FAMILY MEDICINE | Facility: CLINIC | Age: 65
End: 2020-05-22
Payer: MEDICARE

## 2020-05-22 VITALS
TEMPERATURE: 97.6 F | BODY MASS INDEX: 27.17 KG/M2 | RESPIRATION RATE: 12 BRPM | HEART RATE: 68 BPM | OXYGEN SATURATION: 96 % | DIASTOLIC BLOOD PRESSURE: 56 MMHG | SYSTOLIC BLOOD PRESSURE: 94 MMHG | WEIGHT: 205 LBS | HEIGHT: 73 IN

## 2020-05-22 PROCEDURE — 99213 OFFICE O/P EST LOW 20 MIN: CPT

## 2020-05-22 NOTE — PHYSICAL EXAM
[No Acute Distress] : no acute distress [Well-Appearing] : well-appearing [Well Developed] : well developed [Well Nourished] : well nourished [Normal Sclera/Conjunctiva] : normal sclera/conjunctiva [PERRL] : pupils equal round and reactive to light [Normal Outer Ear/Nose] : the outer ears and nose were normal in appearance [No JVD] : no jugular venous distention [Normal Oropharynx] : the oropharynx was normal [No Respiratory Distress] : no respiratory distress  [No Lymphadenopathy] : no lymphadenopathy [No Accessory Muscle Use] : no accessory muscle use [Clear to Auscultation] : lungs were clear to auscultation bilaterally [Normal Rate] : normal rate  [Regular Rhythm] : with a regular rhythm [No Murmur] : no murmur heard [Soft] : abdomen soft [No HSM] : no HSM [No CVA Tenderness] : no CVA  tenderness [No Spinal Tenderness] : no spinal tenderness [Speech Grossly Normal] : speech grossly normal [Alert and Oriented x3] : oriented to person, place, and time [Normal Mood] : the mood was normal [de-identified] : usual ankle edema 2+

## 2020-05-22 NOTE — HISTORY OF PRESENT ILLNESS
[FreeTextEntry8] : Patient is here due to 2 increasingly unstable gait and weakness and some mild lightheadedness he dates this to several days ago when he was started on a new sleeping pill by his psychologist name of which he does not remember he also has not been taking much in the way of fluids because he does not like to have to get up to urinate he has a background of the usually having a stable gait and had been receiving physical therapy until the Covid situation was physical therapy to close no headache no fainting spells no chest pain no shortness of breath he was also noted to have low blood pressure but he does usually run low blood pressures in the 100/60 range today it is slightly lower than

## 2020-05-22 NOTE — ASSESSMENT
[FreeTextEntry1] : Patient's unsteady gait is long-standing worsening is multifactorial likely recent laboratory work done by oncology hematology does not show any meaningful abnormalities but thyroid function tests have not been included and he is hypothyroid on supplementation the role of the newly prescribed a sleeping pill and is to be determined he will start that temporarily he will increase his hydration and decrease his Lasix from daily to every other day and we followed up in 3 or 4 days telephonic that or if necessary and person a TSH T3 and T4 will be ordered

## 2020-05-22 NOTE — REVIEW OF SYSTEMS
[Nocturia] : nocturia [Frequency] : frequency [Unsteady Walk] : ataxia [Chills] : no chills [Fever] : no fever [Night Sweats] : no night sweats [Recent Change In Weight] : ~T no recent weight change [Sore Throat] : no sore throat [Nasal Discharge] : no nasal discharge [Chest Pain] : no chest pain [Orthopena] : no orthopnea [Shortness Of Breath] : no shortness of breath [Wheezing] : no wheezing [Cough] : no cough [Abdominal Pain] : no abdominal pain [Dyspnea on Exertion] : not dyspnea on exertion [Vomiting] : no vomiting [Nausea] : no nausea [Dysuria] : no dysuria [Headache] : no headache [Dizziness] : no dizziness [Hematuria] : no hematuria [Fainting] : no fainting [Confusion] : no confusion [Memory Loss] : no memory loss

## 2020-06-30 ENCOUNTER — RX RENEWAL (OUTPATIENT)
Age: 65
End: 2020-06-30

## 2020-07-01 ENCOUNTER — RX RENEWAL (OUTPATIENT)
Age: 65
End: 2020-07-01

## 2020-12-18 NOTE — H&P ADULT - NSHPLABSRESULTS_GEN_ALL_CORE
15.5   8.1   )-----------( 61       ( 2019 18:20 )             46.0           136  |  100  |  18  ----------------------------<  108<H>  4.8   |  29  |  0.86    Ca    8.2<L>      2019 19:15    TPro  6.6  /  Alb  2.4<L>  /  TBili  0.6  /  DBili  x   /  AST  63<H>  /  ALT  25  /  AlkPhos  59      Lactate, Blood: 1.5 mmol/L ( @ 19:15)       LIVER FUNCTIONS - ( 2019 19:15 )  Alb: 2.4 g/dL / Pro: 6.6 g/dL / ALK PHOS: 59 U/L / ALT: 25 U/L DA / AST: 63 U/L / GGT: x               PT/INR - ( 2019 19:15 )   PT: 13.2 sec;   INR: 1.17 ratio         PTT - ( 2019 19:15 )  PTT:32.2 sec    CARDIAC MARKERS ( 2019 19:15 )  <.017 ng/mL / x     / x     / x     / x          Serum Pro-Brain Natriuretic Peptide: 127 pg/mL (19 @ 19:15)    Urinalysis Basic - ( 2019 20:55 )    Color: Yellow / Appearance: Clear / S.010 / pH: x  Gluc: x / Ketone: Negative  / Bili: Negative / Urobili: Negative   Blood: x / Protein: Negative / Nitrite: Negative   Leuk Esterase: Negative / RBC: 0-4 /HPF / WBC Negative /HPF   Sq Epi: x / Non Sq Epi: Neg.-Few / Bacteria: Negative /HPF        CAPILLARY BLOOD GLUCOSE    EKG: NSR at 74bpm, ST Tw changes V3-V5 more prom compared to 2018    < from: CT Chest No Cont (19 @ 20:00) >    IMPRESSION: Chronic changes with scarring and bronchiectasis   predominating in the upper lobes seen on prior plain films. No evidence   of superimposed pneumonia    < end of copied text >    < from: Xray Chest 1 View AP/PA (19 @ 19:12) >    IMPRESSION: Unchanged chest showing extensive fibrotic changes as above.    < end of copied text > No / Patient refused

## 2021-01-01 ENCOUNTER — INPATIENT (INPATIENT)
Facility: HOSPITAL | Age: 66
LOS: 20 days | DRG: 871 | End: 2021-07-12
Attending: INTERNAL MEDICINE | Admitting: INTERNAL MEDICINE
Payer: MEDICARE

## 2021-01-01 ENCOUNTER — OUTPATIENT (OUTPATIENT)
Dept: OUTPATIENT SERVICES | Facility: HOSPITAL | Age: 66
LOS: 1 days | End: 2021-01-01
Payer: MEDICARE

## 2021-01-01 ENCOUNTER — RX RENEWAL (OUTPATIENT)
Age: 66
End: 2021-01-01

## 2021-01-01 ENCOUNTER — TRANSCRIPTION ENCOUNTER (OUTPATIENT)
Age: 66
End: 2021-01-01

## 2021-01-01 ENCOUNTER — EMERGENCY (EMERGENCY)
Facility: HOSPITAL | Age: 66
LOS: 1 days | Discharge: ANOTHER HOSPITAL | End: 2021-01-01
Attending: EMERGENCY MEDICINE | Admitting: EMERGENCY MEDICINE
Payer: MEDICARE

## 2021-01-01 ENCOUNTER — APPOINTMENT (OUTPATIENT)
Dept: CT IMAGING | Facility: CLINIC | Age: 66
End: 2021-01-01
Payer: MEDICARE

## 2021-01-01 ENCOUNTER — APPOINTMENT (OUTPATIENT)
Dept: FAMILY MEDICINE | Facility: CLINIC | Age: 66
End: 2021-01-01
Payer: MEDICARE

## 2021-01-01 ENCOUNTER — APPOINTMENT (OUTPATIENT)
Dept: FAMILY MEDICINE | Facility: CLINIC | Age: 66
End: 2021-01-01

## 2021-01-01 ENCOUNTER — APPOINTMENT (OUTPATIENT)
Dept: CT IMAGING | Facility: CLINIC | Age: 66
End: 2021-01-01

## 2021-01-01 ENCOUNTER — INPATIENT (INPATIENT)
Facility: HOSPITAL | Age: 66
LOS: 6 days | Discharge: NURSING, REHAB OR LONG-TERM CARE | DRG: 82 | End: 2021-05-10
Attending: SURGERY | Admitting: SURGERY
Payer: MEDICARE

## 2021-01-01 VITALS
DIASTOLIC BLOOD PRESSURE: 70 MMHG | OXYGEN SATURATION: 97 % | HEART RATE: 66 BPM | TEMPERATURE: 97.7 F | HEIGHT: 73 IN | SYSTOLIC BLOOD PRESSURE: 104 MMHG | WEIGHT: 202.06 LBS | RESPIRATION RATE: 16 BRPM | BODY MASS INDEX: 26.78 KG/M2

## 2021-01-01 VITALS
OXYGEN SATURATION: 90 % | WEIGHT: 182.1 LBS | SYSTOLIC BLOOD PRESSURE: 128 MMHG | RESPIRATION RATE: 48 BRPM | HEART RATE: 130 BPM | TEMPERATURE: 100 F | HEIGHT: 74 IN | DIASTOLIC BLOOD PRESSURE: 83 MMHG

## 2021-01-01 VITALS — DIASTOLIC BLOOD PRESSURE: 62 MMHG | SYSTOLIC BLOOD PRESSURE: 98 MMHG | HEART RATE: 68 BPM

## 2021-01-01 VITALS
DIASTOLIC BLOOD PRESSURE: 80 MMHG | RESPIRATION RATE: 18 BRPM | WEIGHT: 205.03 LBS | TEMPERATURE: 98 F | HEART RATE: 74 BPM | HEIGHT: 74 IN | SYSTOLIC BLOOD PRESSURE: 127 MMHG | OXYGEN SATURATION: 100 %

## 2021-01-01 VITALS
HEART RATE: 93 BPM | DIASTOLIC BLOOD PRESSURE: 53 MMHG | WEIGHT: 199.96 LBS | OXYGEN SATURATION: 95 % | HEIGHT: 74 IN | RESPIRATION RATE: 26 BRPM | TEMPERATURE: 101 F | SYSTOLIC BLOOD PRESSURE: 107 MMHG

## 2021-01-01 VITALS
TEMPERATURE: 99 F | HEART RATE: 80 BPM | DIASTOLIC BLOOD PRESSURE: 71 MMHG | SYSTOLIC BLOOD PRESSURE: 109 MMHG | RESPIRATION RATE: 20 BRPM | OXYGEN SATURATION: 100 %

## 2021-01-01 VITALS — TEMPERATURE: 101 F

## 2021-01-01 DIAGNOSIS — S06.5X9A TRAUMATIC SUBDURAL HEMORRHAGE WITH LOSS OF CONSCIOUSNESS OF UNSPECIFIED DURATION, INITIAL ENCOUNTER: Chronic | ICD-10-CM

## 2021-01-01 DIAGNOSIS — N40.0 BENIGN PROSTATIC HYPERPLASIA WITHOUT LOWER URINARY TRACT SYMPTOMS: ICD-10-CM

## 2021-01-01 DIAGNOSIS — E03.9 HYPOTHYROIDISM, UNSPECIFIED: ICD-10-CM

## 2021-01-01 DIAGNOSIS — Z93.3 COLOSTOMY STATUS: Chronic | ICD-10-CM

## 2021-01-01 DIAGNOSIS — Z90.49 ACQUIRED ABSENCE OF OTHER SPECIFIED PARTS OF DIGESTIVE TRACT: Chronic | ICD-10-CM

## 2021-01-01 DIAGNOSIS — M25.473 EFFUSION, UNSPECIFIED ANKLE: ICD-10-CM

## 2021-01-01 DIAGNOSIS — J18.9 PNEUMONIA, UNSPECIFIED ORGANISM: ICD-10-CM

## 2021-01-01 DIAGNOSIS — I60.9 NONTRAUMATIC SUBARACHNOID HEMORRHAGE, UNSPECIFIED: ICD-10-CM

## 2021-01-01 DIAGNOSIS — G40.909 EPILEPSY, UNSPECIFIED, NOT INTRACTABLE, WITHOUT STATUS EPILEPTICUS: ICD-10-CM

## 2021-01-01 DIAGNOSIS — Z79.899 OTHER LONG TERM (CURRENT) DRUG THERAPY: ICD-10-CM

## 2021-01-01 DIAGNOSIS — Z98.890 OTHER SPECIFIED POSTPROCEDURAL STATES: Chronic | ICD-10-CM

## 2021-01-01 DIAGNOSIS — J44.9 CHRONIC OBSTRUCTIVE PULMONARY DISEASE, UNSPECIFIED: Chronic | ICD-10-CM

## 2021-01-01 DIAGNOSIS — R29.898 OTHER SYMPTOMS AND SIGNS INVOLVING THE MUSCULOSKELETAL SYSTEM: ICD-10-CM

## 2021-01-01 DIAGNOSIS — Z29.9 ENCOUNTER FOR PROPHYLACTIC MEASURES, UNSPECIFIED: ICD-10-CM

## 2021-01-01 DIAGNOSIS — D86.9 SARCOIDOSIS, UNSPECIFIED: ICD-10-CM

## 2021-01-01 DIAGNOSIS — T14.8XXA OTHER INJURY OF UNSPECIFIED BODY REGION, INITIAL ENCOUNTER: ICD-10-CM

## 2021-01-01 DIAGNOSIS — G93.1 ANOXIC BRAIN DAMAGE, NOT ELSEWHERE CLASSIFIED: Chronic | ICD-10-CM

## 2021-01-01 DIAGNOSIS — D69.6 THROMBOCYTOPENIA, UNSPECIFIED: ICD-10-CM

## 2021-01-01 DIAGNOSIS — R26.81 UNSTEADINESS ON FEET: ICD-10-CM

## 2021-01-01 DIAGNOSIS — M06.9 RHEUMATOID ARTHRITIS, UNSPECIFIED: ICD-10-CM

## 2021-01-01 DIAGNOSIS — J47.9 BRONCHIECTASIS, UNCOMPLICATED: Chronic | ICD-10-CM

## 2021-01-01 DIAGNOSIS — G40.909 EPILEPSY, UNSPECIFIED, NOT INTRACTABLE, W/OUT STATUS EPILEPTICUS: Chronic | ICD-10-CM

## 2021-01-01 DIAGNOSIS — R91.8 OTHER NONSPECIFIC ABNORMAL FINDING OF LUNG FIELD: ICD-10-CM

## 2021-01-01 DIAGNOSIS — Z00.8 ENCOUNTER FOR OTHER GENERAL EXAMINATION: ICD-10-CM

## 2021-01-01 DIAGNOSIS — J69.0 PNEUMONITIS DUE TO INHALATION OF FOOD AND VOMIT: ICD-10-CM

## 2021-01-01 DIAGNOSIS — B35.1 TINEA UNGUIUM: ICD-10-CM
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
## 2021-01-01 DIAGNOSIS — L98.499 NON-PRESSURE CHRONIC ULCER OF SKIN OF OTHER SITES WITH UNSPECIFIED SEVERITY: Chronic | ICD-10-CM

## 2021-01-01 LAB
ALBUMIN SERPL ELPH-MCNC: 1.5 G/DL — LOW (ref 3.3–5)
ALBUMIN SERPL ELPH-MCNC: 1.7 G/DL — LOW (ref 3.3–5)
ALBUMIN SERPL ELPH-MCNC: 1.9 G/DL — LOW (ref 3.3–5)
ALBUMIN SERPL ELPH-MCNC: 2 G/DL — LOW (ref 3.3–5)
ALBUMIN SERPL ELPH-MCNC: 2.2 G/DL — LOW (ref 3.3–5)
ALBUMIN SERPL ELPH-MCNC: 2.7 G/DL — LOW (ref 3.3–5)
ALP SERPL-CCNC: 59 U/L — SIGNIFICANT CHANGE UP (ref 30–120)
ALP SERPL-CCNC: 60 U/L — SIGNIFICANT CHANGE UP (ref 30–120)
ALP SERPL-CCNC: 65 U/L — SIGNIFICANT CHANGE UP (ref 30–120)
ALP SERPL-CCNC: 66 U/L — SIGNIFICANT CHANGE UP (ref 30–120)
ALP SERPL-CCNC: 73 U/L — SIGNIFICANT CHANGE UP (ref 30–120)
ALP SERPL-CCNC: 99 U/L — SIGNIFICANT CHANGE UP (ref 30–120)
ALT FLD-CCNC: 13 U/L DA — SIGNIFICANT CHANGE UP (ref 10–60)
ALT FLD-CCNC: 26 U/L DA — SIGNIFICANT CHANGE UP (ref 10–60)
ALT FLD-CCNC: <10 U/L DA — LOW (ref 10–60)
ANION GAP SERPL CALC-SCNC: -2 MMOL/L — LOW (ref 5–17)
ANION GAP SERPL CALC-SCNC: -3 MMOL/L — LOW (ref 5–17)
ANION GAP SERPL CALC-SCNC: -4 MMOL/L — LOW (ref 5–17)
ANION GAP SERPL CALC-SCNC: -4 MMOL/L — LOW (ref 5–17)
ANION GAP SERPL CALC-SCNC: 1 MMOL/L — LOW (ref 5–17)
ANION GAP SERPL CALC-SCNC: 12 MMOL/L — SIGNIFICANT CHANGE UP (ref 5–17)
ANION GAP SERPL CALC-SCNC: 12 MMOL/L — SIGNIFICANT CHANGE UP (ref 5–17)
ANION GAP SERPL CALC-SCNC: 3 MMOL/L — LOW (ref 5–17)
ANION GAP SERPL CALC-SCNC: 5 MMOL/L — SIGNIFICANT CHANGE UP (ref 5–17)
ANION GAP SERPL CALC-SCNC: 7 MMOL/L — SIGNIFICANT CHANGE UP (ref 5–17)
APPEARANCE UR: CLEAR — SIGNIFICANT CHANGE UP
APTT BLD: 26.6 SEC — LOW (ref 27.5–35.5)
APTT BLD: 36 SEC — HIGH (ref 27.5–35.5)
AST SERPL-CCNC: 11 U/L — SIGNIFICANT CHANGE UP (ref 10–40)
AST SERPL-CCNC: 12 U/L — SIGNIFICANT CHANGE UP (ref 10–40)
AST SERPL-CCNC: 14 U/L — SIGNIFICANT CHANGE UP (ref 10–40)
AST SERPL-CCNC: 15 U/L — SIGNIFICANT CHANGE UP (ref 10–40)
AST SERPL-CCNC: 22 U/L — SIGNIFICANT CHANGE UP (ref 10–40)
AST SERPL-CCNC: 41 U/L — HIGH (ref 10–40)
BACTERIA # UR AUTO: ABNORMAL
BACTERIA # UR AUTO: NEGATIVE — SIGNIFICANT CHANGE UP
BACTERIA # UR AUTO: NEGATIVE — SIGNIFICANT CHANGE UP
BASE EXCESS BLDA CALC-SCNC: 12.7 MMOL/L — HIGH (ref -2–2)
BASE EXCESS BLDA CALC-SCNC: 13 MMOL/L — HIGH (ref -2–2)
BASE EXCESS BLDA CALC-SCNC: 14 MMOL/L — HIGH (ref -2–2)
BASE EXCESS BLDA CALC-SCNC: 16 MMOL/L — HIGH (ref -2–2)
BASE EXCESS BLDA CALC-SCNC: 18 MMOL/L — HIGH (ref -2–2)
BASE EXCESS BLDV CALC-SCNC: 1.8 MMOL/L — SIGNIFICANT CHANGE UP (ref -2–2)
BASOPHILS # BLD AUTO: 0 K/UL — SIGNIFICANT CHANGE UP (ref 0–0.2)
BASOPHILS # BLD AUTO: 0.01 K/UL — SIGNIFICANT CHANGE UP (ref 0–0.2)
BASOPHILS # BLD AUTO: 0.01 K/UL — SIGNIFICANT CHANGE UP (ref 0–0.2)
BASOPHILS # BLD AUTO: 0.02 K/UL — SIGNIFICANT CHANGE UP (ref 0–0.2)
BASOPHILS # BLD AUTO: 0.02 K/UL — SIGNIFICANT CHANGE UP (ref 0–0.2)
BASOPHILS # BLD AUTO: 0.17 K/UL — SIGNIFICANT CHANGE UP (ref 0–0.2)
BASOPHILS NFR BLD AUTO: 0 % — SIGNIFICANT CHANGE UP (ref 0–2)
BASOPHILS NFR BLD AUTO: 0.1 % — SIGNIFICANT CHANGE UP (ref 0–2)
BASOPHILS NFR BLD AUTO: 0.1 % — SIGNIFICANT CHANGE UP (ref 0–2)
BASOPHILS NFR BLD AUTO: 0.2 % — SIGNIFICANT CHANGE UP (ref 0–2)
BASOPHILS NFR BLD AUTO: 0.3 % — SIGNIFICANT CHANGE UP (ref 0–2)
BASOPHILS NFR BLD AUTO: 0.7 % — SIGNIFICANT CHANGE UP (ref 0–2)
BILIRUB SERPL-MCNC: 0.3 MG/DL — SIGNIFICANT CHANGE UP (ref 0.2–1.2)
BILIRUB SERPL-MCNC: 0.3 MG/DL — SIGNIFICANT CHANGE UP (ref 0.2–1.2)
BILIRUB SERPL-MCNC: 0.4 MG/DL — SIGNIFICANT CHANGE UP (ref 0.2–1.2)
BILIRUB SERPL-MCNC: 0.5 MG/DL — SIGNIFICANT CHANGE UP (ref 0.2–1.2)
BILIRUB SERPL-MCNC: 0.6 MG/DL — SIGNIFICANT CHANGE UP (ref 0.2–1.2)
BILIRUB SERPL-MCNC: 0.8 MG/DL — SIGNIFICANT CHANGE UP (ref 0.2–1.2)
BILIRUB UR-MCNC: NEGATIVE — SIGNIFICANT CHANGE UP
BLD GP AB SCN SERPL QL: NEGATIVE — SIGNIFICANT CHANGE UP
BLOOD GAS COMMENTS ARTERIAL: SIGNIFICANT CHANGE UP
BUN SERPL-MCNC: 11 MG/DL — SIGNIFICANT CHANGE UP (ref 7–23)
BUN SERPL-MCNC: 11 MG/DL — SIGNIFICANT CHANGE UP (ref 7–23)
BUN SERPL-MCNC: 12 MG/DL — SIGNIFICANT CHANGE UP (ref 7–23)
BUN SERPL-MCNC: 15 MG/DL — SIGNIFICANT CHANGE UP (ref 7–23)
BUN SERPL-MCNC: 15 MG/DL — SIGNIFICANT CHANGE UP (ref 7–23)
BUN SERPL-MCNC: 16 MG/DL — SIGNIFICANT CHANGE UP (ref 7–23)
BUN SERPL-MCNC: 16 MG/DL — SIGNIFICANT CHANGE UP (ref 7–23)
BUN SERPL-MCNC: 17 MG/DL — SIGNIFICANT CHANGE UP (ref 7–23)
BUN SERPL-MCNC: 18 MG/DL — SIGNIFICANT CHANGE UP (ref 7–23)
BUN SERPL-MCNC: 19 MG/DL — SIGNIFICANT CHANGE UP (ref 7–23)
BUN SERPL-MCNC: 20 MG/DL — SIGNIFICANT CHANGE UP (ref 7–23)
BUN SERPL-MCNC: 20 MG/DL — SIGNIFICANT CHANGE UP (ref 7–23)
BUN SERPL-MCNC: 23 MG/DL — SIGNIFICANT CHANGE UP (ref 7–23)
BUN SERPL-MCNC: 25 MG/DL — HIGH (ref 7–23)
BUN SERPL-MCNC: 30 MG/DL — HIGH (ref 7–23)
BUN SERPL-MCNC: 33 MG/DL — HIGH (ref 7–23)
CA-I SERPL-SCNC: 1.15 MMOL/L — SIGNIFICANT CHANGE UP (ref 1.12–1.3)
CALCIUM SERPL-MCNC: 10 MG/DL — SIGNIFICANT CHANGE UP (ref 8.4–10.5)
CALCIUM SERPL-MCNC: 10.2 MG/DL — SIGNIFICANT CHANGE UP (ref 8.4–10.5)
CALCIUM SERPL-MCNC: 10.5 MG/DL — SIGNIFICANT CHANGE UP (ref 8.4–10.5)
CALCIUM SERPL-MCNC: 8.6 MG/DL — SIGNIFICANT CHANGE UP (ref 8.4–10.5)
CALCIUM SERPL-MCNC: 8.7 MG/DL — SIGNIFICANT CHANGE UP (ref 8.4–10.5)
CALCIUM SERPL-MCNC: 8.8 MG/DL — SIGNIFICANT CHANGE UP (ref 8.4–10.5)
CALCIUM SERPL-MCNC: 8.8 MG/DL — SIGNIFICANT CHANGE UP (ref 8.4–10.5)
CALCIUM SERPL-MCNC: 8.9 MG/DL — SIGNIFICANT CHANGE UP (ref 8.4–10.5)
CALCIUM SERPL-MCNC: 9.3 MG/DL — SIGNIFICANT CHANGE UP (ref 8.4–10.5)
CALCIUM SERPL-MCNC: 9.7 MG/DL — SIGNIFICANT CHANGE UP (ref 8.4–10.5)
CALCIUM SERPL-MCNC: 9.7 MG/DL — SIGNIFICANT CHANGE UP (ref 8.4–10.5)
CALCIUM SERPL-MCNC: 9.9 MG/DL — SIGNIFICANT CHANGE UP (ref 8.4–10.5)
CALCIUM SERPL-MCNC: 9.9 MG/DL — SIGNIFICANT CHANGE UP (ref 8.4–10.5)
CHLORIDE BLDV-SCNC: 111 MMOL/L — HIGH (ref 96–108)
CHLORIDE SERPL-SCNC: 100 MMOL/L — SIGNIFICANT CHANGE UP (ref 96–108)
CHLORIDE SERPL-SCNC: 100 MMOL/L — SIGNIFICANT CHANGE UP (ref 96–108)
CHLORIDE SERPL-SCNC: 102 MMOL/L — SIGNIFICANT CHANGE UP (ref 96–108)
CHLORIDE SERPL-SCNC: 102 MMOL/L — SIGNIFICANT CHANGE UP (ref 96–108)
CHLORIDE SERPL-SCNC: 103 MMOL/L — SIGNIFICANT CHANGE UP (ref 96–108)
CHLORIDE SERPL-SCNC: 104 MMOL/L — SIGNIFICANT CHANGE UP (ref 96–108)
CHLORIDE SERPL-SCNC: 106 MMOL/L — SIGNIFICANT CHANGE UP (ref 96–108)
CHLORIDE SERPL-SCNC: 106 MMOL/L — SIGNIFICANT CHANGE UP (ref 96–108)
CHLORIDE SERPL-SCNC: 93 MMOL/L — LOW (ref 96–108)
CHLORIDE SERPL-SCNC: 93 MMOL/L — LOW (ref 96–108)
CHLORIDE SERPL-SCNC: 95 MMOL/L — LOW (ref 96–108)
CHLORIDE SERPL-SCNC: 96 MMOL/L — SIGNIFICANT CHANGE UP (ref 96–108)
CHLORIDE SERPL-SCNC: 98 MMOL/L — SIGNIFICANT CHANGE UP (ref 96–108)
CHLORIDE SERPL-SCNC: 99 MMOL/L — SIGNIFICANT CHANGE UP (ref 96–108)
CK MB BLD-MCNC: 11.1 % — HIGH (ref 0–3.5)
CK MB BLD-MCNC: 14.1 % — HIGH (ref 0–3.5)
CK MB CFR SERPL CALC: 1 NG/ML — SIGNIFICANT CHANGE UP (ref 0–3.6)
CK MB CFR SERPL CALC: 2.4 NG/ML — SIGNIFICANT CHANGE UP (ref 0–3.6)
CK SERPL-CCNC: 162 U/L — SIGNIFICANT CHANGE UP (ref 30–200)
CK SERPL-CCNC: 17 U/L — LOW (ref 39–308)
CK SERPL-CCNC: 9 U/L — LOW (ref 39–308)
CO2 BLDV-SCNC: 29 MMOL/L — SIGNIFICANT CHANGE UP (ref 22–30)
CO2 SERPL-SCNC: 23 MMOL/L — SIGNIFICANT CHANGE UP (ref 22–31)
CO2 SERPL-SCNC: 29 MMOL/L — SIGNIFICANT CHANGE UP (ref 22–31)
CO2 SERPL-SCNC: 31 MMOL/L — SIGNIFICANT CHANGE UP (ref 22–31)
CO2 SERPL-SCNC: 37 MMOL/L — HIGH (ref 22–31)
CO2 SERPL-SCNC: 38 MMOL/L — HIGH (ref 22–31)
CO2 SERPL-SCNC: 40 MMOL/L — HIGH (ref 22–31)
CO2 SERPL-SCNC: 41 MMOL/L — HIGH (ref 22–31)
CO2 SERPL-SCNC: 42 MMOL/L — HIGH (ref 22–31)
CO2 SERPL-SCNC: 44 MMOL/L — HIGH (ref 22–31)
COLOR SPEC: YELLOW — SIGNIFICANT CHANGE UP
COVID-19 SPIKE DOMAIN AB INTERP: POSITIVE
COVID-19 SPIKE DOMAIN AB INTERP: POSITIVE
COVID-19 SPIKE DOMAIN ANTIBODY RESULT: 145 U/ML — HIGH
COVID-19 SPIKE DOMAIN ANTIBODY RESULT: >250 U/ML — HIGH
CREAT SERPL-MCNC: 0.31 MG/DL — LOW (ref 0.5–1.3)
CREAT SERPL-MCNC: 0.33 MG/DL — LOW (ref 0.5–1.3)
CREAT SERPL-MCNC: 0.38 MG/DL — LOW (ref 0.5–1.3)
CREAT SERPL-MCNC: 0.4 MG/DL — LOW (ref 0.5–1.3)
CREAT SERPL-MCNC: 0.4 MG/DL — LOW (ref 0.5–1.3)
CREAT SERPL-MCNC: 0.41 MG/DL — LOW (ref 0.5–1.3)
CREAT SERPL-MCNC: 0.41 MG/DL — LOW (ref 0.5–1.3)
CREAT SERPL-MCNC: 0.44 MG/DL — LOW (ref 0.5–1.3)
CREAT SERPL-MCNC: 0.46 MG/DL — LOW (ref 0.5–1.3)
CREAT SERPL-MCNC: 0.52 MG/DL — SIGNIFICANT CHANGE UP (ref 0.5–1.3)
CREAT SERPL-MCNC: 0.54 MG/DL — SIGNIFICANT CHANGE UP (ref 0.5–1.3)
CREAT SERPL-MCNC: 0.55 MG/DL — SIGNIFICANT CHANGE UP (ref 0.5–1.3)
CREAT SERPL-MCNC: 0.6 MG/DL — SIGNIFICANT CHANGE UP (ref 0.5–1.3)
CREAT SERPL-MCNC: 0.64 MG/DL — SIGNIFICANT CHANGE UP (ref 0.5–1.3)
CREAT SERPL-MCNC: 0.66 MG/DL — SIGNIFICANT CHANGE UP (ref 0.5–1.3)
CREAT SERPL-MCNC: 0.67 MG/DL — SIGNIFICANT CHANGE UP (ref 0.5–1.3)
CREAT SERPL-MCNC: 0.81 MG/DL — SIGNIFICANT CHANGE UP (ref 0.5–1.3)
CREAT SERPL-MCNC: 0.9 MG/DL — SIGNIFICANT CHANGE UP (ref 0.5–1.3)
CULTURE RESULTS: NO GROWTH — SIGNIFICANT CHANGE UP
CULTURE RESULTS: SIGNIFICANT CHANGE UP
DIFF PNL FLD: ABNORMAL
DIFF PNL FLD: ABNORMAL
DIFF PNL FLD: NEGATIVE — SIGNIFICANT CHANGE UP
EOSINOPHIL # BLD AUTO: 0 K/UL — SIGNIFICANT CHANGE UP (ref 0–0.5)
EOSINOPHIL # BLD AUTO: 0.01 K/UL — SIGNIFICANT CHANGE UP (ref 0–0.5)
EOSINOPHIL # BLD AUTO: 0.07 K/UL — SIGNIFICANT CHANGE UP (ref 0–0.5)
EOSINOPHIL # BLD AUTO: 0.22 K/UL — SIGNIFICANT CHANGE UP (ref 0–0.5)
EOSINOPHIL # BLD AUTO: 0.27 K/UL — SIGNIFICANT CHANGE UP (ref 0–0.5)
EOSINOPHIL # BLD AUTO: 0.4 K/UL — SIGNIFICANT CHANGE UP (ref 0–0.5)
EOSINOPHIL NFR BLD AUTO: 0 % — SIGNIFICANT CHANGE UP (ref 0–6)
EOSINOPHIL NFR BLD AUTO: 0.1 % — SIGNIFICANT CHANGE UP (ref 0–6)
EOSINOPHIL NFR BLD AUTO: 1.2 % — SIGNIFICANT CHANGE UP (ref 0–6)
EOSINOPHIL NFR BLD AUTO: 2.7 % — SIGNIFICANT CHANGE UP (ref 0–6)
EOSINOPHIL NFR BLD AUTO: 2.8 % — SIGNIFICANT CHANGE UP (ref 0–6)
EOSINOPHIL NFR BLD AUTO: 5.9 % — SIGNIFICANT CHANGE UP (ref 0–6)
EPI CELLS # UR: 1 /HPF — SIGNIFICANT CHANGE UP
EPI CELLS # UR: NEGATIVE — SIGNIFICANT CHANGE UP
EPI CELLS # UR: SIGNIFICANT CHANGE UP
GAS PNL BLDA: SIGNIFICANT CHANGE UP
GAS PNL BLDV: 134 MMOL/L — LOW (ref 135–145)
GAS PNL BLDV: SIGNIFICANT CHANGE UP
GLUCOSE BLDC GLUCOMTR-MCNC: 108 MG/DL — HIGH (ref 70–99)
GLUCOSE BLDC GLUCOMTR-MCNC: 112 MG/DL — HIGH (ref 70–99)
GLUCOSE BLDC GLUCOMTR-MCNC: 118 MG/DL — HIGH (ref 70–99)
GLUCOSE BLDC GLUCOMTR-MCNC: 118 MG/DL — HIGH (ref 70–99)
GLUCOSE BLDC GLUCOMTR-MCNC: 119 MG/DL — HIGH (ref 70–99)
GLUCOSE BLDC GLUCOMTR-MCNC: 122 MG/DL — HIGH (ref 70–99)
GLUCOSE BLDC GLUCOMTR-MCNC: 99 MG/DL — SIGNIFICANT CHANGE UP (ref 70–99)
GLUCOSE BLDV-MCNC: 101 MG/DL — HIGH (ref 70–99)
GLUCOSE SERPL-MCNC: 100 MG/DL — HIGH (ref 70–99)
GLUCOSE SERPL-MCNC: 102 MG/DL — HIGH (ref 70–99)
GLUCOSE SERPL-MCNC: 105 MG/DL — HIGH (ref 70–99)
GLUCOSE SERPL-MCNC: 110 MG/DL — HIGH (ref 70–99)
GLUCOSE SERPL-MCNC: 113 MG/DL — HIGH (ref 70–99)
GLUCOSE SERPL-MCNC: 114 MG/DL — HIGH (ref 70–99)
GLUCOSE SERPL-MCNC: 115 MG/DL — HIGH (ref 70–99)
GLUCOSE SERPL-MCNC: 119 MG/DL — HIGH (ref 70–99)
GLUCOSE SERPL-MCNC: 120 MG/DL — HIGH (ref 70–99)
GLUCOSE SERPL-MCNC: 122 MG/DL — HIGH (ref 70–99)
GLUCOSE SERPL-MCNC: 124 MG/DL — HIGH (ref 70–99)
GLUCOSE SERPL-MCNC: 126 MG/DL — HIGH (ref 70–99)
GLUCOSE SERPL-MCNC: 128 MG/DL — HIGH (ref 70–99)
GLUCOSE SERPL-MCNC: 132 MG/DL — HIGH (ref 70–99)
GLUCOSE SERPL-MCNC: 145 MG/DL — HIGH (ref 70–99)
GLUCOSE SERPL-MCNC: 81 MG/DL — SIGNIFICANT CHANGE UP (ref 70–99)
GLUCOSE SERPL-MCNC: 86 MG/DL — SIGNIFICANT CHANGE UP (ref 70–99)
GLUCOSE SERPL-MCNC: 94 MG/DL — SIGNIFICANT CHANGE UP (ref 70–99)
GLUCOSE UR QL: NEGATIVE MG/DL — SIGNIFICANT CHANGE UP
GLUCOSE UR QL: NEGATIVE MG/DL — SIGNIFICANT CHANGE UP
GLUCOSE UR QL: NEGATIVE — SIGNIFICANT CHANGE UP
GRAM STN FLD: SIGNIFICANT CHANGE UP
GRAM STN FLD: SIGNIFICANT CHANGE UP
HCO3 BLDA-SCNC: 32 MMOL/L — HIGH (ref 23–27)
HCO3 BLDA-SCNC: 33 MMOL/L — HIGH (ref 23–27)
HCO3 BLDA-SCNC: 35 MMOL/L — HIGH (ref 23–27)
HCO3 BLDA-SCNC: 39 MMOL/L — HIGH (ref 23–27)
HCO3 BLDA-SCNC: 40 MMOL/L — HIGH (ref 23–27)
HCO3 BLDV-SCNC: 28 MMOL/L — SIGNIFICANT CHANGE UP (ref 21–29)
HCO3 BLDV-SCNC: 38 MMOL/L — HIGH (ref 21–29)
HCT VFR BLD CALC: 33.7 % — LOW (ref 39–50)
HCT VFR BLD CALC: 33.7 % — LOW (ref 39–50)
HCT VFR BLD CALC: 34.1 % — LOW (ref 39–50)
HCT VFR BLD CALC: 34.6 % — LOW (ref 39–50)
HCT VFR BLD CALC: 36.4 % — LOW (ref 39–50)
HCT VFR BLD CALC: 37.3 % — LOW (ref 39–50)
HCT VFR BLD CALC: 39.4 % — SIGNIFICANT CHANGE UP (ref 39–50)
HCT VFR BLD CALC: 39.5 % — SIGNIFICANT CHANGE UP (ref 39–50)
HCT VFR BLD CALC: 40 % — SIGNIFICANT CHANGE UP (ref 39–50)
HCT VFR BLD CALC: 40.1 % — SIGNIFICANT CHANGE UP (ref 39–50)
HCT VFR BLD CALC: 40.2 % — SIGNIFICANT CHANGE UP (ref 39–50)
HCT VFR BLD CALC: 40.5 % — SIGNIFICANT CHANGE UP (ref 39–50)
HCT VFR BLD CALC: 40.7 % — SIGNIFICANT CHANGE UP (ref 39–50)
HCT VFR BLD CALC: 40.9 % — SIGNIFICANT CHANGE UP (ref 39–50)
HCT VFR BLD CALC: 41.3 % — SIGNIFICANT CHANGE UP (ref 39–50)
HCT VFR BLD CALC: 43.1 % — SIGNIFICANT CHANGE UP (ref 39–50)
HCT VFR BLD CALC: 43.5 % — SIGNIFICANT CHANGE UP (ref 39–50)
HCT VFR BLD CALC: 44 % — SIGNIFICANT CHANGE UP (ref 39–50)
HCT VFR BLD CALC: 45.1 % — SIGNIFICANT CHANGE UP (ref 39–50)
HCT VFR BLD CALC: 49.8 % — SIGNIFICANT CHANGE UP (ref 39–50)
HCT VFR BLDA CALC: 42 % — SIGNIFICANT CHANGE UP (ref 39–50)
HGB BLD CALC-MCNC: 13.5 G/DL — SIGNIFICANT CHANGE UP (ref 13–17)
HGB BLD-MCNC: 10.7 G/DL — LOW (ref 13–17)
HGB BLD-MCNC: 10.8 G/DL — LOW (ref 13–17)
HGB BLD-MCNC: 10.8 G/DL — LOW (ref 13–17)
HGB BLD-MCNC: 11.3 G/DL — LOW (ref 13–17)
HGB BLD-MCNC: 11.7 G/DL — LOW (ref 13–17)
HGB BLD-MCNC: 12.3 G/DL — LOW (ref 13–17)
HGB BLD-MCNC: 12.4 G/DL — LOW (ref 13–17)
HGB BLD-MCNC: 12.8 G/DL — LOW (ref 13–17)
HGB BLD-MCNC: 12.8 G/DL — LOW (ref 13–17)
HGB BLD-MCNC: 12.9 G/DL — LOW (ref 13–17)
HGB BLD-MCNC: 13 G/DL — SIGNIFICANT CHANGE UP (ref 13–17)
HGB BLD-MCNC: 13 G/DL — SIGNIFICANT CHANGE UP (ref 13–17)
HGB BLD-MCNC: 13.3 G/DL — SIGNIFICANT CHANGE UP (ref 13–17)
HGB BLD-MCNC: 13.4 G/DL — SIGNIFICANT CHANGE UP (ref 13–17)
HGB BLD-MCNC: 13.8 G/DL — SIGNIFICANT CHANGE UP (ref 13–17)
HGB BLD-MCNC: 13.8 G/DL — SIGNIFICANT CHANGE UP (ref 13–17)
HGB BLD-MCNC: 14 G/DL — SIGNIFICANT CHANGE UP (ref 13–17)
HGB BLD-MCNC: 14.7 G/DL — SIGNIFICANT CHANGE UP (ref 13–17)
HGB BLD-MCNC: 14.9 G/DL — SIGNIFICANT CHANGE UP (ref 13–17)
HGB BLD-MCNC: 16.2 G/DL — SIGNIFICANT CHANGE UP (ref 13–17)
HOROWITZ INDEX BLDA+IHG-RTO: 100 — SIGNIFICANT CHANGE UP
HOROWITZ INDEX BLDA+IHG-RTO: 42 — SIGNIFICANT CHANGE UP
HOROWITZ INDEX BLDA+IHG-RTO: 50 — SIGNIFICANT CHANGE UP
HYALINE CASTS # UR AUTO: 0 /LPF — SIGNIFICANT CHANGE UP (ref 0–2)
IMM GRANULOCYTES NFR BLD AUTO: 0.1 % — SIGNIFICANT CHANGE UP (ref 0–1.5)
IMM GRANULOCYTES NFR BLD AUTO: 0.4 % — SIGNIFICANT CHANGE UP (ref 0–1.5)
IMM GRANULOCYTES NFR BLD AUTO: 0.6 % — SIGNIFICANT CHANGE UP (ref 0–1.5)
IMM GRANULOCYTES NFR BLD AUTO: 0.7 % — SIGNIFICANT CHANGE UP (ref 0–1.5)
IMM GRANULOCYTES NFR BLD AUTO: 0.8 % — SIGNIFICANT CHANGE UP (ref 0–1.5)
IMM GRANULOCYTES NFR BLD AUTO: 0.9 % — SIGNIFICANT CHANGE UP (ref 0–1.5)
INR BLD: 1.1 RATIO — SIGNIFICANT CHANGE UP (ref 0.88–1.16)
INR BLD: 1.31 RATIO — HIGH (ref 0.88–1.16)
KETONES UR-MCNC: ABNORMAL
KETONES UR-MCNC: ABNORMAL
KETONES UR-MCNC: SIGNIFICANT CHANGE UP
LACTATE BLDV-MCNC: 1.2 MMOL/L — SIGNIFICANT CHANGE UP (ref 0.7–2)
LACTATE BLDV-MCNC: 1.8 MMOL/L — SIGNIFICANT CHANGE UP (ref 0.7–2)
LACTATE SERPL-SCNC: 1.9 MMOL/L — SIGNIFICANT CHANGE UP (ref 0.7–2)
LACTATE SERPL-SCNC: 2.9 MMOL/L — HIGH (ref 0.7–2)
LEUKOCYTE ESTERASE UR-ACNC: ABNORMAL
LEUKOCYTE ESTERASE UR-ACNC: ABNORMAL
LEUKOCYTE ESTERASE UR-ACNC: NEGATIVE — SIGNIFICANT CHANGE UP
LYMPHOCYTES # BLD AUTO: 0.48 K/UL — LOW (ref 1–3.3)
LYMPHOCYTES # BLD AUTO: 0.6 K/UL — LOW (ref 1–3.3)
LYMPHOCYTES # BLD AUTO: 0.69 K/UL — LOW (ref 1–3.3)
LYMPHOCYTES # BLD AUTO: 0.85 K/UL — LOW (ref 1–3.3)
LYMPHOCYTES # BLD AUTO: 1.27 K/UL — SIGNIFICANT CHANGE UP (ref 1–3.3)
LYMPHOCYTES # BLD AUTO: 1.66 K/UL — SIGNIFICANT CHANGE UP (ref 1–3.3)
LYMPHOCYTES # BLD AUTO: 14.3 % — SIGNIFICANT CHANGE UP (ref 13–44)
LYMPHOCYTES # BLD AUTO: 16.8 % — SIGNIFICANT CHANGE UP (ref 13–44)
LYMPHOCYTES # BLD AUTO: 18.8 % — SIGNIFICANT CHANGE UP (ref 13–44)
LYMPHOCYTES # BLD AUTO: 2.4 % — LOW (ref 13–44)
LYMPHOCYTES # BLD AUTO: 4.1 % — LOW (ref 13–44)
LYMPHOCYTES # BLD AUTO: 8.8 % — LOW (ref 13–44)
MAGNESIUM SERPL-MCNC: 1.7 MG/DL — SIGNIFICANT CHANGE UP (ref 1.6–2.6)
MAGNESIUM SERPL-MCNC: 1.8 MG/DL — SIGNIFICANT CHANGE UP (ref 1.6–2.6)
MAGNESIUM SERPL-MCNC: 2 MG/DL — SIGNIFICANT CHANGE UP (ref 1.6–2.6)
MAGNESIUM SERPL-MCNC: 2.1 MG/DL — SIGNIFICANT CHANGE UP (ref 1.6–2.6)
MAGNESIUM SERPL-MCNC: 2.7 MG/DL — HIGH (ref 1.6–2.6)
MANUAL SMEAR VERIFICATION: SIGNIFICANT CHANGE UP
MCHC RBC-ENTMCNC: 29.8 PG — SIGNIFICANT CHANGE UP (ref 27–34)
MCHC RBC-ENTMCNC: 29.9 PG — SIGNIFICANT CHANGE UP (ref 27–34)
MCHC RBC-ENTMCNC: 30.1 PG — SIGNIFICANT CHANGE UP (ref 27–34)
MCHC RBC-ENTMCNC: 30.2 PG — SIGNIFICANT CHANGE UP (ref 27–34)
MCHC RBC-ENTMCNC: 30.4 PG — SIGNIFICANT CHANGE UP (ref 27–34)
MCHC RBC-ENTMCNC: 30.5 PG — SIGNIFICANT CHANGE UP (ref 27–34)
MCHC RBC-ENTMCNC: 30.6 PG — SIGNIFICANT CHANGE UP (ref 27–34)
MCHC RBC-ENTMCNC: 30.7 PG — SIGNIFICANT CHANGE UP (ref 27–34)
MCHC RBC-ENTMCNC: 30.7 PG — SIGNIFICANT CHANGE UP (ref 27–34)
MCHC RBC-ENTMCNC: 30.8 PG — SIGNIFICANT CHANGE UP (ref 27–34)
MCHC RBC-ENTMCNC: 30.9 PG — SIGNIFICANT CHANGE UP (ref 27–34)
MCHC RBC-ENTMCNC: 30.9 PG — SIGNIFICANT CHANGE UP (ref 27–34)
MCHC RBC-ENTMCNC: 31 PG — SIGNIFICANT CHANGE UP (ref 27–34)
MCHC RBC-ENTMCNC: 31 PG — SIGNIFICANT CHANGE UP (ref 27–34)
MCHC RBC-ENTMCNC: 31.1 PG — SIGNIFICANT CHANGE UP (ref 27–34)
MCHC RBC-ENTMCNC: 31.2 PG — SIGNIFICANT CHANGE UP (ref 27–34)
MCHC RBC-ENTMCNC: 31.3 PG — SIGNIFICANT CHANGE UP (ref 27–34)
MCHC RBC-ENTMCNC: 31.4 GM/DL — LOW (ref 32–36)
MCHC RBC-ENTMCNC: 31.7 PG — SIGNIFICANT CHANGE UP (ref 27–34)
MCHC RBC-ENTMCNC: 31.8 GM/DL — LOW (ref 32–36)
MCHC RBC-ENTMCNC: 31.9 GM/DL — LOW (ref 32–36)
MCHC RBC-ENTMCNC: 32 GM/DL — SIGNIFICANT CHANGE UP (ref 32–36)
MCHC RBC-ENTMCNC: 32.1 GM/DL — SIGNIFICANT CHANGE UP (ref 32–36)
MCHC RBC-ENTMCNC: 32.3 GM/DL — SIGNIFICANT CHANGE UP (ref 32–36)
MCHC RBC-ENTMCNC: 32.5 GM/DL — SIGNIFICANT CHANGE UP (ref 32–36)
MCHC RBC-ENTMCNC: 32.5 GM/DL — SIGNIFICANT CHANGE UP (ref 32–36)
MCHC RBC-ENTMCNC: 32.7 GM/DL — SIGNIFICANT CHANGE UP (ref 32–36)
MCHC RBC-ENTMCNC: 33 GM/DL — SIGNIFICANT CHANGE UP (ref 32–36)
MCHC RBC-ENTMCNC: 33 GM/DL — SIGNIFICANT CHANGE UP (ref 32–36)
MCHC RBC-ENTMCNC: 33.1 GM/DL — SIGNIFICANT CHANGE UP (ref 32–36)
MCHC RBC-ENTMCNC: 33.4 GM/DL — SIGNIFICANT CHANGE UP (ref 32–36)
MCHC RBC-ENTMCNC: 33.8 GM/DL — SIGNIFICANT CHANGE UP (ref 32–36)
MCV RBC AUTO: 92.8 FL — SIGNIFICANT CHANGE UP (ref 80–100)
MCV RBC AUTO: 93 FL — SIGNIFICANT CHANGE UP (ref 80–100)
MCV RBC AUTO: 93.8 FL — SIGNIFICANT CHANGE UP (ref 80–100)
MCV RBC AUTO: 93.8 FL — SIGNIFICANT CHANGE UP (ref 80–100)
MCV RBC AUTO: 93.9 FL — SIGNIFICANT CHANGE UP (ref 80–100)
MCV RBC AUTO: 94 FL — SIGNIFICANT CHANGE UP (ref 80–100)
MCV RBC AUTO: 94 FL — SIGNIFICANT CHANGE UP (ref 80–100)
MCV RBC AUTO: 94.3 FL — SIGNIFICANT CHANGE UP (ref 80–100)
MCV RBC AUTO: 94.6 FL — SIGNIFICANT CHANGE UP (ref 80–100)
MCV RBC AUTO: 94.9 FL — SIGNIFICANT CHANGE UP (ref 80–100)
MCV RBC AUTO: 95 FL — SIGNIFICANT CHANGE UP (ref 80–100)
MCV RBC AUTO: 95 FL — SIGNIFICANT CHANGE UP (ref 80–100)
MCV RBC AUTO: 95.1 FL — SIGNIFICANT CHANGE UP (ref 80–100)
MCV RBC AUTO: 95.3 FL — SIGNIFICANT CHANGE UP (ref 80–100)
MCV RBC AUTO: 95.4 FL — SIGNIFICANT CHANGE UP (ref 80–100)
MCV RBC AUTO: 95.9 FL — SIGNIFICANT CHANGE UP (ref 80–100)
MCV RBC AUTO: 96.2 FL — SIGNIFICANT CHANGE UP (ref 80–100)
MCV RBC AUTO: 96.4 FL — SIGNIFICANT CHANGE UP (ref 80–100)
MCV RBC AUTO: 96.5 FL — SIGNIFICANT CHANGE UP (ref 80–100)
MCV RBC AUTO: 97.1 FL — SIGNIFICANT CHANGE UP (ref 80–100)
MONOCYTES # BLD AUTO: 0.63 K/UL — SIGNIFICANT CHANGE UP (ref 0–0.9)
MONOCYTES # BLD AUTO: 0.85 K/UL — SIGNIFICANT CHANGE UP (ref 0–0.9)
MONOCYTES # BLD AUTO: 0.96 K/UL — HIGH (ref 0–0.9)
MONOCYTES # BLD AUTO: 0.99 K/UL — HIGH (ref 0–0.9)
MONOCYTES # BLD AUTO: 1.3 K/UL — HIGH (ref 0–0.9)
MONOCYTES # BLD AUTO: 3.53 K/UL — HIGH (ref 0–0.9)
MONOCYTES NFR BLD AUTO: 10.6 % — SIGNIFICANT CHANGE UP (ref 2–14)
MONOCYTES NFR BLD AUTO: 11.2 % — SIGNIFICANT CHANGE UP (ref 2–14)
MONOCYTES NFR BLD AUTO: 12.6 % — SIGNIFICANT CHANGE UP (ref 2–14)
MONOCYTES NFR BLD AUTO: 12.6 % — SIGNIFICANT CHANGE UP (ref 2–14)
MONOCYTES NFR BLD AUTO: 14 % — SIGNIFICANT CHANGE UP (ref 2–14)
MONOCYTES NFR BLD AUTO: 9.7 % — SIGNIFICANT CHANGE UP (ref 2–14)
MRSA PCR RESULT.: SIGNIFICANT CHANGE UP
NEUTROPHILS # BLD AUTO: 20.76 K/UL — HIGH (ref 1.8–7.4)
NEUTROPHILS # BLD AUTO: 4.19 K/UL — SIGNIFICANT CHANGE UP (ref 1.8–7.4)
NEUTROPHILS # BLD AUTO: 4.34 K/UL — SIGNIFICANT CHANGE UP (ref 1.8–7.4)
NEUTROPHILS # BLD AUTO: 5.94 K/UL — SIGNIFICANT CHANGE UP (ref 1.8–7.4)
NEUTROPHILS # BLD AUTO: 6.56 K/UL — SIGNIFICANT CHANGE UP (ref 1.8–7.4)
NEUTROPHILS # BLD AUTO: 9.75 K/UL — HIGH (ref 1.8–7.4)
NEUTROPHILS NFR BLD AUTO: 62 % — SIGNIFICANT CHANGE UP (ref 43–77)
NEUTROPHILS NFR BLD AUTO: 66.8 % — SIGNIFICANT CHANGE UP (ref 43–77)
NEUTROPHILS NFR BLD AUTO: 72.9 % — SIGNIFICANT CHANGE UP (ref 43–77)
NEUTROPHILS NFR BLD AUTO: 75.3 % — SIGNIFICANT CHANGE UP (ref 43–77)
NEUTROPHILS NFR BLD AUTO: 82 % — HIGH (ref 43–77)
NEUTROPHILS NFR BLD AUTO: 83.6 % — HIGH (ref 43–77)
NITRITE UR-MCNC: NEGATIVE — SIGNIFICANT CHANGE UP
NRBC # BLD: 0 /100 WBCS — SIGNIFICANT CHANGE UP (ref 0–0)
NT-PROBNP SERPL-SCNC: 1877 PG/ML — HIGH (ref 0–125)
NT-PROBNP SERPL-SCNC: 2700 PG/ML — HIGH (ref 0–125)
PCO2 BLDA: 62 MMHG — HIGH (ref 32–46)
PCO2 BLDA: 63 MMHG — HIGH (ref 32–46)
PCO2 BLDA: 66 MMHG — HIGH (ref 32–46)
PCO2 BLDA: 82 MMHG — CRITICAL HIGH (ref 32–46)
PCO2 BLDA: 89 MMHG — CRITICAL HIGH (ref 32–46)
PCO2 BLDV: 51 MMHG — HIGH (ref 35–50)
PCO2 BLDV: 77 MMHG — HIGH (ref 35–50)
PH BLDA: 7.28 — LOW (ref 7.35–7.45)
PH BLDA: 7.31 — LOW (ref 7.35–7.45)
PH BLDA: 7.4 — SIGNIFICANT CHANGE UP (ref 7.35–7.45)
PH BLDA: 7.43 — SIGNIFICANT CHANGE UP (ref 7.35–7.45)
PH BLDA: 7.44 — SIGNIFICANT CHANGE UP (ref 7.35–7.45)
PH BLDV: 7.35 — SIGNIFICANT CHANGE UP (ref 7.35–7.45)
PH BLDV: 7.36 — SIGNIFICANT CHANGE UP (ref 7.35–7.45)
PH UR: 6 — SIGNIFICANT CHANGE UP (ref 5–8)
PH UR: 6.5 — SIGNIFICANT CHANGE UP (ref 5–8)
PH UR: 8 — SIGNIFICANT CHANGE UP (ref 5–8)
PHOSPHATE SERPL-MCNC: 2.2 MG/DL — LOW (ref 2.5–4.5)
PHOSPHATE SERPL-MCNC: 2.3 MG/DL — LOW (ref 2.5–4.5)
PHOSPHATE SERPL-MCNC: 2.3 MG/DL — LOW (ref 2.5–4.5)
PHOSPHATE SERPL-MCNC: 2.5 MG/DL — SIGNIFICANT CHANGE UP (ref 2.5–4.5)
PHOSPHATE SERPL-MCNC: 2.7 MG/DL — SIGNIFICANT CHANGE UP (ref 2.5–4.5)
PHOSPHATE SERPL-MCNC: 2.7 MG/DL — SIGNIFICANT CHANGE UP (ref 2.5–4.5)
PHOSPHATE SERPL-MCNC: 2.9 MG/DL — SIGNIFICANT CHANGE UP (ref 2.5–4.5)
PHOSPHATE SERPL-MCNC: 3 MG/DL — SIGNIFICANT CHANGE UP (ref 2.5–4.5)
PHOSPHATE SERPL-MCNC: 3 MG/DL — SIGNIFICANT CHANGE UP (ref 2.5–4.5)
PLAT MORPH BLD: NORMAL — SIGNIFICANT CHANGE UP
PLATELET # BLD AUTO: 121 K/UL — LOW (ref 150–400)
PLATELET # BLD AUTO: 122 K/UL — LOW (ref 150–400)
PLATELET # BLD AUTO: 126 K/UL — LOW (ref 150–400)
PLATELET # BLD AUTO: 130 K/UL — LOW (ref 150–400)
PLATELET # BLD AUTO: 141 K/UL — LOW (ref 150–400)
PLATELET # BLD AUTO: 145 K/UL — LOW (ref 150–400)
PLATELET # BLD AUTO: 145 K/UL — LOW (ref 150–400)
PLATELET # BLD AUTO: 148 K/UL — LOW (ref 150–400)
PLATELET # BLD AUTO: 154 K/UL — SIGNIFICANT CHANGE UP (ref 150–400)
PLATELET # BLD AUTO: 158 K/UL — SIGNIFICANT CHANGE UP (ref 150–400)
PLATELET # BLD AUTO: 182 K/UL — SIGNIFICANT CHANGE UP (ref 150–400)
PLATELET # BLD AUTO: 182 K/UL — SIGNIFICANT CHANGE UP (ref 150–400)
PLATELET # BLD AUTO: 212 K/UL — SIGNIFICANT CHANGE UP (ref 150–400)
PLATELET # BLD AUTO: 243 K/UL — SIGNIFICANT CHANGE UP (ref 150–400)
PLATELET # BLD AUTO: 43 K/UL — LOW (ref 150–400)
PLATELET # BLD AUTO: 44 K/UL — LOW (ref 150–400)
PLATELET # BLD AUTO: 48 K/UL — LOW (ref 150–400)
PLATELET # BLD AUTO: 66 K/UL — LOW (ref 150–400)
PLATELET # BLD AUTO: 78 K/UL — LOW (ref 150–400)
PLATELET # BLD AUTO: 94 K/UL — LOW (ref 150–400)
PO2 BLDA: 136 MMHG — HIGH (ref 74–108)
PO2 BLDA: 54 MMHG — LOW (ref 74–108)
PO2 BLDA: 69 MMHG — LOW (ref 74–108)
PO2 BLDA: 71 MMHG — LOW (ref 74–108)
PO2 BLDA: 73 MMHG — LOW (ref 74–108)
PO2 BLDV: 49 MMHG — HIGH (ref 25–45)
PO2 BLDV: 52 MMHG — HIGH (ref 25–45)
POTASSIUM BLDV-SCNC: 4 MMOL/L — SIGNIFICANT CHANGE UP (ref 3.5–5.3)
POTASSIUM SERPL-MCNC: 3.3 MMOL/L — LOW (ref 3.5–5.3)
POTASSIUM SERPL-MCNC: 3.6 MMOL/L — SIGNIFICANT CHANGE UP (ref 3.5–5.3)
POTASSIUM SERPL-MCNC: 3.8 MMOL/L — SIGNIFICANT CHANGE UP (ref 3.5–5.3)
POTASSIUM SERPL-MCNC: 4 MMOL/L — SIGNIFICANT CHANGE UP (ref 3.5–5.3)
POTASSIUM SERPL-MCNC: 4.1 MMOL/L — SIGNIFICANT CHANGE UP (ref 3.5–5.3)
POTASSIUM SERPL-MCNC: 4.1 MMOL/L — SIGNIFICANT CHANGE UP (ref 3.5–5.3)
POTASSIUM SERPL-MCNC: 4.2 MMOL/L — SIGNIFICANT CHANGE UP (ref 3.5–5.3)
POTASSIUM SERPL-MCNC: 4.3 MMOL/L — SIGNIFICANT CHANGE UP (ref 3.5–5.3)
POTASSIUM SERPL-MCNC: 4.3 MMOL/L — SIGNIFICANT CHANGE UP (ref 3.5–5.3)
POTASSIUM SERPL-MCNC: 4.4 MMOL/L — SIGNIFICANT CHANGE UP (ref 3.5–5.3)
POTASSIUM SERPL-MCNC: 4.5 MMOL/L — SIGNIFICANT CHANGE UP (ref 3.5–5.3)
POTASSIUM SERPL-MCNC: 4.7 MMOL/L — SIGNIFICANT CHANGE UP (ref 3.5–5.3)
POTASSIUM SERPL-MCNC: 4.8 MMOL/L — SIGNIFICANT CHANGE UP (ref 3.5–5.3)
POTASSIUM SERPL-MCNC: 5 MMOL/L — SIGNIFICANT CHANGE UP (ref 3.5–5.3)
POTASSIUM SERPL-SCNC: 3.3 MMOL/L — LOW (ref 3.5–5.3)
POTASSIUM SERPL-SCNC: 3.6 MMOL/L — SIGNIFICANT CHANGE UP (ref 3.5–5.3)
POTASSIUM SERPL-SCNC: 3.8 MMOL/L — SIGNIFICANT CHANGE UP (ref 3.5–5.3)
POTASSIUM SERPL-SCNC: 4 MMOL/L — SIGNIFICANT CHANGE UP (ref 3.5–5.3)
POTASSIUM SERPL-SCNC: 4.1 MMOL/L — SIGNIFICANT CHANGE UP (ref 3.5–5.3)
POTASSIUM SERPL-SCNC: 4.1 MMOL/L — SIGNIFICANT CHANGE UP (ref 3.5–5.3)
POTASSIUM SERPL-SCNC: 4.2 MMOL/L — SIGNIFICANT CHANGE UP (ref 3.5–5.3)
POTASSIUM SERPL-SCNC: 4.3 MMOL/L — SIGNIFICANT CHANGE UP (ref 3.5–5.3)
POTASSIUM SERPL-SCNC: 4.3 MMOL/L — SIGNIFICANT CHANGE UP (ref 3.5–5.3)
POTASSIUM SERPL-SCNC: 4.4 MMOL/L — SIGNIFICANT CHANGE UP (ref 3.5–5.3)
POTASSIUM SERPL-SCNC: 4.5 MMOL/L — SIGNIFICANT CHANGE UP (ref 3.5–5.3)
POTASSIUM SERPL-SCNC: 4.7 MMOL/L — SIGNIFICANT CHANGE UP (ref 3.5–5.3)
POTASSIUM SERPL-SCNC: 4.8 MMOL/L — SIGNIFICANT CHANGE UP (ref 3.5–5.3)
POTASSIUM SERPL-SCNC: 5 MMOL/L — SIGNIFICANT CHANGE UP (ref 3.5–5.3)
PROCALCITONIN SERPL-MCNC: 0.04 NG/ML — SIGNIFICANT CHANGE UP (ref 0.02–0.1)
PROCALCITONIN SERPL-MCNC: 0.21 NG/ML — HIGH (ref 0.02–0.1)
PROT SERPL-MCNC: 5.5 G/DL — LOW (ref 6–8.3)
PROT SERPL-MCNC: 5.5 G/DL — LOW (ref 6–8.3)
PROT SERPL-MCNC: 5.6 G/DL — LOW (ref 6–8.3)
PROT SERPL-MCNC: 6.2 G/DL — SIGNIFICANT CHANGE UP (ref 6–8.3)
PROT SERPL-MCNC: 6.6 G/DL — SIGNIFICANT CHANGE UP (ref 6–8.3)
PROT SERPL-MCNC: 7.5 G/DL — SIGNIFICANT CHANGE UP (ref 6–8.3)
PROT UR-MCNC: 30 MG/DL
PROT UR-MCNC: ABNORMAL
PROT UR-MCNC: NEGATIVE MG/DL — SIGNIFICANT CHANGE UP
PROTHROM AB SERPL-ACNC: 13.2 SEC — SIGNIFICANT CHANGE UP (ref 10.6–13.6)
PROTHROM AB SERPL-ACNC: 15.7 SEC — HIGH (ref 10.6–13.6)
RAPID RVP RESULT: SIGNIFICANT CHANGE UP
RAPID RVP RESULT: SIGNIFICANT CHANGE UP
RBC # BLD: 3.55 M/UL — LOW (ref 4.2–5.8)
RBC # BLD: 3.58 M/UL — LOW (ref 4.2–5.8)
RBC # BLD: 3.59 M/UL — LOW (ref 4.2–5.8)
RBC # BLD: 3.67 M/UL — LOW (ref 4.2–5.8)
RBC # BLD: 3.88 M/UL — LOW (ref 4.2–5.8)
RBC # BLD: 4.02 M/UL — LOW (ref 4.2–5.8)
RBC # BLD: 4.13 M/UL — LOW (ref 4.2–5.8)
RBC # BLD: 4.16 M/UL — LOW (ref 4.2–5.8)
RBC # BLD: 4.19 M/UL — LOW (ref 4.2–5.8)
RBC # BLD: 4.24 M/UL — SIGNIFICANT CHANGE UP (ref 4.2–5.8)
RBC # BLD: 4.28 M/UL — SIGNIFICANT CHANGE UP (ref 4.2–5.8)
RBC # BLD: 4.33 M/UL — SIGNIFICANT CHANGE UP (ref 4.2–5.8)
RBC # BLD: 4.44 M/UL — SIGNIFICANT CHANGE UP (ref 4.2–5.8)
RBC # BLD: 4.53 M/UL — SIGNIFICANT CHANGE UP (ref 4.2–5.8)
RBC # BLD: 4.53 M/UL — SIGNIFICANT CHANGE UP (ref 4.2–5.8)
RBC # BLD: 4.63 M/UL — SIGNIFICANT CHANGE UP (ref 4.2–5.8)
RBC # BLD: 4.81 M/UL — SIGNIFICANT CHANGE UP (ref 4.2–5.8)
RBC # BLD: 5.24 M/UL — SIGNIFICANT CHANGE UP (ref 4.2–5.8)
RBC # FLD: 13.7 % — SIGNIFICANT CHANGE UP (ref 10.3–14.5)
RBC # FLD: 13.7 % — SIGNIFICANT CHANGE UP (ref 10.3–14.5)
RBC # FLD: 13.8 % — SIGNIFICANT CHANGE UP (ref 10.3–14.5)
RBC # FLD: 14.1 % — SIGNIFICANT CHANGE UP (ref 10.3–14.5)
RBC # FLD: 14.1 % — SIGNIFICANT CHANGE UP (ref 10.3–14.5)
RBC # FLD: 14.2 % — SIGNIFICANT CHANGE UP (ref 10.3–14.5)
RBC # FLD: 14.2 % — SIGNIFICANT CHANGE UP (ref 10.3–14.5)
RBC # FLD: 14.3 % — SIGNIFICANT CHANGE UP (ref 10.3–14.5)
RBC # FLD: 14.4 % — SIGNIFICANT CHANGE UP (ref 10.3–14.5)
RBC # FLD: 14.5 % — SIGNIFICANT CHANGE UP (ref 10.3–14.5)
RBC # FLD: 14.6 % — HIGH (ref 10.3–14.5)
RBC # FLD: 14.7 % — HIGH (ref 10.3–14.5)
RBC # FLD: 14.8 % — HIGH (ref 10.3–14.5)
RBC # FLD: 14.9 % — HIGH (ref 10.3–14.5)
RBC # FLD: 15 % — HIGH (ref 10.3–14.5)
RBC # FLD: 15.3 % — HIGH (ref 10.3–14.5)
RBC # FLD: 15.6 % — HIGH (ref 10.3–14.5)
RBC BLD AUTO: NORMAL — SIGNIFICANT CHANGE UP
RBC CASTS # UR COMP ASSIST: 1 /HPF — SIGNIFICANT CHANGE UP (ref 0–4)
RBC CASTS # UR COMP ASSIST: ABNORMAL /HPF (ref 0–4)
RBC CASTS # UR COMP ASSIST: SIGNIFICANT CHANGE UP /HPF (ref 0–4)
RH IG SCN BLD-IMP: POSITIVE — SIGNIFICANT CHANGE UP
S AUREUS DNA NOSE QL NAA+PROBE: SIGNIFICANT CHANGE UP
SAO2 % BLDA: 87 % — LOW (ref 92–96)
SAO2 % BLDA: 93 % — SIGNIFICANT CHANGE UP (ref 92–96)
SAO2 % BLDA: 95 % — SIGNIFICANT CHANGE UP (ref 92–96)
SAO2 % BLDA: 95 % — SIGNIFICANT CHANGE UP (ref 92–96)
SAO2 % BLDA: 98 % — HIGH (ref 92–96)
SAO2 % BLDV: 82 % — SIGNIFICANT CHANGE UP (ref 67–88)
SAO2 % BLDV: 85 % — SIGNIFICANT CHANGE UP (ref 67–88)
SARS-COV-2 IGG+IGM SERPL QL IA: 145 U/ML — HIGH
SARS-COV-2 IGG+IGM SERPL QL IA: >250 U/ML — HIGH
SARS-COV-2 IGG+IGM SERPL QL IA: POSITIVE
SARS-COV-2 IGG+IGM SERPL QL IA: POSITIVE
SARS-COV-2 RNA SPEC QL NAA+PROBE: SIGNIFICANT CHANGE UP
SODIUM SERPL-SCNC: 131 MMOL/L — LOW (ref 135–145)
SODIUM SERPL-SCNC: 132 MMOL/L — LOW (ref 135–145)
SODIUM SERPL-SCNC: 134 MMOL/L — LOW (ref 135–145)
SODIUM SERPL-SCNC: 135 MMOL/L — SIGNIFICANT CHANGE UP (ref 135–145)
SODIUM SERPL-SCNC: 136 MMOL/L — SIGNIFICANT CHANGE UP (ref 135–145)
SODIUM SERPL-SCNC: 137 MMOL/L — SIGNIFICANT CHANGE UP (ref 135–145)
SODIUM SERPL-SCNC: 139 MMOL/L — SIGNIFICANT CHANGE UP (ref 135–145)
SODIUM SERPL-SCNC: 140 MMOL/L — SIGNIFICANT CHANGE UP (ref 135–145)
SODIUM SERPL-SCNC: 140 MMOL/L — SIGNIFICANT CHANGE UP (ref 135–145)
SODIUM SERPL-SCNC: 141 MMOL/L — SIGNIFICANT CHANGE UP (ref 135–145)
SODIUM SERPL-SCNC: 143 MMOL/L — SIGNIFICANT CHANGE UP (ref 135–145)
SODIUM SERPL-SCNC: 144 MMOL/L — SIGNIFICANT CHANGE UP (ref 135–145)
SODIUM SERPL-SCNC: 145 MMOL/L — SIGNIFICANT CHANGE UP (ref 135–145)
SP GR SPEC: 1.01 — SIGNIFICANT CHANGE UP (ref 1.01–1.02)
SP GR SPEC: 1.02 — SIGNIFICANT CHANGE UP (ref 1.01–1.02)
SP GR SPEC: >1.05 (ref 1.01–1.02)
SPECIMEN SOURCE: SIGNIFICANT CHANGE UP
TROPONIN I SERPL-MCNC: 0.25 NG/ML — HIGH (ref 0.02–0.06)
TROPONIN I SERPL-MCNC: 0.35 NG/ML — HIGH (ref 0.02–0.06)
TROPONIN I SERPL-MCNC: 0.49 NG/ML — HIGH (ref 0.02–0.06)
TROPONIN I SERPL-MCNC: 0.49 NG/ML — HIGH (ref 0.02–0.06)
UROBILINOGEN FLD QL: 8 MG/DL
UROBILINOGEN FLD QL: 8 MG/DL
UROBILINOGEN FLD QL: NEGATIVE — SIGNIFICANT CHANGE UP
VALPROATE SERPL-MCNC: 80 UG/ML — SIGNIFICANT CHANGE UP (ref 50–100)
VANCOMYCIN TROUGH SERPL-MCNC: 11.1 UG/ML — SIGNIFICANT CHANGE UP (ref 10–20)
WBC # BLD: 10 K/UL — SIGNIFICANT CHANGE UP (ref 3.8–10.5)
WBC # BLD: 10.45 K/UL — SIGNIFICANT CHANGE UP (ref 3.8–10.5)
WBC # BLD: 10.61 K/UL — HIGH (ref 3.8–10.5)
WBC # BLD: 10.74 K/UL — HIGH (ref 3.8–10.5)
WBC # BLD: 11.65 K/UL — HIGH (ref 3.8–10.5)
WBC # BLD: 12.35 K/UL — HIGH (ref 3.8–10.5)
WBC # BLD: 13.71 K/UL — HIGH (ref 3.8–10.5)
WBC # BLD: 14.96 K/UL — HIGH (ref 3.8–10.5)
WBC # BLD: 15.37 K/UL — HIGH (ref 3.8–10.5)
WBC # BLD: 25.29 K/UL — HIGH (ref 3.8–10.5)
WBC # BLD: 5.93 K/UL — SIGNIFICANT CHANGE UP (ref 3.8–10.5)
WBC # BLD: 5.95 K/UL — SIGNIFICANT CHANGE UP (ref 3.8–10.5)
WBC # BLD: 6.47 K/UL — SIGNIFICANT CHANGE UP (ref 3.8–10.5)
WBC # BLD: 6.76 K/UL — SIGNIFICANT CHANGE UP (ref 3.8–10.5)
WBC # BLD: 7.45 K/UL — SIGNIFICANT CHANGE UP (ref 3.8–10.5)
WBC # BLD: 7.88 K/UL — SIGNIFICANT CHANGE UP (ref 3.8–10.5)
WBC # BLD: 7.96 K/UL — SIGNIFICANT CHANGE UP (ref 3.8–10.5)
WBC # BLD: 8.56 K/UL — SIGNIFICANT CHANGE UP (ref 3.8–10.5)
WBC # BLD: 9.47 K/UL — SIGNIFICANT CHANGE UP (ref 3.8–10.5)
WBC # BLD: 9.86 K/UL — SIGNIFICANT CHANGE UP (ref 3.8–10.5)
WBC # FLD AUTO: 10 K/UL — SIGNIFICANT CHANGE UP (ref 3.8–10.5)
WBC # FLD AUTO: 10.45 K/UL — SIGNIFICANT CHANGE UP (ref 3.8–10.5)
WBC # FLD AUTO: 10.61 K/UL — HIGH (ref 3.8–10.5)
WBC # FLD AUTO: 10.74 K/UL — HIGH (ref 3.8–10.5)
WBC # FLD AUTO: 11.65 K/UL — HIGH (ref 3.8–10.5)
WBC # FLD AUTO: 12.35 K/UL — HIGH (ref 3.8–10.5)
WBC # FLD AUTO: 13.71 K/UL — HIGH (ref 3.8–10.5)
WBC # FLD AUTO: 14.96 K/UL — HIGH (ref 3.8–10.5)
WBC # FLD AUTO: 15.37 K/UL — HIGH (ref 3.8–10.5)
WBC # FLD AUTO: 25.29 K/UL — HIGH (ref 3.8–10.5)
WBC # FLD AUTO: 5.93 K/UL — SIGNIFICANT CHANGE UP (ref 3.8–10.5)
WBC # FLD AUTO: 5.95 K/UL — SIGNIFICANT CHANGE UP (ref 3.8–10.5)
WBC # FLD AUTO: 6.47 K/UL — SIGNIFICANT CHANGE UP (ref 3.8–10.5)
WBC # FLD AUTO: 6.76 K/UL — SIGNIFICANT CHANGE UP (ref 3.8–10.5)
WBC # FLD AUTO: 7.45 K/UL — SIGNIFICANT CHANGE UP (ref 3.8–10.5)
WBC # FLD AUTO: 7.88 K/UL — SIGNIFICANT CHANGE UP (ref 3.8–10.5)
WBC # FLD AUTO: 7.96 K/UL — SIGNIFICANT CHANGE UP (ref 3.8–10.5)
WBC # FLD AUTO: 8.56 K/UL — SIGNIFICANT CHANGE UP (ref 3.8–10.5)
WBC # FLD AUTO: 9.47 K/UL — SIGNIFICANT CHANGE UP (ref 3.8–10.5)
WBC # FLD AUTO: 9.86 K/UL — SIGNIFICANT CHANGE UP (ref 3.8–10.5)
WBC UR QL: 1 /HPF — SIGNIFICANT CHANGE UP (ref 0–5)
WBC UR QL: SIGNIFICANT CHANGE UP
WBC UR QL: SIGNIFICANT CHANGE UP

## 2021-01-01 PROCEDURE — 84145 PROCALCITONIN (PCT): CPT

## 2021-01-01 PROCEDURE — 72125 CT NECK SPINE W/O DYE: CPT

## 2021-01-01 PROCEDURE — 86769 SARS-COV-2 COVID-19 ANTIBODY: CPT

## 2021-01-01 PROCEDURE — 71250 CT THORAX DX C-: CPT | Mod: 26,MA

## 2021-01-01 PROCEDURE — 99223 1ST HOSP IP/OBS HIGH 75: CPT

## 2021-01-01 PROCEDURE — 74230 X-RAY XM SWLNG FUNCJ C+: CPT | Mod: 26

## 2021-01-01 PROCEDURE — 71045 X-RAY EXAM CHEST 1 VIEW: CPT | Mod: 26

## 2021-01-01 PROCEDURE — 99232 SBSQ HOSP IP/OBS MODERATE 35: CPT

## 2021-01-01 PROCEDURE — 99233 SBSQ HOSP IP/OBS HIGH 50: CPT

## 2021-01-01 PROCEDURE — 83735 ASSAY OF MAGNESIUM: CPT

## 2021-01-01 PROCEDURE — 87635 SARS-COV-2 COVID-19 AMP PRB: CPT

## 2021-01-01 PROCEDURE — U0003: CPT

## 2021-01-01 PROCEDURE — 92610 EVALUATE SWALLOWING FUNCTION: CPT

## 2021-01-01 PROCEDURE — 83605 ASSAY OF LACTIC ACID: CPT

## 2021-01-01 PROCEDURE — 87641 MR-STAPH DNA AMP PROBE: CPT

## 2021-01-01 PROCEDURE — 71250 CT THORAX DX C-: CPT

## 2021-01-01 PROCEDURE — 99239 HOSP IP/OBS DSCHRG MGMT >30: CPT

## 2021-01-01 PROCEDURE — 92526 ORAL FUNCTION THERAPY: CPT

## 2021-01-01 PROCEDURE — 70486 CT MAXILLOFACIAL W/O DYE: CPT | Mod: 26

## 2021-01-01 PROCEDURE — 94668 MNPJ CHEST WALL SBSQ: CPT

## 2021-01-01 PROCEDURE — 97530 THERAPEUTIC ACTIVITIES: CPT

## 2021-01-01 PROCEDURE — 83036 HEMOGLOBIN GLYCOSYLATED A1C: CPT

## 2021-01-01 PROCEDURE — 0225U NFCT DS DNA&RNA 21 SARSCOV2: CPT

## 2021-01-01 PROCEDURE — 93005 ELECTROCARDIOGRAM TRACING: CPT

## 2021-01-01 PROCEDURE — 94760 N-INVAS EAR/PLS OXIMETRY 1: CPT

## 2021-01-01 PROCEDURE — 85610 PROTHROMBIN TIME: CPT

## 2021-01-01 PROCEDURE — 85025 COMPLETE CBC W/AUTO DIFF WBC: CPT

## 2021-01-01 PROCEDURE — U0005: CPT

## 2021-01-01 PROCEDURE — 99285 EMERGENCY DEPT VISIT HI MDM: CPT | Mod: 25

## 2021-01-01 PROCEDURE — 85027 COMPLETE CBC AUTOMATED: CPT

## 2021-01-01 PROCEDURE — 36415 COLL VENOUS BLD VENIPUNCTURE: CPT

## 2021-01-01 PROCEDURE — 80202 ASSAY OF VANCOMYCIN: CPT

## 2021-01-01 PROCEDURE — 12345: CPT | Mod: NC

## 2021-01-01 PROCEDURE — 81001 URINALYSIS AUTO W/SCOPE: CPT

## 2021-01-01 PROCEDURE — 80053 COMPREHEN METABOLIC PANEL: CPT

## 2021-01-01 PROCEDURE — 99443: CPT

## 2021-01-01 PROCEDURE — 85014 HEMATOCRIT: CPT

## 2021-01-01 PROCEDURE — 93306 TTE W/DOPPLER COMPLETE: CPT | Mod: 26

## 2021-01-01 PROCEDURE — 97166 OT EVAL MOD COMPLEX 45 MIN: CPT

## 2021-01-01 PROCEDURE — 99072 ADDL SUPL MATRL&STAF TM PHE: CPT

## 2021-01-01 PROCEDURE — 70450 CT HEAD/BRAIN W/O DYE: CPT | Mod: 26,MA,77

## 2021-01-01 PROCEDURE — 70450 CT HEAD/BRAIN W/O DYE: CPT

## 2021-01-01 PROCEDURE — 70486 CT MAXILLOFACIAL W/O DYE: CPT

## 2021-01-01 PROCEDURE — 86850 RBC ANTIBODY SCREEN: CPT

## 2021-01-01 PROCEDURE — 87077 CULTURE AEROBIC IDENTIFY: CPT

## 2021-01-01 PROCEDURE — 71275 CT ANGIOGRAPHY CHEST: CPT

## 2021-01-01 PROCEDURE — 99285 EMERGENCY DEPT VISIT HI MDM: CPT

## 2021-01-01 PROCEDURE — 87040 BLOOD CULTURE FOR BACTERIA: CPT

## 2021-01-01 PROCEDURE — 99291 CRITICAL CARE FIRST HOUR: CPT | Mod: CS

## 2021-01-01 PROCEDURE — 73030 X-RAY EXAM OF SHOULDER: CPT | Mod: 26,LT

## 2021-01-01 PROCEDURE — 99291 CRITICAL CARE FIRST HOUR: CPT | Mod: 25

## 2021-01-01 PROCEDURE — 99232 SBSQ HOSP IP/OBS MODERATE 35: CPT | Mod: GC

## 2021-01-01 PROCEDURE — 84100 ASSAY OF PHOSPHORUS: CPT

## 2021-01-01 PROCEDURE — 74230 X-RAY XM SWLNG FUNCJ C+: CPT

## 2021-01-01 PROCEDURE — 12002 RPR S/N/AX/GEN/TRNK2.6-7.5CM: CPT

## 2021-01-01 PROCEDURE — 71045 X-RAY EXAM CHEST 1 VIEW: CPT

## 2021-01-01 PROCEDURE — 85730 THROMBOPLASTIN TIME PARTIAL: CPT

## 2021-01-01 PROCEDURE — 82803 BLOOD GASES ANY COMBINATION: CPT

## 2021-01-01 PROCEDURE — 82947 ASSAY GLUCOSE BLOOD QUANT: CPT

## 2021-01-01 PROCEDURE — 99214 OFFICE O/P EST MOD 30 MIN: CPT

## 2021-01-01 PROCEDURE — 94640 AIRWAY INHALATION TREATMENT: CPT

## 2021-01-01 PROCEDURE — 93306 TTE W/DOPPLER COMPLETE: CPT

## 2021-01-01 PROCEDURE — 70450 CT HEAD/BRAIN W/O DYE: CPT | Mod: 26,MA

## 2021-01-01 PROCEDURE — 84484 ASSAY OF TROPONIN QUANT: CPT

## 2021-01-01 PROCEDURE — 72141 MRI NECK SPINE W/O DYE: CPT

## 2021-01-01 PROCEDURE — 82550 ASSAY OF CK (CPK): CPT

## 2021-01-01 PROCEDURE — 80048 BASIC METABOLIC PNL TOTAL CA: CPT

## 2021-01-01 PROCEDURE — 73030 X-RAY EXAM OF SHOULDER: CPT

## 2021-01-01 PROCEDURE — 97110 THERAPEUTIC EXERCISES: CPT

## 2021-01-01 PROCEDURE — 70450 CT HEAD/BRAIN W/O DYE: CPT | Mod: 26

## 2021-01-01 PROCEDURE — 83880 ASSAY OF NATRIURETIC PEPTIDE: CPT

## 2021-01-01 PROCEDURE — 90471 IMMUNIZATION ADMIN: CPT

## 2021-01-01 PROCEDURE — 82330 ASSAY OF CALCIUM: CPT

## 2021-01-01 PROCEDURE — 85018 HEMOGLOBIN: CPT

## 2021-01-01 PROCEDURE — 93010 ELECTROCARDIOGRAM REPORT: CPT

## 2021-01-01 PROCEDURE — 97116 GAIT TRAINING THERAPY: CPT

## 2021-01-01 PROCEDURE — 90715 TDAP VACCINE 7 YRS/> IM: CPT

## 2021-01-01 PROCEDURE — 94660 CPAP INITIATION&MGMT: CPT

## 2021-01-01 PROCEDURE — 82435 ASSAY OF BLOOD CHLORIDE: CPT

## 2021-01-01 PROCEDURE — 96374 THER/PROPH/DIAG INJ IV PUSH: CPT | Mod: XU

## 2021-01-01 PROCEDURE — 97162 PT EVAL MOD COMPLEX 30 MIN: CPT

## 2021-01-01 PROCEDURE — 92611 MOTION FLUOROSCOPY/SWALLOW: CPT

## 2021-01-01 PROCEDURE — 71250 CT THORAX DX C-: CPT | Mod: 26

## 2021-01-01 PROCEDURE — 82962 GLUCOSE BLOOD TEST: CPT

## 2021-01-01 PROCEDURE — 72141 MRI NECK SPINE W/O DYE: CPT | Mod: 26

## 2021-01-01 PROCEDURE — 86900 BLOOD TYPING SEROLOGIC ABO: CPT

## 2021-01-01 PROCEDURE — 87070 CULTURE OTHR SPECIMN AEROBIC: CPT

## 2021-01-01 PROCEDURE — 84132 ASSAY OF SERUM POTASSIUM: CPT

## 2021-01-01 PROCEDURE — 94667 MNPJ CHEST WALL 1ST: CPT

## 2021-01-01 PROCEDURE — 84295 ASSAY OF SERUM SODIUM: CPT

## 2021-01-01 PROCEDURE — 87640 STAPH A DNA AMP PROBE: CPT

## 2021-01-01 PROCEDURE — 87086 URINE CULTURE/COLONY COUNT: CPT

## 2021-01-01 PROCEDURE — 36600 WITHDRAWAL OF ARTERIAL BLOOD: CPT

## 2021-01-01 PROCEDURE — 86901 BLOOD TYPING SEROLOGIC RH(D): CPT

## 2021-01-01 PROCEDURE — 84443 ASSAY THYROID STIM HORMONE: CPT

## 2021-01-01 PROCEDURE — 71275 CT ANGIOGRAPHY CHEST: CPT | Mod: 26

## 2021-01-01 PROCEDURE — 72125 CT NECK SPINE W/O DYE: CPT | Mod: 26

## 2021-01-01 PROCEDURE — 82553 CREATINE MB FRACTION: CPT

## 2021-01-01 PROCEDURE — 36430 TRANSFUSION BLD/BLD COMPNT: CPT

## 2021-01-01 PROCEDURE — 99222 1ST HOSP IP/OBS MODERATE 55: CPT

## 2021-01-01 PROCEDURE — 80164 ASSAY DIPROPYLACETIC ACD TOT: CPT

## 2021-01-01 PROCEDURE — 74177 CT ABD & PELVIS W/CONTRAST: CPT | Mod: 26,MA

## 2021-01-01 PROCEDURE — 74177 CT ABD & PELVIS W/CONTRAST: CPT

## 2021-01-01 RX ORDER — MEROPENEM 1 G/30ML
1000 INJECTION INTRAVENOUS ONCE
Refills: 0 | Status: DISCONTINUED | OUTPATIENT
Start: 2021-01-01 | End: 2021-01-01

## 2021-01-01 RX ORDER — ENOXAPARIN SODIUM 100 MG/ML
40 INJECTION SUBCUTANEOUS EVERY 24 HOURS
Refills: 0 | Status: DISCONTINUED | OUTPATIENT
Start: 2021-01-01 | End: 2021-01-01

## 2021-01-01 RX ORDER — MEROPENEM 1 G/30ML
500 INJECTION INTRAVENOUS ONCE
Refills: 0 | Status: COMPLETED | OUTPATIENT
Start: 2021-01-01 | End: 2021-01-01

## 2021-01-01 RX ORDER — MEROPENEM 1 G/30ML
1000 INJECTION INTRAVENOUS EVERY 8 HOURS
Refills: 0 | Status: COMPLETED | OUTPATIENT
Start: 2021-01-01 | End: 2021-01-01

## 2021-01-01 RX ORDER — AZITHROMYCIN 500 MG/1
500 TABLET, FILM COATED ORAL ONCE
Refills: 0 | Status: COMPLETED | OUTPATIENT
Start: 2021-01-01 | End: 2021-01-01

## 2021-01-01 RX ORDER — AZTREONAM 2 G
2000 VIAL (EA) INJECTION ONCE
Refills: 0 | Status: COMPLETED | OUTPATIENT
Start: 2021-01-01 | End: 2021-01-01

## 2021-01-01 RX ORDER — LACTOBACILLUS ACIDOPHILUS 100MM CELL
1 CAPSULE ORAL
Qty: 0 | Refills: 0 | DISCHARGE

## 2021-01-01 RX ORDER — SODIUM CHLORIDE 9 MG/ML
1000 INJECTION, SOLUTION INTRAVENOUS
Refills: 0 | Status: DISCONTINUED | OUTPATIENT
Start: 2021-01-01 | End: 2021-01-01

## 2021-01-01 RX ORDER — DIVALPROEX SODIUM 500 MG/1
1500 TABLET, DELAYED RELEASE ORAL AT BEDTIME
Refills: 0 | Status: DISCONTINUED | OUTPATIENT
Start: 2021-01-01 | End: 2021-01-01

## 2021-01-01 RX ORDER — VANCOMYCIN HCL 1 G
1000 VIAL (EA) INTRAVENOUS ONCE
Refills: 0 | Status: COMPLETED | OUTPATIENT
Start: 2021-01-01 | End: 2021-01-01

## 2021-01-01 RX ORDER — LEVOTHYROXINE SODIUM 0.03 MG/1
25 TABLET ORAL
Qty: 90 | Refills: 3 | Status: ACTIVE | COMMUNITY
Start: 2018-08-29 | End: 1900-01-01

## 2021-01-01 RX ORDER — ACETAMINOPHEN 500 MG
650 TABLET ORAL EVERY 6 HOURS
Refills: 0 | Status: DISCONTINUED | OUTPATIENT
Start: 2021-01-01 | End: 2021-01-01

## 2021-01-01 RX ORDER — DIVALPROEX SODIUM 500 MG/1
750 TABLET, DELAYED RELEASE ORAL ONCE
Refills: 0 | Status: COMPLETED | OUTPATIENT
Start: 2021-01-01 | End: 2021-01-01

## 2021-01-01 RX ORDER — FUROSEMIDE 40 MG
40 TABLET ORAL DAILY
Refills: 0 | Status: DISCONTINUED | OUTPATIENT
Start: 2021-01-01 | End: 2021-01-01

## 2021-01-01 RX ORDER — TAMSULOSIN HYDROCHLORIDE 0.4 MG/1
0.4 CAPSULE ORAL AT BEDTIME
Refills: 0 | Status: DISCONTINUED | OUTPATIENT
Start: 2021-01-01 | End: 2021-01-01

## 2021-01-01 RX ORDER — PANTOPRAZOLE SODIUM 20 MG/1
40 TABLET, DELAYED RELEASE ORAL DAILY
Refills: 0 | Status: DISCONTINUED | OUTPATIENT
Start: 2021-01-01 | End: 2021-01-01

## 2021-01-01 RX ORDER — POLYETHYLENE GLYCOL 3350 17 G/17G
17 POWDER, FOR SOLUTION ORAL DAILY
Refills: 0 | Status: DISCONTINUED | OUTPATIENT
Start: 2021-01-01 | End: 2021-01-01

## 2021-01-01 RX ORDER — SODIUM CHLORIDE 9 MG/ML
2500 INJECTION INTRAMUSCULAR; INTRAVENOUS; SUBCUTANEOUS ONCE
Refills: 0 | Status: COMPLETED | OUTPATIENT
Start: 2021-01-01 | End: 2021-01-01

## 2021-01-01 RX ORDER — LEVOTHYROXINE SODIUM 125 MCG
25 TABLET ORAL DAILY
Refills: 0 | Status: DISCONTINUED | OUTPATIENT
Start: 2021-01-01 | End: 2021-01-01

## 2021-01-01 RX ORDER — IPRATROPIUM/ALBUTEROL SULFATE 18-103MCG
1 AEROSOL WITH ADAPTER (GRAM) INHALATION
Qty: 0 | Refills: 0 | DISCHARGE

## 2021-01-01 RX ORDER — BUDESONIDE AND FORMOTEROL FUMARATE DIHYDRATE 160; 4.5 UG/1; UG/1
2 AEROSOL RESPIRATORY (INHALATION)
Refills: 0 | Status: DISCONTINUED | OUTPATIENT
Start: 2021-01-01 | End: 2021-01-01

## 2021-01-01 RX ORDER — ACETAMINOPHEN 500 MG
975 TABLET ORAL ONCE
Refills: 0 | Status: COMPLETED | OUTPATIENT
Start: 2021-01-01 | End: 2021-01-01

## 2021-01-01 RX ORDER — SENNA PLUS 8.6 MG/1
2 TABLET ORAL AT BEDTIME
Refills: 0 | Status: DISCONTINUED | OUTPATIENT
Start: 2021-01-01 | End: 2021-01-01

## 2021-01-01 RX ORDER — TETANUS TOXOID, REDUCED DIPHTHERIA TOXOID AND ACELLULAR PERTUSSIS VACCINE, ADSORBED 5; 2.5; 8; 8; 2.5 [IU]/.5ML; [IU]/.5ML; UG/.5ML; UG/.5ML; UG/.5ML
0.5 SUSPENSION INTRAMUSCULAR ONCE
Refills: 0 | Status: COMPLETED | OUTPATIENT
Start: 2021-01-01 | End: 2021-01-01

## 2021-01-01 RX ORDER — IPRATROPIUM/ALBUTEROL SULFATE 18-103MCG
3 AEROSOL WITH ADAPTER (GRAM) INHALATION ONCE
Refills: 0 | Status: COMPLETED | OUTPATIENT
Start: 2021-01-01 | End: 2021-01-01

## 2021-01-01 RX ORDER — MAGNESIUM SULFATE 500 MG/ML
2 VIAL (ML) INJECTION ONCE
Refills: 0 | Status: COMPLETED | OUTPATIENT
Start: 2021-01-01 | End: 2021-01-01

## 2021-01-01 RX ORDER — ONDANSETRON 8 MG/1
4 TABLET, FILM COATED ORAL EVERY 4 HOURS
Refills: 0 | Status: DISCONTINUED | OUTPATIENT
Start: 2021-01-01 | End: 2021-01-01

## 2021-01-01 RX ORDER — SENNA PLUS 8.6 MG/1
2 TABLET ORAL
Qty: 0 | Refills: 0 | DISCHARGE
Start: 2021-01-01

## 2021-01-01 RX ORDER — FUROSEMIDE 40 MG
1 TABLET ORAL
Qty: 0 | Refills: 0 | DISCHARGE

## 2021-01-01 RX ORDER — DIPHENHYDRAMINE HYDROCHLORIDE AND LIDOCAINE HYDROCHLORIDE AND ALUMINUM HYDROXIDE AND MAGNESIUM HYDRO
5 KIT EVERY 6 HOURS
Refills: 0 | Status: COMPLETED | OUTPATIENT
Start: 2021-01-01 | End: 2021-01-01

## 2021-01-01 RX ORDER — MEROPENEM 1 G/30ML
500 INJECTION INTRAVENOUS ONCE
Refills: 0 | Status: DISCONTINUED | OUTPATIENT
Start: 2021-01-01 | End: 2021-01-01

## 2021-01-01 RX ORDER — ENOXAPARIN SODIUM 100 MG/ML
40 INJECTION SUBCUTANEOUS DAILY
Refills: 0 | Status: DISCONTINUED | OUTPATIENT
Start: 2021-01-01 | End: 2021-01-01

## 2021-01-01 RX ORDER — VENLAFAXINE HCL 75 MG
37.5 CAPSULE, EXT RELEASE 24 HR ORAL EVERY 12 HOURS
Refills: 0 | Status: DISCONTINUED | OUTPATIENT
Start: 2021-01-01 | End: 2021-01-01

## 2021-01-01 RX ORDER — DIVALPROEX SODIUM 500 MG/1
1 TABLET, DELAYED RELEASE ORAL
Qty: 0 | Refills: 0 | DISCHARGE

## 2021-01-01 RX ORDER — ACETAMINOPHEN 500 MG
650 TABLET ORAL ONCE
Refills: 0 | Status: COMPLETED | OUTPATIENT
Start: 2021-01-01 | End: 2021-01-01

## 2021-01-01 RX ORDER — BUDESONIDE AND FORMOTEROL FUMARATE DIHYDRATE 160; 4.5 UG/1; UG/1
2 AEROSOL RESPIRATORY (INHALATION)
Qty: 0 | Refills: 0 | DISCHARGE

## 2021-01-01 RX ORDER — DIVALPROEX SODIUM 500 MG/1
1500 TABLET, DELAYED RELEASE ORAL
Qty: 0 | Refills: 0 | DISCHARGE

## 2021-01-01 RX ORDER — DIVALPROEX SODIUM 500 MG/1
750 TABLET, DELAYED RELEASE ORAL EVERY 12 HOURS
Refills: 0 | Status: DISCONTINUED | OUTPATIENT
Start: 2021-01-01 | End: 2021-01-01

## 2021-01-01 RX ORDER — VENLAFAXINE HCL 75 MG
1 CAPSULE, EXT RELEASE 24 HR ORAL
Qty: 0 | Refills: 0 | DISCHARGE

## 2021-01-01 RX ORDER — IPRATROPIUM/ALBUTEROL SULFATE 18-103MCG
3 AEROSOL WITH ADAPTER (GRAM) INHALATION EVERY 6 HOURS
Refills: 0 | Status: DISCONTINUED | OUTPATIENT
Start: 2021-01-01 | End: 2021-01-01

## 2021-01-01 RX ORDER — LACTOBACILLUS ACIDOPHILUS 100MM CELL
1 CAPSULE ORAL DAILY
Refills: 0 | Status: DISCONTINUED | OUTPATIENT
Start: 2021-01-01 | End: 2021-01-01

## 2021-01-01 RX ORDER — ACETAMINOPHEN 500 MG
650 TABLET ORAL ONCE
Refills: 0 | Status: DISCONTINUED | OUTPATIENT
Start: 2021-01-01 | End: 2021-01-01

## 2021-01-01 RX ORDER — MEROPENEM 1 G/30ML
0 INJECTION INTRAVENOUS
Qty: 0 | Refills: 0 | DISCHARGE

## 2021-01-01 RX ORDER — HEPARIN SODIUM 5000 [USP'U]/ML
5000 INJECTION INTRAVENOUS; SUBCUTANEOUS EVERY 8 HOURS
Refills: 0 | Status: DISCONTINUED | OUTPATIENT
Start: 2021-01-01 | End: 2021-01-01

## 2021-01-01 RX ORDER — FUROSEMIDE 40 MG/1
40 TABLET ORAL
Qty: 60 | Refills: 2 | Status: ACTIVE | COMMUNITY
Start: 2017-07-06 | End: 1900-01-01

## 2021-01-01 RX ORDER — TAMSULOSIN HYDROCHLORIDE 0.4 MG/1
1 CAPSULE ORAL
Qty: 0 | Refills: 0 | DISCHARGE

## 2021-01-01 RX ORDER — ACETAMINOPHEN 500 MG
650 TABLET ORAL EVERY 6 HOURS
Refills: 0 | Status: COMPLETED | OUTPATIENT
Start: 2021-01-01 | End: 2021-01-01

## 2021-01-01 RX ORDER — PANTOPRAZOLE SODIUM 20 MG/1
1 TABLET, DELAYED RELEASE ORAL
Qty: 0 | Refills: 0 | DISCHARGE

## 2021-01-01 RX ORDER — LEVOTHYROXINE SODIUM 125 MCG
1 TABLET ORAL
Qty: 0 | Refills: 0 | DISCHARGE

## 2021-01-01 RX ORDER — DIVALPROEX SODIUM 500 MG/1
500 TABLET, DELAYED RELEASE ORAL THREE TIMES A DAY
Refills: 0 | Status: DISCONTINUED | OUTPATIENT
Start: 2021-01-01 | End: 2021-01-01

## 2021-01-01 RX ORDER — MIDODRINE HYDROCHLORIDE 2.5 MG/1
2.5 TABLET ORAL THREE TIMES A DAY
Refills: 0 | Status: DISCONTINUED | OUTPATIENT
Start: 2021-01-01 | End: 2021-01-01

## 2021-01-01 RX ORDER — IPRATROPIUM/ALBUTEROL SULFATE 18-103MCG
3 AEROSOL WITH ADAPTER (GRAM) INHALATION
Qty: 0 | Refills: 0 | DISCHARGE
Start: 2021-01-01

## 2021-01-01 RX ORDER — LEVOTHYROXINE SODIUM 125 MCG
12.5 TABLET ORAL AT BEDTIME
Refills: 0 | Status: DISCONTINUED | OUTPATIENT
Start: 2021-01-01 | End: 2021-01-01

## 2021-01-01 RX ORDER — FINASTERIDE 5 MG/1
5 TABLET, FILM COATED ORAL DAILY
Refills: 0 | Status: DISCONTINUED | OUTPATIENT
Start: 2021-01-01 | End: 2021-01-01

## 2021-01-01 RX ORDER — SODIUM CHLORIDE 9 MG/ML
500 INJECTION, SOLUTION INTRAVENOUS ONCE
Refills: 0 | Status: COMPLETED | OUTPATIENT
Start: 2021-01-01 | End: 2021-01-01

## 2021-01-01 RX ORDER — ALBUTEROL 90 UG/1
2 AEROSOL, METERED ORAL EVERY 6 HOURS
Refills: 0 | Status: DISCONTINUED | OUTPATIENT
Start: 2021-01-01 | End: 2021-01-01

## 2021-01-01 RX ORDER — TIOTROPIUM BROMIDE 18 UG/1
1 CAPSULE ORAL; RESPIRATORY (INHALATION) DAILY
Refills: 0 | Status: DISCONTINUED | OUTPATIENT
Start: 2021-01-01 | End: 2021-01-01

## 2021-01-01 RX ORDER — VENLAFAXINE HCL 75 MG
37.5 CAPSULE, EXT RELEASE 24 HR ORAL ONCE
Refills: 0 | Status: COMPLETED | OUTPATIENT
Start: 2021-01-01 | End: 2021-01-01

## 2021-01-01 RX ORDER — CHLORHEXIDINE GLUCONATE 213 G/1000ML
1 SOLUTION TOPICAL
Refills: 0 | Status: DISCONTINUED | OUTPATIENT
Start: 2021-01-01 | End: 2021-01-01

## 2021-01-01 RX ORDER — POTASSIUM CHLORIDE 20 MEQ
1 PACKET (EA) ORAL
Qty: 0 | Refills: 0 | DISCHARGE

## 2021-01-01 RX ORDER — AZTREONAM 2 G
2000 VIAL (EA) INJECTION ONCE
Refills: 0 | Status: DISCONTINUED | OUTPATIENT
Start: 2021-01-01 | End: 2021-01-01

## 2021-01-01 RX ORDER — SODIUM CHLORIDE 9 MG/ML
1000 INJECTION INTRAMUSCULAR; INTRAVENOUS; SUBCUTANEOUS ONCE
Refills: 0 | Status: COMPLETED | OUTPATIENT
Start: 2021-01-01 | End: 2021-01-01

## 2021-01-01 RX ORDER — TAMSULOSIN HYDROCHLORIDE 0.4 MG/1
0.4 CAPSULE ORAL
Qty: 90 | Refills: 2 | Status: ACTIVE | COMMUNITY
Start: 2017-07-25 | End: 1900-01-01

## 2021-01-01 RX ORDER — POTASSIUM CHLORIDE 20 MEQ
10 PACKET (EA) ORAL
Refills: 0 | Status: COMPLETED | OUTPATIENT
Start: 2021-01-01 | End: 2021-01-01

## 2021-01-01 RX ORDER — ACETAMINOPHEN 500 MG
2 TABLET ORAL
Qty: 0 | Refills: 0 | DISCHARGE
Start: 2021-01-01

## 2021-01-01 RX ORDER — POTASSIUM CHLORIDE 20 MEQ
10 PACKET (EA) ORAL DAILY
Refills: 0 | Status: DISCONTINUED | OUTPATIENT
Start: 2021-01-01 | End: 2021-01-01

## 2021-01-01 RX ORDER — DOXAZOSIN MESYLATE 4 MG
1 TABLET ORAL AT BEDTIME
Refills: 0 | Status: DISCONTINUED | OUTPATIENT
Start: 2021-01-01 | End: 2021-01-01

## 2021-01-01 RX ORDER — VALPROIC ACID (AS SODIUM SALT) 250 MG/5ML
1500 SOLUTION, ORAL ORAL AT BEDTIME
Refills: 0 | Status: DISCONTINUED | OUTPATIENT
Start: 2021-01-01 | End: 2021-01-01

## 2021-01-01 RX ORDER — VENLAFAXINE HCL 75 MG
75 CAPSULE, EXT RELEASE 24 HR ORAL DAILY
Refills: 0 | Status: DISCONTINUED | OUTPATIENT
Start: 2021-01-01 | End: 2021-01-01

## 2021-01-01 RX ORDER — VANCOMYCIN HCL 1 G
1250 VIAL (EA) INTRAVENOUS EVERY 12 HOURS
Refills: 0 | Status: DISCONTINUED | OUTPATIENT
Start: 2021-01-01 | End: 2021-01-01

## 2021-01-01 RX ORDER — POLYETHYLENE GLYCOL 3350 17 G/17G
17 POWDER, FOR SOLUTION ORAL
Qty: 0 | Refills: 0 | DISCHARGE
Start: 2021-01-01

## 2021-01-01 RX ORDER — SODIUM CHLORIDE 0.65 %
1 AEROSOL, SPRAY (ML) NASAL EVERY 4 HOURS
Refills: 0 | Status: DISCONTINUED | OUTPATIENT
Start: 2021-01-01 | End: 2021-01-01

## 2021-01-01 RX ADMIN — Medication 3 MILLILITER(S): at 12:17

## 2021-01-01 RX ADMIN — Medication 3 MILLILITER(S): at 13:10

## 2021-01-01 RX ADMIN — MEROPENEM 100 MILLIGRAM(S): 1 INJECTION INTRAVENOUS at 12:00

## 2021-01-01 RX ADMIN — TAMSULOSIN HYDROCHLORIDE 0.4 MILLIGRAM(S): 0.4 CAPSULE ORAL at 22:27

## 2021-01-01 RX ADMIN — Medication 25 MICROGRAM(S): at 05:42

## 2021-01-01 RX ADMIN — Medication 3 MILLILITER(S): at 01:46

## 2021-01-01 RX ADMIN — DIPHENHYDRAMINE HYDROCHLORIDE AND LIDOCAINE HYDROCHLORIDE AND ALUMINUM HYDROXIDE AND MAGNESIUM HYDRO 5 MILLILITER(S): KIT at 05:22

## 2021-01-01 RX ADMIN — MIDODRINE HYDROCHLORIDE 2.5 MILLIGRAM(S): 2.5 TABLET ORAL at 11:39

## 2021-01-01 RX ADMIN — DIPHENHYDRAMINE HYDROCHLORIDE AND LIDOCAINE HYDROCHLORIDE AND ALUMINUM HYDROXIDE AND MAGNESIUM HYDRO 5 MILLILITER(S): KIT at 17:09

## 2021-01-01 RX ADMIN — Medication 3 MILLILITER(S): at 14:15

## 2021-01-01 RX ADMIN — Medication 3 MILLILITER(S): at 12:27

## 2021-01-01 RX ADMIN — Medication 3 MILLILITER(S): at 00:54

## 2021-01-01 RX ADMIN — BUDESONIDE AND FORMOTEROL FUMARATE DIHYDRATE 2 PUFF(S): 160; 4.5 AEROSOL RESPIRATORY (INHALATION) at 17:09

## 2021-01-01 RX ADMIN — Medication 3 MILLILITER(S): at 01:30

## 2021-01-01 RX ADMIN — Medication 3 MILLILITER(S): at 14:17

## 2021-01-01 RX ADMIN — Medication 300 MILLIGRAM(S): at 17:32

## 2021-01-01 RX ADMIN — ENOXAPARIN SODIUM 40 MILLIGRAM(S): 100 INJECTION SUBCUTANEOUS at 11:26

## 2021-01-01 RX ADMIN — Medication 3 MILLILITER(S): at 19:58

## 2021-01-01 RX ADMIN — TAMSULOSIN HYDROCHLORIDE 0.4 MILLIGRAM(S): 0.4 CAPSULE ORAL at 23:42

## 2021-01-01 RX ADMIN — DIVALPROEX SODIUM 750 MILLIGRAM(S): 500 TABLET, DELAYED RELEASE ORAL at 11:39

## 2021-01-01 RX ADMIN — Medication 12.5 MICROGRAM(S): at 22:20

## 2021-01-01 RX ADMIN — SENNA PLUS 2 TABLET(S): 8.6 TABLET ORAL at 23:07

## 2021-01-01 RX ADMIN — DIVALPROEX SODIUM 1500 MILLIGRAM(S): 500 TABLET, DELAYED RELEASE ORAL at 23:24

## 2021-01-01 RX ADMIN — BUDESONIDE AND FORMOTEROL FUMARATE DIHYDRATE 2 PUFF(S): 160; 4.5 AEROSOL RESPIRATORY (INHALATION) at 17:19

## 2021-01-01 RX ADMIN — Medication 40 MILLIGRAM(S): at 11:40

## 2021-01-01 RX ADMIN — DIPHENHYDRAMINE HYDROCHLORIDE AND LIDOCAINE HYDROCHLORIDE AND ALUMINUM HYDROXIDE AND MAGNESIUM HYDRO 5 MILLILITER(S): KIT at 06:34

## 2021-01-01 RX ADMIN — PANTOPRAZOLE SODIUM 40 MILLIGRAM(S): 20 TABLET, DELAYED RELEASE ORAL at 11:41

## 2021-01-01 RX ADMIN — Medication 3 MILLILITER(S): at 07:22

## 2021-01-01 RX ADMIN — Medication 3 MILLILITER(S): at 06:32

## 2021-01-01 RX ADMIN — Medication 300 MILLIGRAM(S): at 18:07

## 2021-01-01 RX ADMIN — Medication 3 MILLILITER(S): at 00:26

## 2021-01-01 RX ADMIN — Medication 300 MILLIGRAM(S): at 11:51

## 2021-01-01 RX ADMIN — ENOXAPARIN SODIUM 40 MILLIGRAM(S): 100 INJECTION SUBCUTANEOUS at 11:39

## 2021-01-01 RX ADMIN — PANTOPRAZOLE SODIUM 40 MILLIGRAM(S): 20 TABLET, DELAYED RELEASE ORAL at 11:01

## 2021-01-01 RX ADMIN — Medication 37.5 MILLIGRAM(S): at 09:20

## 2021-01-01 RX ADMIN — ENOXAPARIN SODIUM 40 MILLIGRAM(S): 100 INJECTION SUBCUTANEOUS at 11:54

## 2021-01-01 RX ADMIN — Medication 25 MICROGRAM(S): at 05:22

## 2021-01-01 RX ADMIN — Medication 3 MILLILITER(S): at 09:04

## 2021-01-01 RX ADMIN — Medication 166.67 MILLIGRAM(S): at 02:08

## 2021-01-01 RX ADMIN — MIDODRINE HYDROCHLORIDE 2.5 MILLIGRAM(S): 2.5 TABLET ORAL at 22:59

## 2021-01-01 RX ADMIN — Medication 3 MILLILITER(S): at 06:46

## 2021-01-01 RX ADMIN — Medication 25 MILLIGRAM(S): at 21:28

## 2021-01-01 RX ADMIN — TAMSULOSIN HYDROCHLORIDE 0.4 MILLIGRAM(S): 0.4 CAPSULE ORAL at 21:51

## 2021-01-01 RX ADMIN — SODIUM CHLORIDE 60 MILLILITER(S): 9 INJECTION, SOLUTION INTRAVENOUS at 02:08

## 2021-01-01 RX ADMIN — ALBUTEROL 2 PUFF(S): 90 AEROSOL, METERED ORAL at 23:41

## 2021-01-01 RX ADMIN — Medication 20 MILLIGRAM(S): at 05:58

## 2021-01-01 RX ADMIN — ENOXAPARIN SODIUM 40 MILLIGRAM(S): 100 INJECTION SUBCUTANEOUS at 11:40

## 2021-01-01 RX ADMIN — DIVALPROEX SODIUM 1500 MILLIGRAM(S): 500 TABLET, DELAYED RELEASE ORAL at 23:07

## 2021-01-01 RX ADMIN — Medication 3 MILLILITER(S): at 06:35

## 2021-01-01 RX ADMIN — FINASTERIDE 5 MILLIGRAM(S): 5 TABLET, FILM COATED ORAL at 11:54

## 2021-01-01 RX ADMIN — Medication 3 MILLILITER(S): at 19:30

## 2021-01-01 RX ADMIN — FINASTERIDE 5 MILLIGRAM(S): 5 TABLET, FILM COATED ORAL at 11:59

## 2021-01-01 RX ADMIN — Medication 3 MILLILITER(S): at 13:49

## 2021-01-01 RX ADMIN — MEROPENEM 100 MILLIGRAM(S): 1 INJECTION INTRAVENOUS at 06:34

## 2021-01-01 RX ADMIN — Medication 20 MILLIGRAM(S): at 05:49

## 2021-01-01 RX ADMIN — Medication 3 MILLILITER(S): at 19:29

## 2021-01-01 RX ADMIN — Medication 3 MILLILITER(S): at 13:25

## 2021-01-01 RX ADMIN — MEROPENEM 100 MILLIGRAM(S): 1 INJECTION INTRAVENOUS at 05:21

## 2021-01-01 RX ADMIN — DIPHENHYDRAMINE HYDROCHLORIDE AND LIDOCAINE HYDROCHLORIDE AND ALUMINUM HYDROXIDE AND MAGNESIUM HYDRO 5 MILLILITER(S): KIT at 11:48

## 2021-01-01 RX ADMIN — Medication 650 MILLIGRAM(S): at 07:12

## 2021-01-01 RX ADMIN — MEROPENEM 100 MILLIGRAM(S): 1 INJECTION INTRAVENOUS at 21:35

## 2021-01-01 RX ADMIN — DIVALPROEX SODIUM 500 MILLIGRAM(S): 500 TABLET, DELAYED RELEASE ORAL at 23:42

## 2021-01-01 RX ADMIN — Medication 3 MILLILITER(S): at 01:59

## 2021-01-01 RX ADMIN — TIOTROPIUM BROMIDE 1 CAPSULE(S): 18 CAPSULE ORAL; RESPIRATORY (INHALATION) at 12:07

## 2021-01-01 RX ADMIN — Medication 25 MILLIGRAM(S): at 21:57

## 2021-01-01 RX ADMIN — DIVALPROEX SODIUM 750 MILLIGRAM(S): 500 TABLET, DELAYED RELEASE ORAL at 12:14

## 2021-01-01 RX ADMIN — Medication 3 MILLILITER(S): at 01:26

## 2021-01-01 RX ADMIN — Medication 975 MILLIGRAM(S): at 19:28

## 2021-01-01 RX ADMIN — Medication 166.67 MILLIGRAM(S): at 13:42

## 2021-01-01 RX ADMIN — Medication 3 MILLILITER(S): at 17:29

## 2021-01-01 RX ADMIN — BUDESONIDE AND FORMOTEROL FUMARATE DIHYDRATE 2 PUFF(S): 160; 4.5 AEROSOL RESPIRATORY (INHALATION) at 05:50

## 2021-01-01 RX ADMIN — Medication 3 MILLILITER(S): at 08:47

## 2021-01-01 RX ADMIN — Medication 3 MILLILITER(S): at 00:23

## 2021-01-01 RX ADMIN — Medication 3 MILLILITER(S): at 13:24

## 2021-01-01 RX ADMIN — BUDESONIDE AND FORMOTEROL FUMARATE DIHYDRATE 2 PUFF(S): 160; 4.5 AEROSOL RESPIRATORY (INHALATION) at 21:29

## 2021-01-01 RX ADMIN — Medication 650 MILLIGRAM(S): at 20:15

## 2021-01-01 RX ADMIN — TAMSULOSIN HYDROCHLORIDE 0.4 MILLIGRAM(S): 0.4 CAPSULE ORAL at 23:16

## 2021-01-01 RX ADMIN — ALBUTEROL 2 PUFF(S): 90 AEROSOL, METERED ORAL at 05:46

## 2021-01-01 RX ADMIN — BUDESONIDE AND FORMOTEROL FUMARATE DIHYDRATE 2 PUFF(S): 160; 4.5 AEROSOL RESPIRATORY (INHALATION) at 17:29

## 2021-01-01 RX ADMIN — Medication 3 MILLILITER(S): at 19:10

## 2021-01-01 RX ADMIN — Medication 100 MILLIEQUIVALENT(S): at 11:59

## 2021-01-01 RX ADMIN — Medication 300 MILLIGRAM(S): at 17:24

## 2021-01-01 RX ADMIN — Medication 12.5 MICROGRAM(S): at 21:43

## 2021-01-01 RX ADMIN — DIPHENHYDRAMINE HYDROCHLORIDE AND LIDOCAINE HYDROCHLORIDE AND ALUMINUM HYDROXIDE AND MAGNESIUM HYDRO 5 MILLILITER(S): KIT at 11:40

## 2021-01-01 RX ADMIN — Medication 3 MILLILITER(S): at 01:27

## 2021-01-01 RX ADMIN — Medication 1 MILLIGRAM(S): at 22:59

## 2021-01-01 RX ADMIN — Medication 300 MILLIGRAM(S): at 23:36

## 2021-01-01 RX ADMIN — DIPHENHYDRAMINE HYDROCHLORIDE AND LIDOCAINE HYDROCHLORIDE AND ALUMINUM HYDROXIDE AND MAGNESIUM HYDRO 5 MILLILITER(S): KIT at 18:41

## 2021-01-01 RX ADMIN — BUDESONIDE AND FORMOTEROL FUMARATE DIHYDRATE 2 PUFF(S): 160; 4.5 AEROSOL RESPIRATORY (INHALATION) at 05:47

## 2021-01-01 RX ADMIN — BUDESONIDE AND FORMOTEROL FUMARATE DIHYDRATE 2 PUFF(S): 160; 4.5 AEROSOL RESPIRATORY (INHALATION) at 06:13

## 2021-01-01 RX ADMIN — Medication 37.5 MILLIGRAM(S): at 11:39

## 2021-01-01 RX ADMIN — Medication 25 MICROGRAM(S): at 05:07

## 2021-01-01 RX ADMIN — TIOTROPIUM BROMIDE 1 CAPSULE(S): 18 CAPSULE ORAL; RESPIRATORY (INHALATION) at 12:37

## 2021-01-01 RX ADMIN — Medication 3 MILLILITER(S): at 13:59

## 2021-01-01 RX ADMIN — Medication 3 MILLILITER(S): at 12:55

## 2021-01-01 RX ADMIN — Medication 3 MILLILITER(S): at 06:50

## 2021-01-01 RX ADMIN — DIVALPROEX SODIUM 750 MILLIGRAM(S): 500 TABLET, DELAYED RELEASE ORAL at 22:01

## 2021-01-01 RX ADMIN — Medication 166.67 MILLIGRAM(S): at 01:03

## 2021-01-01 RX ADMIN — DIVALPROEX SODIUM 1500 MILLIGRAM(S): 500 TABLET, DELAYED RELEASE ORAL at 21:25

## 2021-01-01 RX ADMIN — ALBUTEROL 2 PUFF(S): 90 AEROSOL, METERED ORAL at 11:57

## 2021-01-01 RX ADMIN — FINASTERIDE 5 MILLIGRAM(S): 5 TABLET, FILM COATED ORAL at 11:48

## 2021-01-01 RX ADMIN — Medication 3 MILLILITER(S): at 12:52

## 2021-01-01 RX ADMIN — Medication 3 MILLILITER(S): at 14:37

## 2021-01-01 RX ADMIN — PANTOPRAZOLE SODIUM 40 MILLIGRAM(S): 20 TABLET, DELAYED RELEASE ORAL at 11:59

## 2021-01-01 RX ADMIN — Medication 25 MILLIGRAM(S): at 21:17

## 2021-01-01 RX ADMIN — Medication 25 MILLIGRAM(S): at 21:09

## 2021-01-01 RX ADMIN — ENOXAPARIN SODIUM 40 MILLIGRAM(S): 100 INJECTION SUBCUTANEOUS at 11:28

## 2021-01-01 RX ADMIN — MEROPENEM 100 MILLIGRAM(S): 1 INJECTION INTRAVENOUS at 22:01

## 2021-01-01 RX ADMIN — SENNA PLUS 2 TABLET(S): 8.6 TABLET ORAL at 23:10

## 2021-01-01 RX ADMIN — Medication 3 MILLILITER(S): at 17:32

## 2021-01-01 RX ADMIN — ENOXAPARIN SODIUM 40 MILLIGRAM(S): 100 INJECTION SUBCUTANEOUS at 12:00

## 2021-01-01 RX ADMIN — Medication 25 MILLIGRAM(S): at 21:31

## 2021-01-01 RX ADMIN — MEROPENEM 100 MILLIGRAM(S): 1 INJECTION INTRAVENOUS at 13:37

## 2021-01-01 RX ADMIN — Medication 3 MILLILITER(S): at 00:55

## 2021-01-01 RX ADMIN — Medication 25 MICROGRAM(S): at 05:21

## 2021-01-01 RX ADMIN — ENOXAPARIN SODIUM 40 MILLIGRAM(S): 100 INJECTION SUBCUTANEOUS at 11:47

## 2021-01-01 RX ADMIN — SODIUM CHLORIDE 60 MILLILITER(S): 9 INJECTION, SOLUTION INTRAVENOUS at 11:35

## 2021-01-01 RX ADMIN — Medication 100 MILLIEQUIVALENT(S): at 10:03

## 2021-01-01 RX ADMIN — MEROPENEM 100 MILLIGRAM(S): 1 INJECTION INTRAVENOUS at 13:08

## 2021-01-01 RX ADMIN — Medication 25 MICROGRAM(S): at 06:41

## 2021-01-01 RX ADMIN — ALBUTEROL 2 PUFF(S): 90 AEROSOL, METERED ORAL at 21:50

## 2021-01-01 RX ADMIN — Medication 3 MILLILITER(S): at 20:05

## 2021-01-01 RX ADMIN — Medication 37.5 MILLIGRAM(S): at 22:38

## 2021-01-01 RX ADMIN — PANTOPRAZOLE SODIUM 40 MILLIGRAM(S): 20 TABLET, DELAYED RELEASE ORAL at 11:04

## 2021-01-01 RX ADMIN — MEROPENEM 100 MILLIGRAM(S): 1 INJECTION INTRAVENOUS at 18:10

## 2021-01-01 RX ADMIN — DIVALPROEX SODIUM 750 MILLIGRAM(S): 500 TABLET, DELAYED RELEASE ORAL at 23:10

## 2021-01-01 RX ADMIN — DIPHENHYDRAMINE HYDROCHLORIDE AND LIDOCAINE HYDROCHLORIDE AND ALUMINUM HYDROXIDE AND MAGNESIUM HYDRO 5 MILLILITER(S): KIT at 01:11

## 2021-01-01 RX ADMIN — DIVALPROEX SODIUM 750 MILLIGRAM(S): 500 TABLET, DELAYED RELEASE ORAL at 09:17

## 2021-01-01 RX ADMIN — Medication 650 MILLIGRAM(S): at 05:34

## 2021-01-01 RX ADMIN — PANTOPRAZOLE SODIUM 40 MILLIGRAM(S): 20 TABLET, DELAYED RELEASE ORAL at 11:40

## 2021-01-01 RX ADMIN — Medication 3 MILLILITER(S): at 12:51

## 2021-01-01 RX ADMIN — Medication 650 MILLIGRAM(S): at 06:44

## 2021-01-01 RX ADMIN — Medication 40 MILLIGRAM(S): at 05:36

## 2021-01-01 RX ADMIN — SODIUM CHLORIDE 100 MILLILITER(S): 9 INJECTION, SOLUTION INTRAVENOUS at 05:54

## 2021-01-01 RX ADMIN — Medication 650 MILLIGRAM(S): at 02:50

## 2021-01-01 RX ADMIN — TAMSULOSIN HYDROCHLORIDE 0.4 MILLIGRAM(S): 0.4 CAPSULE ORAL at 21:50

## 2021-01-01 RX ADMIN — Medication 37.5 MILLIGRAM(S): at 10:10

## 2021-01-01 RX ADMIN — SODIUM CHLORIDE 50 MILLILITER(S): 9 INJECTION, SOLUTION INTRAVENOUS at 05:38

## 2021-01-01 RX ADMIN — MEROPENEM 100 MILLIGRAM(S): 1 INJECTION INTRAVENOUS at 18:09

## 2021-01-01 RX ADMIN — MEROPENEM 100 MILLIGRAM(S): 1 INJECTION INTRAVENOUS at 11:18

## 2021-01-01 RX ADMIN — SODIUM CHLORIDE 500 MILLILITER(S): 9 INJECTION, SOLUTION INTRAVENOUS at 16:33

## 2021-01-01 RX ADMIN — BUDESONIDE AND FORMOTEROL FUMARATE DIHYDRATE 2 PUFF(S): 160; 4.5 AEROSOL RESPIRATORY (INHALATION) at 17:33

## 2021-01-01 RX ADMIN — Medication 3 MILLILITER(S): at 12:59

## 2021-01-01 RX ADMIN — Medication 125 MILLIGRAM(S): at 04:06

## 2021-01-01 RX ADMIN — Medication 40 MILLIGRAM(S): at 05:54

## 2021-01-01 RX ADMIN — Medication 3 MILLILITER(S): at 20:02

## 2021-01-01 RX ADMIN — PANTOPRAZOLE SODIUM 40 MILLIGRAM(S): 20 TABLET, DELAYED RELEASE ORAL at 11:45

## 2021-01-01 RX ADMIN — Medication 3 MILLILITER(S): at 09:16

## 2021-01-01 RX ADMIN — SODIUM CHLORIDE 100 MILLILITER(S): 9 INJECTION, SOLUTION INTRAVENOUS at 11:16

## 2021-01-01 RX ADMIN — POLYETHYLENE GLYCOL 3350 17 GRAM(S): 17 POWDER, FOR SOLUTION ORAL at 17:30

## 2021-01-01 RX ADMIN — MEROPENEM 100 MILLIGRAM(S): 1 INJECTION INTRAVENOUS at 21:09

## 2021-01-01 RX ADMIN — MEROPENEM 100 MILLIGRAM(S): 1 INJECTION INTRAVENOUS at 13:40

## 2021-01-01 RX ADMIN — PANTOPRAZOLE SODIUM 40 MILLIGRAM(S): 20 TABLET, DELAYED RELEASE ORAL at 11:26

## 2021-01-01 RX ADMIN — Medication 975 MILLIGRAM(S): at 17:13

## 2021-01-01 RX ADMIN — SODIUM CHLORIDE 50 MILLILITER(S): 9 INJECTION, SOLUTION INTRAVENOUS at 16:35

## 2021-01-01 RX ADMIN — DIVALPROEX SODIUM 750 MILLIGRAM(S): 500 TABLET, DELAYED RELEASE ORAL at 10:04

## 2021-01-01 RX ADMIN — MEROPENEM 100 MILLIGRAM(S): 1 INJECTION INTRAVENOUS at 23:09

## 2021-01-01 RX ADMIN — Medication 25 MILLIGRAM(S): at 21:08

## 2021-01-01 RX ADMIN — Medication 3 MILLILITER(S): at 08:09

## 2021-01-01 RX ADMIN — Medication 3 MILLILITER(S): at 23:10

## 2021-01-01 RX ADMIN — Medication 166.67 MILLIGRAM(S): at 13:14

## 2021-01-01 RX ADMIN — ENOXAPARIN SODIUM 40 MILLIGRAM(S): 100 INJECTION SUBCUTANEOUS at 12:06

## 2021-01-01 RX ADMIN — Medication 3 MILLILITER(S): at 00:43

## 2021-01-01 RX ADMIN — Medication 40 MILLIGRAM(S): at 05:51

## 2021-01-01 RX ADMIN — Medication 300 MILLIGRAM(S): at 05:46

## 2021-01-01 RX ADMIN — Medication 100 MILLIGRAM(S): at 08:45

## 2021-01-01 RX ADMIN — MEROPENEM 100 MILLIGRAM(S): 1 INJECTION INTRAVENOUS at 21:14

## 2021-01-01 RX ADMIN — FINASTERIDE 5 MILLIGRAM(S): 5 TABLET, FILM COATED ORAL at 12:31

## 2021-01-01 RX ADMIN — ENOXAPARIN SODIUM 40 MILLIGRAM(S): 100 INJECTION SUBCUTANEOUS at 11:04

## 2021-01-01 RX ADMIN — DIVALPROEX SODIUM 1500 MILLIGRAM(S): 500 TABLET, DELAYED RELEASE ORAL at 22:27

## 2021-01-01 RX ADMIN — Medication 12.5 MICROGRAM(S): at 21:14

## 2021-01-01 RX ADMIN — TETANUS TOXOID, REDUCED DIPHTHERIA TOXOID AND ACELLULAR PERTUSSIS VACCINE, ADSORBED 0.5 MILLILITER(S): 5; 2.5; 8; 8; 2.5 SUSPENSION INTRAMUSCULAR at 08:17

## 2021-01-01 RX ADMIN — DIPHENHYDRAMINE HYDROCHLORIDE AND LIDOCAINE HYDROCHLORIDE AND ALUMINUM HYDROXIDE AND MAGNESIUM HYDRO 5 MILLILITER(S): KIT at 11:15

## 2021-01-01 RX ADMIN — DIVALPROEX SODIUM 1500 MILLIGRAM(S): 500 TABLET, DELAYED RELEASE ORAL at 21:50

## 2021-01-01 RX ADMIN — Medication 3 MILLILITER(S): at 20:03

## 2021-01-01 RX ADMIN — ALBUTEROL 2 PUFF(S): 90 AEROSOL, METERED ORAL at 18:27

## 2021-01-01 RX ADMIN — Medication 3 MILLILITER(S): at 05:51

## 2021-01-01 RX ADMIN — Medication 300 MILLIGRAM(S): at 12:07

## 2021-01-01 RX ADMIN — Medication 3 MILLILITER(S): at 06:37

## 2021-01-01 RX ADMIN — SODIUM CHLORIDE 50 MILLILITER(S): 9 INJECTION, SOLUTION INTRAVENOUS at 13:40

## 2021-01-01 RX ADMIN — Medication 37.5 MILLIGRAM(S): at 21:53

## 2021-01-01 RX ADMIN — Medication 25 MILLIGRAM(S): at 21:30

## 2021-01-01 RX ADMIN — ALBUTEROL 2 PUFF(S): 90 AEROSOL, METERED ORAL at 06:40

## 2021-01-01 RX ADMIN — Medication 37.5 MILLIGRAM(S): at 22:01

## 2021-01-01 RX ADMIN — SENNA PLUS 2 TABLET(S): 8.6 TABLET ORAL at 21:51

## 2021-01-01 RX ADMIN — Medication 650 MILLIGRAM(S): at 18:58

## 2021-01-01 RX ADMIN — ENOXAPARIN SODIUM 40 MILLIGRAM(S): 100 INJECTION SUBCUTANEOUS at 11:18

## 2021-01-01 RX ADMIN — DIVALPROEX SODIUM 750 MILLIGRAM(S): 500 TABLET, DELAYED RELEASE ORAL at 10:11

## 2021-01-01 RX ADMIN — ENOXAPARIN SODIUM 40 MILLIGRAM(S): 100 INJECTION SUBCUTANEOUS at 12:27

## 2021-01-01 RX ADMIN — MIDODRINE HYDROCHLORIDE 2.5 MILLIGRAM(S): 2.5 TABLET ORAL at 11:48

## 2021-01-01 RX ADMIN — Medication 300 MILLIGRAM(S): at 23:10

## 2021-01-01 RX ADMIN — BUDESONIDE AND FORMOTEROL FUMARATE DIHYDRATE 2 PUFF(S): 160; 4.5 AEROSOL RESPIRATORY (INHALATION) at 05:52

## 2021-01-01 RX ADMIN — Medication 3 MILLILITER(S): at 06:58

## 2021-01-01 RX ADMIN — PANTOPRAZOLE SODIUM 40 MILLIGRAM(S): 20 TABLET, DELAYED RELEASE ORAL at 11:18

## 2021-01-01 RX ADMIN — BUDESONIDE AND FORMOTEROL FUMARATE DIHYDRATE 2 PUFF(S): 160; 4.5 AEROSOL RESPIRATORY (INHALATION) at 17:25

## 2021-01-01 RX ADMIN — DIVALPROEX SODIUM 750 MILLIGRAM(S): 500 TABLET, DELAYED RELEASE ORAL at 10:10

## 2021-01-01 RX ADMIN — Medication 37.5 MILLIGRAM(S): at 12:13

## 2021-01-01 RX ADMIN — DIVALPROEX SODIUM 750 MILLIGRAM(S): 500 TABLET, DELAYED RELEASE ORAL at 09:20

## 2021-01-01 RX ADMIN — Medication 25 MILLIGRAM(S): at 21:35

## 2021-01-01 RX ADMIN — Medication 3 MILLILITER(S): at 12:07

## 2021-01-01 RX ADMIN — Medication 12.5 MICROGRAM(S): at 21:40

## 2021-01-01 RX ADMIN — MEROPENEM 100 MILLIGRAM(S): 1 INJECTION INTRAVENOUS at 12:05

## 2021-01-01 RX ADMIN — Medication 3 MILLILITER(S): at 05:42

## 2021-01-01 RX ADMIN — BUDESONIDE AND FORMOTEROL FUMARATE DIHYDRATE 2 PUFF(S): 160; 4.5 AEROSOL RESPIRATORY (INHALATION) at 06:30

## 2021-01-01 RX ADMIN — Medication 3 MILLILITER(S): at 01:45

## 2021-01-01 RX ADMIN — Medication 3 MILLILITER(S): at 06:20

## 2021-01-01 RX ADMIN — Medication 3 MILLILITER(S): at 20:51

## 2021-01-01 RX ADMIN — Medication 300 MILLIGRAM(S): at 05:42

## 2021-01-01 RX ADMIN — ALBUTEROL 2 PUFF(S): 90 AEROSOL, METERED ORAL at 11:39

## 2021-01-01 RX ADMIN — TAMSULOSIN HYDROCHLORIDE 0.4 MILLIGRAM(S): 0.4 CAPSULE ORAL at 21:25

## 2021-01-01 RX ADMIN — Medication 3 MILLILITER(S): at 02:22

## 2021-01-01 RX ADMIN — Medication 37.5 MILLIGRAM(S): at 22:59

## 2021-01-01 RX ADMIN — Medication 12.5 MICROGRAM(S): at 21:22

## 2021-01-01 RX ADMIN — Medication 3 MILLILITER(S): at 06:54

## 2021-01-01 RX ADMIN — MEROPENEM 100 MILLIGRAM(S): 1 INJECTION INTRAVENOUS at 02:18

## 2021-01-01 RX ADMIN — Medication 3 MILLILITER(S): at 00:59

## 2021-01-01 RX ADMIN — TIOTROPIUM BROMIDE 1 CAPSULE(S): 18 CAPSULE ORAL; RESPIRATORY (INHALATION) at 12:27

## 2021-01-01 RX ADMIN — MEROPENEM 100 MILLIGRAM(S): 1 INJECTION INTRAVENOUS at 02:32

## 2021-01-01 RX ADMIN — Medication 650 MILLIGRAM(S): at 00:48

## 2021-01-01 RX ADMIN — Medication 3 MILLILITER(S): at 01:22

## 2021-01-01 RX ADMIN — Medication 3 MILLILITER(S): at 17:10

## 2021-01-01 RX ADMIN — ALBUTEROL 2 PUFF(S): 90 AEROSOL, METERED ORAL at 23:49

## 2021-01-01 RX ADMIN — SODIUM CHLORIDE 50 MILLILITER(S): 9 INJECTION, SOLUTION INTRAVENOUS at 21:28

## 2021-01-01 RX ADMIN — SODIUM CHLORIDE 1000 MILLILITER(S): 9 INJECTION INTRAMUSCULAR; INTRAVENOUS; SUBCUTANEOUS at 15:13

## 2021-01-01 RX ADMIN — Medication 650 MILLIGRAM(S): at 03:50

## 2021-01-01 RX ADMIN — Medication 40 MILLIGRAM(S): at 06:13

## 2021-01-01 RX ADMIN — Medication 3 MILLILITER(S): at 19:12

## 2021-01-01 RX ADMIN — MEROPENEM 100 MILLIGRAM(S): 1 INJECTION INTRAVENOUS at 05:44

## 2021-01-01 RX ADMIN — DIPHENHYDRAMINE HYDROCHLORIDE AND LIDOCAINE HYDROCHLORIDE AND ALUMINUM HYDROXIDE AND MAGNESIUM HYDRO 5 MILLILITER(S): KIT at 05:44

## 2021-01-01 RX ADMIN — SODIUM CHLORIDE 50 MILLILITER(S): 9 INJECTION, SOLUTION INTRAVENOUS at 21:44

## 2021-01-01 RX ADMIN — PANTOPRAZOLE SODIUM 40 MILLIGRAM(S): 20 TABLET, DELAYED RELEASE ORAL at 11:38

## 2021-01-01 RX ADMIN — Medication 25 MICROGRAM(S): at 05:39

## 2021-01-01 RX ADMIN — MEROPENEM 100 MILLIGRAM(S): 1 INJECTION INTRAVENOUS at 14:03

## 2021-01-01 RX ADMIN — Medication 3 MILLILITER(S): at 08:14

## 2021-01-01 RX ADMIN — Medication 3 MILLILITER(S): at 01:56

## 2021-01-01 RX ADMIN — Medication 25 MILLIGRAM(S): at 21:43

## 2021-01-01 RX ADMIN — Medication 3 MILLILITER(S): at 13:21

## 2021-01-01 RX ADMIN — Medication 3 MILLILITER(S): at 08:29

## 2021-01-01 RX ADMIN — Medication 3 MILLILITER(S): at 12:47

## 2021-01-01 RX ADMIN — Medication 3 MILLILITER(S): at 12:33

## 2021-01-01 RX ADMIN — Medication 40 MILLIGRAM(S): at 05:52

## 2021-01-01 RX ADMIN — Medication 650 MILLIGRAM(S): at 16:50

## 2021-01-01 RX ADMIN — SODIUM CHLORIDE 100 MILLILITER(S): 9 INJECTION, SOLUTION INTRAVENOUS at 11:41

## 2021-01-01 RX ADMIN — MEROPENEM 100 MILLIGRAM(S): 1 INJECTION INTRAVENOUS at 05:31

## 2021-01-01 RX ADMIN — Medication 12.5 MICROGRAM(S): at 21:08

## 2021-01-01 RX ADMIN — Medication 300 MILLIGRAM(S): at 12:11

## 2021-01-01 RX ADMIN — ENOXAPARIN SODIUM 40 MILLIGRAM(S): 100 INJECTION SUBCUTANEOUS at 11:15

## 2021-01-01 RX ADMIN — PANTOPRAZOLE SODIUM 40 MILLIGRAM(S): 20 TABLET, DELAYED RELEASE ORAL at 11:25

## 2021-01-01 RX ADMIN — ENOXAPARIN SODIUM 40 MILLIGRAM(S): 100 INJECTION SUBCUTANEOUS at 12:16

## 2021-01-01 RX ADMIN — DIVALPROEX SODIUM 750 MILLIGRAM(S): 500 TABLET, DELAYED RELEASE ORAL at 21:04

## 2021-01-01 RX ADMIN — MEROPENEM 100 MILLIGRAM(S): 1 INJECTION INTRAVENOUS at 21:43

## 2021-01-01 RX ADMIN — SODIUM CHLORIDE 100 MILLILITER(S): 9 INJECTION, SOLUTION INTRAVENOUS at 21:43

## 2021-01-01 RX ADMIN — Medication 1000 MILLIGRAM(S): at 03:20

## 2021-01-01 RX ADMIN — Medication 3 MILLILITER(S): at 12:46

## 2021-01-01 RX ADMIN — ENOXAPARIN SODIUM 40 MILLIGRAM(S): 100 INJECTION SUBCUTANEOUS at 11:25

## 2021-01-01 RX ADMIN — DIPHENHYDRAMINE HYDROCHLORIDE AND LIDOCAINE HYDROCHLORIDE AND ALUMINUM HYDROXIDE AND MAGNESIUM HYDRO 5 MILLILITER(S): KIT at 23:10

## 2021-01-01 RX ADMIN — ALBUTEROL 2 PUFF(S): 90 AEROSOL, METERED ORAL at 12:00

## 2021-01-01 RX ADMIN — SODIUM CHLORIDE 2500 MILLILITER(S): 9 INJECTION INTRAMUSCULAR; INTRAVENOUS; SUBCUTANEOUS at 08:17

## 2021-01-01 RX ADMIN — Medication 25 MILLIGRAM(S): at 22:13

## 2021-01-01 RX ADMIN — MEROPENEM 100 MILLIGRAM(S): 1 INJECTION INTRAVENOUS at 11:14

## 2021-01-01 RX ADMIN — Medication 25 MILLIGRAM(S): at 21:40

## 2021-01-01 RX ADMIN — MEROPENEM 100 MILLIGRAM(S): 1 INJECTION INTRAVENOUS at 18:25

## 2021-01-01 RX ADMIN — PANTOPRAZOLE SODIUM 40 MILLIGRAM(S): 20 TABLET, DELAYED RELEASE ORAL at 11:28

## 2021-01-01 RX ADMIN — ALBUTEROL 2 PUFF(S): 90 AEROSOL, METERED ORAL at 17:24

## 2021-01-01 RX ADMIN — ENOXAPARIN SODIUM 40 MILLIGRAM(S): 100 INJECTION SUBCUTANEOUS at 12:13

## 2021-01-01 RX ADMIN — Medication 100 MILLIEQUIVALENT(S): at 10:58

## 2021-01-01 RX ADMIN — Medication 3 MILLILITER(S): at 01:02

## 2021-01-01 RX ADMIN — SODIUM CHLORIDE 2500 MILLILITER(S): 9 INJECTION INTRAMUSCULAR; INTRAVENOUS; SUBCUTANEOUS at 03:50

## 2021-01-01 RX ADMIN — DIPHENHYDRAMINE HYDROCHLORIDE AND LIDOCAINE HYDROCHLORIDE AND ALUMINUM HYDROXIDE AND MAGNESIUM HYDRO 5 MILLILITER(S): KIT at 17:05

## 2021-01-01 RX ADMIN — FINASTERIDE 5 MILLIGRAM(S): 5 TABLET, FILM COATED ORAL at 11:39

## 2021-01-01 RX ADMIN — SODIUM CHLORIDE 100 MILLILITER(S): 9 INJECTION, SOLUTION INTRAVENOUS at 05:15

## 2021-01-01 RX ADMIN — Medication 3 MILLILITER(S): at 23:07

## 2021-01-01 RX ADMIN — Medication 40 MILLIGRAM(S): at 05:46

## 2021-01-01 RX ADMIN — ENOXAPARIN SODIUM 40 MILLIGRAM(S): 100 INJECTION SUBCUTANEOUS at 12:05

## 2021-01-01 RX ADMIN — Medication 3 MILLILITER(S): at 06:10

## 2021-01-01 RX ADMIN — SODIUM CHLORIDE 50 MILLILITER(S): 9 INJECTION, SOLUTION INTRAVENOUS at 05:46

## 2021-01-01 RX ADMIN — PANTOPRAZOLE SODIUM 40 MILLIGRAM(S): 20 TABLET, DELAYED RELEASE ORAL at 11:15

## 2021-01-01 RX ADMIN — Medication 37.5 MILLIGRAM(S): at 10:04

## 2021-01-01 RX ADMIN — Medication 300 MILLIGRAM(S): at 12:27

## 2021-01-01 RX ADMIN — Medication 300 MILLIGRAM(S): at 12:16

## 2021-01-01 RX ADMIN — Medication 3 MILLILITER(S): at 12:34

## 2021-01-01 RX ADMIN — MEROPENEM 100 MILLIGRAM(S): 1 INJECTION INTRAVENOUS at 03:00

## 2021-01-01 RX ADMIN — Medication 12.5 MICROGRAM(S): at 21:51

## 2021-01-01 RX ADMIN — MEROPENEM 100 MILLIGRAM(S): 1 INJECTION INTRAVENOUS at 03:30

## 2021-01-01 RX ADMIN — DIVALPROEX SODIUM 750 MILLIGRAM(S): 500 TABLET, DELAYED RELEASE ORAL at 22:59

## 2021-01-01 RX ADMIN — AZITHROMYCIN 250 MILLIGRAM(S): 500 TABLET, FILM COATED ORAL at 17:16

## 2021-01-01 RX ADMIN — SODIUM CHLORIDE 500 MILLILITER(S): 9 INJECTION, SOLUTION INTRAVENOUS at 14:30

## 2021-01-01 RX ADMIN — Medication 650 MILLIGRAM(S): at 16:48

## 2021-01-01 RX ADMIN — ENOXAPARIN SODIUM 40 MILLIGRAM(S): 100 INJECTION SUBCUTANEOUS at 12:07

## 2021-01-01 RX ADMIN — Medication 12.5 MICROGRAM(S): at 21:17

## 2021-01-01 RX ADMIN — BUDESONIDE AND FORMOTEROL FUMARATE DIHYDRATE 2 PUFF(S): 160; 4.5 AEROSOL RESPIRATORY (INHALATION) at 05:15

## 2021-01-01 RX ADMIN — DIVALPROEX SODIUM 750 MILLIGRAM(S): 500 TABLET, DELAYED RELEASE ORAL at 21:53

## 2021-01-01 RX ADMIN — MEROPENEM 100 MILLIGRAM(S): 1 INJECTION INTRAVENOUS at 18:06

## 2021-01-01 RX ADMIN — ENOXAPARIN SODIUM 40 MILLIGRAM(S): 100 INJECTION SUBCUTANEOUS at 11:45

## 2021-01-01 RX ADMIN — ENOXAPARIN SODIUM 40 MILLIGRAM(S): 100 INJECTION SUBCUTANEOUS at 11:11

## 2021-01-01 RX ADMIN — Medication 50 GRAM(S): at 11:59

## 2021-01-01 RX ADMIN — MEROPENEM 100 MILLIGRAM(S): 1 INJECTION INTRAVENOUS at 18:41

## 2021-01-01 RX ADMIN — TIOTROPIUM BROMIDE 1 CAPSULE(S): 18 CAPSULE ORAL; RESPIRATORY (INHALATION) at 12:17

## 2021-01-01 RX ADMIN — PANTOPRAZOLE SODIUM 40 MILLIGRAM(S): 20 TABLET, DELAYED RELEASE ORAL at 11:37

## 2021-01-01 RX ADMIN — BUDESONIDE AND FORMOTEROL FUMARATE DIHYDRATE 2 PUFF(S): 160; 4.5 AEROSOL RESPIRATORY (INHALATION) at 17:50

## 2021-01-01 RX ADMIN — ENOXAPARIN SODIUM 40 MILLIGRAM(S): 100 INJECTION SUBCUTANEOUS at 11:01

## 2021-01-01 RX ADMIN — TIOTROPIUM BROMIDE 1 CAPSULE(S): 18 CAPSULE ORAL; RESPIRATORY (INHALATION) at 18:05

## 2021-01-01 RX ADMIN — MEROPENEM 100 MILLIGRAM(S): 1 INJECTION INTRAVENOUS at 18:35

## 2021-01-01 RX ADMIN — MIDODRINE HYDROCHLORIDE 2.5 MILLIGRAM(S): 2.5 TABLET ORAL at 17:05

## 2021-01-01 RX ADMIN — MEROPENEM 100 MILLIGRAM(S): 1 INJECTION INTRAVENOUS at 14:02

## 2021-01-01 RX ADMIN — Medication 300 MILLIGRAM(S): at 17:29

## 2021-01-01 RX ADMIN — MEROPENEM 100 MILLIGRAM(S): 1 INJECTION INTRAVENOUS at 06:02

## 2021-01-01 RX ADMIN — Medication 12.5 MICROGRAM(S): at 21:31

## 2021-01-01 RX ADMIN — Medication 37.5 MILLIGRAM(S): at 21:04

## 2021-01-01 RX ADMIN — TIOTROPIUM BROMIDE 1 CAPSULE(S): 18 CAPSULE ORAL; RESPIRATORY (INHALATION) at 11:57

## 2021-01-01 RX ADMIN — DIVALPROEX SODIUM 1500 MILLIGRAM(S): 500 TABLET, DELAYED RELEASE ORAL at 21:51

## 2021-01-01 RX ADMIN — Medication 3 MILLILITER(S): at 06:38

## 2021-01-01 RX ADMIN — Medication 3 MILLILITER(S): at 19:33

## 2021-01-01 RX ADMIN — Medication 3 MILLILITER(S): at 15:12

## 2021-01-01 RX ADMIN — Medication 25 MICROGRAM(S): at 05:11

## 2021-01-01 RX ADMIN — ENOXAPARIN SODIUM 40 MILLIGRAM(S): 100 INJECTION SUBCUTANEOUS at 11:59

## 2021-01-01 RX ADMIN — Medication 3 MILLILITER(S): at 06:53

## 2021-01-01 RX ADMIN — Medication 3 MILLILITER(S): at 02:12

## 2021-01-01 RX ADMIN — Medication 3 MILLILITER(S): at 20:13

## 2021-01-01 RX ADMIN — SODIUM CHLORIDE 50 MILLILITER(S): 9 INJECTION, SOLUTION INTRAVENOUS at 13:52

## 2021-01-01 RX ADMIN — Medication 12.5 MICROGRAM(S): at 21:57

## 2021-01-01 RX ADMIN — Medication 3 MILLILITER(S): at 12:32

## 2021-01-01 RX ADMIN — Medication 650 MILLIGRAM(S): at 04:34

## 2021-01-01 RX ADMIN — MEROPENEM 100 MILLIGRAM(S): 1 INJECTION INTRAVENOUS at 13:52

## 2021-01-01 RX ADMIN — PANTOPRAZOLE SODIUM 40 MILLIGRAM(S): 20 TABLET, DELAYED RELEASE ORAL at 11:47

## 2021-01-01 RX ADMIN — PANTOPRAZOLE SODIUM 40 MILLIGRAM(S): 20 TABLET, DELAYED RELEASE ORAL at 12:05

## 2021-01-01 RX ADMIN — FINASTERIDE 5 MILLIGRAM(S): 5 TABLET, FILM COATED ORAL at 11:11

## 2021-01-01 RX ADMIN — ALBUTEROL 2 PUFF(S): 90 AEROSOL, METERED ORAL at 12:28

## 2021-01-01 RX ADMIN — MEROPENEM 100 MILLIGRAM(S): 1 INJECTION INTRAVENOUS at 05:12

## 2021-01-01 RX ADMIN — Medication 3 MILLILITER(S): at 20:01

## 2021-01-01 RX ADMIN — PANTOPRAZOLE SODIUM 40 MILLIGRAM(S): 20 TABLET, DELAYED RELEASE ORAL at 11:11

## 2021-01-01 RX ADMIN — Medication 25 MICROGRAM(S): at 06:15

## 2021-01-01 RX ADMIN — BUDESONIDE AND FORMOTEROL FUMARATE DIHYDRATE 2 PUFF(S): 160; 4.5 AEROSOL RESPIRATORY (INHALATION) at 06:10

## 2021-01-01 RX ADMIN — SODIUM CHLORIDE 2500 MILLILITER(S): 9 INJECTION INTRAMUSCULAR; INTRAVENOUS; SUBCUTANEOUS at 02:22

## 2021-01-01 RX ADMIN — Medication 40 MILLIGRAM(S): at 05:50

## 2021-01-01 RX ADMIN — Medication 3 MILLILITER(S): at 20:00

## 2021-01-01 RX ADMIN — Medication 37.5 MILLIGRAM(S): at 23:10

## 2021-01-01 RX ADMIN — ENOXAPARIN SODIUM 40 MILLIGRAM(S): 100 INJECTION SUBCUTANEOUS at 11:37

## 2021-01-01 RX ADMIN — TAMSULOSIN HYDROCHLORIDE 0.4 MILLIGRAM(S): 0.4 CAPSULE ORAL at 23:07

## 2021-01-01 RX ADMIN — POLYETHYLENE GLYCOL 3350 17 GRAM(S): 17 POWDER, FOR SOLUTION ORAL at 12:07

## 2021-01-01 RX ADMIN — SODIUM CHLORIDE 50 MILLILITER(S): 9 INJECTION, SOLUTION INTRAVENOUS at 05:21

## 2021-01-01 RX ADMIN — MEROPENEM 100 MILLIGRAM(S): 1 INJECTION INTRAVENOUS at 02:31

## 2021-01-01 RX ADMIN — MEROPENEM 100 MILLIGRAM(S): 1 INJECTION INTRAVENOUS at 14:05

## 2021-01-01 RX ADMIN — POLYETHYLENE GLYCOL 3350 17 GRAM(S): 17 POWDER, FOR SOLUTION ORAL at 06:17

## 2021-01-01 RX ADMIN — ENOXAPARIN SODIUM 40 MILLIGRAM(S): 100 INJECTION SUBCUTANEOUS at 11:41

## 2021-01-01 RX ADMIN — MEROPENEM 100 MILLIGRAM(S): 1 INJECTION INTRAVENOUS at 11:40

## 2021-01-01 RX ADMIN — Medication 25 MICROGRAM(S): at 05:52

## 2021-01-01 RX ADMIN — Medication 37.5 MILLIGRAM(S): at 09:17

## 2021-01-01 RX ADMIN — MEROPENEM 100 MILLIGRAM(S): 1 INJECTION INTRAVENOUS at 18:44

## 2021-01-01 RX ADMIN — MEROPENEM 100 MILLIGRAM(S): 1 INJECTION INTRAVENOUS at 04:07

## 2021-01-01 RX ADMIN — Medication 3 MILLILITER(S): at 01:15

## 2021-01-01 RX ADMIN — MEROPENEM 100 MILLIGRAM(S): 1 INJECTION INTRAVENOUS at 11:41

## 2021-01-01 RX ADMIN — Medication 650 MILLIGRAM(S): at 16:22

## 2021-01-01 RX ADMIN — Medication 25 MICROGRAM(S): at 05:51

## 2021-01-01 RX ADMIN — DIVALPROEX SODIUM 750 MILLIGRAM(S): 500 TABLET, DELAYED RELEASE ORAL at 22:38

## 2021-01-01 RX ADMIN — SODIUM CHLORIDE 100 MILLILITER(S): 9 INJECTION, SOLUTION INTRAVENOUS at 21:31

## 2021-01-01 RX ADMIN — Medication 10 MILLIGRAM(S): at 06:03

## 2021-01-01 RX ADMIN — MEROPENEM 100 MILLIGRAM(S): 1 INJECTION INTRAVENOUS at 13:55

## 2021-01-01 RX ADMIN — Medication 25 MICROGRAM(S): at 05:46

## 2021-01-01 RX ADMIN — Medication 25 MILLIGRAM(S): at 21:14

## 2021-01-01 RX ADMIN — MEROPENEM 100 MILLIGRAM(S): 1 INJECTION INTRAVENOUS at 05:39

## 2021-01-01 RX ADMIN — Medication 3 MILLILITER(S): at 13:57

## 2021-01-01 RX ADMIN — MEROPENEM 100 MILLIGRAM(S): 1 INJECTION INTRAVENOUS at 13:28

## 2021-01-01 RX ADMIN — BUDESONIDE AND FORMOTEROL FUMARATE DIHYDRATE 2 PUFF(S): 160; 4.5 AEROSOL RESPIRATORY (INHALATION) at 05:42

## 2021-01-01 RX ADMIN — FINASTERIDE 5 MILLIGRAM(S): 5 TABLET, FILM COATED ORAL at 12:13

## 2021-01-01 RX ADMIN — Medication 300 MILLIGRAM(S): at 00:54

## 2021-01-01 RX ADMIN — BUDESONIDE AND FORMOTEROL FUMARATE DIHYDRATE 2 PUFF(S): 160; 4.5 AEROSOL RESPIRATORY (INHALATION) at 06:40

## 2021-01-01 RX ADMIN — Medication 1 MILLIGRAM(S): at 21:04

## 2021-01-01 RX ADMIN — MEROPENEM 100 MILLIGRAM(S): 1 INJECTION INTRAVENOUS at 21:57

## 2021-01-01 RX ADMIN — Medication 3 MILLILITER(S): at 19:31

## 2021-01-01 RX ADMIN — MIDODRINE HYDROCHLORIDE 2.5 MILLIGRAM(S): 2.5 TABLET ORAL at 05:22

## 2021-01-01 RX ADMIN — Medication 300 MILLIGRAM(S): at 05:51

## 2021-01-01 RX ADMIN — Medication 3 MILLILITER(S): at 00:48

## 2021-01-01 RX ADMIN — Medication 3 MILLILITER(S): at 07:26

## 2021-01-01 RX ADMIN — MEROPENEM 100 MILLIGRAM(S): 1 INJECTION INTRAVENOUS at 21:17

## 2021-01-01 RX ADMIN — Medication 40 MILLIGRAM(S): at 05:14

## 2021-01-01 RX ADMIN — Medication 166.67 MILLIGRAM(S): at 14:19

## 2021-01-01 RX ADMIN — ALBUTEROL 2 PUFF(S): 90 AEROSOL, METERED ORAL at 17:19

## 2021-01-01 RX ADMIN — SODIUM CHLORIDE 50 MILLILITER(S): 9 INJECTION, SOLUTION INTRAVENOUS at 04:57

## 2021-01-01 RX ADMIN — ALBUTEROL 2 PUFF(S): 90 AEROSOL, METERED ORAL at 05:51

## 2021-01-01 RX ADMIN — MEROPENEM 100 MILLIGRAM(S): 1 INJECTION INTRAVENOUS at 02:11

## 2021-01-01 RX ADMIN — ENOXAPARIN SODIUM 40 MILLIGRAM(S): 100 INJECTION SUBCUTANEOUS at 12:30

## 2021-01-01 RX ADMIN — ENOXAPARIN SODIUM 40 MILLIGRAM(S): 100 INJECTION SUBCUTANEOUS at 11:38

## 2021-01-01 RX ADMIN — Medication 650 MILLIGRAM(S): at 00:33

## 2021-01-01 RX ADMIN — Medication 1 SPRAY(S): at 13:28

## 2021-01-01 RX ADMIN — Medication 250 MILLIGRAM(S): at 02:22

## 2021-01-01 RX ADMIN — MEROPENEM 100 MILLIGRAM(S): 1 INJECTION INTRAVENOUS at 04:35

## 2021-01-01 RX ADMIN — Medication 650 MILLIGRAM(S): at 00:03

## 2021-01-01 RX ADMIN — Medication 37.5 MILLIGRAM(S): at 22:02

## 2021-01-01 RX ADMIN — Medication 3 MILLILITER(S): at 06:40

## 2021-01-01 RX ADMIN — Medication 20 MILLIGRAM(S): at 05:38

## 2021-01-01 RX ADMIN — Medication 12.5 MICROGRAM(S): at 21:35

## 2021-01-01 RX ADMIN — Medication 25 MICROGRAM(S): at 06:11

## 2021-01-01 RX ADMIN — Medication 3 MILLILITER(S): at 19:39

## 2021-01-01 RX ADMIN — Medication 3 MILLILITER(S): at 08:48

## 2021-01-01 RX ADMIN — MEROPENEM 100 MILLIGRAM(S): 1 INJECTION INTRAVENOUS at 06:18

## 2021-01-01 RX ADMIN — Medication 12.5 MICROGRAM(S): at 21:28

## 2021-01-01 RX ADMIN — Medication 3 MILLILITER(S): at 20:15

## 2021-01-01 RX ADMIN — MEROPENEM 100 MILLIGRAM(S): 1 INJECTION INTRAVENOUS at 13:17

## 2021-01-01 RX ADMIN — ALBUTEROL 2 PUFF(S): 90 AEROSOL, METERED ORAL at 06:14

## 2021-01-01 RX ADMIN — MEROPENEM 100 MILLIGRAM(S): 1 INJECTION INTRAVENOUS at 21:22

## 2021-01-01 RX ADMIN — POLYETHYLENE GLYCOL 3350 17 GRAM(S): 17 POWDER, FOR SOLUTION ORAL at 12:28

## 2021-01-01 RX ADMIN — Medication 3 MILLILITER(S): at 01:37

## 2021-01-01 RX ADMIN — MEROPENEM 100 MILLIGRAM(S): 1 INJECTION INTRAVENOUS at 11:59

## 2021-01-01 RX ADMIN — ENOXAPARIN SODIUM 40 MILLIGRAM(S): 100 INJECTION SUBCUTANEOUS at 11:14

## 2021-01-01 RX ADMIN — MEROPENEM 100 MILLIGRAM(S): 1 INJECTION INTRAVENOUS at 05:46

## 2021-01-01 RX ADMIN — Medication 3 MILLILITER(S): at 13:38

## 2021-01-01 RX ADMIN — SODIUM CHLORIDE 50 MILLILITER(S): 9 INJECTION, SOLUTION INTRAVENOUS at 02:01

## 2021-01-01 RX ADMIN — Medication 3 MILLILITER(S): at 20:31

## 2021-01-01 RX ADMIN — Medication 25 MILLIGRAM(S): at 21:42

## 2021-01-01 RX ADMIN — Medication 1 MILLIGRAM(S): at 22:02

## 2021-01-01 RX ADMIN — Medication 40 MILLIGRAM(S): at 06:41

## 2021-01-01 RX ADMIN — Medication 650 MILLIGRAM(S): at 01:39

## 2021-01-01 RX ADMIN — Medication 300 MILLIGRAM(S): at 23:07

## 2021-02-24 PROBLEM — J44.9 COPD (CHRONIC OBSTRUCTIVE PULMONARY DISEASE): Chronic | Status: ACTIVE | Noted: 2017-07-06

## 2021-02-24 PROBLEM — D69.6 THROMBOCYTOPENIA: Status: ACTIVE | Noted: 2018-12-21

## 2021-02-24 PROBLEM — M25.473 ANKLE EDEMA: Status: ACTIVE | Noted: 2017-07-06

## 2021-02-24 PROBLEM — E03.9 HYPOTHYROIDISM: Status: ACTIVE | Noted: 2018-08-29

## 2021-02-24 PROBLEM — G93.1 HYPOXIC ENCEPHALOPATHY: Chronic | Status: ACTIVE | Noted: 2017-07-06

## 2021-02-24 PROBLEM — L98.499 SKIN ULCER: Chronic | Status: ACTIVE | Noted: 2017-09-14

## 2021-02-24 PROBLEM — J47.9 BRONCHIECTASIS: Chronic | Status: ACTIVE | Noted: 2017-08-25

## 2021-02-24 PROBLEM — G40.909 SEIZURE DISORDER: Chronic | Status: ACTIVE | Noted: 2017-07-06

## 2021-02-24 NOTE — PHYSICAL EXAM
[No Acute Distress] : no acute distress [Well Nourished] : well nourished [Well Developed] : well developed [Well-Appearing] : well-appearing [Normal Sclera/Conjunctiva] : normal sclera/conjunctiva [PERRL] : pupils equal round and reactive to light [Normal Outer Ear/Nose] : the outer ears and nose were normal in appearance [Normal Oropharynx] : the oropharynx was normal [No JVD] : no jugular venous distention [No Lymphadenopathy] : no lymphadenopathy [Thyroid Normal, No Nodules] : the thyroid was normal and there were no nodules present [No Respiratory Distress] : no respiratory distress  [No Accessory Muscle Use] : no accessory muscle use [Normal Rate] : normal rate  [Regular Rhythm] : with a regular rhythm [Soft] : abdomen soft [Non Tender] : non-tender [Non-distended] : non-distended [Normal Bowel Sounds] : normal bowel sounds [Normal Supraclavicular Nodes] : no supraclavicular lymphadenopathy [Normal Posterior Cervical Nodes] : no posterior cervical lymphadenopathy [Normal Anterior Cervical Nodes] : no anterior cervical lymphadenopathy [No CVA Tenderness] : no CVA  tenderness [No Spinal Tenderness] : no spinal tenderness [No Skin Lesions] : no skin lesions [Normal Mood] : the mood was normal [de-identified] : Venous stasis dermatitis lower extremities [de-identified] : Stable gait status post anoxic encephalopathy

## 2021-02-24 NOTE — HISTORY OF PRESENT ILLNESS
[FreeTextEntry1] : Dyspnea and unstable gait [de-identified] : Patient is here in follow-up he has not been seen in almost a year he is major issues revolve around COPD in stable gait secondary central nervous system issues chronic venous stasis hypothyroidism he has been following with hematology with regard to thrombocytopenia he states he seems to bruise very easily he had a trip and fall several weeks ago but sustained no major injury at that time.  He was systems otherwise is unremarkable he has been receiving physical therapy at home and the family is to try to get him occupational therapy also

## 2021-02-24 NOTE — REVIEW OF SYSTEMS
[Joint Pain] : joint pain [Joint Stiffness] : joint stiffness [Muscle Weakness] : muscle weakness [Back Pain] : back pain [Skin Rash] : skin rash [Unsteady Walk] : ataxia [Fever] : no fever [Chills] : no chills [Fatigue] : no fatigue [Hot Flashes] : no hot flashes [Vision Problems] : no vision problems [Sore Throat] : no sore throat [Chest Pain] : no chest pain [Palpitations] : no palpitations [Orthopena] : no orthopnea [Paroxysmal Nocturnal Dyspnea] : no paroxysmal nocturnal dyspnea [Shortness Of Breath] : no shortness of breath [Wheezing] : no wheezing [Abdominal Pain] : no abdominal pain [Cough] : no cough [Nausea] : no nausea [Constipation] : no constipation [Diarrhea] : no diarrhea [Vomiting] : no vomiting [Heartburn] : no heartburn [Melena] : no melena [Dysuria] : no dysuria [Nocturia] : no nocturia [Hematuria] : no hematuria [Frequency] : no frequency [Headache] : no headache [Fainting] : no fainting [Suicidal] : not suicidal [de-identified] : Chronic stasis dermatitis lower extremities

## 2021-02-26 NOTE — ED ADULT NURSE NOTE - DOES PATIENT HAVE ADVANCE DIRECTIVE
[FreeTextEntry3] : All medical record entries made by the Scribe were at my, Dr. Jose Doherty, direction and personally dictated by me on 02/23/2021. I have reviewed the chart and agree that the record accurately reflects my personal performance of the history, physical exam, assessment and plan. I have also personally directed, reviewed and agreed with the chart.  [Time Spent: ___ minutes] : I have spent [unfilled] minutes of time on the encounter. No

## 2021-03-08 PROBLEM — R26.81 UNSTABLE GAIT: Status: ACTIVE | Noted: 2019-06-12

## 2021-03-08 PROBLEM — R29.898 MUSCULAR DECONDITIONING: Status: ACTIVE | Noted: 2019-06-12

## 2021-05-03 NOTE — H&P ADULT - HISTORY OF PRESENT ILLNESS
HPI: Patient is a 65y old  Male with history of seizures who presents with a chief complaint of fall. Patient fell backwards while sitting on his chair. He struck his head and have loss of consciousness. He was brought to [facility] and found to have SAH and transferred to [facility]. Patient currently complains of neck and lower back pain. Denies pain in his extremities and chest.

## 2021-05-03 NOTE — ED PROVIDER NOTE - PHYSICAL EXAMINATION
General: NAD, good hygiene, well developed  HENT: laceration s/p repaired at right posterior scalp, EOMI, no conjunctivae injection, moist mucosa.  Neck: midline cervical tenderness w. c-collar in place.   Cardiovascular: RRR, S1&2, no M or R, radial pulses equal and b/l  Respiratory: CTABL, no wheezes or crackles, no decreased breath sounds  Abdominal: soft and non-tender non distended, neg for guarding, no CVA tenderness   Extremities: +1 pitting edema of the legs/feet, DP/PT equal b/l  Skin: warm, well perfused  Neuro: CN2-12 intact, AOX3, EOMI. PERRLA, 5/5 strength in UE and LE b/l.  Sensation intact in UE/LE b/l. Neg for pronator drift, gait exam was deferred  Psych: normal mood and affect

## 2021-05-03 NOTE — ED PROVIDER NOTE - SHIFT CHANGE DETAILS
Attending MD Cunningham: 65M with PMH/PSH including CAD, S/P MI 1993, CABG X 5 1993, PCI stents 2000,  CHF EF 25-30%, VT, S/P AICD 2008, PAF, S/P cardioversion, is on xarelto, here for melena for 4 days, patient pending final neurosurgery and trauma recommendations

## 2021-05-03 NOTE — H&P ADULT - NSHPPHYSICALEXAM_GEN_ALL_CORE
General: well developed, well nourished, NAD  Neuro: alert and oriented, no focal deficits, moves all extremities spontaneously  HEENT: NCAT, no lymphadenopathy  Respiratory: airway patent, respirations unlabored  CVS: regular rate and rhythm  Abdomen: soft, nontender, nondistended  Extremities: Midline neck tenderness, moves all extremities, b/l lower extremity edema with chronic skin changes  Skin: warm, dry, appropriate color

## 2021-05-03 NOTE — ED PROVIDER NOTE - OBJECTIVE STATEMENT
karrie VALENCIA h.o DM, hypothyroid, seizures, SDH, p.w SAH s/p fall from recliner today. patient was evaluated at [facility] and found to have a SAH w. laceration that is repaired there. patient states hitting his head on the fire place, unsure if LOC. reports headache and neck pain. no numbness or weakness of the extremities, vision changes, chest pain or shortness of breath. patient states he was febrile at [place] today. takes depakote for seizures and last one was 2020. no tongue biting or urinary incontinence. no post ictal states per EMS

## 2021-05-03 NOTE — ED ADULT NURSE NOTE - OBJECTIVE STATEMENT
65y male brought in by EMS as a transfer from [facility] where he was found to have a SAH s/p fall @0430 this morning. pt states he is on Keppra at home 2/2 a hx of seizures. pt is A&Ox3 breathing is spontaneous and labored on 2L NC pt states he has a history of COPD b 65y male brought in by EMS as a transfer from [facility] where he was found to have a SAH s/p fall @0430 this morning. pt states he is on Keppra at home 2/2  hx of seizures. pt is A&Ox3 breathing spontaneous and labored on 2L NC pt states he has a history of COPD w/ no oxygen use at home. pt states he tripped and fell 65y male brought in by EMS with 22G PIV in R wrist as a transfer from [facility] where he was found to have a SAH s/p fall @0430 this morning. pt states he is on Keppra at home 2/2  hx of seizures. pt is A&Ox3 breathing spontaneous and labored on 2L NC pt states he has a history of COPD w/ no oxygen use at home. pt states he tripped and fell at home and hit his head on the mantle denies LOC and use of blood thinners. PERRLA no facial droop, no extremity weakness, no decrease in sensation, able to move all extremities. 65y male brought in by EMS with 22G PIV in R wrist as a transfer from [facility] where he was found to have a SAH s/p fall @0430 this morning. 3 staples to back of head lac[facility]. pt states he is on Keppra at home 2/2  hx of seizures. pt is A&Ox3 breathing spontaneous and labored on 2L NC pt states he has a history of COPD w/ no oxygen use at home. pt states he tripped and fell at home and hit his head on the mantle denies LOC and use of blood thinners. PERRLA no facial droop, no extremity weakness, no decrease in sensation, able to move all extremities.

## 2021-05-03 NOTE — ED ADULT NURSE REASSESSMENT NOTE - NS ED NURSE REASSESS COMMENT FT1
Report taken from CHOLO Castillo at 1900. Pt AAOx4, VS as documented. Neuro flowsheet completed. +finger , full ROM and strength in all extremities. +sensation BL UE and LE. PERRL intact. No facial droop. Pt complaining of 5/10 pain in generalized head. HOB elevated to 30 degrees. Pt aware of plan to be admitted to hospital and awaiting bed assignment. Bed locked in lowest position with appropriate side rails raised. Pt given call bell and explained call bell system. Pt has no other questions or concerns at this time.

## 2021-05-03 NOTE — ED ADULT NURSE REASSESSMENT NOTE - NS ED NURSE REASSESS COMMENT FT1
Transported to radiology via stretcher for CT exams @0900, report rec'd from CHOLO Yee. Awake and responsive, CM RSR VR 80's, O2sat 100% on 2L n/c. CT head, C-spine & chest marin well. Returned to ED in stable condition @0920, report to CHOLO Raymundo.

## 2021-05-03 NOTE — H&P ADULT - ASSESSMENT
Patient is a 65y old  Male with history of seizures who presents with mechanical fall found to have b/l subarachnoid hemorrhage. CT with LLL consolidation withy question pna. UA negative. CK negative     PLAN:   - Admit to surgery for resuscitation   - Trend lactate  - Fluid resuscitation  - Regular diet  - Restart SQH after 24 hours stable head CT (tomorrow PM)  - PT consult  - Plan discussed with Attending, Dr. Dheeraj Barger MD  General Surgery, PGY 2

## 2021-05-03 NOTE — ED ADULT TRIAGE NOTE - CHIEF COMPLAINT QUOTE
"I felt cold overnight and I fell at about 4AM when I got up to the bathroom and hit my head." The patient is unsure if he had LOC. States he pressed his life alert. Patient noted to have shaking chills and scalp laceration. Patient received 650mg of Tylenol from EMS.

## 2021-05-03 NOTE — ED PROVIDER NOTE - PROGRESS NOTE DETAILS
Attending MD Cunningham: Patient admitted to surgery.  Patient will need instructions for removal of staples placed in ED at time of discharge. Narayan Rand, PGY 3: spoke with trauma and neurosgy, recom repeat ct and ctap for other injuries. on repeat ct, possible consolidation on LLQ, abx was added on. ck normal and spoke with surgery for persistent neck pain, will admit for monitoring and possible MR.

## 2021-05-03 NOTE — CONSULT NOTE ADULT - SUBJECTIVE AND OBJECTIVE BOX
TRAUMA SERVICE (Acute Care Surgery / ACS - #[id number]) - CONSULT NOTE  --------------------------------------------------------------------------------------------    TRAUMA ACTIVATION LEVEL:     MECHANISM OF INJURY:     Fall    GCS: 	E: 4	V: 5	M: 6      HPI: Patient is a 65y old  Male with history of seizures who presents with a chief complaint of fall. Patient fell backwards while sitting on his chair. He struck his head and have loss of consciousness. He was brought to [facility] and found to have SAH and transferred to [facility]. Patient currently complains of neck and lower back pain. Denies pain in his extremities and chest.       ROS: 10-system review is otherwise negative except HPI above.      PAST MEDICAL & SURGICAL HISTORY:  DM (diabetes mellitus)  Sarcoid  Bronchiectasis  Subdural hematoma  Inguinal hernia  Seizure  Hypothyroid  Sleep apnea  LAMBERT and COPD overlap syndrome  BPH without urinary obstruction  Status post Bright&#x27;s procedure  Bilateral subdural hematomas  Status post cholecystectomy  Status post inguinal hernia repair      FAMILY HISTORY:  Family history of diabetes mellitus    Family history of pulmonary embolism (Grandparent)      SOCIAL HISTORY:     ALLERGIES: benzodiazepines (Other)  Keppra (Other (Severe))  penicillins (Unknown)      HOME MEDICATIONS:   Combivent: 1 puff(s) inhaled , As Needed (03 May 2021 07:52)  Depakote 500 mg oral delayed release tablet: 1 tab(s) orally 3 times a day (03 May 2021 07:52)  furosemide 40 mg oral tablet: 1 tab(s) orally once a day (03 May 2021 07:52)  Klor-Con 10 mEq oral tablet, extended release: 1 tab(s) orally once a day (03 May 2021 07:52)  levothyroxine 25 mcg (0.025 mg) oral tablet: 1 tab(s) orally once a day (03 May 2021 07:52)  Symbicort 160 mcg-4.5 mcg/inh inhalation aerosol: 2 puff(s) inhaled 2 times a day  hosp (03 May 2021 07:52)  tamsulosin 0.4 mg oral capsule: 1 cap(s) orally once a day (03 May 2021 07:52)      CURRENT MEDICATIONS  MEDICATIONS (STANDING): sodium chloride 0.9% Bolus 1000 milliLiter(s) IV Bolus once    MEDICATIONS (PRN):  --------------------------------------------------------------------------------------------    Vitals:   T(C): 37.1 (05-03-21 @ 11:28), Max: 38.2 (05-03-21 @ 07:34)  HR: 74 (05-03-21 @ 11:00) (71 - 93)  BP: 127/80 (05-03-21 @ 11:00) (104/60 - 127/80)  RR: 18 (05-03-21 @ 11:00) (18 - 26)  SpO2: 100% (05-03-21 @ 11:00) (95% - 100%)  CAPILLARY BLOOD GLUCOSE        CAPILLARY BLOOD GLUCOSE          Height (cm): 188 (05-03 @ 11:00)  Weight (kg): 93 (05-03 @ 11:00)  BMI (kg/m2): 26.3 (05-03 @ 11:00)  BSA (m2): 2.2 (05-03 @ 11:00)    PHYSICAL EXAM:   General: NAD  HEENT: Right posterior scalp laceratin  Neck: Soft, midline trachea, C-collar in place  Chest: No chest wall tenderness.   Cardiac: S1, S2, RRR  Respiratory: no respiratory distress  Abdomen: Soft, non-distended, non-tender, no rebound, no guarding, no masses palpated  Pelvis: Stable, non-tender, no ecchymosis  Ext: Moves all extremities without issues, no focal weakness    --------------------------------------------------------------------------------------------    LABS  CBC (05-03 @ 08:23)                              14.7                           11.65<H>  )----------------(  48<L>      83.6<H>% Neutrophils, 4.1<L>% Lymphocytes, ANC: 9.75<H>                              43.5      BMP (05-03 @ 08:23)             140     |  104     |  33<H> 		Ca++ --      Ca 8.9                ---------------------------------( 126<H>		Mg --                 4.1     |  29      |  0.90  			Ph --        LFTs (05-03 @ 08:23)      TPro 6.6 / Alb 2.7<L> / TBili 0.6 / DBili -- / AST 41<H> / ALT 26 / AlkPhos 66    Coags (05-03 @ 08:23)  aPTT 26.6<L> / INR 1.10 / PT 13.2      ABG (05-03 @ 08:23)      /  /  /  /  / %     Lactate:  2.9<H>      --------------------------------------------------------------------------------------------    MICROBIOLOGY      --------------------------------------------------------------------------------------------    IMAGING  c< from: CT Chest No Cont (05.03.21 @ 09:10) >  IMPRESSION:  No acute traumatic sequelae on this noncontrast study.  Severe interstitial fibrosis and bronchiectasis similar to prior.  Focal consolidation left lower lobe consistent with pneumonia. Please image to resolution.      < end of copied text >  < from: CT Head No Cont (05.03.21 @ 09:09) >  1)  there are areas of subarachnoid hemorrhage predominantly in the right frontal region with blood layering in multiple sulci. No acute epidural subdural hematoma noted..  2)  underlying involutional changes in both hemispheres with volume loss. There are holes, but with no acute fracture noted.      < end of copied text >      --------------------------------------------------------------------------------------------

## 2021-05-03 NOTE — ED PROVIDER NOTE - OBJECTIVE STATEMENT
Lost balance while getting on his recliner, fell backward, hit his head on the base of fireplace, ? loc.  MIld headache, neck got worse after fall, no numbness or no new weakness.  Right wrist weakness chronically.  pt was sob after the fall, but no sob in the ED.  No cp.  No other complaints. (1) Other Medications/None

## 2021-05-03 NOTE — ED ADULT NURSE NOTE - JUGULAR VENOUS DISTENTION
absent
You can access the [facility] Patient Portal, offered by [facility], by registering with the following website: [email or web address]

## 2021-05-03 NOTE — CONSULT NOTE ADULT - SUBJECTIVE AND OBJECTIVE BOX
p (1480)     HPI:   65M s/p mechanical fall backwards hit his head, now with mild HA and moderate neck pain.     Exam: AAOx3, WALLS strongly, SILT.     CTH R frontal tSAH, neg Ct cspine    --Anticoagulation:    =====================  PAST MEDICAL HISTORY   DM (diabetes mellitus)    Sarcoid    Bronchiectasis    Subdural hematoma    Inguinal hernia    Seizure    Hypothyroid    Sleep apnea    LAMBERT and COPD overlap syndrome    BPH without urinary obstruction      PAST SURGICAL HISTORY   No significant past surgical history    Status post Bright&#x27;s procedure    Bilateral subdural hematomas    Status post cholecystectomy    Status post inguinal hernia repair      benzodiazepines (Other)  Keppra (Other (Severe))  penicillins (Unknown)      MEDICATIONS:  Antibiotics:    Neuro:    Other:      SOCIAL HISTORY:   Occupation:   Marital Status:     FAMILY HISTORY:  No pertinent family history in first degree relatives    Family history of diabetes mellitus    Family history of pulmonary embolism (Grandparent)        ROS: Negative except per HPI    LABS:  PT/INR - ( 03 May 2021 08:23 )   PT: 13.2 sec;   INR: 1.10 ratio         PTT - ( 03 May 2021 08:23 )  PTT:26.6 sec                        14.7   11.65 )-----------( 48       ( 03 May 2021 08:23 )             43.5     05-03    140  |  104  |  33<H>  ----------------------------<  126<H>  4.1   |  29  |  0.90    Ca    8.9      03 May 2021 08:23    TPro  6.6  /  Alb  2.7<L>  /  TBili  0.6  /  DBili  x   /  AST  41<H>  /  ALT  26  /  AlkPhos  66  05-03       Humira Counseling:  I discussed with the patient the risks of adalimumab including but not limited to myelosuppression, immunosuppression, autoimmune hepatitis, demyelinating diseases, lymphoma, and serious infections.  The patient understands that monitoring is required including a PPD at baseline and must alert us or the primary physician if symptoms of infection or other concerning signs are noted.

## 2021-05-03 NOTE — ED ADULT NURSE NOTE - BRADEN SCORE (IF 18 OR LESS ACTIVATE SKIN INJURY RISK INCREASED GUIDELINE), MLM
September 11, 2019      Anne Brown MD  [address] [facility] [address]           [facility] [address]  [address]  [facility] [address]  Phone: [phone]  Fax: [phone]          Patient: Juan Antonio Mejia   MR Number: [id number]   YOB: 1945   Date of Visit: 9/11/2019       Dear Dr. Anne Brown:    Thank you for referring Juan Antonio Mejia to me for evaluation. Attached you will find relevant portions of my assessment and plan of care.    If you have questions, please do not hesitate to call me. I look forward to following Juan Antonio Mejia along with you.    Sincerely,    Angelika Alvarez MD    Enclosure  CC:  No Recipients    If you would like to receive this communication electronically, please contact [email or web address] or [phone] to request more information on [employer] access.    For providers and/or their staff who would like to refer a patient to [facility], please contact us through our one-stop-shop provider referral line, [facility], at [phone].    If you feel you have received this communication in error or would no longer like to receive these types of communications, please e-mail [email or web address]          23

## 2021-05-03 NOTE — ED PROVIDER NOTE - CLINICAL SUMMARY MEDICAL DECISION MAKING FREE TEXT BOX
mechanical fall w. SAH on OSH. continue to have headache and neck pain, c-collar in place and will c/s neurosurg and will repeat ct head. no ac meds. due to mechanism, will c/s trauma. pain control. no chest pain or shortness of breath at this time. nontachycardiac and nontachypneic, appears comfortable on c-collar. dispo pending further imaging and will reassess. no sign of seizures and neuro exam normal mechanical fall w. SAH on OSH. continue to have headache and neck pain, c-collar in place and will c/s neurosurg and will repeat ct head. no ac meds. due to mechanism, will c/s trauma. pain control. no chest pain or shortness of breath at this time. nontachycardiac and nontachypneic, appears comfortable on c-collar. dispo pending further imaging and will reassess. no sign of seizures and neuro exam normal    Attending MD Johnson:  Attending MD Johnson: 66 yo male  h.o DM, hypothyroid, seizures, SDH, p.w SAH s/p fall from recliner today. patient was evaluated at [facility] and found to have a SAH w. laceration that is repaired there. No AC.  Patient with midline neck pain on exam and arrived with EMS from OSH with no collar in place.  CT c-spine done at outside hospital negative but pain continued.   Abigail CAMPBELL placed here.  Will repeat head CT and have NS and trauma evaluation.

## 2021-05-03 NOTE — ED ADULT NURSE NOTE - OBJECTIVE STATEMENT
patient is living in senior [facility] alone and fell this morning while using toilet. noted bleeding controlled at the back of his head, has c/o pain and neck pain, IV accessed and tolerating. Labs drawn & sent results pending. r/o covid, will continue to monitor.     "I felt cold overnight and I fell at about 4AM when I got up to the bathroom and hit my head." The patient is unsure if he had LOC. States he pressed his life alert. Patient noted to have shaking chills and scalp laceration. Patient received 650mg of Tylenol from EMS.

## 2021-05-03 NOTE — H&P ADULT - NSHPLABSRESULTS_GEN_ALL_CORE
T(C): 37.6 (05-03-21 @ 17:34), Max: 38.2 (05-03-21 @ 07:34)  HR: 65 (05-03-21 @ 15:49) (65 - 93)  BP: 104/65 (05-03-21 @ 15:49) (104/60 - 127/80)  RR: 19 (05-03-21 @ 15:49) (18 - 26)  SpO2: 100% (05-03-21 @ 15:49) (95% - 100%)                              14.7   11.65 )-----------( 48       ( 03 May 2021 08:23 )             43.5   05-03    140  |  104  |  33<H>  ----------------------------<  126<H>  4.1   |  29  |  0.90    Ca    8.9      03 May 2021 08:23    TPro  6.6  /  Alb  2.7<L>  /  TBili  0.6  /  DBili  x   /  AST  41<H>  /  ALT  26  /  AlkPhos  66  05-03  Urinalysis Basic - ( 03 May 2021 15:25 )    Color: x / Appearance: x / SG: x / pH: x  Gluc: x / Ketone: x  / Bili: x / Urobili: x   Blood: x / Protein: x / Nitrite: x   Leuk Esterase: x / RBC: 1 /hpf / WBC 1 /HPF   Sq Epi: x / Non Sq Epi: 1 /hpf / Bacteria: Negative    PT/INR - ( 03 May 2021 08:23 )   PT: 13.2 sec;   INR: 1.10 ratio         PTT - ( 03 May 2021 08:23 )  PTT:26.6 sec

## 2021-05-03 NOTE — ED PROVIDER NOTE - ATTENDING CONTRIBUTION TO CARE
Attending MD Johnson:  I personally have seen and examined this patient.  Resident note reviewed and agree on plan of care and except where noted.

## 2021-05-03 NOTE — ED ADULT NURSE NOTE - NSIMPLEMENTINTERV_GEN_ALL_ED
Implemented All Fall with Harm Risk Interventions:  [place] to call system. Call bell, personal items and telephone within reach. Instruct patient to call for assistance. Room bathroom lighting operational. Non-slip footwear when patient is off stretcher. Physically safe environment: no spills, clutter or unnecessary equipment. Stretcher in lowest position, wheels locked, appropriate side rails in place. Provide visual cue, wrist band, yellow gown, etc. Monitor gait and stability. Monitor for mental status changes and reorient to person, place, and time. Review medications for side effects contributing to fall risk. Reinforce activity limits and safety measures with patient and family. Provide visual clues: red socks.

## 2021-05-03 NOTE — ED PROVIDER NOTE - PROGRESS NOTE DETAILS
Spoke with Dr. Morales ( trauma) at [facility] who accepted the patient.  Pt and sister Kristin wanted to go to [facility].  Spoke with Chloe (neurosurg) at [facility].  Pt and Kristin informed of tx to [facility].

## 2021-05-03 NOTE — H&P ADULT - ATTENDING COMMENTS
ATTENDING ATTESTATION  DOS 5/3/21  I have seen and examined this patient with Dr. Barger. I have reviewed all labs, imaging and reports. I have participated in formulating the plan, and have read and agree with the history, ROS, exam, assessment and plan as stated by Dr. Barger above.     S/P tripping while trying to get up from his chair, no seizure activity (is therapeutic on depakote). Did hit his head with resultant small SAH. Complains of neck and back pain. CT of the chest suggests a possible pneumonia, however he has no symptoms. He does have a slightly elevated WBC and lactate which may represent dehydration in the setting of being down after his fall for a while.   Will plan to admit for fluid repletion via IV, will trend lactate and WBC. No abx for now since no symptoms of infection. Repeat HCT in 12 to 24 hours. Will try to clinically clear c-collar. Will need PT evaluation.     Total time spent in the care of this patient today (excluding critical care & procedures): 55 min                 Over 50% of the total time was spent on counseling and coordination of care.     Anastasia Esqueda M.D., M.S.  Dept of Trauma, Acute and Critical Care

## 2021-05-03 NOTE — ED ADULT NURSE NOTE - NSIMPLEMENTINTERV_GEN_ALL_ED
Implemented All Fall with Harm Risk Interventions:  [place] to call system. Call bell, personal items and telephone within reach. Instruct patient to call for assistance. Room bathroom lighting operational. Non-slip footwear when patient is off stretcher. Physically safe environment: no spills, clutter or unnecessary equipment. Stretcher in lowest position, wheels locked, appropriate side rails in place. Provide visual cue, wrist band, yellow gown, etc. Monitor gait and stability. Monitor for mental status changes and reorient to person, place, and time. Review medications for side effects contributing to fall risk. Reinforce activity limits and safety measures with patient and family. Provide visual clues: red socks.

## 2021-05-03 NOTE — ED PROVIDER NOTE - PMH
BPH without urinary obstruction    Bronchiectasis    DM (diabetes mellitus)    Hypothyroid    Inguinal hernia    LAMBRET and COPD overlap syndrome    Sarcoid    Seizure    Sleep apnea    Subdural hematoma

## 2021-05-04 NOTE — CONSULT NOTE ADULT - PROBLEM SELECTOR RECOMMENDATION 2
pt is on Depakote 500mg DR po tid  - home regimen is Depakote ER 1500mg PO qbedtime per pharmacy ;  would restart  - Depakote level 80 ( therapeutic)

## 2021-05-04 NOTE — CONSULT NOTE ADULT - PROBLEM SELECTOR RECOMMENDATION 3
- pt reports chronic thrombocytopenia ; follows w/Hematology Dr Freed  - plt count 44   - d/w Hematology ; will need 1 unit  plt transfusion to maintain plt count > 80 given SAH

## 2021-05-04 NOTE — PHYSICAL THERAPY INITIAL EVALUATION ADULT - PERTINENT HX OF CURRENT PROBLEM, REHAB EVAL
Patient is a 65y old  Male with history of seizures who presents with mechanical fall found to have b/l subarachnoid hemorrhage. CT with LLL consolidation withy question pna. UA negative. CK negative. Dx: SAH, Seizure Disorder, Thrombocytopenia, Hypothyroidism, Sarcoid, BPH.

## 2021-05-04 NOTE — DISCHARGE NOTE PROVIDER - NSDCFUADDINST_GEN_ALL_CORE_FT
NOTIFY YOUR SURGEON IF: You have any fever (over 100.4 F) or chills, persistent nausea/vomiting, persistent diarrhea, or if your pain is not controlled on your discharge pain medications.  Please continue Clindamycin through 5/10.    NOTIFY YOUR SURGEON IF: You have any fever (over 100.4 F) or chills, persistent nausea/vomiting, persistent diarrhea, or if your pain is not controlled on your discharge pain medications.

## 2021-05-04 NOTE — CONSULT NOTE ADULT - PROBLEM SELECTOR RECOMMENDATION 9
- Short follow up CT Head (4 hours) showed no change  - for  follow up CT today ; if stable would start DVT PPX  - also c/o neck pain ; to get MRI of the neck today.  - mgt per primary team

## 2021-05-04 NOTE — DISCHARGE NOTE PROVIDER - HOSPITAL COURSE
Patient is a 65y old  Male with history of seizures who presents with a chief complaint of fall. Patient fell backwards while sitting on his chair. He struck his head and have loss of consciousness. He was brought to [facility] and found to have SAH and transferred to [facility]. Patient currently complains of neck and lower back pain. Denies pain in his extremities and chest. OF note, CT also noted LLL consolidation, suspicious for PNA, patient afebrile without cough, will consider an incidental finding and have patient follow up with his PCP for repeat imaging.  Scalp lac stapled in ED.    Patient was admitted to surgery for further resuscitation, neurochecks, PT eval.  Nsx was consulted and recommended 4 hr repeat head CT which was stable, interval CT 5/4 also stable.  Patients DVT ppx was resumed 24 hrs post-last stable head CT.  MRI was done of c-spine as patient c/o neck pain.  MRI was done and showed *************.    Patient with chronic thrombocytopenia ; follows w/Hematology Dr Freed  - d/w Hematology ; received 1 unit plt transfusion to maintain plt count, outpatient f/u with hematology.    A tertiary survey was done and incidental findings were reported to patient/family.  PT evaluated patient and recommended discharge to [facility] when patient medically stable.    On day of discharge, the patient was tolerating diet, working with PT well and pain controlled. The patient will be discharged home with outpatient follow up with Dr. Esqueda as well as neurosurgery, hematology and his PCP. Patient is a 65y old  Male with history of seizures who presents with a chief complaint of fall. Patient fell backwards while sitting on his chair. He struck his head and have loss of consciousness. He was brought to [facility] and found to have SAH and transferred to [facility]. Patient currently complains of neck and lower back pain. Denies pain in his extremities and chest. OF note, CT also noted LLL consolidation, suspicious for PNA, patient afebrile without cough, will consider an incidental finding and have patient follow up with his PCP for repeat imaging.  Scalp lac stapled in ED.    Patient was admitted to surgery for further resuscitation, neurochecks, PT eval.  Nsx was consulted and recommended 4 hr repeat head CT which was stable, interval CT 5/4 also stable.  Patients DVT ppx was resumed 24 hrs post-last stable head CT.  MRI was done of c-spine as patient c/o neck pain.  MRI was done and showed Far left lateral disc protrusion with moderate to severe left-sided neural foraminal stenosis suspected. No acute fracture. No acute ligamentous injury.  C-ollar cleared.    Patient with chronic thrombocytopenia ; follows w/Hematology Dr Freed  - d/w Hematology ; received 1 unit plt transfusion to maintain plt count, outpatient f/u with hematology.    A tertiary survey was done and incidental findings were reported to patient/family.  PT evaluated patient and recommended discharge to [facility] when patient medically stable.    On day of discharge, the patient was tolerating diet, working with PT well and pain controlled. The patient will be discharged home with outpatient follow up with Dr. Esqueda as well as neurosurgery, hematology and his PCP. Patient is a 65y old  Male with history of seizures who presents with a chief complaint of fall. Patient fell backwards while sitting on his chair. He struck his head and have loss of consciousness. He was brought to [facility] and found to have SAH and transferred to [facility]. Patient currently complains of neck and lower back pain. Denies pain in his extremities and chest. OF note, CT also noted LLL consolidation, suspicious for PNA, patient afebrile without cough, will consider an incidental finding and have patient follow up with his PCP for repeat imaging.  Scalp lac stapled in ED.    Patient was admitted to surgery for further resuscitation, neurochecks, PT eval.  Nsx was consulted and recommended 4 hr repeat head CT which was stable, interval CT 5/4 also stable.  Patients DVT ppx was resumed 24 hrs post-last stable head CT.  MRI was done of c-spine as patient c/o neck pain.  MRI was done and showed Far left lateral disc protrusion with moderate to severe left-sided neural foraminal stenosis suspected. No acute fracture. No acute ligamentous injury.  C-ollar cleared.    Patient with chronic thrombocytopenia ; follows w/Hematology Dr Freed  - d/w Hematology ; received 1 unit plt transfusion 5/4, & 1u 5/5, to maintain plt count in setting of head bleed; outpatient f/u with hematology.    A tertiary survey was done and incidental findings were reported to patient/family.  PT evaluated patient and recommended discharge to [facility] when patient medically stable.    On day of discharge, the patient was tolerating diet, working with PT well and pain controlled. The patient will be discharged home with outpatient follow up with Dr. Esqueda as well as neurosurgery, hematology and his PCP. Patient is a 65y old  Male with history of seizures who presents with a chief complaint of fall. Patient fell backwards while sitting on his chair. He struck his head and have loss of consciousness. He was brought to [facility] and found to have SAH and transferred to [facility]. Patient currently complains of neck and lower back pain. Denies pain in his extremities and chest. OF note, CT also noted LLL consolidation, suspicious for PNA, patient afebrile without cough, will consider an incidental finding and have patient follow up with his PCP for repeat imaging.  Scalp lac stapled in ED.    Patient was admitted to surgery for further resuscitation, neurochecks, PT eval.  Nsx was consulted and recommended 4 hr repeat head CT which was stable, interval CT 5/4 also stable.  Patients DVT ppx was resumed 24 hrs post-last stable head CT.  MRI was done of c-spine as patient c/o neck pain.  MRI was done and showed Far left lateral disc protrusion with moderate to severe left-sided neural foraminal stenosis suspected. No acute fracture. No acute ligamentous injury.  C-ollar cleared.    Patient with chronic thrombocytopenia ; follows w/Hematology Dr Freed  - d/w Hematology ; received 1 unit plt transfusion 5/4, & 1u 5/5, to maintain plt count in setting of head bleed; outpatient f/u with hematology.    A tertiary survey was done and incidental findings were reported to patient/family.  PT evaluated patient and recommended discharge to [facility] when patient medically stable. 5/6- Patient c/o cough , in lieu of LLL consolidation on CT, pt started on a 5d course of clindamycin for suspected PNA.    On day of discharge, the patient was tolerating diet, working with PT well and pain controlled. The patient will be discharged home with outpatient follow up with Dr. Esqueda as well as neurosurgery, hematology and his PCP. Patient is a 65y old  Male with history of seizures who presents with a chief complaint of fall. Patient fell backwards while sitting on his chair. He struck his head and have loss of consciousness. He was brought to [facility] and found to have SAH and transferred to [facility]. Patient currently complains of neck and lower back pain. Denies pain in his extremities and chest. OF note, CT also noted LLL consolidation, suspicious for PNA, patient afebrile without cough, will consider an incidental finding and have patient follow up with his PCP for repeat imaging.  Scalp lac stapled in ED.    Patient was admitted to surgery for further resuscitation, neurochecks, PT eval.  Nsx was consulted and recommended 4 hr repeat head CT which was stable, interval CT 5/4 also stable.  Patients DVT ppx was resumed 24 hrs post-last stable head CT.  MRI was done of c-spine as patient c/o neck pain.  MRI was done and showed Far left lateral disc protrusion with moderate to severe left-sided neural foraminal stenosis suspected. No acute fracture. No acute ligamentous injury.  C-ollar cleared.    Patient with chronic thrombocytopenia ; follows w/Hematology Dr Freed  - d/w Hematology ; received 1 unit plt transfusion 5/4, & 1u 5/5, to maintain plt count in setting of head bleed; outpatient f/u with hematology.    A tertiary survey was done and incidental findings were reported to patient/family.  PT evaluated patient and recommended discharge to [facility] when patient medically stable. 5/6- Patient c/o cough , in lieu of LLL consolidation on CT, pt started on a 5d course of clindamycin for suspected PNA.  Pt. c/o L. shoulder pain, Xray done showing ***********    On day of discharge, the patient was tolerating diet, working with PT well and pain controlled. The patient will be discharged to [facility] with outpatient follow up with Dr. Esqueda as well as neurosurgery, hematology and his PCP. Patient is a 65y old  Male with history of seizures who presents with a chief complaint of fall. Patient fell backwards while sitting on his chair. He struck his head and have loss of consciousness. He was brought to [facility] and found to have SAH and transferred to [facility]. Patient currently complains of neck and lower back pain. Denies pain in his extremities and chest. OF note, CT also noted LLL consolidation, suspicious for PNA, patient afebrile without cough, will consider an incidental finding and have patient follow up with his PCP for repeat imaging.  Scalp lac stapled in ED.    Patient was admitted to surgery for further resuscitation, neurochecks, PT eval.  Nsx was consulted and recommended 4 hr repeat head CT which was stable, interval CT 5/4 also stable.  Patients DVT ppx was resumed 24 hrs post-last stable head CT.  MRI was done of c-spine as patient c/o neck pain.  MRI was done and showed Far left lateral disc protrusion with moderate to severe left-sided neural foraminal stenosis suspected. No acute fracture. No acute ligamentous injury.  C-ollar cleared.    Patient with chronic thrombocytopenia ; follows w/Hematology Dr Freed  - d/w Hematology ; received 1 unit plt transfusion 5/4, & 1u 5/5, to maintain plt count in setting of head bleed; outpatient f/u with hematology.    A tertiary survey was done and incidental findings were reported to patient/family.  PT evaluated patient and recommended discharge to [facility] when patient medically stable. 5/6- Patient c/o cough , in lieu of LLL consolidation on CT, pt started on a 5d course of clindamycin for suspected PNA.  Pt. c/o L. shoulder pain, Xray done showing No acute fractures, dislocations, or AC separation.    On day of discharge, the patient was tolerating diet, working with PT well and pain controlled. The patient will be discharged to [facility] with outpatient follow up with Dr. Esqueda as well as neurosurgery, hematology and his PCP. Patient is a 65y old  Male with history of seizures who presents with a chief complaint of fall. Patient fell backwards while sitting on his chair. He struck his head and have loss of consciousness. He was brought to [facility] and found to have SAH and transferred to [facility]. Patient currently complains of neck and lower back pain. Denies pain in his extremities and chest. OF note, CT also noted LLL consolidation, suspicious for PNA, patient afebrile without cough, will consider an incidental finding and have patient follow up with his PCP for repeat imaging.  Scalp lac stapled in ED.    Patient was admitted to surgery for further resuscitation, neurochecks, PT eval.  Nsx was consulted and recommended 4 hr repeat head CT which was stable, interval CT 5/4 also stable.  Patients DVT ppx was resumed 24 hrs post-last stable head CT.  MRI was done of c-spine as patient c/o neck pain.  MRI was done and showed Far left lateral disc protrusion with moderate to severe left-sided neural foraminal stenosis suspected. No acute fracture. No acute ligamentous injury.  C-ollar cleared.    Patient with chronic thrombocytopenia ; follows w/Hematology Dr Freed  - d/w Hematology ; received 1 unit plt transfusion 5/4, & 1u 5/5, to maintain plt count in setting of head bleed; outpatient f/u with hematology.    A tertiary survey was done and incidental findings were reported to patient/family.  PT evaluated patient and recommended discharge to [facility] when patient medically stable. 5/6- Patient c/o cough , in lieu of LLL consolidation on CT, pt started on a 5d course of clindamycin for suspected PNA.  Pt. c/o L. shoulder pain, Xray done showing No acute fractures, dislocations, or AC separation.    5/7- Speech and swallow eval done--> recommended *************************    On day of discharge, the patient was tolerating diet, working with PT well and pain controlled. The patient will be discharged to [facility] with outpatient follow up with Dr. Esqueda as well as neurosurgery, hematology and his PCP. Patient is a 65y old  Male with history of seizures who presents with a chief complaint of fall. Patient fell backwards while sitting on his chair. He struck his head and have loss of consciousness. He was brought to [facility] and found to have SAH and transferred to [facility]. Patient currently complains of neck and lower back pain. Denies pain in his extremities and chest. OF note, CT also noted LLL consolidation, suspicious for PNA, patient afebrile without cough, will consider an incidental finding and have patient follow up with his PCP for repeat imaging.  Scalp lac stapled in ED.    Patient was admitted to surgery for further resuscitation, neurochecks, PT eval.  Nsx was consulted and recommended 4 hr repeat head CT which was stable, interval CT 5/4 also stable.  Patients DVT ppx was resumed 24 hrs post-last stable head CT.  MRI was done of c-spine as patient c/o neck pain.  MRI was done and showed Far left lateral disc protrusion with moderate to severe left-sided neural foraminal stenosis suspected. No acute fracture. No acute ligamentous injury.  C-ollar cleared.    Patient with chronic thrombocytopenia ; follows w/Hematology Dr Freed  - d/w Hematology ; received 1 unit plt transfusion 5/4, & 1u 5/5, to maintain plt count in setting of head bleed; outpatient f/u with hematology.    A tertiary survey was done and incidental findings were reported to patient/family.  PT evaluated patient and recommended discharge to [facility] when patient medically stable. 5/6- Patient c/o cough , in lieu of LLL consolidation on CT, pt started on a 5d course of clindamycin for suspected PNA.  Pt. c/o L. shoulder pain, Xray done showing No acute fractures, dislocations, or AC separation.    5/7- Speech and swallow eval done--> recommended MBS.  MBS showed **********************************    On day of discharge, the patient was tolerating diet, working with PT well and pain controlled. The patient will be discharged to [facility] with outpatient follow up with Dr. Esqueda as well as neurosurgery, hematology and his PCP. Patient is a 65y old  Male with history of seizures who presents with a chief complaint of fall. Patient fell backwards while sitting on his chair. He struck his head and have loss of consciousness. He was brought to [facility] and found to have SAH and transferred to [facility]. Patient currently complains of neck and lower back pain. Denies pain in his extremities and chest. OF note, CT also noted LLL consolidation, suspicious for PNA, patient afebrile without cough, will consider an incidental finding and have patient follow up with his PCP for repeat imaging.  Scalp lac stapled in ED.    Patient was admitted to surgery for further resuscitation, neurochecks, PT eval.  Nsx was consulted and recommended 4 hr repeat head CT which was stable, interval CT 5/4 also stable.  Patients DVT ppx was resumed 24 hrs post-last stable head CT.  MRI was done of c-spine as patient c/o neck pain.  MRI was done and showed Far left lateral disc protrusion with moderate to severe left-sided neural foraminal stenosis suspected. No acute fracture. No acute ligamentous injury.  C-ollar cleared.    Patient with chronic thrombocytopenia ; follows w/Hematology Dr Freed  - d/w Hematology ; received 1 unit plt transfusion 5/4, & 1u 5/5, to maintain plt count in setting of head bleed; outpatient f/u with hematology.    A tertiary survey was done and incidental findings were reported to patient/family.  PT evaluated patient and recommended discharge to [facility] when patient medically stable. 5/6- Patient c/o cough , in lieu of LLL consolidation on CT, pt started on a 5d course of clindamycin for suspected PNA.  Pt. c/o L. shoulder pain, Xray done showing No acute fractures, dislocations, or AC separation.    5/7- Speech and swallow eval done--> recommended MBS.  Based off of MBS, Speech and swallow recommended dysphagia I Nectar thin liquid diet with chin tuck. Pt was ambiguous about whether he would be compliant with the diet because he states that he likes eating what he usually eats. Aspiration risk was explained to the pt and pt understands the risks.    On day of discharge, the patient was tolerating diet, working with PT well and pain controlled. The patient will be discharged to [facility] with outpatient follow up with Dr. Esqueda as well as neurosurgery, hematology and his PCP.

## 2021-05-04 NOTE — CONSULT NOTE ADULT - SUBJECTIVE AND OBJECTIVE BOX
TRAUMA/ACUTE CARE SURGERY - HOSPITAL MEDICINE CO-MANAGEMENT INITIAL VISIT NOTE      CHIEF COMPLAINT: Patient is a 65y old  Male who presents with a chief complaint of     HPI: HPI: Patient is a 65y old  Male with history of seizures who presents with a chief complaint of fall. Patient fell backwards while sitting on his chair. He struck his head and have loss of consciousness. He was brought to [facility] and found to have SAH and transferred to [facility]. Patient currently complains of neck and lower back pain. Denies pain in his extremities and chest.      (03 May 2021 19:01)      History limited due to: [ ] Dementia  [ ] Delirium  [ ] Condition    Allergies    Keppra (Other (Severe))  penicillins (Unknown)    Intolerances    benzodiazepines (Other)      HOME MEDICATIONS: [ ] Reviewed  Home Medications:  Combivent: 1 puff(s) inhaled , As Needed (03 May 2021 07:52)  Depakote 500 mg oral delayed release tablet: 1 tab(s) orally 3 times a day (03 May 2021 07:52)  furosemide 40 mg oral tablet: 1 tab(s) orally once a day (03 May 2021 07:52)  Klor-Con 10 mEq oral tablet, extended release: 1 tab(s) orally once a day (03 May 2021 07:52)  levothyroxine 25 mcg (0.025 mg) oral tablet: 1 tab(s) orally once a day (03 May 2021 07:52)  Symbicort 160 mcg-4.5 mcg/inh inhalation aerosol: 2 puff(s) inhaled 2 times a day  hosp (03 May 2021 07:52)  tamsulosin 0.4 mg oral capsule: 1 cap(s) orally once a day (03 May 2021 07:52)      MEDICATIONS  (STANDING):  ALBUTerol    90 MICROgram(s) HFA Inhaler 2 Puff(s) Inhalation every 6 hours  budesonide 160 MICROgram(s)/formoterol 4.5 MICROgram(s) Inhaler 2 Puff(s) Inhalation two times a day  diVALproex  milliGRAM(s) Oral three times a day  furosemide    Tablet 40 milliGRAM(s) Oral daily  levothyroxine 25 MICROGram(s) Oral daily  tamsulosin 0.4 milliGRAM(s) Oral at bedtime  tiotropium 18 MICROgram(s) Capsule 1 Capsule(s) Inhalation daily    MEDICATIONS  (PRN):  acetaminophen   Tablet .. 650 milliGRAM(s) Oral every 6 hours PRN Mild Pain (1 - 3)      PAST MEDICAL & SURGICAL HISTORY:  DM (diabetes mellitus)    Sarcoid    Bronchiectasis    Subdural hematoma    Inguinal hernia    Seizure    Hypothyroid    Sleep apnea    LAMBERT and COPD overlap syndrome    BPH without urinary obstruction    Status post Bright&#x27;s procedure    Bilateral subdural hematomas    Status post cholecystectomy    Status post inguinal hernia repair    [ ] Reviewed     Functional Assessment: [ ] Independent  [ ] Assistance  [ ] Total care  [ ] Non-ambulatory    SOCIAL HISTORY:  Residence: [ ] DMITRY  [ ] SNF  [ ] Community  [ ] Substance abuse:   [ ] Tobacco:   [ ] Alcohol use:     FAMILY HISTORY:  Family history of diabetes mellitus    Family history of pulmonary embolism (Grandparent)    [ ] No pertinent family history in first degree relatives     REVIEW OF SYSTEMS:    CONSTITUTIONAL: No fever, weight loss, or fatigue  EYES: No eye pain, visual disturbances, or discharge  ENMT:  No difficulty hearing, tinnitus, vertigo; No sinus or throat pain  NECK: No pain or stiffness  BREASTS: No pain, masses, or nipple discharge  RESPIRATORY: No cough, wheezing, chills or hemoptysis; No shortness of breath  CARDIOVASCULAR: No chest pain, palpitations, dizziness, or leg swelling  GASTROINTESTINAL: No abdominal or epigastric pain. No nausea, vomiting, or hematemesis; No diarrhea or constipation. No melena or hematochezia.  GENITOURINARY: No dysuria, frequency, hematuria, or incontinence  NEUROLOGICAL: No headaches, memory loss, loss of strength, numbness, or tremors  SKIN: No itching, burning, rashes, or lesions   LYMPH NODES: No enlarged glands  ENDOCRINE: No heat or cold intolerance; No hair loss  MUSCULOSKELETAL: No muscle or back pain  PSYCHIATRIC: No depression, anxiety, mood swings, or difficulty sleeping  HEME/LYMPH: No easy bruising, or bleeding gums  ALLERGY AND IMMUNOLOGIC: No hives or eczema    [  ] All other ROS negative  [  ] Unable to obtain due to poor mental status    PHYSICAL EXAM:    Vital Signs Last 24 Hrs  T(C): 36.8 (04 May 2021 09:06), Max: 37.6 (03 May 2021 17:34)  T(F): 98.3 (04 May 2021 09:06), Max: 99.7 (03 May 2021 17:34)  HR: 69 (04 May 2021 09:06) (58 - 92)  BP: 98/61 (04 May 2021 09:06) (98/59 - 115/79)  BP(mean): 73 (03 May 2021 20:52) (72 - 78)  RR: 18 (04 May 2021 09:06) (18 - 22)  SpO2: 93% (04 May 2021 09:06) (93% - 100%)    GENERAL: NAD, well-groomed, well-developed  HEAD:  Atraumatic, Normocephalic  EYES: EOMI, PERRLA, conjunctiva and sclera clear  ENMT: Moist mucous membranes  NECK: Supple, No JVD  RESPIRATORY: Clear to auscultation bilaterally; No rales, rhonchi, wheezing, or rubs  CARDIOVASCULAR: Regular rate and rhythm; No murmurs, rubs, or gallops  GASTROINTESTINAL: Soft, Nontender, Nondistended; Bowel sounds present  GENITOURINARY: Not examined  EXTREMITIES:  2+ Peripheral Pulses, No clubbing, cyanosis, or edema  NERVOUS SYSTEM:  Alert & Oriented X3; Moving all 4 extremities; No gross sensory deficits  HEME/LYMPH: No lymphadenopathy noted  SKIN: No rashes or lesions; Incisions C/D/I    LABS:                        12.9   7.96  )-----------( 44       ( 04 May 2021 05:30 )             39.4     Hemoglobin: 12.9 g/dL (05-04 @ 05:30)  Hemoglobin: 12.8 g/dL (05-03 @ 23:55)  Hemoglobin: 14.7 g/dL (05-03 @ 08:23)    05-04    141  |  106  |  17  ----------------------------<  102<H>  3.8   |  23  |  0.64    Ca    8.8      04 May 2021 05:30  Phos  2.7     05-04  Mg     1.8     05-04    TPro  6.6  /  Alb  2.7<L>  /  TBili  0.6  /  DBili  x   /  AST  41<H>  /  ALT  26  /  AlkPhos  66  05-03    PT/INR - ( 03 May 2021 08:23 )   PT: 13.2 sec;   INR: 1.10 ratio         PTT - ( 03 May 2021 08:23 )  PTT:26.6 sec  Urinalysis Basic - ( 03 May 2021 15:25 )    Color: x / Appearance: x / SG: x / pH: x  Gluc: x / Ketone: x  / Bili: x / Urobili: x   Blood: x / Protein: x / Nitrite: x   Leuk Esterase: x / RBC: 1 /hpf / WBC 1 /HPF   Sq Epi: x / Non Sq Epi: 1 /hpf / Bacteria: Negative      CAPILLARY BLOOD GLUCOSE            RADIOLOGY & ADDITIONAL STUDIES:    EKG:   Personally Reviewed:  [ ] YES     Imaging:   Personally Reviewed:  [ ] YES               [ ] Consultant(s) Notes Reviewed  [x] Care Discussed with Consultants/Other Providers: Trauma Team    [ ] Fall risks identified:    [ ] High risk medications identified:    [ ] Probable osteoporosis    [ ] Possible osteomalacia    [ ] Increased delirium risk    [ ] Delirium and other risks can be reduced by:          -early ambulation          -minimizing "tethers" - IV, oxygen, catheters, etc          -avoiding hypnotics and sedatives          -maintaining hydration/nutrition          -avoid anticholinergics - diphenhydramine, etc          -pain control          -supportive environment    Advanced Directives: [ ] DNR  [ ] No feeding tube  [ ] MOLST in chart  [ ] MOLST completed today  [ ] Unknown   TRAUMA/ACUTE CARE SURGERY - HOSPITAL MEDICINE CO-MANAGEMENT INITIAL VISIT NOTE      CHIEF COMPLAINT: Patient is a 65y old  Male who presents with a chief complaint of     HPI: 65M with history of Seizure disorder, Hypothyroidism,  Thrombocytopenia, BPH who presents with a chief complaint of fall. Patient fell backwards while sitting on his chair. He struck his head and had loss of consciousness. He was brought to [facility] and found to have SAH and transferred to [facility]. Patient currently complains of neck and lower back pain. Denies pain in his extremities and chest.   Pt states that he felt dizzy prior to the fall. Denies associated nausea, aura, cp, sob , urinary/bowel incontinence, tongue biting at the time. States that he had a similar fall in August of last year; no LOC at that time and did not seek medical attention. He states that his last and only seizure was in 2017 at which time he was started on Depakote.  He denies CP, SOB, cough, fever/chills.     Allergies    Keppra (Other (Severe))  penicillins (Unknown)    Intolerances    benzodiazepines (Other)      HOME MEDICATIONS: [ ] Reviewed  Home Medications:  Combivent: 1 puff(s) inhaled , As Needed (03 May 2021 07:52)  Depakote 500 mg oral delayed release tablet: 1 tab(s) orally 3 times a day (03 May 2021 07:52)  furosemide 40 mg oral tablet: 1 tab(s) orally once a day (03 May 2021 07:52)  Klor-Con 10 mEq oral tablet, extended release: 1 tab(s) orally once a day (03 May 2021 07:52)  levothyroxine 25 mcg (0.025 mg) oral tablet: 1 tab(s) orally once a day (03 May 2021 07:52)  Symbicort 160 mcg-4.5 mcg/inh inhalation aerosol: 2 puff(s) inhaled 2 times a day  hosp (03 May 2021 07:52)  tamsulosin 0.4 mg oral capsule: 1 cap(s) orally once a day (03 May 2021 07:52)      MEDICATIONS  (STANDING):  ALBUTerol    90 MICROgram(s) HFA Inhaler 2 Puff(s) Inhalation every 6 hours  budesonide 160 MICROgram(s)/formoterol 4.5 MICROgram(s) Inhaler 2 Puff(s) Inhalation two times a day  diVALproex  milliGRAM(s) Oral three times a day  furosemide    Tablet 40 milliGRAM(s) Oral daily  levothyroxine 25 MICROGram(s) Oral daily  tamsulosin 0.4 milliGRAM(s) Oral at bedtime  tiotropium 18 MICROgram(s) Capsule 1 Capsule(s) Inhalation daily    MEDICATIONS  (PRN):  acetaminophen   Tablet .. 650 milliGRAM(s) Oral every 6 hours PRN Mild Pain (1 - 3)      PAST MEDICAL & SURGICAL HISTORY:  DM (diabetes mellitus)    Sarcoid    Bronchiectasis    Subdural hematoma    Inguinal hernia    Seizure    Hypothyroid    Sleep apnea    LAMBERT and COPD overlap syndrome    BPH without urinary obstruction    Status post Bright&#x27;s procedure    Bilateral subdural hematomas    Status post cholecystectomy    Status post inguinal hernia repair    [x ] Reviewed     Functional Assessment: [ x] Independent  [ ] Assistance  [ ] Total care  [ ] Non-ambulatory    SOCIAL HISTORY:  Residence: [ ] DMITRY  [ ] SNF  [x  ] Community ; lives alone  Denies Substance abuse and  Tobacco.   [x ] Occasional Alcohol use    FAMILY HISTORY:  Family history of diabetes mellitus    Family history of pulmonary embolism (Grandparent)      REVIEW OF SYSTEMS:    CONSTITUTIONAL: No fever, weight loss, or fatigue  RESPIRATORY: No cough, wheezing, chills or hemoptysis; No shortness of breath  CARDIOVASCULAR: No chest pain, palpitations, dizziness, or leg swelling  GASTROINTESTINAL: No abdominal or epigastric pain. No nausea, vomiting, or hematemesis; No diarrhea or constipation.  GENITOURINARY: No dysuria, frequency, hematuria, or incontinence  NEUROLOGICAL: No headaches, memory loss, loss of strength, numbness, or tremors  SKIN: No itching, burning, rashes, or lesions   ENDOCRINE: No heat or cold intolerance; No hair loss  MUSCULOSKELETAL: + neck pain, +back pain  PSYCHIATRIC: No depression, anxiety, mood swings, or difficulty sleeping  HEME/LYMPH: No easy bruising, or bleeding gums      PHYSICAL EXAM:    Vital Signs Last 24 Hrs  T(C): 36.8 (04 May 2021 09:06), Max: 37.6 (03 May 2021 17:34)  T(F): 98.3 (04 May 2021 09:06), Max: 99.7 (03 May 2021 17:34)  HR: 69 (04 May 2021 09:06) (58 - 92)  BP: 98/61 (04 May 2021 09:06) (98/59 - 115/79)  BP(mean): 73 (03 May 2021 20:52) (72 - 78)  RR: 18 (04 May 2021 09:06) (18 - 22)  SpO2: 93% (04 May 2021 09:06) (93% - 100%)    GENERAL: NAD, anicteric , afebrile   HEAD:  Atraumatic, Normocephalic  EYES: EOMI, PERRLA, conjunctiva and sclera clear  ENMT: Moist mucous membranes  NECK: C- collar in place  RESPIRATORY: Clear to auscultation bilaterally; No rales, rhonchi, wheezing, or rubs  CARDIOVASCULAR: Regular rate and rhythm; No murmurs, rubs, or gallops  GASTROINTESTINAL: Soft, Nontender, Nondistended; Bowel sounds present  EXTREMITIES:  +arthritic changes in right hand ; no LE  edema  NERVOUS SYSTEM:  Alert & Oriented X3; Moving all 4 extremities; No gross sensory deficits  HEME/LYMPH: No lymphadenopathy noted  SKIN: No rashes or lesions    LABS:                        12.9   7.96  )-----------( 44       ( 04 May 2021 05:30 )             39.4     Hemoglobin: 12.9 g/dL (05-04 @ 05:30)  Hemoglobin: 12.8 g/dL (05-03 @ 23:55)  Hemoglobin: 14.7 g/dL (05-03 @ 08:23)    05-04    141  |  106  |  17  ----------------------------<  102<H>  3.8   |  23  |  0.64    Ca    8.8      04 May 2021 05:30  Phos  2.7     05-04  Mg     1.8     05-04    TPro  6.6  /  Alb  2.7<L>  /  TBili  0.6  /  DBili  x   /  AST  41<H>  /  ALT  26  /  AlkPhos  66  05-03    PT/INR - ( 03 May 2021 08:23 )   PT: 13.2 sec;   INR: 1.10 ratio         PTT - ( 03 May 2021 08:23 )  PTT:26.6 sec  Urinalysis Basic - ( 03 May 2021 15:25 )    Color: x / Appearance: x / SG: x / pH: x  Gluc: x / Ketone: x  / Bili: x / Urobili: x   Blood: x / Protein: x / Nitrite: x   Leuk Esterase: x / RBC: 1 /hpf / WBC 1 /HPF   Sq Epi: x / Non Sq Epi: 1 /hpf / Bacteria: Negative      CAPILLARY BLOOD GLUCOSE            RADIOLOGY & ADDITIONAL STUDIES:    EKG:   Personally Reviewed:  [x ] YES     Imaging:   Personally Reviewed:  [x ] YES                 [x] Care Discussed with Consultants/Other Providers: Trauma Team

## 2021-05-04 NOTE — PROGRESS NOTE ADULT - SUBJECTIVE AND OBJECTIVE BOX
SURGERY: Trauma Surgery  Pager: [phone]    INTERVAL EVENTS/SUBJECTIVE: No acute events overnight. Patient resting comfortably in bed. Patient seen and examined by surgical team at bedside. No chest pain, shortness of breath, dyspnea, malaise, nausea, vomiting, subjective fever or chills.    ______________________________________________  OBJECTIVE:   T(C): 36.8 (05-04-21 @ 09:06), Max: 37.6 (05-03-21 @ 17:34)  HR: 69 (05-04-21 @ 09:06) (58 - 92)  BP: 98/61 (05-04-21 @ 09:06) (98/59 - 115/79)  RR: 18 (05-04-21 @ 09:06) (18 - 22)  SpO2: 93% (05-04-21 @ 09:06) (93% - 100%)  Wt(kg): --  CAPILLARY BLOOD GLUCOSE        I&O's Detail    03 May 2021 07:01  -  04 May 2021 07:00  --------------------------------------------------------  IN:    Oral Fluid: 220 mL  Total IN: 220 mL    OUT:    Voided (mL): 850 mL  Total OUT: 850 mL    Total NET: -630 mL          Physical exam:  General: well developed, well nourished, NAD  Neuro: alert and oriented, no focal deficits, moves all extremities spontaneously  HEENT: NCAT, no lymphadenopathy  Respiratory: airway patent, respirations unlabored  CVS: regular rate and rhythm  Abdomen: soft, nontender, nondistended  Extremities: Midline neck tenderness, moves all extremities, b/l lower extremity edema with chronic skin changes  Skin: warm, dry, appropriate color  ______________________________________________  LABS:  CBC Full  -  ( 04 May 2021 05:30 )  WBC Count : 7.96 K/uL  RBC Count : 4.13 M/uL  Hemoglobin : 12.9 g/dL  Hematocrit : 39.4 %  Platelet Count - Automated : 44 K/uL  Mean Cell Volume : 95.4 fl  Mean Cell Hemoglobin : 31.2 pg  Mean Cell Hemoglobin Concentration : 32.7 gm/dL  Auto Neutrophil # : x  Auto Lymphocyte # : x  Auto Monocyte # : x  Auto Eosinophil # : x  Auto Basophil # : x  Auto Neutrophil % : x  Auto Lymphocyte % : x  Auto Monocyte % : x  Auto Eosinophil % : x  Auto Basophil % : x    05-04    141  |  106  |  17  ----------------------------<  102<H>  3.8   |  23  |  0.64    Ca    8.8      04 May 2021 05:30  Phos  2.7     05-04  Mg     1.8     05-04    TPro  6.6  /  Alb  2.7<L>  /  TBili  0.6  /  DBili  x   /  AST  41<H>  /  ALT  26  /  AlkPhos  66  05-03    _____________________________________________  RADIOLOGY:

## 2021-05-04 NOTE — DISCHARGE NOTE PROVIDER - NSDCMRMEDTOKEN_GEN_ALL_CORE_FT
Combivent: 1 puff(s) inhaled , As Needed  Depakote 500 mg oral delayed release tablet: 1 tab(s) orally 3 times a day  furosemide 40 mg oral tablet: 1 tab(s) orally once a day  Klor-Con 10 mEq oral tablet, extended release: 1 tab(s) orally once a day  levothyroxine 25 mcg (0.025 mg) oral tablet: 1 tab(s) orally once a day  Symbicort 160 mcg-4.5 mcg/inh inhalation aerosol: 2 puff(s) inhaled 2 times a day  hosp  tamsulosin 0.4 mg oral capsule: 1 cap(s) orally once a day   Combivent 18 mcg-103 mcg-/inh inhalation aerosol: 1 puff(s) inhaled every 6 hours, As Needed  Depakote ER: 1500 milligram(s) orally once a day (at bedtime)  furosemide 40 mg oral tablet: 1 tab(s) orally once a day  Klor-Con 10 mEq oral tablet, extended release: 1 tab(s) orally once a day  levothyroxine 25 mcg (0.025 mg) oral tablet: 1 tab(s) orally once a day  tamsulosin 0.4 mg oral capsule: 1 cap(s) orally once a day   acetaminophen 325 mg oral tablet: 2 tab(s) orally every 6 hours, As needed, Mild Pain (1 - 3)  Combivent 18 mcg-103 mcg-/inh inhalation aerosol: 1 puff(s) inhaled every 6 hours, As Needed  Depakote ER: 1500 milligram(s) orally once a day (at bedtime)  furosemide 40 mg oral tablet: 1 tab(s) orally once a day  Klor-Con 10 mEq oral tablet, extended release: 1 tab(s) orally once a day  levothyroxine 25 mcg (0.025 mg) oral tablet: 1 tab(s) orally once a day  tamsulosin 0.4 mg oral capsule: 1 cap(s) orally once a day   acetaminophen 325 mg oral tablet: 2 tab(s) orally every 6 hours, As needed, Mild Pain (1 - 3)  clindamycin 300 mg oral capsule: 1 cap(s) orally every 6 hours    End Date: 5/10  Combivent 18 mcg-103 mcg-/inh inhalation aerosol: 1 puff(s) inhaled every 6 hours, As Needed  Depakote ER: 1500 milligram(s) orally once a day (at bedtime)  furosemide 40 mg oral tablet: 1 tab(s) orally once a day  Klor-Con 10 mEq oral tablet, extended release: 1 tab(s) orally once a day  levothyroxine 25 mcg (0.025 mg) oral tablet: 1 tab(s) orally once a day  tamsulosin 0.4 mg oral capsule: 1 cap(s) orally once a day   acetaminophen 325 mg oral tablet: 2 tab(s) orally every 6 hours, As needed, Mild Pain (1 - 3)  clindamycin 300 mg oral capsule: 1 cap(s) orally every 6 hours    End Date: 5/10  Combivent 18 mcg-103 mcg-/inh inhalation aerosol: 1 puff(s) inhaled every 6 hours, As Needed  Depakote ER: 1500 milligram(s) orally once a day (at bedtime)  furosemide 40 mg oral tablet: 1 tab(s) orally once a day  Klor-Con 10 mEq oral tablet, extended release: 1 tab(s) orally once a day  levothyroxine 25 mcg (0.025 mg) oral tablet: 1 tab(s) orally once a day  polyethylene glycol 3350 oral powder for reconstitution: 17 gram(s) orally once a day  senna oral tablet: 2 tab(s) orally once a day (at bedtime)  tamsulosin 0.4 mg oral capsule: 1 cap(s) orally once a day   acetaminophen 325 mg oral tablet: 2 tab(s) orally every 6 hours, As needed, Mild Pain (1 - 3)  bisacodyl 10 mg rectal suppository: 1 suppository(ies) rectal once a day, As needed, Constipation  clindamycin 300 mg oral capsule: 1 cap(s) orally every 6 hours    End Date: 5/10  Combivent 18 mcg-103 mcg-/inh inhalation aerosol: 1 puff(s) inhaled every 6 hours, As Needed  Depakote ER: 1500 milligram(s) orally once a day (at bedtime)  furosemide 40 mg oral tablet: 1 tab(s) orally once a day  ipratropium-albuterol 0.5 mg-2.5 mg/3 mLinhalation solution: 3 milliliter(s) inhaled every 6 hours  Klor-Con 10 mEq oral tablet, extended release: 1 tab(s) orally once a day  levothyroxine 25 mcg (0.025 mg) oral tablet: 1 tab(s) orally once a day  polyethylene glycol 3350 oral powder for reconstitution: 17 gram(s) orally once a day  senna oral tablet: 2 tab(s) orally once a day (at bedtime)  tamsulosin 0.4 mg oral capsule: 1 cap(s) orally once a day

## 2021-05-04 NOTE — DISCHARGE NOTE PROVIDER - NSDCCPCAREPLAN_GEN_ALL_CORE_FT
PRINCIPAL DISCHARGE DIAGNOSIS  Diagnosis: SAH (subarachnoid hemorrhage)  Assessment and Plan of Treatment: Please follow up with Dr. Dobson (Neurosurgery) in 2 weeks.      SECONDARY DISCHARGE DIAGNOSES  Diagnosis: Thrombocytopenia  Assessment and Plan of Treatment: Please follow up with your hematologist Dr. Freed within 1-2 weeks of discharge.    Diagnosis: Consolidation lung  Assessment and Plan of Treatment: You were found to have a lung consolidation on CT scan.  Please follow up with your PCP (Dr. Gonsalves) within 1-2 weeks of discharge for further monitoring/work-up.    Diagnosis: Scalp laceration  Assessment and Plan of Treatment: Please follow up with Dr. Esqueda in 1 week for staple removal     PRINCIPAL DISCHARGE DIAGNOSIS  Diagnosis: SAH (subarachnoid hemorrhage)  Assessment and Plan of Treatment: Please follow up with Dr. Dobson (Neurosurgery) in 2 weeks.  Please have them remove staples in your scalp.      SECONDARY DISCHARGE DIAGNOSES  Diagnosis: Thrombocytopenia  Assessment and Plan of Treatment: Please follow up with your hematologist Dr. Freed within 1-2 weeks of discharge.    Diagnosis: Consolidation lung  Assessment and Plan of Treatment: You were found to have a lung consolidation on CT scan.  Please follow up with your PCP (Dr. Gonsalves) within 1-2 weeks of discharge for further monitoring/work-up.    Diagnosis: Scalp laceration  Assessment and Plan of Treatment: Please follow up with Dr. Esqueda in 1 week for staple removal if unable to make an appointment with neurosurgery within 10 days.     PRINCIPAL DISCHARGE DIAGNOSIS  Diagnosis: SAH (subarachnoid hemorrhage)  Assessment and Plan of Treatment: Please follow up with Dr. Dobson (Neurosurgery) in 2 weeks.  Please have them remove staples in your scalp.      SECONDARY DISCHARGE DIAGNOSES  Diagnosis: Thrombocytopenia  Assessment and Plan of Treatment: Please follow up with your hematologist Dr. Freed within 1-2 weeks of discharge.    Diagnosis: Consolidation lung  Assessment and Plan of Treatment: You were found to have a lung consolidation on CT scan, you were treated with antibiotics for pneumonia - continue clindamycin 300mg every 6 hours until complete 5/10 doses.  Please follow up with your PCP (Dr. Gonsalves) within 1-2 weeks of discharge for further monitoring/work-up. and to evaluate lungs once pneumonia resolved    Diagnosis: Scalp laceration  Assessment and Plan of Treatment: Please follow up with Dr. Esqueda in 1 week for staple removal if unable to make an appointment with neurosurgery within 10 days.

## 2021-05-04 NOTE — DISCHARGE NOTE PROVIDER - NSDCFUADDAPPT_GEN_ALL_CORE_FT
Please follow up with your PCP in one month.  You should have a repeat CT chest in one month to evaluate the left lower lobe consolidation that was found on CT 5/3.  Please follow up with your PCP in one month.  You should have a repeat CT chest in one month to evaluate the left lower lobe consolidation that was found on CT 5/3.     Please follow up with Dr. Freed (your hematologist) as an outpatient within 1-2 weeks for your history of thrombocytopenia.    Please follow up with Dr. Esqueda in 1 week for staple removal. Please follow up with your PCP in one month.  You should have a repeat CT chest in one month to evaluate the left lower lobe consolidation that was found on CT 5/3.     Please follow up with Dr. Freed (your hematologist) as an outpatient within 1-2 weeks for your history of thrombocytopenia.    **PLEASE FOLLOW UP WITH NEUROSURGERY IN 1 WEEK FOR STAPLE REMOVAL**  If you cannot follow up with your neurosurgeon within 10 days, please make an appointment to follow up with Dr. Esqueda for staple removal.

## 2021-05-04 NOTE — CHART NOTE - NSCHARTNOTEFT_GEN_A_CORE
Tertiary Trauma Survey (TTS)    Date of TTS: 5/4                          Admit Date: 5/3                                 HPI:  HPI: Patient is a 65y old  Male with history of seizures who presents with a chief complaint of fall. Patient fell backwards while sitting on his chair. He struck his head and have loss of consciousness. He was brought to [facility] and found to have SAH and transferred to [facility]. Patient currently complains of neck and lower back pain. Denies pain in his extremities and chest.      (03 May 2021 19:01)      PAST MEDICAL & SURGICAL HISTORY:  DM (diabetes mellitus)    Sarcoid    Bronchiectasis    Subdural hematoma    Inguinal hernia    Seizure    Hypothyroid    Sleep apnea    LAMBERT and COPD overlap syndrome    BPH without urinary obstruction    Status post Bright&#x27;s procedure    Bilateral subdural hematomas    Status post cholecystectomy    Status post inguinal hernia repair      [  ] No significant past history as reviewed with the patient and family    FAMILY HISTORY:  Family history of diabetes mellitus    Family history of pulmonary embolism (Grandparent)      [  ] Family history not pertinent as reviewed with the patient and family    SOCIAL HISTORY:    Medications (inpatient): ALBUTerol    90 MICROgram(s) HFA Inhaler 2 Puff(s) Inhalation every 6 hours  budesonide 160 MICROgram(s)/formoterol 4.5 MICROgram(s) Inhaler 2 Puff(s) Inhalation two times a day  diVALproex  milliGRAM(s) Oral three times a day  furosemide    Tablet 40 milliGRAM(s) Oral daily  levothyroxine 25 MICROGram(s) Oral daily  tamsulosin 0.4 milliGRAM(s) Oral at bedtime  tiotropium 18 MICROgram(s) Capsule 1 Capsule(s) Inhalation daily    Medications (PRN):acetaminophen   Tablet .. 650 milliGRAM(s) Oral every 6 hours PRN    Allergies: Keppra (Other (Severe))  penicillins (Unknown)  (Intolerances: benzodiazepines (Other)  )    Vital Signs Last 24 Hrs  T(C): 36.8 (04 May 2021 13:40), Max: 37.6 (03 May 2021 17:34)  T(F): 98.3 (04 May 2021 13:40), Max: 99.7 (03 May 2021 17:34)  HR: 65 (04 May 2021 13:40) (58 - 92)  BP: 103/70 (04 May 2021 13:40) (98/59 - 115/79)  BP(mean): 73 (03 May 2021 20:52) (72 - 78)  RR: 18 (04 May 2021 13:40) (18 - 22)  SpO2: 93% (04 May 2021 13:40) (93% - 100%)  Drug Dosing Weight  Height (cm): 188 (03 May 2021 11:00)  Weight (kg): 93 (03 May 2021 11:00)  BMI (kg/m2): 26.3 (03 May 2021 11:00)  BSA (m2): 2.2 (03 May 2021 11:00)                          12.9   7.96  )-----------( 44       ( 04 May 2021 05:30 )             39.4     05-04    141  |  106  |  17  ----------------------------<  102<H>  3.8   |  23  |  0.64    Ca    8.8      04 May 2021 05:30  Phos  2.7     05-04  Mg     1.8     05-04    TPro  6.6  /  Alb  2.7<L>  /  TBili  0.6  /  DBili  x   /  AST  41<H>  /  ALT  26  /  AlkPhos  66  05-03    PT/INR - ( 03 May 2021 08:23 )   PT: 13.2 sec;   INR: 1.10 ratio         PTT - ( 03 May 2021 08:23 )  PTT:26.6 sec  Urinalysis Basic - ( 03 May 2021 15:25 )    Color: x / Appearance: x / SG: x / pH: x  Gluc: x / Ketone: x  / Bili: x / Urobili: x   Blood: x / Protein: x / Nitrite: x   Leuk Esterase: x / RBC: 1 /hpf / WBC 1 /HPF   Sq Epi: x / Non Sq Epi: 1 /hpf / Bacteria: Negative        List Injuries Identified to Date: R frontal SAH    List Operative and Interventional Radiological Procedures: none    Consults (Date):  [ X ] Neurosurgery 5/3  [  ] Orthopedics  [  ] Plastics  [  ] Urology  [  ] PM&R  [  ] Social Work    RADIOLOGICAL FINDINGS REVIEW:  CXR:    EXAM:  XR CHEST AP OR PA 1V                                  PROCEDURE DATE:  05/03/2021          INTERPRETATION:  AP semierect chest on May 3, 2021 at 8:29 AM. Patient is admitted. CAT scan the same day showed subarachnoid hemorrhage.    Heart magnified by technique but likely enlarged. Aorta is elongated.    There are diffuse irregular appearing interstitial infiltrates in both lung fields with apical thickening.    Chest is similar to October 25, 2019.    IMPRESSION: Heart enlargement, prominent aorta, and diffuse chronic interstitial lung disease.      Pelvis Films:     C-Spine Films:    T/L/S Spine Films:    Extremity Films:    Head CT:      EXAM:  CT BRAIN                                  PROCEDURE DATE:  05/03/2021          INTERPRETATION:  INDICATION:  Status post trauma with head injury.   Headache.  TECHNIQUE:  A non contrast 2.5mm axial CT study of the brain was performed from skull base to vertex. Coronal and sagittal reformations were generated from the axial data.  COMPARISON EXAMINATION:  CT dated 6-17    FINDINGS:    HEMISPHERES:There are involutional changes with volume loss with no acute infarct or intraparenchymal hematoma.  VENTRICLES:  Midline and normal in size.  POSTERIOR FOSSA:  There are bilateral small CSF hygromas. No acute abnormality noted.  EXTRACEREBRAL SPACES:  Subarachnoid hemorrhage is noted. There is scattered in the right frontal sulci, and there is also trace blood on the left. No subdural or epidural hematoma is noted.  SKULL BASE AND CALVARIUM:  There are old charity holes bilaterally. No acute fracture noted.  SINUSES AND MASTOIDS:  Clear.  MISCELLANEOUS:  A partial empty sella is again identified. Chronic nasal fractures are suggested as well.    IMPRESSION:    1)  there are areas of subarachnoid hemorrhage predominantly in the right frontal region with blood layering in multiple sulci. No acute epidural subdural hematoma noted..  2)  underlying involutional changes in both hemispheres with volume loss. There are holes, but with no acute fracture noted.    EXAM:  CT BRAIN                            PROCEDURE DATE:  05/03/2021            INTERPRETATION:  Noncontrast CT of the brain.    CLINICAL INDICATION:  Follow-up for subarachnoid hemorrhage    TECHNIQUE : Axial CT scanning of the brain was obtained from the skull base to the vertex without the administration of intravenous contrast.    COMPARISON: Brain CT dated 5/3/2021 at 8:54 AM    FINDINGS:  Redemonstrated is high right frontal subarachnoid hemorrhage, similar in appearance. Left frontal subarachnoid hemorrhage not well visualized. A    Small bilateral subdural hygromas are unchanged in appearance. Multiple bilateral charity holes.    Age-appropriate involutional changes and mild microvascular ischemic change.    Partially empty sella    Postsurgical changes at the zygomatic maxillary junction. A probable chronic bilateral nasal bone fractures. No acute displaced calvarial fractures identified. A    The orbits are not remarkable in appearance.    IMPRESSION:    Similar appearing rightfrontal subarachnoid hemorrhage.        C-Spine CT:    EXAM:  CT CERVICAL SPINE                                  PROCEDURE DATE:  05/03/2021          INTERPRETATION:  CT CERVICAL    INDICATIONS:  neck pain. Trauma.    TECHNIQUE:  Thin section CT imaging was conducted.  3-D, Coronal and sagittal reformations were generated from the axial data.    FINDINGS:    There is alteration of the cervical lordosis which may reflect positioning or spasm.    C1/C2:  The anterior and posterior arches of C1 appear to be intact. There is no C1-C2 subluxation. No odontoid fracture is noted. Base of C2 appears to be intact    There are lower cervical vertebral bodies appear to be intact. No upper thoracic fracture noted.    Mild degenerative changes are noted without significant stenosis    Extensive emphysematous and fibrotic changes are noted in both apices, left greater than right.    IMPRESSION:    No acute fracture identified.    EXAM:  CT BRAIN                            PROCEDURE DATE:  05/04/2021            INTERPRETATION:  CLINICAL INDICATION: Follow-up right-sided frontal traumatic subarachnoid hemorrhage    5mm axial sections of the brain were obtained from base to vertex, without the intravenous administration of contrast material. Coronal and sagittal computer generated reconstructed views are available.    Comparison is made with prior CT of 5/3/2021 at 12:57 PM.    No significant interval change is identified.    There is a small amount of sulcal subarachnoid hemorrhage in the right frontal parasagittal region. No significant parenchymal hemorrhage is identified. Atrophy and small vessel white matter ischemic changes are noted. There has been previous bilateral frontal parietal charity holes.      IMPRESSION: No change from 5/3/2021, right frontal traumatic subarachnoid hemorrhage.        Neck CT:    Chest CT:    EXAM:  CT CHEST                                  PROCEDURE DATE:  05/03/2021          INTERPRETATION:  CLINICAL INFORMATION: Fall    COMPARISON: 4/30/2019    CONTRAST/COMPLICATIONS:  IV Contrast: NONE  Oral Contrast: NONE  Complications: None reported at time of study completion    PROCEDURE:  CT of the Chest was performed.  Sagittal and coronal reformats were performed.    FINDINGS:    LUNGS AND AIRWAYS: Patent central airways.  Severe bilateral interstitial fibrotic and bronchiectatic changessimilar to prior .Focal consolidation left lower lobe consistent with pneumonia. Please image to resolution to exclude underlying occult neoplasm.  PLEURA: No pleural effusion.  MEDIASTINUM AND JOANNE: No lymphadenopathy. Moderate hiatal hernia.  VESSELS: Within normal limits.  HEART: Heart size is normal with coronary artery calcification. No pericardial effusion.  CHEST WALL AND LOWER NECK: Within normal limits.  VISUALIZED UPPER ABDOMEN: Left renal cyst. Cholecystectomy.  BONES: No acute    IMPRESSION:  No acute traumatic sequelae on this noncontrast study.  Severe interstitial fibrosis and bronchiectasis similar to prior.  Focal consolidation left lower lobe consistent with pneumonia. Please image to resolution.    ABD/Pelvis CT:    EXAM:  CT ABDOMEN AND PELVIS IC                            PROCEDURE DATE:  05/03/2021            INTERPRETATION:  CLINICAL INFORMATION: Trauma scan. Patient had a fall and hit his head.    COMPARISON: CT abdomen pelvis from 2/26/2016, CT chest from the same day and from April 30, 2019.    CONTRAST/COMPLICATIONS:  IV Contrast: Omnipaque 350  90 cc administered   10 cc discarded  Oral Contrast: NONE  Complications: None reported at time of study completion    PROCEDURE:  CT of the Abdomen and Pelvis was performed.  Sagittal and coronal reformats were performed.    FINDINGS:  LOWER CHEST: Redemonstration of bilateral peripheral reticulations with bronchiectasis. Consolidation in the left lower lobe. Crazy-paving pattern with intralobular septal thickening, may represent pulmonary edema and/or hemorrhage.    LIVER: Within normal limits.  BILE DUCTS: Normal caliber.  GALLBLADDER: It appears that patient status post partial cholecystectomy.  SPLEEN: Within normal limits.  PANCREAS: Within normal limits.  ADRENALS: Within normal limits.  KIDNEYS/URETERS: Bilateral renal cysts and hypodensities too small to characterize.    BLADDER: Within normal limits.  REPRODUCTIVE ORGANS: Prostate within normal limits.    BOWEL: Small hiatal hernia. Nobowel obstruction. Status post Soriano procedure with surgical clips noted in the region of the sigmoid colon. Appendix is not visualized.  PERITONEUM: No ascites.  VESSELS: Within normal limits.  RETROPERITONEUM/LYMPH NODES: No lymphadenopathy.  ABDOMINAL WALL: Left fat containing inguinal hernia. Midline ventral hernia containing loop of small bowel without surrounding fat stranding.  BONES: Mild degenerative changes. No fractures.    IMPRESSION:  1.  Left lower lobe consolidation suspicious for pneumonia. Recommend follow-up chest CT to resolution to exclude the possibility of neoplasm. Crazy-paving pattern with intralobular septal thickening, may represent pulmonary edema and/or hemorrhage.  2.  Midline ventral hernia containing loop of bowel without surrounding fat stranding. Correlate clinically for incarceration.  3.  No traumatic injury.      Other:    Physical Exam:  General: NAD, resting comfortably  HEENT: NC/AT, EOMI, normal hearing, no oral lesions, no LAD, neck supple  Pulmonary: normal resp effort, CTA-B  Cardiovascular: NSR, no murmurs  Abdominal: soft, ND/NT, no organomegaly  Extremities: WWP, normal strength, no clubbing/cyanosis/edema  Neuro: A/O x 3, CNs II-XII grossly intact, normal sensation, no focal deficits  Pulses: palpable distal pulses      Interpretation of Findings: Tertiary Trauma Survey (TTS)    Date of TTS: 5/4                          Admit Date: 5/3                                 HPI:  HPI: Patient is a 65y old  Male with history of seizures who presents with a chief complaint of fall. Patient fell backwards while sitting on his chair. He struck his head and have loss of consciousness. He was brought to [facility] and found to have SAH and transferred to [facility]. Patient currently complains of neck and lower back pain. Denies pain in his extremities and chest.      (03 May 2021 19:01)      PAST MEDICAL & SURGICAL HISTORY:  DM (diabetes mellitus)    Sarcoid    Bronchiectasis    Subdural hematoma    Inguinal hernia    Seizure    Hypothyroid    Sleep apnea    LAMBERT and COPD overlap syndrome    BPH without urinary obstruction    Status post Bright&#x27;s procedure    Bilateral subdural hematomas    Status post cholecystectomy    Status post inguinal hernia repair      [  ] No significant past history as reviewed with the patient and family    FAMILY HISTORY:  Family history of diabetes mellitus    Family history of pulmonary embolism (Grandparent)      [  ] Family history not pertinent as reviewed with the patient and family    SOCIAL HISTORY:    Medications (inpatient): ALBUTerol    90 MICROgram(s) HFA Inhaler 2 Puff(s) Inhalation every 6 hours  budesonide 160 MICROgram(s)/formoterol 4.5 MICROgram(s) Inhaler 2 Puff(s) Inhalation two times a day  diVALproex  milliGRAM(s) Oral three times a day  furosemide    Tablet 40 milliGRAM(s) Oral daily  levothyroxine 25 MICROGram(s) Oral daily  tamsulosin 0.4 milliGRAM(s) Oral at bedtime  tiotropium 18 MICROgram(s) Capsule 1 Capsule(s) Inhalation daily    Medications (PRN):acetaminophen   Tablet .. 650 milliGRAM(s) Oral every 6 hours PRN    Allergies: Keppra (Other (Severe))  penicillins (Unknown)  (Intolerances: benzodiazepines (Other)  )    Vital Signs Last 24 Hrs  T(C): 36.8 (04 May 2021 13:40), Max: 37.6 (03 May 2021 17:34)  T(F): 98.3 (04 May 2021 13:40), Max: 99.7 (03 May 2021 17:34)  HR: 65 (04 May 2021 13:40) (58 - 92)  BP: 103/70 (04 May 2021 13:40) (98/59 - 115/79)  BP(mean): 73 (03 May 2021 20:52) (72 - 78)  RR: 18 (04 May 2021 13:40) (18 - 22)  SpO2: 93% (04 May 2021 13:40) (93% - 100%)  Drug Dosing Weight  Height (cm): 188 (03 May 2021 11:00)  Weight (kg): 93 (03 May 2021 11:00)  BMI (kg/m2): 26.3 (03 May 2021 11:00)  BSA (m2): 2.2 (03 May 2021 11:00)                          12.9   7.96  )-----------( 44       ( 04 May 2021 05:30 )             39.4     05-04    141  |  106  |  17  ----------------------------<  102<H>  3.8   |  23  |  0.64    Ca    8.8      04 May 2021 05:30  Phos  2.7     05-04  Mg     1.8     05-04    TPro  6.6  /  Alb  2.7<L>  /  TBili  0.6  /  DBili  x   /  AST  41<H>  /  ALT  26  /  AlkPhos  66  05-03    PT/INR - ( 03 May 2021 08:23 )   PT: 13.2 sec;   INR: 1.10 ratio         PTT - ( 03 May 2021 08:23 )  PTT:26.6 sec  Urinalysis Basic - ( 03 May 2021 15:25 )    Color: x / Appearance: x / SG: x / pH: x  Gluc: x / Ketone: x  / Bili: x / Urobili: x   Blood: x / Protein: x / Nitrite: x   Leuk Esterase: x / RBC: 1 /hpf / WBC 1 /HPF   Sq Epi: x / Non Sq Epi: 1 /hpf / Bacteria: Negative        List Injuries Identified to Date: R frontal SAH    List Operative and Interventional Radiological Procedures: none    Consults (Date):  [ X ] Neurosurgery 5/3  [  ] Orthopedics  [  ] Plastics  [  ] Urology  [  ] PM&R  [  ] Social Work    RADIOLOGICAL FINDINGS REVIEW:  CXR:    EXAM:  XR CHEST AP OR PA 1V                                  PROCEDURE DATE:  05/03/2021          INTERPRETATION:  AP semierect chest on May 3, 2021 at 8:29 AM. Patient is admitted. CAT scan the same day showed subarachnoid hemorrhage.    Heart magnified by technique but likely enlarged. Aorta is elongated.    There are diffuse irregular appearing interstitial infiltrates in both lung fields with apical thickening.    Chest is similar to October 25, 2019.    IMPRESSION: Heart enlargement, prominent aorta, and diffuse chronic interstitial lung disease.      Pelvis Films:     C-Spine Films:    T/L/S Spine Films:    Extremity Films:    Head CT:      EXAM:  CT BRAIN                                  PROCEDURE DATE:  05/03/2021          INTERPRETATION:  INDICATION:  Status post trauma with head injury.   Headache.  TECHNIQUE:  A non contrast 2.5mm axial CT study of the brain was performed from skull base to vertex. Coronal and sagittal reformations were generated from the axial data.  COMPARISON EXAMINATION:  CT dated 6-17    FINDINGS:    HEMISPHERES:There are involutional changes with volume loss with no acute infarct or intraparenchymal hematoma.  VENTRICLES:  Midline and normal in size.  POSTERIOR FOSSA:  There are bilateral small CSF hygromas. No acute abnormality noted.  EXTRACEREBRAL SPACES:  Subarachnoid hemorrhage is noted. There is scattered in the right frontal sulci, and there is also trace blood on the left. No subdural or epidural hematoma is noted.  SKULL BASE AND CALVARIUM:  There are old charity holes bilaterally. No acute fracture noted.  SINUSES AND MASTOIDS:  Clear.  MISCELLANEOUS:  A partial empty sella is again identified. Chronic nasal fractures are suggested as well.    IMPRESSION:    1)  there are areas of subarachnoid hemorrhage predominantly in the right frontal region with blood layering in multiple sulci. No acute epidural subdural hematoma noted..  2)  underlying involutional changes in both hemispheres with volume loss. There are holes, but with no acute fracture noted.    EXAM:  CT BRAIN                            PROCEDURE DATE:  05/03/2021            INTERPRETATION:  Noncontrast CT of the brain.    CLINICAL INDICATION:  Follow-up for subarachnoid hemorrhage    TECHNIQUE : Axial CT scanning of the brain was obtained from the skull base to the vertex without the administration of intravenous contrast.    COMPARISON: Brain CT dated 5/3/2021 at 8:54 AM    FINDINGS:  Redemonstrated is high right frontal subarachnoid hemorrhage, similar in appearance. Left frontal subarachnoid hemorrhage not well visualized. A    Small bilateral subdural hygromas are unchanged in appearance. Multiple bilateral charity holes.    Age-appropriate involutional changes and mild microvascular ischemic change.    Partially empty sella    Postsurgical changes at the zygomatic maxillary junction. A probable chronic bilateral nasal bone fractures. No acute displaced calvarial fractures identified. A    The orbits are not remarkable in appearance.    IMPRESSION:    Similar appearing rightfrontal subarachnoid hemorrhage.        C-Spine CT:    EXAM:  CT CERVICAL SPINE                                  PROCEDURE DATE:  05/03/2021          INTERPRETATION:  CT CERVICAL    INDICATIONS:  neck pain. Trauma.    TECHNIQUE:  Thin section CT imaging was conducted.  3-D, Coronal and sagittal reformations were generated from the axial data.    FINDINGS:    There is alteration of the cervical lordosis which may reflect positioning or spasm.    C1/C2:  The anterior and posterior arches of C1 appear to be intact. There is no C1-C2 subluxation. No odontoid fracture is noted. Base of C2 appears to be intact    There are lower cervical vertebral bodies appear to be intact. No upper thoracic fracture noted.    Mild degenerative changes are noted without significant stenosis    Extensive emphysematous and fibrotic changes are noted in both apices, left greater than right.    IMPRESSION:    No acute fracture identified.    EXAM:  CT BRAIN                            PROCEDURE DATE:  05/04/2021            INTERPRETATION:  CLINICAL INDICATION: Follow-up right-sided frontal traumatic subarachnoid hemorrhage    5mm axial sections of the brain were obtained from base to vertex, without the intravenous administration of contrast material. Coronal and sagittal computer generated reconstructed views are available.    Comparison is made with prior CT of 5/3/2021 at 12:57 PM.    No significant interval change is identified.    There is a small amount of sulcal subarachnoid hemorrhage in the right frontal parasagittal region. No significant parenchymal hemorrhage is identified. Atrophy and small vessel white matter ischemic changes are noted. There has been previous bilateral frontal parietal charity holes.      IMPRESSION: No change from 5/3/2021, right frontal traumatic subarachnoid hemorrhage.        Neck CT:    Chest CT:    EXAM:  CT CHEST                                  PROCEDURE DATE:  05/03/2021          INTERPRETATION:  CLINICAL INFORMATION: Fall    COMPARISON: 4/30/2019    CONTRAST/COMPLICATIONS:  IV Contrast: NONE  Oral Contrast: NONE  Complications: None reported at time of study completion    PROCEDURE:  CT of the Chest was performed.  Sagittal and coronal reformats were performed.    FINDINGS:    LUNGS AND AIRWAYS: Patent central airways.  Severe bilateral interstitial fibrotic and bronchiectatic changessimilar to prior .Focal consolidation left lower lobe consistent with pneumonia. Please image to resolution to exclude underlying occult neoplasm.  PLEURA: No pleural effusion.  MEDIASTINUM AND JOANNE: No lymphadenopathy. Moderate hiatal hernia.  VESSELS: Within normal limits.  HEART: Heart size is normal with coronary artery calcification. No pericardial effusion.  CHEST WALL AND LOWER NECK: Within normal limits.  VISUALIZED UPPER ABDOMEN: Left renal cyst. Cholecystectomy.  BONES: No acute    IMPRESSION:  No acute traumatic sequelae on this noncontrast study.  Severe interstitial fibrosis and bronchiectasis similar to prior.  Focal consolidation left lower lobe consistent with pneumonia. Please image to resolution.    ABD/Pelvis CT:    EXAM:  CT ABDOMEN AND PELVIS IC                            PROCEDURE DATE:  05/03/2021            INTERPRETATION:  CLINICAL INFORMATION: Trauma scan. Patient had a fall and hit his head.    COMPARISON: CT abdomen pelvis from 2/26/2016, CT chest from the same day and from April 30, 2019.    CONTRAST/COMPLICATIONS:  IV Contrast: Omnipaque 350  90 cc administered   10 cc discarded  Oral Contrast: NONE  Complications: None reported at time of study completion    PROCEDURE:  CT of the Abdomen and Pelvis was performed.  Sagittal and coronal reformats were performed.    FINDINGS:  LOWER CHEST: Redemonstration of bilateral peripheral reticulations with bronchiectasis. Consolidation in the left lower lobe. Crazy-paving pattern with intralobular septal thickening, may represent pulmonary edema and/or hemorrhage.    LIVER: Within normal limits.  BILE DUCTS: Normal caliber.  GALLBLADDER: It appears that patient status post partial cholecystectomy.  SPLEEN: Within normal limits.  PANCREAS: Within normal limits.  ADRENALS: Within normal limits.  KIDNEYS/URETERS: Bilateral renal cysts and hypodensities too small to characterize.    BLADDER: Within normal limits.  REPRODUCTIVE ORGANS: Prostate within normal limits.    BOWEL: Small hiatal hernia. Nobowel obstruction. Status post Soriano procedure with surgical clips noted in the region of the sigmoid colon. Appendix is not visualized.  PERITONEUM: No ascites.  VESSELS: Within normal limits.  RETROPERITONEUM/LYMPH NODES: No lymphadenopathy.  ABDOMINAL WALL: Left fat containing inguinal hernia. Midline ventral hernia containing loop of small bowel without surrounding fat stranding.  BONES: Mild degenerative changes. No fractures.    IMPRESSION:  1.  Left lower lobe consolidation suspicious for pneumonia. Recommend follow-up chest CT to resolution to exclude the possibility of neoplasm. Crazy-paving pattern with intralobular septal thickening, may represent pulmonary edema and/or hemorrhage.  2.  Midline ventral hernia containing loop of bowel without surrounding fat stranding. Correlate clinically for incarceration.  3.  No traumatic injury.      Other:    Physical Exam:  General: NAD, resting comfortably in bed  HEENT: brusing along anterior and posterior scalp, EOMI, normal hearing, C-collar in place  Pulmonary: normal resp effort, decreased breath sounds bilaterally  Cardiovascular: NSR, no murmurs  Abdominal: soft, ND/NT  Extremities: WWP, normal strength, BLE pitting edema  Neuro: A/O x 3, CNs II-XII grossly intact, normal sensation, no focal deficits  Pulses: palpable distal pulses      Interpretation of Findings: No significant changes from previous surveys

## 2021-05-04 NOTE — DISCHARGE NOTE PROVIDER - CARE PROVIDER_API CALL
Diana Freed; PhD)  Internal Medicine; Medical Oncology  [address] [facility], [address]  [place] [address]  Phone: [phone]  Fax: [phone]  Follow Up Time: 2 weeks    Earle Dobson)  Neurosurgery  [address] [place] [address], [address] [place] [address]  Phone: [phone]  Fax: [phone]  Follow Up Time: 2 weeks    Anastasia Esqueda)  Surgery  [address] [place] [address]  Phone: [phone]  Fax: [phone]  Follow Up Time: 1 week   Diana Freed; PhD)  Internal Medicine; Medical Oncology  [address] [facility], [address]  [place] [address]  Phone: [phone]  Fax: [phone]  Follow Up Time: 2 weeks    Earle Dobson)  Neurosurgery  [address] [place] [address], [address] [place] [address]  Phone: [phone]  Fax: [phone]  Follow Up Time: 2 weeks    Anastasia Esqueda)  Surgery  [address] [place] [address]  Phone: [phone]  Fax: [phone]  Follow Up Time: Routine

## 2021-05-04 NOTE — DISCHARGE NOTE PROVIDER - CARE PROVIDERS DIRECT ADDRESSES
,DirectAddress_Unknown,[email or web address],DirectAddress_Unknown

## 2021-05-04 NOTE — DISCHARGE NOTE PROVIDER - INSTRUCTIONS
Regular diet as tolerated Please eat a Dysphagia I nectar thin liquid diet while supporting your chin. Dysphagia I with nectar thickened liquids VIA CHIN TUCK AND SMALL SINGLE SIPS with straw.    1) Full assist and cueing with meals, 2) Crush meds or provide via alternate source, 3) ALL Liquids via CHIN TUCK and SMALL SINGLE SIPS with straw, 4) Encourage successive swallows for oral and pharyngeal clearance, 5) Aspiration precautions. Monitor for s/s aspiration/laryngeal penetration. If noted:  D/C p.o. intake, provide non-oral nutrition/hydration/meds we recommend you eat a Dysphagia I nectar thin liquid diet while supporting your chin. Dysphagia I with nectar thickened liquids VIA CHIN TUCK AND SMALL SINGLE SIPS with straw. you chose to eat regular diet and understand risks of aspiration     1) Full assist and cueing with meals, 2) Crush meds or provide via alternate source, 3) ALL Liquids via CHIN TUCK and SMALL SINGLE SIPS with straw, 4) Encourage successive swallows for oral and pharyngeal clearance, 5) Aspiration precautions. Monitor for s/s aspiration/laryngeal penetration. If noted:  D/C p.o. intake, provide non-oral nutrition/hydration/meds

## 2021-05-04 NOTE — CHART NOTE - NSCHARTNOTEFT_GEN_A_CORE
During this admission, a number of CT scans demonstrated LLL consolidation with the differential including but not limited to pneumonia vs. neoplasm.    PLAN:  - Discussed with pt that he have a repeat Chest CT in 1 month and f/u with primary care provider  - Gave pt a copy of the report  - Discussed the above with PCP    Darrel Chaudhari, PGY-1  ACS  [id number]

## 2021-05-04 NOTE — PHYSICAL THERAPY INITIAL EVALUATION ADULT - ACTIVE RANGE OF MOTION EXAMINATION, REHAB EVAL
Neck not tested 2/2 to cervical brace/no Active ROM deficits were identified/deficits as listed below

## 2021-05-04 NOTE — PHYSICAL THERAPY INITIAL EVALUATION ADULT - GENERAL OBSERVATIONS, REHAB EVAL
Pt received semi-supine in bed in NAD, +IV Lock, + O2 2L NC, +cervical collar. Pt AOx4, pleasant and cooperative.

## 2021-05-04 NOTE — CONSULT NOTE ADULT - SUBJECTIVE AND OBJECTIVE BOX
Reason for consult:    HPI:  HPI: Patient is a 65y old  Male with history of seizures who presents with a chief complaint of fall. Patient fell backwards while sitting on his chair. He struck his head and have loss of consciousness. He was brought to [facility] and found to have SAH and transferred to [facility]. Patient currently complains of neck and lower back pain. Denies pain in his extremities and chest.      (03 May 2021 19:01)    Patient is known to Dr. Diana Freed at [facility] for the management of thrombocytopenia. Per Dr. Freed' last note:     Mr. Jose M Reilly is a 63­year­old with known history of bilateral subdural hematomas, seizure disorder, chronic obstructive pulmonary  disease, hypothyroidism, and asymptomatic thrombocytopenia. Since his last visit the patient has been hospitalized with a severe viral  infection and underwent cardiac catheterization which proved to be negative. He has been diagnosed with aspiration pneumonia and has  been recommended procedures to use when he eats. He is not totally adherent to the diet consistency recommended.     Of note, platelet count on 3/1/2021 was 51K in office.    PAST MEDICAL & SURGICAL HISTORY:  DM (diabetes mellitus)    Sarcoid    Bronchiectasis    Subdural hematoma    Inguinal hernia    Seizure    Hypothyroid    Sleep apnea    LAMBERT and COPD overlap syndrome    BPH without urinary obstruction    Status post Bright&#x27;s procedure    Bilateral subdural hematomas    Status post cholecystectomy    Status post inguinal hernia repair        FAMILY HISTORY:  Family history of diabetes mellitus    Family history of pulmonary embolism (Grandparent)        Alcohol Denied  Smoking: Nonsmoker  Drug Use: Denied  Marital Status:         Allergies    Keppra (Other (Severe))  penicillins (Unknown)    Intolerances    benzodiazepines (Other)      MEDICATIONS  (STANDING):  ALBUTerol    90 MICROgram(s) HFA Inhaler 2 Puff(s) Inhalation every 6 hours  budesonide 160 MICROgram(s)/formoterol 4.5 MICROgram(s) Inhaler 2 Puff(s) Inhalation two times a day  diVALproex  milliGRAM(s) Oral three times a day  furosemide    Tablet 40 milliGRAM(s) Oral daily  levothyroxine 25 MICROGram(s) Oral daily  tamsulosin 0.4 milliGRAM(s) Oral at bedtime  tiotropium 18 MICROgram(s) Capsule 1 Capsule(s) Inhalation daily    MEDICATIONS  (PRN):  acetaminophen   Tablet .. 650 milliGRAM(s) Oral every 6 hours PRN Mild Pain (1 - 3)      ROS  No fever, sweats, chills  No epistaxis, HA, sore throat  No CP, SOB, cough, sputum  No n/v/d, abd pain, melena, hematochezia  No edema  No rash  No anxiety  No back pain, joint pain  Bleeding from head wound  No dysuria, hematuria    T(C): 36.8 (05-04-21 @ 13:40), Max: 37.6 (05-03-21 @ 17:34)  HR: 65 (05-04-21 @ 13:40) (58 - 92)  BP: 103/70 (05-04-21 @ 13:40) (98/59 - 115/79)  RR: 18 (05-04-21 @ 13:40) (18 - 22)  SpO2: 93% (05-04-21 @ 13:40) (93% - 100%)  Wt(kg): --    PE  NAD  Awake, alert  Dried blood back of head  Anicteric  RRR  CTAB  Abd soft, NT, ND  No c/c/e  No rash grossly                            12.9   7.96  )-----------( 44       ( 04 May 2021 05:30 )             39.4       05-04    141  |  106  |  17  ----------------------------<  102<H>  3.8   |  23  |  0.64    Ca    8.8      04 May 2021 05:30  Phos  2.7     05-04  Mg     1.8     05-04    TPro  6.6  /  Alb  2.7<L>  /  TBili  0.6  /  DBili  x   /  AST  41<H>  /  ALT  26  /  AlkPhos  66  05-03      EXAM:  CT BRAIN                            PROCEDURE DATE:  05/04/2021            INTERPRETATION:  CLINICAL INDICATION: Follow-up right-sided frontal traumatic subarachnoid hemorrhage    5mm axial sections of the brain were obtained from base to vertex, without the intravenous administration of contrast material. Coronal and sagittal computer generated reconstructed views are available.    Comparison is made with prior CT of 5/3/2021 at 12:57 PM.    No significant interval change is identified.    There is a small amount of sulcal subarachnoid hemorrhage in the right frontal parasagittal region. No significant parenchymal hemorrhage is identified. Atrophy and small vessel white matter ischemic changes are noted. There has been previous bilateral frontal parietal charity holes.      IMPRESSION: No change from 5/3/2021, right frontal traumatic subarachnoid hemorrhage.      EXAM:  CT ABDOMEN AND PELVIS IC                            PROCEDURE DATE:  05/03/2021            INTERPRETATION:  CLINICAL INFORMATION: Trauma scan. Patient had a fall and hit his head.    COMPARISON: CT abdomen pelvis from 2/26/2016, CT chest from the same day and from April 30, 2019.    CONTRAST/COMPLICATIONS:  IV Contrast: Omnipaque 350  90 cc administered   10 cc discarded  Oral Contrast: NONE  Complications: None reported at time of study completion    PROCEDURE:  CT of the Abdomen and Pelvis was performed.  Sagittal and coronal reformats were performed.    FINDINGS:  LOWER CHEST: Redemonstration of bilateral peripheral reticulations with bronchiectasis. Consolidation in the left lower lobe. Crazy-paving pattern with intralobular septal thickening, may represent pulmonary edema and/or hemorrhage.    LIVER: Within normal limits.  BILE DUCTS: Normal caliber.  GALLBLADDER: It appears that patient status post partial cholecystectomy.  SPLEEN: Within normal limits.  PANCREAS: Within normal limits.  ADRENALS: Within normal limits.  KIDNEYS/URETERS: Bilateral renal cysts and hypodensities too small to characterize.    BLADDER: Within normal limits.  REPRODUCTIVE ORGANS: Prostate within normal limits.    BOWEL: Small hiatal hernia. No bowel obstruction. Status post Soriano procedure with surgical clips noted in the region of the sigmoid colon. Appendix is not visualized.  PERITONEUM: No ascites.  VESSELS: Within normal limits.  RETROPERITONEUM/LYMPH NODES: No lymphadenopathy.  ABDOMINAL WALL: Left fat containing inguinal hernia. Midline ventral hernia containing loop of small bowel without surrounding fat stranding.  BONES: Mild degenerative changes. No fractures.    IMPRESSION:  1.  Left lower lobe consolidation suspicious for pneumonia. Recommend follow-up chest CT to resolution to exclude the possibility of neoplasm. Crazy-paving pattern with intralobular septal thickening, may represent pulmonary edema and/or hemorrhage.  2.  Midline ventral hernia containing loop of bowel without surrounding fat stranding. Correlate clinically for incarceration.  3.  No traumatic injury.

## 2021-05-04 NOTE — CONSULT NOTE ADULT - PROBLEM SELECTOR RECOMMENDATION 8
Called pt's Pharmacy [facility] [phone] for medication reconciliation  Outpatient Medication review done in [place]    Case discussed with Trauma team

## 2021-05-04 NOTE — CONSULT NOTE ADULT - ASSESSMENT
Jose M Reilly  65M s/p mechanical fall backwards hit his head, now with mild HA and moderate neck pain. AAOx3, WALLS strongly, SILT. CTH R frontal tSAH, neg Ct cspine  -Repeat CTH 4hrs  -No Ac/AP on board, if pt to be adm rec repeat at 24hrs for stability, if stable can start DVT ppx 24hrs post-CT  -Given CT neg, if patient still with neck pain can get MR for cspine clearance, can be done in or outpatient, maintain collar until then if still with neck pain. If no pain can clear to confrontation  -No neurosurgical intervention  -F/U 2 weeks with Dr. Dobson
a 63­year­old with known history of bilateral subdural hematomas, seizure disorder, chronic obstructive pulmonary disease, hypothyroidism, and asymptomatic thrombocytopenia. Since his last visit the patient has been hospitalized with a severe viral infection and underwent cardiac catheterization which proved to be negative. He has been diagnosed with aspiration pneumonia and has been recommended procedures to use when he eats. He is not totally adherent to the diet consistency recommended. Spent 3 weeks at a rehab center and is now doing physical therapy. His thrombocytopenia is essentially stable.    Chronic Thrombocytopenia  --Stable per Hematologist - Dr. Diana Freed at [facility]  --Workup has been done as outpatient  --Normally no intervention  --As platelets <80K with active cerebral hemorrhage S/P trauma, would recommend transfusion of platelets to 80K    Consolidations on CT chest  --Patient may have pneumonia  --Recommend ID, pulmonary input    We will continue to follow patient. Thank you for the opportunity to participate in Mr. Reilly's care    After discharge patient may continue to follow with Dr. Diana Freed at [facility]    Bird Comer PA-C  Hematology/Oncology  [facility]   [phone] (cell)  [phone] (office)  [phone] (alt office)  Evenings and weekends please call MD on call or office    
Patient is a 65y old  Male with history of seizures who presents with mechanical fall found to have b/l subarachnoid hemorrhage. CT with possible pneumonia.     PLAN:   - No surgical intervention at this time.   - Recommend UA to r/o source of infection  - Trend CPK and lactate  - Fluid resuscitation  - Plan discussed with Attending, Dr. Dheeraj Barger MD  General Surgery, PGY 2    
 65M with history of Seizure disorder, Hypothyroidism,  Thrombocytopenia, BPH who presents with a chief complaint of fall. Patient fell backwards while sitting on his chair. He struck his head and had loss of consciousness. He was brought to [facility] and found to have SAH and transferred to [facility]

## 2021-05-04 NOTE — PHYSICAL THERAPY INITIAL EVALUATION ADULT - ADDITIONAL COMMENTS
Pt lives aone in apt with no steps to enter and uses a RW for ambulation. Pt stated PTA he was 100% independent and drives. Pt has his sister come to home 3 days per week to clean, cook and do laundry.

## 2021-05-04 NOTE — DISCHARGE NOTE PROVIDER - PROVIDER TOKENS
PROVIDER:[TOKEN:[[id number]],FOLLOWUP:[2 weeks]],PROVIDER:[TOKEN:[[id number]],FOLLOWUP:[2 weeks]],PROVIDER:[TOKEN:[[id number]],FOLLOWUP:[1 week]] PROVIDER:[TOKEN:[[id number]],FOLLOWUP:[2 weeks]],PROVIDER:[TOKEN:[[id number]],FOLLOWUP:[2 weeks]],PROVIDER:[TOKEN:[[id number]],FOLLOWUP:[Routine]]

## 2021-05-05 NOTE — PROGRESS NOTE ADULT - PROBLEM SELECTOR PLAN 1
Short follow up CT Head (4 hours) showed no change  - 24 hr CT head stable ; pt started on DVT ppx  - for MRI of the neck today  - mgt per primary team.

## 2021-05-05 NOTE — PROGRESS NOTE ADULT - SUBJECTIVE AND OBJECTIVE BOX
Patient is a 65y old  Male who presents with a chief complaint of SAH after a fall (05 May 2021 12:00)    Patient seen this morning. Feeling well without new complaints. Received platelet transfusion yesterday      MEDICATIONS  (STANDING):  ALBUTerol    90 MICROgram(s) HFA Inhaler 2 Puff(s) Inhalation every 6 hours  budesonide 160 MICROgram(s)/formoterol 4.5 MICROgram(s) Inhaler 2 Puff(s) Inhalation two times a day  diVALproex ER 1500 milliGRAM(s) Oral at bedtime  enoxaparin Injectable 40 milliGRAM(s) SubCutaneous every 24 hours  furosemide    Tablet 40 milliGRAM(s) Oral daily  levothyroxine 25 MICROGram(s) Oral daily  tamsulosin 0.4 milliGRAM(s) Oral at bedtime  tiotropium 18 MICROgram(s) Capsule 1 Capsule(s) Inhalation daily    MEDICATIONS  (PRN):  acetaminophen   Tablet .. 650 milliGRAM(s) Oral every 6 hours PRN Mild Pain (1 - 3)        Vital Signs Last 24 Hrs  T(C): 36.9 (05 May 2021 09:55), Max: 37 (04 May 2021 16:11)  T(F): 98.5 (05 May 2021 09:55), Max: 98.6 (04 May 2021 16:11)  HR: 74 (05 May 2021 09:55) (67 - 77)  BP: 104/67 (05 May 2021 09:55) (96/65 - 119/77)  BP(mean): --  RR: 16 (05 May 2021 09:55) (16 - 18)  SpO2: 93% (05 May 2021 09:55) (93% - 98%)    PE  NAD  Awake, alert  Anicteric  Dried blood back of scalp  No rash grossly                            14.9   7.45  )-----------( 66       ( 05 May 2021 10:03 )             45.1       05-05    141  |  98  |  15  ----------------------------<  105<H>  3.8   |  31  |  0.67    Ca    9.3      05 May 2021 10:03  Phos  2.7     05-05  Mg     2.0     05-05      EXAM:  CT BRAIN                            PROCEDURE DATE:  05/04/2021            INTERPRETATION:  CLINICAL INDICATION: Follow-up right-sided frontal traumatic subarachnoid hemorrhage    5mm axial sections of the brain were obtained from base to vertex, without the intravenous administration of contrast material. Coronal and sagittal computer generated reconstructed views are available.    Comparison is made with prior CT of 5/3/2021 at 12:57 PM.    No significant interval change is identified.    There is a small amount of sulcal subarachnoid hemorrhage in the right frontal parasagittal region. No significant parenchymal hemorrhage is identified. Atrophy and small vessel white matter ischemic changes are noted. There has been previous bilateral frontal parietal charity holes.      IMPRESSION: No change from 5/3/2021, right frontal traumatic subarachnoid hemorrhage.

## 2021-05-05 NOTE — PROGRESS NOTE ADULT - SUBJECTIVE AND OBJECTIVE BOX
ACS DAILY PROGRESS NOTE:       SUBJECTIVE/ROS: No acute events overnight. Pain is well controlled. denies dizziness, SOB, CP or palpitations.        MEDICATIONS  (STANDING):  ALBUTerol    90 MICROgram(s) HFA Inhaler 2 Puff(s) Inhalation every 6 hours  budesonide 160 MICROgram(s)/formoterol 4.5 MICROgram(s) Inhaler 2 Puff(s) Inhalation two times a day  diVALproex ER 1500 milliGRAM(s) Oral at bedtime  enoxaparin Injectable 40 milliGRAM(s) SubCutaneous daily  furosemide    Tablet 40 milliGRAM(s) Oral daily  levothyroxine 25 MICROGram(s) Oral daily  tamsulosin 0.4 milliGRAM(s) Oral at bedtime  tiotropium 18 MICROgram(s) Capsule 1 Capsule(s) Inhalation daily    MEDICATIONS  (PRN):  acetaminophen   Tablet .. 650 milliGRAM(s) Oral every 6 hours PRN Mild Pain (1 - 3)      OBJECTIVE:    Vital Signs Last 24 Hrs  T(C): 36.6 (05 May 2021 02:14), Max: 37 (04 May 2021 16:11)  T(F): 97.9 (05 May 2021 02:14), Max: 98.6 (04 May 2021 16:11)  HR: 77 (05 May 2021 02:14) (65 - 77)  BP: 111/71 (05 May 2021 02:14) (96/65 - 119/77)  BP(mean): --  RR: 16 (05 May 2021 02:14) (16 - 20)  SpO2: 96% (05 May 2021 02:14) (93% - 98%)    Physical Exam     Gen: NAD, alert  Neck: c collar in place  Pulm: non-labor breathing   Heart: RRR  Abd: soft, ND/NT  Ext: well perfused      I&O's Detail    03 May 2021 07:01  -  04 May 2021 07:00  --------------------------------------------------------  IN:    Oral Fluid: 220 mL  Total IN: 220 mL    OUT:    Voided (mL): 850 mL  Total OUT: 850 mL    Total NET: -630 mL      04 May 2021 07:01  -  05 May 2021 02:33  --------------------------------------------------------  IN:    Oral Fluid: 860 mL  Total IN: 860 mL    OUT:    Voided (mL): 300 mL  Total OUT: 300 mL    Total NET: 560 mL          Daily     Daily     LABS:                        12.9   7.96  )-----------( 44       ( 04 May 2021 05:30 )             39.4     05-04    141  |  106  |  17  ----------------------------<  102<H>  3.8   |  23  |  0.64    Ca    8.8      04 May 2021 05:30  Phos  2.7     05-04  Mg     1.8     05-04    TPro  6.6  /  Alb  2.7<L>  /  TBili  0.6  /  DBili  x   /  AST  41<H>  /  ALT  26  /  AlkPhos  66  05-03    PT/INR - ( 03 May 2021 08:23 )   PT: 13.2 sec;   INR: 1.10 ratio         PTT - ( 03 May 2021 08:23 )  PTT:26.6 sec  Urinalysis Basic - ( 03 May 2021 15:25 )    Color: x / Appearance: x / SG: x / pH: x  Gluc: x / Ketone: x  / Bili: x / Urobili: x   Blood: x / Protein: x / Nitrite: x   Leuk Esterase: x / RBC: 1 /hpf / WBC 1 /HPF   Sq Epi: x / Non Sq Epi: 1 /hpf / Bacteria: Negative

## 2021-05-05 NOTE — PROGRESS NOTE ADULT - SUBJECTIVE AND OBJECTIVE BOX
Patient is a 65y old  Male who presents with a chief complaint of fall (05 May 2021 02:31)      SUBJECTIVE / OVERNIGHT EVENTS:  Pt seen and examined. No acute events overnight. He denies CP, SOB, fever/chills, headache. Pt c/o that C- collar is too tight and is choking him.    MEDICATIONS  (STANDING):  ALBUTerol    90 MICROgram(s) HFA Inhaler 2 Puff(s) Inhalation every 6 hours  budesonide 160 MICROgram(s)/formoterol 4.5 MICROgram(s) Inhaler 2 Puff(s) Inhalation two times a day  diVALproex ER 1500 milliGRAM(s) Oral at bedtime  enoxaparin Injectable 40 milliGRAM(s) SubCutaneous every 24 hours  furosemide    Tablet 40 milliGRAM(s) Oral daily  levothyroxine 25 MICROGram(s) Oral daily  tamsulosin 0.4 milliGRAM(s) Oral at bedtime  tiotropium 18 MICROgram(s) Capsule 1 Capsule(s) Inhalation daily    MEDICATIONS  (PRN):  acetaminophen   Tablet .. 650 milliGRAM(s) Oral every 6 hours PRN Mild Pain (1 - 3)      Vital Signs Last 24 Hrs  T(C): 36.9 (05 May 2021 09:55), Max: 37 (04 May 2021 16:11)  T(F): 98.5 (05 May 2021 09:55), Max: 98.6 (04 May 2021 16:11)  HR: 74 (05 May 2021 09:55) (65 - 77)  BP: 104/67 (05 May 2021 09:55) (96/65 - 119/77)  BP(mean): --  RR: 16 (05 May 2021 09:55) (16 - 18)  SpO2: 93% (05 May 2021 09:55) (93% - 98%)  CAPILLARY BLOOD GLUCOSE        I&O's Summary    04 May 2021 07:01  -  05 May 2021 07:00  --------------------------------------------------------  IN: 1078 mL / OUT: 300 mL / NET: 778 mL        PHYSICAL EXAM:  GENERAL: NAD, anicteric , afebrile   HEAD:  +scalp laceration  EYES: EOMI, PERRLA, conjunctiva and sclera clear  ENMT: Moist mucous membranes  NECK: C- collar in place  RESPIRATORY: Clear to auscultation bilaterally; No rales, rhonchi, wheezing, or rubs  CARDIOVASCULAR: Regular rate and rhythm; No murmurs, rubs, or gallops  GASTROINTESTINAL: Soft, Nontender, Nondistended; Bowel sounds present  EXTREMITIES:  +arthritic changes in right hand ; no LE  edema  NERVOUS SYSTEM:  Alert & Oriented X3; Moving all 4 extremities; No gross sensory deficits  HEME/LYMPH: No lymphadenopathy noted  SKIN: No rashes or lesions    LABS:                        14.9   7.45  )-----------( 66       ( 05 May 2021 10:03 )             45.1     05-05    141  |  98  |  15  ----------------------------<  105<H>  3.8   |  31  |  0.67    Ca    9.3      05 May 2021 10:03  Phos  2.7     05-05  Mg     2.0     05-05        CARDIAC MARKERS ( 03 May 2021 15:10 )  x     / x     / 162 U/L / x     / x          Urinalysis Basic - ( 03 May 2021 15:25 )    Color: x / Appearance: x / SG: x / pH: x  Gluc: x / Ketone: x  / Bili: x / Urobili: x   Blood: x / Protein: x / Nitrite: x   Leuk Esterase: x / RBC: 1 /hpf / WBC 1 /HPF   Sq Epi: x / Non Sq Epi: 1 /hpf / Bacteria: Negative

## 2021-05-05 NOTE — BEHAVIORAL HEALTH ASSESSMENT NOTE - JUDGMENT (REGARDING EVERYDAY EVENTS)
It there cough or congestion? Any fever or chills? Getting better or worse? Unless she has signs of an infection, it should improve as she continues not smoking       Maribel Garland DO  [facility]  5/5/2021 10:23 AM [place]

## 2021-05-06 NOTE — CHART NOTE - NSCHARTNOTEFT_GEN_A_CORE
MRI c-spine reviewed with Neurosurgery (Dr. Julius Armstrong). OK to remove C-collar as MRI does not identify any ligamentous injury, & neuroforaminal narrowing is not an indication for C-collar.        ACS [phone]

## 2021-05-06 NOTE — PROGRESS NOTE ADULT - PROBLEM SELECTOR PLAN 1
Short follow up CT Head (4 hours) showed no change  - 24 hr CT head stable ; pt started on DVT ppx  -  MRI cervical spine shows C5-C6 moderate to severe left-sided neural foraminal stenosis  - mgt per primary team.

## 2021-05-06 NOTE — PROGRESS NOTE ADULT - SUBJECTIVE AND OBJECTIVE BOX
Subjective:   Patient seen at bedside this AM. Reports feeling well, without complaints. Denies chest pain, SOB. Tolerating diet without N/V.     24h Events:   - Overnight, no acute events    Objective:  Vital Signs  T(C): 36.3 (05-06 @ 00:25), Max: 36.9 (05-05 @ 09:55)  HR: 64 (05-06 @ 00:25) (64 - 74)  BP: 123/84 (05-06 @ 00:25) (104/67 - 124/76)  RR: 18 (05-06 @ 00:25) (16 - 18)  SpO2: 94% (05-06 @ 00:25) (93% - 99%)  05-04-21 @ 07:01  -  05-05-21 @ 07:00  --------------------------------------------------------  IN:  Total IN: 0 mL    OUT:    Voided (mL): 300 mL  Total OUT: 300 mL    Total NET: -300 mL      05-05-21 @ 07:01  -  05-06-21 @ 02:50  --------------------------------------------------------  IN:  Total IN: 0 mL    OUT:    Voided (mL): 2150 mL  Total OUT: 2150 mL    Total NET: -2150 mL      Physical Exam:  GEN: resting in bed comfortably in NAD  HEENT: c-collar in place  RESP: no increased WOB  ABD: soft, non-distended, non-tender without rebound tenderness or guarding  EXTR: warm, well-perfused without gross deformities; spontaneous movement in b/l U/L extrem      Labs:             14.9   7.45  )-----------( 66       ( 05 May 2021 10:03 )             45.1   05-05    141  |  98  |  15  ----------------------------<  105<H>  3.8   |  31  |  0.67    Ca    9.3      05 May 2021 10:03  Phos  2.7     05-05  Mg     2.0     05-05        Medications:   MEDICATIONS  (STANDING):  ALBUTerol    90 MICROgram(s) HFA Inhaler 2 Puff(s) Inhalation every 6 hours  budesonide 160 MICROgram(s)/formoterol 4.5 MICROgram(s) Inhaler 2 Puff(s) Inhalation two times a day  diVALproex ER 1500 milliGRAM(s) Oral at bedtime  enoxaparin Injectable 40 milliGRAM(s) SubCutaneous every 24 hours  furosemide    Tablet 40 milliGRAM(s) Oral daily  levothyroxine 25 MICROGram(s) Oral daily  tamsulosin 0.4 milliGRAM(s) Oral at bedtime  tiotropium 18 MICROgram(s) Capsule 1 Capsule(s) Inhalation daily    MEDICATIONS  (PRN):  acetaminophen   Tablet .. 650 milliGRAM(s) Oral every 6 hours PRN Mild Pain (1 - 3)        Assessment:   66yo Male with Hx seizures who presents with mechanical fall found to have b/l subarachnoid hemorrhage. CT with LLL consolidation withy questionable PNA, UA negative, CK negative. Patient with thrombocytopenia, now s/p 1u PLT on 5/4.         Plan:   - Regular diet   - Pain control PRN  - Lovenox for VTE ppx  - C/w c-collar -- f/u MRI neck to clear  - Continue home meds  - F/u AM labs, trend PLT      ACS | Trauma Surgery  #[id number] Subjective:   Patient seen at bedside this AM. Reports feeling well, without complaints. Denies chest pain, SOB. Tolerating diet without N/V. Does report some cough, minimal productive sputum; afebrile; no leukocytosis; no dyspnea/tachypnea. C-collar in place likely hindering some oropharyngeal function; plan to likely remove c-collar today.    24h Events:   - Overnight, no acute events    Objective:  Vital Signs  T(C): 36.3 (05-06 @ 00:25), Max: 36.9 (05-05 @ 09:55)  HR: 64 (05-06 @ 00:25) (64 - 74)  BP: 123/84 (05-06 @ 00:25) (104/67 - 124/76)  RR: 18 (05-06 @ 00:25) (16 - 18)  SpO2: 94% (05-06 @ 00:25) (93% - 99%)  05-04-21 @ 07:01  -  05-05-21 @ 07:00  --------------------------------------------------------  IN:  Total IN: 0 mL    OUT:    Voided (mL): 300 mL  Total OUT: 300 mL    Total NET: -300 mL      05-05-21 @ 07:01  -  05-06-21 @ 02:50  --------------------------------------------------------  IN:  Total IN: 0 mL    OUT:    Voided (mL): 2150 mL  Total OUT: 2150 mL    Total NET: -2150 mL      Physical Exam:  GEN: resting in bed comfortably in NAD  HEENT: c-collar in place  RESP: no increased WOB  ABD: soft, non-distended, non-tender without rebound tenderness or guarding  EXTR: warm, well-perfused without gross deformities; spontaneous movement in b/l U/L extrem      Labs:             14.9   7.45  )-----------( 66       ( 05 May 2021 10:03 )             45.1   05-05    141  |  98  |  15  ----------------------------<  105<H>  3.8   |  31  |  0.67    Ca    9.3      05 May 2021 10:03  Phos  2.7     05-05  Mg     2.0     05-05        Medications:   MEDICATIONS  (STANDING):  ALBUTerol    90 MICROgram(s) HFA Inhaler 2 Puff(s) Inhalation every 6 hours  budesonide 160 MICROgram(s)/formoterol 4.5 MICROgram(s) Inhaler 2 Puff(s) Inhalation two times a day  diVALproex ER 1500 milliGRAM(s) Oral at bedtime  enoxaparin Injectable 40 milliGRAM(s) SubCutaneous every 24 hours  furosemide    Tablet 40 milliGRAM(s) Oral daily  levothyroxine 25 MICROGram(s) Oral daily  tamsulosin 0.4 milliGRAM(s) Oral at bedtime  tiotropium 18 MICROgram(s) Capsule 1 Capsule(s) Inhalation daily    MEDICATIONS  (PRN):  acetaminophen   Tablet .. 650 milliGRAM(s) Oral every 6 hours PRN Mild Pain (1 - 3)        Assessment:   66yo Male with Hx seizures who presents with mechanical fall found to have b/l subarachnoid hemorrhage. CT with LLL consolidation withy questionable PNA, UA negative, CK negative. Patient with thrombocytopenia, now s/p 1u PLT on 5/4.         Plan:   - Regular diet   - Pain control PRN  - Lovenox for VTE ppx  - C/w c-collar -- f/u MRI neck to clear; cleared by spine surgery -> plan to remove C-collar today  - Continue home meds  - F/U AM labs, trend PLT  - F/U Chest x-ray  - Clindamycin course for empiric coverage of poss PNA  - Head staples to removed as OP basis at follow up visit with neurosurgery      ACS | Trauma Surgery  #[id number]

## 2021-05-07 NOTE — PROGRESS NOTE ADULT - SUBJECTIVE AND OBJECTIVE BOX
Subjective:   Patient seen at bedside this AM. Reports feeling well, without complaints. Denies chest pain, SOB. Tolerating diet without N/V.     24h Events:   - Overnight, no acute events  - C-collar cleared    Objective:  Vital Signs  T(C): 36.8 (05-07 @ 00:16), Max: 37.3 (05-06 @ 04:45)  HR: 72 (05-07 @ 00:16) (68 - 78)  BP: 95/63 (05-07 @ 00:16) (95/63 - 111/70)  RR: 20 (05-07 @ 00:16) (18 - 20)  SpO2: 95% (05-07 @ 00:16) (86% - 95%)  05-05-21 @ 07:01  -  05-06-21 @ 07:00  --------------------------------------------------------  IN:  Total IN: 0 mL    OUT:    Voided (mL): 2500 mL  Total OUT: 2500 mL    Total NET: -2500 mL      05-06-21 @ 07:01  -  05-07-21 @ 03:58  --------------------------------------------------------  IN:  Total IN: 0 mL    OUT:    Voided (mL): 2075 mL  Total OUT: 2075 mL    Total NET: -2075 mL      Physical Exam:  GEN: resting in bed comfortably in NAD  HEENT: c-collar in place  RESP: no increased WOB  ABD: soft, non-distended, non-tender without rebound tenderness or guarding  EXTR: warm, well-perfused without gross deformities; spontaneous movement in b/l U/L extrem      Labs:             13.8   6.47  )-----------( 78       ( 06 May 2021 09:42 )             41.3   05-05    141  |  98  |  15  ----------------------------<  105<H>  3.8   |  31  |  0.67    Ca    9.3      05 May 2021 10:03  Phos  2.7     05-05  Mg     2.0     05-05      Medications:   MEDICATIONS  (STANDING):  ALBUTerol    90 MICROgram(s) HFA Inhaler 2 Puff(s) Inhalation every 6 hours  budesonide 160 MICROgram(s)/formoterol 4.5 MICROgram(s) Inhaler 2 Puff(s) Inhalation two times a day  clindamycin   Capsule 300 milliGRAM(s) Oral every 6 hours  diVALproex ER 1500 milliGRAM(s) Oral at bedtime  enoxaparin Injectable 40 milliGRAM(s) SubCutaneous every 24 hours  furosemide    Tablet 40 milliGRAM(s) Oral daily  levothyroxine 25 MICROGram(s) Oral daily  polyethylene glycol 3350 17 Gram(s) Oral daily  senna 2 Tablet(s) Oral at bedtime  tamsulosin 0.4 milliGRAM(s) Oral at bedtime  tiotropium 18 MICROgram(s) Capsule 1 Capsule(s) Inhalation daily    MEDICATIONS  (PRN):  acetaminophen   Tablet .. 650 milliGRAM(s) Oral every 6 hours PRN Mild Pain (1 - 3)      Assessment:   64yo Male with Hx seizures who presents with mechanical fall found to have b/l subarachnoid hemorrhage. CT with LLL consolidation withy questionable PNA, UA negative, CK negative. Patient with thrombocytopenia, now s/p 1u PLT on 5/4. Dispo planning.       Plan:   - Regular diet   - Pain control PRN  - Lovenox for VTE ppx  - C/w c-collar -- f/u MRI neck to clear; cleared by spine surgery -> plan to remove C-collar today  - Continue home meds  - F/U AM labs, trend PLT  - Clindamycin course for empiric coverage of poss PNA  - Head staples to removed as OP basis at follow up visit with neurosurgery      ACS | Trauma Surgery  #[id number] Subjective:   Patient seen at bedside this AM. Reports some mild L shoulder pain. On 2L NC overnight. Tolerating diet without N/V.     24h Events:   - Overnight, no acute events  - C-collar cleared    Objective:  Vital Signs  T(C): 36.8 (05-07 @ 00:16), Max: 37.3 (05-06 @ 04:45)  HR: 72 (05-07 @ 00:16) (68 - 78)  BP: 95/63 (05-07 @ 00:16) (95/63 - 111/70)  RR: 20 (05-07 @ 00:16) (18 - 20)  SpO2: 95% (05-07 @ 00:16) (86% - 95%)  05-05-21 @ 07:01  -  05-06-21 @ 07:00  --------------------------------------------------------  IN:  Total IN: 0 mL    OUT:    Voided (mL): 2500 mL  Total OUT: 2500 mL    Total NET: -2500 mL      05-06-21 @ 07:01  -  05-07-21 @ 03:58  --------------------------------------------------------  IN:  Total IN: 0 mL    OUT:    Voided (mL): 2075 mL  Total OUT: 2075 mL    Total NET: -2075 mL      Physical Exam:  GEN: resting in bed comfortably in NAD  HEENT: c-collar cleared  RESP: no increased WOB  ABD: soft, non-distended, non-tender without rebound tenderness or guarding  EXTR: warm, well-perfused without gross deformities; spontaneous movement in b/l U/L extrem      Labs:             13.8   6.47  )-----------( 78       ( 06 May 2021 09:42 )             41.3   05-05    141  |  98  |  15  ----------------------------<  105<H>  3.8   |  31  |  0.67    Ca    9.3      05 May 2021 10:03  Phos  2.7     05-05  Mg     2.0     05-05      Medications:   MEDICATIONS  (STANDING):  ALBUTerol    90 MICROgram(s) HFA Inhaler 2 Puff(s) Inhalation every 6 hours  budesonide 160 MICROgram(s)/formoterol 4.5 MICROgram(s) Inhaler 2 Puff(s) Inhalation two times a day  clindamycin   Capsule 300 milliGRAM(s) Oral every 6 hours  diVALproex ER 1500 milliGRAM(s) Oral at bedtime  enoxaparin Injectable 40 milliGRAM(s) SubCutaneous every 24 hours  furosemide    Tablet 40 milliGRAM(s) Oral daily  levothyroxine 25 MICROGram(s) Oral daily  polyethylene glycol 3350 17 Gram(s) Oral daily  senna 2 Tablet(s) Oral at bedtime  tamsulosin 0.4 milliGRAM(s) Oral at bedtime  tiotropium 18 MICROgram(s) Capsule 1 Capsule(s) Inhalation daily    MEDICATIONS  (PRN):  acetaminophen   Tablet .. 650 milliGRAM(s) Oral every 6 hours PRN Mild Pain (1 - 3)      Assessment:   64yo Male with Hx seizures who presents with mechanical fall found to have b/l subarachnoid hemorrhage. CT with LLL consolidation withy questionable PNA, UA negative, CK negative. Patient with thrombocytopenia, now s/p 1u PLT on 5/4. Dispo planning.       Plan:   - Regular diet   - Pain control PRN  - Lovenox for VTE ppx  - C/w c-collar -- f/u MRI neck to clear; cleared by spine surgery -> plan to remove C-collar today -> C - collar cleared  - Continue home meds  - F/U AM labs, trend PLT  - Clindamycin course for empiric coverage of poss PNA  - Head staples to removed as OP basis at follow up visit with neurosurgery  - Will f/u XR Shoulder for L shoulder pain      ACS | Trauma Surgery  #[id number]

## 2021-05-07 NOTE — SWALLOW BEDSIDE ASSESSMENT ADULT - SWALLOW EVAL: RECOMMENDED DIET
Consider NPO, with non-oral nutrition/hydration/medications given history of silent aspiration and concern for aspiration pneumonia

## 2021-05-07 NOTE — PROGRESS NOTE ADULT - SUBJECTIVE AND OBJECTIVE BOX
Patient is a 65y old  Male who presents with a chief complaint of SAH after a fall (05 May 2021 12:00)      SUBJECTIVE / OVERNIGHT EVENTS:    MEDICATIONS  (STANDING):  ALBUTerol    90 MICROgram(s) HFA Inhaler 2 Puff(s) Inhalation every 6 hours  budesonide 160 MICROgram(s)/formoterol 4.5 MICROgram(s) Inhaler 2 Puff(s) Inhalation two times a day  clindamycin   Capsule 300 milliGRAM(s) Oral every 6 hours  diVALproex ER 1500 milliGRAM(s) Oral at bedtime  enoxaparin Injectable 40 milliGRAM(s) SubCutaneous every 24 hours  furosemide    Tablet 40 milliGRAM(s) Oral daily  levothyroxine 25 MICROGram(s) Oral daily  polyethylene glycol 3350 17 Gram(s) Oral daily  senna 2 Tablet(s) Oral at bedtime  tamsulosin 0.4 milliGRAM(s) Oral at bedtime  tiotropium 18 MICROgram(s) Capsule 1 Capsule(s) Inhalation daily    MEDICATIONS  (PRN):  acetaminophen   Tablet .. 650 milliGRAM(s) Oral every 6 hours PRN Mild Pain (1 - 3)      Vital Signs Last 24 Hrs  T(C): 36.8 (07 May 2021 08:48), Max: 36.8 (06 May 2021 16:06)  T(F): 98.3 (07 May 2021 08:48), Max: 98.3 (06 May 2021 20:06)  HR: 79 (07 May 2021 08:48) (69 - 79)  BP: 115/74 (07 May 2021 08:48) (95/63 - 115/74)  BP(mean): --  RR: 18 (07 May 2021 08:48) (18 - 20)  SpO2: 93% (07 May 2021 08:48) (86% - 97%)  CAPILLARY BLOOD GLUCOSE        I&O's Summary    06 May 2021 07:01  -  07 May 2021 07:00  --------------------------------------------------------  IN: 480 mL / OUT: 2275 mL / NET: -1795 mL        PHYSICAL EXAM:  GENERAL: NAD, well-developed  HEAD:  Atraumatic, Normocephalic  EYES: EOMI, PERRLA, conjunctiva and sclera clear  NECK: Supple, No JVD  CHEST/LUNG: Clear to auscultation bilaterally; No wheeze  HEART: Regular rate and rhythm; No murmurs, rubs, or gallops  ABDOMEN: Soft, Nontender, Nondistended; Bowel sounds present  EXTREMITIES:  2+ Peripheral Pulses, No clubbing, cyanosis, or edema  PSYCH: AAOx3  NEUROLOGY: non-focal  SKIN: No rashes or lesions    LABS:                        13.8   6.47  )-----------( 78       ( 06 May 2021 09:42 )             41.3                     RADIOLOGY & ADDITIONAL TESTS:    Imaging Personally Reviewed:    Consultant(s) Notes Reviewed:      Care Discussed with Consultants/Other Providers:   Patient is a 65y old  Male who presents with a chief complaint of SAH after a fall (05 May 2021 12:00)      SUBJECTIVE / OVERNIGHT EVENTS:  Pt seen and examined. Noted to desaturate to 86% overnight on 1L; uptitrated to 2L ; now sating 93%. Pt c/o productive cough , denies SOB, CP, fever/chills.  Also c/o left shoulder pain ; f/up left shoulder XR.      MEDICATIONS  (STANDING):  ALBUTerol    90 MICROgram(s) HFA Inhaler 2 Puff(s) Inhalation every 6 hours  budesonide 160 MICROgram(s)/formoterol 4.5 MICROgram(s) Inhaler 2 Puff(s) Inhalation two times a day  clindamycin   Capsule 300 milliGRAM(s) Oral every 6 hours  diVALproex ER 1500 milliGRAM(s) Oral at bedtime  enoxaparin Injectable 40 milliGRAM(s) SubCutaneous every 24 hours  furosemide    Tablet 40 milliGRAM(s) Oral daily  levothyroxine 25 MICROGram(s) Oral daily  polyethylene glycol 3350 17 Gram(s) Oral daily  senna 2 Tablet(s) Oral at bedtime  tamsulosin 0.4 milliGRAM(s) Oral at bedtime  tiotropium 18 MICROgram(s) Capsule 1 Capsule(s) Inhalation daily    MEDICATIONS  (PRN):  acetaminophen   Tablet .. 650 milliGRAM(s) Oral every 6 hours PRN Mild Pain (1 - 3)      Vital Signs Last 24 Hrs  T(C): 36.8 (07 May 2021 08:48), Max: 36.8 (06 May 2021 16:06)  T(F): 98.3 (07 May 2021 08:48), Max: 98.3 (06 May 2021 20:06)  HR: 79 (07 May 2021 08:48) (69 - 79)  BP: 115/74 (07 May 2021 08:48) (95/63 - 115/74)  BP(mean): --  RR: 18 (07 May 2021 08:48) (18 - 20)  SpO2: 93% (07 May 2021 08:48) (86% - 97%)  CAPILLARY BLOOD GLUCOSE        I&O's Summary    06 May 2021 07:01  -  07 May 2021 07:00  --------------------------------------------------------  IN: 480 mL / OUT: 2275 mL / NET: -1795 mL        PHYSICAL EXAM:  GENERAL: NAD, anicteric , up in chair,  afebrile   HEAD:  +scalp laceration w/staples  ENMT: Moist mucous membranes  NECK: supple, no JVD  RESPIRATORY: Clear to auscultation bilaterally; +transmitted sounds b/l,  left lower lobe crackles  CARDIOVASCULAR: Regular rate and rhythm; No murmurs, rubs, or gallops  GASTROINTESTINAL: Soft, Nontender, Nondistended; Bowel sounds present  EXTREMITIES:  +arthritic changes in right hand ; no LE  edema  NERVOUS SYSTEM:  AAOX 3, nonfocal ; No gross sensory deficits  SKIN: No rashes or lesions    LABS:                        13.8   6.47  )-----------( 78       ( 06 May 2021 09:42 )             41.3                     RADIOLOGY & ADDITIONAL TESTS:    Imaging Personally Reviewed:  CXR 5/7/21  No fractures, dislocations, or AC separation.    Limited evaluation of joint spacing due to suboptimal positioning.    Maintained subacromial and coracoclavicular spaces.    Generalized osteopenia otherwise no discrete lytic or blastic lesions.    No periarticular soft tissue calcifications.    Nonspecific ill-defined increased markings and opacities in visualized mid and upper left lung.

## 2021-05-07 NOTE — SWALLOW BEDSIDE ASSESSMENT ADULT - SWALLOW EVAL: DIAGNOSIS
Pt seen for bedside swallow evaluation s/p admission post fall with SAH. Pt has history of dysphagia and silent aspiration resulting in aspiration pneumonia. Speech & swallow now consulted due to concerns for aspiration pneumonia. Given history of silent aspiration, No PO trials were provided at bedside. Pt would benefit from objective assessment to r/o aspiration given known history.

## 2021-05-07 NOTE — SWALLOW BEDSIDE ASSESSMENT ADULT - SLP PERTINENT HISTORY OF CURRENT PROBLEM
Patient is a 65 year old male with history of bilateral subdural hematomas, seizure disorder, chronic obstructive pulmonary disease, hypothyroidism, and asymptomatic thrombocytopenia. Since his last visit the patient has been hospitalized with a severe viral infection and underwent cardiac catheterization which proved to be negative. Pt presents with a chief complaint of fall. Patient fell backwards while sitting on his chair. He struck his head and had loss of consciousness. He was brought to [facility] and found to have SAH and transferred to [facility]. Complained of neck and lower back pain. Denies pain in his extremities and chest.

## 2021-05-07 NOTE — SWALLOW BEDSIDE ASSESSMENT ADULT - SLP GENERAL OBSERVATIONS
Pt encountered awake and alert, upright OOB to chair on 2l/min via NC. A&Ox4. Able to follow directives. Vocal quality hoarse. Denied changes in vocal quality. Denied current dysphagia; but stated that S&S was consulted because he was coughing with water. +Volitional swallow. Congested cued cough.

## 2021-05-07 NOTE — SWALLOW BEDSIDE ASSESSMENT ADULT - SWALLOW EVAL: PATIENT/FAMILY GOALS STATEMENT
Per Pt, he was diagnosed with aspiration pneumonia in 2018/2019. He received speech therapy at a rehab center in [place]. He states that he was on a soft diet and eventually upgraded to regular. When asked history of thickened liquids, he stated that they were recommended but he never drank them. Unclear consistency of thickened liquids that was recommended and Pt does not recall any follow up objective testing post known MBS at [facility] in 2019. States he eats regular food/thin liquids at home. Pt amendable to having repeat objective to assess swallow function.

## 2021-05-07 NOTE — SWALLOW BEDSIDE ASSESSMENT ADULT - COMMENTS
Hospital Course:   On admission, CT of the chest suggested a possible pneumonia; however, he was asymptomatic. Had slightly elevated WBC and lactate which may represent dehydration in the setting of being down after his fall for a while. UA negative. No abx for now since no symptoms of infection. Nsx: CTH R frontal tSAH, neg Ct cspine. No neurosurgical intervention. Short follow up CT Head (4 hours) showed no change.  Heme/onc consulted for Thrombocytopenia: Pt has been diagnosed with aspiration pneumonia and has been recommended procedures to use when he eats. He is not totally adherent to the diet consistency recommended. Spent 3 weeks at a rehab center and is now doing physical therapy. His thrombocytopenia is essentially stable.   5/4: S/p platelet transfusion.   5/6: Does report some cough, minimal productive yellow sputum; afebrile; no leukocytosis; no dyspnea/tachypnea. C-collar in place likely hindering some oropharyngeal function. Nsx cleared to remove C-collar. Clindamycin for Pna (anerobic coverage for possible aspiration).    Dysphagia History: 5/6/2019: Pt had MBS which revealed “Patient presents with rand-pharyngo-cervical esophageal dysphagia with impaired oral management, delay in trigger of the swallow reflex, post swallow oral and pharyngeal > cervical esophageal residue, and impaired airway protection with silent laryngeal penetration/aspiration of the most conservative p.o. textures.” NPO, with non-oral nutrition/hydration and medication was recommended.     SEE ADDENDUM FOR IMAGING.. Hospital Course:   On admission, CT of the chest suggested a possible pneumonia; however, he was asymptomatic. Had slightly elevated WBC and lactate which may represent dehydration in the setting of being down after his fall for a while. UA negative. No abx for now since no symptoms of infection. Nsx: CTH R frontal tSAH, neg Ct cspine. No neurosurgical intervention. Short follow up CT Head (4 hours) showed no change.  Heme/onc consulted for Thrombocytopenia: Pt has been diagnosed with aspiration pneumonia and has been recommended procedures to use when he eats. He is not totally adherent to the diet consistency recommended. Spent 3 weeks at a rehab center and is now doing physical therapy. His thrombocytopenia is essentially stable.   5/4: S/p platelet transfusion.   5/6: Does report some cough, minimal productive yellow sputum; afebrile; no leukocytosis; no dyspnea/tachypnea. C-collar in place likely hindering some oropharyngeal function. Nsx cleared to remove C-collar. Clindamycin for Pna (anerobic coverage for possible aspiration).  5/7: Noted to desaturate to 86% overnight on 1L; uptitrated to 2L ; now sating 93%. Pt c/o productive cough. Coughing while drinking thin liquids. Concern for aspiration pneumonia.     Dysphagia History: 5/6/2019: Pt had MBS which revealed “Patient presents with rand-pharyngo-cervical esophageal dysphagia with impaired oral management, delay in trigger of the swallow reflex, post swallow oral and pharyngeal > cervical esophageal residue, and impaired airway protection with silent laryngeal penetration/aspiration of the most conservative p.o. textures.” NPO, with non-oral nutrition/hydration and medication was recommended.     SEE ADDENDUM FOR IMAGING..

## 2021-05-08 NOTE — SWALLOW VFSS/MBS ASSESSMENT ADULT - MODE OF PRESENTATION
fed by clinician cup/spoon/self fed/fed by clinician spoon/fed by clinician cup/spoon/straw/self fed/fed by clinician cup/straw/self fed

## 2021-05-08 NOTE — SWALLOW VFSS/MBS ASSESSMENT ADULT - ORAL PHASE
+ Tongue pumping; trace to mild lingual residue post swallow/Delayed oral transit time/Reduced anterior - posterior transport/Incomplete tongue to palate contact + Tongue pumping; up to max spillover to the valleculae pre swallow, up to mo; trace to mild lingual residue post swallow/Delayed oral transit time/Reduced anterior - posterior transport/Incomplete tongue to palate contact

## 2021-05-08 NOTE — SWALLOW VFSS/MBS ASSESSMENT ADULT - UNSUCCESSFUL STRATEGIES TRIALED DURING PROCEDURE
use of chin tuck + small single sips via straw reduces degree of laryngeal penetration/aspiration, but does not eliminate risk

## 2021-05-08 NOTE — PROVIDER CONTACT NOTE (OTHER) - ASSESSMENT
pt. order is NPO except meds. HOB 50 degrees. pt. has extreme difficulty swallowing pills with glass of water. pt coughs frequently. RN concerned for aspiration.

## 2021-05-08 NOTE — SWALLOW VFSS/MBS ASSESSMENT ADULT - DIAGNOSTIC IMPRESSIONS
64 yo male with h/o prior b/l SDHs, COPD, and dysphagia, currently admitted s/p fall, with new SAH, with concern for aspiration PNA. Pt with known h/o non-compliance with SLP recommendations for dysphagia. Today, Pt presents with evidence of an oropharyngeal dysphagia notable for impaired oral management with prolonged bolus manipulation with uncontrolled A-P spillover of liquids, trace to mild oral residue, trace to mild pharyngeal residue, laryngeal penetration of thick puree that appears retrieved, laryngeal penetration of nectar thick liquids and soft solids during swallow that does not fully clear, and significant aspiration of thin liquids via larger consecutive cup sips with delayed cough post intake. Small single cup sips and use of controlled bolus size via teaspoon delivery were not consistently effective for preventing airway invasion with nectar-thick liquids, Use of chin tuck posture + small single sips via straw was effective for improving airway protection for nectar-thick liquids. Use of same strategy combination was not adequately effective for improving airway protection with thin liquids, but it did reduce the degree of laryngeal penetration/aspiration.    Disorders: reduced lingual strength/ROM/Rate of motion, reduced tongue to palate contact, mildly reduced BOT to posterior pharyngeal wall contact, reduced hyo-laryngeal elevation/excursion, reduced laryngeal closure, reduced pharyngeal contraction and stripping, reduced supraglottic sensation, reduced subglottic sensation.

## 2021-05-08 NOTE — SWALLOW VFSS/MBS ASSESSMENT ADULT - NS SWALLOW VFSS REC ASPIR MON
Monitor for s/s aspiration/laryngeal penetration. If noted:  D/C p.o. intake, provide non-oral nutrition/hydration/meds, and contact this service @ [phone]/change of breathing pattern/cough/gurgly voice/fever/pneumonia/throat clearing/upper respiratory infection

## 2021-05-08 NOTE — PROGRESS NOTE ADULT - SUBJECTIVE AND OBJECTIVE BOX
Andre Reyes, M.D.  Pager: [phone]  Office: [phone]    Patient is a 65y old  Male who presents with a chief complaint of SAH after a fall (05 May 2021 12:00)        SUBJECTIVE / OVERNIGHT EVENTS:    No acute overnight events.  still coughing, non-productive     ROS: ( - ) Fever, ( - )Chills,  ( - )Nausea/Vomiting, ( - ) Cough, ( - )Shortness of breath, ( - )Chest Pain    MEDICATIONS  (STANDING):  albuterol/ipratropium for Nebulization 3 milliLiter(s) Nebulizer every 6 hours  budesonide 160 MICROgram(s)/formoterol 4.5 MICROgram(s) Inhaler 2 Puff(s) Inhalation two times a day  clindamycin   Capsule 300 milliGRAM(s) Oral every 6 hours  diVALproex ER 1500 milliGRAM(s) Oral at bedtime  enoxaparin Injectable 40 milliGRAM(s) SubCutaneous every 24 hours  furosemide    Tablet 40 milliGRAM(s) Oral daily  levothyroxine 25 MICROGram(s) Oral daily  polyethylene glycol 3350 17 Gram(s) Oral daily  senna 2 Tablet(s) Oral at bedtime  tamsulosin 0.4 milliGRAM(s) Oral at bedtime  tiotropium 18 MICROgram(s) Capsule 1 Capsule(s) Inhalation daily    MEDICATIONS  (PRN):  acetaminophen   Tablet .. 650 milliGRAM(s) Oral every 6 hours PRN Mild Pain (1 - 3)          T(C): 36.8 (05-08 @ 05:00), Max: 37.2 (05-07 @ 16:09)   HR: 69   BP: 107/70   RR: 18   SpO2: 95%    PHYSICAL EXAM:    GENERAL: NAD, anicteric , up in chair,  afebrile   HEAD:  +scalp laceration w/staples  ENMT: Moist mucous membranes  NECK: supple, no JVD  RESPIRATORY: no wheezing; +transmitted sounds b/l,  left lower lobe crackles  CARDIOVASCULAR: Regular rate and rhythm; No murmurs, rubs, or gallops  GASTROINTESTINAL: Soft, Nontender, Nondistended; Bowel sounds present  EXTREMITIES:  +arthritic changes in right hand ; no LE  edema  NERVOUS SYSTEM:  AAOX 3, nonfocal ; No gross sensory deficits  SKIN: No rashes or lesions    LABS:              CAPILLARY BLOOD GLUCOSE          RADIOLOGY & ADDITIONAL TESTS:    Imaging Personally Reviewed:  Consultant(s) Notes Reviewed:    Care Discussed with Consultants/Other Providers:

## 2021-05-08 NOTE — PROGRESS NOTE ADULT - PROBLEM SELECTOR PLAN 1
- imaging showing focal consolidation left lower lobe consistent with pneumonia  - concern for aspiration PNA  - c/w Clindamycin 300mg q 6  x 5 days ( anerobic coverage for possible aspiration ; penicillin allergic)  - Speech/Swallow evaluation --> MBS completed today, results pending.  - I had an extensive discussion with the pt regarding concern for aspiration. We discussed the 3 possible outcomes which would be dysphagia diet-->  he refused this option, we discussed possible PEG placement if there was aspiration on the MBS--> he refused this option, the patient states that he loves to eat and he wanted to continue to eat regular food and thin liquids with the understanding that he is at risk for aspiration. He has clear understanding of Aspiration risks and he has decision making capacity. Therefore, I would recommend that patient continue with a regard diet as per his wishes.  - Aspiration precautions - imaging showing focal consolidation left lower lobe consistent with pneumonia  - concern for aspiration PNA  - c/w Clindamycin 300mg q 6  x 5 days ( anerobic coverage for possible aspiration ; penicillin allergic)  - Speech/Swallow evaluation --> MBS completed today, results pending.  - I had an extensive discussion with the pt regarding concern for aspiration. We discussed the 3 possible outcomes which would be dysphagia diet-->  he refused this option, we discussed possible PEG placement if there was aspiration on the MBS--> he refused this option, the patient states that he loves to eat and he wanted to continue to eat regular food and thin liquids with the understanding that he is at risk for aspiration. He has clear understanding of Aspiration risks and he has decision making capacity. Therefore, I would recommend that patient continue with a regard diet as per his wishes.  - Aspiration precautions    pt with acute hypoxic respiratory failure due to pneumonia  - pt is currently on 2 L of nasal cannula   - titrate down oxygen as tolerated to maintain a O2 sat of 90%

## 2021-05-08 NOTE — SWALLOW VFSS/MBS ASSESSMENT ADULT - SLP GENERAL OBSERVATIONS
Pt encountered in radiology, secure in ERIC chair. Pt awake and alert, cooperative, following directions for the purposes of the exam.

## 2021-05-08 NOTE — SWALLOW VFSS/MBS ASSESSMENT ADULT - SLP PERTINENT HISTORY OF CURRENT PROBLEM
Patient is a 65 year old male with history of bilateral subdural hematomas, seizure disorder, chronic obstructive pulmonary disease, hypothyroidism, and asymptomatic thrombocytopenia. Since his last visit the patient has been hospitalized with a severe viral infection and underwent cardiac catheterization which proved to be negative. Pt presents with a chief complaint of fall. Patient fell backwards while sitting on his chair. He struck his head and had loss of consciousness. He was brought to [facility] and found to have SAH and transferred to [facility]. Complained of neck and lower back pain. Denies pain in his extremities and chest.

## 2021-05-08 NOTE — SWALLOW VFSS/MBS ASSESSMENT ADULT - LARYNGEAL PENETRATION DURING THE SWALLOW - SILENT
shallow, over laryngeal surface of arytenoids, appears retrieved/Trace deep, over laryngeal surface of the arytenoids, without retrieval/Trace over the arytenoids; without retrieval/Trace/Mild/Moderate over the arytenoids, without retrieval, with soft solids/Trace

## 2021-05-08 NOTE — SWALLOW VFSS/MBS ASSESSMENT ADULT - RECOMMENDED CONSISTENCY
Dysphagia I with nectar thickened liquids VIA CHIN TUCK AND SMALL SINGLE SIPS with straw.  MD/team Please enter the following as notes to RN: 1) Full assist and cueing with meals, 2) Crush meds or provide via alternate source, 3) ALL Liquids via CHIN TUCK and SMALL SINGLE SIPS with straw, 4) Encourage successive swallows for oral and pharyngeal clearance, 5) Aspiration precautions. Monitor for s/s aspiration/laryngeal penetration. If noted:  D/C p.o. intake, provide non-oral nutrition/hydration/meds     If Pt declines to comply with recommended diet consistencies, would strongly suggest the use of the following strategies, which may reduced degree of laryngeal penetration/aspiration, but will not eliminate risks: CHIN TUCK with SMALL SINGLE SIPS via straw for liquids, small single bites and sips with all PO intake, periodic cued coughs/throat clears.

## 2021-05-08 NOTE — SWALLOW VFSS/MBS ASSESSMENT ADULT - ROSENBEK'S PENETRATION ASPIRATION SCALE
(1) no aspiration, contrast does not enter airway (3) contrast remains above the vocal cords, visible residue remains (penetration) (5) contrast contacts vocal cords, visible residue remains (penetration) 5 with use of strategies (chin tuck + small single sips via straw)/(7) contrast passes glottis, visible subglottic residue remains despite patient’s response (aspiration) (2) contrast enters airway, remains above the vocal cords, no residue remains (penetration)

## 2021-05-08 NOTE — PROGRESS NOTE ADULT - SUBJECTIVE AND OBJECTIVE BOX
GENERAL SURGERY PROGRESS NOTE    SUBJECTIVE: No acute events overnight. No other concerns this AM.    Vital Signs Last 24 Hrs  T(C): 36.8 (08 May 2021 14:14), Max: 36.8 (08 May 2021 05:00)  T(F): 98.3 (08 May 2021 14:14), Max: 98.3 (08 May 2021 05:00)  HR: 77 (08 May 2021 14:14) (69 - 77)  BP: 121/76 (08 May 2021 14:14) (103/70 - 134/75)  BP(mean): --  RR: 18 (08 May 2021 14:14) (18 - 18)  SpO2: 94% (08 May 2021 14:14) (93% - 99%)    I&O's Summary    07 May 2021 07:01  -  08 May 2021 07:00  --------------------------------------------------------  IN: 360 mL / OUT: 900 mL / NET: -540 mL    08 May 2021 07:01  -  08 May 2021 16:38  --------------------------------------------------------  IN: 120 mL / OUT: 0 mL / NET: 120 mL        Physical Exam:  GEN: resting in bed comfortably in NAD  HEENT: c-collar cleared  RESP: no increased WOB  ABD: soft, non-distended, non-tender without rebound tenderness or guarding  EXTR: warm, well-perfused without gross deformities; spontaneous movement in b/l U/L extrem    LABS:                RADIOLOGY & ADDITIONAL STUDIES:

## 2021-05-08 NOTE — CHART NOTE - NSCHARTNOTEFT_GEN_A_CORE
Just like with Dr. Reyes, Mr. Reilly stated that he would not like any alternatives to eating a regular diet despite aspiration risks.    ACS  [id number]

## 2021-05-08 NOTE — SWALLOW VFSS/MBS ASSESSMENT ADULT - COMMENTS
Hospital Course:   On admission, CT of the chest suggested a possible pneumonia; however, he was asymptomatic. Had slightly elevated WBC and lactate which may represent dehydration in the setting of being down after his fall for a while. UA negative. No abx for now since no symptoms of infection. Nsx: CTH R frontal tSAH, neg Ct cspine. No neurosurgical intervention. Short follow up CT Head (4 hours) showed no change.  Heme/onc consulted for Thrombocytopenia: Pt has been diagnosed with aspiration pneumonia and has been recommended procedures to use when he eats. He is not totally adherent to the diet consistency recommended. Spent 3 weeks at a rehab center and is now doing physical therapy. His thrombocytopenia is essentially stable.   5/4: S/p platelet transfusion.   5/6: Does report some cough, minimal productive yellow sputum; afebrile; no leukocytosis; no dyspnea/tachypnea. C-collar in place likely hindering some oropharyngeal function. Nsx cleared to remove C-collar. Clindamycin for Pna (anerobic coverage for possible aspiration).  5/7: Noted to desaturate to 86% overnight on 1L; uptitrated to 2L ; now sating 93%. Pt c/o productive cough. Coughing while drinking thin liquids. Concern for aspiration pneumonia.     Dysphagia History: 5/6/2019: Pt had MBS which revealed “Patient presents with rand-pharyngo-cervical esophageal dysphagia with impaired oral management, delay in trigger of the swallow reflex, post swallow oral and pharyngeal > cervical esophageal residue, and impaired airway protection with silent laryngeal penetration/aspiration of the most conservative p.o. textures.” NPO, with non-oral nutrition/hydration and medication was recommended.     SEE EMR FOR IMAGING.

## 2021-05-09 NOTE — PROGRESS NOTE ADULT - PROBLEM SELECTOR PROBLEM 1
Aspiration pneumonia of left lower lobe, unspecified aspiration pneumonia type
SAH (subarachnoid hemorrhage)
Aspiration pneumonia of left lower lobe, unspecified aspiration pneumonia type
SAH (subarachnoid hemorrhage)
SAH (subarachnoid hemorrhage)

## 2021-05-09 NOTE — PROGRESS NOTE ADULT - PROBLEM SELECTOR PLAN 2
- c/w Depakote ER 1500mg PO qbedtime   - Depakote level 80 ( therapeutic).
- c/w Depakote ER 1500mg PO qbedtime   - Depakote level 80 ( therapeutic).
- 24 hr CT head stable ; pt started on DVT ppx  - MRI cervical spine shows C5-C6 moderate to severe left-sided neural foraminal stenosis  - mgt per primary team.
- 24 hr CT head stable ; pt started on DVT ppx  - MRI cervical spine shows C5-C6 moderate to severe left-sided neural foraminal stenosis  - mgt per primary team.
- c/w Depakote ER 1500mg PO qbedtime   - Depakote level 80 ( therapeutic).

## 2021-05-09 NOTE — PROGRESS NOTE ADULT - ATTENDING COMMENTS
ATTENDING ATTESTATION  I have seen and examined this patient on rounds thismorning with the surgery team. I have reviewed all new labs, imaging and reports. I have participated in formulating the plan for the day, and have read and agree with the history, ROS, exam, assessment and plan as stated above.     Day 2/5 Clinda for treatment of community acquired pna which became symptomatic yesterday.   Was noted today to have coughing while drinking liquids. Will have swallow evaluated by Speech Therapy to make sure he is not aspirating.   Complaining of left shoulder pain without echymosis or deformity. Will obtain left shoulder xray.     Total time spent in the care of this patient today (excluding critical care & procedures): 26 min                 Over 50% of the total time was spent in discussion and coordination of care with consulting services, dietary and rehab services.     Anastasia Esqueda M.D., M.S.  Dept of Trauma, Acute and Critical Care
ATTENDING ATTESTATION  I have seen and examined this patient on rounds thismorning with the surgery team. I have reviewed all new labs, imaging and reports. I have participated in formulating the plan for the day, and have read and agree with the history, ROS, exam, assessment and plan as stated above.     Feeling improved today, but still just generally weak.   Developed a cough since late yesterday and had some yellowish sputum. Still no fever or leukocytosis, but in the setting of consolidation seen on admission CT chest and CXR that shows left lower lobe consolidation vs. atelectasis, will treat for community acquired pneumonia. Cipro x 5days. I have discussed the importance of using his incentive spirometer, and taking it with him to use upon discharge.   Medically ready for discharge to rehab. Will have follow up with PMD.     Total time spent in the care of this patient today (excluding critical care & procedures): 25 min                 Over 50% of the total time was spent in discussion and coordination of care with consulting services, dietary and rehab services.     Anastasia Esqueda M.D., M.S.  Dept of Trauma, Acute and Critical Care
ATTENDING ATTESTATION  I have seen and examined this patient on rounds thismorning with the surgery team. I have reviewed all new labs, imaging and reports. I have participated in formulating the plan for the day, and have read and agree with the history, ROS, exam, assessment and plan as stated above.     Medically ready for discharge to rehab. Has had no transport yesterday, awaiting transport today.   No further labs.   Failed swallow test this week. Has a long history of swallow difficulty and workup. Patient would not want a feeding tube if indicated, and chooses to accept aspiration risk of eating. Did not pursue MBS in this setting.   Clinda through 5/11 for total 5 days for empiric treatment of community acquired pneumonia, possibly due to aspiration.     Total time spent in the care of this patient today (excluding critical care & procedures): 25 min                 Over 50% of the total time was spent in discussion and coordination of care with consulting services, dietary and rehab services.     Anastasia Esqueda M.D., M.S.  Dept of Trauma, Acute and Critical Care
ATTENDING ATTESTATION  I have seen and examined this patient on rounds thismorning with the surgery team. I have reviewed all new labs, imaging and reports. I have participated in formulating the plan for the day, and have read and agree with the history, ROS, exam, assessment and plan as stated above.     S/P mechanical fall while trying to get up from a chair yesterday with resultant SAH. Short follow up HCT (4 hours) showed no change, will get a follow up CT today. If stable, will start dvt ppx in 24 hours from today's scan. Has continued neck pain in the setting of a non-traumatic cspine CT scan. Will get MRI of the neck today. He has had normalization of his WBC and lactate, likely elevated due to some dehydration from being down after his fall. He has no cough, fever or symptoms of pneumonia, which was suggested by a LLL consolidation on chest CT yesterday. Not currently being treated for PNA. In the absence of any infectious symptoms, we will consider this an incidental finding with the possibility of being a neoplasm, and report it to the patient, his PMD, and recommend a follow up chest CT in one month.     Total time spent in the care of this patient today (excluding critical care & procedures): 25 min                 Over 50% of the total time was spent in discussion and coordination of care with consulting services, dietary and rehab services.     Anastasia Esqueda M.D., M.S.  Dept of Trauma, Acute and Critical Care
ATTENDING ATTESTATION  I have seen and examined this patient on rounds thismorning with the surgery team. I have reviewed all new labs, imaging and reports. I have participated in formulating the plan for the day, and have read and agree with the history, ROS, exam, assessment and plan as stated above.     HCT with SAH stable. DVT ppx to start today. Transfused 1unit of platelets for (known and followed by outpatient hematologist) thrombocytopenia to 40, with adequate response to 66. Will transfuse 1 more unit for goal plt >80 per heme/onc.   Pending MRI of cpsine.   WBC still remains normal, no symptoms of pna, no treatment for CT chest finding, plan for follow up CT as outpatient.     Total time spent in the care of this patient today (excluding critical care & procedures): 25 min                Over 50% of the total time was spent in discussion and coordination of care with consulting services, dietary and rehab services.     Anastasia Esqueda M.D., M.S.  Dept of Trauma, Acute and Critical Care

## 2021-05-09 NOTE — PROGRESS NOTE ADULT - SUBJECTIVE AND OBJECTIVE BOX
GENERAL SURGERY PROGRESS NOTE    SUBJECTIVE: No acute events overnight. Denies fevers, chills, nausea, vomiting, chest pain, shortness of breath. No other concerns this AM    Vital Signs Last 24 Hrs  T(C): 37.1 (08 May 2021 23:28), Max: 37.2 (08 May 2021 20:03)  T(F): 98.8 (08 May 2021 23:28), Max: 99 (08 May 2021 20:03)  HR: 80 (08 May 2021 23:28) (69 - 85)  BP: 111/67 (08 May 2021 23:28) (103/70 - 121/76)  BP(mean): --  RR: 18 (08 May 2021 23:28) (18 - 18)  SpO2: 95% (08 May 2021 23:28) (93% - 95%)    I&O's Summary    07 May 2021 07:01  -  08 May 2021 07:00  --------------------------------------------------------  IN: 360 mL / OUT: 900 mL / NET: -540 mL    08 May 2021 07:01  -  09 May 2021 03:39  --------------------------------------------------------  IN: 520 mL / OUT: 250 mL / NET: 270 mL        Physical Exam:  GEN: resting in bed comfortably in NAD  HEENT: c-collar cleared  RESP: no increased WOB  ABD: soft, non-distended, non-tender without rebound tenderness or guarding  EXTR: warm, well-perfused without gross deformities; spontaneous movement in b/l U/L extrem    LABS:                RADIOLOGY & ADDITIONAL STUDIES:

## 2021-05-09 NOTE — PROGRESS NOTE ADULT - PROBLEM SELECTOR PLAN 10
Called pt's Pharmacy [facility] [phone] for medication reconciliation  Outpatient Medication review done in [place]    Case discussed with Trauma team
Called pt's Pharmacy [facility] [phone] for medication reconciliation  Outpatient Medication review done in [place]    Case discussed with Trauma team
Called pt's Pharmacy [facility] [phone] for medication reconciliation  Outpatient Medication review done in [place]    Case discussed with Trauma team
Called pt's Pharmacy [facility] [phone] for medication reconciliation  Outpatient Medication review done in [place]    Case discussed with Trauma team Resident pager [phone]
Called pt's Pharmacy [facility] [phone] for medication reconciliation  Outpatient Medication review done in [place]

## 2021-05-09 NOTE — PROGRESS NOTE ADULT - PROBLEM SELECTOR PLAN 6
c/w Synthroid 25mcg po daily.

## 2021-05-09 NOTE — PROGRESS NOTE ADULT - PROBLEM SELECTOR PLAN 4
- imaging showing focal consolidation left lower lobe consistent with pneumonia  - pt now c/o productive cough  - denies sob/cp/fever/chills ;  labs negative for leukocytosis  - now with bilateral crackles, transmitted sounds  on auscultation ; desated 86% on 1L , 93% on 2L NC  - pt coughing while drinking thin liquids  - concern for aspiration PNA  - c/w Clindamycin 300mg q 6  x 5 days ( anerobic coverage for possible aspiration ; penicillin allergic)  - Speech/Swallow evaluation  - Aspiration precautions
pt with chronic thrombocytopenia ; follows w/Hematology Dr Freed  - seen by Hematology ; recc'd 1u plt transfusion which pt received 5/4  - Plt count 78 on 5/6 ; c/t monitor
- pt states that he fell after getting out of bed to adjust his thermostat ; states that he had been out drinking that night w/his sister which he says is rare for him  - reports similar fall about 6 months ago ; has a Life Alert necklace  - Of note he had cardiac cath a year ago May 3, 2019 which showed normal coronary circulation  - doubtful that this is syncope or seizure ; mechanical fall more likely  - PT reccs DARYA ; discharge planning in progress  - fall precautions
pt with chronic thrombocytopenia ; follows w/Hematology Dr Freed  - seen by Hematology ; recc'd 1u plt transfusion which pt received 5/4  - Plt count 78 on 5/6 ; c/t monitor
- pt states that he fell after getting out of bed to adjust his thermostat ; states that he had been out drinking that night w/his sister which he says is rare for him  - reports similar fall about 6 months ago ; has a Life Alert necklace  - Of note he had cardiac cath a year ago May 3, 2019 which showed normal coronary circulation  - doubtful that this is syncope or seizure ; mechanical fall more likely  - PT reccs DARYA  - fall precautions

## 2021-05-09 NOTE — PROGRESS NOTE ADULT - PROBLEM SELECTOR PLAN 1
- imaging showing focal consolidation left lower lobe consistent with pneumonia  - concern for aspiration PNA  - c/w Clindamycin 300mg q 6  x 5 days ( anerobic coverage for possible aspiration ; penicillin allergic)  - MBS yesterday did show aspiration, the patient was recommended for Dysphagia 1 Nectar thickened diet but the patient is refusing to take a dysphagia diet. I had an extensive discussion with the pt regarding concern for aspiration he said that he loves to eat and he wanted to continue to eating a regular diet with regular liquids, he understand that he is at risk for aspiration. Therefore, I would recommend that patient continue with a regard diet as per his wishes.   - Per swallow therapist he should use of the following strategies, which may reduced degree of laryngeal penetration/aspiration, but will not eliminate risks: CHIN TUCK with SMALL SINGLE SIPS via straw for liquids, small single bites and sips with all PO intake, periodic cued coughs/throat clears.    pt with acute hypoxic respiratory failure due to pneumonia  - pt is currently on 2 L of nasal cannula   - titrate down oxygen as tolerated to maintain a O2 sat of 90%  - Pneumonia did appear better on CTangio

## 2021-05-09 NOTE — PROGRESS NOTE ADULT - PROBLEM SELECTOR PROBLEM 8
BPH without urinary obstruction

## 2021-05-09 NOTE — PROGRESS NOTE ADULT - PROBLEM SELECTOR PLAN 9
on Lovenox SC for DVT ppx
on Lovenox SC for DVT ppx  start Miralax for a bowel regimen
on Lovenox SC for DVT ppx  start Miralax for a bowel regimen

## 2021-05-09 NOTE — PROGRESS NOTE ADULT - PROBLEM SELECTOR PLAN 3
- c/w Depakote ER 1500mg PO qbedtime   - Depakote level 80 ( therapeutic).
- c/w Depakote ER 1500mg PO qbedtime   - Depakote level 80 ( therapeutic).
pt with chronic thrombocytopenia ; follows w/Hematology Dr Freed  - seen by Hematology ; recc'd 1u plt transfusion which pt received 5/4  - Plt count 78 on 5/6 ; c/t monitor
pt with chronic thrombocytopenia ; follows w/Hematology Dr Freed  - seen by Hematology ; recc'd 1u plt transfusion which pt received 5/4  - Plt count 78 today ; c/t monitor
pt reports chronic thrombocytopenia ; follows w/Hematology Dr Freed  - seen by Hematology ; recc'd 1u plt transfusion which pt received 5/4  - Plt count 66 today ; c/t monitor

## 2021-05-09 NOTE — PROGRESS NOTE ADULT - SUBJECTIVE AND OBJECTIVE BOX
Andre Reyes, M.D.  Pager: [phone]  Office: [phone]    Patient is a 65y old  Male who presents with a chief complaint of Fall, Sub Arachnoid Hemorrhage (08 May 2021 10:16)          SUBJECTIVE / OVERNIGHT EVENTS:    No acute overnight events.  No complaints    ROS: ( - ) Fever, ( - )Chills,  ( - )Nausea/Vomiting, ( - ) Cough, ( - )Shortness of breath, ( - )Chest Pain    MEDICATIONS  (STANDING):  albuterol/ipratropium for Nebulization 3 milliLiter(s) Nebulizer every 6 hours  budesonide 160 MICROgram(s)/formoterol 4.5 MICROgram(s) Inhaler 2 Puff(s) Inhalation two times a day  clindamycin   Capsule 300 milliGRAM(s) Oral every 6 hours  diVALproex ER 1500 milliGRAM(s) Oral at bedtime  enoxaparin Injectable 40 milliGRAM(s) SubCutaneous every 24 hours  furosemide    Tablet 40 milliGRAM(s) Oral daily  levothyroxine 25 MICROGram(s) Oral daily  polyethylene glycol 3350 17 Gram(s) Oral daily  senna 2 Tablet(s) Oral at bedtime  tamsulosin 0.4 milliGRAM(s) Oral at bedtime  tiotropium 18 MICROgram(s) Capsule 1 Capsule(s) Inhalation daily    MEDICATIONS  (PRN):  acetaminophen   Tablet .. 650 milliGRAM(s) Oral every 6 hours PRN Mild Pain (1 - 3)  bisacodyl Suppository 10 milliGRAM(s) Rectal daily PRN Constipation          T(C): 36.3 (05-09 @ 13:26), Max: 37.2 (05-08 @ 20:03)   HR: 82   BP: 102/71   RR: 17   SpO2: 94%    PHYSICAL EXAM:    GENERAL: NAD, anicteric , up in chair,  afebrile   HEAD:  +scalp laceration w/staples  ENMT: Moist mucous membranes  NECK: supple, no JVD  RESPIRATORY: no wheezing; +transmitted sounds b/l,  left lower lobe crackles  CARDIOVASCULAR: Regular rate and rhythm; No murmurs, rubs, or gallops  GASTROINTESTINAL: Soft, Nontender, Nondistended; Bowel sounds present  EXTREMITIES:  +arthritic changes in right hand ; no LE  edema  NERVOUS SYSTEM:  AAOx3, nonfocal ; No gross sensory deficits  SKIN: No rashes or lesions      LABS:              CAPILLARY BLOOD GLUCOSE          RADIOLOGY & ADDITIONAL TESTS:    Imaging Personally Reviewed: < from: CT Angio Chest w/ IV Cont (05.09.21 @ 14:57) >  FINDINGS:    LUNGS AND AIRWAYS: Tracheal diverticulum.  Stable bilateral reticular opacities with architectural distortion, bronchiectasis and honeycombing. Interval decrease in size of the previous noted left lower lobe opacity.    PLEURA: No pleural effusion.  MEDIASTINUM AND JOANNE: No lymphadenopathy.  VESSELS: There is no pulmonary embolus      HEART: Heart size is normal. No pericardial effusion.  CHEST WALL AND LOWER NECK: Within normal limits.  VISUALIZED UPPER ABDOMEN: Small hiatal hernia. Barium contrast is noted within the colon. Cholecystectomy.  BONES: Within normal limits.    IMPRESSION:  No pulmonary embolus.    Improving left lower lobe pneumonia when compared with prior study. Follow-up recommended to ensure complete resolution.    < end of copied text >    Consultant(s) Notes Reviewed:    Care Discussed with Consultants/Other Providers:

## 2021-05-09 NOTE — PROGRESS NOTE ADULT - PROBLEM SELECTOR PROBLEM 2
SAH (subarachnoid hemorrhage)
SAH (subarachnoid hemorrhage)
Seizure disorder

## 2021-05-09 NOTE — PROGRESS NOTE ADULT - PROBLEM SELECTOR PLAN 7
d/c Combivent inhaler   c/w Duonebs q 6 prn
c/w Combivent inhaler 1 puff q 6.
d/c Combivent inhaler   c/w Duonebs q 6 prn
d/c Combivent inhaler   would start Duonebs q 6 prn
c/w Combivent inhaler 1 puff q 6.

## 2021-05-09 NOTE — PROGRESS NOTE ADULT - PROBLEM SELECTOR PLAN 5
- chest CT showed  patent central airways.  Severe bilateral interstitial fibrotic and bronchiectatic changes similar to prior   - also showed focal consolidation left lower lobe consistent with pneumonia  - pt denies cough/sob/co/fever/chills ;  labs negative for leukocytosis  - would monitor off abx for now  - would  image to resolution to exclude underlying occult neoplasm ; pt notified of incidental finding by primary team
- pt states that he fell after getting out of bed to adjust his thermostat ; states that he had been out drinking that night w/his sister which he says is rare for him  - reports similar fall about 6 months ago ; has a Life Alert necklace  - Of note he had cardiac cath a year ago May 3, 2019 which showed normal coronary circulation  - doubtful that this is syncope or seizure ; mechanical fall more likely  - PT reccs DARYA ; discharge planning in progress  - fall precautions
- showed focal consolidation left lower lobe consistent with pneumonia  - pt now c/o productive cough  - denies sob/cp/fever/chills ;  labs negative for leukocytosis  - now with bilateral crackles on auscultation ; sating 90% on room air , 93% on 1L NC  - would treat for PNA with Clindamycin 300mg q 6  x 5 days ( anerobic coverage for possible aspiration ; penicillin allergic)

## 2021-05-09 NOTE — PROGRESS NOTE ADULT - PROBLEM SELECTOR PROBLEM 4
Fall, sequela
Thrombocytopenia
Thrombocytopenia
Aspiration pneumonia of left lower lobe, unspecified aspiration pneumonia type
Fall, sequela

## 2021-05-09 NOTE — PROGRESS NOTE ADULT - PROBLEM SELECTOR PLAN 8
c/w Flomax 0.4mg po qbedtime.

## 2021-05-10 NOTE — DISCHARGE NOTE NURSING/CASE MANAGEMENT/SOCIAL WORK - PATIENT PORTAL LINK FT
You can access the FollowMyHealth Patient Portal offered by [facility] by registering at the following website: [email or web address]. By joining [facility]’s FollowMyHealth portal, you will also be able to view your health information using other applications (apps) compatible with our system.

## 2021-05-10 NOTE — PROGRESS NOTE ADULT - PROVIDER SPECIALTY LIST ADULT
Hospitalist
Trauma Surgery
Trauma Surgery
Hospitalist
Trauma Surgery
Heme/Onc
Hospitalist

## 2021-05-10 NOTE — PROGRESS NOTE ADULT - ASSESSMENT
65M with history of Seizure disorder, Hypothyroidism,  Thrombocytopenia, BPH who presents with a chief complaint of fall. Patient fell backwards while sitting on his chair. He struck his head and had loss of consciousness. He was brought to [facility] and found to have SAH and transferred to [facility]
66yo Male with Hx seizures who presents with mechanical fall found to have b/l subarachnoid hemorrhage. CT with LLL consolidation withy questionable PNA, UA negative, CK negative. Patient with thrombocytopenia, now s/p 1u PLT on 5/4. Dispo planning.       Plan:   - Regular diet   - Pain control PRN  - Lovenox for VTE ppx  - C/w c-collar -- f/u MRI neck to clear; cleared by spine surgery -> plan to remove C-collar today -> C - collar cleared  - Continue home meds  - F/U AM labs, trend PLT  - Clindamycin course for empiric coverage of poss PNA  - Head staples to removed as OP basis at follow up visit with neurosurgery  - XR Shoulder benign  - S&S recommended dysphagia I diet nectar thin with chin tuck based off of MBS, however pt prefers regular diet. Aspiration risk discussed with pt  - Pt to be discharged to rehab today      ACS | Trauma Surgery  #[id number]
Patient is a 65y old  Male with history of seizures who presents with mechanical fall found to have b/l subarachnoid hemorrhage. CT with LLL consolidation withy question pna. UA negative. CK negative     PLAN:   - Admit to surgery for resuscitation   - Trend lactate  - Fluid resuscitation  - Regular diet  - Restart SQH after 24 hours stable head CT (tomorrow PM)  - PT consult  - Plan discussed with Attending, Dr. Esqueda    
a 63­year­old with known history of bilateral subdural hematomas, seizure disorder, chronic obstructive pulmonary disease, hypothyroidism, and asymptomatic thrombocytopenia. Since his last visit the patient has been hospitalized with a severe viral infection and underwent cardiac catheterization which proved to be negative. He has been diagnosed with aspiration pneumonia and has been recommended procedures to use when he eats. He is not totally adherent to the diet consistency recommended. Spent 3 weeks at a rehab center and is now doing physical therapy. His thrombocytopenia is essentially stable.    Chronic Thrombocytopenia  --Stable per Hematologist - Dr. Diana Freed at [facility]  --Workup has been done as outpatient  --Normally no intervention  --As platelets <80K with active cerebral hemorrhage S/P trauma, would recommend transfusion of platelets to 80K  --Received platelets 5/4 - would recommend another unit    Consolidations on CT chest  --Patient may have pneumonia  --Hospitalist team aware - planning to reimage  --Recommend ID, pulmonary input    We will continue to follow patient. Thank you for the opportunity to participate in Mr. Reilly's care    After discharge patient may continue to follow with Dr. Diana Freed at [facility]    Bird Comer PA-C  Hematology/Oncology  [facility]   [phone] (cell)  [phone] (office)  [phone] (alt office)  Evenings and weekends please call MD on call or office    
a/p Patient is a 65y old  Male with history of seizures who presents with mechanical fall found to have b/l subarachnoid hemorrhage. CT with LLL consolidation withy question pna. UA negative. CK negative     PLAN:   - Continue regular diet  - Thrombocytopenia: s/p 1u Plt on 5/4, will f/u AM labs  - Pain control  - Encourage IS  - DVT ppx: Lovenox   - C collar   - Continue home meds    ACS  [id number]  
64yo Male with Hx seizures who presents with mechanical fall found to have b/l subarachnoid hemorrhage. CT with LLL consolidation withy questionable PNA, UA negative, CK negative. Patient with thrombocytopenia, now s/p 1u PLT on 5/4. Dispo planning.       Plan:   - Regular diet   - Pain control PRN  - Lovenox for VTE ppx  - C/w c-collar -- f/u MRI neck to clear; cleared by spine surgery -> plan to remove C-collar today -> C - collar cleared  - Continue home meds  - F/U AM labs, trend PLT  - Clindamycin course for empiric coverage of poss PNA  - Head staples to removed as OP basis at follow up visit with neurosurgery  - XR Shoulder benign  - S&S recommended dysphagia I diet nectar thin with chin tuck based off of MBS, however pt prefers regular diet. Aspiration risk discussed with pt      ACS | Trauma Surgery  #[id number]
 65M with history of Seizure disorder, Hypothyroidism,  Thrombocytopenia, BPH who presents with a chief complaint of fall. Patient fell backwards while sitting on his chair. He struck his head and had loss of consciousness. He was brought to [facility] and found to have SAH and transferred to [facility]
64yo Male with Hx seizures who presents with mechanical fall found to have b/l subarachnoid hemorrhage. CT with LLL consolidation withy questionable PNA, UA negative, CK negative. Patient with thrombocytopenia, now s/p 1u PLT on 5/4. Dispo planning.       Plan:   - Regular diet   - Pain control PRN  - Lovenox for VTE ppx  - C/w c-collar -- f/u MRI neck to clear; cleared by spine surgery -> plan to remove C-collar today -> C - collar cleared  - Continue home meds  - F/U AM labs, trend PLT  - Clindamycin course for empiric coverage of poss PNA  - Head staples to removed as OP basis at follow up visit with neurosurgery  - XR Shoulder benign  - S&S recommended dysphagia I diet nectar thin with chin tuck based off of MBS, however pt prefers regular diet. Aspiration risk discussed with pt      ACS | Trauma Surgery  #[id number]
 65M with history of Seizure disorder, Hypothyroidism,  Thrombocytopenia, BPH who presents with a chief complaint of fall. Patient fell backwards while sitting on his chair. He struck his head and had loss of consciousness. He was brought to [facility] and found to have SAH and transferred to [facility]
 65M with history of Seizure disorder, Hypothyroidism,  Thrombocytopenia, BPH who presents with a chief complaint of fall. Patient fell backwards while sitting on his chair. He struck his head and had loss of consciousness. He was brought to [facility] and found to have SAH and transferred to [facility]
65M with history of Seizure disorder, Hypothyroidism,  Thrombocytopenia, BPH who presents with a chief complaint of fall. Patient fell backwards while sitting on his chair. He struck his head and had loss of consciousness. He was brought to [facility] and found to have SAH and transferred to [facility]

## 2021-05-10 NOTE — DISCHARGE NOTE NURSING/CASE MANAGEMENT/SOCIAL WORK - NSDCFUADDAPPT_GEN_ALL_CORE_FT
Please follow up with your PCP in one month.  You should have a repeat CT chest in one month to evaluate the left lower lobe consolidation that was found on CT 5/3.     Please follow up with Dr. Freed (your hematologist) as an outpatient within 1-2 weeks for your history of thrombocytopenia.    **PLEASE FOLLOW UP WITH NEUROSURGERY IN 1 WEEK FOR STAPLE REMOVAL**  If you cannot follow up with your neurosurgeon within 10 days, please make an appointment to follow up with Dr. Esqueda for staple removal.

## 2021-05-10 NOTE — PROGRESS NOTE ADULT - SUBJECTIVE AND OBJECTIVE BOX
GENERAL SURGERY PROGRESS NOTE    SUBJECTIVE: No acute events overnight. No other concerns this AM    Vital Signs Last 24 Hrs  T(C): 37.2 (10 May 2021 09:12), Max: 37.2 (10 May 2021 09:12)  T(F): 98.9 (10 May 2021 09:12), Max: 98.9 (10 May 2021 09:12)  HR: 71 (10 May 2021 09:12) (68 - 72)  BP: 115/85 (10 May 2021 09:12) (103/68 - 115/85)  BP(mean): --  RR: 18 (10 May 2021 09:12) (17 - 18)  SpO2: 96% (10 May 2021 09:12) (94% - 98%)    I&O's Summary    09 May 2021 07:01  -  10 May 2021 07:00  --------------------------------------------------------  IN: 740 mL / OUT: 1200 mL / NET: -460 mL    10 May 2021 07:01  -  10 May 2021 14:22  --------------------------------------------------------  IN: 0 mL / OUT: 320 mL / NET: -320 mL        Physical Exam:  GEN: resting in bed comfortably in NAD  HEENT: c-collar cleared  RESP: no increased WOB  ABD: soft, non-distended, non-tender without rebound tenderness or guarding  EXTR: warm, well-perfused without gross deformities; spontaneous movement in b/l U/L extrem    LABS:                RADIOLOGY & ADDITIONAL STUDIES:

## 2021-05-10 NOTE — PROGRESS NOTE ADULT - NSICDXPILOT_GEN_ALL_CORE
[place]

## 2021-06-21 NOTE — PROVIDER CONTACT NOTE (EICU) - ASSESSMENT
CT chest stigmata of fibrotic process in lungs, Hx bronchiectasi RLL infiltrate, hypercapnia [id number]

## 2021-06-21 NOTE — CONSULT NOTE ADULT - SUBJECTIVE AND OBJECTIVE BOX
Patient is a 65y old  Male who presents with a chief complaint of SOB    HPI: 65M with history of Seizure disorder, Hypothyroidism,  Thrombocytopenia, BPH, recent right SAH from fall May 2021, presents from [facility] for hypoxia and AMS. He was started on meropenem yesterday at [facility] for RLL PNA and SOB. He presents to  ED with fever, SOB, hypoxia and AMS. Found to have hypercapnia, hypoxia, severe sepsis, given 2L IVF, placed on BiPAP and now requiring 100% FiO2. Received meropenem vancomycin in the ER.  WBC 22K with left shift evidence for R sided PNA on CXR. Most likely aspiration as he failed his speech and swallow eval at [facility] in may, however refused to go on dysphagia diet.     Patient seen and examined at the bedside. He is found in ER, on biPAP, SPO2 93% on 1005 FiO2 on BiPAP. He is lethargic, but will arouse to verbal stimuli and follow simple commands, however falls asleep quickly. BP stable at this time. On  on BIPAP, TV 400s, Vte >10L.     PAST MEDICAL & SURGICAL HISTORY:  DM (diabetes mellitus)    Sarcoid    Bronchiectasis    Subdural hematoma    Inguinal hernia    Seizure    Hypothyroid    Sleep apnea    LAMBERT and COPD overlap syndrome    BPH without urinary obstruction    Status post Bright&#x27;s procedure    Bilateral subdural hematomas    Status post cholecystectomy    Status post inguinal hernia repair        Review of Systems:  unable to obtain due to patient's clinical condition       Medications:  meropenem  IVPB 500 milliGRAM(s) IV Intermittent once  acetaminophen  Suppository .. 650 milliGRAM(s) Rectal once      ICU Vital Signs Last 24 Hrs  T(C): 39.1 (2021 02:30), Max: 39.1 (2021 02:30)  T(F): 102.3 (2021 02:30), Max: 102.3 (2021 02:30)  HR: 111 (2021 03:00) (111 - 132)  BP: 102/73 (2021 03:00) (102/73 - 131/82)  RR: 28 (2021 03:00) (28 - 48)  SpO2: 94% (2021 03:00) (90% - 98%)      ABG - ( 2021 02:34 )  pH, Arterial: 7.28  pH, Blood: x     /  pCO2: 89    /  pO2: 136   / HCO3: 33    / Base Excess: 14.0  /  SaO2: 98        I&O's Detail        LABS:                        16.2   25.29 )-----------( 126      ( 2021 02:28 )             49.8     06-21    139  |  98  |  23  ----------------------------<  124<H>  3.6   |  40<H>  |  0.81    Ca    10.5      2021 02:28    TPro  7.5  /  Alb  2.2<L>  /  TBili  0.8  /  DBili  x   /  AST  22  /  ALT  <10<L>  /  AlkPhos  99  06-    CAPILLARY BLOOD GLUCOSE        PT/INR - ( 2021 02:28 )   PT: 15.7 sec;   INR: 1.31 ratio         PTT - ( 2021 02:28 )  PTT:36.0 sec  Urinalysis Basic - ( 2021 03:13 )    Color: Yellow / Appearance: Clear / S.020 / pH: x  Gluc: x / Ketone: Trace  / Bili: Negative / Urobili: 8 mg/dL   Blood: x / Protein: 30 mg/dL / Nitrite: Negative   Leuk Esterase: Trace / RBC: x / WBC x   Sq Epi: x / Non Sq Epi: x / Bacteria: x      CULTURES:      Physical Examination:    General: Elderly, ill appearing, lehargic     HEENT: R pupil > than L, reactive     PULM: Rhonchi bilaterally     CVS: tachycardic rate and regular rhythm, no murmurs, rubs, or gallops    ABD: Soft, nondistended, nontender, normoactive bowel sounds    EXT: mild LE edema noted     SKIN: Warm     NEURO: A+Ox1, follows simple commands, lethargic, responds to verbal stimuli       RADIOLOGY: CXR appears to have multiple right sided infiltrates       CRITICAL CARE TIME SPENT: 73 minutes assessing presenting problems of acute illness, which pose high probability of life threatening deterioration or end organ damage/dysfunction, as well as medical decision making including initiating plan of care, reviewing data, reviewing radiologic exams, discussing with multidisciplinary team,  discussing goals of care with patient/family, and writing this note.  Non-inclusive of procedures performed,

## 2021-06-21 NOTE — PROGRESS NOTE ADULT - ASSESSMENT
65M with seizures on keppra, BPH, hypothyroidism, sarcoidosis GERD, COPD, LAMBERT, recent admission to [facility] from 5/3-5/10/2021 for right subarachnoid hemorrhage s/p fall who presents from [facility] for hypoxia.  Found to have acute hypoxic and hypercapnic respiratory failure 2/2 sepsis 2/2 aspiration PNA.  Likely aspiration PNA since patient has been recommended dysphagia I diet from [facility] but reports state that he has been non-compliant with recommendation.  Also location of RLL and RUL also fits with aspiration.      Problems  Acute hypoxic and hypercapnic respiratory failure  Sepsis 2/2 aspiration PNA  Sarcoidosis  Seizures  COPD    Acute hypoxic and hypercapnic respiratory failure   - admitted to SPCU  - repeat ABG -> monitor CO2   - consider intubation if does not improve though patient is currently less lethargic than when he first arrived to the ED  - Now on high flow oxygen per Pulm.  continue suctioning and chest PT.     Sepsis from aspiration PNA with metabolic encephalopathy - meets sepsis criteria with leukocytosis and tachypnea with source of infection.  Lactate normal.  HCT with no acute bleed.  - continue with meropenem and vancomycin as per ID  - ID consult appreciated  - f/u blood cultures  - keep NPO for now as high risk for intubation    Sarcoidosis/COPD  - started on steroids for history of bronchiectasis as well  - started on symbicort    Seizures  - cont with depakote    Hypothyroidism  - cont with synthroid    General anxiety disorder  - restart effexor when able to take PO.     BPH  - has haq now, will need to restart flomax prior to trying to remove haq    Preventive measures  - can start with lovenox 40mg subq for DVT ppx    Patient critically ill, high risk for deterioration.  Discussed with sister at bedside and she is aware of prognosis.  Patient filled out MOLST with full CPR requested at [facility].  Confirmed with sister that is his wish.   will continue to monitor in SPCU.

## 2021-06-21 NOTE — H&P ADULT - NSHPPHYSICALEXAM_GEN_ALL_CORE
PHYSICAL EXAM:  Vital Signs Last 24 Hrs  T(C): 39.1 (21 Jun 2021 02:30), Max: 39.1 (21 Jun 2021 02:30)  T(F): 102.3 (21 Jun 2021 02:30), Max: 102.3 (21 Jun 2021 02:30)  HR: 107 (21 Jun 2021 03:43) (107 - 132)  BP: 102/73 (21 Jun 2021 03:00) (102/73 - 131/82)  BP(mean): --  RR: 28 (21 Jun 2021 03:00) (28 - 48)  SpO2: 93% (21 Jun 2021 03:43) (90% - 98%)    GENERAL:     older appearing than stated age male on BiPAP, looks comfortable currently  HEAD:     atraumatic  RESPIRATORY:     coarse breath sounds with BiPAP, slight crackles on right  CARDIOVASCULAR:     tachycardic, no murmurs or rubs or gallops, 2+ peripheral pulses  GASTROINTESTINAL:     soft, large abdominal scar with contractures around umbilicus, nontender, no hepatosplenomegaly palpated, bowel sounds present  EXTREMITIES:     no clubbing or cyanosis or edema  MUSCULOSKELETAL:     no joint pain or swelling or deformities  NERVOUS SYSTEM:     grossly intact  SKIN:     no rashes or lesions  PSYCH:     lethargic but arousable

## 2021-06-21 NOTE — CONSULT NOTE ADULT - SUBJECTIVE AND OBJECTIVE BOX
Date/Time Patient Seen:  		  Referring MD:   Data Reviewed	       Patient is a 65y old  Male who presents with a chief complaint of respiratory failure, lethargy (21 Jun 2021 04:48)      Subjective/HPI    h and p reviewed  er provider note reviewed  labs reviewed  imaging reviewed    65M with seizures on keppra, BPH, hypothyroidism, sarcoidosis GERD, COPD, LAMEBRT, recent admission to [facility] from 5/3-5/10/2021 for right subarachnoid hemorrhage s/p fall who presents from [facility] for hypoxia.  Per notes, patient was found to have a RLL PNA at [facility] and was started on IV meropenem yesterday.  Then today, he was found to have O2 sats of 90% while on NRB and therefore transferred here for further evaluation.     I/n the ED, patient's triage vitals were /83, , RR 32, 90%, T 100.3F.  Patient was noted to nearly obtunded with tachypnea and patient was started on BiPAP on 100%.   Labs showed leukocytosis of 25.3 with a left shift and an ABG of 7.28/89/136.  CT eventually showed RUL and RLL consolidation, likely PNA and patient was started on meropenem.  ICU was consulted and patient was accepted to SPCU for acute hypoxic respiratory failure 2/2 sepsis 2/2 possible aspiration PNA.               FAMILY HISTORY:  Family history of diabetes mellitus    Grandparent  Still living? Unknown  Family history of pulmonary embolism, Age at diagnosis: Age Unknown.     Social History:  Social History (marital status, living situation, occupation, tobacco use, alcohol and drug use, and sexual history): - unknown smoking history or etoh or illegal drug use  - came from [facility]     Tobacco Screening:  · Core Measure Site	No  · Has the patient used tobacco in the past 30 days?	Unable to assess due to patient's cognitive impairment    Risk Assessment:    Present on Admission:  Deep Venous Thrombosis	no  Pulmonary Embolus	no     Heart Failure:  Does this patient have a history of or has been diagnosed with heart failure? unknown.       PAST MEDICAL & SURGICAL HISTORY:  No pertinent past medical history    DM (diabetes mellitus)    Sarcoid    Bronchiectasis    Diverticulitis    Subdural hematoma    Inguinal hernia    Cellulitis    Seizure    Hypothyroid    Sleep apnea    LAMBERT and COPD overlap syndrome    BPH without urinary obstruction    No significant past surgical history    Status post Bright&#x27;s procedure    Bilateral subdural hematomas    Status post cholecystectomy    Status post inguinal hernia repair          Medication list         MEDICATIONS  (STANDING):  albuterol/ipratropium for Nebulization 3 milliLiter(s) Nebulizer every 6 hours  budesonide 160 MICROgram(s)/formoterol 4.5 MICROgram(s) Inhaler 2 Puff(s) Inhalation two times a day  enoxaparin Injectable 40 milliGRAM(s) SubCutaneous daily  levothyroxine Injectable 12.5 MICROGram(s) IV Push at bedtime  meropenem  IVPB 1000 milliGRAM(s) IV Intermittent every 8 hours  methylPREDNISolone sodium succinate Injectable 40 milliGRAM(s) IV Push every 8 hours  pantoprazole  Injectable 40 milliGRAM(s) IV Push daily  valproate sodium IVPB 1500 milliGRAM(s) IV Intermittent at bedtime  vancomycin  IVPB 1250 milliGRAM(s) IV Intermittent every 12 hours    MEDICATIONS  (PRN):  polyethylene glycol 3350 17 Gram(s) Oral daily PRN Constipation         Vitals log        ICU Vital Signs Last 24 Hrs  T(C): 36.9 (21 Jun 2021 06:08), Max: 39.1 (21 Jun 2021 02:30)  T(F): 98.5 (21 Jun 2021 06:08), Max: 102.3 (21 Jun 2021 02:30)  HR: 110 (21 Jun 2021 06:08) (107 - 132)  BP: 117/78 (21 Jun 2021 06:08) (102/73 - 131/82)  BP(mean): 90 (21 Jun 2021 06:08) (90 - 90)  ABP: --  ABP(mean): --  RR: 32 (21 Jun 2021 06:08) (28 - 48)  SpO2: 95% (21 Jun 2021 06:08) (90% - 98%)           Input and Output:  I&O's Detail      Lab Data                        16.2   25.29 )-----------( 126      ( 21 Jun 2021 02:28 )             49.8     06-21    139  |  98  |  23  ----------------------------<  124<H>  3.6   |  40<H>  |  0.81    Ca    10.5      21 Jun 2021 02:28    TPro  7.5  /  Alb  2.2<L>  /  TBili  0.8  /  DBili  x   /  AST  22  /  ALT  <10<L>  /  AlkPhos  99  06-21    ABG - ( 21 Jun 2021 02:34 )  pH, Arterial: 7.28  pH, Blood: x     /  pCO2: 89    /  pO2: 136   / HCO3: 33    / Base Excess: 14.0  /  SaO2: 98                      Review of Systems	  lethargy  weakness      Objective     Physical Examination        Pertinent Lab findings & Imaging      Herrera:  NO   Adequate UO     I&O's Detail           Discussed with:     Cultures:	        Radiology    EXAM:  CT CHEST                                  PROCEDURE DATE:  06/21/2021          INTERPRETATION:  CLINICAL INFORMATION: SOB, fever. Hx of sarcoid    COMPARISON: Comparison to numerous prior examinations most recent on 5/9/2021    CONTRAST/COMPLICATIONS:  IV Contrast: NONE  0 cc administered   0 cc discarded  Oral Contrast: NONE  Complications: None reported at time of study completion    PROCEDURE:  CT of the Chest was performed.  Sagittal and coronal reformats were performed.    FINDINGS:    LUNGS AND AIRWAYS: There is increased consolidation throughout the right upper and right lower lobe. Consolidation in the left lower lobe is decreased from prior exam. Marked bronchiectasis and architectural distortion is again demonstrated. Tracheal diverticulum.  PLEURA: Trace bilateral pleural effusions.  MEDIASTINUM AND JOANNE: Enlarged lymph nodes throughout the middle mediastinum are unchanged.  VESSELS: Within normal limits.  HEART: Heart size is normal. No pericardial effusion.  CHEST WALL AND LOWER NECK: Within normal limits.  VISUALIZED UPPER ABDOMEN: Status post cholecystectomy.  BONES: Degenerative changes.    IMPRESSION:    Increased consolidation throughout the right upper and lower lobes, correlate for pneumonia. Decreased consolidation in the left lower lobe.                SYLVIE RIZZO   This document has been electronically signed. Jun 21 2021  4:21AM                           Date/Time Patient Seen:  		  Referring MD:   Data Reviewed	       Patient is a 65y old  Male who presents with a chief complaint of respiratory failure, lethargy (21 Jun 2021 04:48)      Subjective/HPI    h and p reviewed  er provider note reviewed  labs reviewed  imaging reviewed    65M with seizures on keppra, BPH, hypothyroidism, sarcoidosis GERD, COPD, LAMBERT, recent admission to [facility] from 5/3-5/10/2021 for right subarachnoid hemorrhage s/p fall who presents from [facility] for hypoxia.  Per notes, patient was found to have a RLL PNA at [facility] and was started on IV meropenem yesterday.  Then today, he was found to have O2 sats of 90% while on NRB and therefore transferred here for further evaluation.     I/n the ED, patient's triage vitals were /83, , RR 32, 90%, T 100.3F.  Patient was noted to nearly obtunded with tachypnea and patient was started on BiPAP on 100%.   Labs showed leukocytosis of 25.3 with a left shift and an ABG of 7.28/89/136.  CT eventually showed RUL and RLL consolidation, likely PNA and patient was started on meropenem.  ICU was consulted and patient was accepted to SPCU for acute hypoxic respiratory failure 2/2 sepsis 2/2 possible aspiration PNA.               FAMILY HISTORY:  Family history of diabetes mellitus    Grandparent  Still living? Unknown  Family history of pulmonary embolism, Age at diagnosis: Age Unknown.     Social History:  Social History (marital status, living situation, occupation, tobacco use, alcohol and drug use, and sexual history): - unknown smoking history or etoh or illegal drug use  - came from [facility]     Tobacco Screening:  · Core Measure Site	No  · Has the patient used tobacco in the past 30 days?	Unable to assess due to patient's cognitive impairment    Risk Assessment:    Present on Admission:  Deep Venous Thrombosis	no  Pulmonary Embolus	no     Heart Failure:  Does this patient have a history of or has been diagnosed with heart failure? unknown.       PAST MEDICAL & SURGICAL HISTORY:  No pertinent past medical history    DM (diabetes mellitus)    Sarcoid    Bronchiectasis    Diverticulitis    Subdural hematoma    Inguinal hernia    Cellulitis    Seizure    Hypothyroid    Sleep apnea    LAMBERT and COPD overlap syndrome    BPH without urinary obstruction    No significant past surgical history    Status post Bright&#x27;s procedure    Bilateral subdural hematomas    Status post cholecystectomy    Status post inguinal hernia repair          Medication list         MEDICATIONS  (STANDING):  albuterol/ipratropium for Nebulization 3 milliLiter(s) Nebulizer every 6 hours  budesonide 160 MICROgram(s)/formoterol 4.5 MICROgram(s) Inhaler 2 Puff(s) Inhalation two times a day  enoxaparin Injectable 40 milliGRAM(s) SubCutaneous daily  levothyroxine Injectable 12.5 MICROGram(s) IV Push at bedtime  meropenem  IVPB 1000 milliGRAM(s) IV Intermittent every 8 hours  methylPREDNISolone sodium succinate Injectable 40 milliGRAM(s) IV Push every 8 hours  pantoprazole  Injectable 40 milliGRAM(s) IV Push daily  valproate sodium IVPB 1500 milliGRAM(s) IV Intermittent at bedtime  vancomycin  IVPB 1250 milliGRAM(s) IV Intermittent every 12 hours    MEDICATIONS  (PRN):  polyethylene glycol 3350 17 Gram(s) Oral daily PRN Constipation         Vitals log        ICU Vital Signs Last 24 Hrs  T(C): 36.9 (21 Jun 2021 06:08), Max: 39.1 (21 Jun 2021 02:30)  T(F): 98.5 (21 Jun 2021 06:08), Max: 102.3 (21 Jun 2021 02:30)  HR: 110 (21 Jun 2021 06:08) (107 - 132)  BP: 117/78 (21 Jun 2021 06:08) (102/73 - 131/82)  BP(mean): 90 (21 Jun 2021 06:08) (90 - 90)  ABP: --  ABP(mean): --  RR: 32 (21 Jun 2021 06:08) (28 - 48)  SpO2: 95% (21 Jun 2021 06:08) (90% - 98%)           Input and Output:  I&O's Detail      Lab Data                        16.2   25.29 )-----------( 126      ( 21 Jun 2021 02:28 )             49.8     06-21    139  |  98  |  23  ----------------------------<  124<H>  3.6   |  40<H>  |  0.81    Ca    10.5      21 Jun 2021 02:28    TPro  7.5  /  Alb  2.2<L>  /  TBili  0.8  /  DBili  x   /  AST  22  /  ALT  <10<L>  /  AlkPhos  99  06-21    ABG - ( 21 Jun 2021 02:34 )  pH, Arterial: 7.28  pH, Blood: x     /  pCO2: 89    /  pO2: 136   / HCO3: 33    / Base Excess: 14.0  /  SaO2: 98                      Review of Systems	  lethargy  weakness      Objective     Physical Examination    frail  weak  on BIPAP  poor dentition  resp distress  no wheeze  abd soft      Pertinent Lab findings & Imaging      Herrera:  NO   Adequate UO     I&O's Detail           Discussed with:     Cultures:	        Radiology    EXAM:  CT CHEST                                  PROCEDURE DATE:  06/21/2021          INTERPRETATION:  CLINICAL INFORMATION: SOB, fever. Hx of sarcoid    COMPARISON: Comparison to numerous prior examinations most recent on 5/9/2021    CONTRAST/COMPLICATIONS:  IV Contrast: NONE  0 cc administered   0 cc discarded  Oral Contrast: NONE  Complications: None reported at time of study completion    PROCEDURE:  CT of the Chest was performed.  Sagittal and coronal reformats were performed.    FINDINGS:    LUNGS AND AIRWAYS: There is increased consolidation throughout the right upper and right lower lobe. Consolidation in the left lower lobe is decreased from prior exam. Marked bronchiectasis and architectural distortion is again demonstrated. Tracheal diverticulum.  PLEURA: Trace bilateral pleural effusions.  MEDIASTINUM AND JOANNE: Enlarged lymph nodes throughout the middle mediastinum are unchanged.  VESSELS: Within normal limits.  HEART: Heart size is normal. No pericardial effusion.  CHEST WALL AND LOWER NECK: Within normal limits.  VISUALIZED UPPER ABDOMEN: Status post cholecystectomy.  BONES: Degenerative changes.    IMPRESSION:    Increased consolidation throughout the right upper and lower lobes, correlate for pneumonia. Decreased consolidation in the left lower lobe.                SYLVIE RIZZO   This document has been electronically signed. Jun 21 2021  4:21AM

## 2021-06-21 NOTE — H&P ADULT - ASSESSMENT
65M with seizures on keppra, BPH, hypothyroidism, sarcoidosis GERD, COPD, LAMBERT, recent admission to [facility] from 5/3-5/10/2021 for right subarachnoid hemorrhage s/p fall who presents from [facility] for hypoxia.  Found to have acute hypoxic and hypercapnic respiratory failure 2/2 sepsis 2/2 aspiration PNA.  Likely aspiration PNA since patient has been recommended dysphagia I diet from [facility] but reports state that he has been non-compliant with recommendation.  Also location of RLL and RUL also fits with aspiration.      Problems  Acute hypoxic and hypercapnic respiratory failure  Sepsis 2/2 aspiration PNA  Sarcoidosis  Seizures  COPD    Acute hypoxic and hypercapnic respiratory failure   - admitted to SPCU  - cc consult   - repeat ABG -> monitor CO2   - consider intubation if does not improve though patient is currently less lethargic than when he first arrived to the ED  - continue with BiPAP with maintaining O2 sat >90%    Sepsis from aspiration PNA with metabolic encephalopathy - meets sepsis criteria with leukocytosis and tachypnea with source of infection.  Lactate normal.  HCT with no acute bleed.  - continue with meropenem (just started on this abx on [facility])  - continue with vancomycin to cover MRSA PNA (was recently hospitalizaed last month) -> will increased to vancomycin 1250mg BID, check vancomycin trough pre 4th dose  - ID consult  - f/u blood cultures  - keep NPO for now  - official MBS again -> patient may refuse again but given the severity of his illness now, would reassess    Sarcoidosis/COPD  - started on steroids for history of bronchiectasis as well  - started on symbicort    Seizures  - cont with depakote    Hypothyroidism  - cont with synthroid    General anxiety disorder  - cont with effexor    BPH  - cont with tamsulosin    Preventive measures  - can start with lovenox 40mg subq for DVT ppx

## 2021-06-21 NOTE — DIETITIAN INITIAL EVALUATION ADULT. - OTHER INFO
Per H&P, pt is a 65M with seizures on keppra, BPH, hypothyroidism, sarcoidosis, GERD, COPD, LAMBERT, recent admission to [facility] from 5/3-5/10/2021 for right subarachnoid hemorrhage s/p fall who presents from [facility] for hypoxia.  Per notes, patient was found to have a RLL PNA at [facility] and was started on IV meropenem yesterday.  Then today, he was found to have O2 sats of 90% while on NRB and therefore transferred here for further evaluation.  CT eventually showed RUL and RLL consolidation, likely PNA and patient was started on meropenem.  ICU was consulted and patient was accepted to SPCU for acute hypoxic respiratory failure 2/2 sepsis 2/2 possible aspiration PNA.         Nutrition consult ordered for stage II or > pressure ulcer (noted pt with DTI to sacrum).  Unable to obtain nutrition related hx secondary to medical status (pt required bipap earlier today with switch to high flow NC after suctioning of secretions/mucus), pt very lethargic.  NPO status maintained.  SLP evaluation ordered but unable to assess today secondary to supplemental O2 requirement.  Noted pt had MBSS at [facility] in May 2021 (recommended dysphagia 1 puree with nectar flds).  Per transfer documents from [facility], pt was on TAB, chopped consistency with ground vegetables PTA; noted pt has had hx of modified diet consistency non-compliance.  Pt presently on LR@60ml/hr.  RD to follow closely and will make nutrition recommendations pending hospital course.

## 2021-06-21 NOTE — PROVIDER CONTACT NOTE (EICU) - RECOMMENDATIONS
- Bipap, trial of AVAPS, repeeat ABG  -Vanco/merropenem  -solumedrol 40 Q 8 H  - Speech and swallow consult, npo  - lovenox for DVT PPX  - seen on camera d/W DERIK Salamanca

## 2021-06-21 NOTE — ED ADULT NURSE NOTE - CHIEF COMPLAINT QUOTE
as per EMS; pt with difficulty breathing; saturation 88% on CPAP and N/C at [facility]

## 2021-06-21 NOTE — H&P ADULT - HISTORY OF PRESENT ILLNESS
***Patient is currently lethargic and unable to provide any history.  Collateral information obtained from chart.**      65M with seizures on keppra, BPH, hypothyroidism, sarcoidosis GERD, COPD, LAMBERT, recent admission to [facility] from 5/3-5/10/2021 for right subarachnoid hemorrhage s/p fall who presents from [facility] for hypoxia.  Per notes, patient was found to have a RLL PNA at [facility] and was started on IV meropenem yesterday.  Then today, he was found to have O2 sats of 90% while on NRB and therefore transferred here for further evaluation.     I/n the ED, patient's triage vitals were /83, , RR 32, 90%, T 100.3F.  Patient was noted to nearly obtunded with tachypnea and patient was started on BiPAP on 100%.   Labs showed leukocytosis of 25.3 with a left shift and an ABG of 7.28/89/136.  CT eventually showed RUL and RLL consolidation, likely PNA and patient was started on meropenem.  ICU was consulted and patient was accepted to SPCU for acute hypoxic respiratory failure 2/2 sepsis 2/2 possible aspiration PNA.

## 2021-06-21 NOTE — PROGRESS NOTE ADULT - SUBJECTIVE AND OBJECTIVE BOX
Patient is a 65y old  Male who presents with a chief complaint of respiratory failure, lethargy (2021 13:28)    INTERVAL HPI/OVERNIGHT EVENTS:  this am patient on Bipap 70% oxygen, switched to high flow - was able to be suction lots of secretions and mucous from oral cavity.  patient lethargic - follows simple commands but very lethargic.     MEDICATIONS  (STANDING):  albuterol/ipratropium for Nebulization 3 milliLiter(s) Nebulizer every 6 hours  budesonide 160 MICROgram(s)/formoterol 4.5 MICROgram(s) Inhaler 2 Puff(s) Inhalation two times a day  enoxaparin Injectable 40 milliGRAM(s) SubCutaneous daily  lactated ringers. 1000 milliLiter(s) (60 mL/Hr) IV Continuous <Continuous>  levothyroxine Injectable 12.5 MICROGram(s) IV Push at bedtime  meropenem  IVPB 1000 milliGRAM(s) IV Intermittent every 8 hours  methylPREDNISolone sodium succinate Injectable 40 milliGRAM(s) IV Push daily  pantoprazole  Injectable 40 milliGRAM(s) IV Push daily  valproate sodium IVPB 1500 milliGRAM(s) IV Intermittent at bedtime  vancomycin  IVPB 1250 milliGRAM(s) IV Intermittent every 12 hours    MEDICATIONS  (PRN):  polyethylene glycol 3350 17 Gram(s) Oral daily PRN Constipation    Allergies  Keppra (Other (Severe))  penicillins (Unknown)    Intolerances  benzodiazepines (Other)      REVIEW OF SYSTEMS:  unable due to lethargy    Vital Signs Last 24 Hrs  T(C): 36.4 (2021 13:44), Max: 39.1 (2021 02:30)  T(F): 97.6 (2021 13:44), Max: 102.3 (2021 02:30)  HR: 102 (2021 14:00) (98 - 132)  BP: 120/79 (2021 14:00) (99/65 - 131/82)  BP(mean): 93 (2021 14:00) (78 - 93)  RR: 29 (2021 14:00) (27 - 48)  SpO2: 90% (2021 14:00) (90% - 98%)    PHYSICAL EXAM:  GENERAL: tachypneic, flushed  HEAD:  Atraumatic, Normocephalic  EYES:  conjunctiva and sclera clear  ENMT: Moist mucous membranes, poor dentition, lower left gum appears swollen.   NECK: Supple, No JVD  NERVOUS SYSTEM:  lethargic, follows 1 step commands.  moves all extremities spontaneously.    CHEST/LUNG: decreased air entry right> left, bilateral wheeze  HEART: tachy s1s2  ABDOMEN: Soft, Nontender, Nondistended; Bowel sounds present  EXTREMITIES:  2+ Peripheral Pulses, No clubbing, cyanosis, or edema      LABS:                        16.2   25.29 )-----------( 126      ( 2021 02:28 )             49.8     2021 02:28    139    |  98     |  23     ----------------------------<  124    3.6     |  40     |  0.81     Ca    10.5       2021 02:28    TPro  7.5    /  Alb  2.2    /  TBili  0.8    /  DBili  x      /  AST  22     /  ALT  <10    /  AlkPhos  99     2021 02:28    PT/INR - ( 2021 02:28 )   PT: 15.7 sec;   INR: 1.31 ratio         PTT - ( 2021 02:28 )  PTT:36.0 sec  Urinalysis Basic - ( 2021 03:13 )    Color: Yellow / Appearance: Clear / S.020 / pH: x  Gluc: x / Ketone: Trace  / Bili: Negative / Urobili: 8 mg/dL   Blood: x / Protein: 30 mg/dL / Nitrite: Negative   Leuk Esterase: Trace / RBC: 0-2 /HPF / WBC 0-2   Sq Epi: x / Non Sq Epi: Occasional / Bacteria: Occasional      CAPILLARY BLOOD GLUCOSE          RADIOLOGY & ADDITIONAL TESTS:    Imaging Personally Reviewed:  [ ] YES     Consultant(s) Notes Reviewed:      Care Discussed with Consultants/Other Providers:    Advanced Directives: [ ] DNR  [ ] No feeding tube  [ ] MOLST in chart  [ ] MOLST completed today  [ ] Unknown

## 2021-06-21 NOTE — PROVIDER CONTACT NOTE (EICU) - BACKGROUND
HPI: 65M with history of Seizure disorder, Hypothyroidism,  Thrombocytopenia, BPH, recent right SAH from fall May 2021, presents from [facility] for hypoxia and AMS. He was started on meropenem yesterday at [facility] for RLL PNA and SOB. He presents to  ED with fever, SOB, hypoxia and AMS. Found to have hypercapnia, hypoxia, severe sepsis, given 2L IVF, placed on BiPAP and now requiring 100% FiO2. Received meropenem vancomycin in the ER.  WBC 22K with left shift evidence for R sided PNA on CXR. Most likely aspiration as he failed his speech and swallow eval at [facility] in may, however refused to go on dysphagia diet.     Patient seen and examined at the bedside. He is found in ER, on biPAP, SPO2 93% on 1005 FiO2 on BiPAP. He is lethargic, but will arouse to verbal stimuli and follow simple commands, however falls asleep quickly. BP stable at this time. On 14/8 on BIPAP, TV 400s, Vte >10L.     PAST MEDICAL & SURGICAL HISTORY:

## 2021-06-21 NOTE — CONSULT NOTE ADULT - ASSESSMENT
65M with history of Seizure disorder, Hypothyroidism,  Thrombocytopenia, BPH, recent right SAH from fall May 2021, presents from [facility] for hypoxia and AMS. Found to have severe sepsis, acute hypoxic and hypercapnic respiratory failure   65M with history of Seizure disorder, Hypothyroidism,  Thrombocytopenia, BPH, recent right SAH from fall May 2021, presents from [facility] for hypoxia and AMS. Found to have severe sepsis, acute hypoxic and hypercapnic respiratory failure  probable ongoing - continued aspiration - with resulting consolidation - asp pna - as seen on CT chest and clinical presentation  at risk for decompensation - resp failure -   on BIPAP - will alternate with HF NC - discussed with RT -   will repeat ABG at 16:00 today    broad spectrum ABX - mrsa screen, cx in progress, biomarkers, WBC trend  assist with all needs and ADL  HOB elev - asp prec  oral hygiene  Prognosis guarded - GOC discussion -     SPCU care and monitoring

## 2021-06-21 NOTE — CONSULT NOTE ADULT - ASSESSMENT
65M with history of Seizure disorder, Hypothyroidism,  Thrombocytopenia, BPH, recent right SAH from fall May 2021, presents from [facility] for hypoxia and AMS. Found to have severe sepsis, acute hypoxic and hypercapnic respiratory failure, most likely Aspiration PNA.     Problem List:  1) Acute hypoxic and hypercapnic respiratory failure  2) Aspiration PNA  3) Severe sepsis   4) R/o intracranial pathology   5) Metabolic encephalopathy     NEURO: Lethargic, most likely due to hypercapnia and metabolic encephalopathy. Need to r/o intracrainial pathology given recent R frontal SAH and now with lethargy. pH 7.28 with PCO2 of 89, most likely chronically hyerpcapnic and degree of hypercapnia might not fully explain lethargy given pH of 7.28. Hold all sedating medications.     CV: hemodynamically stable at this time. S/p 2L IVF, lactate 1.9. Currently not in shock state but remain high risk for worsening sepsis and if BP drops will start patient on vasopressors to maintain MAP >65. Cardiac cath from 2019 with clean coronaries.     PULM: Resp failure on bipap. Requiring 100% FiO2, SPO2 93%. RR 28 on BiPAP. TV 400mll, vte >10L with these settings hypercapnia should clear. Will continue home dose duonebs. Will start patient on course of steroids given history of bronchiectasis and sarcoidosis. He seems to be a chronic retainer of CO2 based on ABG. Start symbicort.     GI: NPO for now    RENAL: making urine, no evidence for SWATI    ID: WBC 22K, left shift, R infiltrates on CXR, history of dysphagia that goes back to at least 2019. Most likely aspiration PNA. Started on meropenem and vancomycin. BC and UC obtained. lactate 1.9. Will need to be seen by ID    HEME: Holding chemical DVT-P given history     ENDO: synthroid to hypothyroidism    DISPO: admit to SPCU if CT head is negative          65M with history of Seizure disorder, Hypothyroidism,  Thrombocytopenia, BPH, recent right SAH from fall May 2021, presents from [facility] for hypoxia and AMS. Found to have severe sepsis, acute hypoxic and hypercapnic respiratory failure, most likely Aspiration PNA.     Plan discussed with E-ICU attending Dr. Bradley    Problem List:  1) Acute hypoxic and hypercapnic respiratory failure  2) Aspiration PNA  3) Severe sepsis   4) R/o intracranial pathology   5) Metabolic encephalopathy     NEURO: Lethargic, most likely due to hypercapnia and metabolic encephalopathy. Need to r/o intracrainial pathology given recent R frontal SAH and now with lethargy. pH 7.28 with PCO2 of 89, most likely chronically hyerpcapnic and degree of hypercapnia might not fully explain lethargy given pH of 7.28. Hold all sedating medications.     CV: hemodynamically stable at this time. S/p 2L IVF, lactate 1.9. Currently not in shock state but remain high risk for worsening sepsis and if BP drops will start patient on vasopressors to maintain MAP >65. Cardiac cath from 2019 with clean coronaries.     PULM: Resp failure on bipap. Requiring 100% FiO2, SPO2 93%. RR 28 on BiPAP. TV 400mll, vte >10L with these settings hypercapnia should clear. Will continue home dose duonebs. Will start patient on course of steroids given history of bronchiectasis and sarcoidosis. He seems to be a chronic retainer of CO2 based on ABG. Start symbicort.     GI: NPO for now    RENAL: making urine, no evidence for SWATI    ID: WBC 22K, left shift, R infiltrates on CXR, history of dysphagia that goes back to at least 2019. Most likely aspiration PNA. Started on meropenem and vancomycin. BC and UC obtained. lactate 1.9. Will need to be seen by ID    HEME: Holding chemical DVT-P given history     ENDO: synthroid to hypothyroidism    DISPO: admit to SPCU if CT head is negative      0443: CT head negative admit to SPCU, patient reevaulated more awake now on BiPAP, SPO2 96%. Repeat ABG in a few hours. titrate FiO2          65M with history of Seizure disorder, Hypothyroidism,  Thrombocytopenia, BPH, recent right SAH from fall May 2021, presents from [facility] for hypoxia and AMS. Found to have severe sepsis, acute hypoxic and hypercapnic respiratory failure, most likely Aspiration PNA.     Plan discussed with E-ICU attending Dr. Bradley    Problem List:  1) Acute hypoxic and hypercapnic respiratory failure  2) Aspiration PNA  3) Severe sepsis   4) R/o intracranial pathology   5) Metabolic encephalopathy     NEURO: Lethargic, most likely due to hypercapnia and metabolic encephalopathy. Need to r/o intracrainial pathology given recent R frontal SAH and now with lethargy. pH 7.28 with PCO2 of 89, most likely chronically hyerpcapnic and degree of hypercapnia might not fully explain lethargy given pH of 7.28. Hold all sedating medications.     CV: hemodynamically stable at this time. S/p 2L IVF, lactate 1.9. Currently not in shock state but remain high risk for worsening sepsis and if BP drops will start patient on vasopressors to maintain MAP >65. Cardiac cath from 2019 with clean coronaries.     PULM: Resp failure on bipap. Requiring 100% FiO2, SPO2 93%. RR 28 on BiPAP. TV 400mll, vte >10L with these settings hypercapnia should clear. Will continue home dose duonebs. Will start patient on course of steroids given history of bronchiectasis and sarcoidosis. He seems to be a chronic retainer of CO2 based on ABG. Start symbicort.     GI: NPO for now    RENAL: making urine, no evidence for SWATI    ID: WBC 22K, left shift, R infiltrates on CXR, history of dysphagia that goes back to at least 2019. Most likely aspiration PNA. Started on meropenem and vancomycin. BC and UC obtained. lactate 1.9. Will need to be seen by ID    HEME: Holding chemical DVT-P given history     ENDO: synthroid to hypothyroidism    DISPO: admit to SPCU if CT head is negative      0443: CT head negative admit to SPCU, patient reevaulated more awake now on BiPAP, SPO2 96%. Repeat ABG in a few hours. titrate FiO2    Spoke to sister and HCP Kristin and went over in great detail about the patient past, present, and future condition. All questions were answered. She will be in to see him during visiting hours.

## 2021-06-21 NOTE — H&P ADULT - NSHPLABSRESULTS_GEN_ALL_CORE
LABS:                        16.2   25.29<H> )-----------( 126<L>    ( 2021 02:28 )             49.8     139    |  98     |  23     ----------------------------<  124<H>    2021 02:28  3.6     |  40<H>  |  0.81         Ca 10.5          2021 02:28        TPro  7.5    /  Alb  2.2<L>  /  TBili  0.8    /  DBili  x      /  AST  22     /  ALT  <10<L>  /  AlkPhos  99     2021 02:28    PT/INR - ( 2021 02:28 )   PT: 15.7<H>;   INR: 1.31<H>         PTT - ( 2021 02:28 )  PTT:36.0<H>    Urinalysis Basic - ( 2021 03:13 )    Color: Yellow / Appearance: Clear / S.020 / pH: x  Gluc: x / Ketone: Trace  / Bili: Negative / Urobili: 8 mg/dL   Blood: x / Protein: 30 mg/dL / Nitrite: Negative   Leuk Esterase: Trace / RBC: 0-2 /HPF / WBC 0-2   Sq Epi: x / Non Sq Epi: Occasional / Bacteria: Occasional    Blood Gas Profile - Arterial (21 @ 02:34)    pH, Arterial: 7.28    pCO2, Arterial: 89:     pO2, Arterial: 136 mmHg    HCO3, Arterial: 33 mmol/L    Base Excess, Arterial: 14.0 mmol/L    Oxygen Saturation, Arterial: 98 %    FIO2, Arterial: 100    Blood Gas Comments Arterial: fio2 100%    Blood Gas Source Arterial: Arterial    EKG:   (reviewed and interpreted by me) - sinus tachycardia with possible STD in V4-V6, minimally SOLOMON in aVR  Radiology: LABS:                        16.2   25.29<H> )-----------( 126<L>    ( 2021 02:28 )             49.8     139    |  98     |  23     ----------------------------<  124<H>    2021 02:28  3.6     |  40<H>  |  0.81         Ca 10.5          2021 02:28        TPro  7.5    /  Alb  2.2<L>  /  TBili  0.8    /  DBili  x      /  AST  22     /  ALT  <10<L>  /  AlkPhos  99     2021 02:28    PT/INR - ( 2021 02:28 )   PT: 15.7<H>;   INR: 1.31<H>         PTT - ( 2021 02:28 )  PTT:36.0<H>    Urinalysis Basic - ( 2021 03:13 )    Color: Yellow / Appearance: Clear / S.020 / pH: x  Gluc: x / Ketone: Trace  / Bili: Negative / Urobili: 8 mg/dL   Blood: x / Protein: 30 mg/dL / Nitrite: Negative   Leuk Esterase: Trace / RBC: 0-2 /HPF / WBC 0-2   Sq Epi: x / Non Sq Epi: Occasional / Bacteria: Occasional    Blood Gas Profile - Arterial (21 @ 02:34)    pH, Arterial: 7.28    pCO2, Arterial: 89:     pO2, Arterial: 136 mmHg    HCO3, Arterial: 33 mmol/L    Base Excess, Arterial: 14.0 mmol/L    Oxygen Saturation, Arterial: 98 %    FIO2, Arterial: 100    Blood Gas Comments Arterial: fio2 100%    Blood Gas Source Arterial: Arterial    EKG:   (reviewed and interpreted by me) - sinus tachycardia with possible STD in V4-V6, minimally SOLOMON in aVR (similar to previous EKG)  Radiology: (reviewed by me)  < from: CT Chest No Cont (21 @ 04:00) >      IMPRESSION:    Increased consolidation throughout the right upper and lower lobes, correlate for pneumonia. Decreased consolidation in the left lower lobe.    < end of copied text >

## 2021-06-21 NOTE — ED PROVIDER NOTE - OBJECTIVE STATEMENT
Patient sent from [facility] with respiratory distress. Reported to have pneumonia. CXR yesterday showed RLL inf. Pt. on meropenem

## 2021-06-21 NOTE — CONSULT NOTE ADULT - SUBJECTIVE AND OBJECTIVE BOX
HPI:  65M with seizures on keppra, BPH, hypothyroidism, sarcoidosis GERD, COPD, LAMBERT, recent admission to [facility] from 5/3-5/10/2021 for right subarachnoid hemorrhage s/p fall who presented from [facility] rehab with cc of SOB and hypoxia. In ED noted to be hypoxic with fever 102.3, Placed on BIPAP now on HFNC, CT chest showed RUL and RLL consolidation. WBC 25K. No documented n/v/d CP Pt is poorly responsive and is unable to provide history.     Infectious Disease consult was called to evaluate pt and for antibiotic management.      Past Medical & Surgical Hx:  PAST MEDICAL & SURGICAL HISTORY:  DM (diabetes mellitus)  Sarcoid  Bronchiectasis  Subdural hematoma  Inguinal hernia  Seizure  Hypothyroid  Sleep apnea  LAMBERT and COPD overlap syndrome  BPH without urinary obstruction  Status post Bright&#x27;s procedure  Bilateral subdural hematomas  Status post cholecystectomy  Status post inguinal hernia repair      Social History--  EtOH: denies   Tobacco: denies   Drug Use: denies       FAMILY HISTORY:  Family history of diabetes mellitus  Family history of pulmonary embolism (Grandparent)        Allergies  Keppra (Other (Severe))  penicillins (Unknown)    Intolerances  benzodiazepines (Other)      Home Medications:  acetaminophen 325 mg oral tablet: 2 tab(s) orally every 6 hours, As needed, Mild Pain (1 - 3) (21 Jun 2021 02:13)  Bacid (LAC) oral tablet: 1 tab(s) orally 2 times a day (21 Jun 2021 04:42)  bisacodyl 10 mg rectal suppository: 1 suppository(ies) rectal once a day, As needed, Constipation (21 Jun 2021 02:13)  Depakote ER: 1500 milligram(s) orally once a day (at bedtime) (21 Jun 2021 02:13)  Effexor XR 75 mg oral capsule, extended release: 1 cap(s) orally once a day (21 Jun 2021 04:42)  furosemide 40 mg oral tablet: 1 tab(s) orally once a day (21 Jun 2021 02:13)  ipratropium-albuterol 0.5 mg-2.5 mg/3 mLinhalation solution: 3 milliliter(s) inhaled every 6 hours (21 Jun 2021 02:13)  Klor-Con 10 mEq oral tablet, extended release: 1 tab(s) orally once a day (21 Jun 2021 02:13)  levothyroxine 25 mcg (0.025 mg) oral tablet: 1 tab(s) orally once a day (21 Jun 2021 02:13)  meropenem 1000 mg/ 50 mL-NaCl 0.9% intravenous solution: intravenous every 8 hours (21 Jun 2021 04:42)  polyethylene glycol 3350 oral powder for reconstitution: 17 gram(s) orally once a day (21 Jun 2021 02:13)  senna oral tablet: 2 tab(s) orally once a day (at bedtime) (21 Jun 2021 02:13)  tamsulosin 0.4 mg oral capsule: 1 cap(s) orally once a day (21 Jun 2021 02:13)      Current Inpatient Medications :    ANTIBIOTICS:   meropenem  IVPB 1000 milliGRAM(s) IV Intermittent every 8 hours  vancomycin  IVPB 1250 milliGRAM(s) IV Intermittent every 12 hours      OTHER RELEVANT MEDICATIONS :  albuterol/ipratropium for Nebulization 3 milliLiter(s) Nebulizer every 6 hours  budesonide 160 MICROgram(s)/formoterol 4.5 MICROgram(s) Inhaler 2 Puff(s) Inhalation two times a day  enoxaparin Injectable 40 milliGRAM(s) SubCutaneous daily  lactated ringers. 1000 milliLiter(s) IV Continuous <Continuous>  levothyroxine Injectable 12.5 MICROGram(s) IV Push at bedtime  methylPREDNISolone sodium succinate Injectable 40 milliGRAM(s) IV Push daily  pantoprazole  Injectable 40 milliGRAM(s) IV Push daily  polyethylene glycol 3350 17 Gram(s) Oral daily PRN  valproate sodium IVPB 1500 milliGRAM(s) IV Intermittent at bedtime      ROS:  Unable to obtain due to : Pt's condition      I&O's Detail    21 Jun 2021 07:01  -  21 Jun 2021 13:29  --------------------------------------------------------  IN:    IV PiggyBack: 50 mL    Lactated Ringers: 180 mL  Total IN: 230 mL    OUT:    Indwelling Catheter - Urethral (mL): 200 mL  Total OUT: 200 mL    Total NET: 30 mL      Physical Exam:  Vital Signs Last 24 Hrs  T(C): 36.7 (21 Jun 2021 07:27), Max: 39.1 (21 Jun 2021 02:30)  T(F): 98.1 (21 Jun 2021 07:27), Max: 102.3 (21 Jun 2021 02:30)  HR: 98 (21 Jun 2021 12:00) (98 - 132)  BP: 99/65 (21 Jun 2021 12:00) (99/65 - 131/82)  BP(mean): 78 (21 Jun 2021 12:00) (78 - 93)  RR: 31 (21 Jun 2021 12:00) (27 - 48)  SpO2: 93% (21 Jun 2021 12:00) (90% - 98%)  Height (cm): 188 (06-21 @ 01:58)  Weight (kg): 82.6 (06-21 @ 01:58)  BMI (kg/m2): 23.4 (06-21 @ 01:58)  BSA (m2): 2.09 (06-21 @ 01:58)    General: weak poorly responsive  Neck: supple, trachea midline  Lungs: Decreased  no wheeze/rhonchi  Cardiovascular: regular rate and rhythm, S1 S2  Abdomen: soft, nontender, ND, bowel sounds normal  Neurological: Lethargic  Skin: no rash  Extremities: + edema    Labs:                         16.2   25.29 )-----------( 126      ( 21 Jun 2021 02:28 )             49.8   06-21    139  |  98  |  23  ----------------------------<  124<H>  3.6   |  40<H>  |  0.81    Ca    10.5      21 Jun 2021 02:28    TPro  7.5  /  Alb  2.2<L>  /  TBili  0.8  /  DBili  x   /  AST  22  /  ALT  <10<L>  /  AlkPhos  99  06-21      RECENT CULTURES:          RADIOLOGY & ADDITIONAL STUDIES:    EXAM:  CT CHEST                                  PROCEDURE DATE:  06/21/2021          INTERPRETATION:  CLINICAL INFORMATION: SOB, fever. Hx of sarcoid    COMPARISON: Comparison to numerous prior examinations most recent on 5/9/2021    CONTRAST/COMPLICATIONS:  IV Contrast: NONE  0 cc administered   0 cc discarded  Oral Contrast: NONE  Complications: None reported at time of study completion    PROCEDURE:  CT of the Chest was performed.  Sagittal and coronal reformats were performed.    FINDINGS:    LUNGS AND AIRWAYS: There is increased consolidation throughout the right upper and right lower lobe. Consolidation in the left lower lobe is decreased from prior exam. Marked bronchiectasis and architectural distortion is again demonstrated. Tracheal diverticulum.  PLEURA: Trace bilateral pleural effusions.  MEDIASTINUM AND JOANNE: Enlarged lymph nodes throughout the middle mediastinum are unchanged.  VESSELS: Within normal limits.  HEART: Heart size is normal. No pericardial effusion.  CHEST WALL AND LOWER NECK: Within normal limits.  VISUALIZED UPPER ABDOMEN: Status post cholecystectomy.  BONES: Degenerative changes.    IMPRESSION:    Increased consolidation throughout the right upper and lower lobes, correlate for pneumonia. Decreased consolidation in the left lower lobe.    Assessment :   65M with seizures on keppra, BPH, hypothyroidism, sarcoidosis GERD, COPD, LAMBERT, recent admission to [facility] from 5/3-5/10/2021 for right subarachnoid hemorrhage s/p fall admitted from [facility] rehab with acute hypoxic respiratory failure sec severe right lung pneumonia likely aspiration pneumonia. Tmax 102.3, Off BIPAP now on HFNC, CT chest showed RUL and RLL consolidation. WBC 25K.         Plan :   Cont Vancomycin and  Meropenam  Fu cultures  Trend temps and cbc  MRSA swab  Swallow study when more awake  High risk for intubation  Pulm on case      Continue with present regiment .  Approptiate use of antibiotics and adverse effects reviewed.      Critical care > 45 minutes spent in direct patient care reviewing  the notes, lab data/ imaging , discussion with multidisciplinary team. All questions were addressed and answered to the best of my capacity .    Thank you for allowing me to participate in the care of your patient .      Tegan Granados MD  Infectious Disease  [phone]

## 2021-06-21 NOTE — ED PROVIDER NOTE - CLINICAL SUMMARY MEDICAL DECISION MAKING FREE TEXT BOX
Patient in severe respiratory distress with altered LOC. WIll get labs, CXR, place on bipap. Patient will require in patient care Patient in severe respiratory distress with altered LOC. WIll get labs, CXR, place on bipap. Patient will require in patient care    Critical care time spent 45 minutes Patient in severe respiratory distress with altered LOC. WIll get labs, CXR, place on bipap. Patient at high risk of poor outcome. Patient will require in patient care    Critical care time spent 45 minutes

## 2021-06-21 NOTE — ED PROVIDER NOTE - CARE PLAN
Principal Discharge DX:	Pneumonia   Principal Discharge DX:	Pneumonia  Secondary Diagnosis:	Sarcoid  Secondary Diagnosis:	Bronchiectasis

## 2021-06-22 NOTE — PROVIDER CONTACT NOTE (CRITICAL VALUE NOTIFICATION) - BACKGROUND
Dx Pneumonia
66yo M admitted from [facility] for pneumonia
66yo M admitted from [facility] for pneumonia

## 2021-06-22 NOTE — PROGRESS NOTE ADULT - SUBJECTIVE AND OBJECTIVE BOX
JULY FORTE is a 65yMale , patient examined and chart reviewed.     INTERVAL HPI/ OVERNIGHT EVENTS:   Remains on HFNC. Afebrile.  Lethargic but arousable.    PAST MEDICAL & SURGICAL HISTORY:  DM (diabetes mellitus)  Sarcoid  Bronchiectasis  Subdural hematoma  Inguinal hernia  Seizure  Hypothyroid  Sleep apnea  LAMBERT and COPD overlap syndrome  BPH without urinary obstruction  Status post Bright&#x27;s procedure  Bilateral subdural hematomas  Status post cholecystectomy  Status post inguinal hernia repair      For details regarding the patient's social history, family history, and other miscellaneous elements, please refer the initial infectious diseases consultation and/or the admitting history and physical examination for this admission.    ROS:  Unable to obtain due to : UTO sec Pt's condition    ALLERGIES  benzodiazepines (Other)  Keppra (Other (Severe))  penicillins (Unknown)      Current inpatient medications :    ANTIBIOTICS/RELEVANT:  meropenem  IVPB 1000 milliGRAM(s) IV Intermittent every 8 hours  vancomycin  IVPB 1250 milliGRAM(s) IV Intermittent every 12 hours      albuterol/ipratropium for Nebulization 3 milliLiter(s) Nebulizer every 6 hours  budesonide 160 MICROgram(s)/formoterol 4.5 MICROgram(s) Inhaler 2 Puff(s) Inhalation two times a day  enoxaparin Injectable 40 milliGRAM(s) SubCutaneous daily  lactated ringers. 1000 milliLiter(s) IV Continuous <Continuous>  levothyroxine Injectable 12.5 MICROGram(s) IV Push at bedtime  methylPREDNISolone sodium succinate Injectable 40 milliGRAM(s) IV Push daily  pantoprazole  Injectable 40 milliGRAM(s) IV Push daily  polyethylene glycol 3350 17 Gram(s) Oral daily PRN  valproate sodium IVPB 1500 milliGRAM(s) IV Intermittent at bedtime      Objective:     @ : @ 07:00  --------------------------------------------------------  IN: 1650 mL / OUT: 800 mL / NET: 850 mL     @ 07: @ 13:31  --------------------------------------------------------  IN: 590 mL / OUT: 0 mL / NET: 590 mL      T(C): 36.7 (21 @ 12:00), Max: 36.9 (21 @ 21:11)  HR: 87 (21 @ 12:01) (72 - 102)  BP: 99/71 (21 @ 12:01) (89/54 - 120/79)  RR: 21 (21 @ 12:01) (18 - 29)  SpO2: 95% (21 @ 12:01) (90% - 100%)    Physical Exam:  General: Weak frail  no acute distress  Neck: supple, trachea midline  Lungs: Decreased no wheeze/rhonchi  Cardiovascular: regular rate and rhythm, S1 S2  Abdomen: soft, nontender,  bowel sounds normal  Neurological: Lethargic  Skin: no rash  Extremities: + edema        LABS:                        13.0   13.71 )-----------( 94       ( 2021 07:12 )             40.2       06    137  |  99  |  25<H>  ----------------------------<  110<H>  3.6   |  40<H>  |  0.66    Ca    9.7      2021 07:12  Phos  2.8       Mg     1.7         TPro  5.5<L>  /  Alb  1.5<L>  /  TBili  0.5  /  DBili  x   /  AST  15  /  ALT  <10<L>  /  AlkPhos  73  -22      PT/INR - ( 2021 02:28 )   PT: 15.7 sec;   INR: 1.31 ratio         PTT - ( 2021 02:28 )  PTT:36.0 sec  Urinalysis Basic - ( 2021 03:13 )    Color: Yellow / Appearance: Clear / S.020 / pH: x  Gluc: x / Ketone: Trace  / Bili: Negative / Urobili: 8 mg/dL   Blood: x / Protein: 30 mg/dL / Nitrite: Negative   Leuk Esterase: Trace / RBC: 0-2 /HPF / WBC 0-2   Sq Epi: x / Non Sq Epi: Occasional / Bacteria: Occasional      Vancomycin Level, Trough: 11.1 ug/mL ( @ 11:39)    ABG - ( 2021 10:26 )  pH, Arterial: 7.44  pH, Blood: x     /  pCO2: 62    /  pO2: 71    / HCO3: 39    / Base Excess: 16.0  /  SaO2: 95          MICROBIOLOGY:          RADIOLOGY & ADDITIONAL STUDIES:  EXAM:  CT CHEST                                  PROCEDURE DATE:  2021          INTERPRETATION:  CLINICAL INFORMATION: SOB, fever. Hx of sarcoid    COMPARISON: Comparison to numerous prior examinations most recent on 2021    CONTRAST/COMPLICATIONS:  IV Contrast: NONE  0 cc administered   0 cc discarded  Oral Contrast: NONE  Complications: None reported at time of study completion    PROCEDURE:  CT of the Chest was performed.  Sagittal and coronal reformats were performed.    FINDINGS:    LUNGS AND AIRWAYS: There is increased consolidation throughout the right upper and right lower lobe. Consolidation in the left lower lobe is decreased from prior exam. Marked bronchiectasis and architectural distortion is again demonstrated. Tracheal diverticulum.  PLEURA: Trace bilateral pleural effusions.  MEDIASTINUM AND JOANNE: Enlarged lymph nodes throughout the middle mediastinum are unchanged.  VESSELS: Within normal limits.  HEART: Heart size is normal. No pericardial effusion.  CHEST WALL AND LOWER NECK: Within normal limits.  VISUALIZED UPPER ABDOMEN: Status post cholecystectomy.  BONES: Degenerative changes.    IMPRESSION:    Increased consolidation throughout the right upper and lower lobes, correlate for pneumonia. Decreased consolidation in the left lower lobe.      Assessment :  65M with seizures on keppra, BPH, hypothyroidism, sarcoidosis GERD, COPD, LAMBERT, recent admission to [facility] from 5/3-5/10/2021 for right subarachnoid hemorrhage s/p fall admitted from [facility] rehab with acute hypoxic respiratory failure sec severe right lung pneumonia likely aspiration pneumonia. Off BIPAP now on HFNC,  Sp fever   WBC downtrending       Plan :   Cont Vancomycin and  Meropenam  Fu Vancomycin trough  Fu cultures  Trend temps and cbc  MRSA swab  Swallow study when more awake  Pulm on case    D/w Dr Pak and Dr Birmingham    Continue with present regiment.  Appropriate use of antibiotics and adverse effects reviewed.      > 35 minutes were spent in direct patient care reviewing notes, medications ,labs data/ imaging , discussion with multidisciplinary team.    Thank you for allowing me to participate in care of your patient .    Tegan Granados MD  Infectious Disease  [phone]

## 2021-06-22 NOTE — PROCEDURE NOTE - ADDITIONAL PROCEDURE DETAILS
Peripheral IV placed in setting in setting of hypoxic respiratory failure/pneumonia  not included in cc time

## 2021-06-22 NOTE — CONSULT NOTE ADULT - SUBJECTIVE AND OBJECTIVE BOX
Pt seen at bedside in ICU, minimally responsive but non-communicative  PMHx and admission info reviewed in chart and with RN    PE: No facial swelling noted.  Intraoral exam with no acute infection or abscess  Multiple large maxillary and mandibular exostoses (Alecia) noted.  Poor Oral Hygiene

## 2021-06-22 NOTE — CONSULT NOTE ADULT - SUBJECTIVE AND OBJECTIVE BOX
History of Present Illness: The patient is a 65 year old male with a history of seizure d/o, BPH, hypothyroidism, sarcoidosis, COPD, LAMBERT, recent SAH who is admitted with respiratory failure. He was at [facility] (formerly [facility]) when he developed respiratory distress and found to have PNA. The patient is unable to provide much history to me.    Past Medical/Surgical History:  Seizure d/o, BPH, hypothyroidism, sarcoidosis, COPD, LAMBERT, recent SAH    Medications:  Home Medications:  acetaminophen 325 mg oral tablet: 2 tab(s) orally every 6 hours, As needed, Mild Pain (1 - 3) (21 Jun 2021 02:13)  Bacid (LAC) oral tablet: 1 tab(s) orally 2 times a day (21 Jun 2021 04:42)  bisacodyl 10 mg rectal suppository: 1 suppository(ies) rectal once a day, As needed, Constipation (21 Jun 2021 02:13)  Depakote ER: 1500 milligram(s) orally once a day (at bedtime) (21 Jun 2021 02:13)  Effexor XR 75 mg oral capsule, extended release: 1 cap(s) orally once a day (21 Jun 2021 04:42)  furosemide 40 mg oral tablet: 1 tab(s) orally once a day (21 Jun 2021 02:13)  ipratropium-albuterol 0.5 mg-2.5 mg/3 mLinhalation solution: 3 milliliter(s) inhaled every 6 hours (21 Jun 2021 02:13)  Klor-Con 10 mEq oral tablet, extended release: 1 tab(s) orally once a day (21 Jun 2021 02:13)  levothyroxine 25 mcg (0.025 mg) oral tablet: 1 tab(s) orally once a day (21 Jun 2021 02:13)  meropenem 1000 mg/ 50 mL-NaCl 0.9% intravenous solution: intravenous every 8 hours (21 Jun 2021 04:42)  polyethylene glycol 3350 oral powder for reconstitution: 17 gram(s) orally once a day (21 Jun 2021 02:13)  senna oral tablet: 2 tab(s) orally once a day (at bedtime) (21 Jun 2021 02:13)  tamsulosin 0.4 mg oral capsule: 1 cap(s) orally once a day (21 Jun 2021 02:13)      Family History: Non-contributory family history of premature cardiovascular atherosclerotic disease    Social History: No tobacco, alcohol or drug use    Review of Systems:  General: No fevers, chills, weight loss or gain  Skin: No rashes, color changes  Cardiovascular: No chest pain, orthopnea  Respiratory: No shortness of breath, cough  Gastrointestinal: No nausea, abdominal pain  Genitourinary: No incontinence, pain with urination  Musculoskeletal: No pain, swelling, decreased range of motion  Neurological: No headache, weakness  Psychiatric: No depression, anxiety  Endocrine: No weight loss or gain, increased thirst  All other systems are comprehensively negative.    Physical Exam:  Vitals:        Vital Signs Last 24 Hrs  T(C): 36.7 (22 Jun 2021 12:00), Max: 36.9 (21 Jun 2021 21:11)  T(F): 98.1 (22 Jun 2021 12:00), Max: 98.4 (21 Jun 2021 21:11)  HR: 87 (22 Jun 2021 12:01) (72 - 102)  BP: 99/71 (22 Jun 2021 12:01) (89/54 - 120/79)  BP(mean): 81 (22 Jun 2021 12:01) (66 - 93)  RR: 21 (22 Jun 2021 12:01) (18 - 29)  SpO2: 95% (22 Jun 2021 12:01) (90% - 100%)  General: NAD  HEENT: MMM  Neck: No JVD, no carotid bruit  Lungs: CTAB  CV: RRR, nl S1/S2, no M/R/G  Abdomen: S/NT/ND, +BS  Extremities: No LE edema, no cyanosis  Neuro: AAOx3, non-focal  Skin: No rash    Labs:                        13.0   13.71 )-----------( 94       ( 22 Jun 2021 07:12 )             40.2     06-22    137  |  99  |  25<H>  ----------------------------<  110<H>  3.6   |  40<H>  |  0.66    Ca    9.7      22 Jun 2021 07:12  Phos  2.8     06-21  Mg     1.7     06-21    TPro  5.5<L>  /  Alb  1.5<L>  /  TBili  0.5  /  DBili  x   /  AST  15  /  ALT  <10<L>  /  AlkPhos  73  06-22    CARDIAC MARKERS ( 22 Jun 2021 07:12 )  .492 ng/mL / x     / 17 U/L / x     / 2.4 ng/mL  CARDIAC MARKERS ( 21 Jun 2021 23:51 )  .253 ng/mL / x     / 9 U/L / x     / 1.0 ng/mL  CARDIAC MARKERS ( 21 Jun 2021 16:30 )  .208 ng/mL / x     / x     / x     / x          PT/INR - ( 21 Jun 2021 02:28 )   PT: 15.7 sec;   INR: 1.31 ratio         PTT - ( 21 Jun 2021 02:28 )  PTT:36.0 sec    ECG: Sinus tachycardia, anterolateral ST depressions    Telemetry: Sinus rhythm, sinus tachycardia at times

## 2021-06-22 NOTE — PROGRESS NOTE ADULT - ASSESSMENT
65M with history of Seizure disorder, Hypothyroidism,  Thrombocytopenia, BPH, recent right SAH from fall May 2021, presents from [facility] for hypoxia and AMS. Found to have severe sepsis, acute hypoxic and hypercapnic respiratory failure    source of infection - etiol - w/u in progress -   probable ongoing - continued aspiration - with resulting consolidation - asp pna - as seen on CT chest and clinical presentation  at risk for decompensation - resp failure -   bipap alternate with HF -     broad spectrum ABX - mrsa screen, cx in progress, biomarkers, WBC trend  assist with all needs and ADL  HOB elev - asp prec  oral hygiene  Prognosis guarded - GOC discussion -     SPCU care and monitoring

## 2021-06-22 NOTE — PROGRESS NOTE ADULT - SUBJECTIVE AND OBJECTIVE BOX
Patient is a 65y old  Male who presents with a chief complaint of respiratory failure, lethargy (2021 07:07)    INTERVAL HPI/OVERNIGHT EVENTS:  talking today - reports dry mouth wants to drink.  denies pain.     MEDICATIONS  (STANDING):  albuterol/ipratropium for Nebulization 3 milliLiter(s) Nebulizer every 6 hours  budesonide 160 MICROgram(s)/formoterol 4.5 MICROgram(s) Inhaler 2 Puff(s) Inhalation two times a day  enoxaparin Injectable 40 milliGRAM(s) SubCutaneous daily  lactated ringers. 1000 milliLiter(s) (100 mL/Hr) IV Continuous <Continuous>  levothyroxine Injectable 12.5 MICROGram(s) IV Push at bedtime  meropenem  IVPB 1000 milliGRAM(s) IV Intermittent every 8 hours  methylPREDNISolone sodium succinate Injectable 40 milliGRAM(s) IV Push daily  pantoprazole  Injectable 40 milliGRAM(s) IV Push daily  valproate sodium IVPB 1500 milliGRAM(s) IV Intermittent at bedtime  vancomycin  IVPB 1250 milliGRAM(s) IV Intermittent every 12 hours    MEDICATIONS  (PRN):  polyethylene glycol 3350 17 Gram(s) Oral daily PRN Constipation      Allergies  Keppra (Other (Severe))  penicillins (Unknown)    Intolerances  benzodiazepines (Other)      REVIEW OF SYSTEMS:  limited, denies symptoms    Vital Signs Last 24 Hrs  T(C): 36.5 (2021 08:00), Max: 36.9 (2021 21:11)  T(F): 97.7 (2021 08:00), Max: 98.4 (2021 21:11)  HR: 78 (2021 10:00) (72 - 102)  BP: 102/67 (2021 10:00) (89/54 - 120/79)  BP(mean): 79 (2021 10:00) (66 - 93)  RR: 20 (2021 10:00) (18 - 31)  SpO2: 95% (2021 10:00) (90% - 100%)    PHYSICAL EXAM:  GENERAL: NAD, well-groomed, well-developed  HEAD:  Atraumatic, Normocephalic  EYES: conjunctiva and sclera clear  ENMT: Moist mucous membranes  NECK: Supple, No JVD  NERVOUS SYSTEM:  Alert & Oriented X2; All 4 extremities mobile, no gross sensory deficits.   CHEST/LUNG: decreased air entry bilaterally, occ wheeze/ rhonchi  HEART: Regular rate and rhythm;  ABDOMEN: Soft, Nontender, Nondistended; Bowel sounds present  EXTREMITIES:  2+ Peripheral Pulses, No clubbing, cyanosis, or edema      LABS:                        13.0   13.71 )-----------( 94       ( 2021 07:12 )             40.2     2021 07:12    137    |  99     |  25     ----------------------------<  110    3.6     |  40     |  0.66     Ca    9.7        2021 07:12  Phos  2.8       2021 16:30  Mg     1.7       2021 16:30    TPro  5.5    /  Alb  1.5    /  TBili  0.5    /  DBili  x      /  AST  15     /  ALT  <10    /  AlkPhos  73     2021 07:12    PT/INR - ( 2021 02:28 )   PT: 15.7 sec;   INR: 1.31 ratio    PTT - ( 2021 02:28 )  PTT:36.0 sec    Troponin trend:  .492   @ 07:12  .253   @ 23:51  .208   @ 16:30    Urinalysis Basic - ( 2021 03:13 )  Color: Yellow / Appearance: Clear / S.020 / pH: x  Gluc: x / Ketone: Trace  / Bili: Negative / Urobili: 8 mg/dL   Blood: x / Protein: 30 mg/dL / Nitrite: Negative   Leuk Esterase: Trace / RBC: 0-2 /HPF / WBC 0-2   Sq Epi: x / Non Sq Epi: Occasional / Bacteria: Occasional      CAPILLARY BLOOD GLUCOSE          RADIOLOGY & ADDITIONAL TESTS:    Imaging Personally Reviewed:  [ ] YES     Consultant(s) Notes Reviewed:  pulm/cc    Care Discussed with Consultants/Other Providers:  Card = Dr Le    Advanced Directives: [ ] DNR  [ ] No feeding tube  [ ] MOLST in chart  [ ] MOLST completed today  [ ] Unknown

## 2021-06-22 NOTE — PROGRESS NOTE ADULT - SUBJECTIVE AND OBJECTIVE BOX
Patient is a 65y old  Male who presents with a chief complaint of respiratory failure, lethargy (2021 15:16)      BRIEF HOSPITAL COURSE: ***    Events last 24 hours:       PAST MEDICAL & SURGICAL HISTORY:  DM (diabetes mellitus)  Sarcoid  Bronchiectasis  Subdural hematoma  Inguinal hernia  Seizure  Hypothyroid  Sleep apnea  LAMBERT and COPD overlap syndrome  BPH without urinary obstruction  Status post Bright&#x27;s procedure  Bilateral subdural hematomas  Status post cholecystectomy  Status post inguinal hernia repair      Allergies  Keppra (Other (Severe))  penicillins (Unknown)  Intolerances  benzodiazepines (Other)      FAMILY HISTORY:  Family history of diabetes mellitus  Family history of pulmonary embolism (Grandparent)      Social History:   From [facility]      Review of Systems:  Difficult to obtain 2/2 lethargy      Physical Examination:    General: Elderly male, lying in bed, tachypneic     HEENT: NC/AT, Pupils equal, reactive to light.  Symmetric. HFNC in place.     PULM: Tachypneic with accessory muscle use. Poor air movement b/l with expiratory wheezing, diminished over right side.      CVS: Regular rate and rhythm, no murmurs, rubs, or gallops appreciated    ABD: Soft, nondistended, nontender, normoactive bowel sounds, no masses     EXT: + edema, nontender    SKIN: Warm and well perfused, no rashes noted.    NEURO: Lethargic       Medications:  meropenem  IVPB 1000 milliGRAM(s) IV Intermittent every 8 hours  vancomycin  IVPB 1250 milliGRAM(s) IV Intermittent every 12 hours  albuterol/ipratropium for Nebulization 3 milliLiter(s) Nebulizer every 6 hours  budesonide 160 MICROgram(s)/formoterol 4.5 MICROgram(s) Inhaler 2 Puff(s) Inhalation two times a day  valproate sodium IVPB 1500 milliGRAM(s) IV Intermittent at bedtime  enoxaparin Injectable 40 milliGRAM(s) SubCutaneous daily  pantoprazole  Injectable 40 milliGRAM(s) IV Push daily  polyethylene glycol 3350 17 Gram(s) Oral daily PRN  levothyroxine Injectable 12.5 MICROGram(s) IV Push at bedtime  methylPREDNISolone sodium succinate Injectable 40 milliGRAM(s) IV Push daily  lactated ringers. 1000 milliLiter(s) IV Continuous <Continuous>      ICU Vital Signs Last 24 Hrs  T(C): 36.9 (2021 21:11), Max: 39.1 (2021 02:30)  T(F): 98.4 (2021 21:11), Max: 102.3 (2021 02:30)  HR: 74 (2021 00:00) (74 - 132)  BP: 94/61 (2021 00:00) (89/54 - 131/82)  BP(mean): 72 (2021 00:00) (66 - 93)  ABP: --  ABP(mean): --  RR: 23 (2021 00:00) (18 - 48)  SpO2: 96% (2021 00:00) (90% - 98%)    Vital Signs Last 24 Hrs  T(C): 36.9 (:11), Max: 39.1 (2021 02:30)  T(F): 98.4 (:11), Max: 102.3 (2021 02:30)  HR: 74 (2021 00:00) (74 - 132)  BP: 94/61 (2021 00:00) (89/54 - 131/82)  BP(mean): 72 (2021 00:00) (66 - 93)  RR: 23 (2021 00:00) (18 - 48)  SpO2: 96% (2021 00:00) (90% - 98%)    ABG - ( 2021 16:10 )  pH, Arterial: 7.31  pH, Blood: x     /  pCO2: 82    /  pO2: 54    / HCO3: 32    / Base Excess: 13.0  /  SaO2: 87          I&O's Detail    2021 07:01  -  2021 00:29  --------------------------------------------------------  IN:    IV PiggyBack: 150 mL    Lactated Ringers: 720 mL  Total IN: 870 mL    OUT:    Indwelling Catheter - Urethral (mL): 550 mL  Total OUT: 550 mL  Total NET: 320 mL      LABS:                        14.5   17.71 )-----------( 78       ( 2021 16:29 )             45.5     -    140  |  102  |  22  ----------------------------<  161<H>  3.5   |  38<H>  |  0.58    Ca    9.8      2021 16:30  Phos  2.8       Mg     1.7         TPro  5.9<L>  /  Alb  1.7<L>  /  TBili  0.6  /  DBili  x   /  AST  14  /  ALT  <10<L>  /  AlkPhos  77  -      CARDIAC MARKERS ( 2021 23:51 )  .253 ng/mL / x     / 9 U/L / x     / 1.0 ng/mL  CARDIAC MARKERS ( 2021 16:30 )  .208 ng/mL / x     / x     / x     / x          PT/INR - ( 2021 02:28 )   PT: 15.7 sec;   INR: 1.31 ratio    PTT - ( 2021 02:28 )  PTT:36.0 sec      Urinalysis Basic - ( 2021 03:13 )  Color: Yellow / Appearance: Clear / S.020 / pH: x  Gluc: x / Ketone: Trace  / Bili: Negative / Urobili: 8 mg/dL   Blood: x / Protein: 30 mg/dL / Nitrite: Negative   Leuk Esterase: Trace / RBC: 0-2 /HPF / WBC 0-2   Sq Epi: x / Non Sq Epi: Occasional / Bacteria: Occasional      CULTURES:  Pending      RADIOLOGY:   < from: CT Chest No Cont (21 @ 04:00) >  EXAM:  CT CHEST                          PROCEDURE DATE:  2021      INTERPRETATION:  CLINICAL INFORMATION: SOB, fever. Hx of sarcoid    COMPARISON: Comparison to numerous prior examinations most recent on 2021    CONTRAST/COMPLICATIONS:  IV Contrast: NONE  0 cc administered   0 cc discarded  Oral Contrast: NONE  Complications: None reported at time of study completion    PROCEDURE:  CT of the Chest was performed.  Sagittal and coronal reformats were performed.    FINDINGS:    LUNGS AND AIRWAYS: There is increased consolidation throughout the right upper and right lower lobe. Consolidation in the left lower lobe is decreased from prior exam. Marked bronchiectasis and architectural distortion is again demonstrated. Tracheal diverticulum.  PLEURA: Trace bilateral pleural effusions.  MEDIASTINUM AND JOANNE: Enlarged lymph nodes throughout the middle mediastinum are unchanged.  VESSELS: Within normal limits.  HEART: Heart size is normal. No pericardial effusion.  CHEST WALL AND LOWER NECK: Within normal limits.  VISUALIZED UPPER ABDOMEN: Status post cholecystectomy.  BONES: Degenerative changes.    IMPRESSION:    Increased consolidation throughout the right upper and lower lobes, correlate for pneumonia. Decreased consolidation in the left lower lobe.    SYLVIE RIZZO   This document has been electronically signed. 2021  4:21AM    < end of copied text >      SUPPLEMENTAL O2: HFNC  LINES: Peripheral   IVF: N  APONTE: Y  PPx: Lovenox, PPI  CONTACT: N Patient is a 65y old  Male who presents with a chief complaint of respiratory failure, lethargy (2021 15:16)      BRIEF HOSPITAL COURSE:   64 yo m pmhx Sacroidosis, COPD, LAMBERT, hypothyroidism, BPH, GERD with recent admission for right SAH s/p mechanical fall () biba from [facility] with ams and hypoxia admitted with hypoxic/hypercapnic respiratory failure requiring Bipap 100%, pneumonia and severe sepsis.     Events last 24 hours:   Patient on HFNC 70 LPM and 40% Fio2 with spo2 maintaining high 80s, ABG ordered this afternoon that revealed pao2 of 54, fio2 increased to 50%. Patient tachypneic with poor air movement b/l and expiratory wheezing.  RT called for neb. Respiratory status remains tenuous.        PAST MEDICAL & SURGICAL HISTORY:  DM (diabetes mellitus)  Sarcoid  Bronchiectasis  Subdural hematoma  Inguinal hernia  Seizure  Hypothyroid  Sleep apnea  LAMBERT and COPD overlap syndrome  BPH without urinary obstruction  Status post Bright&#x27;s procedure  Bilateral subdural hematomas  Status post cholecystectomy  Status post inguinal hernia repair      Allergies  Keppra (Other (Severe))  penicillins (Unknown)  Intolerances  benzodiazepines (Other)      FAMILY HISTORY:  Family history of diabetes mellitus  Family history of pulmonary embolism (Grandparent)      Social History:   From [facility]      Review of Systems:  Difficult to obtain 2/2 lethargy      Physical Examination:    General: Elderly male, lying in bed, tachypneic     HEENT: NC/AT, Pupils equal, reactive to light.  Symmetric. HFNC in place.     PULM: Tachypneic with accessory muscle use. Poor air movement b/l with expiratory wheezing, diminished over right side.      CVS: Regular rate and rhythm, no murmurs, rubs, or gallops appreciated    ABD: Soft, nondistended, nontender, normoactive bowel sounds, no masses     EXT: + edema, nontender    SKIN: Warm and well perfused, no rashes noted.    NEURO: Lethargic       Medications:  meropenem  IVPB 1000 milliGRAM(s) IV Intermittent every 8 hours  vancomycin  IVPB 1250 milliGRAM(s) IV Intermittent every 12 hours  albuterol/ipratropium for Nebulization 3 milliLiter(s) Nebulizer every 6 hours  budesonide 160 MICROgram(s)/formoterol 4.5 MICROgram(s) Inhaler 2 Puff(s) Inhalation two times a day  valproate sodium IVPB 1500 milliGRAM(s) IV Intermittent at bedtime  enoxaparin Injectable 40 milliGRAM(s) SubCutaneous daily  pantoprazole  Injectable 40 milliGRAM(s) IV Push daily  polyethylene glycol 3350 17 Gram(s) Oral daily PRN  levothyroxine Injectable 12.5 MICROGram(s) IV Push at bedtime  methylPREDNISolone sodium succinate Injectable 40 milliGRAM(s) IV Push daily  lactated ringers. 1000 milliLiter(s) IV Continuous <Continuous>      ICU Vital Signs Last 24 Hrs  T(C): 36.9 (2021 21:11), Max: 39.1 (2021 02:30)  T(F): 98.4 (2021 21:11), Max: 102.3 (2021 02:30)  HR: 74 (2021 00:00) (74 - 132)  BP: 94/61 (2021 00:00) (89/54 - 131/82)  BP(mean): 72 (2021 00:00) (66 - 93)  ABP: --  ABP(mean): --  RR: 23 (2021 00:00) (18 - 48)  SpO2: 96% (2021 00:00) (90% - 98%)    Vital Signs Last 24 Hrs  T(C): 36.9 (2021 21:11), Max: 39.1 (2021 02:30)  T(F): 98.4 (2021 21:11), Max: 102.3 (2021 02:30)  HR: 74 (2021 00:00) (74 - 132)  BP: 94/61 (2021 00:00) (89/54 - 131/82)  BP(mean): 72 (2021 00:00) (66 - 93)  RR: 23 (2021 00:00) (18 - 48)  SpO2: 96% (2021 00:00) (90% - 98%)    ABG - ( 2021 16:10 )  pH, Arterial: 7.31  pH, Blood: x     /  pCO2: 82    /  pO2: 54    / HCO3: 32    / Base Excess: 13.0  /  SaO2: 87          I&O's Detail    2021 07:01  -  2021 00:29  --------------------------------------------------------  IN:    IV PiggyBack: 150 mL    Lactated Ringers: 720 mL  Total IN: 870 mL    OUT:    Indwelling Catheter - Urethral (mL): 550 mL  Total OUT: 550 mL  Total NET: 320 mL      LABS:                        14.5   17.71 )-----------( 78       ( 2021 16:29 )             45.5     06-21    140  |  102  |  22  ----------------------------<  161<H>  3.5   |  38<H>  |  0.58    Ca    9.8      2021 16:30  Phos  2.8     -  Mg     1.7     -    TPro  5.9<L>  /  Alb  1.7<L>  /  TBili  0.6  /  DBili  x   /  AST  14  /  ALT  <10<L>  /  AlkPhos  77  06-21      CARDIAC MARKERS ( 2021 23:51 )  .253 ng/mL / x     / 9 U/L / x     / 1.0 ng/mL  CARDIAC MARKERS ( 2021 16:30 )  .208 ng/mL / x     / x     / x     / x          PT/INR - ( 2021 02:28 )   PT: 15.7 sec;   INR: 1.31 ratio    PTT - ( 2021 02:28 )  PTT:36.0 sec      Urinalysis Basic - ( 2021 03:13 )  Color: Yellow / Appearance: Clear / S.020 / pH: x  Gluc: x / Ketone: Trace  / Bili: Negative / Urobili: 8 mg/dL   Blood: x / Protein: 30 mg/dL / Nitrite: Negative   Leuk Esterase: Trace / RBC: 0-2 /HPF / WBC 0-2   Sq Epi: x / Non Sq Epi: Occasional / Bacteria: Occasional      CULTURES:  Pending      RADIOLOGY:   < from: CT Chest No Cont (21 @ 04:00) >  EXAM:  CT CHEST                          PROCEDURE DATE:  2021      INTERPRETATION:  CLINICAL INFORMATION: SOB, fever. Hx of sarcoid    COMPARISON: Comparison to numerous prior examinations most recent on 2021    CONTRAST/COMPLICATIONS:  IV Contrast: NONE  0 cc administered   0 cc discarded  Oral Contrast: NONE  Complications: None reported at time of study completion    PROCEDURE:  CT of the Chest was performed.  Sagittal and coronal reformats were performed.    FINDINGS:    LUNGS AND AIRWAYS: There is increased consolidation throughout the right upper and right lower lobe. Consolidation in the left lower lobe is decreased from prior exam. Marked bronchiectasis and architectural distortion is again demonstrated. Tracheal diverticulum.  PLEURA: Trace bilateral pleural effusions.  MEDIASTINUM AND JOANNE: Enlarged lymph nodes throughout the middle mediastinum are unchanged.  VESSELS: Within normal limits.  HEART: Heart size is normal. No pericardial effusion.  CHEST WALL AND LOWER NECK: Within normal limits.  VISUALIZED UPPER ABDOMEN: Status post cholecystectomy.  BONES: Degenerative changes.    IMPRESSION:    Increased consolidation throughout the right upper and lower lobes, correlate for pneumonia. Decreased consolidation in the left lower lobe.    SYLVIE RIZZO   This document has been electronically signed. 2021  4:21AM    < end of copied text >      SUPPLEMENTAL O2: HFNC  LINES: Peripheral   IVF: N  APONTE: Y  PPx: Lovenox, PPI  CONTACT: N

## 2021-06-22 NOTE — CONSULT NOTE ADULT - ASSESSMENT
The patient is a 65 year old male with a history of seizure d/o, BPH, hypothyroidism, sarcoidosis, COPD, LAMBERT, recent SAH who is admitted with respiratory failure in the setting of aspiration PNA.    Plan:  - ECG with anterolateral ST depressions  - Troponin elevated and trending up to 0.49. Likely elevated in the setting of demand ischemia from underlying infection, respiratory failure, tachycardia. Continue to trend until trending down.  - Check echo  - Given recent SAH, would hold off with antiplatelets or anticoagulants  - On meropenem and vancomycin  - BP on low side - continue IV fluids  - Mild tachycardia at times due to infections/respiratory issues

## 2021-06-22 NOTE — PROGRESS NOTE ADULT - ASSESSMENT
65M with seizures on keppra, BPH, hypothyroidism, sarcoidosis GERD, COPD, LAMBERT, recent admission to [facility] from 5/3-5/10/2021 for right subarachnoid hemorrhage s/p fall who presents from [facility] for hypoxia.  Found to have acute hypoxic and hypercapnic respiratory failure 2/2 sepsis 2/2 aspiration PNA.  Likely aspiration PNA since patient has been recommended dysphagia I diet from [facility] but reports state that he has been non-compliant with recommendation.  Also location of RLL and RUL also fits with aspiration.      Problems  Acute hypoxic and hypercapnic respiratory failure  Sepsis 2/2 aspiration PNA  Sarcoidosis  Seizures  COPD    Acute hypoxic and hypercapnic respiratory failure   - admitted to S[facility]  - repeat ABG -> monitor CO2   - consider intubation if does not improve though patient has had some improvement  - Now on high flow oxygen per Pulm.  continue suctioning and chest PT.     Sepsis from aspiration PNA with metabolic encephalopathy - meets sepsis criteria with leukocytosis and tachypnea with source of infection.  Lactate normal.  HCT with no acute bleed.  - continue with meropenem and vancomycin as per ID  - ID consult appreciated  - f/u blood cultures  - keep NPO for now as high risk for intubation    Troponin Elevation  - likely demand ischemia  - cardiology eval appreciated  - check TTE, continue to trend trop    Sarcoidosis/COPD  - started on steroids for history of bronchiectasis as well  - started on symbicort    Seizure history  - cont with depakote    Hypothyroidism  - cont with synthroid    General anxiety disorder  - restart effexor when able to take PO.     BPH  - has haq now, will need to restart flomax prior to trying to remove haq    Preventive measures  - can start with lovenox 40mg subq for DVT ppx    Patient critically ill, high risk for deterioration.    GOC discussed with sister 6/21 - full code.

## 2021-06-22 NOTE — PHARMACOTHERAPY INTERVENTION NOTE - COMMENTS
Antimicrobial Stewardship Program  ASP: restricted antibiotic   CrCl = 127.2ml/min  Meropenem 1gm IVPB q8h  ID physician on case: Dr. Tegan Granados

## 2021-06-22 NOTE — CONSULT NOTE ADULT - SUBJECTIVE AND OBJECTIVE BOX
HPI  65M with seizures on keppra, BPH, hypothyroidism, sarcoidosis GERD, COPD, LAMBERT, recent admission to [facility] from 5/3-5/10/2021 for right subarachnoid hemorrhage s/p fall who presents from [facility] for hypoxia.  Per notes, patient was found to have a RLL PNA at [facility] and was started on IV meropenem yesterday.  Then today, he was found to have O2 sats of 90% while on NRB and therefore transferred here for further evaluation.   Patient presents with deep tissue injury ( DTPI) at sacral region and skin tear left dorsal hand      PAST MEDICAL & SURGICAL HISTORY:  DM (diabetes mellitus)    Sarcoid    Bronchiectasis    Subdural hematoma    Inguinal hernia    Seizure    Hypothyroid    Sleep apnea    LAMBERT and COPD overlap syndrome    BPH without urinary obstruction    Status post Bright&#x27;s procedure    Bilateral subdural hematomas    Status post cholecystectomy    Status post inguinal hernia         MEDICATIONS  (STANDING):  albuterol/ipratropium for Nebulization 3 milliLiter(s) Nebulizer every 6 hours  budesonide 160 MICROgram(s)/formoterol 4.5 MICROgram(s) Inhaler 2 Puff(s) Inhalation two times a day  enoxaparin Injectable 40 milliGRAM(s) SubCutaneous daily  lactated ringers. 1000 milliLiter(s) (100 mL/Hr) IV Continuous <Continuous>  levothyroxine Injectable 12.5 MICROGram(s) IV Push at bedtime  meropenem  IVPB 1000 milliGRAM(s) IV Intermittent every 8 hours  methylPREDNISolone sodium succinate Injectable 40 milliGRAM(s) IV Push daily  pantoprazole  Injectable 40 milliGRAM(s) IV Push daily  valproate sodium IVPB 1500 milliGRAM(s) IV Intermittent at bedtime  vancomycin  IVPB 1250 milliGRAM(s) IV Intermittent every 12 hours    MEDICATIONS  (PRN):  polyethylene glycol 3350 17 Gram(s) Oral daily PRN Constipation        Allergies    Keppra (Other (Severe))  penicillins (Unknown)    Intolerances    benzodiazepines (Other)    FAMILY HISTORY:  Family history of diabetes mellitus    Family history of pulmonary embolism (Grandparent)    Vital Signs Last 24 Hrs  T(C): 36.7 (22 Jun 2021 12:00), Max: 36.9 (21 Jun 2021 21:11)  T(F): 98.1 (22 Jun 2021 12:00), Max: 98.4 (21 Jun 2021 21:11)  HR: 75 (22 Jun 2021 13:50) (72 - 90)  BP: 99/71 (22 Jun 2021 12:01) (89/54 - 112/68)  BP(mean): 81 (22 Jun 2021 12:01) (66 - 84)  RR: 21 (22 Jun 2021 12:01) (18 - 23)  SpO2: 94% (22 Jun 2021 13:50) (92% - 100%)    Total Care Sport/ Versa Care P500 bed                          13.0   13.71 )-----------( 94       ( 22 Jun 2021 07:12 )             40.2   06-22    137  |  99  |  25<H>  ----------------------------<  110<H>  3.6   |  40<H>  |  0.66    Ca    9.7      22 Jun 2021 07:12  Phos  2.8     06-21  Mg     1.7     06-21    TPro  5.5<L>  /  Alb  1.5<L>  /  TBili  0.5  /  DBili  x   /  AST  15  /  ALT  <10<L>  /  AlkPhos  73  06-22      Neurology nonverbal, no follow commands    Musculoskeletal/Vascular:  no deformities/ contractures    Skin: frail,      A1C with Estimated Average Glucose Result: 5.2 % (06-22-21 @ 00:18)  Auto Neutrophil #: 15.61 K/uL (06-21-21 @ 16:29)

## 2021-06-22 NOTE — PROGRESS NOTE ADULT - ASSESSMENT
66 yo m pmhx Sacroidosis, COPD, LAMBERT, hypothyroidism, BPH, GERD with recent admission for right SAH s/p mechanical fall (05/21) biba from [facility] with ams and hypoxia admitted with hypoxic/hypercapnic respiratory failure requiring Bipap 100%, pneumonia and severe sepsis.     NEURO: Continue home dose valproate.   CV: Borderline BP, close HD monitoring.  RESP: Hypoxic/Hypercapnic Respiratory Failure, HFNC 70 LPM, Fio2 50%, titrating settings to maintain spo2 >88%, aggressive chest pt, aggressive suctioning, keep HOB >30 degrees, aspiration precautions.  Nebs q6hr and prn. Continue solumedrol.   RENAL: Monitor lytes, replace as needed.  Herrera in place.   GI: NPO  ENDO: Hypothyroidism on synthroid.   ID: Meropenem and vancomycin for coverage.  Blood cx pending.  Sputum cx if obtainable. ID following  HEME: Heparin for vte ppx    DISPO: Full code.      Critical Care time: 60 mins assessing presenting problems of acute illness that poses high probability of life threatening deterioration or end organ damage/dysfunction.  Medical decision making including Initiating plan of care, reviewing data, reviewing radiology, discussing with multidisciplinary team, non inclusive of procedures, discussing goals of care with patient/family  66 yo m pmhx Sacroidosis, COPD, LAMBERT, hypothyroidism, BPH, GERD with recent admission for right SAH s/p mechanical fall (05/21) biba from [facility] with ams and hypoxia admitted with hypoxic/hypercapnic respiratory failure requiring Bipap 100%, pneumonia and severe sepsis.     NEURO: Lethargic/encephalopathy- ?pneumonia. Continue home dose valproate.   CV: Borderline BP, close HD monitoring.  RESP: Hypoxic/Hypercapnic Respiratory Failure, HFNC 70 LPM, Fio2 50%, titrating settings to maintain spo2 >88%, aggressive chest pt, aggressive suctioning, keep HOB >30 degrees, aspiration precautions.  Nebs q6hr and prn. Continue solumedrol.   RENAL: Monitor lytes, replace as needed.  Herrera in place.   GI: NPO  ENDO: Hypothyroidism on synthroid.   ID: Meropenem and vancomycin for coverage.  Blood cx pending.  Sputum cx if obtainable. ID following  HEME: Heparin for vte ppx    DISPO: Full code.      Critical Care time: 60 mins assessing presenting problems of acute illness that poses high probability of life threatening deterioration or end organ damage/dysfunction.  Medical decision making including Initiating plan of care, reviewing data, reviewing radiology, discussing with multidisciplinary team, non inclusive of procedures, discussing goals of care with patient/family

## 2021-06-22 NOTE — CONSULT NOTE ADULT - ASSESSMENT
Patient is a 64yo male presenting with a DTPI sacral region: 10 x 7 periwound intact:     recommendation: Please cleanse with NS< Pat dry, paint kathi-wound with Cavilon, cover with SIN BID and PRN, it is ok to leave a thin layer of cream after cleansing this will not impede wound healing.     2. Left dorsal hand skin tear stage 2 ---4.5 x 1 dry ecchymotic, no odor, no induration, no fluctulance, no warmth, no tenderness    recommendation: cleanse with NS, pat dry, cover with adaptic, 4x4, secure with rufina QOD      Continue Nutrition (as tolerated)  Continue Offloading   Continue Pericare  Care as per medicine will follow w/ you  Discussed findings and recommendations with Medical team member  Thank you for this consult  Mary Jo Alonzo NP, [facility] [phone]

## 2021-06-22 NOTE — PROGRESS NOTE ADULT - SUBJECTIVE AND OBJECTIVE BOX
Date/Time Patient Seen:  		  Referring MD:   Data Reviewed	       Patient is a 65y old  Male who presents with a chief complaint of respiratory failure, lethargy (22 Jun 2021 00:28)      Subjective/HPI     PAST MEDICAL & SURGICAL HISTORY:  No pertinent past medical history    DM (diabetes mellitus)    Sarcoid    Bronchiectasis    Diverticulitis    Subdural hematoma    Inguinal hernia    Cellulitis    Seizure    Hypothyroid    Sleep apnea    LAMBERT and COPD overlap syndrome    BPH without urinary obstruction    No significant past surgical history    Status post Bright&#x27;s procedure    Bilateral subdural hematomas    Status post cholecystectomy    Status post inguinal hernia repair          Medication list         MEDICATIONS  (STANDING):  albuterol/ipratropium for Nebulization 3 milliLiter(s) Nebulizer every 6 hours  budesonide 160 MICROgram(s)/formoterol 4.5 MICROgram(s) Inhaler 2 Puff(s) Inhalation two times a day  enoxaparin Injectable 40 milliGRAM(s) SubCutaneous daily  lactated ringers. 1000 milliLiter(s) (60 mL/Hr) IV Continuous <Continuous>  levothyroxine Injectable 12.5 MICROGram(s) IV Push at bedtime  meropenem  IVPB 1000 milliGRAM(s) IV Intermittent every 8 hours  methylPREDNISolone sodium succinate Injectable 40 milliGRAM(s) IV Push daily  pantoprazole  Injectable 40 milliGRAM(s) IV Push daily  valproate sodium IVPB 1500 milliGRAM(s) IV Intermittent at bedtime  vancomycin  IVPB 1250 milliGRAM(s) IV Intermittent every 12 hours    MEDICATIONS  (PRN):  polyethylene glycol 3350 17 Gram(s) Oral daily PRN Constipation         Vitals log        ICU Vital Signs Last 24 Hrs  T(C): 36.7 (22 Jun 2021 04:03), Max: 36.9 (21 Jun 2021 21:11)  T(F): 98 (22 Jun 2021 04:03), Max: 98.4 (21 Jun 2021 21:11)  HR: 77 (22 Jun 2021 06:41) (72 - 104)  BP: 90/58 (22 Jun 2021 06:00) (89/54 - 120/79)  BP(mean): 68 (22 Jun 2021 06:00) (66 - 93)  ABP: --  ABP(mean): --  RR: 20 (22 Jun 2021 06:00) (18 - 31)  SpO2: 95% (22 Jun 2021 06:41) (90% - 100%)           Input and Output:  I&O's Detail    21 Jun 2021 07:01  -  22 Jun 2021 07:00  --------------------------------------------------------  IN:    IV PiggyBack: 450 mL    Lactated Ringers: 1200 mL  Total IN: 1650 mL    OUT:    Indwelling Catheter - Urethral (mL): 800 mL  Total OUT: 800 mL    Total NET: 850 mL          Lab Data                        14.5   17.71 )-----------( 78       ( 21 Jun 2021 16:29 )             45.5     06-21    140  |  102  |  22  ----------------------------<  161<H>  3.5   |  38<H>  |  0.58    Ca    9.8      21 Jun 2021 16:30  Phos  2.8     06-21  Mg     1.7     06-21    TPro  5.9<L>  /  Alb  1.7<L>  /  TBili  0.6  /  DBili  x   /  AST  14  /  ALT  <10<L>  /  AlkPhos  77  06-21    ABG - ( 21 Jun 2021 16:10 )  pH, Arterial: 7.31  pH, Blood: x     /  pCO2: 82    /  pO2: 54    / HCO3: 32    / Base Excess: 13.0  /  SaO2: 87                CARDIAC MARKERS ( 21 Jun 2021 23:51 )  .253 ng/mL / x     / 9 U/L / x     / 1.0 ng/mL  CARDIAC MARKERS ( 21 Jun 2021 16:30 )  .208 ng/mL / x     / x     / x     / x            Review of Systems	      Objective     Physical Examination    heart s1s2  lung dec BS  abd soft  HF      Pertinent Lab findings & Imaging      Sharon:  NO   Adequate UO     I&O's Detail    21 Jun 2021 07:01  -  22 Jun 2021 07:00  --------------------------------------------------------  IN:    IV PiggyBack: 450 mL    Lactated Ringers: 1200 mL  Total IN: 1650 mL    OUT:    Indwelling Catheter - Urethral (mL): 800 mL  Total OUT: 800 mL    Total NET: 850 mL               Discussed with:     Cultures:	        Radiology

## 2021-06-22 NOTE — PROVIDER CONTACT NOTE (CRITICAL VALUE NOTIFICATION) - ASSESSMENT
Pt on HFNC, admitted for pneumonia on IV antibiotics.
Pt on HFNC, admitted for pneumonia on IV antibiotics.

## 2021-06-23 NOTE — PROGRESS NOTE ADULT - SUBJECTIVE AND OBJECTIVE BOX
Date/Time Patient Seen:  		  Referring MD:   Data Reviewed	       Patient is a 65y old  Male who presents with a chief complaint of respiratory failure, lethargy (23 Jun 2021 00:58)      Subjective/HPI     PAST MEDICAL & SURGICAL HISTORY:  No pertinent past medical history    DM (diabetes mellitus)    Sarcoid    Bronchiectasis    Diverticulitis    Subdural hematoma    Inguinal hernia    Cellulitis    Seizure    Hypothyroid    Sleep apnea    LAMBERT and COPD overlap syndrome    BPH without urinary obstruction    No significant past surgical history    Status post Bright&#x27;s procedure    Bilateral subdural hematomas    Status post cholecystectomy    Status post inguinal hernia repair          Medication list         MEDICATIONS  (STANDING):  albuterol/ipratropium for Nebulization 3 milliLiter(s) Nebulizer every 6 hours  budesonide 160 MICROgram(s)/formoterol 4.5 MICROgram(s) Inhaler 2 Puff(s) Inhalation two times a day  enoxaparin Injectable 40 milliGRAM(s) SubCutaneous daily  lactated ringers. 1000 milliLiter(s) (100 mL/Hr) IV Continuous <Continuous>  levothyroxine Injectable 12.5 MICROGram(s) IV Push at bedtime  meropenem  IVPB 1000 milliGRAM(s) IV Intermittent every 8 hours  methylPREDNISolone sodium succinate Injectable 40 milliGRAM(s) IV Push daily  pantoprazole  Injectable 40 milliGRAM(s) IV Push daily  valproate sodium IVPB 1500 milliGRAM(s) IV Intermittent at bedtime  vancomycin  IVPB 1250 milliGRAM(s) IV Intermittent every 12 hours    MEDICATIONS  (PRN):  polyethylene glycol 3350 17 Gram(s) Oral daily PRN Constipation         Vitals log        ICU Vital Signs Last 24 Hrs  T(C): 36.8 (23 Jun 2021 04:03), Max: 36.9 (23 Jun 2021 00:03)  T(F): 98.2 (23 Jun 2021 04:03), Max: 98.4 (23 Jun 2021 00:03)  HR: 76 (23 Jun 2021 06:00) (63 - 87)  BP: 100/74 (23 Jun 2021 06:00) (89/62 - 103/67)  BP(mean): 83 (23 Jun 2021 06:00) (61 - 83)  ABP: --  ABP(mean): --  RR: 26 (23 Jun 2021 06:00) (20 - 29)  SpO2: 93% (23 Jun 2021 06:00) (91% - 100%)           Input and Output:  I&O's Detail    22 Jun 2021 07:01  -  23 Jun 2021 07:00  --------------------------------------------------------  IN:    IV PiggyBack: 700 mL    Lactated Ringers: 2140 mL  Total IN: 2840 mL    OUT:    Indwelling Catheter - Urethral (mL): 850 mL    Oral Fluid: 0 mL  Total OUT: 850 mL    Total NET: 1990 mL          Lab Data                        12.8   15.37 )-----------( 121      ( 23 Jun 2021 06:22 )             40.0     06-23    139  |  98  |  23  ----------------------------<  94  3.6   |  44<H>  |  0.54    Ca    9.7      23 Jun 2021 06:22  Phos  2.8     06-21  Mg     1.8     06-23    TPro  5.5<L>  /  Alb  1.5<L>  /  TBili  0.5  /  DBili  x   /  AST  15  /  ALT  <10<L>  /  AlkPhos  73  06-22    ABG - ( 22 Jun 2021 21:59 )  pH, Arterial: 7.43  pH, Blood: x     /  pCO2: 66    /  pO2: 73    / HCO3: 40    / Base Excess: 18.0  /  SaO2: 95                CARDIAC MARKERS ( 23 Jun 2021 06:22 )  .354 ng/mL / x     / x     / x     / x      CARDIAC MARKERS ( 22 Jun 2021 17:22 )  .494 ng/mL / x     / x     / x     / x      CARDIAC MARKERS ( 22 Jun 2021 07:12 )  .492 ng/mL / x     / 17 U/L / x     / 2.4 ng/mL  CARDIAC MARKERS ( 21 Jun 2021 23:51 )  .253 ng/mL / x     / 9 U/L / x     / 1.0 ng/mL  CARDIAC MARKERS ( 21 Jun 2021 16:30 )  .208 ng/mL / x     / x     / x     / x            Review of Systems	      Objective     Physical Examination    heart s1s2  lung dc BS  abd soft  head nc      Pertinent Lab findings & Imaging      Herrera:  NO   Adequate UO     I&O's Detail    22 Jun 2021 07:01  -  23 Jun 2021 07:00  --------------------------------------------------------  IN:    IV PiggyBack: 700 mL    Lactated Ringers: 2140 mL  Total IN: 2840 mL    OUT:    Indwelling Catheter - Urethral (mL): 850 mL    Oral Fluid: 0 mL  Total OUT: 850 mL    Total NET: 1990 mL               Discussed with:     Cultures:	        Radiology

## 2021-06-23 NOTE — PROGRESS NOTE ADULT - SUBJECTIVE AND OBJECTIVE BOX
Chief Complaint: Respiratory distress    Interval Events: No events overnight.    Review of Systems:  General: No fevers, chills, weight loss or gain  Skin: No rashes, color changes  Cardiovascular: No chest pain, orthopnea  Respiratory: No shortness of breath, cough  Gastrointestinal: No nausea, abdominal pain  Genitourinary: No incontinence, pain with urination  Musculoskeletal: No pain, swelling, decreased range of motion  Neurological: No headache, weakness  Psychiatric: No depression, anxiety  Endocrine: No weight loss or gain, increased thirst  All other systems are comprehensively negative.    Physical Exam:  Vitals:        Vital Signs Last 24 Hrs  T(C): 36.8 (23 Jun 2021 04:03), Max: 36.9 (23 Jun 2021 00:03)  T(F): 98.2 (23 Jun 2021 04:03), Max: 98.4 (23 Jun 2021 00:03)  HR: 76 (23 Jun 2021 06:00) (63 - 87)  BP: 100/74 (23 Jun 2021 06:00) (90/61 - 103/67)  BP(mean): 83 (23 Jun 2021 06:00) (61 - 83)  RR: 26 (23 Jun 2021 06:00) (20 - 29)  SpO2: 93% (23 Jun 2021 06:00) (91% - 100%)  General: NAD  HEENT: MMM  Neck: No JVD, no carotid bruit  Lungs: CTAB  CV: RRR, nl S1/S2, no M/R/G  Abdomen: S/NT/ND, +BS  Extremities: No LE edema, no cyanosis  Neuro: AAOx3, non-focal  Skin: No rash    Labs:                        12.8   15.37 )-----------( 121      ( 23 Jun 2021 06:22 )             40.0     06-23    139  |  98  |  23  ----------------------------<  94  3.6   |  44<H>  |  0.54    Ca    9.7      23 Jun 2021 06:22  Phos  2.8     06-21  Mg     1.8     06-23    TPro  5.5<L>  /  Alb  1.5<L>  /  TBili  0.5  /  DBili  x   /  AST  15  /  ALT  <10<L>  /  AlkPhos  73  06-22    CARDIAC MARKERS ( 23 Jun 2021 06:22 )  .354 ng/mL / x     / x     / x     / x      CARDIAC MARKERS ( 22 Jun 2021 17:22 )  .494 ng/mL / x     / x     / x     / x      CARDIAC MARKERS ( 22 Jun 2021 07:12 )  .492 ng/mL / x     / 17 U/L / x     / 2.4 ng/mL  CARDIAC MARKERS ( 21 Jun 2021 23:51 )  .253 ng/mL / x     / 9 U/L / x     / 1.0 ng/mL  CARDIAC MARKERS ( 21 Jun 2021 16:30 )  .208 ng/mL / x     / x     / x     / x              Telemetry: Sinus rhythm

## 2021-06-23 NOTE — PROGRESS NOTE ADULT - ASSESSMENT
The patient is a 65 year old male with a history of seizure d/o, BPH, hypothyroidism, sarcoidosis, COPD, LAMBERT, recent SAH who is admitted with respiratory failure in the setting of aspiration PNA.    Plan:  - ECG with anterolateral ST depressions  - Troponin elevated and trending up to 0.49. Likely elevated in the setting of demand ischemia from underlying infection, respiratory failure, tachycardia. No need to trend further.  - Echo with normal LV systolic function, no significant valve issues  - Given recent SAH, would hold off with antiplatelets or anticoagulants  - On meropenem and vancomycin  - BP on low side - continue IV fluids  - Mild tachycardia at times due to infections/respiratory issues

## 2021-06-23 NOTE — PROGRESS NOTE ADULT - SUBJECTIVE AND OBJECTIVE BOX
JULY FORTE  MRN-[id number]  Patient is a 65y old  Male who presents with a chief complaint of respiratory failure, lethargy (22 Jun 2021 17:55)    HPI:  ***Patient is currently lethargic and unable to provide any history.  Collateral information obtained from chart.**      65M with seizures on keppra, BPH, hypothyroidism, sarcoidosis GERD, COPD, LAMBERT, recent admission to [facility] from 5/3-5/10/2021 for right subarachnoid hemorrhage s/p fall who presents from [facility] for hypoxia.  Per notes, patient was found to have a RLL PNA at [facility] and was started on IV meropenem yesterday.  Then today, he was found to have O2 sats of 90% while on NRB and therefore transferred here for further evaluation.     I/n the ED, patient's triage vitals were /83, , RR 32, 90%, T 100.3F.  Patient was noted to nearly obtunded with tachypnea and patient was started on BiPAP on 100%.   Labs showed leukocytosis of 25.3 with a left shift and an ABG of 7.28/89/136.  CT eventually showed RUL and RLL consolidation, likely PNA and patient was started on meropenem.  ICU was consulted and patient was accepted to [facility]U for acute hypoxic respiratory failure 2/2 sepsis 2/2 possible aspiration PNA.          (21 Jun 2021 04:48)      Hospital course:  biba from [facility] with ams and hypoxia admitted with hypoxic/hypercapnic respiratory failure requiring Bipap 100%, pneumonia and severe sepsis. Patient on HFNC 70 LPM and 50% Fio2    Today:  Pt remains on HFNC 50% and 55L, ABG performed this evening 7.43/66/73/40/95%. Pt with a lot of secretions, althought with strong cough. frequent suctioning performed at the bedside. no indication for BiPAP at this time given secretions and ABG   Pt is tenuous and at high risk for intubation     REVIEW OF SYSTEMS:    Gen: No fever  EYES/ENT: No visual changes;  No vertigo or throat pain   NECK: No pain   RES:  No shortness of breath +Cough + secretions   CARD: No chest pain   GI: No abdominal pain  : No dysuria  NEURO: No weakness  SKIN: No itching, rashes     Physical Exam:  Vital Signs Last 24 Hrs  T(C): 36.9 (23 Jun 2021 00:03), Max: 36.9 (23 Jun 2021 00:03)  T(F): 98.4 (23 Jun 2021 00:03), Max: 98.4 (23 Jun 2021 00:03)  HR: 64 (23 Jun 2021 04:57) (63 - 87)  BP: 94/46 (23 Jun 2021 04:00) (89/62 - 103/67)  BP(mean): 61 (23 Jun 2021 04:00) (61 - 81)  RR: 20 (23 Jun 2021 04:00) (20 - 29)  SpO2: 96% (23 Jun 2021 04:57) (91% - 100%)    Gen:  Awake, lethrgic A/O X1-2  CNS: non focal 	  Neck: no JVD  RES : HNFC coarse BS throughout audible secretions with cough, diminished BS at bases  CVS: Regular  rhythm. Normal S1/S2  Abd: Soft, non-distended. Bowel sounds present.  Skin: No rash.  Ext:  no edema    ============================I/O===========================   I&O's Detail    21 Jun 2021 07:01  -  22 Jun 2021 07:00  --------------------------------------------------------  IN:    IV PiggyBack: 450 mL    Lactated Ringers: 1200 mL  Total IN: 1650 mL    OUT:    Indwelling Catheter - Urethral (mL): 800 mL  Total OUT: 800 mL    Total NET: 850 mL      22 Jun 2021 07:01  -  23 Jun 2021 06:04  --------------------------------------------------------  IN:    IV PiggyBack: 700 mL    Lactated Ringers: 2040 mL  Total IN: 2740 mL    OUT:    Indwelling Catheter - Urethral (mL): 450 mL  Total OUT: 450 mL    Total NET: 2290 mL        ============================ LABS =========================                        13.0   13.71 )-----------( 94       ( 22 Jun 2021 07:12 )             40.2     06-22    137  |  99  |  25<H>  ----------------------------<  110<H>  3.6   |  40<H>  |  0.66    Ca    9.7      22 Jun 2021 07:12  Phos  2.8     06-21  Mg     1.7     06-21    TPro  5.5<L>  /  Alb  1.5<L>  /  TBili  0.5  /  DBili  x   /  AST  15  /  ALT  <10<L>  /  AlkPhos  73  06-22    LIVER FUNCTIONS - ( 22 Jun 2021 07:12 )  Alb: 1.5 g/dL / Pro: 5.5 g/dL / ALK PHOS: 73 U/L / ALT: <10 U/L DA / AST: 15 U/L / GGT: x             ABG - ( 22 Jun 2021 21:59 )  pH, Arterial: 7.43  pH, Blood: x     /  pCO2: 66    /  pO2: 73    / HCO3: 40    / Base Excess: 18.0  /  SaO2: 95                  ======================Micro/Rad/Cardio=================  Culture: Reviewed   CXR: Reviewed  < from: CT Chest No Cont (06.21.21 @ 04:00) >  IMPRESSION:    Increased consolidation throughout the right upper and lower lobes, correlate for pneumonia. Decreased consolidation in the left lower lobe.      < end of copied text >    Echo:Reviewed  ======================================================  PAST MEDICAL & SURGICAL HISTORY:  DM (diabetes mellitus)    Sarcoid    Bronchiectasis    Subdural hematoma    Inguinal hernia    Seizure    Hypothyroid    Sleep apnea    LAMBERT and COPD overlap syndrome    BPH without urinary obstruction    Status post Bright&#x27;s procedure    Bilateral subdural hematomas    Status post cholecystectomy    Status post inguinal hernia repair      ====================ASSESSMENT ==============  66 yo m pmhx Sacroidosis, COPD, LAMBERT, hypothyroidism, BPH, GERD with recent admission for right SAH s/p mechanical fall (05/21) biba from [facility] with ams and hypoxia admitted with hypoxic/hypercapnic respiratory failure requiring Bipap 100%, pneumonia and severe sepsis.   ====================== NEUROLOGY=====================  valproate sodium IVPB 1500 milliGRAM(s) IV Intermittent at bedtime    ==================== RESPIRATORY======================  -HFNC 55/50%  -off BiPAP ON   -ABG this AM   titrating settings to maintain spo2 >88%, aggressive chest pt  -aggressive suctioning  - keep HOB >30 degrees,   -aspiration precautions.    -Nebs q6hr and prn  -Continue solumedrol.     albuterol/ipratropium for Nebulization 3 milliLiter(s) Nebulizer every 6 hours  budesonide 160 MICROgram(s)/formoterol 4.5 MICROgram(s) Inhaler 2 Puff(s) Inhalation two times a day    ====================CARDIOVASCULAR==================    ===================HEMATOLOGIC/ONC ===================  enoxaparin Injectable 40 milliGRAM(s) SubCutaneous daily    ===================== RENAL =========================  Continue monitoring urine output   Monitor lytes, replace as needed.  Herrera in place.   ==================== GASTROINTESTINAL===================  lactated ringers. 1000 milliLiter(s) (100 mL/Hr) IV Continuous <Continuous>  pantoprazole  Injectable 40 milliGRAM(s) IV Push daily  polyethylene glycol 3350 17 Gram(s) Oral daily PRN Constipation    =======================    ENDOCRINE  =====================  levothyroxine Injectable 12.5 MICROGram(s) IV Push at bedtime  methylPREDNISolone sodium succinate Injectable 40 milliGRAM(s) IV Push daily    ========================INFECTIOUS DISEASE================  meropenem  IVPB 1000 milliGRAM(s) IV Intermittent every 8 hours  vancomycin  IVPB 1250 milliGRAM(s) IV Intermittent every 12 hours  Blood cx NTD   Sputum cx pending if obtainable   ID following    DISPO: Full code.     Patient requires continuous monitoring with bedside rhythm monitoring, pulse oximetry, ventilator/ bipap  monitoring and intermittent blood gas analysis.    Care plan discussed with ICU care team.    Patient remained critical; required more than usual care; I have spent 55 minutes providing non-routine care, revaluated multiple times during the day.

## 2021-06-23 NOTE — PROGRESS NOTE ADULT - SUBJECTIVE AND OBJECTIVE BOX
JULY FORTE is a 65yMale , patient examined and chart reviewed.     INTERVAL HPI/ OVERNIGHT EVENTS:   Remains on HFNC. Afebrile.  More awake alert. NAD.    PAST MEDICAL & SURGICAL HISTORY:  DM (diabetes mellitus)  Sarcoid  Bronchiectasis  Subdural hematoma  Inguinal hernia  Seizure  Hypothyroid  Sleep apnea  LAMBERT and COPD overlap syndrome  BPH without urinary obstruction  Status post Bright&#x27;s procedure  Bilateral subdural hematomas  Status post cholecystectomy  Status post inguinal hernia repair      For details regarding the patient's social history, family history, and other miscellaneous elements, please refer the initial infectious diseases consultation and/or the admitting history and physical examination for this admission.    ROS:  Unable to obtain due to : UTO sec Pt's condition    ALLERGIES  benzodiazepines (Other)  Keppra (Other (Severe))  penicillins (Unknown)      Current inpatient medications :    ANTIBIOTICS/RELEVANT:  meropenem  IVPB 1000 milliGRAM(s) IV Intermittent every 8 hours  vancomycin  IVPB 1250 milliGRAM(s) IV Intermittent every 12 hours      MEDICATIONS  (STANDING):  albuterol/ipratropium for Nebulization 3 milliLiter(s) Nebulizer every 6 hours  enoxaparin Injectable 40 milliGRAM(s) SubCutaneous daily  lactated ringers. 1000 milliLiter(s) (100 mL/Hr) IV Continuous <Continuous>  levothyroxine Injectable 12.5 MICROGram(s) IV Push at bedtime  methylPREDNISolone sodium succinate Injectable 40 milliGRAM(s) IV Push daily  pantoprazole  Injectable 40 milliGRAM(s) IV Push daily  valproate sodium IVPB 1500 milliGRAM(s) IV Intermittent at bedtime    MEDICATIONS  (PRN):  polyethylene glycol 3350 17 Gram(s) Oral daily PRN Constipation      Objective:  Vital Signs Last 24 Hrs  T(C): 36.7 (2021 19:36), Max: 36.9 (2021 00:03)  T(F): 98.1 (2021 19:36), Max: 98.4 (2021 00:03)  HR: 59 (2021 20:01) (59 - 89)  BP: 102/79 (2021 20:00) (90/61 - 111/71)  BP(mean): 88 (2021 20:00) (61 - 88)  RR: 20 (2021 20:00) (17 - 29)  SpO2: 94% (2021 20:01) (91% - 97%)    Physical Exam:  General: Weak frail  no acute distress  Neck: supple, trachea midline  Lungs: Decreased no wheeze/rhonchi  Cardiovascular: regular rate and rhythm, S1 S2  Abdomen: soft, nontender,  bowel sounds normal  Neurological: Lethargic  Skin: no rash  Extremities: + edema        LABS:                        12.8   15.37 )-----------( 121      ( 2021 06:22 )             40.0   06-23    139  |  98  |  23  ----------------------------<  94  3.6   |  44<H>  |  0.54    Ca    9.7      2021 06:22  Mg     1.8         TPro  5.5<L>  /  Alb  1.5<L>  /  TBili  0.5  /  DBili  x   /  AST  15  /  ALT  <10<L>  /  AlkPhos  73  06-22    Urinalysis Basic - ( 2021 03:13 )    Color: Yellow / Appearance: Clear / S.020 / pH: x  Gluc: x / Ketone: Trace  / Bili: Negative / Urobili: 8 mg/dL   Blood: x / Protein: 30 mg/dL / Nitrite: Negative   Leuk Esterase: Trace / RBC: 0-2 /HPF / WBC 0-2   Sq Epi: x / Non Sq Epi: Occasional / Bacteria: Occasional      Vancomycin Level, Trough: 11.1 ug/mL ( @ 11:39)    ABG - ( 2021 10:26 )  pH, Arterial: 7.44  pH, Blood: x     /  pCO2: 62    /  pO2: 71    / HCO3: 39    / Base Excess: 16.0  /  SaO2: 95          MICROBIOLOGY:    Culture - Sputum (collected 2021 14:04)  Source: .Sputum Sputum  Gram Stain (2021 20:37):    Few Squamous epithelial cells per low power field    Few polymorphonuclear leukocytes per low power field    Few Yeast per oil power field    Culture - Urine (collected 2021 14:11)  Source: .Urine Clean Catch (Midstream)  Final Report (2021 14:00):    No growth    Culture - Blood (collected 2021 14:11)  Source: .Blood Blood-Peripheral  Preliminary Report (2021 15:02):    No growth to date.    Culture - Blood (collected 2021 14:11)  Source: .Blood Blood-Peripheral  Preliminary Report (2021 15:02):    No growth to date.    MRSA/MSSA PCR (21 @ 00:13)    MRSA PCR Result.: NotDete    Staph Aureus PCR Result: NotDete      RADIOLOGY & ADDITIONAL STUDIES:  EXAM:  CT CHEST                                  PROCEDURE DATE:  2021          INTERPRETATION:  CLINICAL INFORMATION: SOB, fever. Hx of sarcoid    COMPARISON: Comparison to numerous prior examinations most recent on 2021    CONTRAST/COMPLICATIONS:  IV Contrast: NONE  0 cc administered   0 cc discarded  Oral Contrast: NONE  Complications: None reported at time of study completion    PROCEDURE:  CT of the Chest was performed.  Sagittal and coronal reformats were performed.    FINDINGS:    LUNGS AND AIRWAYS: There is increased consolidation throughout the right upper and right lower lobe. Consolidation in the left lower lobe is decreased from prior exam. Marked bronchiectasis and architectural distortion is again demonstrated. Tracheal diverticulum.  PLEURA: Trace bilateral pleural effusions.  MEDIASTINUM AND JOANNE: Enlarged lymph nodes throughout the middle mediastinum are unchanged.  VESSELS: Within normal limits.  HEART: Heart size is normal. No pericardial effusion.  CHEST WALL AND LOWER NECK: Within normal limits.  VISUALIZED UPPER ABDOMEN: Status post cholecystectomy.  BONES: Degenerative changes.    IMPRESSION:    Increased consolidation throughout the right upper and lower lobes, correlate for pneumonia. Decreased consolidation in the left lower lobe.      Assessment :  65M with seizures on keppra, BPH, hypothyroidism, sarcoidosis GERD, COPD, LAMBERT, recent admission to [facility] from 5/3-5/10/2021 for right subarachnoid hemorrhage s/p fall admitted from [facility] rehab with acute hypoxic respiratory failure sec severe right lung pneumonia likely aspiration pneumonia. Off BIPAP now on HFNC,  Sp fever   Leukocytosis multifactorial       Plan :   Cont Meropenam  DC Vancomycin as MRSA swab neg  Trend temps and cbc  Swallow study when more awake  Cont to titrate down 02 reqiurements  Pulm on case    D/w Dr Pak and Dr Birmingham    Continue with present regiment.  Appropriate use of antibiotics and adverse effects reviewed.      > 35 minutes were spent in direct patient care reviewing notes, medications ,labs data/ imaging , discussion with multidisciplinary team.    Thank you for allowing me to participate in care of your patient .    Tegan Granados MD  Infectious Disease  [phone]

## 2021-06-23 NOTE — CHART NOTE - NSCHARTNOTEFT_GEN_A_CORE
Follow up note:  Patient is a 65 y old  male who presents with a chief complaint of respiratory failure, lethargy,. PMH: seizures on keppra, BPH, hypothyroidism, sarcoidosis GERD, COPD, LAMBERT, recent admission to [facility] from 5/3-5/10/2021 for right subarachnoid hemorrhage s/p fall who presents from [facility] for hypoxia. Chart review:  Found to have acute hypoxic and hypercapnic respiratory failure 2/2 sepsis 2/2 aspiration PNA.  Likely aspiration PNA since patient has been recommended dysphagia I diet from [facility] but reports state that he has been non-compliant with recommendation.  Also location of RLL and RUL also fits with aspiration.  Suctioned for thick creamy secretions, on IVF @ 100 ml/hr. Now on high flow oxygen per Pulm - attempted to wean this AM.  MD note: Sepsis from aspiration PNA with metabolic encephalopathy - meets sepsis criteria with leukocytosis and tachypnea with source of infection. Keep NPO for now as high risk for intubation. BP on low side - continue IV fluids. At risk for decompensation - resp failure. Labs reviewed no s/s of dehydration noted.       Factors impacting intake: [ ] none [ ] nausea  [ ] vomiting [ ] diarrhea [ ] constipation  [ ]chewing problems [x ] swallowing issues  [x ] other: NPO    Diet Presciption: Diet, NPO (06-21-21 @ 06:20)    Intake: NPO     Current Weight: Weight (kg): 82.6 (06-21 @ 01:58)  % Weight Change  177.6 lbs (6/23)   182.1 lbs (6/21)    Pertinent Medications: MEDICATIONS  (STANDING):  albuterol/ipratropium for Nebulization 3 milliLiter(s) Nebulizer every 6 hours  enoxaparin Injectable 40 milliGRAM(s) SubCutaneous daily  lactated ringers. 1000 milliLiter(s) (100 mL/Hr) IV Continuous <Continuous>  levothyroxine Injectable 12.5 MICROGram(s) IV Push at bedtime  meropenem  IVPB 1000 milliGRAM(s) IV Intermittent every 8 hours  methylPREDNISolone sodium succinate Injectable 40 milliGRAM(s) IV Push daily  pantoprazole  Injectable 40 milliGRAM(s) IV Push daily  valproate sodium IVPB 1500 milliGRAM(s) IV Intermittent at bedtime  vancomycin  IVPB 1250 milliGRAM(s) IV Intermittent every 12 hours    MEDICATIONS  (PRN):  polyethylene glycol 3350 17 Gram(s) Oral daily PRN Constipation    Pertinent Labs: 06-23 Na139 mmol/L Glu 94 mg/dL K+ 3.6 mmol/L Cr  0.54 mg/dL BUN 23 mg/dL 06-21 Phos 2.8 mg/dL 06-22 Alb 1.5 g/dL<L>     CAPILLARY BLOOD GLUCOSE    Skin: DTI sacrum on admission     Estimated Needs:   [x ] no change since previous assessment  [ ] recalculated:     Previous Nutrition Diagnosis:   [ ] Inadequate Energy Intake [ x]Inadequate Oral Intake [ ] Excessive Energy Intake   [ ] Underweight [ ] Increased Nutrient Needs [ ] Overweight/Obesity   [ ] Altered GI Function [ ] Unintended Weight Loss [ ] Food & Nutrition Related Knowledge Deficit [ ] Malnutrition     Nutrition Diagnosis is [x ] ongoing  [ ] resolved [ ] not applicable     Interventions:   Recommend  [ ] Change Diet To:  [ ] Nutrition Supplement  [x ] Nutrition Support  [x ] Other: if/when medically feasible (when hemodynamically stable)  consider EN via NGT  (glucerna 1.2 @ 30 ml/hr increase by 10 cc Q 6 H reach goal of 75 ml/hr)   -wound care protocol, aspiration precautions   Monitoring and Evaluation:    [ x ] weights [ x ] labs[ x ] follow up per protocol  [x ] other: ? initiate EN, IV hydration

## 2021-06-23 NOTE — PROGRESS NOTE ADULT - SUBJECTIVE AND OBJECTIVE BOX
Patient is a 65y old  Male who presents with a chief complaint of respiratory failure, lethargy (23 Jun 2021 07:14)      INTERVAL HPI/OVERNIGHT EVENTS: no overall changes.  now sleepy, arousable but falls asleep easily.     MEDICATIONS  (STANDING):  albuterol/ipratropium for Nebulization 3 milliLiter(s) Nebulizer every 6 hours  enoxaparin Injectable 40 milliGRAM(s) SubCutaneous daily  lactated ringers. 1000 milliLiter(s) (100 mL/Hr) IV Continuous <Continuous>  levothyroxine Injectable 12.5 MICROGram(s) IV Push at bedtime  meropenem  IVPB 1000 milliGRAM(s) IV Intermittent every 8 hours  methylPREDNISolone sodium succinate Injectable 40 milliGRAM(s) IV Push daily  pantoprazole  Injectable 40 milliGRAM(s) IV Push daily  valproate sodium IVPB 1500 milliGRAM(s) IV Intermittent at bedtime  vancomycin  IVPB 1250 milliGRAM(s) IV Intermittent every 12 hours    MEDICATIONS  (PRN):  polyethylene glycol 3350 17 Gram(s) Oral daily PRN Constipation    Allergies  Keppra (Other (Severe))  penicillins (Unknown)    Intolerances  benzodiazepines (Other)      REVIEW OF SYSTEMS:  unable to obtain due to lethargy    Vital Signs Last 24 Hrs  T(C): 36.7 (23 Jun 2021 08:51), Max: 36.9 (23 Jun 2021 00:03)  T(F): 98 (23 Jun 2021 08:51), Max: 98.4 (23 Jun 2021 00:03)  HR: 70 (23 Jun 2021 10:00) (63 - 89)  BP: 107/70 (23 Jun 2021 10:00) (90/61 - 107/70)  BP(mean): 82 (23 Jun 2021 10:00) (61 - 83)  RR: 20 (23 Jun 2021 10:00) (17 - 29)  SpO2: 92% (23 Jun 2021 10:00) (91% - 100%)    PHYSICAL EXAM:  GENERAL: NAD, well-groomed, well-developed  HEAD:  Atraumatic, Normocephalic  EYES:  conjunctiva and sclera clear  ENMT: Moist mucous membranes  NECK: Supple, No JVD  NERVOUS SYSTEM:  able to be woken up, moves all extremities   CHEST/LUNG: decreased air entry, occ wheeze or rhonci  HEART: Regular rate and rhythm; No murmurs, rubs, or gallops  ABDOMEN: Soft, Nontender, Nondistended; Bowel sounds present  EXTREMITIES:  2+ Peripheral Pulses, No clubbing, cyanosis, or edema      LABS:                        12.8   15.37 )-----------( 121      ( 23 Jun 2021 06:22 )             40.0     23 Jun 2021 06:22    139    |  98     |  23     ----------------------------<  94     3.6     |  44     |  0.54     Ca    9.7        23 Jun 2021 06:22  Mg     1.8       23 Jun 2021 06:22          CAPILLARY BLOOD GLUCOSE          RADIOLOGY & ADDITIONAL TESTS:    Imaging Personally Reviewed:  [ ] YES   Echo: IMPRESSION:  1. technical difficult study, poor apical window  2. grossly normal overall left ventricular systolic function with mild diastolic dysfunction  3. mild tricuspid regurgitation with mildly elevated right ventricular systolic pressure.    Consultant(s) Notes Reviewed:  pulm, card, id, omfs    Care Discussed with Consultants/Other Providers:    Advanced Directives: [ ] DNR  [ ] No feeding tube  [ ] MOLST in chart  [ ] MOLST completed today  [ ] Unknown

## 2021-06-23 NOTE — PROGRESS NOTE ADULT - ASSESSMENT
65M with seizures on keppra, BPH, hypothyroidism, sarcoidosis GERD, COPD, LAMBERT, recent admission to [facility] from 5/3-5/10/2021 for right subarachnoid hemorrhage s/p fall who presents from [facility] for hypoxia.  Found to have acute hypoxic and hypercapnic respiratory failure 2/2 sepsis 2/2 aspiration PNA.  Likely aspiration PNA since patient has been recommended dysphagia I diet from [facility] but reports state that he has been non-compliant with recommendation.  Also location of RLL and RUL also fits with aspiration.      Problems  Acute hypoxic and hypercapnic respiratory failure  Sepsis 2/2 aspiration PNA  Sarcoidosis  Seizures  COPD    Acute hypoxic and hypercapnic respiratory failure   - admitted to [facility]  - repeat ABG -> monitor CO2   - Now on high flow oxygen per Pulm - attempted to wean this AM but SpO2 going to upper 80s.  continue suctioning and chest PT.     Sepsis from aspiration PNA with metabolic encephalopathy - meets sepsis criteria with leukocytosis and tachypnea with source of infection.  Lactate normal.  HCT with no acute bleed.  - continue with meropenem and vancomycin as per ID  - ID f/u appreciated  - f/u blood cultures - NGTD  - keep NPO for now as high risk for intubation  - OMFS eval appreciated - no signs of oral infection    Troponin Elevation  - likely demand ischemia  - cardiology f/u appreciated  -  TTE noted - elevated RV pressures likely due to pna, continue to trend trop per cardiology     Sarcoidosis/COPD  - started on steroids for history of bronchiectasis as well  - symbicort - patient not able to use properly - can dc.     Seizure history  - cont with depakote    Hypothyroidism  - cont with synthroid    General anxiety disorder  - restart effexor when able to take PO.     BPH  - has haq now, will need to restart flomax prior to trying to remove haq    Preventive measures  - can start with lovenox 40mg subq for DVT ppx    Patient critically ill, high risk for deterioration.    GOC discussed with sister 6/21 - full code.

## 2021-06-23 NOTE — PROGRESS NOTE ADULT - SUBJECTIVE AND OBJECTIVE BOX
Patient is a 65y old  Male who presents with a chief complaint of SAH after a fall (05 May 2021 12:00)      SUBJECTIVE / OVERNIGHT EVENTS:    MEDICATIONS  (STANDING):  ALBUTerol    90 MICROgram(s) HFA Inhaler 2 Puff(s) Inhalation every 6 hours  budesonide 160 MICROgram(s)/formoterol 4.5 MICROgram(s) Inhaler 2 Puff(s) Inhalation two times a day  clindamycin   Capsule 300 milliGRAM(s) Oral every 6 hours  diVALproex ER 1500 milliGRAM(s) Oral at bedtime  enoxaparin Injectable 40 milliGRAM(s) SubCutaneous every 24 hours  furosemide    Tablet 40 milliGRAM(s) Oral daily  levothyroxine 25 MICROGram(s) Oral daily  tamsulosin 0.4 milliGRAM(s) Oral at bedtime  tiotropium 18 MICROgram(s) Capsule 1 Capsule(s) Inhalation daily    MEDICATIONS  (PRN):  acetaminophen   Tablet .. 650 milliGRAM(s) Oral every 6 hours PRN Mild Pain (1 - 3)      Vital Signs Last 24 Hrs  T(C): 36.7 (06 May 2021 09:57), Max: 37.3 (06 May 2021 04:45)  T(F): 98.1 (06 May 2021 09:57), Max: 99.2 (06 May 2021 04:45)  HR: 78 (06 May 2021 09:57) (64 - 78)  BP: 110/68 (06 May 2021 09:57) (108/72 - 124/76)  BP(mean): --  RR: 18 (06 May 2021 09:57) (16 - 18)  SpO2: 93% (06 May 2021 09:57) (92% - 99%)  CAPILLARY BLOOD GLUCOSE        I&O's Summary    05 May 2021 07:01  -  06 May 2021 07:00  --------------------------------------------------------  IN: 1205 mL / OUT: 2500 mL / NET: -1295 mL    06 May 2021 07:01  -  06 May 2021 11:18  --------------------------------------------------------  IN: 0 mL / OUT: 1050 mL / NET: -1050 mL        PHYSICAL EXAM:  GENERAL: NAD, well-developed  HEAD:  Atraumatic, Normocephalic  EYES: EOMI, PERRLA, conjunctiva and sclera clear  NECK: Supple, No JVD  CHEST/LUNG: Clear to auscultation bilaterally; No wheeze  HEART: Regular rate and rhythm; No murmurs, rubs, or gallops  ABDOMEN: Soft, Nontender, Nondistended; Bowel sounds present  EXTREMITIES:  2+ Peripheral Pulses, No clubbing, cyanosis, or edema  PSYCH: AAOx3  NEUROLOGY: non-focal  SKIN: No rashes or lesions    LABS:                        13.8   6.47  )-----------( 78       ( 06 May 2021 09:42 )             41.3     05-05    141  |  98  |  15  ----------------------------<  105<H>  3.8   |  31  |  0.67    Ca    9.3      05 May 2021 10:03  Phos  2.7     05-05  Mg     2.0     05-05                RADIOLOGY & ADDITIONAL TESTS:    Imaging Personally Reviewed:    Consultant(s) Notes Reviewed:      Care Discussed with Consultants/Other Providers:   English Patient is a 65y old  Male who presents with a chief complaint of SAH after a fall (05 May 2021 12:00)      SUBJECTIVE / OVERNIGHT EVENTS:  Pt seen and examined. No acute events overnight. He c/o cough productive of yellowish sputum. Pt denies fever/chills, SOB, CP.  Pt is sating 93% on 1L NC , 90% on room air. C- collar d/daly this AM.    MEDICATIONS  (STANDING):  ALBUTerol    90 MICROgram(s) HFA Inhaler 2 Puff(s) Inhalation every 6 hours  budesonide 160 MICROgram(s)/formoterol 4.5 MICROgram(s) Inhaler 2 Puff(s) Inhalation two times a day  clindamycin   Capsule 300 milliGRAM(s) Oral every 6 hours  diVALproex ER 1500 milliGRAM(s) Oral at bedtime  enoxaparin Injectable 40 milliGRAM(s) SubCutaneous every 24 hours  furosemide    Tablet 40 milliGRAM(s) Oral daily  levothyroxine 25 MICROGram(s) Oral daily  tamsulosin 0.4 milliGRAM(s) Oral at bedtime  tiotropium 18 MICROgram(s) Capsule 1 Capsule(s) Inhalation daily    MEDICATIONS  (PRN):  acetaminophen   Tablet .. 650 milliGRAM(s) Oral every 6 hours PRN Mild Pain (1 - 3)      Vital Signs Last 24 Hrs  T(C): 36.7 (06 May 2021 09:57), Max: 37.3 (06 May 2021 04:45)  T(F): 98.1 (06 May 2021 09:57), Max: 99.2 (06 May 2021 04:45)  HR: 78 (06 May 2021 09:57) (64 - 78)  BP: 110/68 (06 May 2021 09:57) (108/72 - 124/76)  BP(mean): --  RR: 18 (06 May 2021 09:57) (16 - 18)  SpO2: 93% (06 May 2021 09:57) (92% - 99%)  CAPILLARY BLOOD GLUCOSE        I&O's Summary    05 May 2021 07:01  -  06 May 2021 07:00  --------------------------------------------------------  IN: 1205 mL / OUT: 2500 mL / NET: -1295 mL    06 May 2021 07:01  -  06 May 2021 11:18  --------------------------------------------------------  IN: 0 mL / OUT: 1050 mL / NET: -1050 mL        PHYSICAL EXAM:  GENERAL: NAD, anicteric , afebrile   HEAD:  +scalp laceration w/staples  EYES: EOMI, PERRLA, conjunctiva and sclera clear  ENMT: Moist mucous membranes  NECK: C- collar in place  RESPIRATORY: Clear to auscultation bilaterally; +bilateral crackles   CARDIOVASCULAR: Regular rate and rhythm; No murmurs, rubs, or gallops  GASTROINTESTINAL: Soft, Nontender, Nondistended; Bowel sounds present  EXTREMITIES:  +arthritic changes in right hand ; no LE  edema  NERVOUS SYSTEM:  Alert & Oriented X3; Moving all 4 extremities; No gross sensory deficits  HEME/LYMPH: No lymphadenopathy noted  SKIN: No rashes or lesions    LABS:                        13.8   6.47  )-----------( 78       ( 06 May 2021 09:42 )             41.3     05-05    141  |  98  |  15  ----------------------------<  105<H>  3.8   |  31  |  0.67    Ca    9.3      05 May 2021 10:03  Phos  2.7     05-05  Mg     2.0     05-05        MRI CERVICAL SPINE 5/5/2021  Far left lateral disc protrusion with moderate to severe left-sided neural foraminal stenosis suspected.    No acute fracture. No acute ligamentous injury.

## 2021-06-23 NOTE — PROGRESS NOTE ADULT - ASSESSMENT
remains on HF -   OMF eval noted -     65M with history of Seizure disorder, Hypothyroidism,  Thrombocytopenia, BPH, recent right SAH from fall May 2021, presents from [facility] for hypoxia and AMS. Found to have severe sepsis, acute hypoxic and hypercapnic respiratory failure    source of infection - etiol - w/u in progress - Dental eval noted - OMF Attending eval reviewed -   probable ongoing - continued aspiration - with resulting consolidation - asp pna - as seen on CT chest and clinical presentation  at risk for decompensation - resp failure -   bipap alternate with HF -     broad spectrum ABX - mrsa screen, cx in progress, biomarkers, WBC trend  assist with all needs and ADL  HOB elev - asp prec  oral hygiene  Prognosis guarded - GOC discussion -     SPCU care and monitoring

## 2021-06-24 NOTE — PROGRESS NOTE ADULT - SUBJECTIVE AND OBJECTIVE BOX
Patient is a 65y old  Male who presents with a chief complaint of respiratory failure, lethargy (24 Jun 2021 07:22)    INTERVAL HPI/OVERNIGHT EVENTS:  down to 40% on high flow.  still suctioning sputum.     MEDICATIONS  (STANDING):  albuterol/ipratropium for Nebulization 3 milliLiter(s) Nebulizer every 6 hours  enoxaparin Injectable 40 milliGRAM(s) SubCutaneous daily  lactated ringers. 1000 milliLiter(s) (100 mL/Hr) IV Continuous <Continuous>  levothyroxine Injectable 12.5 MICROGram(s) IV Push at bedtime  meropenem  IVPB 1000 milliGRAM(s) IV Intermittent every 8 hours  methylPREDNISolone sodium succinate Injectable 40 milliGRAM(s) IV Push daily  pantoprazole  Injectable 40 milliGRAM(s) IV Push daily  valproate sodium IVPB 1500 milliGRAM(s) IV Intermittent at bedtime    MEDICATIONS  (PRN):  polyethylene glycol 3350 17 Gram(s) Oral daily PRN Constipation    Allergies  Keppra (Other (Severe))  penicillins (Unknown)    Intolerances  benzodiazepines (Other)      REVIEW OF SYSTEMS:  limited.  patient denies complaints.    Vital Signs Last 24 Hrs  T(C): 36.8 (24 Jun 2021 08:20), Max: 36.9 (23 Jun 2021 12:45)  T(F): 98.3 (24 Jun 2021 08:20), Max: 98.4 (23 Jun 2021 12:45)  HR: 78 (24 Jun 2021 10:00) (59 - 78)  BP: 111/66 (24 Jun 2021 10:00) (93/67 - 111/71)  BP(mean): 80 (24 Jun 2021 10:00) (74 - 88)  RR: 30 (24 Jun 2021 10:00) (20 - 30)  SpO2: 91% (24 Jun 2021 10:00) (91% - 96%)    PHYSICAL EXAM:  GENERAL: NAD, well-groomed, well-developed  HEAD:  Atraumatic, Normocephalic  EYES:  conjunctiva and sclera clear  ENMT: moist membranes, poor dentition  NECK: Supple, No JVD  NERVOUS SYSTEM:  awake, falls asleep easily, moves extremities.   CHEST/LUNG: improved air entry, occ rhonchi  HEART: Regular rate and rhythm;   ABDOMEN: Soft, Nontender, Nondistended; Bowel sounds present  EXTREMITIES:  2+ Peripheral Pulses, No clubbing, cyanosis, or edema      LABS:      Ca    9.7        23 Jun 2021 06:22          CAPILLARY BLOOD GLUCOSE          RADIOLOGY & ADDITIONAL TESTS:    Imaging Personally Reviewed:  [ ] YES     Consultant(s) Notes Reviewed:      Care Discussed with Consultants/Other Providers:    Advanced Directives: [ ] DNR  [ ] No feeding tube  [ ] MOLST in chart  [ ] MOLST completed today  [ ] Unknown

## 2021-06-24 NOTE — PROGRESS NOTE ADULT - SUBJECTIVE AND OBJECTIVE BOX
Date/Time Patient Seen:  		  Referring MD:   Data Reviewed	       Patient is a 65y old  Male who presents with a chief complaint of respiratory failure, lethargy (24 Jun 2021 00:23)      Subjective/HPI     PAST MEDICAL & SURGICAL HISTORY:  No pertinent past medical history    DM (diabetes mellitus)    Sarcoid    Bronchiectasis    Diverticulitis    Subdural hematoma    Inguinal hernia    Cellulitis    Seizure    Hypothyroid    Sleep apnea    LAMBERT and COPD overlap syndrome    BPH without urinary obstruction    No significant past surgical history    Status post Bright&#x27;s procedure    Bilateral subdural hematomas    Status post cholecystectomy    Status post inguinal hernia repair          Medication list         MEDICATIONS  (STANDING):  albuterol/ipratropium for Nebulization 3 milliLiter(s) Nebulizer every 6 hours  enoxaparin Injectable 40 milliGRAM(s) SubCutaneous daily  lactated ringers. 1000 milliLiter(s) (100 mL/Hr) IV Continuous <Continuous>  levothyroxine Injectable 12.5 MICROGram(s) IV Push at bedtime  meropenem  IVPB 1000 milliGRAM(s) IV Intermittent every 8 hours  methylPREDNISolone sodium succinate Injectable 40 milliGRAM(s) IV Push daily  pantoprazole  Injectable 40 milliGRAM(s) IV Push daily  valproate sodium IVPB 1500 milliGRAM(s) IV Intermittent at bedtime    MEDICATIONS  (PRN):  polyethylene glycol 3350 17 Gram(s) Oral daily PRN Constipation         Vitals log        ICU Vital Signs Last 24 Hrs  T(C): 36.9 (24 Jun 2021 04:00), Max: 36.9 (23 Jun 2021 12:45)  T(F): 98.4 (24 Jun 2021 04:00), Max: 98.4 (23 Jun 2021 12:45)  HR: 65 (24 Jun 2021 06:55) (59 - 89)  BP: 102/68 (24 Jun 2021 06:00) (92/63 - 111/71)  BP(mean): 78 (24 Jun 2021 06:00) (71 - 88)  ABP: --  ABP(mean): --  RR: 27 (24 Jun 2021 06:00) (17 - 27)  SpO2: 95% (24 Jun 2021 06:55) (91% - 96%)           Input and Output:  I&O's Detail    23 Jun 2021 07:01  -  24 Jun 2021 07:00  --------------------------------------------------------  IN:    IV PiggyBack: 450 mL    Lactated Ringers: 2400 mL  Total IN: 2850 mL    OUT:    Indwelling Catheter - Urethral (mL): 1000 mL    Oral Fluid: 0 mL  Total OUT: 1000 mL    Total NET: 1850 mL          Lab Data                        12.8   15.37 )-----------( 121      ( 23 Jun 2021 06:22 )             40.0     06-23    139  |  98  |  23  ----------------------------<  94  3.6   |  44<H>  |  0.54    Ca    9.7      23 Jun 2021 06:22  Mg     1.8     06-23      ABG - ( 22 Jun 2021 21:59 )  pH, Arterial: 7.43  pH, Blood: x     /  pCO2: 66    /  pO2: 73    / HCO3: 40    / Base Excess: 18.0  /  SaO2: 95                CARDIAC MARKERS ( 23 Jun 2021 06:22 )  .354 ng/mL / x     / x     / x     / x      CARDIAC MARKERS ( 22 Jun 2021 17:22 )  .494 ng/mL / x     / x     / x     / x            Review of Systems	      Objective     Physical Examination    heart s1s2  lung dec bS  abd soft  head nc      Pertinent Lab findings & Imaging      Sharon:  NO   Adequate UO     I&O's Detail    23 Jun 2021 07:01  -  24 Jun 2021 07:00  --------------------------------------------------------  IN:    IV PiggyBack: 450 mL    Lactated Ringers: 2400 mL  Total IN: 2850 mL    OUT:    Indwelling Catheter - Urethral (mL): 1000 mL    Oral Fluid: 0 mL  Total OUT: 1000 mL    Total NET: 1850 mL               Discussed with:     Cultures:	        Radiology

## 2021-06-24 NOTE — PROGRESS NOTE ADULT - SUBJECTIVE AND OBJECTIVE BOX
Patient is a 65y old  Male who presents with a chief complaint of respiratory failure, lethargy (23 Jun 2021 12:54)      BRIEF HOSPITAL COURSE:   66 yo m pmhx Sacroidosis, COPD, LAMBERT, hypothyroidism, BPH, GERD with recent admission for right SAH s/p mechanical fall (05/21) biba from [facility] with ams and hypoxia admitted with hypoxic/hypercapnic respiratory failure requiring Bipap 100%, pneumonia and severe sepsis.     Events last 24 hours:   Remains on HFNC, slowly weaning now at 40LPM and 40% fio2, tolerating well, more alert and communicative.       PAST MEDICAL & SURGICAL HISTORY:  DM (diabetes mellitus)  Sarcoid  Bronchiectasis  Subdural hematoma  Inguinal hernia  Seizure  Hypothyroid  Sleep apnea  LAMBERT and COPD overlap syndrome  BPH without urinary obstruction  Status post Bright&#x27;s procedure  Bilateral subdural hematomas  Status post cholecystectomy  Status post inguinal hernia repair      Allergies  Keppra (Other (Severe))  penicillins (Unknown)    Intolerances  benzodiazepines (Other)    FAMILY HISTORY:  Family history of diabetes mellitus  Family history of pulmonary embolism (Grandparent)      Social History:   From [facility]      Review of Systems:  No complaints at this time       Physical Examination:    General: Elderly male, lying in bed, NAD    HEENT: NC/AT, HFNC in place.    PULM: Symmetrical thorax expansion upon respiration.  Coarse to auscultation bilaterally, no significant sputum production    CVS: Regular rate and rhythm, no murmurs, rubs, or gallops appreciated    ABD: Soft, nondistended, nontender, normoactive bowel sounds, no masses appreciated    EXT: + edema, nontender    SKIN: Warm and well perfused, no rashes noted.    NEURO: Alert, interactive      Medications:  meropenem  IVPB 1000 milliGRAM(s) IV Intermittent every 8 hours  albuterol/ipratropium for Nebulization 3 milliLiter(s) Nebulizer every 6 hours  valproate sodium IVPB 1500 milliGRAM(s) IV Intermittent at bedtime  enoxaparin Injectable 40 milliGRAM(s) SubCutaneous daily  pantoprazole  Injectable 40 milliGRAM(s) IV Push daily  polyethylene glycol 3350 17 Gram(s) Oral daily PRN  levothyroxine Injectable 12.5 MICROGram(s) IV Push at bedtime  methylPREDNISolone sodium succinate Injectable 40 milliGRAM(s) IV Push daily  lactated ringers. 1000 milliLiter(s) IV Continuous <Continuous>      ICU Vital Signs Last 24 Hrs  T(C): 36.7 (23 Jun 2021 19:36), Max: 36.9 (23 Jun 2021 12:45)  T(F): 98.1 (23 Jun 2021 19:36), Max: 98.4 (23 Jun 2021 12:45)  HR: 66 (23 Jun 2021 22:00) (59 - 89)  BP: 93/67 (23 Jun 2021 22:00) (92/63 - 111/71)  BP(mean): 76 (23 Jun 2021 22:00) (61 - 88)  ABP: --  ABP(mean): --  RR: 24 (23 Jun 2021 22:00) (17 - 26)  SpO2: 95% (23 Jun 2021 22:00) (92% - 96%)    Vital Signs Last 24 Hrs  T(C): 36.7 (23 Jun 2021 19:36), Max: 36.9 (23 Jun 2021 12:45)  T(F): 98.1 (23 Jun 2021 19:36), Max: 98.4 (23 Jun 2021 12:45)  HR: 66 (23 Jun 2021 22:00) (59 - 89)  BP: 93/67 (23 Jun 2021 22:00) (92/63 - 111/71)  BP(mean): 76 (23 Jun 2021 22:00) (61 - 88)  RR: 24 (23 Jun 2021 22:00) (17 - 26)  SpO2: 95% (23 Jun 2021 22:00) (92% - 96%)    ABG - ( 22 Jun 2021 21:59 )  pH, Arterial: 7.43  pH, Blood: x     /  pCO2: 66    /  pO2: 73    / HCO3: 40    / Base Excess: 18.0  /  SaO2: 95          I&O's Detail    22 Jun 2021 07:01  -  23 Jun 2021 07:00  --------------------------------------------------------  IN:    IV PiggyBack: 700 mL    Lactated Ringers: 2140 mL  Total IN: 2840 mL    OUT:    Indwelling Catheter - Urethral (mL): 850 mL    Oral Fluid: 0 mL  Total OUT: 850 mL  Total NET: 1990 mL      23 Jun 2021 07:01  -  24 Jun 2021 00:24  --------------------------------------------------------  IN:    IV PiggyBack: 400 mL    Lactated Ringers: 1600 mL  Total IN: 2000 mL    OUT:    Indwelling Catheter - Urethral (mL): 700 mL  Total OUT: 700 mL  Total NET: 1300 mL      LABS:                        12.8   15.37 )-----------( 121      ( 23 Jun 2021 06:22 )             40.0     06-23    139  |  98  |  23  ----------------------------<  94  3.6   |  44<H>  |  0.54    Ca    9.7      23 Jun 2021 06:22  Mg     1.8     06-23    TPro  5.5<L>  /  Alb  1.5<L>  /  TBili  0.5  /  DBili  x   /  AST  15  /  ALT  <10<L>  /  AlkPhos  73  06-22      CARDIAC MARKERS ( 23 Jun 2021 06:22 )  .354 ng/mL / x     / x     / x     / x      CARDIAC MARKERS ( 22 Jun 2021 17:22 )  .494 ng/mL / x     / x     / x     / x      CARDIAC MARKERS ( 22 Jun 2021 07:12 )  .492 ng/mL / x     / 17 U/L / x     / 2.4 ng/mL      CULTURES:  Culture Results:   No growth to date. (06-21 @ 14:11)  Culture Results:   No growth to date. (06-21 @ 14:11)  Culture Results:   No growth (06-21 @ 14:11)      RADIOLOGY:   < from: US Transthoracic Echocardiogram w/Doppler Complete (06.22.21 @ 11:46) >  EXAM:  US TTE W DOPPLER COMPLETE                          PROCEDURE DATE:  06/22/2021      INTERPRETATION:  Indication: Elevated cardiac enzymes    Technician: Shani Bustillo    Height 6 feet 2 inches, weight 180 pounds, blood [id number]/67 mmHg    Study Quality: Technical difficult study, poor apical window    Measurements: Aortic root size 3.6 cm, left atrial size 2.9 cm, right atrial size 3.6 cm, right ventricular size 2.66 cm, left ventricular end-diastolic diameter 4.7 cm, left ventricular end-systolic diameter 3.5 cm, septal wall thickness 1.0 cm, posterior thickness 0.8 cm, aortic velocity 1.4 m/s, mitral E velocity 0.5 m/s, ejection fraction 55-60%.    Mitral Valve: Appears normal with normal opening.  Aortic Valve: Normal aortic valve with normal cusp excursion trace aortic regurgitation  Left Atrium: Normal size  Left Ventricle: Normal size, normal wall thickness with the normal overall left ventricular systolic function. Mild diastolic dysfunction noted  Pericardium: No pericardial effusion  Tricuspid Valve: Appears normal with the mild tricuspid regurgitation with estimated right femur systolic pressure 36 mmHg  Pulmonic Valve: Appears to be normal  Right Ventricle: Not well-visualized grossly appears normal size with normal right ventricular systolic function  Right Atrium: Normal size    IMPRESSION:  1. technical difficult study, poor apical window  2. grossly normal overall left ventricular systolic function with mild diastolic dysfunction  3. mild tricuspid regurgitation with mildly elevated right ventricular systolic pressure.    [facility]IC[facility]/CARDIOLOGY  This document has been electronically signed. Jun 22 2021  4:14PM    < end of copied text >      SUPPLEMENTAL O2: HFNC  LINES: Peripheral   IVF: N  APONTE: Y  PPx: PPI, Lovenox  CONTACT: N Patient is a 65y old  Male who presents with a chief complaint of respiratory failure, lethargy (23 Jun 2021 12:54)      BRIEF HOSPITAL COURSE:   64 yo m pmhx Sacroidosis, COPD, LAMBERT, hypothyroidism, BPH, GERD with recent admission for right SAH s/p mechanical fall (05/21) biba from [facility] with ams and hypoxia admitted with hypoxic/hypercapnic respiratory failure requiring Bipap 100%, pneumonia and severe sepsis.     Events last 24 hours:   Remains on HFNC, slowly weaning now at 40LPM and 40% fio2, tolerating well, however still with episodes of hypoxia, more alert and communicative.       PAST MEDICAL & SURGICAL HISTORY:  DM (diabetes mellitus)  Sarcoid  Bronchiectasis  Subdural hematoma  Inguinal hernia  Seizure  Hypothyroid  Sleep apnea  LAMBERT and COPD overlap syndrome  BPH without urinary obstruction  Status post Bright&#x27;s procedure  Bilateral subdural hematomas  Status post cholecystectomy  Status post inguinal hernia repair      Allergies  Keppra (Other (Severe))  penicillins (Unknown)    Intolerances  benzodiazepines (Other)    FAMILY HISTORY:  Family history of diabetes mellitus  Family history of pulmonary embolism (Grandparent)      Social History:   From [facility]      Review of Systems:  No complaints at this time       Physical Examination:    General: Elderly male, lying in bed, NAD    HEENT: NC/AT, HFNC in place.    PULM: Symmetrical thorax expansion upon respiration.  Coarse to auscultation bilaterally, no significant sputum production    CVS: Regular rate and rhythm, no murmurs, rubs, or gallops appreciated    ABD: Soft, nondistended, nontender, normoactive bowel sounds, no masses appreciated    EXT: + edema, nontender    SKIN: Warm and well perfused, no rashes noted.    NEURO: Alert, interactive      Medications:  meropenem  IVPB 1000 milliGRAM(s) IV Intermittent every 8 hours  albuterol/ipratropium for Nebulization 3 milliLiter(s) Nebulizer every 6 hours  valproate sodium IVPB 1500 milliGRAM(s) IV Intermittent at bedtime  enoxaparin Injectable 40 milliGRAM(s) SubCutaneous daily  pantoprazole  Injectable 40 milliGRAM(s) IV Push daily  polyethylene glycol 3350 17 Gram(s) Oral daily PRN  levothyroxine Injectable 12.5 MICROGram(s) IV Push at bedtime  methylPREDNISolone sodium succinate Injectable 40 milliGRAM(s) IV Push daily  lactated ringers. 1000 milliLiter(s) IV Continuous <Continuous>      ICU Vital Signs Last 24 Hrs  T(C): 36.7 (23 Jun 2021 19:36), Max: 36.9 (23 Jun 2021 12:45)  T(F): 98.1 (23 Jun 2021 19:36), Max: 98.4 (23 Jun 2021 12:45)  HR: 66 (23 Jun 2021 22:00) (59 - 89)  BP: 93/67 (23 Jun 2021 22:00) (92/63 - 111/71)  BP(mean): 76 (23 Jun 2021 22:00) (61 - 88)  ABP: --  ABP(mean): --  RR: 24 (23 Jun 2021 22:00) (17 - 26)  SpO2: 95% (23 Jun 2021 22:00) (92% - 96%)    Vital Signs Last 24 Hrs  T(C): 36.7 (23 Jun 2021 19:36), Max: 36.9 (23 Jun 2021 12:45)  T(F): 98.1 (23 Jun 2021 19:36), Max: 98.4 (23 Jun 2021 12:45)  HR: 66 (23 Jun 2021 22:00) (59 - 89)  BP: 93/67 (23 Jun 2021 22:00) (92/63 - 111/71)  BP(mean): 76 (23 Jun 2021 22:00) (61 - 88)  RR: 24 (23 Jun 2021 22:00) (17 - 26)  SpO2: 95% (23 Jun 2021 22:00) (92% - 96%)    ABG - ( 22 Jun 2021 21:59 )  pH, Arterial: 7.43  pH, Blood: x     /  pCO2: 66    /  pO2: 73    / HCO3: 40    / Base Excess: 18.0  /  SaO2: 95          I&O's Detail    22 Jun 2021 07:01  -  23 Jun 2021 07:00  --------------------------------------------------------  IN:    IV PiggyBack: 700 mL    Lactated Ringers: 2140 mL  Total IN: 2840 mL    OUT:    Indwelling Catheter - Urethral (mL): 850 mL    Oral Fluid: 0 mL  Total OUT: 850 mL  Total NET: 1990 mL      23 Jun 2021 07:01  -  24 Jun 2021 00:24  --------------------------------------------------------  IN:    IV PiggyBack: 400 mL    Lactated Ringers: 1600 mL  Total IN: 2000 mL    OUT:    Indwelling Catheter - Urethral (mL): 700 mL  Total OUT: 700 mL  Total NET: 1300 mL      LABS:                        12.8   15.37 )-----------( 121      ( 23 Jun 2021 06:22 )             40.0     06-23    139  |  98  |  23  ----------------------------<  94  3.6   |  44<H>  |  0.54    Ca    9.7      23 Jun 2021 06:22  Mg     1.8     06-23    TPro  5.5<L>  /  Alb  1.5<L>  /  TBili  0.5  /  DBili  x   /  AST  15  /  ALT  <10<L>  /  AlkPhos  73  06-22      CARDIAC MARKERS ( 23 Jun 2021 06:22 )  .354 ng/mL / x     / x     / x     / x      CARDIAC MARKERS ( 22 Jun 2021 17:22 )  .494 ng/mL / x     / x     / x     / x      CARDIAC MARKERS ( 22 Jun 2021 07:12 )  .492 ng/mL / x     / 17 U/L / x     / 2.4 ng/mL      CULTURES:  Culture Results:   No growth to date. (06-21 @ 14:11)  Culture Results:   No growth to date. (06-21 @ 14:11)  Culture Results:   No growth (06-21 @ 14:11)      RADIOLOGY:   < from: US Transthoracic Echocardiogram w/Doppler Complete (06.22.21 @ 11:46) >  EXAM:  US TTE W DOPPLER COMPLETE                          PROCEDURE DATE:  06/22/2021      INTERPRETATION:  Indication: Elevated cardiac enzymes    Technician: Shani Bustillo    Height 6 feet 2 inches, weight 180 pounds, blood [id number]/67 mmHg    Study Quality: Technical difficult study, poor apical window    Measurements: Aortic root size 3.6 cm, left atrial size 2.9 cm, right atrial size 3.6 cm, right ventricular size 2.66 cm, left ventricular end-diastolic diameter 4.7 cm, left ventricular end-systolic diameter 3.5 cm, septal wall thickness 1.0 cm, posterior thickness 0.8 cm, aortic velocity 1.4 m/s, mitral E velocity 0.5 m/s, ejection fraction 55-60%.    Mitral Valve: Appears normal with normal opening.  Aortic Valve: Normal aortic valve with normal cusp excursion trace aortic regurgitation  Left Atrium: Normal size  Left Ventricle: Normal size, normal wall thickness with the normal overall left ventricular systolic function. Mild diastolic dysfunction noted  Pericardium: No pericardial effusion  Tricuspid Valve: Appears normal with the mild tricuspid regurgitation with estimated right femur systolic pressure 36 mmHg  Pulmonic Valve: Appears to be normal  Right Ventricle: Not well-visualized grossly appears normal size with normal right ventricular systolic function  Right Atrium: Normal size    IMPRESSION:  1. technical difficult study, poor apical window  2. grossly normal overall left ventricular systolic function with mild diastolic dysfunction  3. mild tricuspid regurgitation with mildly elevated right ventricular systolic pressure.    [facility]IC[facility]/CARDIOLOGY  This document has been electronically signed. Jun 22 2021  4:14PM    < end of copied text >      SUPPLEMENTAL O2: HFNC  LINES: Peripheral   IVF: N  APONTE: Y  PPx: PPI, Lovenox  CONTACT: N

## 2021-06-24 NOTE — PROGRESS NOTE ADULT - ASSESSMENT
fio2 titration in progress - remains on high fio2 - VS noted - labs reviewed -     65M with history of Seizure disorder, Hypothyroidism,  Thrombocytopenia, BPH, recent right SAH from fall May 2021, presents from [facility] for hypoxia and AMS. Found to have severe sepsis, acute hypoxic and hypercapnic respiratory failure    source of infection - etiol - w/u in progress - Dental eval noted - OMF Attending eval reviewed -   probable ongoing - continued aspiration - with resulting consolidation - asp pna - as seen on CT chest and clinical presentation  at risk for decompensation - resp failure -   bipap alternate with HF -     broad spectrum ABX - mrsa screen, cx in progress, biomarkers, WBC trend  assist with all needs and ADL  HOB elev - asp prec  oral hygiene  Prognosis guarded - GOC discussion -     SPCU care and monitoring

## 2021-06-24 NOTE — PROGRESS NOTE ADULT - ASSESSMENT
The patient is a 65 year old male with a history of seizure d/o, BPH, hypothyroidism, sarcoidosis, COPD, LAMBERT, recent SAH who is admitted with respiratory failure in the setting of aspiration PNA.    Plan:  - ECG with anterolateral ST depressions  - Troponin peaked at 0.49. Likely elevated in the setting of demand ischemia from underlying infection, respiratory failure, tachycardia. No need to trend further.  - Echo with normal LV systolic function, no significant valve issues  - Given recent SAH, would hold off with antiplatelets or anticoagulants  - On meropenem and vancomycin  - Continue gentle hydration  - Mild tachycardia at times due to infections/respiratory issues  - Attempt to wean down O2

## 2021-06-24 NOTE — PROGRESS NOTE ADULT - SUBJECTIVE AND OBJECTIVE BOX
JULY FORTE is a 65yMale , patient examined and chart reviewed.     INTERVAL HPI/ OVERNIGHT EVENTS:   Remains on HFNC. Afebrile.  Weak.    PAST MEDICAL & SURGICAL HISTORY:  DM (diabetes mellitus)  Sarcoid  Bronchiectasis  Subdural hematoma  Inguinal hernia  Seizure  Hypothyroid  Sleep apnea  LAMBERT and COPD overlap syndrome  BPH without urinary obstruction  Status post Bright&#x27;s procedure  Bilateral subdural hematomas  Status post cholecystectomy  Status post inguinal hernia repair      For details regarding the patient's social history, family history, and other miscellaneous elements, please refer the initial infectious diseases consultation and/or the admitting history and physical examination for this admission.    ROS:  Unable to obtain due to : UTO sec Pt's condition    ALLERGIES  benzodiazepines (Other)  Keppra (Other (Severe))  penicillins (Unknown)      Current inpatient medications :    ANTIBIOTICS/RELEVANT:  meropenem  IVPB 1000 milliGRAM(s) IV Intermittent every 8 hours    MEDICATIONS  (STANDING):  albuterol/ipratropium for Nebulization 3 milliLiter(s) Nebulizer every 6 hours  enoxaparin Injectable 40 milliGRAM(s) SubCutaneous daily  lactated ringers. 1000 milliLiter(s) (100 mL/Hr) IV Continuous <Continuous>  levothyroxine Injectable 12.5 MICROGram(s) IV Push at bedtime  methylPREDNISolone sodium succinate Injectable 40 milliGRAM(s) IV Push daily  pantoprazole  Injectable 40 milliGRAM(s) IV Push daily  valproate sodium IVPB 1500 milliGRAM(s) IV Intermittent at bedtime    MEDICATIONS  (PRN):  polyethylene glycol 3350 17 Gram(s) Oral daily PRN Constipation        Objective:  Vital Signs Last 24 Hrs  T(C): 36.7 (2021 12:25), Max: 36.9 (2021 04:00)  T(F): 98.1 (2021 12:25), Max: 98.4 (2021 04:00)  HR: 72 (2021 12:00) (59 - 78)  BP: 97/66 (2021 12:00) (93/67 - 111/71)  BP(mean): 76 (2021 12:00) (75 - 88)  RR: 26 (2021 12:00) (20 - 30)  SpO2: 94% (2021 12:00) (91% - 96%)      Physical Exam:  General: Weak frail  no acute distress  Neck: supple, trachea midline  Lungs: Decreased no wheeze/rhonchi  Cardiovascular: regular rate and rhythm, S1 S2  Abdomen: soft, nontender,  bowel sounds normal  Neurological: Lethargic  Skin: no rash  Extremities: + edema        LABS:                        12.8   15.37 )-----------( 121      ( 2021 06:22 )             40.0   06-23    139  |  98  |  23  ----------------------------<  94  3.6   |  44<H>  |  0.54    Ca    9.7      2021 06:22  Mg     1.8           Urinalysis Basic - ( 2021 03:13 )    Color: Yellow / Appearance: Clear / S.020 / pH: x  Gluc: x / Ketone: Trace  / Bili: Negative / Urobili: 8 mg/dL   Blood: x / Protein: 30 mg/dL / Nitrite: Negative   Leuk Esterase: Trace / RBC: 0-2 /HPF / WBC 0-2   Sq Epi: x / Non Sq Epi: Occasional / Bacteria: Occasional      Vancomycin Level, Trough: 11.1 ug/mL ( @ 11:39)    ABG - ( 2021 10:26 )  pH, Arterial: 7.44  pH, Blood: x     /  pCO2: 62    /  pO2: 71    / HCO3: 39    / Base Excess: 16.0  /  SaO2: 95          MICROBIOLOGY:    Culture - Sputum (collected 2021 14:04)  Source: .Sputum Sputum  Gram Stain (2021 20:37):    Few Squamous epithelial cells per low power field    Few polymorphonuclear leukocytes per low power field    Few Yeast per oil power field    Culture - Urine (collected 2021 14:11)  Source: .Urine Clean Catch (Midstream)  Final Report (2021 14:00):    No growth    Culture - Blood (collected 2021 14:11)  Source: .Blood Blood-Peripheral  Preliminary Report (2021 15:02):    No growth to date.    Culture - Blood (collected 2021 14:11)  Source: .Blood Blood-Peripheral  Preliminary Report (2021 15:02):    No growth to date.    MRSA/MSSA PCR (21 @ 00:13)    MRSA PCR Result.: NotDeteyosi    Staph Aureus PCR Result: NotDetec      RADIOLOGY & ADDITIONAL STUDIES:  EXAM:  CT CHEST                                  PROCEDURE DATE:  2021          INTERPRETATION:  CLINICAL INFORMATION: SOB, fever. Hx of sarcoid    COMPARISON: Comparison to numerous prior examinations most recent on 2021    CONTRAST/COMPLICATIONS:  IV Contrast: NONE  0 cc administered   0 cc discarded  Oral Contrast: NONE  Complications: None reported at time of study completion    PROCEDURE:  CT of the Chest was performed.  Sagittal and coronal reformats were performed.    FINDINGS:    LUNGS AND AIRWAYS: There is increased consolidation throughout the right upper and right lower lobe. Consolidation in the left lower lobe is decreased from prior exam. Marked bronchiectasis and architectural distortion is again demonstrated. Tracheal diverticulum.  PLEURA: Trace bilateral pleural effusions.  MEDIASTINUM AND JOANNE: Enlarged lymph nodes throughout the middle mediastinum are unchanged.  VESSELS: Within normal limits.  HEART: Heart size is normal. No pericardial effusion.  CHEST WALL AND LOWER NECK: Within normal limits.  VISUALIZED UPPER ABDOMEN: Status post cholecystectomy.  BONES: Degenerative changes.    IMPRESSION:    Increased consolidation throughout the right upper and lower lobes, correlate for pneumonia. Decreased consolidation in the left lower lobe.      Assessment :  65M with seizures on keppra, BPH, hypothyroidism, sarcoidosis GERD, COPD, LAMBERT, recent admission to [facility] from 5/3-5/10/2021 for right subarachnoid hemorrhage s/p fall admitted from [facility] with acute hypoxic respiratory failure sec severe right lung pneumonia likely aspiration pneumonia. Off BIPAP now on HFNC,  Sp fever   Leukocytosis multifactorial       Plan :   Cont Meropenam day 3  Trend temps and cbc  Swallow study when more awake  Cont to titrate down 02 reqiurements  Pulm on case  Puln toileting  Asp precautions    Continue with present regiment.  Appropriate use of antibiotics and adverse effects reviewed.      > 35 minutes were spent in direct patient care reviewing notes, medications ,labs data/ imaging , discussion with multidisciplinary team.    Thank you for allowing me to participate in care of your patient .    Tegan Granados MD  Infectious Disease  [phone]

## 2021-06-24 NOTE — PROGRESS NOTE ADULT - ASSESSMENT
65M with seizures on keppra, BPH, hypothyroidism, sarcoidosis GERD, COPD, LAMBERT, recent admission to [facility] from 5/3-5/10/2021 for right subarachnoid hemorrhage s/p fall who presents from [facility] for hypoxia.  Found to have acute hypoxic and hypercapnic respiratory failure 2/2 sepsis 2/2 aspiration PNA.  Likely aspiration PNA since patient has been recommended dysphagia I diet from [facility] but reports state that he has been non-compliant with recommendation.  Also location of RLL and RUL also fits with aspiration.      Problems  Acute hypoxic and hypercapnic respiratory failure  Sepsis 2/2 aspiration PNA  Sarcoidosis  Seizures  COPD    Acute hypoxic and hypercapnic respiratory failure   - admitted to [facility]  - Now on high flow oxygen per Pulm - wean oxygen as tolerated.  continue suctioning and chest PT.     Sepsis from aspiration PNA with metabolic encephalopathy - sepsis improving  - continue with meropenem as per ID  - ID f/u appreciated  - f/u blood cultures - NGTD  - speech and swallow eval prior to starting diet  - OMFS eval appreciated - no signs of oral infection    Troponin Elevation  - likely demand ischemia  - cardiology f/u appreciated  -  TTE noted - elevated RV pressures likely due to pna    Sarcoidosis/COPD  - started on steroids for history of bronchiectasis as well  - symbicort - patient not able to use properly - can dc.     Seizure history  - cont with depakote    Hypothyroidism  - cont with synthroid    General anxiety disorder  - restart effexor when able to take PO.     BPH  - has haq now, will need to restart flomax prior to trying to remove haq    Preventive measures  - can start with lovenox 40mg subq for DVT ppx    Patient critically ill, high risk for deterioration.    GOC discussed with sister 6/21 - full code.

## 2021-06-24 NOTE — PROGRESS NOTE ADULT - SUBJECTIVE AND OBJECTIVE BOX
Chief Complaint: Respiratory distress    Interval Events: No events overnight.    Review of Systems:  General: No fevers, chills, weight loss or gain  Skin: No rashes, color changes  Cardiovascular: No chest pain, orthopnea  Respiratory: No shortness of breath, cough  Gastrointestinal: No nausea, abdominal pain  Genitourinary: No incontinence, pain with urination  Musculoskeletal: No pain, swelling, decreased range of motion  Neurological: No headache, weakness  Psychiatric: No depression, anxiety  Endocrine: No weight loss or gain, increased thirst  All other systems are comprehensively negative.    Physical Exam:  Vital Signs Last 24 Hrs  T(C): 36.9 (24 Jun 2021 04:00), Max: 36.9 (23 Jun 2021 12:45)  T(F): 98.4 (24 Jun 2021 04:00), Max: 98.4 (23 Jun 2021 12:45)  HR: 63 (24 Jun 2021 08:00) (59 - 73)  BP: 109/75 (24 Jun 2021 08:00) (93/67 - 111/71)  BP(mean): 86 (24 Jun 2021 08:00) (74 - 88)  RR: 26 (24 Jun 2021 08:00) (20 - 27)  SpO2: 95% (24 Jun 2021 08:00) (91% - 96%)  General: NAD  HEENT: MMM  Neck: No JVD, no carotid bruit  Lungs: Rales bilaterally  CV: RRR, nl S1/S2, no M/R/G  Abdomen: S/NT/ND, +BS  Extremities: No LE edema, no cyanosis  Neuro: AAOx3, non-focal  Skin: No rash    Labs:                        12.8   15.37 )-----------( 121      ( 23 Jun 2021 06:22 )             40.0     06-23    139  |  98  |  23  ----------------------------<  94  3.6   |  44<H>  |  0.54    Ca    9.7      23 Jun 2021 06:22  Phos  2.8     06-21  Mg     1.8     06-23    TPro  5.5<L>  /  Alb  1.5<L>  /  TBili  0.5  /  DBili  x   /  AST  15  /  ALT  <10<L>  /  AlkPhos  73  06-22    CARDIAC MARKERS ( 23 Jun 2021 06:22 )  .354 ng/mL / x     / x     / x     / x      CARDIAC MARKERS ( 22 Jun 2021 17:22 )  .494 ng/mL / x     / x     / x     / x      CARDIAC MARKERS ( 22 Jun 2021 07:12 )  .492 ng/mL / x     / 17 U/L / x     / 2.4 ng/mL  CARDIAC MARKERS ( 21 Jun 2021 23:51 )  .253 ng/mL / x     / 9 U/L / x     / 1.0 ng/mL  CARDIAC MARKERS ( 21 Jun 2021 16:30 )  .208 ng/mL / x     / x     / x     / x              Telemetry: Sinus rhythm

## 2021-06-24 NOTE — PROGRESS NOTE ADULT - ASSESSMENT
66 yo m pmhx Sacroidosis, COPD, LAMBERT, hypothyroidism, BPH, GERD with recent admission for right SAH s/p mechanical fall (05/21) biba from [facility] with ams and hypoxia admitted with hypoxic/hypercapnic respiratory failure requiring Bipap 100%, pneumonia and severe sepsis.     NEURO: Encephalopathy improved.  Continue home dose valproate.   CV: Borderline BP, close HD monitoring.  RESP: Hypoxic/Hypercapnic Respiratory Failure, HFNC 40 LPM, Fio2 40%, titrating settings to maintain spo2 >88%, aggressive chest pt, aggressive suctioning, keep HOB >30 degrees. Nebs q6hr and prn. Continue solumedrol.   RENAL: Monitor lytes, replace as needed.  Herrera in place.   GI: NPO  ENDO: Hypothyroidism on synthroid.   ID: Meropenem for coverage.  Blood/urine cx NGTD.  Sputum cx. ID following  HEME: Heparin for vte ppx    DISPO: Full code.   66 yo m pmhx Sacroidosis, COPD, LAMBERT, hypothyroidism, BPH, GERD with recent admission for right SAH s/p mechanical fall (05/21) biba from [facility] with ams and hypoxia admitted with hypoxic/hypercapnic respiratory failure requiring Bipap 100%, pneumonia and severe sepsis.     NEURO: Encephalopathy improved.  Continue home dose valproate.   CV: Borderline BP, close HD monitoring.  RESP: Hypoxic/Hypercapnic Respiratory Failure, HFNC 40 LPM, Fio2 40%, titrating settings to maintain spo2 >88%, aggressive chest pt, aggressive suctioning, keep HOB >30 degrees. Nebs q6hr and prn. Continue solumedrol. Patient remains with copious secretions, able to cough however needs help clearing secretions. Respiratory status remains tenuous.  RENAL: Monitor lytes, replace as needed.  Herrera in place.   GI: NPO  ENDO: Hypothyroidism on synthroid.   ID: Meropenem for coverage.  Blood/urine cx NGTD.  Sputum cx. ID following  HEME: Heparin for vte ppx    DISPO: Full code.

## 2021-06-25 NOTE — PROGRESS NOTE ADULT - SUBJECTIVE AND OBJECTIVE BOX
JULY FORTE is a 65yMale , patient examined and chart reviewed.     INTERVAL HPI/ OVERNIGHT EVENTS:   Remains on HFNC. Afebrile.  Weak. Awake. Family at bedside.    PAST MEDICAL & SURGICAL HISTORY:  DM (diabetes mellitus)  Sarcoid  Bronchiectasis  Subdural hematoma  Inguinal hernia  Seizure  Hypothyroid  Sleep apnea  LAMBERT and COPD overlap syndrome  BPH without urinary obstruction  Status post Bright&#x27;s procedure  Bilateral subdural hematomas  Status post cholecystectomy  Status post inguinal hernia repair      For details regarding the patient's social history, family history, and other miscellaneous elements, please refer the initial infectious diseases consultation and/or the admitting history and physical examination for this admission.    ROS:  Unable to obtain due to : UTO sec Pt's condition    ALLERGIES  benzodiazepines (Other)  Keppra (Other (Severe))  penicillins (Unknown)      Current inpatient medications :    ANTIBIOTICS/RELEVANT:  meropenem  IVPB 1000 milliGRAM(s) IV Intermittent every 8 hours    MEDICATIONS  (STANDING):  albuterol/ipratropium for Nebulization 3 milliLiter(s) Nebulizer every 6 hours  enoxaparin Injectable 40 milliGRAM(s) SubCutaneous daily  lactated ringers. 1000 milliLiter(s) (50 mL/Hr) IV Continuous <Continuous>  levothyroxine Injectable 12.5 MICROGram(s) IV Push at bedtime  methylPREDNISolone sodium succinate Injectable 20 milliGRAM(s) IV Push daily  pantoprazole  Injectable 40 milliGRAM(s) IV Push daily  valproate sodium IVPB 1500 milliGRAM(s) IV Intermittent at bedtime    MEDICATIONS  (PRN):  polyethylene glycol 3350 17 Gram(s) Oral daily PRN Constipation        Objective:  ICU Vital Signs Last 24 Hrs  T(C): 36.7 (25 Jun 2021 20:00), Max: 36.8 (25 Jun 2021 08:00)  T(F): 98 (25 Jun 2021 20:00), Max: 98.2 (25 Jun 2021 08:00)  HR: 72 (25 Jun 2021 22:00) (64 - 72)  BP: 117/67 (25 Jun 2021 22:00) (114/71 - 123/75)  BP(mean): 82 (25 Jun 2021 22:00) (82 - 90)  RR: 26 (25 Jun 2021 22:00) (24 - 30)  SpO2: 98% (25 Jun 2021 22:00) (90% - 99%)      Physical Exam:  General: Weak frail  no acute distress  Neck: supple, trachea midline  Lungs: Decreased no wheeze/rhonchi  Cardiovascular: regular rate and rhythm, S1 S2  Abdomen: soft, nontender,  bowel sounds normal  Neurological: Lethargic  Skin: no rash  Extremities: + edema        LABS:                        13.3   10.61 )-----------( 130      ( 25 Jun 2021 06:19 )             40.7   06-25    143  |  102  |  20  ----------------------------<  86  3.3<L>   |  38<H>  |  0.44<L>    Ca    9.3      25 Jun 2021 06:19  Phos  2.5     06-25  Mg     1.8     06-25      MICROBIOLOGY:    Culture - Sputum (collected 23 Jun 2021 14:04)  Source: .Sputum Sputum  Gram Stain (23 Jun 2021 20:37):    Few Squamous epithelial cells per low power field    Few polymorphonuclear leukocytes per low power field    Few Yeast per oil power field  Final Report (25 Jun 2021 22:01):    Normal Respiratory Vangie present    Culture - Urine (collected 21 Jun 2021 14:11)  Source: .Urine Clean Catch (Midstream)  Final Report (22 Jun 2021 14:00):    No growth    Culture - Blood (collected 21 Jun 2021 14:11)  Source: .Blood Blood-Peripheral  Preliminary Report (22 Jun 2021 15:02):    No growth to date.    Culture - Blood (collected 21 Jun 2021 14:11)  Source: .Blood Blood-Peripheral  Preliminary Report (22 Jun 2021 15:02):    No growth to date.    MRSA/MSSA PCR (06.22.21 @ 00:13)    MRSA PCR Result.: NotDetec    Staph Aureus PCR Result: NotDetec      RADIOLOGY & ADDITIONAL STUDIES:  EXAM:  CT CHEST                                  PROCEDURE DATE:  06/21/2021          INTERPRETATION:  CLINICAL INFORMATION: SOB, fever. Hx of sarcoid    COMPARISON: Comparison to numerous prior examinations most recent on 5/9/2021    CONTRAST/COMPLICATIONS:  IV Contrast: NONE  0 cc administered   0 cc discarded  Oral Contrast: NONE  Complications: None reported at time of study completion    PROCEDURE:  CT of the Chest was performed.  Sagittal and coronal reformats were performed.    FINDINGS:    LUNGS AND AIRWAYS: There is increased consolidation throughout the right upper and right lower lobe. Consolidation in the left lower lobe is decreased from prior exam. Marked bronchiectasis and architectural distortion is again demonstrated. Tracheal diverticulum.  PLEURA: Trace bilateral pleural effusions.  MEDIASTINUM AND JOANNE: Enlarged lymph nodes throughout the middle mediastinum are unchanged.  VESSELS: Within normal limits.  HEART: Heart size is normal. No pericardial effusion.  CHEST WALL AND LOWER NECK: Within normal limits.  VISUALIZED UPPER ABDOMEN: Status post cholecystectomy.  BONES: Degenerative changes.    IMPRESSION:    Increased consolidation throughout the right upper and lower lobes, correlate for pneumonia. Decreased consolidation in the left lower lobe.      Assessment :  65M with seizures on keppra, BPH, hypothyroidism, sarcoidosis GERD, COPD, LAMBERT, recent admission to [facility] from 5/3-5/10/2021 for right subarachnoid hemorrhage s/p fall admitted from [facility] rehab with acute hypoxic respiratory failure sec severe right lung pneumonia likely aspiration pneumonia. Off BIPAP now on HFNC,  Sp fever   Leukocytosis multifactorial       Plan :   Cont Meropenam day 4  Trend temps and cbc  Cont to titrate down 02 reqiurements  Steroids and nebs per pulm  Pulm on case  Puln toileting  Asp precautions    Continue with present regiment.  Appropriate use of antibiotics and adverse effects reviewed.      > 35 minutes were spent in direct patient care reviewing notes, medications ,labs data/ imaging , discussion with multidisciplinary team.    Thank you for allowing me to participate in care of your patient .    Tegan Granados MD  Infectious Disease  [phone]

## 2021-06-25 NOTE — PROGRESS NOTE ADULT - SUBJECTIVE AND OBJECTIVE BOX
Chief Complaint: Respiratory distress    Interval Events: No events overnight.    Review of Systems:  General: No fevers, chills, weight loss or gain  Skin: No rashes, color changes  Cardiovascular: No chest pain, orthopnea  Respiratory: No shortness of breath, cough  Gastrointestinal: No nausea, abdominal pain  Genitourinary: No incontinence, pain with urination  Musculoskeletal: No pain, swelling, decreased range of motion  Neurological: No headache, weakness  Psychiatric: No depression, anxiety  Endocrine: No weight loss or gain, increased thirst  All other systems are comprehensively negative.    Physical Exam:  Vital Signs Last 24 Hrs  T(C): 36.7 (25 Jun 2021 04:00), Max: 37.1 (24 Jun 2021 15:55)  T(F): 98.1 (25 Jun 2021 04:00), Max: 98.7 (24 Jun 2021 15:55)  HR: 72 (25 Jun 2021 08:00) (59 - 78)  BP: 119/73 (25 Jun 2021 08:00) (97/66 - 123/74)  BP(mean): 87 (25 Jun 2021 08:00) (76 - 89)  RR: 30 (25 Jun 2021 08:00) (24 - 31)  SpO2: 95% (25 Jun 2021 08:00) (91% - 100%)  General: NAD  HEENT: MMM  Neck: No JVD, no carotid bruit  Lungs: Rales bilaterally  CV: RRR, nl S1/S2, no M/R/G  Abdomen: S/NT/ND, +BS  Extremities: No LE edema, no cyanosis  Neuro: AAOx3, non-focal  Skin: No rash    Labs:    06-25    143  |  102  |  20  ----------------------------<  86  3.3<L>   |  38<H>  |  0.44<L>    Ca    9.3      25 Jun 2021 06:19  Phos  2.5     06-25  Mg     1.8     06-25                          13.3   10.61 )-----------( 130      ( 25 Jun 2021 06:19 )             40.7     Telemetry: Sinus rhythm

## 2021-06-25 NOTE — PROGRESS NOTE ADULT - ASSESSMENT
The patient is a 65 year old male with a history of seizure d/o, BPH, hypothyroidism, sarcoidosis, COPD, LAMBERT, recent SAH who is admitted with respiratory failure in the setting of aspiration PNA.    Plan:  - ECG with anterolateral ST depressions  - Troponin peaked at 0.49. Likely elevated in the setting of demand ischemia from underlying infection, respiratory failure, tachycardia. No need to trend further.  - Echo with normal LV systolic function, no significant valve issues  - Given recent SAH, would hold off with antiplatelets or anticoagulants  - On meropenem and vancomycin  - O2 being weaned

## 2021-06-25 NOTE — PROGRESS NOTE ADULT - ASSESSMENT
65M with seizures on keppra, BPH, hypothyroidism, sarcoidosis GERD, COPD, LAMBERT, recent admission to [facility] from 5/3-5/10/2021 for right subarachnoid hemorrhage s/p fall who presents from [facility] for hypoxia.  Found to have acute hypoxic and hypercapnic respiratory failure 2/2 sepsis 2/2 aspiration PNA.  Likely aspiration PNA since patient has been recommended dysphagia I diet from [facility] but reports state that he has been non-compliant with recommendation.  Also location of RLL and RUL also fits with aspiration.      Problems  Acute hypoxic and hypercapnic respiratory failure  Sepsis 2/2 aspiration PNA  Sarcoidosis  Seizures  COPD    Acute hypoxic and hypercapnic respiratory failure   - admitted to [facility]  - Still on high flow oxygen per Pulm slow weaning oxygen as tolerated.  continue suctioning and chest PT.   - check f/u CXR    Sepsis from aspiration PNA with metabolic encephalopathy - sepsis improving  - continue with meropenem as per ID  - ID f/u appreciated  - f/u blood cultures - NGTD  - speech and swallow eval appreciated.  not yet ready to start diet.  - OMFS eval appreciated - no signs of oral infection    Troponin Elevation  - likely demand ischemia, no need to trend further  - cardiology f/u appreciated  -  TTE noted - elevated RV pressures likely due to pna    Sarcoidosis/COPD  - started on steroids for history of bronchiectasis as well - being tapered by pulm  - symbicort - patient not able to use properly - can dc.     Seizure history  - cont with depakote    Hypothyroidism  - cont with synthroid    General anxiety disorder  - restart effexor when able to take PO.     BPH  - has haq now, will need to restart flomax prior to trying to remove haq    Preventive measures  - can start with lovenox 40mg subq for DVT ppx    Patient critically ill, high risk for deterioration.    GOC discussed with sister 6/21 - full code.   Last updated sister 6/24

## 2021-06-25 NOTE — PROGRESS NOTE ADULT - SUBJECTIVE AND OBJECTIVE BOX
Patient is a 65y old  Male who presents with a chief complaint of respiratory failure, lethargy (25 Jun 2021 08:48)    INTERVAL HPI/OVERNIGHT EVENTS:  patient reports dry mouth, concerned about starting PT    MEDICATIONS  (STANDING):  albuterol/ipratropium for Nebulization 3 milliLiter(s) Nebulizer every 6 hours  enoxaparin Injectable 40 milliGRAM(s) SubCutaneous daily  lactated ringers. 1000 milliLiter(s) (50 mL/Hr) IV Continuous <Continuous>  levothyroxine Injectable 12.5 MICROGram(s) IV Push at bedtime  meropenem  IVPB 1000 milliGRAM(s) IV Intermittent every 8 hours  methylPREDNISolone sodium succinate Injectable 20 milliGRAM(s) IV Push daily  pantoprazole  Injectable 40 milliGRAM(s) IV Push daily  valproate sodium IVPB 1500 milliGRAM(s) IV Intermittent at bedtime    MEDICATIONS  (PRN):  polyethylene glycol 3350 17 Gram(s) Oral daily PRN Constipation    Allergies  Keppra (Other (Severe))  penicillins (Unknown)    Intolerances  benzodiazepines (Other)      REVIEW OF SYSTEMS:  denies new symptoms    Vital Signs Last 24 Hrs  T(C): 36.7 (25 Jun 2021 04:00), Max: 37.1 (24 Jun 2021 15:55)  T(F): 98.1 (25 Jun 2021 04:00), Max: 98.7 (24 Jun 2021 15:55)  HR: 68 (25 Jun 2021 12:00) (59 - 72)  BP: 123/75 (25 Jun 2021 12:00) (104/65 - 123/75)  BP(mean): 90 (25 Jun 2021 12:00) (77 - 90)  RR: 30 (25 Jun 2021 12:00) (24 - 31)  SpO2: 90% (25 Jun 2021 12:00) (90% - 100%)    PHYSICAL EXAM:  GENERAL: NAD, well-groomed, well-developed  HEAD:  Atraumatic, Normocephalic  EYES:conjunctiva and sclera clear  ENMT: Moist mucous membranes  NECK: Supple, No JVD  NERVOUS SYSTEM:  Alert & Oriented X2; All 4 extremities mobile, no gross sensory deficits.   CHEST/LUNG: Air entry improving, few scattered rhonchi  HEART: Regular rate and rhythm;  ABDOMEN: Soft, Nontender, Nondistended; Bowel sounds present  EXTREMITIES:  2+ Peripheral Pulses, +LE edema      LABS:                        13.3   10.61 )-----------( 130      ( 25 Jun 2021 06:19 )             40.7     25 Jun 2021 06:19    143    |  102    |  20     ----------------------------<  86     3.3     |  38     |  0.44     Ca    9.3        25 Jun 2021 06:19  Phos  2.5       25 Jun 2021 06:19  Mg     1.8       25 Jun 2021 06:19          CAPILLARY BLOOD GLUCOSE      POCT Blood Glucose.: 94 mg/dL (25 Jun 2021 12:50)  POCT Blood Glucose.: 84 mg/dL (25 Jun 2021 06:02)  POCT Blood Glucose.: 90 mg/dL (25 Jun 2021 00:54)      RADIOLOGY & ADDITIONAL TESTS:    Imaging Personally Reviewed:  [ ] YES     Consultant(s) Notes Reviewed:  pulm, card    Care Discussed with Consultants/Other Providers:    Advanced Directives: [ ] DNR  [ ] No feeding tube  [ ] MOLST in chart  [ ] MOLST completed today  [ ] Unknown

## 2021-06-25 NOTE — PROGRESS NOTE ADULT - SUBJECTIVE AND OBJECTIVE BOX
Date/Time Patient Seen:  		  Referring MD:   Data Reviewed	       Patient is a 65y old  Male who presents with a chief complaint of respiratory failure, lethargy (25 Jun 2021 01:57)      Subjective/HPI     PAST MEDICAL & SURGICAL HISTORY:  No pertinent past medical history    DM (diabetes mellitus)    Sarcoid    Bronchiectasis    Diverticulitis    Subdural hematoma    Inguinal hernia    Cellulitis    Seizure    Hypothyroid    Sleep apnea    LAMBERT and COPD overlap syndrome    BPH without urinary obstruction    No significant past surgical history    Status post Bright&#x27;s procedure    Bilateral subdural hematomas    Status post cholecystectomy    Status post inguinal hernia repair          Medication list         MEDICATIONS  (STANDING):  albuterol/ipratropium for Nebulization 3 milliLiter(s) Nebulizer every 6 hours  enoxaparin Injectable 40 milliGRAM(s) SubCutaneous daily  lactated ringers. 1000 milliLiter(s) (100 mL/Hr) IV Continuous <Continuous>  levothyroxine Injectable 12.5 MICROGram(s) IV Push at bedtime  meropenem  IVPB 1000 milliGRAM(s) IV Intermittent every 8 hours  methylPREDNISolone sodium succinate Injectable 40 milliGRAM(s) IV Push daily  pantoprazole  Injectable 40 milliGRAM(s) IV Push daily  valproate sodium IVPB 1500 milliGRAM(s) IV Intermittent at bedtime    MEDICATIONS  (PRN):  polyethylene glycol 3350 17 Gram(s) Oral daily PRN Constipation         Vitals log        ICU Vital Signs Last 24 Hrs  T(C): 36.7 (25 Jun 2021 04:00), Max: 37.1 (24 Jun 2021 15:55)  T(F): 98.1 (25 Jun 2021 04:00), Max: 98.7 (24 Jun 2021 15:55)  HR: 72 (25 Jun 2021 08:00) (59 - 78)  BP: 119/73 (25 Jun 2021 08:00) (97/66 - 123/74)  BP(mean): 87 (25 Jun 2021 08:00) (76 - 89)  ABP: --  ABP(mean): --  RR: 30 (25 Jun 2021 08:00) (24 - 31)  SpO2: 95% (25 Jun 2021 08:00) (91% - 100%)           Input and Output:  I&O's Detail    24 Jun 2021 07:01  -  25 Jun 2021 07:00  --------------------------------------------------------  IN:    IV PiggyBack: 150 mL    Lactated Ringers: 2300 mL  Total IN: 2450 mL    OUT:    Indwelling Catheter - Urethral (mL): 650 mL  Total OUT: 650 mL    Total NET: 1800 mL          Lab Data                        13.3   10.61 )-----------( 130      ( 25 Jun 2021 06:19 )             40.7     06-25    143  |  102  |  20  ----------------------------<  86  3.3<L>   |  38<H>  |  0.44<L>    Ca    9.3      25 Jun 2021 06:19  Phos  2.5     06-25  Mg     1.8     06-25              Review of Systems	      Objective     Physical Examination    heart s1s2  lung dec BS  abd soft  on HF      Pertinent Lab findings & Imaging      Sharon:  NO   Adequate UO     I&O's Detail    24 Jun 2021 07:01  -  25 Jun 2021 07:00  --------------------------------------------------------  IN:    IV PiggyBack: 150 mL    Lactated Ringers: 2300 mL  Total IN: 2450 mL    OUT:    Indwelling Catheter - Urethral (mL): 650 mL  Total OUT: 650 mL    Total NET: 1800 mL               Discussed with:     Cultures:	        Radiology

## 2021-06-25 NOTE — PROGRESS NOTE ADULT - SUBJECTIVE AND OBJECTIVE BOX
Patient is a 65y old  Male who presents with a chief complaint of respiratory failure, lethargy (24 Jun 2021 12:28)      BRIEF HOSPITAL COURSE:   66 yo m pmhx Sacroidosis, COPD, LAMBERT, hypothyroidism, BPH, GERD with recent admission for right SAH s/p mechanical fall (05/21) biba from [facility] with ams and hypoxia admitted with hypoxic/hypercapnic respiratory failure requiring Bipap 100%, pneumonia and severe sepsis.     6:24: Remains on HFNC, slowly weaning now at 40LPM and 40% fio2, tolerating well, however still with episodes of hypoxia, more alert and communicative.    Events last 24 hours:   Remains on HFNC 40/40%, spo2 improved to high 90s, breathing appears more comfortable, HFNC weaned to 30 LPM and 40%. Patient alert and communicative.       PAST MEDICAL & SURGICAL HISTORY:  DM (diabetes mellitus)  Sarcoid  Bronchiectasis  Subdural hematoma  Inguinal hernia  Seizure  Hypothyroid  Sleep apnea  LAMBERT and COPD overlap syndrome  BPH without urinary obstruction  Status post Bright&#x27;s procedure  Bilateral subdural hematomas  Status post cholecystectomy  Status post inguinal hernia repair      Allergies  Keppra (Other (Severe))  penicillins (Unknown)    Intolerances  benzodiazepines (Other)      FAMILY HISTORY:  Family history of diabetes mellitus  Family history of pulmonary embolism (Grandparent)      Social History:   From [facility]    Review of Systems:  No active complaints      Physical Examination:    General: Elderly male, lying in bed, NAD    HEENT: NC/AT, HFNC in place.    PULM: Symmetrical thorax expansion upon respiration.  Coarse to auscultation bilaterally, no significant sputum production    CVS: Regular rate and rhythm, no murmurs, rubs, or gallops appreciated    ABD: Soft, nondistended, nontender, normoactive bowel sounds, no masses appreciated    EXT: + edema, nontender    SKIN: Warm and well perfused, no rashes noted.    NEURO: Alert, interactive      Medications:  meropenem  IVPB 1000 milliGRAM(s) IV Intermittent every 8 hours  albuterol/ipratropium for Nebulization 3 milliLiter(s) Nebulizer every 6 hours  valproate sodium IVPB 1500 milliGRAM(s) IV Intermittent at bedtime  enoxaparin Injectable 40 milliGRAM(s) SubCutaneous daily  pantoprazole  Injectable 40 milliGRAM(s) IV Push daily  polyethylene glycol 3350 17 Gram(s) Oral daily PRN  levothyroxine Injectable 12.5 MICROGram(s) IV Push at bedtime  methylPREDNISolone sodium succinate Injectable 40 milliGRAM(s) IV Push daily  lactated ringers. 1000 milliLiter(s) IV Continuous <Continuous>      ICU Vital Signs Last 24 Hrs  T(C): 36.7 (25 Jun 2021 04:00), Max: 37.1 (24 Jun 2021 15:55)  T(F): 98.1 (25 Jun 2021 04:00), Max: 98.7 (24 Jun 2021 15:55)  HR: 70 (25 Jun 2021 04:00) (59 - 78)  BP: 118/70 (25 Jun 2021 04:00) (97/66 - 123/74)  BP(mean): 85 (25 Jun 2021 04:00) (76 - 89)  ABP: --  ABP(mean): --  RR: 30 (25 Jun 2021 04:00) (24 - 31)  SpO2: 94% (25 Jun 2021 04:00) (91% - 100%)    Vital Signs Last 24 Hrs  T(C): 36.7 (25 Jun 2021 04:00), Max: 37.1 (24 Jun 2021 15:55)  T(F): 98.1 (25 Jun 2021 04:00), Max: 98.7 (24 Jun 2021 15:55)  HR: 70 (25 Jun 2021 04:00) (59 - 78)  BP: 118/70 (25 Jun 2021 04:00) (97/66 - 123/74)  BP(mean): 85 (25 Jun 2021 04:00) (76 - 89)  RR: 30 (25 Jun 2021 04:00) (24 - 31)  SpO2: 94% (25 Jun 2021 04:00) (91% - 100%)      I&O's Detail    23 Jun 2021 07:01  -  24 Jun 2021 07:00  --------------------------------------------------------  IN:    IV PiggyBack: 450 mL    Lactated Ringers: 2400 mL  Total IN: 2850 mL    OUT:    Indwelling Catheter - Urethral (mL): 1000 mL    Oral Fluid: 0 mL  Total OUT: 1000 mL  Total NET: 1850 mL      24 Jun 2021 07:01  -  25 Jun 2021 05:58  --------------------------------------------------------  IN:    IV PiggyBack: 150 mL    Lactated Ringers: 2200 mL  Total IN: 2350 mL    OUT:    Voided (mL): 650 mL  Total OUT: 650 mL  Total NET: 1700 mL      LABS:                        12.8   15.37 )-----------( 121      ( 23 Jun 2021 06:22 )             40.0     06-23    139  |  98  |  23  ----------------------------<  94  3.6   |  44<H>  |  0.54    Ca    9.7      23 Jun 2021 06:22  Mg     1.8     06-23      CARDIAC MARKERS ( 23 Jun 2021 06:22 )  .354 ng/mL / x     / x     / x     / x          CAPILLARY BLOOD GLUCOSE  POCT Blood Glucose.: 90 mg/dL (25 Jun 2021 00:54)      CULTURES:  Culture Results:   Normal Respiratory Vangie present (06-23 @ 14:04)  Culture Results:   No growth to date. (06-21 @ 14:11)  Culture Results:   No growth to date. (06-21 @ 14:11)  Culture Results:   No growth (06-21 @ 14:11)      SUPPLEMENTAL O2: HFNC  LINES: Peripheral   IVF: N  FAY: MICHEAL  PPx: PPI, Lovenox  CONTACT: N

## 2021-06-25 NOTE — PROGRESS NOTE ADULT - ASSESSMENT
monitor for vol overload, remains on IVF - proBNP elevated  will taper Steroids this am   cont weaning fio2 support as tolerated - wean HF -     65M with history of Seizure disorder, Hypothyroidism,  Thrombocytopenia, BPH, recent right SAH from fall May 2021, presents from [facility] for hypoxia and AMS. Found to have severe sepsis, acute hypoxic and hypercapnic respiratory failure    source of infection - etiol - w/u in progress - Dental eval noted - OMF Attending eval reviewed -   probable ongoing - continued aspiration - with resulting consolidation - asp pna - as seen on CT chest and clinical presentation  at risk for decompensation - resp failure -     broad spectrum ABX - mrsa screen, cx in progress, biomarkers, WBC trend  assist with all needs and ADL  HOB elev - asp prec  oral hygiene  Prognosis guarded - GOC discussion -     SPCU care and monitoring

## 2021-06-25 NOTE — PROGRESS NOTE ADULT - ASSESSMENT
66 yo m pmhx Sacroidosis, COPD, LAMBERT, hypothyroidism, BPH, GERD with recent admission for right SAH s/p mechanical fall (05/21) biba from [facility] with ams and hypoxia admitted with hypoxic/hypercapnic respiratory failure requiring Bipap 100%, pneumonia and severe sepsis.     NEURO: Encephalopathy improved.  Continue home dose valproate.   CV: Borderline BP, close HD monitoring.  RESP: Hypoxic/Hypercapnic Respiratory Failure, HFNC setting weaned to 30 LPM, Fio2 40%, titrating settings to maintain spo2 >88%, aggressive chest pt, aggressive suctioning, keep HOB >30 degrees. Nebs q6hr and prn. Continue solumedrol. Patient remains with copious secretions, able to cough however needs help clearing secretions. Respiratory status remains tenuous.  RENAL: Monitor lytes, replace as needed.  Herrera in place.   GI: NPO, tentative plan for S&S, likely will be easier for patient if weaned off HFNC.    ENDO: Hypothyroidism on synthroid.   ID: Meropenem for coverage.  Blood/urine cx NGTD.  Sputum cx. ID following  HEME: Heparin for vte ppx    DISPO: Full code.

## 2021-06-26 NOTE — PROGRESS NOTE ADULT - SUBJECTIVE AND OBJECTIVE BOX
Chief Complaint: Respiratory distress    Interval Events: No events overnight.    Review of Systems:  General: No fevers, chills, weight loss or gain  Skin: No rashes, color changes  Cardiovascular: No chest pain, orthopnea  Respiratory: No shortness of breath, cough  Gastrointestinal: No nausea, abdominal pain  Genitourinary: No incontinence, pain with urination  Musculoskeletal: No pain, swelling, decreased range of motion  Neurological: No headache, weakness  Psychiatric: No depression, anxiety  Endocrine: No weight loss or gain, increased thirst  All other systems are comprehensively negative.    Physical Exam:  Vital Signs Last 24 Hrs  T(C): 36.6 (26 Jun 2021 08:07), Max: 36.8 (26 Jun 2021 04:15)  T(F): 97.9 (26 Jun 2021 08:07), Max: 98.2 (26 Jun 2021 04:15)  HR: 65 (26 Jun 2021 08:00) (64 - 72)  BP: 116/76 (26 Jun 2021 08:00) (114/71 - 123/75)  BP(mean): 88 (26 Jun 2021 08:00) (82 - 90)  RR: 23 (26 Jun 2021 08:00) (23 - 30)  SpO2: 96% (26 Jun 2021 08:00) (90% - 100%)  General: NAD  HEENT: MMM  Neck: No JVD, no carotid bruit  Lungs: Rales bilaterally  CV: RRR, nl S1/S2, no M/R/G  Abdomen: S/NT/ND, +BS  Extremities: No LE edema, no cyanosis  Neuro: AAOx3, non-focal  Skin: No rash    Labs:    06-26    144  |  102  |  15  ----------------------------<  81  4.2   |  37<H>  |  0.40<L>    Ca    9.3      26 Jun 2021 06:38  Phos  2.9     06-26  Mg     2.0     06-26                          14.0   9.86  )-----------( 122      ( 26 Jun 2021 06:38 )             43.1       Telemetry: Sinus rhythm

## 2021-06-26 NOTE — PROGRESS NOTE ADULT - ASSESSMENT
The patient is a 65 year old male with a history of seizure d/o, BPH, hypothyroidism, sarcoidosis, COPD, LAMBERT, recent SAH who is admitted with respiratory failure in the setting of aspiration PNA.    Plan:  - ECG with anterolateral ST depressions  - Troponin peaked at 0.49. Likely elevated in the setting of demand ischemia from underlying infection, respiratory failure, tachycardia. No need to trend further.  - Echo with normal LV systolic function, no significant valve issues  - Given recent SAH, would hold off with antiplatelets or anticoagulants  - On meropenem  - O2 being weaned

## 2021-06-26 NOTE — CHART NOTE - NSCHARTNOTEFT_GEN_A_CORE
Assessment:       Pt is a  65 year old M with seizures , BPH, hypothyroidism, sarcoidosis GERD, COPD, LAMBERT, recent admission to [facility] from 5/3-5/10/2021 for right subarachnoid hemorrhage s/p fall who presents from [facility] for hypoxia.  Found to have acute hypoxic and hypercapnic respiratory failure 2/2 sepsis 2/2 aspiration PNA.  It was recommended that pt  follow dysphagia I diet from [facility] but reports state that he has been non-compliant with recommendation. Pt received SLP eval this morning which he failed. Pt unable to tolerate PO nutrition at this  Given that pt has been NPO x 6 days as well as increased edema: Now  b/l arm edema +1, b/l ankle edema +2 . Pt weight down 4.5# (2.4%)- edema may be masking additional wt loss. Pt meets criteria for severe malnutrition in the context of acute illness.  D/W Dr Pak who will be having GOC discussion with family. If enteral feeding desired then would recommend Jevity 1.5 to goal rate 70ml/hr which will provide 2520 kcal and 93 gm protein which will meet pt's estimated needs. RD remains available. Will f/u          Factors impacting intake: [ ] none [ ] nausea  [ ] vomiting [ ] diarrhea [ ] constipation  [ ]chewing problems [x ] swallowing issues  [ ] other:     Diet Presciption: Diet, NPO (06-21-21 @ 06:20)    Intake: NPO    Current Weight: Weight (kg): 82.6 (06-21 @ 01:58)  % Weight Change  -4.5# x 6 days 2.4%    Pertinent Medications: MEDICATIONS  (STANDING):  albuterol/ipratropium for Nebulization 3 milliLiter(s) Nebulizer every 6 hours  enoxaparin Injectable 40 milliGRAM(s) SubCutaneous daily  lactated ringers. 1000 milliLiter(s) (50 mL/Hr) IV Continuous <Continuous>  levothyroxine Injectable 12.5 MICROGram(s) IV Push at bedtime  meropenem  IVPB 1000 milliGRAM(s) IV Intermittent every 8 hours  methylPREDNISolone sodium succinate Injectable 20 milliGRAM(s) IV Push daily  pantoprazole  Injectable 40 milliGRAM(s) IV Push daily  valproate sodium IVPB 1500 milliGRAM(s) IV Intermittent at bedtime    MEDICATIONS  (PRN):  polyethylene glycol 3350 17 Gram(s) Oral daily PRN Constipation    Pertinent Labs: 06-26 Na144 mmol/L Glu 81 mg/dL K+ 4.2 mmol/L Cr  0.40 mg/dL<L> BUN 15 mg/dL 06-26 Phos 2.9 mg/dL 06-22 Alb 1.5 g/dL<L>     CAPILLARY BLOOD GLUCOSE      POCT Blood Glucose.: 79 mg/dL (25 Jun 2021 17:27)  POCT Blood Glucose.: 94 mg/dL (25 Jun 2021 12:50)    Skin: b/l arm edema +1 b/l ankle edema +2   DTI sacrum    Estimated Needs:   [x ] no change since previous assessment  [ ] recalculated:     Previous Nutrition Diagnosis:   [ ] Inadequate Energy Intake [x ]Inadequate Oral Intake [ ] Excessive Energy Intake   [ ] Underweight [ ] Increased Nutrient Needs [ ] Overweight/Obesity   [ ] Altered GI Function [ ] Unintended Weight Loss [ ] Food & Nutrition Related Knowledge Deficit [    Nutrition Diagnosis is [x ] ongoing  [ ] resolved [ ] not applicable     New Nutrition Diagnosis: [ ] not applicable   x ] Malnutrition   severe malnutrition in the context of acute illness as evidenced by +1/+2 edema to arms/ankles NPO x 6 days    Interventions:   Recommend  [ ] Change Diet To:  [ ] Nutrition Supplement  [x ] Nutrition Support   Jevity 1.5 70 ml/hr x 24 hrs  [ ] Other:     Monitoring and Evaluation:   [ ] PO intake [ x ] Tolerance to diet prescription [ x ] weights [ x ] labs[ x ] follow up per protocol  [ ] other:

## 2021-06-26 NOTE — PROGRESS NOTE ADULT - ASSESSMENT
cxr repeat noted - no improvement - will repeat CT chest - eval for progression of disease -   remains on high fio2 support  SLP follow up reviewed - pt remains NPO  remains on steroids - lower dose     65M with history of Seizure disorder, Hypothyroidism,  Thrombocytopenia, BPH, recent right SAH from fall May 2021, presents from [facility] for hypoxia and AMS. Found to have severe sepsis, acute hypoxic and hypercapnic respiratory failure    source of infection - etiol - w/u in progress - Dental eval noted - OMF Attending eval reviewed -   probable ongoing - continued aspiration - with resulting consolidation - asp pna - as seen on CT chest and clinical presentation  at risk for decompensation - resp failure -     broad spectrum ABX - mrsa screen, cx in progress, biomarkers, WBC trend  assist with all needs and ADL  HOB elev - asp prec  oral hygiene  Prognosis guarded - GOC discussion -     SPCU care and monitoring

## 2021-06-26 NOTE — DIETITIAN NUTRITION RISK NOTIFICATION - TREATMENT: THE FOLLOWING DIET HAS BEEN RECOMMENDED
Diet, NPO (06-21-21 @ 06:20) [Active]        Pt is a  65 year old M with seizures , BPH, hypothyroidism, sarcoidosis GERD, COPD, LAMBERT, recent admission to [facility] from 5/3-5/10/2021 for right subarachnoid hemorrhage s/p fall who presents from [facility] for hypoxia.  Found to have acute hypoxic and hypercapnic respiratory failure 2/2 sepsis 2/2 aspiration PNA.  It was recommended that pt  follow dysphagia I diet from [facility] but reports state that he has been non-compliant with recommendation. Pt received SLP eval this morning which he failed. Pt unable to tolerate PO nutrition at this  Given that pt has been NPO x 6 days as well as increased edema: Now  b/l arm edema +1, b/l ankle edema +2 . Pt weight down 4.5# (2.4%)- edema may be masking additional wt loss. Pt meets criteria for severe malnutrition in the context of acute illness.  D/W Dr Pak who will be having GOC discussion with family. If enteral feeding desired then would recommend Jevity 1.5 to goal rate 70ml/hr which will provide 2520 kcal and 93 gm protein which will meet pt's estimated needs. RD remains available. Will f/u

## 2021-06-26 NOTE — PROGRESS NOTE ADULT - SUBJECTIVE AND OBJECTIVE BOX
Patient seen and examined at bedside  no acute events noted  patient weak lethargic    Review of Systems:  General:denies fever chills, headache, weakness  HEENT: denies blurry vision,diffculty swallowing, difficulty hearing, tinnitus  Cardiovascular: denies chest pain  ,palpitations  Pulmonary:denies shortness of breath, cough, wheezing, hemoptysis  Gastrointestinal: denies abdominal pain, constipation, diarrhea,nausea , vomiting, hematochezia  : denies hematuria, dysuria, or incontinence  Neurological: denies weakness, numbness , tingling, dizziness, tremors  MSK: denies muscle pain, difficulty ambulating, swelling, back pain  skin: denies skin rash, itching, burning, or  skin lesions  Psychiatrical: denies mood disturbances, anxierty, feeling depressed, depression , or difficulty sleeping    Objective:  Vitals  T(C): 36.8 (06-26-21 @ 04:15), Max: 36.8 (06-25-21 @ 08:00)  HR: 67 (06-26-21 @ 06:12) (64 - 72)  BP: 119/70 (06-26-21 @ 06:00) (114/71 - 123/75)  RR: 26 (06-26-21 @ 06:00) (23 - 30)  SpO2: 99% (06-26-21 @ 06:12) (90% - 100%)    Physical Exam:  General: comfortable, no acute distress, well nourished  HEENT: Atraumatic, no LAD, trachea midline, PERRLA  Cardiovascular: normal s1s2, no murmurs, gallops or fricition rubs  Pulmonary: , no wheezing ,++ rhonchi  Gastrointestinal: soft non tender non distended, no masses felt, no organomegally  Muscloskeletal: ++lower extremity edema, intact bilateral lower extremity pulses  Neurological: CN II-12 intact. No focal weakness  Psychiatrical: normal mood, cooperative  SKIN: no rash, lesions or ulcers    Labs:                          14.0   9.86  )-----------( 122      ( 26 Jun 2021 06:38 )             43.1     06-26    144  |  102  |  15  ----------------------------<  81  4.2   |  37<H>  |  0.40<L>    Ca    9.3      26 Jun 2021 06:38  Phos  2.9     06-26  Mg     2.0     06-26              Active Medications  MEDICATIONS  (STANDING):  albuterol/ipratropium for Nebulization 3 milliLiter(s) Nebulizer every 6 hours  enoxaparin Injectable 40 milliGRAM(s) SubCutaneous daily  lactated ringers. 1000 milliLiter(s) (50 mL/Hr) IV Continuous <Continuous>  levothyroxine Injectable 12.5 MICROGram(s) IV Push at bedtime  meropenem  IVPB 1000 milliGRAM(s) IV Intermittent every 8 hours  methylPREDNISolone sodium succinate Injectable 20 milliGRAM(s) IV Push daily  pantoprazole  Injectable 40 milliGRAM(s) IV Push daily  valproate sodium IVPB 1500 milliGRAM(s) IV Intermittent at bedtime    MEDICATIONS  (PRN):  polyethylene glycol 3350 17 Gram(s) Oral daily PRN Constipation     Patient seen and examined at bedside  no acute events noted  patient weak lethargic. on 02 nasal canula    Review of Systems:  General:denies fever chills, headache, weakness  HEENT: denies blurry vision,diffculty swallowing, difficulty hearing, tinnitus  Cardiovascular: denies chest pain  ,palpitations  Pulmonary:denies shortness of breath, cough, wheezing, hemoptysis  Gastrointestinal: denies abdominal pain, constipation, diarrhea,nausea , vomiting, hematochezia  : denies hematuria, dysuria, or incontinence  Neurological: denies weakness, numbness , tingling, dizziness, tremors  MSK: denies muscle pain, difficulty ambulating, swelling, back pain  skin: denies skin rash, itching, burning, or  skin lesions  Psychiatrical: denies mood disturbances, anxierty, feeling depressed, depression , or difficulty sleeping    Objective:  Vitals  T(C): 36.8 (06-26-21 @ 04:15), Max: 36.8 (06-25-21 @ 08:00)  HR: 67 (06-26-21 @ 06:12) (64 - 72)  BP: 119/70 (06-26-21 @ 06:00) (114/71 - 123/75)  RR: 26 (06-26-21 @ 06:00) (23 - 30)  SpO2: 99% (06-26-21 @ 06:12) (90% - 100%)    Physical Exam:  General: cachetic, weak lethargic,  HEENT: Atraumatic, no LAD, trachea midline, PERRLA  Cardiovascular: normal s1s2, no murmurs, gallops or fricition rubs  Pulmonary: , no wheezing ,++ rhonchi  Gastrointestinal: soft non tender non distended, no masses felt, no organomegally  Muscloskeletal: ++lower extremity edema, intact bilateral lower extremity pulses  Neurological: CN II-12 intact. No focal weakness  Psychiatrical: normal mood, cooperative  SKIN:ecchmoysis on skin    Labs:                          14.0   9.86  )-----------( 122      ( 26 Jun 2021 06:38 )             43.1     06-26    144  |  102  |  15  ----------------------------<  81  4.2   |  37<H>  |  0.40<L>    Ca    9.3      26 Jun 2021 06:38  Phos  2.9     06-26  Mg     2.0     06-26              Active Medications  MEDICATIONS  (STANDING):  albuterol/ipratropium for Nebulization 3 milliLiter(s) Nebulizer every 6 hours  enoxaparin Injectable 40 milliGRAM(s) SubCutaneous daily  lactated ringers. 1000 milliLiter(s) (50 mL/Hr) IV Continuous <Continuous>  levothyroxine Injectable 12.5 MICROGram(s) IV Push at bedtime  meropenem  IVPB 1000 milliGRAM(s) IV Intermittent every 8 hours  methylPREDNISolone sodium succinate Injectable 20 milliGRAM(s) IV Push daily  pantoprazole  Injectable 40 milliGRAM(s) IV Push daily  valproate sodium IVPB 1500 milliGRAM(s) IV Intermittent at bedtime    MEDICATIONS  (PRN):  polyethylene glycol 3350 17 Gram(s) Oral daily PRN Constipation

## 2021-06-26 NOTE — PROGRESS NOTE ADULT - SUBJECTIVE AND OBJECTIVE BOX
Date/Time Patient Seen:  		  Referring MD:   Data Reviewed	       Patient is a 65y old  Male who presents with a chief complaint of respiratory failure, lethargy (25 Jun 2021 15:47)      Subjective/HPI     PAST MEDICAL & SURGICAL HISTORY:  No pertinent past medical history    DM (diabetes mellitus)    Sarcoid    Bronchiectasis    Diverticulitis    Subdural hematoma    Inguinal hernia    Cellulitis    Seizure    Hypothyroid    Sleep apnea    LAMBERT and COPD overlap syndrome    BPH without urinary obstruction    No significant past surgical history    Status post Bright&#x27;s procedure    Bilateral subdural hematomas    Status post cholecystectomy    Status post inguinal hernia repair          Medication list         MEDICATIONS  (STANDING):  albuterol/ipratropium for Nebulization 3 milliLiter(s) Nebulizer every 6 hours  enoxaparin Injectable 40 milliGRAM(s) SubCutaneous daily  lactated ringers. 1000 milliLiter(s) (50 mL/Hr) IV Continuous <Continuous>  levothyroxine Injectable 12.5 MICROGram(s) IV Push at bedtime  meropenem  IVPB 1000 milliGRAM(s) IV Intermittent every 8 hours  methylPREDNISolone sodium succinate Injectable 20 milliGRAM(s) IV Push daily  pantoprazole  Injectable 40 milliGRAM(s) IV Push daily  valproate sodium IVPB 1500 milliGRAM(s) IV Intermittent at bedtime    MEDICATIONS  (PRN):  polyethylene glycol 3350 17 Gram(s) Oral daily PRN Constipation         Vitals log        ICU Vital Signs Last 24 Hrs  T(C): 36.8 (26 Jun 2021 04:15), Max: 36.8 (25 Jun 2021 08:00)  T(F): 98.2 (26 Jun 2021 04:15), Max: 98.2 (25 Jun 2021 08:00)  HR: 67 (26 Jun 2021 06:12) (64 - 72)  BP: 119/70 (26 Jun 2021 06:00) (114/71 - 123/75)  BP(mean): 84 (26 Jun 2021 06:00) (82 - 90)  ABP: --  ABP(mean): --  RR: 26 (26 Jun 2021 06:00) (23 - 30)  SpO2: 99% (26 Jun 2021 06:12) (90% - 100%)           Input and Output:  I&O's Detail    24 Jun 2021 07:01  -  25 Jun 2021 07:00  --------------------------------------------------------  IN:    IV PiggyBack: 150 mL    Lactated Ringers: 2300 mL  Total IN: 2450 mL    OUT:    Indwelling Catheter - Urethral (mL): 650 mL  Total OUT: 650 mL    Total NET: 1800 mL      25 Jun 2021 07:01  -  26 Jun 2021 06:58  --------------------------------------------------------  IN:    IV PiggyBack: 450 mL    Lactated Ringers: 1200 mL  Total IN: 1650 mL    OUT:    Indwelling Catheter - Urethral (mL): 1750 mL  Total OUT: 1750 mL    Total NET: -100 mL          Lab Data                        13.3   10.61 )-----------( 130      ( 25 Jun 2021 06:19 )             40.7     06-25    143  |  102  |  20  ----------------------------<  86  3.3<L>   |  38<H>  |  0.44<L>    Ca    9.3      25 Jun 2021 06:19  Phos  2.5     06-25  Mg     1.8     06-25              Review of Systems	      Objective     Physical Examination    heart s1s2  lung dec BS  abd soft  head nc  on o2 support      Pertinent Lab findings & Imaging      Sharon:  NO   Adequate UO     I&O's Detail    24 Jun 2021 07:01  -  25 Jun 2021 07:00  --------------------------------------------------------  IN:    IV PiggyBack: 150 mL    Lactated Ringers: 2300 mL  Total IN: 2450 mL    OUT:    Indwelling Catheter - Urethral (mL): 650 mL  Total OUT: 650 mL    Total NET: 1800 mL      25 Jun 2021 07:01  -  26 Jun 2021 06:58  --------------------------------------------------------  IN:    IV PiggyBack: 450 mL    Lactated Ringers: 1200 mL  Total IN: 1650 mL    OUT:    Indwelling Catheter - Urethral (mL): 1750 mL  Total OUT: 1750 mL    Total NET: -100 mL               Discussed with:     Cultures:	        Radiology

## 2021-06-26 NOTE — PROGRESS NOTE ADULT - ASSESSMENT
65M with seizures on keppra, BPH, hypothyroidism, sarcoidosis GERD, COPD, LAMBERT, recent admission to [facility] from 5/3-5/10/2021 for right subarachnoid hemorrhage s/p fall who presents from [facility] for hypoxia.  Found to have acute hypoxic and hypercapnic respiratory failure 2/2 sepsis 2/2 aspiration PNA.  Likely aspiration PNA since patient has been recommended dysphagia I diet from [facility] but reports state that he has been non-compliant with recommendation.  Also location of RLL and RUL also fits with aspiration.      Problems  Acute hypoxic and hypercapnic respiratory failure  Sepsis 2/2 aspiration PNA  Sarcoidosis  Seizures  COPD    Acute hypoxic and hypercapnic respiratory failure   - admitted to [facility]  - Still on high flow oxygen per Pulm slow weaning oxygen as tolerated.  continue suctioning and chest PT.   - check f/u CXR    Sepsis from aspiration PNA with metabolic encephalopathy - sepsis improving  - continue with meropenem as per ID  - ID f/u appreciated  - f/u blood cultures - NGTD  - speech and swallow eval appreciated.  not yet ready to start diet.  - OMFS eval appreciated - no signs of oral infection    Troponin Elevation  - likely demand ischemia, no need to trend further  - cardiology f/u appreciated  -  TTE noted - elevated RV pressures likely due to pna    Sarcoidosis/COPD  - started on steroids for history of bronchiectasis as well - being tapered by pulm  - symbicort - patient not able to use properly - can dc.     Seizure history  - cont with depakote    Hypothyroidism  - cont with synthroid    General anxiety disorder  - restart effexor when able to take PO.     BPH  - has haq now, will need to restart flomax prior to trying to remove haq    Preventive measures  - can start with lovenox 40mg subq for DVT ppx    Patient critically ill, high risk for deterioration.    GOC discussed with sister 6/21 - full code.   Last updated sister 6/24 65M with seizures on keppra, BPH, hypothyroidism, sarcoidosis GERD, COPD, LAMBERT, recent admission to [facility] from 5/3-5/10/2021 for right subarachnoid hemorrhage s/p fall who presents from [facility] for hypoxia.  Found to have acute hypoxic and hypercapnic respiratory failure 2/2 sepsis 2/2 aspiration PNA.  Likely aspiration PNA since patient has been recommended dysphagia I diet from [facility] but reports state that he has been non-compliant with recommendation.  Also location of RLL and RUL also fits with aspiration.      Problems  Acute hypoxic and hypercapnic respiratory failure  Sepsis 2/2 aspiration PNA  Sarcoidosis  Seizures  COPD    Acute hypoxic and hypercapnic respiratory failure secondary to aspiration PNA ++ restrictive lung disease  - admitted to [facility]  -off high flow on 02  -CXR followup 6/25:: severe interestial disease +fibrosis    Sepsis from aspiration PNA with metabolic encephalopathy - sepsis improving  - continue with meropenem as per ID  - ID f/u appreciated  - f/u blood cultures - NGTD  - speech and swallow eval appreciated.  not yet ready to start diet.  - OMFS eval appreciated - no signs of oral infection    Troponin Elevation  - likely demand ischemia, no need to trend further  - cardiology f/u appreciated  -  TTE noted - elevated RV pressures likely due to pna    Sarcoidosis/COPD  - started on steroids for history of bronchiectasis as well - being tapered by pulm  - symbicort - patient not able to use properly - can dc.     Seizure history  - cont with depakote    Hypothyroidism  - cont with synthroid    General anxiety disorder  - restart effexor when able to take PO.     BPH  - has haq now, will need to restart flomax prior to trying to remove haq    Preventive measures  - can start with lovenox 40mg subq for DVT ppx    Patient critically ill, high risk for deterioration.

## 2021-06-27 NOTE — PROGRESS NOTE ADULT - SUBJECTIVE AND OBJECTIVE BOX
JULY FORTE is a 65yMale , patient examined and chart reviewed.     INTERVAL HPI/ OVERNIGHT EVENTS:   Off HFNC on 2L NC.  Awake. NAD.    PAST MEDICAL & SURGICAL HISTORY:  DM (diabetes mellitus)  Sarcoid  Bronchiectasis  Subdural hematoma  Inguinal hernia  Seizure  Hypothyroid  Sleep apnea  LAMBERT and COPD overlap syndrome  BPH without urinary obstruction  Status post Bright&#x27;s procedure  Bilateral subdural hematomas  Status post cholecystectomy  Status post inguinal hernia repair      For details regarding the patient's social history, family history, and other miscellaneous elements, please refer the initial infectious diseases consultation and/or the admitting history and physical examination for this admission.    ROS:  Systems reviewed and are neg     ALLERGIES  benzodiazepines (Other)  Keppra (Other (Severe))  penicillins (Unknown)      Current inpatient medications :    ANTIBIOTICS/RELEVANT:  meropenem  IVPB 1000 milliGRAM(s) IV Intermittent every 8 hours    MEDICATIONS  (STANDING):  albuterol/ipratropium for Nebulization 3 milliLiter(s) Nebulizer every 6 hours  enoxaparin Injectable 40 milliGRAM(s) SubCutaneous daily  lactated ringers. 1000 milliLiter(s) (50 mL/Hr) IV Continuous <Continuous>  levothyroxine Injectable 12.5 MICROGram(s) IV Push at bedtime  methylPREDNISolone sodium succinate Injectable 20 milliGRAM(s) IV Push daily  pantoprazole  Injectable 40 milliGRAM(s) IV Push daily  valproate sodium IVPB 1500 milliGRAM(s) IV Intermittent at bedtime    MEDICATIONS  (PRN):  polyethylene glycol 3350 17 Gram(s) Oral daily PRN Constipation        Objective:  ICU Vital Signs Last 24 Hrs  T(C): 37.2 (27 Jun 2021 22:00), Max: 37.2 (27 Jun 2021 22:00)  T(F): 98.9 (27 Jun 2021 22:00), Max: 98.9 (27 Jun 2021 22:00)  HR: 81 (27 Jun 2021 22:00) (69 - 84)  BP: 119/77 (27 Jun 2021 22:00) (98/67 - 125/78)  BP(mean): 90 (27 Jun 2021 22:00) (76 - 94)  RR: 24 (27 Jun 2021 22:00) (20 - 25)  SpO2: 98% (27 Jun 2021 22:00) (88% - 100%)        Physical Exam:  General: Weak frail  no acute distress  Neck: supple, trachea midline  Lungs: Decreased no wheeze/rhonchi  Cardiovascular: regular rate and rhythm, S1 S2  Abdomen: soft, nontender,  bowel sounds normal  Neurological: Awake alert  Skin: no rash  Extremities: + edema        LABS:                        14.0   9.86  )-----------( 122      ( 26 Jun 2021 06:38 )             43.1   06-26    144  |  102  |  15  ----------------------------<  81  4.2   |  37<H>  |  0.40<L>    Ca    9.3      26 Jun 2021 06:38  Phos  2.9     06-26  Mg     2.0     06-26    MICROBIOLOGY:    Culture - Sputum (collected 23 Jun 2021 14:04)  Source: .Sputum Sputum  Gram Stain (23 Jun 2021 20:37):    Few Squamous epithelial cells per low power field    Few polymorphonuclear leukocytes per low power field    Few Yeast per oil power field  Final Report (25 Jun 2021 22:01):    Normal Respiratory Vangie present    Culture - Urine (collected 21 Jun 2021 14:11)  Source: .Urine Clean Catch (Midstream)  Final Report (22 Jun 2021 14:00):    No growth    Culture - Blood (collected 21 Jun 2021 14:11)  Source: .Blood Blood-Peripheral  Preliminary Report (22 Jun 2021 15:02):    No growth to date.    Culture - Blood (collected 21 Jun 2021 14:11)  Source: .Blood Blood-Peripheral  Preliminary Report (22 Jun 2021 15:02):    No growth to date.    MRSA/MSSA PCR (06.22.21 @ 00:13)    MRSA PCR Result.: NotDete    Staph Aureus PCR Result: NotDete      RADIOLOGY & ADDITIONAL STUDIES:  EXAM:  CT CHEST                                  PROCEDURE DATE:  06/21/2021          INTERPRETATION:  CLINICAL INFORMATION: SOB, fever. Hx of sarcoid    COMPARISON: Comparison to numerous prior examinations most recent on 5/9/2021    CONTRAST/COMPLICATIONS:  IV Contrast: NONE  0 cc administered   0 cc discarded  Oral Contrast: NONE  Complications: None reported at time of study completion    PROCEDURE:  CT of the Chest was performed.  Sagittal and coronal reformats were performed.    FINDINGS:    LUNGS AND AIRWAYS: There is increased consolidation throughout the right upper and right lower lobe. Consolidation in the left lower lobe is decreased from prior exam. Marked bronchiectasis and architectural distortion is again demonstrated. Tracheal diverticulum.  PLEURA: Trace bilateral pleural effusions.  MEDIASTINUM AND JOANNE: Enlarged lymph nodes throughout the middle mediastinum are unchanged.  VESSELS: Within normal limits.  HEART: Heart size is normal. No pericardial effusion.  CHEST WALL AND LOWER NECK: Within normal limits.  VISUALIZED UPPER ABDOMEN: Status post cholecystectomy.  BONES: Degenerative changes.    IMPRESSION:    Increased consolidation throughout the right upper and lower lobes, correlate for pneumonia. Decreased consolidation in the left lower lobe.      Assessment :  65M with seizures on keppra, BPH, hypothyroidism, sarcoidosis GERD, COPD, LAMBERT, recent admission to [facility] from 5/3-5/10/2021 for right subarachnoid hemorrhage s/p fall admitted from [facility] rehab with acute hypoxic respiratory failure sec severe right lung pneumonia likely aspiration pneumonia. Off BIPAP now on HFNC,  Sp fever   Leukocytosis multifactorial       Plan :   Cont Meropenam day 6  Trend temps and cbc  Steroids and nebs per pulm  Pulm on case  Pulm toileting  Asp precautions    Continue with present regiment.  Appropriate use of antibiotics and adverse effects reviewed.      > 35 minutes were spent in direct patient care reviewing notes, medications ,labs data/ imaging , discussion with multidisciplinary team.    Thank you for allowing me to participate in care of your patient .    Tegan Granados MD  Infectious Disease  [phone]      JULY FORTE is a 65yMale , patient examined and chart reviewed.     INTERVAL HPI/ OVERNIGHT EVENTS:   Off HFNC on 2L NC.  Awake. NAD.    PAST MEDICAL & SURGICAL HISTORY:  DM (diabetes mellitus)  Sarcoid  Bronchiectasis  Subdural hematoma  Inguinal hernia  Seizure  Hypothyroid  Sleep apnea  LAMBERT and COPD overlap syndrome  BPH without urinary obstruction  Status post Bright&#x27;s procedure  Bilateral subdural hematomas  Status post cholecystectomy  Status post inguinal hernia repair      For details regarding the patient's social history, family history, and other miscellaneous elements, please refer the initial infectious diseases consultation and/or the admitting history and physical examination for this admission.    ROS:  Systems reviewed and are neg     ALLERGIES  benzodiazepines (Other)  Keppra (Other (Severe))  penicillins (Unknown)      Current inpatient medications :    ANTIBIOTICS/RELEVANT:  meropenem  IVPB 1000 milliGRAM(s) IV Intermittent every 8 hours    MEDICATIONS  (STANDING):  albuterol/ipratropium for Nebulization 3 milliLiter(s) Nebulizer every 6 hours  enoxaparin Injectable 40 milliGRAM(s) SubCutaneous daily  lactated ringers. 1000 milliLiter(s) (50 mL/Hr) IV Continuous <Continuous>  levothyroxine Injectable 12.5 MICROGram(s) IV Push at bedtime  methylPREDNISolone sodium succinate Injectable 20 milliGRAM(s) IV Push daily  pantoprazole  Injectable 40 milliGRAM(s) IV Push daily  valproate sodium IVPB 1500 milliGRAM(s) IV Intermittent at bedtime    MEDICATIONS  (PRN):  polyethylene glycol 3350 17 Gram(s) Oral daily PRN Constipation        Objective:  ICU Vital Signs Last 24 Hrs  T(C): 37.2 (27 Jun 2021 22:00), Max: 37.2 (27 Jun 2021 22:00)  T(F): 98.9 (27 Jun 2021 22:00), Max: 98.9 (27 Jun 2021 22:00)  HR: 81 (27 Jun 2021 22:00) (69 - 84)  BP: 119/77 (27 Jun 2021 22:00) (98/67 - 125/78)  BP(mean): 90 (27 Jun 2021 22:00) (76 - 94)  RR: 24 (27 Jun 2021 22:00) (20 - 25)  SpO2: 98% (27 Jun 2021 22:00) (88% - 100%)        Physical Exam:  General: Weak frail  no acute distress  Neck: supple, trachea midline  Lungs: Decreased no wheeze/rhonchi  Cardiovascular: regular rate and rhythm, S1 S2  Abdomen: soft, nontender,  bowel sounds normal  Neurological: Awake alert  Skin: no rash  Extremities: + edema        LABS:                        14.0   9.86  )-----------( 122      ( 26 Jun 2021 06:38 )             43.1   06-26    144  |  102  |  15  ----------------------------<  81  4.2   |  37<H>  |  0.40<L>    Ca    9.3      26 Jun 2021 06:38  Phos  2.9     06-26  Mg     2.0     06-26    MICROBIOLOGY:    Culture - Sputum (collected 23 Jun 2021 14:04)  Source: .Sputum Sputum  Gram Stain (23 Jun 2021 20:37):    Few Squamous epithelial cells per low power field    Few polymorphonuclear leukocytes per low power field    Few Yeast per oil power field  Final Report (25 Jun 2021 22:01):    Normal Respiratory Vangie present    Culture - Urine (collected 21 Jun 2021 14:11)  Source: .Urine Clean Catch (Midstream)  Final Report (22 Jun 2021 14:00):    No growth    Culture - Blood (collected 21 Jun 2021 14:11)  Source: .Blood Blood-Peripheral  Preliminary Report (22 Jun 2021 15:02):    No growth to date.    Culture - Blood (collected 21 Jun 2021 14:11)  Source: .Blood Blood-Peripheral  Preliminary Report (22 Jun 2021 15:02):    No growth to date.    MRSA/MSSA PCR (06.22.21 @ 00:13)    MRSA PCR Result.: NotDete    Staph Aureus PCR Result: NotDete      RADIOLOGY & ADDITIONAL STUDIES:  EXAM:  CT CHEST                                  PROCEDURE DATE:  06/21/2021          INTERPRETATION:  CLINICAL INFORMATION: SOB, fever. Hx of sarcoid    COMPARISON: Comparison to numerous prior examinations most recent on 5/9/2021    CONTRAST/COMPLICATIONS:  IV Contrast: NONE  0 cc administered   0 cc discarded  Oral Contrast: NONE  Complications: None reported at time of study completion    PROCEDURE:  CT of the Chest was performed.  Sagittal and coronal reformats were performed.    FINDINGS:    LUNGS AND AIRWAYS: There is increased consolidation throughout the right upper and right lower lobe. Consolidation in the left lower lobe is decreased from prior exam. Marked bronchiectasis and architectural distortion is again demonstrated. Tracheal diverticulum.  PLEURA: Trace bilateral pleural effusions.  MEDIASTINUM AND JOANNE: Enlarged lymph nodes throughout the middle mediastinum are unchanged.  VESSELS: Within normal limits.  HEART: Heart size is normal. No pericardial effusion.  CHEST WALL AND LOWER NECK: Within normal limits.  VISUALIZED UPPER ABDOMEN: Status post cholecystectomy.  BONES: Degenerative changes.    IMPRESSION:    Increased consolidation throughout the right upper and lower lobes, correlate for pneumonia. Decreased consolidation in the left lower lobe.      Assessment :  65M with seizures on keppra, BPH, hypothyroidism, sarcoidosis GERD, COPD, LAMBERT, recent admission to [facility] from 5/3-5/10/2021 for right subarachnoid hemorrhage s/p fall admitted from [facility] rehab with acute hypoxic respiratory failure sec severe right lung pneumonia likely aspiration pneumonia.    Sp fever   Leukocytosis multifactorial downtrending  Clinically improving      Plan :   Cont Meropenam day 6  Trend temps and cbc  Steroids and nebs per pulm  Pulm on case  Pulm toileting  Asp precautions    Continue with present regiment.  Appropriate use of antibiotics and adverse effects reviewed.      > 35 minutes were spent in direct patient care reviewing notes, medications ,labs data/ imaging , discussion with multidisciplinary team.    Thank you for allowing me to participate in care of your patient .    Tegan Granados MD  Infectious Disease  [phone]

## 2021-06-27 NOTE — PROGRESS NOTE ADULT - ASSESSMENT
CT chest pending -   fio2 support  remains NPO  remains on steroids - lower dose     65M with history of Seizure disorder, Hypothyroidism,  Thrombocytopenia, BPH, recent right SAH from fall May 2021, presents from [facility] for hypoxia and AMS. Found to have severe sepsis, acute hypoxic and hypercapnic respiratory failure    source of infection - etiol - w/u in progress - Dental eval noted - OMF Attending eval reviewed -   probable ongoing - continued aspiration - with resulting consolidation - asp pna - as seen on CT chest and clinical presentation  at risk for decompensation - resp failure -     broad spectrum ABX - mrsa screen, cx in progress, biomarkers, WBC trend  assist with all needs and ADL  HOB elev - asp prec  oral hygiene  Prognosis guarded - GOC discussion -     SP[facility] care and monitoring

## 2021-06-27 NOTE — PROGRESS NOTE ADULT - ASSESSMENT
65M with seizures on keppra, BPH, hypothyroidism, sarcoidosis GERD, COPD, LAMBERT, recent admission to [facility] from 5/3-5/10/2021 for right subarachnoid hemorrhage s/p fall who presents from [facility] for hypoxia.  Found to have acute hypoxic and hypercapnic respiratory failure 2/2 sepsis 2/2 aspiration PNA.  Likely aspiration PNA since patient has been recommended dysphagia I diet from [facility] but reports state that he has been non-compliant with recommendation.  Also location of RLL and RUL also fits with aspiration.      Problems  Acute hypoxic and hypercapnic respiratory failure  Sepsis 2/2 aspiration PNA  Sarcoidosis  Seizures  COPD    Acute hypoxic and hypercapnic respiratory failure secondary to aspiration PNA ++ restrictive lung disease  - admitted to [facility]  -off high flow:: onw on 02  -CXR followup 6/25:: severe interestial disease +fibrosis    Sepsis from aspiration PNA with metabolic encephalopathy - sepsis improving  - continue with meropenem as per ID.. day 6  - ID f/u appreciated  - f/u blood cultures - NGTD  - speech and swallow eval appreciated.  not yet ready to start diet.  - OMFS eval appreciated - no signs of oral infection    Troponin Elevation  - likely demand ischemia, no need to trend further  - cardiology f/u appreciated  -  TTE noted - elevated RV pressures likely due to pna    Sarcoidosis/COPD  - started on steroids for history of bronchiectasis as well - being tapered by pulm  - symbicort - patient not able to use properly - can dc.     Seizure history  - cont with depakote    Hypothyroidism  - cont with synthroid    General anxiety disorder  - restart effexor when able to take PO.     BPH  - has haq now, will need to restart flomax prior to trying to remove haq    Preventive measures  - can start with lovenox 40mg subq for DVT ppx    Patient critically ill, high risk for deterioration.

## 2021-06-27 NOTE — PROGRESS NOTE ADULT - SUBJECTIVE AND OBJECTIVE BOX
Patient seen and examined at bedside. no acute overnight events.  patient reports his mouth is dry  vitals stable, afebrile  on meropenam day 6  on 02. nasal canula  white  count resolved  Mild thrombocytopenia persists  probnp is downtrending      Review of Systems:  General:denies fever chills, headache, weakness  HEENT: denies blurry vision,diffculty swallowing, difficulty hearing, tinnitus  Cardiovascular: denies chest pain  ,palpitations  Pulmonary:denies shortness of breath, cough, wheezing, hemoptysis  Gastrointestinal: denies abdominal pain, constipation, diarrhea,nausea , vomiting, hematochezia  : denies hematuria, dysuria, or incontinence  Neurological: denies weakness, numbness , tingling, dizziness, tremors  MSK: denies muscle pain, difficulty ambulating, swelling, back pain  skin: denies skin rash, itching, burning, or  skin lesions  Psychiatrical: denies mood disturbances, anxierty, feeling depressed, depression , or difficulty sleeping    Objective:  Vitals  T(C): 36.8 (06-27-21 @ 15:51), Max: 37.1 (06-27-21 @ 07:34)  HR: 78 (06-27-21 @ 16:00) (71 - 84)  BP: 111/73 (06-27-21 @ 16:00) (98/67 - 127/78)  RR: 20 (06-27-21 @ 16:00) (20 - 27)  SpO2: 97% (06-27-21 @ 16:00) (88% - 100%)    Physical Exam:  General: cachetic, weak lethargic,  HEENT: Atraumatic, no LAD, trachea midline, PERRLA  Cardiovascular: normal s1s2, no murmurs, gallops or fricition rubs  Pulmonary: , no wheezing ,++ rhonchi  Gastrointestinal: soft non tender non distended, no masses felt, no organomegally  Muscloskeletal: ++lower extremity edema, intact bilateral lower extremity pulses  Neurological: CN II-12 intact. No focal weakness  Psychiatrical: normal mood, cooperative  SKIN:ecchmoysis on skin      Labs:                          14.0   9.86  )-----------( 122      ( 26 Jun 2021 06:38 )             43.1     06-26    144  |  102  |  15  ----------------------------<  81  4.2   |  37<H>  |  0.40<L>    Ca    9.3      26 Jun 2021 06:38  Phos  2.9     06-26  Mg     2.0     06-26              Active Medications  MEDICATIONS  (STANDING):  albuterol/ipratropium for Nebulization 3 milliLiter(s) Nebulizer every 6 hours  enoxaparin Injectable 40 milliGRAM(s) SubCutaneous daily  lactated ringers. 1000 milliLiter(s) (50 mL/Hr) IV Continuous <Continuous>  levothyroxine Injectable 12.5 MICROGram(s) IV Push at bedtime  meropenem  IVPB 1000 milliGRAM(s) IV Intermittent every 8 hours  methylPREDNISolone sodium succinate Injectable 20 milliGRAM(s) IV Push daily  pantoprazole  Injectable 40 milliGRAM(s) IV Push daily  valproate sodium IVPB 1500 milliGRAM(s) IV Intermittent at bedtime    MEDICATIONS  (PRN):  polyethylene glycol 3350 17 Gram(s) Oral daily PRN Constipation

## 2021-06-27 NOTE — PROGRESS NOTE ADULT - SUBJECTIVE AND OBJECTIVE BOX
Chief Complaint: Respiratory distress    Interval Events: No events overnight.    Review of Systems:  General: No fevers, chills, weight loss or gain  Skin: No rashes, color changes  Cardiovascular: No chest pain, orthopnea  Respiratory: No shortness of breath, cough  Gastrointestinal: No nausea, abdominal pain  Genitourinary: No incontinence, pain with urination  Musculoskeletal: No pain, swelling, decreased range of motion  Neurological: No headache, weakness  Psychiatric: No depression, anxiety  Endocrine: No weight loss or gain, increased thirst  All other systems are comprehensively negative.    Physical Exam:  Vital Signs Last 24 Hrs  T(C): 37.1 (27 Jun 2021 07:34), Max: 37.1 (27 Jun 2021 07:34)  T(F): 98.7 (27 Jun 2021 07:34), Max: 98.7 (27 Jun 2021 07:34)  HR: 77 (27 Jun 2021 08:03) (65 - 82)  BP: 98/67 (27 Jun 2021 08:03) (98/67 - 127/78)  BP(mean): 76 (27 Jun 2021 08:03) (75 - 92)  RR: 24 (27 Jun 2021 08:03) (22 - 27)  SpO2: 95% (27 Jun 2021 08:03) (88% - 100%)  General: NAD  HEENT: MMM  Neck: No JVD, no carotid bruit  Lungs: Rales bilaterally  CV: RRR, nl S1/S2, no M/R/G  Abdomen: S/NT/ND, +BS  Extremities: No LE edema, no cyanosis  Neuro: AAOx3, non-focal  Skin: No rash    Labs:    06-26    144  |  102  |  15  ----------------------------<  81  4.2   |  37<H>  |  0.40<L>    Ca    9.3      26 Jun 2021 06:38  Phos  2.9     06-26  Mg     2.0     06-26                          14.0   9.86  )-----------( 122      ( 26 Jun 2021 06:38 )             43.1       Telemetry: Sinus rhythm

## 2021-06-27 NOTE — PROGRESS NOTE ADULT - SUBJECTIVE AND OBJECTIVE BOX
Critical Care PA     HI 66 yo m pmhx Sacroidosis, COPD, LAMBERT, hypothyroidism, BPH, GERD with recent admission for right SAH s/p mechanical fall (05/21) biba from [facility] with ams and hypoxia admitted with hypoxic/hypercapnic respiratory failure requiring Bipap 100%, pneumonia and severe sepsis.     24hr Events: No acute events. O2 weaned to 2l nc. Remains npo for high risk of aspiration.     --- PMHx.---    No pertinent past medical history    DM (diabetes mellitus)    Sarcoid    Bronchiectasis    Diverticulitis    Subdural hematoma    Inguinal hernia    Cellulitis    Seizure    Hypothyroid    Sleep apnea    LAMBERT and COPD overlap syndrome    BPH without urinary obstruction       Review Of Systems: All reviewed systems were negative.        --- Medications ---    albuterol/ipratropium for Nebulization 3 milliLiter(s)  enoxaparin Injectable 40 milliGRAM(s)  lactated ringers. 1000 milliLiter(s)  levothyroxine Injectable 12.5 MICROGram(s)  meropenem  IVPB 1000 milliGRAM(s)  methylPREDNISolone sodium succinate Injectable 20 milliGRAM(s)  pantoprazole  Injectable 40 milliGRAM(s)  polyethylene glycol 3350 17 Gram(s) PRN  valproate sodium IVPB 1500 milliGRAM(s)      DVT Prophylaxis : lovenox     Advanced Directives : full code     T(C): 36.8 (06-27-21 @ 19:26), Max: 37.1 (06-27-21 @ 07:34)  HR: 77 (06-27-21 @ 20:00)  BP: 124/83 (06-27-21 @ 20:00)  RR: 22 (06-27-21 @ 20:00)  SpO2: 100% (06-27-21 @ 20:00)  Wt(kg): --    --- Physical Exam ---    General: alert and responsive. NAD    HEENT:   Symmetric.    PULM: few scattered rhoncis b/l no wheezes     CVS: s1s2 RRR    ABD: Soft, nondistended, nontender, normoactive bowel sounds    EXT: No edema, nontender    SKIN: Warm and well perfused, no rashes noted.    --- Labratories ---                           14.0   9.86  )-----------( 122      ( 26 Jun 2021 06:38 )             43.1    06-26    144  |  102  |  15  ----------------------------<  81  4.2   |  37<H>  |  0.40<L>    Ca    9.3      26 Jun 2021 06:38  Phos  2.9     06-26  Mg     2.0     06-26      Radiology  / Ultrasound :   EXAM:  CT CHEST                                  PROCEDURE DATE:  06/27/2021          INTERPRETATION:  Reason for Exam:  Evaluate infection. Hypoxemia.    CT of the chest was performed from the thoracic inlet to the level of the adrenal glands without contrast injection.    Comparison: June 21, 2021    Tubes/Lines: None.    Mediastinum/Vessels/Heart: Aorta and pulmonary arteries are normal in size. There is no pericardial effusion. No lymphadenopathy. Thyroid gland is unremarkable    Lungs/Pleura/Airways: Small bilateral pleural effusions are noted, right greater than left, new since the previous exam.    Underlying architectural distortion predominantly in the upper lobes with airway dilatation and traction bronchiectasis is without change. Bibasilar honeycombing is noted. Overall the underlying fibrosis is unchanged since the previous study. Mild interval improvement in patchy peribronchial vascular consolidations seen on previous examination in the left lower lobe while a right lowerlobe dominant consolidation is unchanged.        Visualized abdomen: Unremarkable noncontrast appearance of the upper abdomen    Bones and soft tissues: No suspicious osseous lesions. Degenerative changes noted throughout the spine.    IMPRESSION:    When compared with June 21, 2021:     Small bilateral pleural effusions are noted, right greater than left, new since the previous exam.    Underlying architectural distortion predominantly in the upper lobes with airway dilatation and traction bronchiectasis is without change. Bibasilar honeycombing is noted. Overall the underlying fibrosis is unchanged since the previous study. Mild interval improvement in patchy peribronchial vascular consolidations seen on previous examination in the left lower lobe while a right lower lobe dominant consolidation is unchanged.                LIMA EAGLE MD; Attending Radiologist  This document has been electronically signed. Jun 27 2021  4:23PM      A/P 66 yo m pmhx Sacroidosis, COPD, LAMBERT, hypothyroidism, BPH, GERD with recent admission for right SAH s/p mechanical fall (05/21) biba from [facility] with ams and hypoxia admitted with hypoxic 2/2 pneumonia and severe sepsis.     Neuro: no issues. Communicates appropriately   CV: HD stable   RESP: Hypoxic/Hypercapnic Respiratory Failure improving, tolerating NC, maintain spo2 >88%,  chest pt, keep HOB >30 degrees. Nebs q6hr and prn. Continue solumedrol.   RENAL: renal indices wnl, cont gently hydration. monitor lytesw. haq renewed for retention likely 2/2 BPH   GI: NPO, S&S on 6/25 at high risk of aspiration. Will cont to follow    ENDO: Hypothyroidism on synthroid.   ID: aspiration peneumonia on Meropenem.  Blood/urine cx NGTD.  Sputum cx. ID following  HEME: lovenox  for vte ppx    DISPO: Full code.          (Reviewing data, imaging, discussing with multidisciplinary team, non inclusive of procedures, discussing goals of care with patient/family)

## 2021-06-27 NOTE — PROGRESS NOTE ADULT - ASSESSMENT
The patient is a 65 year old male with a history of seizure d/o, BPH, hypothyroidism, sarcoidosis, COPD, LAMBERT, recent SAH who is admitted with respiratory failure in the setting of aspiration PNA.    Plan:  - ECG with anterolateral ST depressions  - Troponin peaked at 0.49. Likely elevated in the setting of demand ischemia from underlying infection, respiratory failure, tachycardia. No need to trend further.  - Echo with normal LV systolic function, no significant valve issues  - Given recent SAH, would hold off with antiplatelets or anticoagulants  - On meropenem

## 2021-06-27 NOTE — PROGRESS NOTE ADULT - SUBJECTIVE AND OBJECTIVE BOX
Date/Time Patient Seen:  		  Referring MD:   Data Reviewed	       Patient is a 65y old  Male who presents with a chief complaint of respiratory failure, lethargy (26 Jun 2021 07:56)      Subjective/HPI     PAST MEDICAL & SURGICAL HISTORY:  No pertinent past medical history    DM (diabetes mellitus)    Sarcoid    Bronchiectasis    Diverticulitis    Subdural hematoma    Inguinal hernia    Cellulitis    Seizure    Hypothyroid    Sleep apnea    LAMBERT and COPD overlap syndrome    BPH without urinary obstruction    No significant past surgical history    Status post Bright&#x27;s procedure    Bilateral subdural hematomas    Status post cholecystectomy    Status post inguinal hernia repair          Medication list         MEDICATIONS  (STANDING):  albuterol/ipratropium for Nebulization 3 milliLiter(s) Nebulizer every 6 hours  enoxaparin Injectable 40 milliGRAM(s) SubCutaneous daily  lactated ringers. 1000 milliLiter(s) (50 mL/Hr) IV Continuous <Continuous>  levothyroxine Injectable 12.5 MICROGram(s) IV Push at bedtime  meropenem  IVPB 1000 milliGRAM(s) IV Intermittent every 8 hours  methylPREDNISolone sodium succinate Injectable 20 milliGRAM(s) IV Push daily  pantoprazole  Injectable 40 milliGRAM(s) IV Push daily  valproate sodium IVPB 1500 milliGRAM(s) IV Intermittent at bedtime    MEDICATIONS  (PRN):  polyethylene glycol 3350 17 Gram(s) Oral daily PRN Constipation         Vitals log        ICU Vital Signs Last 24 Hrs  T(C): 36.7 (27 Jun 2021 04:00), Max: 36.8 (27 Jun 2021 00:03)  T(F): 98.1 (27 Jun 2021 04:00), Max: 98.3 (27 Jun 2021 00:03)  HR: 76 (27 Jun 2021 06:00) (65 - 82)  BP: 114/63 (27 Jun 2021 06:00) (98/68 - 127/78)  BP(mean): 78 (27 Jun 2021 06:00) (75 - 92)  ABP: --  ABP(mean): --  RR: 22 (27 Jun 2021 06:00) (22 - 27)  SpO2: 91% (27 Jun 2021 06:00) (88% - 99%)           Input and Output:  I&O's Detail    25 Jun 2021 07:01  -  26 Jun 2021 07:00  --------------------------------------------------------  IN:    IV PiggyBack: 450 mL    Lactated Ringers: 1250 mL  Total IN: 1700 mL    OUT:    Indwelling Catheter - Urethral (mL): 1750 mL  Total OUT: 1750 mL    Total NET: -50 mL      26 Jun 2021 07:01  -  27 Jun 2021 06:42  --------------------------------------------------------  IN:    IV PiggyBack: 200 mL    Lactated Ringers: 950 mL  Total IN: 1150 mL    OUT:    Indwelling Catheter - Urethral (mL): 2600 mL  Total OUT: 2600 mL    Total NET: -1450 mL          Lab Data                        14.0   9.86  )-----------( 122      ( 26 Jun 2021 06:38 )             43.1     06-26    144  |  102  |  15  ----------------------------<  81  4.2   |  37<H>  |  0.40<L>    Ca    9.3      26 Jun 2021 06:38  Phos  2.9     06-26  Mg     2.0     06-26              Review of Systems	      Objective     Physical Examination  heart s1s2  lung dec BS  abd soft  head nc        Pertinent Lab findings & Imaging      Sharon:  NO   Adequate UO     I&O's Detail    25 Jun 2021 07:01  -  26 Jun 2021 07:00  --------------------------------------------------------  IN:    IV PiggyBack: 450 mL    Lactated Ringers: 1250 mL  Total IN: 1700 mL    OUT:    Indwelling Catheter - Urethral (mL): 1750 mL  Total OUT: 1750 mL    Total NET: -50 mL      26 Jun 2021 07:01  -  27 Jun 2021 06:42  --------------------------------------------------------  IN:    IV PiggyBack: 200 mL    Lactated Ringers: 950 mL  Total IN: 1150 mL    OUT:    Indwelling Catheter - Urethral (mL): 2600 mL  Total OUT: 2600 mL    Total NET: -1450 mL               Discussed with:     Cultures:	        Radiology

## 2021-06-28 NOTE — PROGRESS NOTE ADULT - SUBJECTIVE AND OBJECTIVE BOX
Chief Complaint: Respiratory distress    Interval Events: No events overnight.    Review of Systems:  General: No fevers, chills, weight loss or gain  Skin: No rashes, color changes  Cardiovascular: No chest pain, orthopnea  Respiratory: No shortness of breath, cough  Gastrointestinal: No nausea, abdominal pain  Genitourinary: No incontinence, pain with urination  Musculoskeletal: No pain, swelling, decreased range of motion  Neurological: No headache, weakness  Psychiatric: No depression, anxiety  Endocrine: No weight loss or gain, increased thirst  All other systems are comprehensively negative.    Physical Exam:  Vital Signs Last 24 Hrs  T(C): 36.7 (28 Jun 2021 08:27), Max: 37.2 (27 Jun 2021 22:00)  T(F): 98.1 (28 Jun 2021 08:27), Max: 98.9 (27 Jun 2021 22:00)  HR: 80 (28 Jun 2021 08:00) (68 - 86)  BP: 100/61 (28 Jun 2021 08:00) (98/72 - 154/86)  BP(mean): 74 (28 Jun 2021 08:00) (74 - 99)  RR: 24 (28 Jun 2021 08:00) (14 - 25)  SpO2: 95% (28 Jun 2021 08:00) (93% - 100%)  General: NAD  HEENT: MMM  Neck: No JVD, no carotid bruit  Lungs: Rales bilaterally  CV: RRR, nl S1/S2, no M/R/G  Abdomen: S/NT/ND, +BS  Extremities: No LE edema, no cyanosis  Neuro: AAOx3, non-focal  Skin: No rash    Labs:        Telemetry: Sinus rhythm

## 2021-06-28 NOTE — PROGRESS NOTE ADULT - ASSESSMENT
Pt is a 64 y/o male with PMHx as above here with acute hypoxic-hypercarbic respiratory failure secondary to aspiration pneumonia, severe sepsis.     Plan:  -   Pt is a 66 y/o male with PMHx as above here with acute hypoxic-hypercarbic respiratory failure secondary to aspiration pneumonia, severe sepsis.     Plan:  - On 3L NC, saturating mid 90s, goal >88%  - S/p steroids, on nebulizers q6h  - Cont Chest PT  - Pulm following  - NPO, multiple failed speech and swallow assessments, getting tube feeds, HOB >30, aspiration precautions  - Renal indices stable, trend, I/Os, replete lytes  - On empiric everardo, ID following, trend WBC/Temp curve, cultures neg thus far  - Lovenox for dvt ppx  - Cont synthroid    Dispo: Full code.

## 2021-06-28 NOTE — PROGRESS NOTE ADULT - ASSESSMENT
ct chest repeat reviewed -   fio2 titration in progress -   remains NPO -   remains on IVF  will dc Systemic Steroids this am     65M with history of Seizure disorder, Hypothyroidism,  Thrombocytopenia, BPH, recent right SAH from fall May 2021, presents from [facility] for hypoxia and AMS. Found to have severe sepsis, acute hypoxic and hypercapnic respiratory failure    source of infection - etiol - w/u in progress - Dental eval noted - [facility] Attending eval reviewed -   probable ongoing - continued aspiration - with resulting consolidation - asp pna - as seen on CT chest and clinical presentation  at risk for decompensation - resp failure -     s/p broad spectrum ABX - cx noted, biomarkers noted,   assist with all needs and ADL  HOB elev - asp prec  oral hygiene  Prognosis guarded - GOC discussion -     SP[facility] care and monitoring

## 2021-06-28 NOTE — PROGRESS NOTE ADULT - SUBJECTIVE AND OBJECTIVE BOX
Patient is a 65y old  Male who presents with a chief complaint of respiratory failure, lethargy (28 Jun 2021 06:54)    INTERVAL HPI/OVERNIGHT EVENTS:  patient reports he is hungry.   no new complaints.  doing well on 2-3L NC.     MEDICATIONS  (STANDING):  albuterol/ipratropium for Nebulization 3 milliLiter(s) Nebulizer every 6 hours  enoxaparin Injectable 40 milliGRAM(s) SubCutaneous daily  lactated ringers. 1000 milliLiter(s) (50 mL/Hr) IV Continuous <Continuous>  levothyroxine Injectable 12.5 MICROGram(s) IV Push at bedtime  pantoprazole  Injectable 40 milliGRAM(s) IV Push daily  valproate sodium IVPB 1500 milliGRAM(s) IV Intermittent at bedtime    MEDICATIONS  (PRN):  polyethylene glycol 3350 17 Gram(s) Oral daily PRN Constipation      Allergies  Keppra (Other (Severe))  penicillins (Unknown)    Intolerances  benzodiazepines (Other)      REVIEW OF SYSTEMS:  no new complaints.     Vital Signs Last 24 Hrs  T(C): 36.8 (28 Jun 2021 12:20), Max: 37.2 (27 Jun 2021 22:00)  T(F): 98.2 (28 Jun 2021 12:20), Max: 98.9 (27 Jun 2021 22:00)  HR: 78 (28 Jun 2021 12:54) (68 - 86)  BP: 102/71 (28 Jun 2021 12:00) (98/72 - 154/86)  BP(mean): 81 (28 Jun 2021 12:00) (73 - 99)  RR: 22 (28 Jun 2021 12:00) (14 - 25)  SpO2: 96% (28 Jun 2021 12:54) (91% - 100%)    PHYSICAL EXAM:  GENERAL: NAD, well-groomed, well-developed  HEAD:  Atraumatic, Normocephalic  EYES:  conjunctiva and sclera clear  ENMT: Moist mucous membranes  NECK: Supple, No JVD  NERVOUS SYSTEM:  Alert & Oriented X3, Good concentration; All 4 extremities mobile, no gross sensory deficits.   CHEST/LUNG:  good air entry  HEART: Regular rate and rhythm; No murmurs, rubs, or gallops  ABDOMEN: Soft, Nontender, Nondistended; Bowel sounds present  EXTREMITIES:  2+ Peripheral Pulses, Diffuse edema      LABS:              CAPILLARY BLOOD GLUCOSE          RADIOLOGY & ADDITIONAL TESTS:    Imaging Personally Reviewed:  [ ] YES     Consultant(s) Notes Reviewed:      Care Discussed with Consultants/Other Providers:    Advanced Directives: [ ] DNR  [ ] No feeding tube  [ ] MOLST in chart  [ ] MOLST completed today  [ ] Unknown

## 2021-06-28 NOTE — PROGRESS NOTE ADULT - SUBJECTIVE AND OBJECTIVE BOX
Date/Time Patient Seen:  		  Referring MD:   Data Reviewed	       Patient is a 65y old  Male who presents with a chief complaint of respiratory failure, lethargy (27 Jun 2021 22:40)      Subjective/HPI     PAST MEDICAL & SURGICAL HISTORY:  No pertinent past medical history    DM (diabetes mellitus)    Sarcoid    Bronchiectasis    Diverticulitis    Subdural hematoma    Inguinal hernia    Cellulitis    Seizure    Hypothyroid    Sleep apnea    LAMBERT and COPD overlap syndrome    BPH without urinary obstruction    No significant past surgical history    Status post Bright&#x27;s procedure    Bilateral subdural hematomas    Status post cholecystectomy    Status post inguinal hernia repair          Medication list         MEDICATIONS  (STANDING):  albuterol/ipratropium for Nebulization 3 milliLiter(s) Nebulizer every 6 hours  enoxaparin Injectable 40 milliGRAM(s) SubCutaneous daily  lactated ringers. 1000 milliLiter(s) (50 mL/Hr) IV Continuous <Continuous>  levothyroxine Injectable 12.5 MICROGram(s) IV Push at bedtime  methylPREDNISolone sodium succinate Injectable 20 milliGRAM(s) IV Push daily  pantoprazole  Injectable 40 milliGRAM(s) IV Push daily  valproate sodium IVPB 1500 milliGRAM(s) IV Intermittent at bedtime    MEDICATIONS  (PRN):  polyethylene glycol 3350 17 Gram(s) Oral daily PRN Constipation         Vitals log        ICU Vital Signs Last 24 Hrs  T(C): 36.9 (28 Jun 2021 04:00), Max: 37.2 (27 Jun 2021 22:00)  T(F): 98.4 (28 Jun 2021 04:00), Max: 98.9 (27 Jun 2021 22:00)  HR: 86 (28 Jun 2021 06:18) (68 - 86)  BP: 115/74 (28 Jun 2021 06:18) (98/67 - 154/86)  BP(mean): 86 (28 Jun 2021 06:18) (76 - 99)  ABP: --  ABP(mean): --  RR: 25 (28 Jun 2021 06:18) (14 - 25)  SpO2: 95% (28 Jun 2021 06:18) (93% - 100%)           Input and Output:  I&O's Detail    26 Jun 2021 07:01  -  27 Jun 2021 07:00  --------------------------------------------------------  IN:    IV PiggyBack: 200 mL    Lactated Ringers: 950 mL  Total IN: 1150 mL    OUT:    Indwelling Catheter - Urethral (mL): 2600 mL  Total OUT: 2600 mL    Total NET: -1450 mL      27 Jun 2021 07:01  -  28 Jun 2021 06:54  --------------------------------------------------------  IN:    IV PiggyBack: 200 mL    Lactated Ringers: 1100 mL  Total IN: 1300 mL    OUT:    Indwelling Catheter - Urethral (mL): 1400 mL  Total OUT: 1400 mL    Total NET: -100 mL          Lab Data                  Review of Systems	      Objective     Physical Examination    heart s1s2  lung dec BS  abd soft      Pertinent Lab findings & Imaging      Sharon:  NO   Adequate UO     I&O's Detail    26 Jun 2021 07:01  -  27 Jun 2021 07:00  --------------------------------------------------------  IN:    IV PiggyBack: 200 mL    Lactated Ringers: 950 mL  Total IN: 1150 mL    OUT:    Indwelling Catheter - Urethral (mL): 2600 mL  Total OUT: 2600 mL    Total NET: -1450 mL      27 Jun 2021 07:01  -  28 Jun 2021 06:54  --------------------------------------------------------  IN:    IV PiggyBack: 200 mL    Lactated Ringers: 1100 mL  Total IN: 1300 mL    OUT:    Indwelling Catheter - Urethral (mL): 1400 mL  Total OUT: 1400 mL    Total NET: -100 mL               Discussed with:     Cultures:	        Radiology

## 2021-06-28 NOTE — PROGRESS NOTE ADULT - SUBJECTIVE AND OBJECTIVE BOX
JULY FORTE is a 65yMale , patient examined and chart reviewed.     INTERVAL HPI/ OVERNIGHT EVENTS:   Awake. NAD.  Failed Swallow study.    PAST MEDICAL & SURGICAL HISTORY:  DM (diabetes mellitus)  Sarcoid  Bronchiectasis  Subdural hematoma  Inguinal hernia  Seizure  Hypothyroid  Sleep apnea  LAMBERT and COPD overlap syndrome  BPH without urinary obstruction  Status post Bright&#x27;s procedure  Bilateral subdural hematomas  Status post cholecystectomy  Status post inguinal hernia repair      For details regarding the patient's social history, family history, and other miscellaneous elements, please refer the initial infectious diseases consultation and/or the admitting history and physical examination for this admission.    ROS:  Systems reviewed and are neg     ALLERGIES  benzodiazepines (Other)  Keppra (Other (Severe))  penicillins (Unknown)      Current inpatient medications :    ANTIBIOTICS/RELEVANT:  meropenem  IVPB 1000 milliGRAM(s) IV Intermittent every 8 hours    MEDICATIONS  (STANDING):  albuterol/ipratropium for Nebulization 3 milliLiter(s) Nebulizer every 6 hours  enoxaparin Injectable 40 milliGRAM(s) SubCutaneous daily  lactated ringers. 1000 milliLiter(s) (50 mL/Hr) IV Continuous <Continuous>  levothyroxine Injectable 12.5 MICROGram(s) IV Push at bedtime  pantoprazole  Injectable 40 milliGRAM(s) IV Push daily  valproate sodium IVPB 1500 milliGRAM(s) IV Intermittent at bedtime    MEDICATIONS  (PRN):  polyethylene glycol 3350 17 Gram(s) Oral daily PRN Constipation      Objective:  Vital Signs Last 24 Hrs  T(C): 36.7 (28 Jun 2021 21:05), Max: 37.2 (27 Jun 2021 22:00)  T(F): 98.1 (28 Jun 2021 21:05), Max: 98.9 (27 Jun 2021 22:00)  HR: 86 (28 Jun 2021 20:00) (68 - 86)  BP: 101/67 (28 Jun 2021 20:00) (96/68 - 154/86)  BP(mean): 78 (28 Jun 2021 20:00) (72 - 99)  RR: 17 (28 Jun 2021 20:00) (14 - 25)  SpO2: 93% (28 Jun 2021 20:00) (91% - 100%)    Physical Exam:  General: Weak frail  no acute distress  Neck: supple, trachea midline  Lungs: Decreased no wheeze/rhonchi  Cardiovascular: regular rate and rhythm, S1 S2  Abdomen: soft, nontender,  bowel sounds normal  Neurological: Awake alert  Skin: no rash  Extremities: + edema        LABS:                        14.0   9.86  )-----------( 122      ( 26 Jun 2021 06:38 )             43.1   06-26    144  |  102  |  15  ----------------------------<  81  4.2   |  37<H>  |  0.40<L>    Ca    9.3      26 Jun 2021 06:38  Phos  2.9     06-26  Mg     2.0     06-26    MICROBIOLOGY:    Culture - Sputum (collected 23 Jun 2021 14:04)  Source: .Sputum Sputum  Gram Stain (23 Jun 2021 20:37):    Few Squamous epithelial cells per low power field    Few polymorphonuclear leukocytes per low power field    Few Yeast per oil power field  Final Report (25 Jun 2021 22:01):    Normal Respiratory Vangie present    Culture - Urine (collected 21 Jun 2021 14:11)  Source: .Urine Clean Catch (Midstream)  Final Report (22 Jun 2021 14:00):    No growth    Culture - Blood (collected 21 Jun 2021 14:11)  Source: .Blood Blood-Peripheral  Preliminary Report (22 Jun 2021 15:02):    No growth to date.    Culture - Blood (collected 21 Jun 2021 14:11)  Source: .Blood Blood-Peripheral  Preliminary Report (22 Jun 2021 15:02):    No growth to date.    MRSA/MSSA PCR (06.22.21 @ 00:13)    MRSA PCR Result.: NotDete    Staph Aureus PCR Result: NotDete      RADIOLOGY & ADDITIONAL STUDIES:  EXAM:  CT CHEST                                  PROCEDURE DATE:  06/21/2021          INTERPRETATION:  CLINICAL INFORMATION: SOB, fever. Hx of sarcoid    COMPARISON: Comparison to numerous prior examinations most recent on 5/9/2021    CONTRAST/COMPLICATIONS:  IV Contrast: NONE  0 cc administered   0 cc discarded  Oral Contrast: NONE  Complications: None reported at time of study completion    PROCEDURE:  CT of the Chest was performed.  Sagittal and coronal reformats were performed.    FINDINGS:    LUNGS AND AIRWAYS: There is increased consolidation throughout the right upper and right lower lobe. Consolidation in the left lower lobe is decreased from prior exam. Marked bronchiectasis and architectural distortion is again demonstrated. Tracheal diverticulum.  PLEURA: Trace bilateral pleural effusions.  MEDIASTINUM AND JOANNE: Enlarged lymph nodes throughout the middle mediastinum are unchanged.  VESSELS: Within normal limits.  HEART: Heart size is normal. No pericardial effusion.  CHEST WALL AND LOWER NECK: Within normal limits.  VISUALIZED UPPER ABDOMEN: Status post cholecystectomy.  BONES: Degenerative changes.    IMPRESSION:    Increased consolidation throughout the right upper and lower lobes, correlate for pneumonia. Decreased consolidation in the left lower lobe.      Assessment :  65M with seizures on keppra, BPH, hypothyroidism, sarcoidosis GERD, COPD, LAMBERT, recent admission to [facility] from 5/3-5/10/2021 for right subarachnoid hemorrhage s/p fall admitted from [facility] rehab with acute hypoxic respiratory failure sec severe right lung pneumonia likely aspiration pneumonia.    Sp fever   Off HFNC on 3L NC  Leukocytosis multifactorial downtrending  Failed Swallow study  Clinically better      Plan :   Cont Meropenam day 7/10  Trend temps and cbc  Steroids and nebs per pulm  Pulm toileting  Will need PEG  Asp precautions    D/w Dr Rosanne Birmingham    Continue with present regiment.  Appropriate use of antibiotics and adverse effects reviewed.      > 35 minutes were spent in direct patient care reviewing notes, medications ,labs data/ imaging , discussion with multidisciplinary team.    Thank you for allowing me to participate in care of your patient .    Tegan Granados MD  Infectious Disease  [phone]

## 2021-06-28 NOTE — PROGRESS NOTE ADULT - ASSESSMENT
65M with seizures on keppra, BPH, hypothyroidism, sarcoidosis GERD, COPD, LAMBERT, recent admission to [facility] from 5/3-5/10/2021 for right subarachnoid hemorrhage s/p fall who presents from [facility] for hypoxia.  Found to have acute hypoxic and hypercapnic respiratory failure 2/2 sepsis 2/2 aspiration PNA.  Likely aspiration PNA since patient has been recommended dysphagia I diet from [facility] but reports state that he has been non-compliant with recommendation.  Also location of RLL and RUL also fits with aspiration.      Problems  Acute hypoxic and hypercapnic respiratory failure  Sepsis 2/2 aspiration PNA  Sarcoidosis  Seizures  COPD    Acute hypoxic and hypercapnic respiratory failure secondary to aspiration PNA and underlying interstitial lung disease  - admitted to [facility]  -off high flow:: now on nasal cannula   -CXR followup 6/25:: severe interestial disease +fibrosis    Sepsis from aspiration PNA with metabolic encephalopathy - sepsis improving  - meropenem as per ID  - ID f/u appreciated  - f/u blood cultures - NGTD  - speech and swallow eval -pt should be re-evaluated now that pulm status is improving.   - OMFS eval appreciated - no signs of oral infection    Troponin Elevation  - likely demand ischemia, no need to trend further  - cardiology f/u appreciated  -  TTE noted - elevated RV pressures likely due to pna    Sarcoidosis/COPD  - started on steroids for history of bronchiectasis as well - being tapered by pulm  - symbicort - patient not able to use properly - can dc.     Seizure history  - cont with depakote    Hypothyroidism  - cont with synthroid    General anxiety disorder  - restart effexor when able to take PO.     BPH  - has haq now, will need to restart flomax prior to trying to remove haq    Preventive measures  - can start with lovenox 40mg subq for DVT ppx    Patient critically ill, high risk for deterioration.     65M with seizures on keppra, BPH, hypothyroidism, sarcoidosis GERD, COPD, LAMBERT, recent admission to [facility] from 5/3-5/10/2021 for right subarachnoid hemorrhage s/p fall who presents from [facility] for hypoxia.  Found to have acute hypoxic and hypercapnic respiratory failure 2/2 sepsis 2/2 aspiration PNA.  Likely aspiration PNA since patient has been recommended dysphagia I diet from [facility] but reports state that he has been non-compliant with recommendation.  Also location of RLL and RUL also fits with aspiration.      Problems  Acute hypoxic and hypercapnic respiratory failure  Sepsis 2/2 aspiration PNA  Sarcoidosis  Seizures  COPD    Acute hypoxic and hypercapnic respiratory failure secondary to aspiration PNA and underlying interstitial lung disease  - admitted to [facility]  -off high flow:: now on nasal cannula   -CXR followup 6/25:: severe interestial disease +fibrosis    Sepsis from aspiration PNA with metabolic encephalopathy - sepsis improving  - meropenem total 10 days as per ID  - f/u blood cultures - NGTD  - speech and swallow eval -pt should be re-evaluated now that pulm status is improving.   - OMFS eval appreciated - no signs of oral infection    Troponin Elevation  - likely demand ischemia, no need to trend further  - cardiology f/u appreciated  -  TTE noted - elevated RV pressures likely due to pna    Sarcoidosis/COPD  - started on steroids for history of bronchiectasis as well - being tapered by pulm  - symbicort - patient not able to use properly - can dc.     Seizure history  - cont with depakote    Hypothyroidism  - cont with synthroid    General anxiety disorder  - restart effexor when able to take PO.     BPH  - has haq now, will need to restart flomax prior to trying to remove haq    Preventive measures  - can start with lovenox 40mg subq for DVT ppx    Patient critically ill, high risk for deterioration.

## 2021-06-28 NOTE — PROGRESS NOTE ADULT - ASSESSMENT
The patient is a 65 year old male with a history of seizure d/o, BPH, hypothyroidism, sarcoidosis, COPD, LAMBERT, recent SAH who is admitted with respiratory failure in the setting of aspiration PNA.    Plan:  - ECG with anterolateral ST depressions  - Troponin peaked at 0.49. Likely elevated in the setting of demand ischemia from underlying infection, respiratory failure, tachycardia. No need to trend further.  - Echo with normal LV systolic function, no significant valve issues  - Given recent SAH, would hold off with antiplatelets or anticoagulants  - Completed meropenem

## 2021-06-29 NOTE — PROGRESS NOTE ADULT - SUBJECTIVE AND OBJECTIVE BOX
Patient is a 65y old  Male who presents with a chief complaint of respiratory failure, lethargy (29 Jun 2021 06:43)    INTERVAL HPI/OVERNIGHT EVENTS:  feeling ok, no complaints at this time.  breathing feels ok.     MEDICATIONS  (STANDING):  albuterol/ipratropium for Nebulization 3 milliLiter(s) Nebulizer every 6 hours  enoxaparin Injectable 40 milliGRAM(s) SubCutaneous daily  finasteride 5 milliGRAM(s) Oral daily  levothyroxine Injectable 12.5 MICROGram(s) IV Push at bedtime  meropenem  IVPB 1000 milliGRAM(s) IV Intermittent every 8 hours  pantoprazole  Injectable 40 milliGRAM(s) IV Push daily  valproate sodium IVPB 1500 milliGRAM(s) IV Intermittent at bedtime    MEDICATIONS  (PRN):  polyethylene glycol 3350 17 Gram(s) Oral daily PRN Constipation    Allergies  Keppra (Other (Severe))  penicillins (Unknown)    Intolerances  benzodiazepines (Other)      REVIEW OF SYSTEMS:  denies complaints.    Vital Signs Last 24 Hrs  T(C): 36.9 (29 Jun 2021 08:00), Max: 36.9 (28 Jun 2021 16:04)  T(F): 98.5 (29 Jun 2021 08:00), Max: 98.5 (29 Jun 2021 08:00)  HR: 78 (29 Jun 2021 08:00) (67 - 86)  BP: 98/62 (29 Jun 2021 08:00) (91/55 - 105/73)  BP(mean): 74 (29 Jun 2021 08:00) (65 - 85)  RR: 18 (29 Jun 2021 08:00) (16 - 22)  SpO2: 98% (29 Jun 2021 08:00) (92% - 100%)    PHYSICAL EXAM:  GENERAL: NAD, well-groomed, well-developed  HEAD:  Atraumatic, Normocephalic  EYES: conjunctiva and sclera clear  ENMT: Moist mucous membranes  NECK: Supple, No JVD  NERVOUS SYSTEM:  Alert & Oriented X2, Good concentration; All 4 extremities mobile, no gross sensory deficits.   CHEST/LUNG: good air entry with few rhonchi  HEART: Regular rate and rhythm; N  ABDOMEN: Soft, Nontender, Nondistended; Bowel sounds present  EXTREMITIES:  2+ Peripheral Pulses, diffuse edema      LABS:                        13.0   12.35 )-----------( 212      ( 29 Jun 2021 06:34 )             40.9     29 Jun 2021 06:34    145    |  106    |  18     ----------------------------<  120    3.8     |  41     |  0.41     Ca    8.9        29 Jun 2021 06:34  Phos  3.0       29 Jun 2021 06:34  Mg     2.1       29 Jun 2021 06:34          CAPILLARY BLOOD GLUCOSE          RADIOLOGY & ADDITIONAL TESTS:    Imaging Personally Reviewed:  [ ] YES     Consultant(s) Notes Reviewed:      Care Discussed with Consultants/Other Providers:    Advanced Directives: [ ] DNR  [ ] No feeding tube  [ ] MOLST in chart  [ ] MOLST completed today  [ ] Unknown

## 2021-06-29 NOTE — PHYSICAL THERAPY INITIAL EVALUATION ADULT - ADDITIONAL COMMENTS
Pt came from rehab at [facility].  Prior to going to rehab,  Pt lives alone in apt with no steps to enter and uses a RW for ambulation. Pt stated PTA he was 100% independent and drives

## 2021-06-29 NOTE — PHYSICAL THERAPY INITIAL EVALUATION ADULT - PATIENT PROFILE REVIEW, REHAB EVAL
Subjective:       Patient ID: Burak Lazar is a 70 y.o. male.    Chief Complaint: Other (Referred by  to eval RT Groin Pain (post angiogram 6/16/19))      HPI:  Patient is 70 year old male referred to the office with right groin pain over his angiogram arterial stick site. The angiogram was in June of 2019.  He states that he does not remember having any significant hematoma or complications after the procedure. In the weeks to months after the procedure, he has point tenderness directly over the stick site worse with activity and flexion at the hip.  He had ultrasound of the area that showed a patent artery and vein, no evidence of soft tissue abnormality or he hematoma or seroma.  Patient has no other history of surgery in the groin.    Past Medical History:   Diagnosis Date    Arthritis     Hypertension      Past Surgical History:   Procedure Laterality Date    BACK SURGERY  1975    x2    hemmroidectomy      HEMORRHOID SURGERY      KNEE SURGERY Right     KNEE SURGERY Left     x's2    SHOULDER SURGERY Right 2003    x's 2 (2003;2005)    SHOULDER SURGERY Left 2004    x's2 (2004; 2009)     Review of patient's allergies indicates:  No Known Allergies  Medication List with Changes/Refills   Current Medications    AMLODIPINE-ATORVASTATIN (CADUET) 5-20 MG PER TABLET    Take 1 tablet by mouth once daily.    ASPIRIN (ECOTRIN) 81 MG EC TABLET    Take 81 mg by mouth once daily.    FISH OIL-OMEGA-3 FATTY ACIDS 300-1,000 MG CAPSULE    Take 2 g by mouth once daily.   Discontinued Medications    CETIRIZINE (ZYRTEC) 10 MG TABLET    Take 1 tablet (10 mg total) by mouth once daily.    DEXCHLORPHEN-PHENYLEPHRINE-DM (POLYTUSSIN DM) 1-5-10 MG/5 ML SYRP    Take 5 mLs by mouth every 4 (four) hours as needed.    FLUTICASONE PROPIONATE (FLONASE) 50 MCG/ACTUATION NASAL SPRAY    2 sprays (100 mcg total) by Each Nare route once daily.    IBUPROFEN 200 MG CAP    Take 1 capsule by mouth as needed.    NAPROXEN SODIUM  (ANAPROX) 220 MG TABLET    Take 220 mg by mouth once daily.     Family History   Problem Relation Age of Onset    No Known Problems Mother     No Known Problems Father     No Known Problems Maternal Grandmother     No Known Problems Maternal Grandfather     No Known Problems Paternal Grandmother     No Known Problems Paternal Grandfather      Social History     Socioeconomic History    Marital status:      Spouse name: Not on file    Number of children: Not on file    Years of education: Not on file    Highest education level: Not on file   Occupational History    Not on file   Social Needs    Financial resource strain: Not on file    Food insecurity:     Worry: Not on file     Inability: Not on file    Transportation needs:     Medical: Not on file     Non-medical: Not on file   Tobacco Use    Smoking status: Former Smoker     Last attempt to quit: 2010     Years since quitting: 10.0    Smokeless tobacco: Never Used    Tobacco comment: quit 10 yrs ago   Substance and Sexual Activity    Alcohol use: Yes     Comment: social    Drug use: Not on file    Sexual activity: Not on file   Lifestyle    Physical activity:     Days per week: Not on file     Minutes per session: Not on file    Stress: Not on file   Relationships    Social connections:     Talks on phone: Not on file     Gets together: Not on file     Attends Yarsanism service: Not on file     Active member of club or organization: Not on file     Attends meetings of clubs or organizations: Not on file     Relationship status: Not on file   Other Topics Concern    Not on file   Social History Narrative    Not on file         Review of Systems   Constitutional: Negative for appetite change, chills, fever and unexpected weight change.   HENT: Negative for hearing loss, rhinorrhea, sore throat and voice change.    Eyes: Negative for photophobia and visual disturbance.   Respiratory: Negative for cough, choking and shortness of  breath.    Cardiovascular: Negative for chest pain, palpitations and leg swelling.   Gastrointestinal: Negative for abdominal pain, blood in stool, constipation, diarrhea, nausea and vomiting.   Endocrine: Negative for cold intolerance, heat intolerance and polyphagia.   Genitourinary: Negative for dysuria.   Musculoskeletal: Negative for arthralgias and back pain.   Skin: Negative for color change.   Neurological: Negative for dizziness, seizures, syncope and headaches.   Hematological: Negative for adenopathy. Does not bruise/bleed easily.       Objective:      Physical Exam   Constitutional: He is oriented to person, place, and time. He appears well-developed and well-nourished.  Non-toxic appearance. No distress.   HENT:   Head: Normocephalic and atraumatic. Head is without abrasion and without laceration.   Right Ear: External ear normal.   Left Ear: External ear normal.   Nose: Nose normal.   Mouth/Throat: Oropharynx is clear and moist.   Eyes: Pupils are equal, round, and reactive to light. EOM are normal.   Neck: Trachea normal and phonation normal. Neck supple. No tracheal deviation and normal range of motion present.   Cardiovascular: Normal rate and regular rhythm.   Pulmonary/Chest: Effort normal. No accessory muscle usage. No tachypnea. No respiratory distress.   Abdominal: Soft. Normal appearance and bowel sounds are normal. He exhibits no distension and no mass. There is no tenderness. There is no rigidity, no rebound and no guarding.       Tender across the right groin crease. Did not appreciate any hematoma, seroma, hernia defect.     Femoral pulse is 2+   Lymphadenopathy:        Right: No inguinal adenopathy present.        Left: No inguinal adenopathy present.   Neurological: He is alert and oriented to person, place, and time. Coordination and gait normal.   Skin: Skin is warm and intact.   Psychiatric: He has a normal mood and affect. His speech is normal and behavior is normal.        Assessment/Plan:   Burak was seen today for other.    Diagnoses and all orders for this visit:    Left groin pain      -Did not appreciate a hernia on exam. Did not appreciate any skin changes, induration, hematoma. The pulse was strong. Recommend observation with repeat CT imaging to include the groin and hip if pain persists.         yes

## 2021-06-29 NOTE — PHYSICAL THERAPY INITIAL EVALUATION ADULT - PERTINENT HX OF CURRENT PROBLEM, REHAB EVAL
65M with seizures on keppra, BPH, hypothyroidism, sarcoidosis GERD, COPD, LAMBERT, recent admission to [facility] from 5/3-5/10/2021 for right subarachnoid hemorrhage s/p fall who presents from [facility] for hypoxia.  Per notes, patient was found to have a RLL PNA at [facility] and was started on IV meropenem yesterday.  Pt w/ Acute hypoxic and hypercapnic respiratory failure. Sepsis from aspiration PNA with metabolic encephalopathy CT chest: Small bilateral pleural effusions are noted, right greater than left,

## 2021-06-29 NOTE — PROGRESS NOTE ADULT - SUBJECTIVE AND OBJECTIVE BOX
Chief Complaint: Respiratory distress    Interval Events: No events overnight.    Review of Systems:  General: No fevers, chills, weight loss or gain  Skin: No rashes, color changes  Cardiovascular: No chest pain, orthopnea  Respiratory: No shortness of breath, cough  Gastrointestinal: No nausea, abdominal pain  Genitourinary: No incontinence, pain with urination  Musculoskeletal: No pain, swelling, decreased range of motion  Neurological: No headache, weakness  Psychiatric: No depression, anxiety  Endocrine: No weight loss or gain, increased thirst  All other systems are comprehensively negative.    Physical Exam:  Vital Signs Last 24 Hrs  T(C): 36.9 (29 Jun 2021 08:00), Max: 36.9 (28 Jun 2021 16:04)  T(F): 98.5 (29 Jun 2021 08:00), Max: 98.5 (29 Jun 2021 08:00)  HR: 78 (29 Jun 2021 08:00) (67 - 86)  BP: 98/62 (29 Jun 2021 08:00) (91/55 - 105/73)  BP(mean): 74 (29 Jun 2021 08:00) (65 - 85)  RR: 18 (29 Jun 2021 08:00) (16 - 22)  SpO2: 98% (29 Jun 2021 08:00) (91% - 100%)  General: NAD  HEENT: MMM  Neck: No JVD, no carotid bruit  Lungs: CTAB  CV: RRR, nl S1/S2, no M/R/G  Abdomen: S/NT/ND, +BS  Extremities: No LE edema, no cyanosis  Neuro: AAOx3, non-focal  Skin: No rash    Labs:        Telemetry: Sinus rhythm

## 2021-06-29 NOTE — PROGRESS NOTE ADULT - ASSESSMENT
Pt is a 66 y/o male with PMHx as above here with acute hypoxic-hypercarbic respiratory failure secondary to aspiration pneumonia, severe sepsis.     Plan:  - On 5L NC, saturating mid 90s, goal >88%, weaned to 4L NC now  - S/p steroids, on nebulizers q6h  - Cont Chest PT  - Pulm following  - NPO, multiple failed speech and swallow assessments, getting tube feeds, HOB >30, aspiration precautions  - Miralax for bowel regimen  - Renal indices stable, trend, I/Os, replete lytes  - Haq remains, flomax started last night but unable to give via NGT, switched to proscar, plan to DC haq  - On empiric everardo, ID following, trend WBC/Temp curve, cultures neg thus far  - Lovenox for dvt ppx  - Cont synthroid    Dispo: Full code.

## 2021-06-29 NOTE — CHART NOTE - NSCHARTNOTEFT_GEN_A_CORE
Assessment:     6/26  Pt is a  65 year old M with seizures , BPH, hypothyroidism, sarcoidosis GERD, COPD, LAMBERT, recent admission to [facility] from 5/3-5/10/2021 for right subarachnoid hemorrhage s/p fall who presents from [facility] for hypoxia.  Found to have acute hypoxic and hypercapnic respiratory failure 2/2 sepsis 2/2 aspiration PNA.  It was recommended that pt  follow dysphagia I diet from [facility] but reports state that he has been non-compliant with recommendation. Pt received SLP eval this morning which he failed. Pt unable to tolerate PO nutrition at this  Given that pt has been NPO x 6 days as well as increased edema: Now  b/l arm edema +1, b/l ankle edema +2 . Pt weight down 4.5# (2.4%)- edema may be masking additional wt loss. Pt meets criteria for severe malnutrition in the context of acute illness.  D/W Dr Pak who will be having GOC discussion with family. If enteral feeding desired then would recommend Jevity 1.5 to goal rate 70ml/hr which will provide 2520 kcal and 93 gm protein which will meet pt's estimated needs. RD remains available. Will f/u    F/U 6/29:    Pt was started on NGT feedings: Vital 1.5 q 6 hrs with 100ml water before/after each feed. Pt seen on rounds. Pt will require 6 feedings of the aforementioned to meet >75% needs. D/W MD who wrote order. Will also provide prosource for additional protein kcal: 2160 kcal/97 gm protein. Prosource will provide additional 15 gm protein.     Factors impacting intake: [ ] none [ ] nausea  [ ] vomiting [ ] diarrhea [ ] constipation  [ ]chewing problems [x ] swallowing issues  [ ] other:     Diet Presciption:   Intake: NPO    Current Weight: 176.8# no significant change  % Weight Change    Pertinent Medications: MEDICATIONS  (STANDING):  albuterol/ipratropium for Nebulization 3 milliLiter(s) Nebulizer every 6 hours  enoxaparin Injectable 40 milliGRAM(s) SubCutaneous daily  levothyroxine Injectable 12.5 MICROGram(s) IV Push at bedtime  meropenem  IVPB 1000 milliGRAM(s) IV Intermittent every 8 hours  pantoprazole  Injectable 40 milliGRAM(s) IV Push daily  valproate sodium IVPB 1500 milliGRAM(s) IV Intermittent at bedtime    MEDICATIONS  (PRN):  polyethylene glycol 3350 17 Gram(s) Oral daily PRN Constipation    Pertinent Labs: 06-29 Na145 mmol/L Glu 120 mg/dL<H> K+ 3.8 mmol/L Cr  0.41 mg/dL<L> BUN 18 mg/dL 06-29 Phos 3.0 mg/dL     CAPILLARY BLOOD GLUCOSE        Skin: suspected DTI    Estimated Needs:   [x ] no change since previous assessment  [ ] recalculated:     Previous Nutrition Diagnosis:   [ ] Inadequate Energy Intake [ ]Inadequate Oral Intake [ ] Excessive Energy Intake   [ ] Underweight [ ] Increased Nutrient Needs [ ] Overweight/Obesity   [ ] Altered GI Function [ ] Unintended Weight Loss [ ] Food & Nutrition Related Knowledge Deficit [x ] Malnutrition     Nutrition Diagnosis is [ x] ongoing  [ ] resolved [ ] not applicable     New Nutrition Diagnosis: [ x] not applicable       Interventions:   Recommend  [ ] Change Diet To:  [ ] Nutrition Supplement  [x ] Nutrition Support recommend 6 cans Vital 1.5 q 4hrs with 75ml water before/after feeding  [ ] Other:     Monitoring and Evaluation:   [ ] PO intake [ x ] Tolerance to diet prescription [ x ] weights [ x ] labs[ x ] follow up per protocol  [ ] other:

## 2021-06-29 NOTE — PROGRESS NOTE ADULT - SUBJECTIVE AND OBJECTIVE BOX
JULY FORTE is a 65yMale , patient examined and chart reviewed.     INTERVAL HPI/ OVERNIGHT EVENTS:   Awake. NAD.  NGT placed.    PAST MEDICAL & SURGICAL HISTORY:  DM (diabetes mellitus)  Sarcoid  Bronchiectasis  Subdural hematoma  Inguinal hernia  Seizure  Hypothyroid  Sleep apnea  LAMBERT and COPD overlap syndrome  BPH without urinary obstruction  Status post Bright&#x27;s procedure  Bilateral subdural hematomas  Status post cholecystectomy  Status post inguinal hernia repair      For details regarding the patient's social history, family history, and other miscellaneous elements, please refer the initial infectious diseases consultation and/or the admitting history and physical examination for this admission.    ROS:  Systems reviewed and are neg     ALLERGIES  benzodiazepines (Other)  Keppra (Other (Severe))  penicillins (Unknown)      Current inpatient medications :    ANTIBIOTICS/RELEVANT:  meropenem  IVPB 1000 milliGRAM(s) IV Intermittent every 8 hours    MEDICATIONS  (STANDING):  albuterol/ipratropium for Nebulization 3 milliLiter(s) Nebulizer every 6 hours  diVALproex Sprinkle 750 milliGRAM(s) Oral every 12 hours  enoxaparin Injectable 40 milliGRAM(s) SubCutaneous daily  finasteride 5 milliGRAM(s) Oral daily  levothyroxine 25 MICROGram(s) Oral daily  venlafaxine 37.5 milliGRAM(s) Oral every 12 hours    MEDICATIONS  (PRN):  polyethylene glycol 3350 17 Gram(s) Oral daily PRN Constipation      Objective:  Vital Signs Last 24 Hrs  T(C): 36.4 (29 Jun 2021 21:44), Max: 36.9 (29 Jun 2021 08:00)  T(F): 97.5 (29 Jun 2021 21:44), Max: 98.5 (29 Jun 2021 08:00)  HR: 69 (29 Jun 2021 19:58) (65 - 81)  BP: 97/59 (29 Jun 2021 18:00) (90/55 - 101/66)  BP(mean): 72 (29 Jun 2021 18:00) (65 - 77)  RR: 17 (29 Jun 2021 18:00) (17 - 26)  SpO2: 94% (29 Jun 2021 19:58) (92% - 98%)      Physical Exam:  General: Weak frail  no acute distress  Neck: supple, trachea midline  Lungs: Decreased no wheeze/rhonchi  Cardiovascular: regular rate and rhythm, S1 S2  Abdomen: soft, nontender,  bowel sounds normal  Neurological: Awake alert  Skin: no rash  Extremities: + edema        LABS:                        13.0   12.35 )-----------( 212      ( 29 Jun 2021 06:34 )             40.9   06-29    145  |  106  |  18  ----------------------------<  120<H>  3.8   |  41<H>  |  0.41<L>    Ca    8.9      29 Jun 2021 06:34  Phos  3.0     06-29  Mg     2.1     06-29      MICROBIOLOGY:    Culture - Sputum (collected 23 Jun 2021 14:04)  Source: .Sputum Sputum  Gram Stain (23 Jun 2021 20:37):    Few Squamous epithelial cells per low power field    Few polymorphonuclear leukocytes per low power field    Few Yeast per oil power field  Final Report (25 Jun 2021 22:01):    Normal Respiratory Vangie present    Culture - Urine (collected 21 Jun 2021 14:11)  Source: .Urine Clean Catch (Midstream)  Final Report (22 Jun 2021 14:00):    No growth    Culture - Blood (collected 21 Jun 2021 14:11)  Source: .Blood Blood-Peripheral  Preliminary Report (22 Jun 2021 15:02):    No growth to date.    Culture - Blood (collected 21 Jun 2021 14:11)  Source: .Blood Blood-Peripheral  Preliminary Report (22 Jun 2021 15:02):    No growth to date.    MRSA/MSSA PCR (06.22.21 @ 00:13)    MRSA PCR Result.: NotDete    Staph Aureus PCR Result: NotDete      RADIOLOGY & ADDITIONAL STUDIES:  EXAM:  CT CHEST                                  PROCEDURE DATE:  06/21/2021          INTERPRETATION:  CLINICAL INFORMATION: SOB, fever. Hx of sarcoid    COMPARISON: Comparison to numerous prior examinations most recent on 5/9/2021    CONTRAST/COMPLICATIONS:  IV Contrast: NONE  0 cc administered   0 cc discarded  Oral Contrast: NONE  Complications: None reported at time of study completion    PROCEDURE:  CT of the Chest was performed.  Sagittal and coronal reformats were performed.    FINDINGS:    LUNGS AND AIRWAYS: There is increased consolidation throughout the right upper and right lower lobe. Consolidation in the left lower lobe is decreased from prior exam. Marked bronchiectasis and architectural distortion is again demonstrated. Tracheal diverticulum.  PLEURA: Trace bilateral pleural effusions.  MEDIASTINUM AND JOANNE: Enlarged lymph nodes throughout the middle mediastinum are unchanged.  VESSELS: Within normal limits.  HEART: Heart size is normal. No pericardial effusion.  CHEST WALL AND LOWER NECK: Within normal limits.  VISUALIZED UPPER ABDOMEN: Status post cholecystectomy.  BONES: Degenerative changes.    IMPRESSION:    Increased consolidation throughout the right upper and lower lobes, correlate for pneumonia. Decreased consolidation in the left lower lobe.      Assessment :  65M with seizures on keppra, BPH, hypothyroidism, sarcoidosis GERD, COPD, LAMBERT, recent admission to [facility] from 5/3-5/10/2021 for right subarachnoid hemorrhage s/p fall admitted from [facility] rehab with acute hypoxic respiratory failure sec severe right lung pneumonia likely aspiration pneumonia.    Sp fever   Off HFNC on 3L NC  Leukocytosis multifactorial downtrending  Failed Swallow study  Clinically better      Plan :   Cont Meropenam day 8/10  Trend temps and cbc  Steroids and nebs per pulm  Pulm toileting  Will need PEG  Asp precautions    D/w Dr Rosanne Birmingham    Continue with present regiment.  Appropriate use of antibiotics and adverse effects reviewed.      > 35 minutes were spent in direct patient care reviewing notes, medications ,labs data/ imaging , discussion with multidisciplinary team.    Thank you for allowing me to participate in care of your patient .    Tegan Granados MD  Infectious Disease  [phone]

## 2021-06-29 NOTE — PROGRESS NOTE ADULT - ASSESSMENT
on TF  VS noted  fio2 titration  in progress  LABS and repeat imaging reviewed  overall slightly better    65M with history of Seizure disorder, Hypothyroidism,  Thrombocytopenia, BPH, recent right SAH from fall May 2021, presents from [facility] for hypoxia and AMS. Found to have severe sepsis, acute hypoxic and hypercapnic respiratory failure    source of infection - etiol - w/u in progress - Dental eval noted - [facility] Attending eval reviewed -   probable ongoing - continued aspiration - with resulting consolidation - asp pna - as seen on CT chest and clinical presentation  at risk for decompensation - resp failure -     s/p broad spectrum ABX - cx noted, biomarkers noted,   assist with all needs and ADL  HOB elev - asp prec  oral hygiene  Prognosis guarded - GOC discussion -     [facility] care and monitoring

## 2021-06-29 NOTE — PROGRESS NOTE ADULT - SUBJECTIVE AND OBJECTIVE BOX
Patient is a 65y old  Male who presents with a chief complaint of respiratory failure, lethargy (29 Jun 2021 11:04)      BRIEF HOSPITAL COURSE: ***  Events last 24 hours: ***    PAST MEDICAL & SURGICAL HISTORY:  DM (diabetes mellitus)    Sarcoid    Bronchiectasis    Subdural hematoma    Inguinal hernia    Seizure    Hypothyroid    Sleep apnea    LAMBERT and COPD overlap syndrome    BPH without urinary obstruction    Status post Bright&#x27;s procedure    Bilateral subdural hematomas    Status post cholecystectomy    Status post inguinal hernia repair        Review of Systems:  CONSTITUTIONAL: No fever, chills, or fatigue  EYES: No eye pain, visual disturbances, or discharge  ENMT:  No difficulty hearing, tinnitus, vertigo; No sinus or throat pain  NECK: No pain or stiffness  RESPIRATORY: No cough, wheezing, chills or hemoptysis; No shortness of breath  CARDIOVASCULAR: No chest pain, palpitations, dizziness, or leg swelling  GASTROINTESTINAL: No abdominal or epigastric pain. No nausea, vomiting, or hematemesis; No diarrhea or constipation. No melena or hematochezia.  GENITOURINARY: No dysuria, frequency, hematuria, or incontinence  NEUROLOGICAL: No headaches, memory loss, loss of strength, numbness, or tremors  SKIN: No itching, burning, rashes, or lesions   MUSCULOSKELETAL: No joint pain or swelling; No muscle, back, or extremity pain  PSYCHIATRIC: No depression, anxiety, mood swings, or difficulty sleeping    Medications:  meropenem  IVPB 1000 milliGRAM(s) IV Intermittent every 8 hours      albuterol/ipratropium for Nebulization 3 milliLiter(s) Nebulizer every 6 hours    diVALproex Sprinkle 750 milliGRAM(s) Oral every 12 hours  venlafaxine 37.5 milliGRAM(s) Oral every 12 hours      enoxaparin Injectable 40 milliGRAM(s) SubCutaneous daily    polyethylene glycol 3350 17 Gram(s) Oral daily PRN      finasteride 5 milliGRAM(s) Oral daily  levothyroxine 25 MICROGram(s) Oral daily                  ICU Vital Signs Last 24 Hrs  T(C): 36.6 (29 Jun 2021 16:01), Max: 36.9 (29 Jun 2021 08:00)  T(F): 97.8 (29 Jun 2021 16:01), Max: 98.5 (29 Jun 2021 08:00)  HR: 65 (29 Jun 2021 18:00) (65 - 86)  BP: 97/59 (29 Jun 2021 18:00) (90/55 - 101/67)  BP(mean): 72 (29 Jun 2021 18:00) (65 - 78)  ABP: --  ABP(mean): --  RR: 17 (29 Jun 2021 18:00) (17 - 26)  SpO2: 96% (29 Jun 2021 18:00) (92% - 98%)          I&O's Detail    28 Jun 2021 07:01  -  29 Jun 2021 07:00  --------------------------------------------------------  IN:    Free Water: 300 mL    IV PiggyBack: 200 mL    Lactated Ringers: 1150 mL    Vital1.5: 720 mL  Total IN: 2370 mL    OUT:    Indwelling Catheter - Urethral (mL): 500 mL  Total OUT: 500 mL    Total NET: 1870 mL      29 Jun 2021 07:01  -  29 Jun 2021 19:10  --------------------------------------------------------  IN:    Free Water: 150 mL    IV PiggyBack: 50 mL    Lactated Ringers: 150 mL    Vital1.5: 480 mL  Total IN: 830 mL    OUT:    Indwelling Catheter - Urethral (mL): 650 mL  Total OUT: 650 mL    Total NET: 180 mL          LABS:                        13.0   12.35 )-----------( 212      ( 29 Jun 2021 06:34 )             40.9     06-29    145  |  106  |  18  ----------------------------<  120<H>  3.8   |  41<H>  |  0.41<L>    Ca    8.9      29 Jun 2021 06:34  Phos  3.0     06-29  Mg     2.1     06-29            CAPILLARY BLOOD GLUCOSE            CULTURES:  Culture Results:   Normal Respiratory Vangie present (06-23 @ 14:04)      Physical Examination:    VITALS AT TIME OF EXAM:  HR:  BP:  RR:  SPO2:    General: Well appearing, lying in bed in NAD      HEENT: Pupils equal, reactive to light.  Symmetric. No scleral icterus or injection    PULM: Clear to auscultation bilaterally, no wheezes, rales or rhonchi, no significant sputum production or increased respiratory effort    NECK: Supple, no lymphadenopathy, trachea midline    CVS: Regular rate and rhythm, no murmurs, +s1/s2    ABD: Soft, nondistended, nontender, normoactive bowel sounds    EXT: No edema, nontender    SKIN: Warm and well perfused    NEURO: Alert, oriented, interactive, nonfocal    POCUS:    DEVICES:   LINES:  APONTE:    RADIOLOGY: ***    CRITICAL CARE TIME SPENT: ***   Patient is a 65y old  Male who presents with a chief complaint of respiratory failure, lethargy (29 Jun 2021 11:04)      BRIEF HOSPITAL COURSE:   Pt is a 66 y/o male with PMHx of COPD, sarcoidosis, LAMBERT, seizure d/o on keppra, BPH, hypothyroidism and recent SAH s/p mechanical fall in May comes in [facility] w/ acute hypercarbic-hypoxic respiratory failure requiring BiPAP. Secondary to pneumonia, severe sepsis criteria met. Was transitioned to HFNC and eventually NC. Admitted to [facility]. On abx, s/p course of steroids.     Events last 24 hours: Sleeping in bed, NAD, no acute complaints, on 5L NC saturating 95%. Weaned to 4L. Afebrile.     PAST MEDICAL & SURGICAL HISTORY:  DM (diabetes mellitus)    Sarcoid    Bronchiectasis    Subdural hematoma    Inguinal hernia    Seizure    Hypothyroid    Sleep apnea    LAMBERT and COPD overlap syndrome    BPH without urinary obstruction    Status post Bright&#x27;s procedure    Bilateral subdural hematomas    Status post cholecystectomy    Status post inguinal hernia repair        Review of Systems:  CONSTITUTIONAL: No fever, chills, or fatigue  EYES: No eye pain, visual disturbances, or discharge  ENMT:  No difficulty hearing, tinnitus, vertigo; No sinus or throat pain  NECK: No pain or stiffness  RESPIRATORY: No cough, wheezing, chills or hemoptysis; No shortness of breath  CARDIOVASCULAR: No chest pain, palpitations, dizziness, or leg swelling  GASTROINTESTINAL: No abdominal or epigastric pain. No nausea, vomiting, or hematemesis; No diarrhea or constipation. No melena or hematochezia.  GENITOURINARY: No dysuria, frequency, hematuria, or incontinence  NEUROLOGICAL: No headaches, memory loss, loss of strength, numbness, or tremors  SKIN: No itching, burning, rashes, or lesions   MUSCULOSKELETAL: No joint pain or swelling; No muscle, back, or extremity pain  PSYCHIATRIC: No depression, anxiety, mood swings, or difficulty sleeping    Medications:  meropenem  IVPB 1000 milliGRAM(s) IV Intermittent every 8 hours      albuterol/ipratropium for Nebulization 3 milliLiter(s) Nebulizer every 6 hours    diVALproex Sprinkle 750 milliGRAM(s) Oral every 12 hours  venlafaxine 37.5 milliGRAM(s) Oral every 12 hours      enoxaparin Injectable 40 milliGRAM(s) SubCutaneous daily    polyethylene glycol 3350 17 Gram(s) Oral daily PRN      finasteride 5 milliGRAM(s) Oral daily  levothyroxine 25 MICROGram(s) Oral daily                  ICU Vital Signs Last 24 Hrs  T(C): 36.6 (29 Jun 2021 16:01), Max: 36.9 (29 Jun 2021 08:00)  T(F): 97.8 (29 Jun 2021 16:01), Max: 98.5 (29 Jun 2021 08:00)  HR: 65 (29 Jun 2021 18:00) (65 - 86)  BP: 97/59 (29 Jun 2021 18:00) (90/55 - 101/67)  BP(mean): 72 (29 Jun 2021 18:00) (65 - 78)  ABP: --  ABP(mean): --  RR: 17 (29 Jun 2021 18:00) (17 - 26)  SpO2: 96% (29 Jun 2021 18:00) (92% - 98%)          I&O's Detail    28 Jun 2021 07:01  -  29 Jun 2021 07:00  --------------------------------------------------------  IN:    Free Water: 300 mL    IV PiggyBack: 200 mL    Lactated Ringers: 1150 mL    Vital1.5: 720 mL  Total IN: 2370 mL    OUT:    Indwelling Catheter - Urethral (mL): 500 mL  Total OUT: 500 mL    Total NET: 1870 mL      29 Jun 2021 07:01  -  29 Jun 2021 19:10  --------------------------------------------------------  IN:    Free Water: 150 mL    IV PiggyBack: 50 mL    Lactated Ringers: 150 mL    Vital1.5: 480 mL  Total IN: 830 mL    OUT:    Indwelling Catheter - Urethral (mL): 650 mL  Total OUT: 650 mL    Total NET: 180 mL          LABS:                        13.0   12.35 )-----------( 212      ( 29 Jun 2021 06:34 )             40.9     06-29    145  |  106  |  18  ----------------------------<  120<H>  3.8   |  41<H>  |  0.41<L>    Ca    8.9      29 Jun 2021 06:34  Phos  3.0     06-29  Mg     2.1     06-29            CAPILLARY BLOOD GLUCOSE            CULTURES:  Culture Results:   Normal Respiratory Vangie present (06-23 @ 14:04)      Physical Examination:    General: Well appearing, lying in bed in NAD      HEENT: Pupils equal, reactive to light.  Symmetric. No scleral icterus or injection. Teeth deformities noted.     PULM: Largely clear to auscultation bilaterally, some scattered rhonchi, no wheezes, rales, no significant sputum production or increased respiratory effort    NECK: Supple, no lymphadenopathy, trachea midline    CVS: Regular rate and rhythm, no murmurs, +s1/s2    ABD: Soft, nondistended, nontender, normoactive bowel sounds    EXT: scattered edema    SKIN: Warm and well perfused    NEURO: Alert, oriented to self and place, interactive, nonfocal    RADIOLOGY: < from: CT Chest No Cont (06.27.21 @ 10:45) >  IMPRESSION:    When compared with June 21, 2021:     Small bilateral pleural effusions are noted, right greater than left, new since the previous exam.    Underlying architectural distortion predominantly in the upper lobes with airway dilatation and traction bronchiectasis is without change. Bibasilar honeycombing is noted. Overall the underlying fibrosis is unchanged since the previous study. Mild interval improvement in patchy peribronchial vascular consolidations seen on previous examination in the left lower lobe while a right lower lobe dominant consolidation is unchanged.    < end of copied text >

## 2021-06-29 NOTE — PROGRESS NOTE ADULT - ASSESSMENT
65M with seizures on keppra, BPH, hypothyroidism, sarcoidosis GERD, COPD, LAMBERT, recent admission to [facility] from 5/3-5/10/2021 for right subarachnoid hemorrhage s/p fall who presents from [facility] for hypoxia.  Found to have acute hypoxic and hypercapnic respiratory failure 2/2 sepsis 2/2 aspiration PNA.  Likely aspiration PNA since patient has been recommended dysphagia I diet from [facility] but reports state that he has been non-compliant with recommendation.  Also location of RLL and RUL also fits with aspiration.      Problems  Acute hypoxic and hypercapnic respiratory failure  Sepsis 2/2 aspiration PNA  Sarcoidosis  Seizures  COPD    Acute hypoxic and hypercapnic respiratory failure secondary to aspiration PNA and underlying interstitial lung disease  - admitted to [facility]  -off high flow:: now on nasal cannula   -CXR followup 6/25:: severe interestial disease +fibrosis    Sepsis from aspiration PNA with metabolic encephalopathy - sepsis resolving  - meropenem total 10 days as per ID  - f/u blood cultures - NGTD  - speech and swallow eval -failed yesterday.  NGT placed for feeding and medication.   - OMFS eval appreciated - no signs of oral infection    Troponin Elevation  - likely demand ischemia, no need to trend further  - cardiology f/u appreciated  -  TTE noted - elevated RV pressures likely due to pna    Sarcoidosis/COPD  - started on steroids for history of bronchiectasis as well - being tapered by pulm  - symbicort - patient not able to use properly - can dc.     Seizure history  - cont with depakote    Hypothyroidism  - cont with synthroid - switch to NGT    General anxiety disorder  - restart Effexor    BPH  - has haq now,  flomax cannot be given via NGT.  Will switch to proscar and plan to dc haq tomorrow night.    Preventive measures  - can start with lovenox 40mg subq for DVT ppx    Patient critically ill, high risk for deterioration.

## 2021-06-29 NOTE — PHYSICAL THERAPY INITIAL EVALUATION ADULT - GENERAL OBSERVATIONS, REHAB EVAL
pt received semisupine in bed, +tele, +IV, +NG tube, +Bilateral Cair AFO, BLE edema, +supplemental oxygen

## 2021-06-30 NOTE — PROGRESS NOTE ADULT - SUBJECTIVE AND OBJECTIVE BOX
Patient is a 65y old  Male who presents with a chief complaint of respiratory failure, lethargy (30 Jun 2021 06:57)    INTERVAL HPI/OVERNIGHT EVENTS: occ nausea with tube feeds, but no other new complaints.  still copious amounts of sputum.     MEDICATIONS  (STANDING):  albuterol/ipratropium for Nebulization 3 milliLiter(s) Nebulizer every 6 hours  diVALproex Sprinkle 750 milliGRAM(s) Oral every 12 hours  enoxaparin Injectable 40 milliGRAM(s) SubCutaneous daily  finasteride 5 milliGRAM(s) Oral daily  levothyroxine 25 MICROGram(s) Oral daily  meropenem  IVPB 1000 milliGRAM(s) IV Intermittent every 8 hours  venlafaxine 37.5 milliGRAM(s) Oral every 12 hours    MEDICATIONS  (PRN):  polyethylene glycol 3350 17 Gram(s) Oral daily PRN Constipation    Allergies  Keppra (Other (Severe))  penicillins (Unknown)    Intolerances  benzodiazepines (Other)      REVIEW OF SYSTEMS:  no other new complaints.     Vital Signs Last 24 Hrs  T(C): 36.6 (30 Jun 2021 08:09), Max: 36.8 (30 Jun 2021 00:00)  T(F): 97.8 (30 Jun 2021 08:09), Max: 98.2 (30 Jun 2021 00:00)  HR: 72 (30 Jun 2021 10:00) (62 - 76)  BP: 108/74 (30 Jun 2021 10:00) (90/55 - 109/78)  BP(mean): 84 (30 Jun 2021 10:00) (67 - 88)  RR: 20 (30 Jun 2021 10:00) (17 - 26)  SpO2: 94% (30 Jun 2021 10:00) (93% - 97%)    PHYSICAL EXAM:  GENERAL: NAD, well-groomed, well-developed  HEAD:  Atraumatic, Normocephalic  EYES:  conjunctiva and sclera clear  ENMT: Moist mucous membranes  NECK: Supple, No JVD  NERVOUS SYSTEM:  Alert & Oriented X3, Good concentration; All 4 extremities mobile, no gross sensory deficits.   CHEST/LUNG: Clear to auscultation bilaterally;   HEART: Regular rate and rhythm; No murmurs, rubs, or gallops  ABDOMEN: Soft, Nontender, Nondistended; Bowel sounds present  EXTREMITIES:  2+ Peripheral Pulses, No clubbing, cyanosis, or edema      LABS:                        13.8   10.00 )-----------( 182      ( 30 Jun 2021 06:32 )             44.0     30 Jun 2021 06:32    141    |  103    |  19     ----------------------------<  115    4.1     |  40     |  0.40     Ca    8.9        30 Jun 2021 06:32  Phos  2.3       30 Jun 2021 06:32  Mg     2.0       30 Jun 2021 06:32    TPro  5.5    /  Alb  1.7    /  TBili  0.4    /  DBili  x      /  AST  14     /  ALT  <10    /  AlkPhos  60     30 Jun 2021 06:32        CAPILLARY BLOOD GLUCOSE          RADIOLOGY & ADDITIONAL TESTS:    Imaging Personally Reviewed:  [ ] YES     Consultant(s) Notes Reviewed:  pulm/ card    Care Discussed with Consultants/Other Providers:    Advanced Directives: [ ] DNR  [ ] No feeding tube  [ ] MOLST in chart  [ ] MOLST completed today  [ ] Unknown

## 2021-06-30 NOTE — PROGRESS NOTE ADULT - ASSESSMENT
65M with seizures on keppra, BPH, hypothyroidism, sarcoidosis GERD, COPD, LAMBERT, recent admission to [facility] from 5/3-5/10/2021 for right subarachnoid hemorrhage s/p fall who presents from [facility] for hypoxia.  Found to have acute hypoxic and hypercapnic respiratory failure 2/2 sepsis 2/2 aspiration PNA.  Likely aspiration PNA since patient has been recommended dysphagia I diet from [facility] but reports state that he has been non-compliant with recommendation.  Also location of RLL and RUL also fits with aspiration.      Problems  Acute hypoxic and hypercapnic respiratory failure  Sepsis 2/2 aspiration PNA  Sarcoidosis  Seizures  COPD    Acute hypoxic and hypercapnic respiratory failure secondary to aspiration PNA and underlying interstitial lung disease  - admitted to [facility]  -off high flow:: now on nasal cannula   -CXR followup 6/25:: severe interestial disease +fibrosis    Sepsis from aspiration PNA with metabolic encephalopathy - sepsis resolving  - meropenem total 10 days as per ID - will be complete in AM  - f/u blood cultures - NGTD  - speech and swallow eval -failed yesterday.  NGT placed for feeding and medication. await follow up, have discussed possibility of PEG with patient and sister  - OMFS eval appreciated - no signs of oral infection    Troponin Elevation  - likely demand ischemia, no need to trend further  - cardiology f/u appreciated  -  TTE noted - elevated RV pressures likely due to pna    Sarcoidosis/COPD  - started on steroids for history of bronchiectasis as well - being tapered by pulm  - symbicort - patient not able to use properly - can dc.     Seizure history  - cont with depakote    Hypothyroidism  - cont with synthroid - switch to NGT    General anxiety disorder  - restart Effexor    BPH  - has haq now,  flomax cannot be given via NGT.  Proscar started yesterday.  will dc at 11pm tonight -monitor voiding/ bladder scan in AM.     Preventive measures  - can start with lovenox 40mg subq for DVT ppx    Patient critically ill, high risk for deterioration.

## 2021-06-30 NOTE — PROGRESS NOTE ADULT - SUBJECTIVE AND OBJECTIVE BOX
JULY FORTE is a 65yMale , patient examined and chart reviewed.     INTERVAL HPI/ OVERNIGHT EVENTS:   Awake. NAD.    PAST MEDICAL & SURGICAL HISTORY:  DM (diabetes mellitus)  Sarcoid  Bronchiectasis  Subdural hematoma  Inguinal hernia  Seizure  Hypothyroid  Sleep apnea  LAMBERT and COPD overlap syndrome  BPH without urinary obstruction  Status post Bright&#x27;s procedure  Bilateral subdural hematomas  Status post cholecystectomy  Status post inguinal hernia repair      For details regarding the patient's social history, family history, and other miscellaneous elements, please refer the initial infectious diseases consultation and/or the admitting history and physical examination for this admission.    ROS:  Systems reviewed and are neg     ALLERGIES  benzodiazepines (Other)  Keppra (Other (Severe))  penicillins (Unknown)      Current inpatient medications :    ANTIBIOTICS/RELEVANT:  meropenem  IVPB 1000 milliGRAM(s) IV Intermittent every 8 hours    MEDICATIONS  (STANDING):  albuterol/ipratropium for Nebulization 3 milliLiter(s) Nebulizer every 6 hours  diVALproex Sprinkle 750 milliGRAM(s) Oral every 12 hours  enoxaparin Injectable 40 milliGRAM(s) SubCutaneous daily  finasteride 5 milliGRAM(s) Oral daily  levothyroxine 25 MICROGram(s) Oral daily  venlafaxine 37.5 milliGRAM(s) Oral every 12 hours    MEDICATIONS  (PRN):  ondansetron Injectable 4 milliGRAM(s) IV Push every 4 hours PRN Nausea and/or Vomiting  polyethylene glycol 3350 17 Gram(s) Oral daily PRN Constipation        Objective:  ICU Vital Signs Last 24 Hrs  T(C): 36.8 (30 Jun 2021 16:54), Max: 36.8 (30 Jun 2021 00:00)  T(F): 98.2 (30 Jun 2021 16:54), Max: 98.2 (30 Jun 2021 00:00)  HR: 67 (30 Jun 2021 16:00) (62 - 76)  BP: 107/68 (30 Jun 2021 16:00) (96/64 - 109/78)  BP(mean): 81 (30 Jun 2021 16:00) (72 - 88)  RR: 19 (30 Jun 2021 16:00) (17 - 21)  SpO2: 90% (30 Jun 2021 16:00) (90% - 96%)      Physical Exam:  General: Weak frail  no acute distress NGT  Neck: supple, trachea midline  Lungs: Decreased no wheeze/rhonchi  Cardiovascular: regular rate and rhythm, S1 S2  Abdomen: soft, nontender,  bowel sounds normal  Neurological: Awake alert  Skin: no rash  Extremities: + edema        LABS:                        13.8   10.00 )-----------( 182      ( 30 Jun 2021 06:32 )             44.0   06-30    141  |  103  |  19  ----------------------------<  115<H>  4.1   |  40<H>  |  0.40<L>    Ca    8.9      30 Jun 2021 06:32  Phos  2.3     06-30  Mg     2.0     06-30    TPro  5.5<L>  /  Alb  1.7<L>  /  TBili  0.4  /  DBili  x   /  AST  14  /  ALT  <10<L>  /  AlkPhos  60  06-30      MICROBIOLOGY:  Culture - Sputum (collected 23 Jun 2021 14:04)  Source: .Sputum Sputum  Gram Stain (23 Jun 2021 20:37):    Few Squamous epithelial cells per low power field    Few polymorphonuclear leukocytes per low power field    Few Yeast per oil power field  Final Report (25 Jun 2021 22:01):    Normal Respiratory Vangie present    Culture - Urine (collected 21 Jun 2021 14:11)  Source: .Urine Clean Catch (Midstream)  Final Report (22 Jun 2021 14:00):    No growth    Culture - Blood (collected 21 Jun 2021 14:11)  Source: .Blood Blood-Peripheral  Preliminary Report (22 Jun 2021 15:02):    No growth to date.    Culture - Blood (collected 21 Jun 2021 14:11)  Source: .Blood Blood-Peripheral  Preliminary Report (22 Jun 2021 15:02):    No growth to date.    MRSA/MSSA PCR (06.22.21 @ 00:13)    MRSA PCR Result.: NotDete    Staph Aureus PCR Result: NotDete      RADIOLOGY & ADDITIONAL STUDIES:  EXAM:  CT CHEST                                  PROCEDURE DATE:  06/21/2021          INTERPRETATION:  CLINICAL INFORMATION: SOB, fever. Hx of sarcoid    COMPARISON: Comparison to numerous prior examinations most recent on 5/9/2021    CONTRAST/COMPLICATIONS:  IV Contrast: NONE  0 cc administered   0 cc discarded  Oral Contrast: NONE  Complications: None reported at time of study completion    PROCEDURE:  CT of the Chest was performed.  Sagittal and coronal reformats were performed.    FINDINGS:    LUNGS AND AIRWAYS: There is increased consolidation throughout the right upper and right lower lobe. Consolidation in the left lower lobe is decreased from prior exam. Marked bronchiectasis and architectural distortion is again demonstrated. Tracheal diverticulum.  PLEURA: Trace bilateral pleural effusions.  MEDIASTINUM AND JOANNE: Enlarged lymph nodes throughout the middle mediastinum are unchanged.  VESSELS: Within normal limits.  HEART: Heart size is normal. No pericardial effusion.  CHEST WALL AND LOWER NECK: Within normal limits.  VISUALIZED UPPER ABDOMEN: Status post cholecystectomy.  BONES: Degenerative changes.    IMPRESSION:    Increased consolidation throughout the right upper and lower lobes, correlate for pneumonia. Decreased consolidation in the left lower lobe.      Assessment :  65M with seizures on keppra, BPH, hypothyroidism, sarcoidosis GERD, COPD, LAMBERT, recent admission to [facility] from 5/3-5/10/2021 for right subarachnoid hemorrhage s/p fall admitted from [facility] rehab with acute hypoxic respiratory failure sec severe right lung pneumonia likely aspiration pneumonia.    Sp fever   Off HFNC on 3L NC  Leukocytosis multifactorial downtrending  Failed Swallow study  Clinically stable      Plan :   Cont Meropenam day 9/10  Trend temps and cbc  Steroids and nebs per pulm  Pulm toileting  Will need PEG  Asp precautions      Continue with present regiment.  Appropriate use of antibiotics and adverse effects reviewed.      > 35 minutes were spent in direct patient care reviewing notes, medications ,labs data/ imaging , discussion with multidisciplinary team.    Thank you for allowing me to participate in care of your patient .    Tegan Granados MD  Infectious Disease  [phone]

## 2021-06-30 NOTE — PROGRESS NOTE ADULT - SUBJECTIVE AND OBJECTIVE BOX
Date/Time Patient Seen:  		  Referring MD:   Data Reviewed	       Patient is a 65y old  Male who presents with a chief complaint of respiratory failure, lethargy (29 Jun 2021 19:10)      Subjective/HPI     PAST MEDICAL & SURGICAL HISTORY:  No pertinent past medical history    DM (diabetes mellitus)    Sarcoid    Bronchiectasis    Diverticulitis    Subdural hematoma    Inguinal hernia    Cellulitis    Seizure    Hypothyroid    Sleep apnea    LAMBERT and COPD overlap syndrome    BPH without urinary obstruction    No significant past surgical history    Status post Bright&#x27;s procedure    Bilateral subdural hematomas    Status post cholecystectomy    Status post inguinal hernia repair          Medication list         MEDICATIONS  (STANDING):  albuterol/ipratropium for Nebulization 3 milliLiter(s) Nebulizer every 6 hours  diVALproex Sprinkle 750 milliGRAM(s) Oral every 12 hours  enoxaparin Injectable 40 milliGRAM(s) SubCutaneous daily  finasteride 5 milliGRAM(s) Oral daily  levothyroxine 25 MICROGram(s) Oral daily  meropenem  IVPB 1000 milliGRAM(s) IV Intermittent every 8 hours  venlafaxine 37.5 milliGRAM(s) Oral every 12 hours    MEDICATIONS  (PRN):  polyethylene glycol 3350 17 Gram(s) Oral daily PRN Constipation         Vitals log        ICU Vital Signs Last 24 Hrs  T(C): 36.8 (30 Jun 2021 00:00), Max: 36.9 (29 Jun 2021 08:00)  T(F): 98.2 (30 Jun 2021 00:00), Max: 98.5 (29 Jun 2021 08:00)  HR: 67 (30 Jun 2021 06:00) (62 - 78)  BP: 109/78 (30 Jun 2021 06:00) (90/55 - 109/78)  BP(mean): 88 (30 Jun 2021 06:00) (67 - 88)  ABP: --  ABP(mean): --  RR: 19 (30 Jun 2021 06:00) (17 - 26)  SpO2: 95% (30 Jun 2021 06:00) (93% - 98%)           Input and Output:  I&O's Detail    28 Jun 2021 07:01  -  29 Jun 2021 07:00  --------------------------------------------------------  IN:    Free Water: 300 mL    IV PiggyBack: 200 mL    Lactated Ringers: 1150 mL    Vital1.5: 720 mL  Total IN: 2370 mL    OUT:    Indwelling Catheter - Urethral (mL): 500 mL  Total OUT: 500 mL    Total NET: 1870 mL      29 Jun 2021 07:01  -  30 Jun 2021 06:57  --------------------------------------------------------  IN:    Free Water: 375 mL    IV PiggyBack: 150 mL    Lactated Ringers: 150 mL    Vital1.5: 1200 mL  Total IN: 1875 mL    OUT:    Indwelling Catheter - Urethral (mL): 650 mL  Total OUT: 650 mL    Total NET: 1225 mL          Lab Data                        13.8   10.00 )-----------( 182      ( 30 Jun 2021 06:32 )             44.0     06-29    145  |  106  |  18  ----------------------------<  120<H>  3.8   |  41<H>  |  0.41<L>    Ca    8.9      29 Jun 2021 06:34  Phos  3.0     06-29  Mg     2.1     06-29              Review of Systems	      Objective     Physical Examination    heart s1s2  lung dec BS  abd soft  on o2 support      Pertinent Lab findings & Imaging      Sharon:  NO   Adequate UO     I&O's Detail    28 Jun 2021 07:01  -  29 Jun 2021 07:00  --------------------------------------------------------  IN:    Free Water: 300 mL    IV PiggyBack: 200 mL    Lactated Ringers: 1150 mL    Vital1.5: 720 mL  Total IN: 2370 mL    OUT:    Indwelling Catheter - Urethral (mL): 500 mL  Total OUT: 500 mL    Total NET: 1870 mL      29 Jun 2021 07:01  -  30 Jun 2021 06:57  --------------------------------------------------------  IN:    Free Water: 375 mL    IV PiggyBack: 150 mL    Lactated Ringers: 150 mL    Vital1.5: 1200 mL  Total IN: 1875 mL    OUT:    Indwelling Catheter - Urethral (mL): 650 mL  Total OUT: 650 mL    Total NET: 1225 mL               Discussed with:     Cultures:	        Radiology

## 2021-06-30 NOTE — PROGRESS NOTE ADULT - ASSESSMENT
remains on TF  vs noted  labs reviewed    65M with history of Seizure disorder, Hypothyroidism,  Thrombocytopenia, BPH, recent right SAH from fall May 2021, presents from [facility] for hypoxia and AMS. Found to have severe sepsis, acute hypoxic and hypercapnic respiratory failure    source of infection - etiol - w/u in progress - Dental eval noted - [facility] Attending eval reviewed -   probable ongoing - continued aspiration - with resulting consolidation - asp pna - as seen on CT chest and clinical presentation  at risk for decompensation - resp failure -     s/p broad spectrum ABX - cx noted, biomarkers noted,   assist with all needs and ADL  HOB elev - asp prec  oral hygiene  Prognosis guarded - GOC discussion -     [facility] care and monitoring

## 2021-06-30 NOTE — PROGRESS NOTE ADULT - SUBJECTIVE AND OBJECTIVE BOX
Chief Complaint: Respiratory distress    Interval Events: No events overnight.    Review of Systems:  General: No fevers, chills, weight loss or gain  Skin: No rashes, color changes  Cardiovascular: No chest pain, orthopnea  Respiratory: No shortness of breath, cough  Gastrointestinal: No nausea, abdominal pain  Genitourinary: No incontinence, pain with urination  Musculoskeletal: No pain, swelling, decreased range of motion  Neurological: No headache, weakness  Psychiatric: No depression, anxiety  Endocrine: No weight loss or gain, increased thirst  All other systems are comprehensively negative.    Physical Exam:  Vital Signs Last 24 Hrs  T(C): 36.8 (30 Jun 2021 00:00), Max: 36.8 (30 Jun 2021 00:00)  T(F): 98.2 (30 Jun 2021 00:00), Max: 98.2 (30 Jun 2021 00:00)  HR: 67 (30 Jun 2021 06:00) (62 - 76)  BP: 109/78 (30 Jun 2021 06:00) (90/55 - 109/78)  BP(mean): 88 (30 Jun 2021 06:00) (67 - 88)  RR: 19 (30 Jun 2021 06:00) (17 - 26)  SpO2: 95% (30 Jun 2021 06:00) (93% - 98%)  General: Chronically ill appearing  HEENT: MMM  Neck: No JVD, no carotid bruit  Lungs: CTAB  CV: RRR, nl S1/S2, no M/R/G  Abdomen: S/NT/ND, +BS  Extremities: No LE edema, no cyanosis  Neuro: AAOx3, non-focal  Skin: No rash    Labs:    06-30    141  |  103  |  19  ----------------------------<  115<H>  4.1   |  40<H>  |  0.40<L>    Ca    8.9      30 Jun 2021 06:32  Phos  2.3     06-30  Mg     2.0     06-30    TPro  5.5<L>  /  Alb  1.7<L>  /  TBili  0.4  /  DBili  x   /  AST  14  /  ALT  <10<L>  /  AlkPhos  60  06-30                        13.8   10.00 )-----------( 182      ( 30 Jun 2021 06:32 )             44.0       Telemetry: Sinus rhythm

## 2021-06-30 NOTE — PROGRESS NOTE ADULT - ASSESSMENT
The patient is a 65 year old male with a history of seizure d/o, BPH, hypothyroidism, sarcoidosis, COPD, LAMBERT, recent SAH who is admitted with respiratory failure in the setting of aspiration PNA.    Plan:  - ECG with anterolateral ST depressions  - Troponin peaked at 0.49. Likely elevated in the setting of demand ischemia from underlying infection, respiratory failure, tachycardia. No need to trend further.  - Echo with normal LV systolic function, no significant valve issues  - Given recent SAH, would hold off with antiplatelets or anticoagulants  - Completed meropenem  - NGT in place

## 2021-06-30 NOTE — PROGRESS NOTE ADULT - SUBJECTIVE AND OBJECTIVE BOX
JULY FORTE  MRN-[id number]  Patient is a 65y old  Male who presents with a chief complaint of respiratory failure, lethargy (30 Jun 2021 12:24)    HPI:  ***Patient is currently lethargic and unable to provide any history.  Collateral information obtained from chart.**      65M with seizures on keppra, BPH, hypothyroidism, sarcoidosis GERD, COPD, LAMBERT, recent admission to [facility] from 5/3-5/10/2021 for right subarachnoid hemorrhage s/p fall who presents from [facility] for hypoxia.  Per notes, patient was found to have a RLL PNA at [facility] and was started on IV meropenem yesterday.  Then today, he was found to have O2 sats of 90% while on NRB and therefore transferred here for further evaluation.     I/n the ED, patient's triage vitals were /83, , RR 32, 90%, T 100.3F.  Patient was noted to nearly obtunded with tachypnea and patient was started on BiPAP on 100%.   Labs showed leukocytosis of 25.3 with a left shift and an ABG of 7.28/89/136.  CT eventually showed RUL and RLL consolidation, likely PNA and patient was started on meropenem.  ICU was consulted and patient was accepted to [facility] for acute hypoxic respiratory failure 2/2 sepsis 2/2 possible aspiration PNA.          (21 Jun 2021 04:48)      Surgery/Hospital course:    Today:    REVIEW OF SYSTEMS:    Gen: No fever  EYES/ENT: No visual changes;  No vertigo or throat pain   NECK: No pain   RES:  No shortness of breath or Cough  CARD: No chest pain   GI: No abdominal pain  : No dysuria  NEURO: No weakness  SKIN: No itching, rashes     Physical Exam:  Vital Signs Last 24 Hrs  T(C): 36.8 (30 Jun 2021 16:54), Max: 36.8 (30 Jun 2021 00:00)  T(F): 98.2 (30 Jun 2021 16:54), Max: 98.2 (30 Jun 2021 00:00)  HR: 68 (30 Jun 2021 20:03) (62 - 76)  BP: 94/65 (30 Jun 2021 20:00) (94/65 - 109/78)  BP(mean): 74 (30 Jun 2021 20:00) (74 - 88)  RR: 16 (30 Jun 2021 20:00) (16 - 21)  SpO2: 95% (30 Jun 2021 20:03) (90% - 98%)    Gen:  Awake, alert   CNS: non focal 	  Neck: no JVD  RES : clear , no wheezing                      CVS: Regular  rhythm. Normal S1/S2  Abd: Soft, non-distended. Bowel sounds present.  Skin: No rash.  Ext:  no edema    ============================I/O===========================   I&O's Detail    29 Jun 2021 07:01  -  30 Jun 2021 07:00  --------------------------------------------------------  IN:    Free Water: 375 mL    IV PiggyBack: 150 mL    Lactated Ringers: 150 mL    Vital1.5: 1200 mL  Total IN: 1875 mL    OUT:    Indwelling Catheter - Urethral (mL): 650 mL  Total OUT: 650 mL    Total NET: 1225 mL      30 Jun 2021 07:01  -  30 Jun 2021 21:58  --------------------------------------------------------  IN:    Free Water: 225 mL    IV PiggyBack: 50 mL    Vital1.5: 711 mL  Total IN: 986 mL    OUT:    Indwelling Catheter - Urethral (mL): 700 mL  Total OUT: 700 mL    Total NET: 286 mL        ============================ LABS =========================                        13.8   10.00 )-----------( 182      ( 30 Jun 2021 06:32 )             44.0     06-30    141  |  103  |  19  ----------------------------<  115<H>  4.1   |  40<H>  |  0.40<L>    Ca    8.9      30 Jun 2021 06:32  Phos  2.3     06-30  Mg     2.0     06-30    TPro  5.5<L>  /  Alb  1.7<L>  /  TBili  0.4  /  DBili  x   /  AST  14  /  ALT  <10<L>  /  AlkPhos  60  06-30    LIVER FUNCTIONS - ( 30 Jun 2021 06:32 )  Alb: 1.7 g/dL / Pro: 5.5 g/dL / ALK PHOS: 60 U/L / ALT: <10 U/L DA / AST: 14 U/L / GGT: x                 ======================Micro/Rad/Cardio=================  Culture: Reviewed   CXR: Reviewed  Echo:Reviewed  ======================================================  PAST MEDICAL & SURGICAL HISTORY:  DM (diabetes mellitus)    Sarcoid    Bronchiectasis    Subdural hematoma    Inguinal hernia    Seizure    Hypothyroid    Sleep apnea    LAMBERT and COPD overlap syndrome    BPH without urinary obstruction    Status post Bright&#x27;s procedure    Bilateral subdural hematomas    Status post cholecystectomy    Status post inguinal hernia repair      ====================ASSESSMENT ==============  CNS:  RES:  CVS:  Hem:  Kin:  GI:  Endo:  ID:  Skin  Plan:  ====================== NEUROLOGY=====================  diVALproex Sprinkle 750 milliGRAM(s) Oral every 12 hours  ondansetron Injectable 4 milliGRAM(s) IV Push every 4 hours PRN Nausea and/or Vomiting  venlafaxine 37.5 milliGRAM(s) Oral every 12 hours    ==================== RESPIRATORY======================  Mechanical Ventilation:    albuterol/ipratropium for Nebulization 3 milliLiter(s) Nebulizer every 6 hours    ====================CARDIOVASCULAR==================    ===================HEMATOLOGIC/ONC ===================  enoxaparin Injectable 40 milliGRAM(s) SubCutaneous daily    ===================== RENAL =========================  Continue monitoring urine output    ==================== GASTROINTESTINAL===================  polyethylene glycol 3350 17 Gram(s) Oral daily PRN Constipation    =======================    ENDOCRINE  =====================  finasteride 5 milliGRAM(s) Oral daily  levothyroxine 25 MICROGram(s) Oral daily    ========================INFECTIOUS DISEASE================  meropenem  IVPB 1000 milliGRAM(s) IV Intermittent every 8 hours      Patient requires continuous monitoring with bedside rhythm monitoring, pulse oximetry, ventilator/ bipap  monitoring and intermittent blood gas analysis.    Care plan discussed with ICU care team.    Patient remained critical; required more than usual care; I have spent 35 minutes providing non-routine care, revaluated multiple times during the day.     JULY FORTE  MRN-[id number]  Patient is a 65y old  Male who presents with a chief complaint of respiratory failure, lethargy (30 Jun 2021 12:24)    HPI:  65M with seizures on keppra, BPH, hypothyroidism, sarcoidosis GERD, COPD, LAMBERT, recent admission to [facility] from 5/3-5/10/2021 for right subarachnoid hemorrhage s/p fall who presents from [facility] for hypoxia.  Per notes, patient was found to have a RLL PNA at [facility] and was started on IV meropenem yesterday.  Then today, he was found to have O2 sats of 90% while on NRB and therefore transferred here for further evaluation.     I/n the ED, patient's triage vitals were /83, , RR 32, 90%, T 100.3F.  Patient was noted to nearly obtunded with tachypnea and patient was started on BiPAP on 100%.   Labs showed leukocytosis of 25.3 with a left shift and an ABG of 7.28/89/136.  CT eventually showed RUL and RLL consolidation, likely PNA and patient was started on meropenem.  ICU was consulted and patient was accepted to [facility] for acute hypoxic respiratory failure 2/2 sepsis 2/2 possible aspiration PNA.          (21 Jun 2021 04:48)      ON events:   Sleeping in bed, NAD, no acute complaints, on 4L NC saturating 92-97%. Weaned to 4L. Afebrile.         REVIEW OF SYSTEMS:    Gen: No fever  EYES/ENT: No visual changes;  No vertigo or throat pain   NECK: No pain   RES:  No shortness of breath or Cough  CARD: No chest pain   GI: No abdominal pain  : No dysuria  NEURO: No weakness  SKIN: No itching, rashes     Physical Exam:  Vital Signs Last 24 Hrs  T(C): 36.8 (30 Jun 2021 16:54), Max: 36.8 (30 Jun 2021 00:00)  T(F): 98.2 (30 Jun 2021 16:54), Max: 98.2 (30 Jun 2021 00:00)  HR: 68 (30 Jun 2021 20:03) (62 - 76)  BP: 94/65 (30 Jun 2021 20:00) (94/65 - 109/78)  BP(mean): 74 (30 Jun 2021 20:00) (74 - 88)  RR: 16 (30 Jun 2021 20:00) (16 - 21)  SpO2: 95% (30 Jun 2021 20:03) (90% - 98%)    Gen:  Awake, alert oriented   CNS: non focal 	  Neck: no JVD  RES : on NC, with audible secretions, and rhonchi                    CVS: Regular  rhythm. Normal S1/S2  Abd: Soft, non-distended. Bowel sounds present.  Skin: No rash.  Ext:  +dependant  edema    ============================I/O===========================   I&O's Detail    29 Jun 2021 07:01  -  30 Jun 2021 07:00  --------------------------------------------------------  IN:    Free Water: 375 mL    IV PiggyBack: 150 mL    Lactated Ringers: 150 mL    Vital1.5: 1200 mL  Total IN: 1875 mL    OUT:    Indwelling Catheter - Urethral (mL): 650 mL  Total OUT: 650 mL    Total NET: 1225 mL      30 Jun 2021 07:01  -  30 Jun 2021 21:58  --------------------------------------------------------  IN:    Free Water: 225 mL    IV PiggyBack: 50 mL    Vital1.5: 711 mL  Total IN: 986 mL    OUT:    Indwelling Catheter - Urethral (mL): 700 mL  Total OUT: 700 mL    Total NET: 286 mL        ============================ LABS =========================                        13.8   10.00 )-----------( 182      ( 30 Jun 2021 06:32 )             44.0     06-30    141  |  103  |  19  ----------------------------<  115<H>  4.1   |  40<H>  |  0.40<L>    Ca    8.9      30 Jun 2021 06:32  Phos  2.3     06-30  Mg     2.0     06-30    TPro  5.5<L>  /  Alb  1.7<L>  /  TBili  0.4  /  DBili  x   /  AST  14  /  ALT  <10<L>  /  AlkPhos  60  06-30    LIVER FUNCTIONS - ( 30 Jun 2021 06:32 )  Alb: 1.7 g/dL / Pro: 5.5 g/dL / ALK PHOS: 60 U/L / ALT: <10 U/L DA / AST: 14 U/L / GGT: x                 ======================Micro/Rad/Cardio=================  Culture: Reviewed   CXR: Reviewed  Echo:Reviewed  ======================================================  PAST MEDICAL & SURGICAL HISTORY:  DM (diabetes mellitus)    Sarcoid    Bronchiectasis    Subdural hematoma    Inguinal hernia    Seizure    Hypothyroid    Sleep apnea    LAMBERT and COPD overlap syndrome    BPH without urinary obstruction    Status post Bright&#x27;s procedure    Bilateral subdural hematomas    Status post cholecystectomy    Status post inguinal hernia repair      ====================ASSESSMENT ==============  Pt is a 66 y/o male with PMHx as above here with acute hypoxic-hypercarbic respiratory failure secondary to aspiration pneumonia, severe sepsis.   1. aspiration PNA   2. respiratory failure, acute hypoxic       Plan:  - On 5L NC, saturating mid 90s, goal >88%, weaned to 4L NC now  - nebs as needed   - Cont Chest PT  - Pulm following  - NPO, multiple failed speech and swallow assessments  - getting tube feeds  - Miralax for bowel regimen  - Renal indices stable, trend, I/Os, replete lytes  - Haq remains, flomax started last night but unable to give via NGT, switched to proscar, plan to DC haq  - On empiric everardo, ID following, trend WBC/Temp  -Cultures NTD  - Lovenox for dvt ppx  - Cont synthroid    Dispo: Full code.    Patient requires continuous monitoring with bedside rhythm monitoring, pulse oximetry, ventilator/ bipap  monitoring and intermittent blood gas analysis.    Care plan discussed with ICU care team.    Patient remained critical; required more than usual care; I have spent 35 minutes providing non-routine care, revaluated multiple times during the day.

## 2021-07-01 NOTE — OCCUPATIONAL THERAPY INITIAL EVALUATION ADULT - ADDITIONAL COMMENTS
prior to admission to [facility] Pt lives alone in an apartment no steps sister would perform /cooking for pt  Independent w/ ADL's +drives

## 2021-07-01 NOTE — PROGRESS NOTE ADULT - ASSESSMENT
The patient is a 65 year old male with a history of seizure d/o, BPH, hypothyroidism, sarcoidosis, COPD, LAMBERT, recent SAH who is admitted with respiratory failure in the setting of aspiration PNA.    Plan:  - ECG with anterolateral ST depressions  - Troponin peaked at 0.49. Likely elevated in the setting of demand ischemia from underlying infection, respiratory failure, tachycardia. No need to trend further.  - Echo with normal LV systolic function, no significant valve issues  - Given recent SAH, would hold off with antiplatelets or anticoagulants  - Completed meropenem  - NGT in place  - No cardiac contraindication to PEG if that is the plan

## 2021-07-01 NOTE — OCCUPATIONAL THERAPY INITIAL EVALUATION ADULT - LEVEL OF INDEPENDENCE: SIT/SUPINE, REHAB EVAL
POOR sitting balance demonstrated while on eob, sitting tolerance ~ 3 minutes/dependent (less than 25% patients effort)

## 2021-07-01 NOTE — OCCUPATIONAL THERAPY INITIAL EVALUATION ADULT - LEVEL OF INDEPENDENCE: SIT/STAND, REHAB EVAL
Poor standing balance demonstrated fucntional standing tolerance ~ 10-15 seconds/dependent (less than 25% patients effort)

## 2021-07-01 NOTE — SWALLOW BEDSIDE ASSESSMENT ADULT - SPECIFY REASON(S)
To assess swallow function
To assess swallow mechanism
to reassess swallow function. pt is familiar to this service form an initial assessment of swallow function completed 6/25/2021 (please see report for details)

## 2021-07-01 NOTE — PROGRESS NOTE ADULT - ASSESSMENT
remains on TF  vs noted  labs reviewed    65M with history of Seizure disorder, Hypothyroidism,  Thrombocytopenia, BPH, recent right SAH from fall May 2021, presents from [facility] for hypoxia and AMS. Found to have severe sepsis, acute hypoxic and hypercapnic respiratory failure    source of infection - etiol - unclear - poss asp pna - s/p ABX  probable ongoing - continued aspiration - with resulting consolidation - asp pna - as seen on CT chest and clinical presentation  at risk for decompensation - resp failure -     s/p broad spectrum ABX - cx noted, biomarkers noted,   assist with all needs and ADL  HOB elev - asp prec  oral hygiene  Prognosis guarded - GOC discussion -

## 2021-07-01 NOTE — PROGRESS NOTE ADULT - SUBJECTIVE AND OBJECTIVE BOX
JULY FORTE is a 65yMale , patient examined and chart reviewed.     INTERVAL HPI/ OVERNIGHT EVENTS:   Awake. NAD.    PAST MEDICAL & SURGICAL HISTORY:  DM (diabetes mellitus)  Sarcoid  Bronchiectasis  Subdural hematoma  Inguinal hernia  Seizure  Hypothyroid  Sleep apnea  LAMBERT and COPD overlap syndrome  BPH without urinary obstruction  Status post Bright&#x27;s procedure  Bilateral subdural hematomas  Status post cholecystectomy  Status post inguinal hernia repair      For details regarding the patient's social history, family history, and other miscellaneous elements, please refer the initial infectious diseases consultation and/or the admitting history and physical examination for this admission.    ROS:  Systems reviewed and are neg     ALLERGIES  benzodiazepines (Other)  Keppra (Other (Severe))  penicillins (Unknown)      Current inpatient medications :    ANTIBIOTICS/RELEVANT:  meropenem  IVPB 1000 milliGRAM(s) IV Intermittent every 8 hours    MEDICATIONS  (STANDING):  albuterol/ipratropium for Nebulization 3 milliLiter(s) Nebulizer every 6 hours  diVALproex Sprinkle 750 milliGRAM(s) Oral every 12 hours  enoxaparin Injectable 40 milliGRAM(s) SubCutaneous daily  finasteride 5 milliGRAM(s) Oral daily  levothyroxine 25 MICROGram(s) Oral daily  venlafaxine 37.5 milliGRAM(s) Oral every 12 hours    MEDICATIONS  (PRN):  ondansetron Injectable 4 milliGRAM(s) IV Push every 4 hours PRN Nausea and/or Vomiting  polyethylene glycol 3350 17 Gram(s) Oral daily PRN Constipation      Objective:  ICU Vital Signs Last 24 Hrs  T(C): 36.5 (01 Jul 2021 12:38), Max: 36.9 (01 Jul 2021 08:07)  T(F): 97.7 (01 Jul 2021 12:38), Max: 98.5 (01 Jul 2021 08:07)  HR: 72 (01 Jul 2021 14:00) (63 - 73)  BP: 113/80 (01 Jul 2021 14:00) (94/65 - 120/81)  BP(mean): 91 (01 Jul 2021 14:00) (74 - 95)  RR: 18 (01 Jul 2021 14:00) (16 - 26)  SpO2: 93% (01 Jul 2021 14:00) (89% - 100%)      Physical Exam:  General: Weak frail  no acute distress NGT  Neck: supple, trachea midline  Lungs: Decreased no wheeze/rhonchi  Cardiovascular: regular rate and rhythm, S1 S2  Abdomen: soft, nontender,  bowel sounds normal  Neurological: Awake alert  Skin: no rash  Extremities: + edema        LABS:                        13.4   10.74 )-----------( 145      ( 01 Jul 2021 06:16 )             40.5   07-01    136  |  99  |  12  ----------------------------<  128<H>  4.4   |  40<H>  |  0.31<L>    Ca    8.7      01 Jul 2021 06:16  Phos  2.2     07-01  Mg     2.0     07-01    TPro  5.5<L>  /  Alb  1.7<L>  /  TBili  0.4  /  DBili  x   /  AST  14  /  ALT  <10<L>  /  AlkPhos  60  06-30      MICROBIOLOGY:  Culture - Sputum (collected 23 Jun 2021 14:04)  Source: .Sputum Sputum  Gram Stain (23 Jun 2021 20:37):    Few Squamous epithelial cells per low power field    Few polymorphonuclear leukocytes per low power field    Few Yeast per oil power field  Final Report (25 Jun 2021 22:01):    Normal Respiratory Vangie present    Culture - Urine (collected 21 Jun 2021 14:11)  Source: .Urine Clean Catch (Midstream)  Final Report (22 Jun 2021 14:00):    No growth    Culture - Blood (collected 21 Jun 2021 14:11)  Source: .Blood Blood-Peripheral  Preliminary Report (22 Jun 2021 15:02):    No growth to date.    Culture - Blood (collected 21 Jun 2021 14:11)  Source: .Blood Blood-Peripheral  Preliminary Report (22 Jun 2021 15:02):    No growth to date.    MRSA/MSSA PCR (06.22.21 @ 00:13)    MRSA PCR Result.: NotDete    Staph Aureus PCR Result: NotDete      RADIOLOGY & ADDITIONAL STUDIES:  EXAM:  CT CHEST                                  PROCEDURE DATE:  06/21/2021          INTERPRETATION:  CLINICAL INFORMATION: SOB, fever. Hx of sarcoid    COMPARISON: Comparison to numerous prior examinations most recent on 5/9/2021    CONTRAST/COMPLICATIONS:  IV Contrast: NONE  0 cc administered   0 cc discarded  Oral Contrast: NONE  Complications: None reported at time of study completion    PROCEDURE:  CT of the Chest was performed.  Sagittal and coronal reformats were performed.    FINDINGS:    LUNGS AND AIRWAYS: There is increased consolidation throughout the right upper and right lower lobe. Consolidation in the left lower lobe is decreased from prior exam. Marked bronchiectasis and architectural distortion is again demonstrated. Tracheal diverticulum.  PLEURA: Trace bilateral pleural effusions.  MEDIASTINUM AND JOANNE: Enlarged lymph nodes throughout the middle mediastinum are unchanged.  VESSELS: Within normal limits.  HEART: Heart size is normal. No pericardial effusion.  CHEST WALL AND LOWER NECK: Within normal limits.  VISUALIZED UPPER ABDOMEN: Status post cholecystectomy.  BONES: Degenerative changes.    IMPRESSION:    Increased consolidation throughout the right upper and lower lobes, correlate for pneumonia. Decreased consolidation in the left lower lobe.      Assessment :  65M with seizures on keppra, BPH, hypothyroidism, sarcoidosis GERD, COPD, LAMBERT, recent admission to [facility] from 5/3-5/10/2021 for right subarachnoid hemorrhage s/p fall admitted from [facility] rehab with acute hypoxic respiratory failure sec severe right lung pneumonia likely aspiration pneumonia.    Sp fever   Off HFNC on 3L NC  Leukocytosis multifactorial downtrending  Failed Swallow study  Clinically stable      Plan :   Monitor off antibiotics  Completes Meropenam x 10 days course  Trend temps and cbc  Steroids and nebs per pulm  Pulm toileting  Will need PEG- family and pt deciding  Asp precautions      Continue with present regiment.  Appropriate use of antibiotics and adverse effects reviewed.      > 35 minutes were spent in direct patient care reviewing notes, medications ,labs data/ imaging , discussion with multidisciplinary team.    Thank you for allowing me to participate in care of your patient .    Tegan Granados MD  Infectious Disease  [phone]

## 2021-07-01 NOTE — PROVIDER CONTACT NOTE (OTHER) - ASSESSMENT
pt voided, incontinent large amount urine overnight, [place] catheter now in place
NAD, pt appeared to urinate around haq in place, no kinks or blockages in tubing noted, abd soft, no s/s retention noted

## 2021-07-01 NOTE — SWALLOW BEDSIDE ASSESSMENT ADULT - ADDITIONAL RECOMMENDATIONS
Take 600mg ibuprofen and 100mg tylenol 4 times per day  You may alternated between these medications every 3 hours  Please follow-up with Sports Medicine regarding your concussion 
This service to follow-up, as schedule permits.   Perform strict oral care to prevent colonization of bacteria.
Pt provided with swallow strengthening exercises at bedside and was advised to incorporate daily. Pt with good understanding.  Discussed importance of daily oral care with RN. RN with good understanding.

## 2021-07-01 NOTE — PROGRESS NOTE ADULT - SUBJECTIVE AND OBJECTIVE BOX
ICU Progress Note    HPI:    S:    Pt seen and examined  HD #  Pt here for      Allergies    Keppra (Other (Severe))  penicillins (Unknown)    Intolerances    benzodiazepines (Other)      MEDICATIONS  (STANDING):  albuterol/ipratropium for Nebulization 3 milliLiter(s) Nebulizer every 6 hours  diVALproex Sprinkle 750 milliGRAM(s) Oral every 12 hours  enoxaparin Injectable 40 milliGRAM(s) SubCutaneous daily  finasteride 5 milliGRAM(s) Oral daily  levothyroxine 25 MICROGram(s) Oral daily  venlafaxine 37.5 milliGRAM(s) Oral every 12 hours    MEDICATIONS  (PRN):  ondansetron Injectable 4 milliGRAM(s) IV Push every 4 hours PRN Nausea and/or Vomiting  polyethylene glycol 3350 17 Gram(s) Oral daily PRN Constipation      Drug Dosing Weight  Height (cm): 188 (21 Jun 2021 01:58)  Weight (kg): 82.6 (21 Jun 2021 01:58)  BMI (kg/m2): 23.4 (21 Jun 2021 01:58)  BSA (m2): 2.09 (21 Jun 2021 01:58)    PAST MEDICAL & SURGICAL HISTORY:  DM (diabetes mellitus)    Sarcoid    Bronchiectasis    Subdural hematoma    Inguinal hernia    Seizure    Hypothyroid    Sleep apnea    LAMBERT and COPD overlap syndrome    BPH without urinary obstruction    Status post Bright&#x27;s procedure    Bilateral subdural hematomas    Status post cholecystectomy    Status post inguinal hernia repair        FAMILY HISTORY:  Family history of diabetes mellitus    Family history of pulmonary embolism (Grandparent)        ROS: See HPI; otherwise, all systems reviewed and negative.    O:    ICU Vital Signs Last 24 Hrs  T(C): 36.7 (02 Jul 2021 00:00), Max: 36.9 (01 Jul 2021 08:07)  T(F): 98.1 (02 Jul 2021 00:00), Max: 98.5 (01 Jul 2021 08:07)  HR: 77 (02 Jul 2021 00:00) (63 - 77)  BP: 116/83 (02 Jul 2021 00:00) (104/74 - 120/81)  BP(mean): 95 (02 Jul 2021 00:00) (78 - 95)  ABP: --  ABP(mean): --  RR: 20 (02 Jul 2021 00:00) (16 - 26)  SpO2: 95% (02 Jul 2021 00:00) (89% - 100%)          I&O's Detail    30 Jun 2021 07:01  -  01 Jul 2021 07:00  --------------------------------------------------------  IN:    Free Water: 450 mL    IV PiggyBack: 100 mL    Vital1.5: 1422 mL  Total IN: 1972 mL    OUT:    Indwelling Catheter - Urethral (mL): 825 mL  Total OUT: 825 mL    Total NET: 1147 mL      01 Jul 2021 07:01  -  02 Jul 2021 00:42  --------------------------------------------------------  IN:    Free Water: 425 mL    Vital1.5: 946 mL  Total IN: 1371 mL    OUT:    Voided (mL): 2000 mL  Total OUT: 2000 mL    Total NET: -629 mL              PE:    Constitutional: Healthy M lying in bed in NAD.   Neck: No JVD, trachea midline.   Respiratory: CTA B/L good BS B/L no W/R/R.  Cardiovascular: S1S2+ RRR no M/R/G.  Gastrointestinal: Soft, NTND.  Extremities: No peripheral edema, No cyanosis, clubbing.  Neurological: Awake, conversive, alert, no gross deficits.  Skin: No rashes, warm, moist.    LABS:    CBC Full  -  ( 01 Jul 2021 06:16 )  WBC Count : 10.74 K/uL  RBC Count : 4.28 M/uL  Hemoglobin : 13.4 g/dL  Hematocrit : 40.5 %  Platelet Count - Automated : 145 K/uL  Mean Cell Volume : 94.6 fl  Mean Cell Hemoglobin : 31.3 pg  Mean Cell Hemoglobin Concentration : 33.1 gm/dL  Auto Neutrophil # : x  Auto Lymphocyte # : x  Auto Monocyte # : x  Auto Eosinophil # : x  Auto Basophil # : x  Auto Neutrophil % : x  Auto Lymphocyte % : x  Auto Monocyte % : x  Auto Eosinophil % : x  Auto Basophil % : x    07-01    136  |  99  |  12  ----------------------------<  128<H>  4.4   |  40<H>  |  0.31<L>    Ca    8.7      01 Jul 2021 06:16  Phos  2.2     07-01  Mg     2.0     07-01    TPro  5.5<L>  /  Alb  1.7<L>  /  TBili  0.4  /  DBili  x   /  AST  14  /  ALT  <10<L>  /  AlkPhos  60  06-30        CAPILLARY BLOOD GLUCOSE            LIVER FUNCTIONS - ( 30 Jun 2021 06:32 )  Alb: 1.7 g/dL / Pro: 5.5 g/dL / ALK PHOS: 60 U/L / ALT: <10 U/L DA / AST: 14 U/L / GGT: x               A:    65yMale  HD #    Here for:    1.    P:    Neuro: GCS 15. Monitor for delirium.  Continue to optimize pain control. Serial Neurologic assessments.    HEENT: No issues.    CV: Continue hemodynamic monitoring    Pulm: Pulmonary toilet.  Continue incentive spirometer.  Chest PT.  Encourage OOB to chair and ambulation. Nebs. f/u ABG, CXR.    GI/Nutrition: Cont diet, bowel regimen.    /Renal: Monitor UOP. Monitor BMP.  Replete Lytes as needed.    HEME- Chemical and mechanical DVT ppx. f/u CBC. f/u coags as needed.    ID:  Cont abx. f/u Cx's.    Lines/Tubes:     Endo: Maintain euglycemia.    Skin:  Cont skin care, pressure ulcer prevention.    Dispo: Cont care.

## 2021-07-01 NOTE — PROGRESS NOTE ADULT - SUBJECTIVE AND OBJECTIVE BOX
Chief Complaint: Respiratory distress    Interval Events: No events overnight.    Review of Systems:  General: No fevers, chills, weight loss or gain  Skin: No rashes, color changes  Cardiovascular: No chest pain, orthopnea  Respiratory: No shortness of breath, cough  Gastrointestinal: No nausea, abdominal pain  Genitourinary: No incontinence, pain with urination  Musculoskeletal: No pain, swelling, decreased range of motion  Neurological: No headache, weakness  Psychiatric: No depression, anxiety  Endocrine: No weight loss or gain, increased thirst  All other systems are comprehensively negative.    Physical Exam:  Vital Signs Last 24 Hrs  T(C): 36.9 (01 Jul 2021 08:07), Max: 36.9 (01 Jul 2021 08:07)  T(F): 98.5 (01 Jul 2021 08:07), Max: 98.5 (01 Jul 2021 08:07)  HR: 65 (01 Jul 2021 08:00) (63 - 73)  BP: 107/70 (01 Jul 2021 08:00) (94/65 - 119/73)  BP(mean): 83 (01 Jul 2021 08:00) (74 - 88)  RR: 17 (01 Jul 2021 08:00) (16 - 26)  SpO2: 96% (01 Jul 2021 08:00) (89% - 100%)  General: Chronically ill appearing  HEENT: MMM  Neck: No JVD, no carotid bruit  Lungs: CTAB  CV: RRR, nl S1/S2, no M/R/G  Abdomen: S/NT/ND, +BS  Extremities: No LE edema, no cyanosis  Neuro: AAOx3, non-focal  Skin: No rash    Labs:    07-01    136  |  99  |  12  ----------------------------<  128<H>  4.4   |  40<H>  |  0.31<L>    Ca    8.7      01 Jul 2021 06:16  Phos  2.2     07-01  Mg     2.0     07-01    TPro  5.5<L>  /  Alb  1.7<L>  /  TBili  0.4  /  DBili  x   /  AST  14  /  ALT  <10<L>  /  AlkPhos  60  06-30                        13.4   10.74 )-----------( 145      ( 01 Jul 2021 06:16 )             40.5       Telemetry: Sinus rhythm

## 2021-07-01 NOTE — OCCUPATIONAL THERAPY INITIAL EVALUATION ADULT - PERTINENT HX OF CURRENT PROBLEM, REHAB EVAL
65M with seizures on keppra, BPH, hypothyroidism, sarcoidosis GERD, COPD, LAMBERT, recent admission to [facility] from 5/3-5/10/2021 for right subarachnoid hemorrhage s/p fall who presents from [facility] for hypoxia.  Per notes, patient was found to have a RLL PNA at [facility] and was started on IV meropenem yesterday.  Pt w/ Acute hypoxic and hypercapnic respiratory failure. Sepsis from aspiration PNA with metabolic encephalopathy CT chest: Small bilateral pleural effusions are noted, right greater than left,

## 2021-07-01 NOTE — PROGRESS NOTE ADULT - ASSESSMENT
Patient is a 64yo male presenting with a DTPI sacral region:  periwound intact:     recommendation: Please cleanse with NS< Pat dry, paint kathi-wound with Cavilon, cover with SIN BID and PRN, it is ok to leave a thin layer of cream after cleansing this will not impede wound healing.     2. Left dorsal hand skin tear stage 2 ---dry ecchymotic,    recommendation: cleanse with NS, pat dry, cover with adaptic, 4x4, secure with rufina QOD      Continue Nutrition (as tolerated)  Continue Offloading   Continue Pericare  Care as per medicine will follow w/ you  Discussed findings and recommendations with Medical team member  Thank you for this consult  Mary Jo Alonzo NP, [facility] [phone]

## 2021-07-01 NOTE — SWALLOW BEDSIDE ASSESSMENT ADULT - SWALLOW EVAL: CURRENT DIET
NPO
NPO pending formal swallow evaluation
NPO with NGT, pending speech and swallow evaluation per MD order

## 2021-07-01 NOTE — SWALLOW BEDSIDE ASSESSMENT ADULT - PHARYNGEAL PHASE
increased work of breathing and SaO2 desaturation noted./Delayed pharyngeal swallow/Decreased laryngeal elevation/Wet vocal quality post oral intake/Cough post oral intake/Throat clear post oral intake/Delayed cough post oral intake/Delayed throat clear post oral intake/Multiple swallows
increased WOB/Delayed pharyngeal swallow/Multiple swallows

## 2021-07-01 NOTE — SWALLOW BEDSIDE ASSESSMENT ADULT - COMMENTS
Consult received and chart reviewed. Attempted to see the patient this AM for an initial assessment of swallow function, however, patient currently on BiPAP and unable to be weened to nasal cannula for the purposes of a swallow evaluation per RN report.    Per charting, the patient is a "65M with seizures on keppra, BPH, hypothyroidism, sarcoidosis GERD, COPD, LAMBERT, recent admission to [facility] from 5/3-5/10/2021 for right subarachnoid hemorrhage s/p fall who presents from [facility] for hypoxia.  Found to have acute hypoxic and hypercapnic respiratory failure 2/2 sepsis 2/2 aspiration PNA.  Likely aspiration PNA since patient has been recommended dysphagia I diet from [facility] but reports state that he has been non-compliant with recommendation.  Also location of RLL and RUL also fits with aspiration."    The patient was seen for a Modified Barium Swallow Study on 5/8/21 (please see full report for details), at which time a dysphagia 1 with nectar thick liquid diet with utilization of a chin tuck and small sips via straw was recommended. Per further chart review, the patient was noncompliant with the diet recommendation and requested to be placed on a regular diet with an understanding of the associated risks of aspiration.     WBC is elevated. CT of the chest revealed, "Increased consolidation throughout the right upper and lower lobes, correlate for pneumonia. Decreased consolidation in the left lower lobe." CT of the head revealed, "No acute intracranial disease."    Re-consult this department when the patient is successfully tolerating nasal cannula for at least 1-2 hours. Consider short-term non-oral means of nutrition/hydration and consider a nutritional consult given the patient is at an increased nutritional and aspiration risk at this time. Discussed with RN on unit and call out to MD.
Pt was awake and cooperative for a clinical reassessment of swallow function this AM. Pt presents with supplemental oxygen in place via nasal cannula and NGT in place. Per charting, pt is a "65M with history of Seizure disorder, Hypothyroidism,  Thrombocytopenia, BPH, recent right SAH from fall May 2021, presents from [facility] for hypoxia and AMS. Found to have severe sepsis, acute hypoxic and hypercapnic respiratory failure source of infection - etiol - unclear - poss asp pna - s/p ABX probable ongoing - continued aspiration - with resulting consolidation - asp pna - as seen on CT chest and clinical presentation  at risk for decompensation - resp failure - " Recent CXR revealed "Frontal expiratory view of the chest shows the heart to be similar in size. Feeding tube reaches the upper stomach. The lungs show similar infiltrates, compatible with fibrotic changes, with small pleural effusions, right larger than left and there is no evidence of pneumothorax."    Of note - The patient was seen for a Modified Barium Swallow Study on 5/8/21 (please see full report for details), at which time a dysphagia 1 with nectar thick liquid diet with utilization of a chin tuck and small sips via straw was recommended. Per further chart review, the patient was noncompliant with the diet recommendation and requested to be placed on a regular diet with an understanding of the associated risks of aspiration.     Baseline SaO2 noted at 97% prior to provision of PO trials. Thick, dried, copious secretions in pt's oral cavity noted. Oral care provided via swab and cold water by this SLP with removal of secretions noted.
Per charting, the patient is a "65M with seizures on keppra, BPH, hypothyroidism, sarcoidosis GERD, COPD, LAMBERT, recent admission to [facility] from 5/3-5/10/2021 for right subarachnoid hemorrhage s/p fall who presents from [facility] for hypoxia.  Found to have acute hypoxic and hypercapnic respiratory failure 2/2 sepsis 2/2 aspiration PNA.  Likely aspiration PNA since patient has been recommended dysphagia I diet from [facility] but reports state that he has been non-compliant with recommendation.  Also location of RLL and RUL also fits with aspiration."    The patient was seen for a Modified Barium Swallow Study on 5/8/21 (please see full report for details), at which time a dysphagia 1 with nectar thick liquid diet with utilization of a chin tuck and small sips via straw was recommended. Per further chart review, the patient was noncompliant with the diet recommendation and requested to be placed on a regular diet with an understanding of the associated risks of aspiration.    CT CHEST 6/21: "Increased consolidation throughout the right upper and lower lobes, correlate for pneumonia. Decreased consolidation in the left lower lobe."    Consult received and chart reviewed. Patient seen at bedside this morning for an assessment of swallow function, at which time he was awake/alert and oriented. The patient was able to follow directives, answer yes/no questions and communicate basic wants/needs. Patient receiving supplemental O2 via HFNC with SpO2 trending in the 90's. Chin tuck compensatory strategy utilized for evaluation as per chart review.

## 2021-07-01 NOTE — PROGRESS NOTE ADULT - SUBJECTIVE AND OBJECTIVE BOX
Date/Time Patient Seen:  		  Referring MD:   Data Reviewed	       Patient is a 65y old  Male who presents with a chief complaint of respiratory failure, lethargy (30 Jun 2021 21:58)      Subjective/HPI     PAST MEDICAL & SURGICAL HISTORY:  No pertinent past medical history    DM (diabetes mellitus)    Sarcoid    Bronchiectasis    Diverticulitis    Subdural hematoma    Inguinal hernia    Cellulitis    Seizure    Hypothyroid    Sleep apnea    LAMBERT and COPD overlap syndrome    BPH without urinary obstruction    No significant past surgical history    Status post Bright&#x27;s procedure    Bilateral subdural hematomas    Status post cholecystectomy    Status post inguinal hernia repair          Medication list         MEDICATIONS  (STANDING):  albuterol/ipratropium for Nebulization 3 milliLiter(s) Nebulizer every 6 hours  diVALproex Sprinkle 750 milliGRAM(s) Oral every 12 hours  enoxaparin Injectable 40 milliGRAM(s) SubCutaneous daily  finasteride 5 milliGRAM(s) Oral daily  levothyroxine 25 MICROGram(s) Oral daily  venlafaxine 37.5 milliGRAM(s) Oral every 12 hours    MEDICATIONS  (PRN):  ondansetron Injectable 4 milliGRAM(s) IV Push every 4 hours PRN Nausea and/or Vomiting  polyethylene glycol 3350 17 Gram(s) Oral daily PRN Constipation         Vitals log        ICU Vital Signs Last 24 Hrs  T(C): 36.7 (01 Jul 2021 04:00), Max: 36.8 (30 Jun 2021 16:54)  T(F): 98.1 (01 Jul 2021 04:00), Max: 98.3 (01 Jul 2021 00:00)  HR: 66 (01 Jul 2021 06:49) (62 - 76)  BP: 111/64 (01 Jul 2021 06:10) (94/65 - 119/73)  BP(mean): 78 (01 Jul 2021 06:10) (74 - 88)  ABP: --  ABP(mean): --  RR: 26 (01 Jul 2021 06:10) (16 - 26)  SpO2: 100% (01 Jul 2021 06:49) (89% - 100%)           Input and Output:  I&O's Detail    30 Jun 2021 07:01  -  01 Jul 2021 07:00  --------------------------------------------------------  IN:    Free Water: 450 mL    IV PiggyBack: 100 mL    Vital1.5: 1422 mL  Total IN: 1972 mL    OUT:    Indwelling Catheter - Urethral (mL): 825 mL  Total OUT: 825 mL    Total NET: 1147 mL          Lab Data                        13.4   10.74 )-----------( 145      ( 01 Jul 2021 06:16 )             40.5     07-01    136  |  99  |  12  ----------------------------<  128<H>  4.4   |  40<H>  |  0.31<L>    Ca    8.7      01 Jul 2021 06:16  Phos  2.2     07-01  Mg     2.0     07-01    TPro  5.5<L>  /  Alb  1.7<L>  /  TBili  0.4  /  DBili  x   /  AST  14  /  ALT  <10<L>  /  AlkPhos  60  06-30            Review of Systems	      Objective     Physical Examination    heart s1s2  lung dec BS  abd soft      Pertinent Lab findings & Imaging      Sharon:  NO   Adequate UO     I&O's Detail    30 Jun 2021 07:01  -  01 Jul 2021 07:00  --------------------------------------------------------  IN:    Free Water: 450 mL    IV PiggyBack: 100 mL    Vital1.5: 1422 mL  Total IN: 1972 mL    OUT:    Indwelling Catheter - Urethral (mL): 825 mL  Total OUT: 825 mL    Total NET: 1147 mL               Discussed with:     Cultures:	        Radiology

## 2021-07-01 NOTE — PROGRESS NOTE ADULT - SUBJECTIVE AND OBJECTIVE BOX
HPI  65M with seizures on keppra, BPH, hypothyroidism, sarcoidosis GERD, COPD, LAMBERT, recent admission to [facility] from 5/3-5/10/2021 for right subarachnoid hemorrhage s/p fall who presents from [facility] for hypoxia.  Per notes, patient was found to have a RLL PNA at [facility] and was started on IV meropenem yesterday.  Then today, he was found to have O2 sats of 90% while on NRB and therefore transferred here for further evaluation.   Patient presents with deep tissue injury ( DTPI) at sacral region and skin tear left dorsal hand      PAST MEDICAL & SURGICAL HISTORY:  DM (diabetes mellitus)    Sarcoid    Bronchiectasis    Subdural hematoma    Inguinal hernia    Seizure    Hypothyroid    Sleep apnea    LAMBERT and COPD overlap syndrome    BPH without urinary obstruction    Status post Bright&#x27;s procedure    Bilateral subdural hematomas    Status post cholecystectomy    Status post inguinal hernia       MEDICATIONS  (STANDING):  albuterol/ipratropium for Nebulization 3 milliLiter(s) Nebulizer every 6 hours  diVALproex Sprinkle 750 milliGRAM(s) Oral every 12 hours  enoxaparin Injectable 40 milliGRAM(s) SubCutaneous daily  finasteride 5 milliGRAM(s) Oral daily  levothyroxine 25 MICROGram(s) Oral daily  venlafaxine 37.5 milliGRAM(s) Oral every 12 hours    MEDICATIONS  (PRN):  ondansetron Injectable 4 milliGRAM(s) IV Push every 4 hours PRN Nausea and/or Vomiting  polyethylene glycol 3350 17 Gram(s) Oral daily PRN Constipation          Allergies    Keppra (Other (Severe))  penicillins (Unknown)    Intolerances    benzodiazepines (Other)    FAMILY HISTORY:  Family history of diabetes mellitus    Family history of pulmonary embolism (Grandparent)    Vital Signs Last 24 Hrs  T(C): 36.5 (01 Jul 2021 12:38), Max: 36.9 (01 Jul 2021 08:07)  T(F): 97.7 (01 Jul 2021 12:38), Max: 98.5 (01 Jul 2021 08:07)  HR: 72 (01 Jul 2021 14:00) (63 - 73)  BP: 113/80 (01 Jul 2021 14:00) (94/65 - 120/81)  BP(mean): 91 (01 Jul 2021 14:00) (74 - 95)  RR: 18 (01 Jul 2021 14:00) (16 - 26)  SpO2: 93% (01 Jul 2021 14:00) (89% - 100%)  Total Care Sport/ Versa Care P500 bed                        13.4   10.74 )-----------( 145      ( 01 Jul 2021 06:16 )             40.5                            Neurology nonverbal, no follow commands    Musculoskeletal/Vascular:  no deformities/ contractures    Skin: frail,

## 2021-07-01 NOTE — SWALLOW BEDSIDE ASSESSMENT ADULT - ASR SWALLOW LINGUAL MOBILITY
impaired left lateral movement/impaired right lateral movement
impaired protrusion/impaired left lateral movement/impaired right lateral movement

## 2021-07-01 NOTE — SWALLOW BEDSIDE ASSESSMENT ADULT - SWALLOW EVAL: RECOMMENDED DIET
1. Oral means of nutrition/hydration/medication are contraindicated at this time. 2. Consider short-term nonoral means of nutrition/hydration/medication.
1- oral nutrition/hydration contraindicated at this time. 2- continue ST vs. LT alternative means of nutrition as directed by MD/RD.

## 2021-07-01 NOTE — SWALLOW BEDSIDE ASSESSMENT ADULT - SWALLOW EVAL: DIAGNOSIS
1. The patient demonstrated a mild oral dysphagia for puree and honey thick liquid textures administered via teaspoon marked by prolonged manipulation resulting in delayed bolus collection, transfer and posterior transport. 2. Moderate-severe pharyngeal dysphagia for puree and honey thick liquids marked by suspected delayed initiation of pharyngeal swallow trigger with + hyolaryngeal elevation noted upon digital palpation with multiple swallows suggestive of pharyngeal residue and increased WOB suggestive of airway penetration.
1- moderate oral dysphagia given puree and honey thick liquids via teaspoon marked by delayed bolus collection, transfer and transport. pt with transient evidence of oral holding necessitating clinician cue to propel bolus posteriorly. once verbally cued, posterior transport is noted. 2- moderate-severe pharyngeal dysphagia given puree and honey thick liquids with head in neutral and chin tuck postures given recent results of MBS marked by delayed pharyngeal swallow trigger with suspected posterior loss over base of tongue and reduced hyolaryngeal excursion resulting in immediate and delayed reflexive coughing, increase work of breathing, change in vocal quality to a 'wet' tone and observed oxygen desaturation to 92% all suggestive of penetration/aspiration. following productive cough, pt Sa02 returned to baseline of 97%.

## 2021-07-01 NOTE — PROGRESS NOTE ADULT - SUBJECTIVE AND OBJECTIVE BOX
SUBJECTIVE:    No acute events overnight, afebrile, hds.  Pt is still having difficulty swallowing, and still has ng tube in.  No acute cp, sob, n/v, abd pn.    VITAL SIGNS:    Vital Signs Last 24 Hrs  T(C): 36.5 (01 Jul 2021 12:38), Max: 36.9 (01 Jul 2021 08:07)  T(F): 97.7 (01 Jul 2021 12:38), Max: 98.5 (01 Jul 2021 08:07)  HR: 64 (01 Jul 2021 12:57) (63 - 73)  BP: 104/74 (01 Jul 2021 12:17) (94/65 - 120/81)  BP(mean): 95 (01 Jul 2021 12:00) (74 - 95)  RR: 26 (01 Jul 2021 12:00) (16 - 26)  SpO2: 99% (01 Jul 2021 12:57) (89% - 100%)    PHYSICAL EXAM:     GENERAL: NAD  HEENT: perrla, eomi  RESPIRATORY: CTAB  CARDIOVASCULAR: s1 and s2, rrr, + murmur  ABDOMINAL: soft, nd, nt, +bs  EXTREMITIES: no c/c/e  NEUROLOGICAL: alert and oriented x 3, non-focal  SKIN: no new rashes or lesions   MUSCULOSKELETAL: no gross joint deformity                          13.4   10.74 )-----------( 145      ( 01 Jul 2021 06:16 )             40.5     07-01    136  |  99  |  12  ----------------------------<  128<H>  4.4   |  40<H>  |  0.31<L>    Ca    8.7      01 Jul 2021 06:16  Phos  2.2     07-01  Mg     2.0     07-01    TPro  5.5<L>  /  Alb  1.7<L>  /  TBili  0.4  /  DBili  x   /  AST  14  /  ALT  <10<L>  /  AlkPhos  60  06-30      CAPILLARY BLOOD GLUCOSE          MEDICATIONS  (STANDING):  albuterol/ipratropium for Nebulization 3 milliLiter(s) Nebulizer every 6 hours  diVALproex Sprinkle 750 milliGRAM(s) Oral every 12 hours  enoxaparin Injectable 40 milliGRAM(s) SubCutaneous daily  finasteride 5 milliGRAM(s) Oral daily  levothyroxine 25 MICROGram(s) Oral daily  venlafaxine 37.5 milliGRAM(s) Oral every 12 hours

## 2021-07-02 NOTE — PROGRESS NOTE ADULT - ASSESSMENT
65M with seizures on keppra, BPH, hypothyroidism, sarcoidosis GERD, COPD, LAMBERT, recent admission to [facility] from 5/3-5/10/2021 for right subarachnoid hemorrhage s/p fall who presents from [facility] for hypoxia.  Found to have acute hypoxic and hypercapnic respiratory failure 2/2 sepsis 2/2 aspiration PNA.  Likely aspiration PNA since patient has been recommended dysphagia I diet from [facility] but reports state that he has been non-compliant with recommendation.  Also location of RLL and RUL also fits with aspiration.      Problems  Acute hypoxic and hypercapnic respiratory failure  Sepsis 2/2 aspiration PNA  Sarcoidosis  Seizures  COPD    Acute hypoxic and hypercapnic respiratory failure secondary to aspiration PNA and underlying interstitial lung disease  - admitted to [facility]  -off high flow:: now on nasal cannula   -CXR followup 6/25:: severe interestial disease +fibrosis  - still with copious secretions    Sepsis from aspiration PNA with metabolic encephalopathy - sepsis resolving  - meropenem total 10 days as per ID, abx course now complete  - f/u blood cultures - NGTD  - speech and swallow eval -failed again on 7/1.  will likely need PEG.  Will need to discuss further with sister.  Dr Boykin called to evaluate.   - OMFS eval appreciated - no signs of oral infection    Troponin Elevation  - likely demand ischemia, no need to trend further  - cardiology f/u appreciated  -  TTE noted - elevated RV pressures likely due to pna    Sarcoidosis/COPD  - started on steroids for history of bronchiectasis as well - being tapered by pulm  - symbicort - patient not able to use properly - can dc.     Seizure history  - cont with depakote    Hypothyroidism  - cont with synthroid - switch to NGT    General anxiety disorder  - restart Effexor    BPH  - Herrera dc'ed 6/30,  flomax cannot be given via NGT.  Proscar started 6/29    Preventive measures  - can start with lovenox 40mg subq for DVT ppx    Patient critically ill, high risk for deterioration.

## 2021-07-02 NOTE — PROGRESS NOTE ADULT - ASSESSMENT
remains on TF  vs noted  labs reviewed    65M with history of Seizure disorder, Hypothyroidism,  Thrombocytopenia, BPH, recent right SAH from fall May 2021, presents from [facility] for hypoxia and AMS. Found to have severe sepsis, acute hypoxic and hypercapnic respiratory failure    source of infection - etiol - unclear - poss asp pna - s/p ABX  probable ongoing - continued aspiration - with resulting consolidation - asp pna - as seen on CT chest and clinical presentation  at risk for decompensation - resp failure -     s/p broad spectrum ABX - cx noted, biomarkers noted,   assist with all needs and ADL  HOB elev - asp prec  oral hygiene  Prognosis guarded - GOC discussion -

## 2021-07-02 NOTE — PROGRESS NOTE ADULT - SUBJECTIVE AND OBJECTIVE BOX
Patient is a 65y old  Male who presents with a chief complaint of respiratory failure, lethargy (02 Jul 2021 06:56)    INTERVAL HPI/OVERNIGHT EVENTS: no new complaints.     MEDICATIONS  (STANDING):  albuterol/ipratropium for Nebulization 3 milliLiter(s) Nebulizer every 6 hours  diVALproex Sprinkle 750 milliGRAM(s) Oral every 12 hours  enoxaparin Injectable 40 milliGRAM(s) SubCutaneous daily  finasteride 5 milliGRAM(s) Oral daily  levothyroxine 25 MICROGram(s) Oral daily  venlafaxine 37.5 milliGRAM(s) Oral every 12 hours    MEDICATIONS  (PRN):  ondansetron Injectable 4 milliGRAM(s) IV Push every 4 hours PRN Nausea and/or Vomiting  polyethylene glycol 3350 17 Gram(s) Oral daily PRN Constipation    Allergies  Keppra (Other (Severe))  penicillins (Unknown)    Intolerances  benzodiazepines (Other)      REVIEW OF SYSTEMS:  no new complaints.     Vital Signs Last 24 Hrs  T(C): 36.9 (02 Jul 2021 11:15), Max: 36.9 (02 Jul 2021 11:15)  T(F): 98.4 (02 Jul 2021 11:15), Max: 98.4 (02 Jul 2021 11:15)  HR: 80 (02 Jul 2021 14:18) (70 - 84)  BP: 107/85 (02 Jul 2021 12:00) (106/70 - 120/78)  BP(mean): 91 (02 Jul 2021 12:00) (79 - 95)  RR: 20 (02 Jul 2021 14:00) (17 - 23)  SpO2: 95% (02 Jul 2021 14:18) (91% - 96%)    PHYSICAL EXAM:  GENERAL: NAD, well-groomed, well-developed  HEAD:  Atraumatic, Normocephalic  EYES: conjunctiva and sclera clear  ENMT: Moist mucous membranes,  NECK: Supple, No JVD  NERVOUS SYSTEM:  Awake and alert,  moving extremities.    CHEST/LUNG: good air entry, scattered rhonchi  HEART: Regular rate and rhythm;   ABDOMEN: Soft, Nontender, Nondistended; Bowel sounds present  EXTREMITIES:  2+ Peripheral Pulses,  diffuse edema slightly improved.     LABS:      Ca    8.7        01 Jul 2021 06:16          CAPILLARY BLOOD GLUCOSE      POCT Blood Glucose.: 118 mg/dL (02 Jul 2021 12:52)  POCT Blood Glucose.: 118 mg/dL (02 Jul 2021 06:13)  POCT Blood Glucose.: 122 mg/dL (02 Jul 2021 00:45)      RADIOLOGY & ADDITIONAL TESTS:    Imaging Personally Reviewed:  [ ] YES     Consultant(s) Notes Reviewed:      Care Discussed with Consultants/Other Providers:    Advanced Directives: [ ] DNR  [ ] No feeding tube  [ ] MOLST in chart  [ ] MOLST completed today  [ ] Unknown

## 2021-07-02 NOTE — CHART NOTE - NSCHARTNOTEFT_GEN_A_CORE
Assessment: Pt due for nutrition follow-up.  Pt is a 65 year old M with seizures , BPH, hypothyroidism, sarcoidosis GERD, COPD, LAMBERT, recent admission to [facility] from 5/3-5/10/2021 for right subarachnoid hemorrhage s/p fall who presents from [facility] for hypoxia.  Found to have acute hypoxic and hypercapnic respiratory failure 2/2 sepsis 2/2 aspiration PNA.  It was recommended that pt follow dysphagia I diet from [facility] but reports state that he has been non-compliant with recommendation.  Per SLP evaluation this admission, oral nutrition/hydration contraindicated at this time.  NGT placed for nutrition/meds.  Pt started NGT feedings: Vital 1.5 q 6 hrs with 100ml water before/after each feed.  TF/bolus feedings increased 6/29 to better meet needs (current order active for Jevity 1.5 every 4hrs with 75ml water flush before and after each feed, +NC prosource once daily - provides 2190 kcal, 111 gm protein).  Noted GOC ongoing, noted possible need for PEG discussed with pt/family.  +BM 7/1.  RD to continue to follow closely.    Factors impacting intake: [ ] none [ ] nausea  [ ] vomiting [ ] diarrhea [ ] constipation  [ ]chewing problems [x ] swallowing issues  [ ] other:     Diet Prescription: Diet, NPO with Tube Feed:   Tube Feeding Modality: Nasogastric  Vital 1.5 Lee  Total Volume for 24 Hours (mL): 1422  Bolus  Total Volume of Bolus (mL):  237  Tube Feed Frequency: Every 4 hours   Tube Feed Start Time: 12:00  Bolus Feed Rate (mL per Hour): 237   Bolus Feed Duration (in Hours): 1  Bolus Feed Instructions:  bolus feed 4x day with 75cc water flush.  patient should be sitting upright for at least 30min post feed to minimize aspiration risk.  Free Water Flush   Total Volume per Flush (mL): 75   Frequency: Every 4 Hours  No Carb Prosource (1pkg = 15gms Protein)     Qty per Day:  1 (06-29-21 @ 11:02)    Intake: npo, nutrition via bolus feeds    Current Weight: 7/2 174#, 7/1 179#, +generalized edema   % Weight Change    Pertinent Medications: MEDICATIONS  (STANDING):  albuterol/ipratropium for Nebulization 3 milliLiter(s) Nebulizer every 6 hours  diVALproex Sprinkle 750 milliGRAM(s) Oral every 12 hours  enoxaparin Injectable 40 milliGRAM(s) SubCutaneous daily  finasteride 5 milliGRAM(s) Oral daily  levothyroxine 25 MICROGram(s) Oral daily  venlafaxine 37.5 milliGRAM(s) Oral every 12 hours    MEDICATIONS  (PRN):  ondansetron Injectable 4 milliGRAM(s) IV Push every 4 hours PRN Nausea and/or Vomiting  polyethylene glycol 3350 17 Gram(s) Oral daily PRN Constipation    Pertinent Labs: 07-01 Na136 mmol/L Glu 128 mg/dL<H> K+ 4.4 mmol/L Cr  0.31 mg/dL<L> BUN 12 mg/dL 07-01 Phos 2.2 mg/dL<L> 06-30 Alb 1.7 g/dL<L>     CAPILLARY BLOOD GLUCOSE      POCT Blood Glucose.: 118 mg/dL (02 Jul 2021 12:52)  POCT Blood Glucose.: 118 mg/dL (02 Jul 2021 06:13)  POCT Blood Glucose.: 122 mg/dL (02 Jul 2021 00:45)    Skin: DTI to sacrum    Estimated Needs:   [x ] no change since previous assessment  [ ] recalculated:     Previous Nutrition Diagnosis:   [ ] Inadequate Energy Intake [ ]Inadequate Oral Intake [ ] Excessive Energy Intake   [ ] Underweight [ ] Increased Nutrient Needs [ ] Overweight/Obesity   [ ] Altered GI Function [ ] Unintended Weight Loss [ ] Food & Nutrition Related Knowledge Deficit [x ] Malnutrition     Nutrition Diagnosis is [x ] ongoing  [ ] resolved [ ] not applicable     New Nutrition Diagnosis: [ ] not applicable       Interventions: Vital 1.5 q 4hrs with 75ml water before/after feeding  Recommend  [ ] Change Diet To:  [ ] Nutrition Supplement  [ ] Nutrition Support  [ ] Other:     Monitoring and Evaluation:   [ ] PO intake [ x ] Tolerance to diet prescription [ x ] weights [ x ] labs[ x ] follow up per protocol  [ ] other:

## 2021-07-02 NOTE — PROGRESS NOTE ADULT - SUBJECTIVE AND OBJECTIVE BOX
Chief Complaint: Respiratory distress    Interval Events: No events overnight.    Review of Systems:  General: No fevers, chills, weight loss or gain  Skin: No rashes, color changes  Cardiovascular: No chest pain, orthopnea  Respiratory: No shortness of breath, cough  Gastrointestinal: No nausea, abdominal pain  Genitourinary: No incontinence, pain with urination  Musculoskeletal: No pain, swelling, decreased range of motion  Neurological: No headache, weakness  Psychiatric: No depression, anxiety  Endocrine: No weight loss or gain, increased thirst  All other systems are comprehensively negative.    Physical Exam:  Vital Signs Last 24 Hrs  T(C): 36.7 (02 Jul 2021 04:00), Max: 36.8 (01 Jul 2021 15:43)  T(F): 98 (02 Jul 2021 04:00), Max: 98.3 (01 Jul 2021 15:43)  HR: 71 (02 Jul 2021 06:59) (63 - 78)  BP: 107/77 (02 Jul 2021 06:00) (104/74 - 120/81)  BP(mean): 87 (02 Jul 2021 06:00) (79 - 95)  RR: 19 (02 Jul 2021 06:00) (17 - 26)  SpO2: 93% (02 Jul 2021 06:59) (91% - 99%)  General: Chronically ill appearing  HEENT: MMM  Neck: No JVD, no carotid bruit  Lungs: CTAB  CV: RRR, nl S1/S2, no M/R/G  Abdomen: S/NT/ND, +BS  Extremities: No LE edema, no cyanosis  Neuro: AAOx3, non-focal  Skin: No rash    Labs:    07-01    136  |  99  |  12  ----------------------------<  128<H>  4.4   |  40<H>  |  0.31<L>    Ca    8.7      01 Jul 2021 06:16  Phos  2.2     07-01  Mg     2.0     07-01                          13.4   10.74 )-----------( 145      ( 01 Jul 2021 06:16 )             40.5       Telemetry: Sinus rhythm

## 2021-07-02 NOTE — PROGRESS NOTE ADULT - SUBJECTIVE AND OBJECTIVE BOX
JULY FORTE is a 65yMale , patient examined and chart reviewed.     INTERVAL HPI/ OVERNIGHT EVENTS:   Awake. NAD. weak.   Afebrile.    PAST MEDICAL & SURGICAL HISTORY:  DM (diabetes mellitus)  Sarcoid  Bronchiectasis  Subdural hematoma  Inguinal hernia  Seizure  Hypothyroid  Sleep apnea  LAMBERT and COPD overlap syndrome  BPH without urinary obstruction  Status post Bright&#x27;s procedure  Bilateral subdural hematomas  Status post cholecystectomy  Status post inguinal hernia repair      For details regarding the patient's social history, family history, and other miscellaneous elements, please refer the initial infectious diseases consultation and/or the admitting history and physical examination for this admission.    ROS:  Systems reviewed and are neg     ALLERGIES  benzodiazepines (Other)  Keppra (Other (Severe))  penicillins (Unknown)      Current inpatient medications :    ANTIBIOTICS/RELEVANT:    MEDICATIONS  (STANDING):  albuterol/ipratropium for Nebulization 3 milliLiter(s) Nebulizer every 6 hours  diVALproex Sprinkle 750 milliGRAM(s) Oral every 12 hours  enoxaparin Injectable 40 milliGRAM(s) SubCutaneous daily  finasteride 5 milliGRAM(s) Oral daily  levothyroxine 25 MICROGram(s) Oral daily  venlafaxine 37.5 milliGRAM(s) Oral every 12 hours    MEDICATIONS  (PRN):  ondansetron Injectable 4 milliGRAM(s) IV Push every 4 hours PRN Nausea and/or Vomiting  polyethylene glycol 3350 17 Gram(s) Oral daily PRN Constipation      Objective:  ICU Vital Signs Last 24 Hrs  T(C): 36.7 (02 Jul 2021 16:32), Max: 36.9 (02 Jul 2021 11:15)  T(F): 98 (02 Jul 2021 16:32), Max: 98.4 (02 Jul 2021 11:15)  HR: 80 (02 Jul 2021 14:18) (70 - 84)  BP: 107/85 (02 Jul 2021 12:00) (106/70 - 120/78)  BP(mean): 91 (02 Jul 2021 12:00) (79 - 95)  RR: 20 (02 Jul 2021 14:00) (17 - 23)  SpO2: 95% (02 Jul 2021 14:18) (91% - 96%)      Physical Exam:  General: Weak frail  no acute distress NGT  Neck: supple, trachea midline  Lungs: Decreased no wheeze/rhonchi  Cardiovascular: regular rate and rhythm, S1 S2  Abdomen: soft, nontender,  bowel sounds normal  Neurological: Awake alert  Skin: no rash  Extremities: + edema        LABS:                        13.4   10.74 )-----------( 145      ( 01 Jul 2021 06:16 )             40.5   07-01    136  |  99  |  12  ----------------------------<  128<H>  4.4   |  40<H>  |  0.31<L>    Ca    8.7      01 Jul 2021 06:16  Phos  2.2     07-01  Mg     2.0     07-01    MICROBIOLOGY:  Culture - Sputum (collected 23 Jun 2021 14:04)  Source: .Sputum Sputum  Gram Stain (23 Jun 2021 20:37):    Few Squamous epithelial cells per low power field    Few polymorphonuclear leukocytes per low power field    Few Yeast per oil power field  Final Report (25 Jun 2021 22:01):    Normal Respiratory Vangie present    Culture - Urine (collected 21 Jun 2021 14:11)  Source: .Urine Clean Catch (Midstream)  Final Report (22 Jun 2021 14:00):    No growth    Culture - Blood (collected 21 Jun 2021 14:11)  Source: .Blood Blood-Peripheral  Preliminary Report (22 Jun 2021 15:02):    No growth to date.    Culture - Blood (collected 21 Jun 2021 14:11)  Source: .Blood Blood-Peripheral  Preliminary Report (22 Jun 2021 15:02):    No growth to date.    MRSA/MSSA PCR (06.22.21 @ 00:13)    MRSA PCR Result.: NotDete    Staph Aureus PCR Result: NotDete      RADIOLOGY & ADDITIONAL STUDIES:  EXAM:  CT CHEST                                  PROCEDURE DATE:  06/21/2021          INTERPRETATION:  CLINICAL INFORMATION: SOB, fever. Hx of sarcoid    COMPARISON: Comparison to numerous prior examinations most recent on 5/9/2021    CONTRAST/COMPLICATIONS:  IV Contrast: NONE  0 cc administered   0 cc discarded  Oral Contrast: NONE  Complications: None reported at time of study completion    PROCEDURE:  CT of the Chest was performed.  Sagittal and coronal reformats were performed.    FINDINGS:    LUNGS AND AIRWAYS: There is increased consolidation throughout the right upper and right lower lobe. Consolidation in the left lower lobe is decreased from prior exam. Marked bronchiectasis and architectural distortion is again demonstrated. Tracheal diverticulum.  PLEURA: Trace bilateral pleural effusions.  MEDIASTINUM AND JOANNE: Enlarged lymph nodes throughout the middle mediastinum are unchanged.  VESSELS: Within normal limits.  HEART: Heart size is normal. No pericardial effusion.  CHEST WALL AND LOWER NECK: Within normal limits.  VISUALIZED UPPER ABDOMEN: Status post cholecystectomy.  BONES: Degenerative changes.    IMPRESSION:    Increased consolidation throughout the right upper and lower lobes, correlate for pneumonia. Decreased consolidation in the left lower lobe.      Assessment :  65M with seizures on keppra, BPH, hypothyroidism, sarcoidosis GERD, COPD, LAMBERT, recent admission to [facility] from 5/3-5/10/2021 for right subarachnoid hemorrhage s/p fall admitted from [facility] rehab with acute hypoxic respiratory failure sec severe right lung pneumonia likely aspiration pneumonia.    Sp fever   Off HFNC on 3L NC  Leukocytosis multifactorial downtrending  Failed Swallow study  Clinically stable      Plan :   Monitor off antibiotics  Completed Meropenam x 10 days course  Trend temps and cbc  Steroids and nebs per pulm  Pulm toileting  Will need PEG- family and pt deciding  Asp precautions    I will be away 7/3/2021 to 7/14/2021. Dr Devon Sharma et al will be covering. Call if needed.      Continue with present regiment.  Appropriate use of antibiotics and adverse effects reviewed.      > 35 minutes were spent in direct patient care reviewing notes, medications ,labs data/ imaging , discussion with multidisciplinary team.    Thank you for allowing me to participate in care of your patient .    Tegan Granados MD  Infectious Disease  [phone]

## 2021-07-02 NOTE — PROGRESS NOTE ADULT - SUBJECTIVE AND OBJECTIVE BOX
Date/Time Patient Seen:  		  Referring MD:   Data Reviewed	       Patient is a 65y old  Male who presents with a chief complaint of respiratory failure, lethargy (01 Jul 2021 23:42)      Subjective/HPI     PAST MEDICAL & SURGICAL HISTORY:  No pertinent past medical history    DM (diabetes mellitus)    Sarcoid    Bronchiectasis    Diverticulitis    Subdural hematoma    Inguinal hernia    Cellulitis    Seizure    Hypothyroid    Sleep apnea    LAMBERT and COPD overlap syndrome    BPH without urinary obstruction    No significant past surgical history    Status post Bright&#x27;s procedure    Bilateral subdural hematomas    Status post cholecystectomy    Status post inguinal hernia repair          Medication list         MEDICATIONS  (STANDING):  albuterol/ipratropium for Nebulization 3 milliLiter(s) Nebulizer every 6 hours  diVALproex Sprinkle 750 milliGRAM(s) Oral every 12 hours  enoxaparin Injectable 40 milliGRAM(s) SubCutaneous daily  finasteride 5 milliGRAM(s) Oral daily  levothyroxine 25 MICROGram(s) Oral daily  venlafaxine 37.5 milliGRAM(s) Oral every 12 hours    MEDICATIONS  (PRN):  ondansetron Injectable 4 milliGRAM(s) IV Push every 4 hours PRN Nausea and/or Vomiting  polyethylene glycol 3350 17 Gram(s) Oral daily PRN Constipation         Vitals log        ICU Vital Signs Last 24 Hrs  T(C): 36.7 (02 Jul 2021 04:00), Max: 36.9 (01 Jul 2021 08:07)  T(F): 98 (02 Jul 2021 04:00), Max: 98.5 (01 Jul 2021 08:07)  HR: 78 (02 Jul 2021 06:00) (63 - 78)  BP: 107/77 (02 Jul 2021 06:00) (104/74 - 120/81)  BP(mean): 87 (02 Jul 2021 06:00) (79 - 95)  ABP: --  ABP(mean): --  RR: 19 (02 Jul 2021 06:00) (16 - 26)  SpO2: 92% (02 Jul 2021 06:00) (91% - 99%)           Input and Output:  I&O's Detail    30 Jun 2021 07:01  -  01 Jul 2021 07:00  --------------------------------------------------------  IN:    Free Water: 450 mL    IV PiggyBack: 100 mL    Vital1.5: 1422 mL  Total IN: 1972 mL    OUT:    Indwelling Catheter - Urethral (mL): 825 mL  Total OUT: 825 mL    Total NET: 1147 mL      01 Jul 2021 07:01  -  02 Jul 2021 06:56  --------------------------------------------------------  IN:    Free Water: 600 mL    Vital1.5: 1183 mL  Total IN: 1783 mL    OUT:    Voided (mL): 3000 mL  Total OUT: 3000 mL    Total NET: -1217 mL          Lab Data                        13.4   10.74 )-----------( 145      ( 01 Jul 2021 06:16 )             40.5     07-01    136  |  99  |  12  ----------------------------<  128<H>  4.4   |  40<H>  |  0.31<L>    Ca    8.7      01 Jul 2021 06:16  Phos  2.2     07-01  Mg     2.0     07-01              Review of Systems	      Objective     Physical Examination    heart s1s2  lung dec bS  abd soft  head nc      Pertinent Lab findings & Imaging      Sharon:  NO   Adequate UO     I&O's Detail    30 Jun 2021 07:01  -  01 Jul 2021 07:00  --------------------------------------------------------  IN:    Free Water: 450 mL    IV PiggyBack: 100 mL    Vital1.5: 1422 mL  Total IN: 1972 mL    OUT:    Indwelling Catheter - Urethral (mL): 825 mL  Total OUT: 825 mL    Total NET: 1147 mL      01 Jul 2021 07:01  -  02 Jul 2021 06:56  --------------------------------------------------------  IN:    Free Water: 600 mL    Vital1.5: 1183 mL  Total IN: 1783 mL    OUT:    Voided (mL): 3000 mL  Total OUT: 3000 mL    Total NET: -1217 mL               Discussed with:     Cultures:	        Radiology

## 2021-07-03 NOTE — PROGRESS NOTE ADULT - ASSESSMENT
65M with seizures on keppra, BPH, hypothyroidism, sarcoidosis GERD, COPD, LAMBERT, recent admission to [facility] from 5/3-5/10/2021 for right subarachnoid hemorrhage s/p fall who presents from [facility] for hypoxia.  Found to have acute hypoxic and hypercapnic respiratory failure 2/2 sepsis 2/2 aspiration PNA.  Likely aspiration PNA since patient has been recommended dysphagia I diet from [facility] but reports state that he has been non-compliant with recommendations. Patient being treated for aspiration pneumonia    Problems  Acute hypoxic and hypercapnic respiratory failure  Sepsis 2/2 aspiration PNA  Sarcoidosis  Seizures  COPD    Acute hypoxic and hypercapnic respiratory failure secondary to aspiration PNA and underlying interstitial lung disease  - [facility]  -off high flow:: now on nasal cannula   -CXR followup 6/25:: severe interestial disease +fibrosis  - pulm following  - completed 10 day course or merem    Sepsis from aspiration PNA with metabolic encephalopathy -  - meropenem total 10 days completed  - speech and swallow eval -failed again on 7/1.  will likely need PEG.  Dr Boykin called by previous hospitalist to evaluate.   - OMFS eval appreciated - no signs of oral infection    Troponin Elevation  - likely demand ischemia,  - cardiology f/u appreciated  -  TTE noted - elevated RV pressures likely due to pna    Sarcoidosis/COPD  - started on steroids for history of bronchiectasis as well - now off     Seizure history  - cont with depakote    Hypothyroidism  - cont with synthroid -    General anxiety disorder  -  Effexor    BPH  -Proscar started 6/29  - start doxazosin     severe protein calorie malnutrition - nutrition consult  - c.w tube feeds    Patient critically ill, high risk for deterioration.    prognosis grim   discussed with rn

## 2021-07-03 NOTE — PROGRESS NOTE ADULT - SUBJECTIVE AND OBJECTIVE BOX
Patient is a 65y Male with a known history of :  Deep tissue injury [T14.8XXA]      HPI:  ***Patient is currently lethargic and unable to provide any history.  Collateral information obtained from chart.**      65M with seizures on keppra, BPH, hypothyroidism, sarcoidosis GERD, COPD, LAMBERT, recent admission to [facility] from 5/3-5/10/2021 for right subarachnoid hemorrhage s/p fall who presents from [facility] for hypoxia.  Per notes, patient was found to have a RLL PNA at [facility] and was started on IV meropenem yesterday.  Then today, he was found to have O2 sats of 90% while on NRB and therefore transferred here for further evaluation.     I/n the ED, patient's triage vitals were /83, , RR 32, 90%, T 100.3F.  Patient was noted to nearly obtunded with tachypnea and patient was started on BiPAP on 100%.   Labs showed leukocytosis of 25.3 with a left shift and an ABG of 7.28/89/136.  CT eventually showed RUL and RLL consolidation, likely PNA and patient was started on meropenem.  ICU was consulted and patient was accepted to [facility] for acute hypoxic respiratory failure 2/2 sepsis 2/2 possible aspiration PNA.          (21 Jun 2021 04:48)      REVIEW OF SYSTEMS:    CONSTITUTIONAL: No fever, weight loss, or fatigue  EYES: No eye pain, visual disturbances, or discharge  ENMT:  No difficulty hearing, tinnitus, vertigo; No sinus or throat pain  NECK: No pain or stiffness  BREASTS: No pain, masses, or nipple discharge  RESPIRATORY: No cough, wheezing, chills or hemoptysis; No shortness of breath  CARDIOVASCULAR: No chest pain, palpitations, dizziness, or leg swelling  GASTROINTESTINAL: No abdominal or epigastric pain. No nausea, vomiting, or hematemesis; No diarrhea or constipation. No melena or hematochezia.  GENITOURINARY: No dysuria, frequency, hematuria, or incontinence  NEUROLOGICAL: No headaches, memory loss, loss of strength, numbness, or tremors  SKIN: No itching, burning, rashes, or lesions   LYMPH NODES: No enlarged glands  ENDOCRINE: No heat or cold intolerance; No hair loss  MUSCULOSKELETAL: No joint pain or swelling; No muscle, back, or extremity pain  PSYCHIATRIC: No depression, anxiety, mood swings, or difficulty sleeping  HEME/LYMPH: No easy bruising, or bleeding gums  ALLERGY AND IMMUNOLOGIC: No hives or eczema    MEDICATIONS  (STANDING):  albuterol/ipratropium for Nebulization 3 milliLiter(s) Nebulizer every 6 hours  diVALproex Sprinkle 750 milliGRAM(s) Oral every 12 hours  doxazosin 1 milliGRAM(s) Oral at bedtime  enoxaparin Injectable 40 milliGRAM(s) SubCutaneous daily  finasteride 5 milliGRAM(s) Oral daily  levothyroxine 25 MICROGram(s) Oral daily  pantoprazole  Injectable 40 milliGRAM(s) IV Push daily  venlafaxine 37.5 milliGRAM(s) Oral every 12 hours    MEDICATIONS  (PRN):  acetaminophen    Suspension .. 650 milliGRAM(s) Oral every 6 hours PRN Temp greater or equal to 38C (100.4F), Mild Pain (1 - 3)  ondansetron Injectable 4 milliGRAM(s) IV Push every 4 hours PRN Nausea and/or Vomiting  polyethylene glycol 3350 17 Gram(s) Oral daily PRN Constipation      ALLERGIES: Keppra (Other (Severe))  penicillins (Unknown)      FAMILY HISTORY:  Family history of diabetes mellitus    Family history of pulmonary embolism (Grandparent)        Social history:  Alochol:   Smoking:   Drug Use:   Marital Status:     PHYSICAL EXAMINATION:  -----------------------------  T(C): 36.8 (07-03-21 @ 08:39), Max: 36.8 (07-03-21 @ 00:00)  HR: 76 (07-03-21 @ 10:00) (69 - 87)  BP: 113/71 (07-03-21 @ 10:00) (98/64 - 113/78)  RR: 22 (07-03-21 @ 10:00) (16 - 23)  SpO2: 97% (07-03-21 @ 10:00) (91% - 97%)  Wt(kg): --    07-02 @ 07:01  -  07-03 @ 07:00  --------------------------------------------------------  IN:    Enteral Tube Flush: 200 mL    Free Water: 375 mL    Vital1.5: 1179 mL  Total IN: 1754 mL    OUT:    Incontinent per Collection Bag (mL): 1250 mL  Total OUT: 1250 mL    Total NET: 504 mL      07-03 @ 07:01  -  07-03 @ 12:18  --------------------------------------------------------  IN:    Enteral Tube Flush: 237 mL    Free Water: 75 mL  Total IN: 312 mL    OUT:  Total OUT: 0 mL    Total NET: 312 mL            Constitutional: well developed, normal appearance, well groomed, well nourished, no deformities and no acute distress.   Eyes: the conjunctiva exhibited no abnormalities and the eyelids demonstrated no xanthelasmas.   HEENT: normal oral mucosa, no oral pallor and no oral cyanosis.   Neck: normal jugular venous A waves present, normal jugular venous V waves present and no jugular venous miller A waves.   Pulmonary: no respiratory distress, normal respiratory rhythm and effort, no accessory muscle use and lungs were clear to auscultation bilaterally. Anteriorly  Cardiovascular: heart rate and rhythm were normal, normal S1 and S2 and no murmur, gallop, rub, heave or thrill are present.    Musculoskeletal: the gait could not be assessed.  Extremities: no clubbing of the fingernails, no localized cyanosis, no petechial hemorrhages and no ischemic changes.   Skin: normal skin color and pigmentation, no rash, no venous stasis, no skin lesions, no skin ulcer and no xanthoma was observed.     LABS:   --------                       Radiology:    < from: US Transthoracic Echocardiogram w/Doppler Complete (06.22.21 @ 11:46) >    EXAM:  US TTE W DOPPLER COMPLETE                                  PROCEDURE DATE:  06/22/2021          INTERPRETATION:  Indication: Elevated cardiac enzymes    Technician: Shani Bustillo    Height 6 feet 2 inches, weight 180 pounds, blood [id number] mmHg    Study Quality: Technical difficult study, poor apical window    Measurements: Aortic root size 3.6 cm, left atrial size 2.9 cm, right atrial size 3.6 cm, right ventricular size 2.66 cm, left ventricular end-diastolic diameter 4.7 cm, left ventricular end-systolic diameter 3.5 cm, septal wall thickness 1.0 cm, posterior thickness 0.8 cm, aortic velocity 1.4 m/s, mitral E velocity 0.5 m/s, ejection fraction 55-60%.    Mitral Valve: Appears normal with normal opening.  Aortic Valve: Normal aortic valve with normal cusp excursion trace aortic regurgitation  Left Atrium: Normal size  Left Ventricle: Normal size, normal wall thickness with the normal overall left ventricular systolic function. Mild diastolic dysfunction noted  Pericardium: No pericardial effusion  Tricuspid Valve: Appears normal with the mild tricuspid regurgitation with estimated right femur systolic pressure 36 mmHg  Pulmonic Valve: Appears to be normal  Right Ventricle: Not well-visualized grossly appears normal size with normal right ventricular systolic function  Right Atrium: Normal size    IMPRESSION:  1. technical difficult study, poor apical window  2. grossly normal overall left ventricular systolic function with mild diastolic dysfunction  3. mild tricuspid regurgitation with mildly elevated right ventricular systolic pressure.              [facility]  This document has been electronically signed. Jun 22 2021  4:14PM    < end of copied text >

## 2021-07-03 NOTE — PROGRESS NOTE ADULT - ASSESSMENT
remains on TF  vs noted  labs reviewed    65M with history of Seizure disorder, Hypothyroidism,  Thrombocytopenia, BPH, recent right SAH from fall May 2021, presents from [facility] for hypoxia and AMS. Found to have severe sepsis, acute hypoxic and hypercapnic respiratory failure    source of infection - etiol - unclear - poss asp pna - s/p ABX  probable ongoing - continued aspiration - with resulting consolidation - asp pna - as seen on CT chest and clinical presentation  at risk for decompensation - resp failure -     s/p broad spectrum ABX - cx noted, biomarkers noted,   assist with all needs and ADL  HOB elev - asp prec  oral hygiene  Prognosis guarded - GOC discussion -

## 2021-07-03 NOTE — PROGRESS NOTE ADULT - SUBJECTIVE AND OBJECTIVE BOX
Date/Time Patient Seen:  		  Referring MD:   Data Reviewed	       Patient is a 65y old  Male who presents with a chief complaint of respiratory failure, lethargy (02 Jul 2021 15:17)      Subjective/HPI     PAST MEDICAL & SURGICAL HISTORY:  No pertinent past medical history    DM (diabetes mellitus)    Sarcoid    Bronchiectasis    Diverticulitis    Subdural hematoma    Inguinal hernia    Cellulitis    Seizure    Hypothyroid    Sleep apnea    LAMBERT and COPD overlap syndrome    BPH without urinary obstruction    No significant past surgical history    Status post Bright&#x27;s procedure    Bilateral subdural hematomas    Status post cholecystectomy    Status post inguinal hernia repair          Medication list         MEDICATIONS  (STANDING):  albuterol/ipratropium for Nebulization 3 milliLiter(s) Nebulizer every 6 hours  diVALproex Sprinkle 750 milliGRAM(s) Oral every 12 hours  enoxaparin Injectable 40 milliGRAM(s) SubCutaneous daily  finasteride 5 milliGRAM(s) Oral daily  levothyroxine 25 MICROGram(s) Oral daily  venlafaxine 37.5 milliGRAM(s) Oral every 12 hours    MEDICATIONS  (PRN):  ondansetron Injectable 4 milliGRAM(s) IV Push every 4 hours PRN Nausea and/or Vomiting  polyethylene glycol 3350 17 Gram(s) Oral daily PRN Constipation         Vitals log        ICU Vital Signs Last 24 Hrs  T(C): 36.7 (03 Jul 2021 04:03), Max: 36.9 (02 Jul 2021 11:15)  T(F): 98.1 (03 Jul 2021 04:03), Max: 98.4 (02 Jul 2021 11:15)  HR: 74 (03 Jul 2021 05:00) (69 - 87)  BP: 98/64 (03 Jul 2021 04:00) (98/64 - 120/78)  BP(mean): 75 (03 Jul 2021 04:00) (75 - 91)  ABP: --  ABP(mean): --  RR: 18 (03 Jul 2021 05:00) (16 - 23)  SpO2: 96% (03 Jul 2021 05:00) (91% - 96%)           Input and Output:  I&O's Detail    01 Jul 2021 07:01  -  02 Jul 2021 07:00  --------------------------------------------------------  IN:    Free Water: 600 mL    Vital1.5: 1183 mL  Total IN: 1783 mL    OUT:    Voided (mL): 3000 mL  Total OUT: 3000 mL    Total NET: -1217 mL      02 Jul 2021 07:01  -  03 Jul 2021 06:43  --------------------------------------------------------  IN:    Enteral Tube Flush: 100 mL    Free Water: 375 mL    Vital1.5: 1179 mL  Total IN: 1654 mL    OUT:    Incontinent per Collection Bag (mL): 1250 mL  Total OUT: 1250 mL    Total NET: 404 mL          Lab Data                  Review of Systems	      Objective     Physical Examination    heart s1s2  lung dec BS  abd soft      Pertinent Lab findings & Imaging      Sharon:  NO   Adequate UO     I&O's Detail    01 Jul 2021 07:01  -  02 Jul 2021 07:00  --------------------------------------------------------  IN:    Free Water: 600 mL    Vital1.5: 1183 mL  Total IN: 1783 mL    OUT:    Voided (mL): 3000 mL  Total OUT: 3000 mL    Total NET: -1217 mL      02 Jul 2021 07:01  -  03 Jul 2021 06:43  --------------------------------------------------------  IN:    Enteral Tube Flush: 100 mL    Free Water: 375 mL    Vital1.5: 1179 mL  Total IN: 1654 mL    OUT:    Incontinent per Collection Bag (mL): 1250 mL  Total OUT: 1250 mL    Total NET: 404 mL               Discussed with:     Cultures:	        Radiology

## 2021-07-03 NOTE — PROGRESS NOTE ADULT - ASSESSMENT
The patient is a 65 year old male with a history of seizure d/o, BPH, hypothyroidism, sarcoidosis, COPD, sleep apne, recent SAH who is admitted with respiratory failure in the setting of aspiration Pneumonia    7/3/21  Patient lying flat, sleeping comfortably  Easily arousable  No complaints offered  Monitor-NSR    Plan:  - ECG with anterolateral ST depressions  - Troponin peaked at 0.49. Likely elevated in the setting of demand ischemia from underlying infection, respiratory failure, tachycardia. No need to trend further.  - Echo with normal LV systolic function, no significant valve issues-see above report  - Given recent SAH, would hold off with antiplatelets or anticoagulants  - Off meropenem  - NGT in place  - No cardiac contraindication to PEG if that is the plan  - Being followed by Pulmonary, ID

## 2021-07-03 NOTE — PROGRESS NOTE ADULT - SUBJECTIVE AND OBJECTIVE BOX
JULY FORTE    [id number]    65y      Male    INTERVAL HPI/OVERNIGHT EVENTS:    patient being seen for aspiration pneumonia and severe protein calorie malnutrition. Patient seen in ICU and is in nad. Patient denies any complaints    REVIEW OF SYSTEMS:    CONSTITUTIONAL: No fever, weight loss, or fatigue  RESPIRATORY: No cough, wheezing, hemoptysis; No shortness of breath  CARDIOVASCULAR: No chest pain, palpitations  GASTROINTESTINAL: No abdominal or epigastric pain. No nausea, vomiting  NEUROLOGICAL: No headaches, memory loss, loss of strength.  MISCELLANEOUS:      Vital Signs Last 24 Hrs  T(C): 36.8 (03 Jul 2021 08:39), Max: 36.9 (02 Jul 2021 11:15)  T(F): 98.2 (03 Jul 2021 08:39), Max: 98.4 (02 Jul 2021 11:15)  HR: 76 (03 Jul 2021 08:00) (69 - 87)  BP: 102/72 (03 Jul 2021 08:00) (98/64 - 113/78)  BP(mean): 81 (03 Jul 2021 08:00) (75 - 91)  RR: 22 (03 Jul 2021 08:00) (16 - 23)  SpO2: 93% (03 Jul 2021 08:00) (91% - 96%)    PHYSICAL EXAM:    GENERAL: NAD, ill appearing, cachetic  HEAD: temporal wasting, ng tube   EYES: conjunctiva and sclera clear  ENMT: dry MM   NECK: Supple, No JVD  NERVOUS SYSTEM:  Awake and alert, appears older than stated age  CHEST/LUNG: diminished, scattered rhonchi   HEART: Regular rate and rhythm;   ABDOMEN: Soft, Nontender, Nondistended; Bowel sounds present  EXTREMITIES:  edema, muscle wasting         LABS:  none        MEDICATIONS  (STANDING):  albuterol/ipratropium for Nebulization 3 milliLiter(s) Nebulizer every 6 hours  diVALproex Sprinkle 750 milliGRAM(s) Oral every 12 hours  doxazosin 1 milliGRAM(s) Oral at bedtime  enoxaparin Injectable 40 milliGRAM(s) SubCutaneous daily  finasteride 5 milliGRAM(s) Oral daily  levothyroxine 25 MICROGram(s) Oral daily  pantoprazole  Injectable 40 milliGRAM(s) IV Push daily  venlafaxine 37.5 milliGRAM(s) Oral every 12 hours    MEDICATIONS  (PRN):  acetaminophen    Suspension .. 650 milliGRAM(s) Oral every 6 hours PRN Temp greater or equal to 38C (100.4F), Mild Pain (1 - 3)  ondansetron Injectable 4 milliGRAM(s) IV Push every 4 hours PRN Nausea and/or Vomiting  polyethylene glycol 3350 17 Gram(s) Oral daily PRN Constipation      RADIOLOGY & ADDITIONAL TESTS:

## 2021-07-04 NOTE — PROGRESS NOTE ADULT - ASSESSMENT
The patient is a 65 year old male with a history of seizure d/o, BPH, hypothyroidism, sarcoidosis, COPD, sleep apne, recent SAH who is admitted with respiratory failure in the setting of aspiration Pneumonia    7/4/21  Patient lying flat, sleeping comfortably  Monitor-NSR    Plan:  - ECG with anterolateral ST depressions  - Troponin peaked at 0.49. Likely elevated in the setting of demand ischemia from underlying infection, respiratory failure, tachycardia. No need to trend further.  - Echo with normal LV systolic function, no significant valve issues-see above report  - Given recent SAH, would hold off with antiplatelets or anticoagulants  - Off meropenem  - NGT in place  - No cardiac contraindication to PEG if that is the plan  - Being followed by Pulmonary, ID

## 2021-07-04 NOTE — PROGRESS NOTE ADULT - ASSESSMENT
65M with seizures on keppra, BPH, hypothyroidism, sarcoidosis GERD, COPD, LAMBERT, recent admission to [facility] from 5/3-5/10/2021 for right subarachnoid hemorrhage s/p fall who presents from [facility] for hypoxia.  Found to have acute hypoxic and hypercapnic respiratory failure 2/2 sepsis 2/2 aspiration PNA.  Likely aspiration PNA since patient has been recommended dysphagia I diet from [facility] but reports state that he has been non-compliant with recommendations. Patient being treated for aspiration pneumonia    Problems  Acute hypoxic and hypercapnic respiratory failure  Sepsis 2/2 aspiration PNA  severe protein calorie malnturition   Sarcoidosis  Seizures  COPD    Acute hypoxic and hypercapnic respiratory failure secondary to aspiration PNA and underlying interstitial lung disease  - [facility]  -off high flow:: now on nasal cannula   -CXR followup 6/25:: severe interestial disease +fibrosis  - pulm following  - completed 10 day course of merem    Sepsis from aspiration PNA with metabolic encephalopathy -  - meropenem total 10 days completed  - speech and swallow eval -failed again on 7/1.  will likely need PEG.  Dr Boykin called by previous hospitalist to evaluate.   - OMFS eval appreciated - no signs of oral infection, will order magic mouth wash    Troponin Elevation  - likely demand ischemia,  - cardiology f/u appreciated  -  TTE noted - elevated RV pressures likely due to pna    Sarcoidosis/COPD  - started on steroids for history of bronchiectasis as well - now off     Seizure history  - cont with depakote    Hypothyroidism  - cont with synthroid -    General anxiety disorder  -  Effexor    BPH  -Proscar started 6/29  - doxazosin started on 7/3     severe protein calorie malnutrition - nutrition consult  - c.w tube feeds    hypotension - added midodrine     prognosis grim   discussed with rn

## 2021-07-04 NOTE — PROGRESS NOTE ADULT - SUBJECTIVE AND OBJECTIVE BOX
Date/Time Patient Seen:  		  Referring MD:   Data Reviewed	       Patient is a 65y old  Male who presents with a chief complaint of respiratory failure, lethargy (03 Jul 2021 12:17)      Subjective/HPI     PAST MEDICAL & SURGICAL HISTORY:  No pertinent past medical history    DM (diabetes mellitus)    Sarcoid    Bronchiectasis    Diverticulitis    Subdural hematoma    Inguinal hernia    Cellulitis    Seizure    Hypothyroid    Sleep apnea    LAMBERT and COPD overlap syndrome    BPH without urinary obstruction    No significant past surgical history    Status post Bright&#x27;s procedure    Bilateral subdural hematomas    Status post cholecystectomy    Status post inguinal hernia repair          Medication list         MEDICATIONS  (STANDING):  albuterol/ipratropium for Nebulization 3 milliLiter(s) Nebulizer every 6 hours  diVALproex Sprinkle 750 milliGRAM(s) Oral every 12 hours  doxazosin 1 milliGRAM(s) Oral at bedtime  enoxaparin Injectable 40 milliGRAM(s) SubCutaneous daily  finasteride 5 milliGRAM(s) Oral daily  levothyroxine 25 MICROGram(s) Oral daily  pantoprazole  Injectable 40 milliGRAM(s) IV Push daily  venlafaxine 37.5 milliGRAM(s) Oral every 12 hours    MEDICATIONS  (PRN):  acetaminophen    Suspension .. 650 milliGRAM(s) Oral every 6 hours PRN Temp greater or equal to 38C (100.4F), Mild Pain (1 - 3)  ondansetron Injectable 4 milliGRAM(s) IV Push every 4 hours PRN Nausea and/or Vomiting  polyethylene glycol 3350 17 Gram(s) Oral daily PRN Constipation         Vitals log        ICU Vital Signs Last 24 Hrs  T(C): 36.6 (04 Jul 2021 06:00), Max: 36.8 (03 Jul 2021 08:39)  T(F): 97.9 (04 Jul 2021 06:00), Max: 98.2 (03 Jul 2021 08:39)  HR: 78 (04 Jul 2021 06:00) (70 - 83)  BP: 95/56 (04 Jul 2021 06:00) (84/61 - 113/71)  BP(mean): 67 (04 Jul 2021 06:00) (67 - 91)  ABP: --  ABP(mean): --  RR: 22 (04 Jul 2021 06:00) (17 - 25)  SpO2: 95% (04 Jul 2021 06:00) (92% - 98%)           Input and Output:  I&O's Detail    02 Jul 2021 07:01  -  03 Jul 2021 07:00  --------------------------------------------------------  IN:    Enteral Tube Flush: 200 mL    Free Water: 375 mL    Vital1.5: 1179 mL  Total IN: 1754 mL    OUT:    Incontinent per Collection Bag (mL): 1250 mL  Total OUT: 1250 mL    Total NET: 504 mL      03 Jul 2021 07:01  -  04 Jul 2021 06:47  --------------------------------------------------------  IN:    Enteral Tube Flush: 699 mL    Free Water: 225 mL    Vital1.5: 948 mL  Total IN: 1872 mL    OUT:    Incontinent per Collection Bag (mL): 1100 mL  Total OUT: 1100 mL    Total NET: 772 mL          Lab Data                  Review of Systems	      Objective     Physical Examination    heart s1s2  lung dec BS  abd soft  head nc      Pertinent Lab findings & Imaging      Sharon:  NO   Adequate UO     I&O's Detail    02 Jul 2021 07:01  -  03 Jul 2021 07:00  --------------------------------------------------------  IN:    Enteral Tube Flush: 200 mL    Free Water: 375 mL    Vital1.5: 1179 mL  Total IN: 1754 mL    OUT:    Incontinent per Collection Bag (mL): 1250 mL  Total OUT: 1250 mL    Total NET: 504 mL      03 Jul 2021 07:01  -  04 Jul 2021 06:47  --------------------------------------------------------  IN:    Enteral Tube Flush: 699 mL    Free Water: 225 mL    Vital1.5: 948 mL  Total IN: 1872 mL    OUT:    Incontinent per Collection Bag (mL): 1100 mL  Total OUT: 1100 mL    Total NET: 772 mL               Discussed with:     Cultures:	        Radiology

## 2021-07-04 NOTE — PROGRESS NOTE ADULT - SUBJECTIVE AND OBJECTIVE BOX
Patient is a 65y Male with a known history of :  Deep tissue injury [T14.8XXA]      HPI:  ***Patient is currently lethargic and unable to provide any history.  Collateral information obtained from chart.**      65M with seizures on keppra, BPH, hypothyroidism, sarcoidosis GERD, COPD, LAMBERT, recent admission to [facility] from 5/3-5/10/2021 for right subarachnoid hemorrhage s/p fall who presents from [facility] for hypoxia.  Per notes, patient was found to have a RLL PNA at [facility] and was started on IV meropenem yesterday.  Then today, he was found to have O2 sats of 90% while on NRB and therefore transferred here for further evaluation.     I/n the ED, patient's triage vitals were /83, , RR 32, 90%, T 100.3F.  Patient was noted to nearly obtunded with tachypnea and patient was started on BiPAP on 100%.   Labs showed leukocytosis of 25.3 with a left shift and an ABG of 7.28/89/136.  CT eventually showed RUL and RLL consolidation, likely PNA and patient was started on meropenem.  ICU was consulted and patient was accepted to [facility] for acute hypoxic respiratory failure 2/2 sepsis 2/2 possible aspiration PNA.          (21 Jun 2021 04:48)      REVIEW OF SYSTEMS:    CONSTITUTIONAL: No fever, weight loss, or fatigue  EYES: No eye pain, visual disturbances, or discharge  ENMT:  No difficulty hearing, tinnitus, vertigo; No sinus or throat pain  NECK: No pain or stiffness  BREASTS: No pain, masses, or nipple discharge  RESPIRATORY: No cough, wheezing, chills or hemoptysis; No shortness of breath  CARDIOVASCULAR: No chest pain, palpitations, dizziness, or leg swelling  GASTROINTESTINAL: No abdominal or epigastric pain. No nausea, vomiting, or hematemesis; No diarrhea or constipation. No melena or hematochezia.  GENITOURINARY: No dysuria, frequency, hematuria, or incontinence  NEUROLOGICAL: No headaches, memory loss, loss of strength, numbness, or tremors  SKIN: No itching, burning, rashes, or lesions   LYMPH NODES: No enlarged glands  ENDOCRINE: No heat or cold intolerance; No hair loss  MUSCULOSKELETAL: No joint pain or swelling; No muscle, back, or extremity pain  PSYCHIATRIC: No depression, anxiety, mood swings, or difficulty sleeping  HEME/LYMPH: No easy bruising, or bleeding gums  ALLERGY AND IMMUNOLOGIC: No hives or eczema    MEDICATIONS  (STANDING):  albuterol/ipratropium for Nebulization 3 milliLiter(s) Nebulizer every 6 hours  diVALproex Sprinkle 750 milliGRAM(s) Oral every 12 hours  doxazosin 1 milliGRAM(s) Oral at bedtime  enoxaparin Injectable 40 milliGRAM(s) SubCutaneous daily  finasteride 5 milliGRAM(s) Oral daily  FIRST- Mouthwash  BLM 5 milliLiter(s) Swish and Swallow every 6 hours  levothyroxine 25 MICROGram(s) Oral daily  midodrine. 2.5 milliGRAM(s) Oral three times a day  pantoprazole  Injectable 40 milliGRAM(s) IV Push daily  venlafaxine 37.5 milliGRAM(s) Oral every 12 hours    MEDICATIONS  (PRN):  acetaminophen    Suspension .. 650 milliGRAM(s) Oral every 6 hours PRN Temp greater or equal to 38C (100.4F), Mild Pain (1 - 3)  ondansetron Injectable 4 milliGRAM(s) IV Push every 4 hours PRN Nausea and/or Vomiting  polyethylene glycol 3350 17 Gram(s) Oral daily PRN Constipation      ALLERGIES: Keppra (Other (Severe))  penicillins (Unknown)      FAMILY HISTORY:  Family history of diabetes mellitus    Family history of pulmonary embolism (Grandparent)        Social history:  Alochol:   Smoking:   Drug Use:   Marital Status:     PHYSICAL EXAMINATION:  -----------------------------  T(C): 36.5 (07-04-21 @ 08:39), Max: 36.7 (07-03-21 @ 21:14)  HR: 89 (07-04-21 @ 12:00) (70 - 89)  BP: 101/68 (07-04-21 @ 12:00) (84/61 - 106/70)  RR: 19 (07-04-21 @ 12:00) (18 - 25)  SpO2: 90% (07-04-21 @ 12:00) (90% - 98%)  Wt(kg): --    07-03 @ 07:01  -  07-04 @ 07:00  --------------------------------------------------------  IN:    Enteral Tube Flush: 699 mL    Free Water: 225 mL    Vital1.5: 948 mL  Total IN: 1872 mL    OUT:    Incontinent per Collection Bag (mL): 1100 mL  Total OUT: 1100 mL    Total NET: 772 mL      07-04 @ 07:01  -  07-04 @ 12:35  --------------------------------------------------------  IN:    Free Water: 75 mL    Vital1.5: 237 mL  Total IN: 312 mL    OUT:  Total OUT: 0 mL    Total NET: 312 mL            Constitutional: well developed, normal appearance, well groomed, well nourished, no deformities and no acute distress.   Eyes: the conjunctiva exhibited no abnormalities and the eyelids demonstrated no xanthelasmas.   HEENT: normal oral mucosa, no oral pallor and no oral cyanosis.   Neck: normal jugular venous A waves present, normal jugular venous V waves present and no jugular venous miller A waves.   Pulmonary: no respiratory distress, normal respiratory rhythm and effort, no accessory muscle use and lungs were clear to auscultation bilaterally. Anteriorly  Cardiovascular: heart rate and rhythm were normal, normal S1 and S2 and no murmur, gallop, rub, heave or thrill are present.   Musculoskeletal: the gait could not be assessed.   Extremities: no clubbing of the fingernails, no localized cyanosis, no petechial hemorrhages and no ischemic changes.   Skin: normal skin color and pigmentation, no rash, no venous stasis, no skin lesions, no skin ulcer and no xanthoma was observed.      LABS:   --------  07-04    135  |  95<L>  |  16  ----------------------------<  119<H>  4.7   |  44<H>  |  0.33<L>    Ca    8.6      04 Jul 2021 06:49  Mg     2.0     07-04    TPro  5.6<L>  /  Alb  2.0<L>  /  TBili  0.3  /  DBili  x   /  AST  11  /  ALT  13  /  AlkPhos  59  07-04                         11.3   6.76  )-----------( 154      ( 04 Jul 2021 06:49 )             34.6                   Radiology:

## 2021-07-04 NOTE — PROGRESS NOTE ADULT - ASSESSMENT
remains on TF  vs noted  labs reviewed    65M with history of Seizure disorder, Hypothyroidism,  Thrombocytopenia, BPH, recent right SAH from fall May 2021, presents from [facility] for hypoxia and AMS. Found to have severe sepsis, acute hypoxic and hypercapnic respiratory failure    source of infection - etiol - unclear - poss asp pna - s/p ABX  probable ongoing - continued aspiration - with resulting consolidation - asp pna - as seen on CT chest and clinical presentation  at risk for decompensation - resp failure -     s/p broad spectrum ABX - cx noted, biomarkers noted,   assist with all needs and ADL  HOB elev - asp prec  oral hygiene  Prognosis guarded - GOC discussion -

## 2021-07-04 NOTE — PROGRESS NOTE ADULT - SUBJECTIVE AND OBJECTIVE BOX
JULY FORTE    [id number]    65y      Male    INTERVAL HPI/OVERNIGHT EVENTS: patient being seen for pneumonia, failure to thrive and severe protein calorie malnutrition. Patient seen at bedside and is more awake and alert.     Patient states he is hungry     REVIEW OF SYSTEMS:    CONSTITUTIONAL: No fever, weight loss, or fatigue  RESPIRATORY: No cough, wheezing, hemoptysis; No shortness of breath  CARDIOVASCULAR: No chest pain, palpitations  GASTROINTESTINAL: hungry   NEUROLOGICAL: No headaches, memory loss, loss of strength.  MISCELLANEOUS:      Vital Signs Last 24 Hrs  T(C): 36.6 (04 Jul 2021 06:00), Max: 36.8 (03 Jul 2021 08:39)  T(F): 97.9 (04 Jul 2021 06:00), Max: 98.2 (03 Jul 2021 08:39)  HR: 78 (04 Jul 2021 08:13) (70 - 83)  BP: 95/56 (04 Jul 2021 06:00) (84/61 - 113/71)  BP(mean): 67 (04 Jul 2021 06:00) (67 - 91)  RR: 22 (04 Jul 2021 06:00) (18 - 25)  SpO2: 96% (04 Jul 2021 08:13) (92% - 98%)    PHYSICAL EXAM:    GENERAL: NAD, ill appearing, cachetic  HEAD: temporal wasting, ng tube , poor hygiene  EYES: conjunctiva and sclera clear  ENMT: dry MM   NECK: Supple, No JVD  NERVOUS SYSTEM:  Awake and alert, appears older than stated age  CHEST/LUNG: diminished, scattered rhonchi   HEART: Regular rate and rhythm;   ABDOMEN: Soft, Nontender, Nondistended; Bowel sounds present  EXTREMITIES:  edema, muscle wasting       LABS:                        11.3   6.76  )-----------( 154      ( 04 Jul 2021 06:49 )             34.6     07-04    135  |  95<L>  |  16  ----------------------------<  119<H>  4.7   |  44<H>  |  0.33<L>    Ca    8.6      04 Jul 2021 06:49  Mg     2.0     07-04    TPro  5.6<L>  /  Alb  2.0<L>  /  TBili  0.3  /  DBili  x   /  AST  11  /  ALT  13  /  AlkPhos  59  07-04        MEDICATIONS  (STANDING):  albuterol/ipratropium for Nebulization 3 milliLiter(s) Nebulizer every 6 hours  diVALproex Sprinkle 750 milliGRAM(s) Oral every 12 hours  doxazosin 1 milliGRAM(s) Oral at bedtime  enoxaparin Injectable 40 milliGRAM(s) SubCutaneous daily  finasteride 5 milliGRAM(s) Oral daily  levothyroxine 25 MICROGram(s) Oral daily  midodrine. 2.5 milliGRAM(s) Oral three times a day  pantoprazole  Injectable 40 milliGRAM(s) IV Push daily  venlafaxine 37.5 milliGRAM(s) Oral every 12 hours    MEDICATIONS  (PRN):  acetaminophen    Suspension .. 650 milliGRAM(s) Oral every 6 hours PRN Temp greater or equal to 38C (100.4F), Mild Pain (1 - 3)  ondansetron Injectable 4 milliGRAM(s) IV Push every 4 hours PRN Nausea and/or Vomiting  polyethylene glycol 3350 17 Gram(s) Oral daily PRN Constipation      RADIOLOGY & ADDITIONAL TESTS:

## 2021-07-05 NOTE — PROGRESS NOTE ADULT - ASSESSMENT
on TF  on Midodrine  cardio f/u noted  repeat VBG noted -     65M with history of Seizure disorder, Hypothyroidism,  Thrombocytopenia, BPH, recent right SAH from fall May 2021, presents from [facility] for hypoxia and AMS. Found to have severe sepsis, acute hypoxic and hypercapnic respiratory failure    source of infection - etiol - unclear - poss asp pna - s/p ABX  probable ongoing - continued aspiration - with resulting consolidation - asp pna - as seen on CT chest and clinical presentation  at risk for decompensation - resp failure -     s/p broad spectrum ABX - cx noted, biomarkers noted,   assist with all needs and ADL  HOB elev - asp prec  oral hygiene  Prognosis guarded - GOC discussion -

## 2021-07-05 NOTE — PROGRESS NOTE ADULT - SUBJECTIVE AND OBJECTIVE BOX
Patient is a 65y old  Male who presents with a chief complaint of respiratory failure, lethargy (05 Jul 2021 06:54)      INTERVAL HPI/OVERNIGHT EVENTS:    no overnight events    MEDICATIONS  (STANDING):  albuterol/ipratropium for Nebulization 3 milliLiter(s) Nebulizer every 6 hours  diVALproex Sprinkle 750 milliGRAM(s) Oral every 12 hours  doxazosin 1 milliGRAM(s) Oral at bedtime  enoxaparin Injectable 40 milliGRAM(s) SubCutaneous daily  finasteride 5 milliGRAM(s) Oral daily  FIRST- Mouthwash  BLM 5 milliLiter(s) Swish and Swallow every 6 hours  levothyroxine 25 MICROGram(s) Oral daily  midodrine. 2.5 milliGRAM(s) Oral three times a day  pantoprazole  Injectable 40 milliGRAM(s) IV Push daily  venlafaxine 37.5 milliGRAM(s) Oral every 12 hours    MEDICATIONS  (PRN):  acetaminophen    Suspension .. 650 milliGRAM(s) Oral every 6 hours PRN Temp greater or equal to 38C (100.4F), Mild Pain (1 - 3)  ondansetron Injectable 4 milliGRAM(s) IV Push every 4 hours PRN Nausea and/or Vomiting  polyethylene glycol 3350 17 Gram(s) Oral daily PRN Constipation      Allergies    Keppra (Other (Severe))  penicillins (Unknown)    Intolerances    benzodiazepines (Other)        Vital Signs Last 24 Hrs  T(C): 36.7 (05 Jul 2021 08:05), Max: 37.1 (04 Jul 2021 12:30)  T(F): 98.1 (05 Jul 2021 08:05), Max: 98.7 (04 Jul 2021 12:30)  HR: 90 (05 Jul 2021 08:00) (79 - 90)  BP: 100/77 (05 Jul 2021 08:00) (90/68 - 112/63)  BP(mean): 86 (05 Jul 2021 08:00) (75 - 86)  RR: 23 (05 Jul 2021 08:00) (15 - 27)  SpO2: 93% (05 Jul 2021 08:00) (90% - 99%)    PHYSICAL EXAM:  GENERAL: NAD, ill appearing, cachetic  HEAD: temporal wasting, ng tube  EYES: conjunctiva and sclera clear  ENMT: dry MM   NECK: Supple, No JVD  NERVOUS SYSTEM:  Awake and alert, appears older than stated age  CHEST/LUNG: diminished, scattered rhonchi   HEART: Regular rate and rhythm;   ABDOMEN: Soft, Nontender, Nondistended; Bowel sounds present  EXTREMITIES:  edema, muscle wasting     LABS:                        10.8   7.88  )-----------( 148      ( 05 Jul 2021 06:46 )             33.7     05 Jul 2021 06:46    131    |  93     |  16     ----------------------------<  145    4.5     |  40     |  0.38     Ca    8.8        05 Jul 2021 06:46  Phos  3.0       05 Jul 2021 06:46  Mg     2.7       05 Jul 2021 06:46    TPro  6.2    /  Alb  1.9    /  TBili  0.3    /  DBili  x      /  AST  12     /  ALT  <10    /  AlkPhos  65     05 Jul 2021 06:46        CAPILLARY BLOOD GLUCOSE          RADIOLOGY & ADDITIONAL TESTS:    Imaging Personally Reviewed:  [ ] YES     Consultant(s) Notes Reviewed:      Care Discussed with Consultants/Other Providers:    Advanced Directives: [ ] DNR  [ ] No feeding tube  [ ] MOLST in chart  [ ] MOLST completed today  [ ] Unknown

## 2021-07-05 NOTE — PROVIDER CONTACT NOTE (CHANGE IN STATUS NOTIFICATION) - RECOMMENDATIONS
1. POD#1 Phaco/ PCL OD (Symfony MF- ZXROO) - doing well. Continue all 3 gtts as prescribed and until gone. Use Durezol BID OD Ilevro Qdaily OD Ocuflox TID OD Post op Warnings Reiterated 2. POW#2 Phaco/ PCL OS (Symfony MF- ZXROO) - doing well Continue all 3 gtts as prescribed and until gone. Use Durezol BID OS till out Use Ilevro Qdaily OS till out Post op Warnings Reiterated RTC as scheduled
sepsis workup

## 2021-07-05 NOTE — PROGRESS NOTE ADULT - SUBJECTIVE AND OBJECTIVE BOX
Chief Complaint: Respiratory distress    Interval Events: No events overnight.    Review of Systems:  General: No fevers, chills, weight loss or gain  Skin: No rashes, color changes  Cardiovascular: No chest pain, orthopnea  Respiratory: No shortness of breath, cough  Gastrointestinal: No nausea, abdominal pain  Genitourinary: No incontinence, pain with urination  Musculoskeletal: No pain, swelling, decreased range of motion  Neurological: No headache, weakness  Psychiatric: No depression, anxiety  Endocrine: No weight loss or gain, increased thirst  All other systems are comprehensively negative.    Physical Exam:  Vital Signs Last 24 Hrs  T(C): 36.7 (05 Jul 2021 08:05), Max: 37.1 (04 Jul 2021 12:30)  T(F): 98.1 (05 Jul 2021 08:05), Max: 98.7 (04 Jul 2021 12:30)  HR: 90 (05 Jul 2021 08:00) (79 - 90)  BP: 100/77 (05 Jul 2021 08:00) (90/68 - 112/63)  BP(mean): 86 (05 Jul 2021 08:00) (75 - 86)  RR: 23 (05 Jul 2021 08:00) (15 - 27)  SpO2: 93% (05 Jul 2021 08:00) (90% - 99%)  General: Chronically ill appearing  HEENT: MMM  Neck: No JVD, no carotid bruit  Lungs: CTAB  CV: RRR, nl S1/S2, no M/R/G  Abdomen: S/NT/ND, +BS  Extremities: No LE edema, no cyanosis  Neuro: AAOx3, non-focal  Skin: No rash    Labs:    07-05    131<L>  |  93<L>  |  16  ----------------------------<  145<H>  4.5   |  40<H>  |  0.38<L>    Ca    8.8      05 Jul 2021 06:46  Phos  3.0     07-05  Mg     2.7     07-05    TPro  6.2  /  Alb  1.9<L>  /  TBili  0.3  /  DBili  x   /  AST  12  /  ALT  <10<L>  /  AlkPhos  65  07-05                        10.8   7.88  )-----------( 148      ( 05 Jul 2021 06:46 )             33.7       Telemetry: Sinus rhythm

## 2021-07-05 NOTE — PROGRESS NOTE ADULT - ASSESSMENT
65M with seizures on keppra, BPH, hypothyroidism, sarcoidosis GERD, COPD, LAMBERT, recent admission to [facility] from 5/3-5/10/2021 for right subarachnoid hemorrhage s/p fall who presents from [facility] for hypoxia.  Found to have acute hypoxic and hypercapnic respiratory failure 2/2 sepsis 2/2 aspiration PNA.  Likely aspiration PNA since patient has been recommended dysphagia I diet from [facility] but reports state that he has been non-compliant with recommendations. Patient being treated for aspiration pneumonia    Problems  Acute hypoxic and hypercapnic respiratory failure  Sepsis 2/2 aspiration PNA  severe protein calorie malnturition   Sarcoidosis  Seizures  COPD    Acute hypoxic and hypercapnic respiratory failure secondary to aspiration PNA and underlying interstitial lung disease  - SPCU  -off high flow:: now on nasal cannula   -CXR followup 6/25:: severe interestial disease +fibrosis  - pulm following  - completed 10 day course of merem    Sepsis from aspiration PNA with metabolic encephalopathy -  - meropenem total 10 days completed  - speech and swallow eval -failed again on 7/1.  will likely need PEG.  Dr Boykin called by previous hospitalist to evaluate.   - OMFS eval appreciated - no signs of oral infection, will order magic mouth wash    Troponin Elevation  - likely demand ischemia,  - cardiology f/u appreciated  -  TTE noted - elevated RV pressures likely due to pna    Sarcoidosis/COPD  - started on steroids for history of bronchiectasis as well - now off     Seizure history  - cont with depakote    Hypothyroidism  - cont with synthroid -    General anxiety disorder  -  Effexor    BPH  -Proscar started 6/29  - doxazosin started on 7/3     severe protein calorie malnutrition - nutrition consult  - c.w tube feeds  - PEG TUBE discussion    hypotension - added midodrine     prognosis grim   discussed with rn

## 2021-07-05 NOTE — PROGRESS NOTE ADULT - SUBJECTIVE AND OBJECTIVE BOX
Patient is a 65y old  Male who presents with a chief complaint of respiratory failure, lethargy (05 Jul 2021 10:12)    PAST MEDICAL & SURGICAL HISTORY:  DM (diabetes mellitus)    Sarcoid    Bronchiectasis    Subdural hematoma    Inguinal hernia    Seizure    Hypothyroid    Sleep apnea    LAMBERT and COPD overlap syndrome    BPH without urinary obstruction    Status post Brihgt&#x27;s procedure    Bilateral subdural hematomas    Status post cholecystectomy    Status post inguinal hernia repair      JULY FORTE   65y    Male    BRIEF HOSPITAL COURSE:    Review of Systems:                       All other ROS are negative.    Allergies    Keppra (Other (Severe))  penicillins (Unknown)    Intolerances    benzodiazepines (Other)        ICU Vital Signs Last 24 Hrs  T(C): 38.3 (05 Jul 2021 18:55), Max: 38.3 (05 Jul 2021 18:55)  T(F): 101 (05 Jul 2021 18:55), Max: 101 (05 Jul 2021 18:55)  HR: 100 (05 Jul 2021 18:55) (79 - 100)  BP: 118/79 (05 Jul 2021 18:55) (90/68 - 118/79)  BP(mean): 91 (05 Jul 2021 18:55) (75 - 91)  ABP: --  ABP(mean): --  RR: 28 (05 Jul 2021 18:55) (19 - 28)  SpO2: 88% (05 Jul 2021 18:55) (88% - 99%)    Physical Examination:    General:     HEENT:     PULM:     CVS:     ABD:     EXT:     SKIN:     Neuro:          LABS:                        10.8   7.88  )-----------( 148      ( 05 Jul 2021 06:46 )             33.7     07-05    131<L>  |  93<L>  |  16  ----------------------------<  145<H>  4.5   |  40<H>  |  0.38<L>    Ca    8.8      05 Jul 2021 06:46  Phos  3.0     07-05  Mg     2.7     07-05    TPro  6.2  /  Alb  1.9<L>  /  TBili  0.3  /  DBili  x   /  AST  12  /  ALT  <10<L>  /  AlkPhos  65  07-05          CAPILLARY BLOOD GLUCOSE            CULTURES:      Medications:  MEDICATIONS  (STANDING):  albuterol/ipratropium for Nebulization 3 milliLiter(s) Nebulizer every 6 hours  diVALproex Sprinkle 750 milliGRAM(s) Oral every 12 hours  doxazosin 1 milliGRAM(s) Oral at bedtime  enoxaparin Injectable 40 milliGRAM(s) SubCutaneous daily  finasteride 5 milliGRAM(s) Oral daily  FIRST- Mouthwash  BLM 5 milliLiter(s) Swish and Swallow every 6 hours  levothyroxine 25 MICROGram(s) Oral daily  midodrine. 2.5 milliGRAM(s) Oral three times a day  pantoprazole  Injectable 40 milliGRAM(s) IV Push daily  venlafaxine 37.5 milliGRAM(s) Oral every 12 hours    MEDICATIONS  (PRN):  acetaminophen    Suspension .. 650 milliGRAM(s) Oral every 6 hours PRN Temp greater or equal to 38C (100.4F), Mild Pain (1 - 3)  ondansetron Injectable 4 milliGRAM(s) IV Push every 4 hours PRN Nausea and/or Vomiting  polyethylene glycol 3350 17 Gram(s) Oral daily PRN Constipation        07-04 @ 07:01  -  07-05 @ 07:00  --------------------------------------------------------  IN: 1947 mL / OUT: 750 mL / NET: 1197 mL    07-05 @ 07:01 - 07-05 @ 19:31  --------------------------------------------------------  IN: 1012 mL / OUT: 600 mL / NET: 412 mL        RADIOLOGY/IMAGING/ECHO    < from: CT Chest No Cont (06.27.21 @ 10:45) >  MPRESSION:    When compared with June 21, 2021:     Small bilateral pleural effusions are noted, right greater than left, new since the previous exam.    Underlying architectural distortion predominantly in the upper lobes with airway dilatation and traction bronchiectasis is without change. Bibasilar honeycombing is noted. Overall the underlying fibrosis is unchanged since the previous study. Mild interval improvement in patchy peribronchial vascular consolidations seen on previous examination in the left lower lobe while a right lower lobe dominant consolidation is unchanged.      Assessment/Plan:    65M recent SAH sz d/o  hypothyroidism, sarcoidosis, COPD, LAMBERT, admit here 6/21 for aspiration PNA with type 1 and 2 resp failure.     Now febrile more hypoxemic         Patient is a 65y old  Male who presents with a chief complaint of respiratory failure, lethargy (05 Jul 2021 10:12)   CCT 32    PAST MEDICAL & SURGICAL HISTORY:  DM (diabetes mellitus)    Sarcoid    Bronchiectasis    Subdural hematoma    Inguinal hernia    Seizure    Hypothyroid    Sleep apnea    LAMBERT and COPD overlap syndrome    BPH without urinary obstruction    Status post Bright&#x27;s procedure    Bilateral subdural hematomas    Status post cholecystectomy    Status post inguinal hernia repair      JULY FORTE   65y    Male    BRIEF HOSPITAL COURSE:    Review of Systems:                       All other ROS are negative.    Allergies    Keppra (Other (Severe))  penicillins (Unknown)    Intolerances    benzodiazepines (Other)        ICU Vital Signs Last 24 Hrs  T(C): 38.3 (05 Jul 2021 18:55), Max: 38.3 (05 Jul 2021 18:55)  T(F): 101 (05 Jul 2021 18:55), Max: 101 (05 Jul 2021 18:55)  HR: 100 (05 Jul 2021 18:55) (79 - 100)  BP: 118/79 (05 Jul 2021 18:55) (90/68 - 118/79)  BP(mean): 91 (05 Jul 2021 18:55) (75 - 91)  ABP: --  ABP(mean): --  RR: 28 (05 Jul 2021 18:55) (19 - 28)  SpO2: 88% (05 Jul 2021 18:55) (88% - 99%)    Physical Examination:    General: lethargic    HEENT: perrl    PULM: bilateral rhonchi R>L    CVS: s1 s2 reg    ABD: soft+BS    EXT: trace edema     SKIN: warm     Neuro:  WALLS drowsy follows commands           LABS:                        10.8   7.88  )-----------( 148      ( 05 Jul 2021 06:46 )             33.7     07-05    131<L>  |  93<L>  |  16  ----------------------------<  145<H>  4.5   |  40<H>  |  0.38<L>    Ca    8.8      05 Jul 2021 06:46  Phos  3.0     07-05  Mg     2.7     07-05    TPro  6.2  /  Alb  1.9<L>  /  TBili  0.3  /  DBili  x   /  AST  12  /  ALT  <10<L>  /  AlkPhos  65  07-05          CAPILLARY BLOOD GLUCOSE            CULTURES:      Medications:  MEDICATIONS  (STANDING):  albuterol/ipratropium for Nebulization 3 milliLiter(s) Nebulizer every 6 hours  diVALproex Sprinkle 750 milliGRAM(s) Oral every 12 hours  doxazosin 1 milliGRAM(s) Oral at bedtime  enoxaparin Injectable 40 milliGRAM(s) SubCutaneous daily  finasteride 5 milliGRAM(s) Oral daily  FIRST- Mouthwash  BLM 5 milliLiter(s) Swish and Swallow every 6 hours  levothyroxine 25 MICROGram(s) Oral daily  midodrine. 2.5 milliGRAM(s) Oral three times a day  pantoprazole  Injectable 40 milliGRAM(s) IV Push daily  venlafaxine 37.5 milliGRAM(s) Oral every 12 hours    MEDICATIONS  (PRN):  acetaminophen    Suspension .. 650 milliGRAM(s) Oral every 6 hours PRN Temp greater or equal to 38C (100.4F), Mild Pain (1 - 3)  ondansetron Injectable 4 milliGRAM(s) IV Push every 4 hours PRN Nausea and/or Vomiting  polyethylene glycol 3350 17 Gram(s) Oral daily PRN Constipation        07-04 @ 07:01 - 07-05 @ 07:00  --------------------------------------------------------  IN: 1947 mL / OUT: 750 mL / NET: 1197 mL    07-05 @ 07:01 - 07-05 @ 19:31  --------------------------------------------------------  IN: 1012 mL / OUT: 600 mL / NET: 412 mL        RADIOLOGY/IMAGING/ECHO    < from: CT Chest No Cont (06.27.21 @ 10:45) >  MPRESSION:    When compared with June 21, 2021:     Small bilateral pleural effusions are noted, right greater than left, new since the previous exam.    Underlying architectural distortion predominantly in the upper lobes with airway dilatation and traction bronchiectasis is without change. Bibasilar honeycombing is noted. Overall the underlying fibrosis is unchanged since the previous study. Mild interval improvement in patchy peribronchial vascular consolidations seen on previous examination in the left lower lobe while a right lower lobe dominant consolidation is unchanged.      Assessment/Plan:    65M recent SAH sz d/o  hypothyroidism, sarcoidosis, COPD, LAMBERT, admit here 6/21 for aspiration PNA with type 1 and 2 resp failure.     Received 10 days of merrum.  Has been getting NGT feeds.      Now more hypoxemic febrile to 101 AMS lethargic but arousable and WALLS.  Not managing secretions well.      ABG checked and acceptable on 5L 7.39/63/68    CXR  with New RUL infiltrate. Suspect recurrent aspiration     Will resume ABX.      Call placed to Dr Ocampo covering Dr Granados for rec.  of ABX choice.   Dr Cyrstal the hospitalist informed of change in condition.     Unfortunately he is not a good NIPPV candidate with his current MS and secretion issue.  Silvina try HFNC for WOB and hypoxemia, but he is high risk for intubation.

## 2021-07-05 NOTE — CHART NOTE - NSCHARTNOTEFT_GEN_A_CORE
Assessment:     Factors impacting intake: [ ] none [ ] nausea  [ ] vomiting [ ] diarrhea [ ] constipation  [ ]chewing problems [ ] swallowing issues  [ ] other:     Diet Presciption: Diet, NPO with Tube Feed:   Tube Feeding Modality: Nasogastric  Vital 1.5 Lee  Total Volume for 24 Hours (mL): 1422  Bolus  Total Volume of Bolus (mL):  237  Tube Feed Frequency: Every 4 hours   Tube Feed Start Time: 12:00  Bolus Feed Rate (mL per Hour): 237   Bolus Feed Duration (in Hours): 1  Bolus Feed Instructions:  bolus feed 4x day with 75cc water flush.  patient should be sitting upright for at least 30min post feed to minimize aspiration risk.  Free Water Flush   Total Volume per Flush (mL): 75   Frequency: Every 4 Hours  No Carb Prosource (1pkg = 15gms Protein)     Qty per Day:  1 (06-29-21 @ 11:02)    Intake:     Current Weight:   % Weight Change    Pertinent Medications: MEDICATIONS  (STANDING):  albuterol/ipratropium for Nebulization 3 milliLiter(s) Nebulizer every 6 hours  diVALproex Sprinkle 750 milliGRAM(s) Oral every 12 hours  doxazosin 1 milliGRAM(s) Oral at bedtime  enoxaparin Injectable 40 milliGRAM(s) SubCutaneous daily  finasteride 5 milliGRAM(s) Oral daily  FIRST- Mouthwash  BLM 5 milliLiter(s) Swish and Swallow every 6 hours  levothyroxine 25 MICROGram(s) Oral daily  midodrine. 2.5 milliGRAM(s) Oral three times a day  pantoprazole  Injectable 40 milliGRAM(s) IV Push daily  venlafaxine 37.5 milliGRAM(s) Oral every 12 hours    MEDICATIONS  (PRN):  acetaminophen    Suspension .. 650 milliGRAM(s) Oral every 6 hours PRN Temp greater or equal to 38C (100.4F), Mild Pain (1 - 3)  ondansetron Injectable 4 milliGRAM(s) IV Push every 4 hours PRN Nausea and/or Vomiting  polyethylene glycol 3350 17 Gram(s) Oral daily PRN Constipation    Pertinent Labs: 07-05 Na131 mmol/L<L> Glu 145 mg/dL<H> K+ 4.5 mmol/L Cr  0.38 mg/dL<L> BUN 16 mg/dL 07-05 Phos 3.0 mg/dL 07-05 Alb 1.9 g/dL<L>     CAPILLARY BLOOD GLUCOSE        Skin:     Estimated Needs:   [ ] no change since previous assessment  [ ] recalculated:     Previous Nutrition Diagnosis:   [ ] Inadequate Energy Intake [ ]Inadequate Oral Intake [ ] Excessive Energy Intake   [ ] Underweight [ ] Increased Nutrient Needs [ ] Overweight/Obesity   [ ] Altered GI Function [ ] Unintended Weight Loss [ ] Food & Nutrition Related Knowledge Deficit [ ] Malnutrition     Nutrition Diagnosis is [ ] ongoing  [ ] resolved [ ] not applicable     New Nutrition Diagnosis: [ ] not applicable       Interventions:   Recommend  [ ] Change Diet To:  [ ] Nutrition Supplement  [ ] Nutrition Support  [ ] Other:     Monitoring and Evaluation:   [ ] PO intake [ x ] Tolerance to diet prescription [ x ] weights [ x ] labs[ x ] follow up per protocol  [ ] other: Assessment:      65 year old M with seizures , BPH, hypothyroidism, sarcoidosis GERD, COPD, LAMBERT, recent admission to [facility] from 5/3-5/10/2021 for right subarachnoid hemorrhage s/p fall who presents from [facility] for hypoxia.  Found to have acute hypoxic and hypercapnic respiratory failure 2/2 sepsis 2/2 aspiration PNA.  It was recommended that pt follow dysphagia I diet from [facility] but reports state that he has been non-compliant with recommendation.  Per SLP evaluation this admission, oral nutrition/hydration contraindicated.  NGT placed for nutrition/meds.  Pt started NGT feedings: Vital 1.5 q 6 hrs with 100ml water before/after each feed.  TF/bolus feedings increased 6/29 to better meet needs (current order active for Jevity 1.5 every 4hrs with 75ml water flush before and after each feed, +NC prosource once daily - provides 2190 kcal, 111 gm protein). Pt has been tolerating bolus feedings. However, per MD note, pt still at risk for aspiration, decompensation. Pt may require PEG: GOC discussions ongoing.             Factors impacting intake: [ ] none [ ] nausea  [ ] vomiting [ ] diarrhea [ ] constipation  [ ]chewing problems [ x] swallowing issues  [ ] other:     Diet Presciption: Diet, NPO with Tube Feed:   Tube Feeding Modality: Nasogastric  Vital 1.5 Lee  Total Volume for 24 Hours (mL): 1422  Bolus  Total Volume of Bolus (mL):  237  Tube Feed Frequency: Every 4 hours   Tube Feed Start Time: 12:00  Bolus Feed Rate (mL per Hour): 237   Bolus Feed Duration (in Hours): 1  Bolus Feed Instructions:  bolus feed 4x day with 75cc water flush.  patient should be sitting upright for at least 30min post feed to minimize aspiration risk.  Free Water Flush   Total Volume per Flush (mL): 75   Frequency: Every 4 Hours  No Carb Prosource (1pkg = 15gms Protein)     Qty per Day:  1 (06-29-21 @ 11:02)    Intake: N/A    Current Weight: 173.5#  generalized/L hand edema +1, L arm edema +2  % Weight Change    Pertinent Medications: MEDICATIONS  (STANDING):  albuterol/ipratropium for Nebulization 3 milliLiter(s) Nebulizer every 6 hours  diVALproex Sprinkle 750 milliGRAM(s) Oral every 12 hours  doxazosin 1 milliGRAM(s) Oral at bedtime  enoxaparin Injectable 40 milliGRAM(s) SubCutaneous daily  finasteride 5 milliGRAM(s) Oral daily  FIRST- Mouthwash  BLM 5 milliLiter(s) Swish and Swallow every 6 hours  levothyroxine 25 MICROGram(s) Oral daily  midodrine. 2.5 milliGRAM(s) Oral three times a day  pantoprazole  Injectable 40 milliGRAM(s) IV Push daily  venlafaxine 37.5 milliGRAM(s) Oral every 12 hours    MEDICATIONS  (PRN):  acetaminophen    Suspension .. 650 milliGRAM(s) Oral every 6 hours PRN Temp greater or equal to 38C (100.4F), Mild Pain (1 - 3)  ondansetron Injectable 4 milliGRAM(s) IV Push every 4 hours PRN Nausea and/or Vomiting  polyethylene glycol 3350 17 Gram(s) Oral daily PRN Constipation    Pertinent Labs: 07-05 Na131 mmol/L<L> Glu 145 mg/dL<H> K+ 4.5 mmol/L Cr  0.38 mg/dL<L> BUN 16 mg/dL 07-05 Phos 3.0 mg/dL 07-05 Alb 1.9 g/dL<L>     CAPILLARY BLOOD GLUCOSE        Skin: sacral DTI    Estimated Needs:   [x ] no change since previous assessment  [ ] recalculated:     Previous Nutrition Diagnosis:   [ ] Inadequate Energy Intake [ ]Inadequate Oral Intake [ ] Excessive Energy Intake   [ ] Underweight [ ] Increased Nutrient Needs [ ] Overweight/Obesity   [ ] Altered GI Function [ ] Unintended Weight Loss [ ] Food & Nutrition Related Knowledge Deficit [x ] Malnutrition     Nutrition Diagnosis is [ x] ongoing  [ ] resolved [ ] not applicable     New Nutrition Diagnosis: [ ] not applicable       Interventions:   Recommend  [ ] Change Diet To:  [ ] Nutrition Supplement  [ ] Nutrition Support  [ ] Other:     Monitoring and Evaluation:   [ ] PO intake [ x ] Tolerance to diet prescription [ x ] weights [ x ] labs[ x ] follow up per protocol  [ ] other: parent

## 2021-07-05 NOTE — PROGRESS NOTE ADULT - SUBJECTIVE AND OBJECTIVE BOX
Date/Time Patient Seen:  		  Referring MD:   Data Reviewed	       Patient is a 65y old  Male who presents with a chief complaint of respiratory failure, lethargy (04 Jul 2021 12:35)      Subjective/HPI     PAST MEDICAL & SURGICAL HISTORY:  No pertinent past medical history    DM (diabetes mellitus)    Sarcoid    Bronchiectasis    Diverticulitis    Subdural hematoma    Inguinal hernia    Cellulitis    Seizure    Hypothyroid    Sleep apnea    LAMBERT and COPD overlap syndrome    BPH without urinary obstruction    No significant past surgical history    Status post Bright&#x27;s procedure    Bilateral subdural hematomas    Status post cholecystectomy    Status post inguinal hernia repair          Medication list         MEDICATIONS  (STANDING):  albuterol/ipratropium for Nebulization 3 milliLiter(s) Nebulizer every 6 hours  diVALproex Sprinkle 750 milliGRAM(s) Oral every 12 hours  doxazosin 1 milliGRAM(s) Oral at bedtime  enoxaparin Injectable 40 milliGRAM(s) SubCutaneous daily  finasteride 5 milliGRAM(s) Oral daily  FIRST- Mouthwash  BLM 5 milliLiter(s) Swish and Swallow every 6 hours  levothyroxine 25 MICROGram(s) Oral daily  midodrine. 2.5 milliGRAM(s) Oral three times a day  pantoprazole  Injectable 40 milliGRAM(s) IV Push daily  venlafaxine 37.5 milliGRAM(s) Oral every 12 hours    MEDICATIONS  (PRN):  acetaminophen    Suspension .. 650 milliGRAM(s) Oral every 6 hours PRN Temp greater or equal to 38C (100.4F), Mild Pain (1 - 3)  ondansetron Injectable 4 milliGRAM(s) IV Push every 4 hours PRN Nausea and/or Vomiting  polyethylene glycol 3350 17 Gram(s) Oral daily PRN Constipation         Vitals log        ICU Vital Signs Last 24 Hrs  T(C): 36.5 (05 Jul 2021 04:03), Max: 37.1 (04 Jul 2021 12:30)  T(F): 97.7 (05 Jul 2021 04:03), Max: 98.7 (04 Jul 2021 12:30)  HR: 89 (05 Jul 2021 06:36) (72 - 90)  BP: 109/66 (05 Jul 2021 06:00) (90/68 - 112/63)  BP(mean): 80 (05 Jul 2021 06:00) (67 - 81)  ABP: --  ABP(mean): --  RR: 20 (05 Jul 2021 06:00) (15 - 27)  SpO2: 96% (05 Jul 2021 06:36) (90% - 99%)           Input and Output:  I&O's Detail    03 Jul 2021 07:01  -  04 Jul 2021 07:00  --------------------------------------------------------  IN:    Enteral Tube Flush: 699 mL    Free Water: 225 mL    Vital1.5: 948 mL  Total IN: 1872 mL    OUT:    Incontinent per Collection Bag (mL): 1100 mL  Total OUT: 1100 mL    Total NET: 772 mL      04 Jul 2021 07:01  -  05 Jul 2021 06:54  --------------------------------------------------------  IN:    Enteral Tube Flush: 375 mL    Free Water: 150 mL    Vital1.5: 1422 mL  Total IN: 1947 mL    OUT:    Incontinent per Collection Bag (mL): 750 mL  Total OUT: 750 mL    Total NET: 1197 mL          Lab Data                        10.8   7.88  )-----------( 148      ( 05 Jul 2021 06:46 )             33.7     07-04    135  |  95<L>  |  16  ----------------------------<  119<H>  4.7   |  44<H>  |  0.33<L>    Ca    8.6      04 Jul 2021 06:49  Mg     2.0     07-04    TPro  5.6<L>  /  Alb  2.0<L>  /  TBili  0.3  /  DBili  x   /  AST  11  /  ALT  13  /  AlkPhos  59  07-04            Review of Systems	      Objective     Physical Examination    heart s1s2  lung dec bS  abd soft      Pertinent Lab findings & Imaging      Sharon:  NO   Adequate UO     I&O's Detail    03 Jul 2021 07:01  -  04 Jul 2021 07:00  --------------------------------------------------------  IN:    Enteral Tube Flush: 699 mL    Free Water: 225 mL    Vital1.5: 948 mL  Total IN: 1872 mL    OUT:    Incontinent per Collection Bag (mL): 1100 mL  Total OUT: 1100 mL    Total NET: 772 mL      04 Jul 2021 07:01  -  05 Jul 2021 06:54  --------------------------------------------------------  IN:    Enteral Tube Flush: 375 mL    Free Water: 150 mL    Vital1.5: 1422 mL  Total IN: 1947 mL    OUT:    Incontinent per Collection Bag (mL): 750 mL  Total OUT: 750 mL    Total NET: 1197 mL               Discussed with:     Cultures:	        Radiology

## 2021-07-05 NOTE — PROVIDER CONTACT NOTE (CHANGE IN STATUS NOTIFICATION) - SITUATION
febrile, lethargic easily arousable, adventitious breath sounds noted, ST on monitor 103, normotensive, 90-92% on 5L nc, completed IV ANTIBX last week, on tube feeding

## 2021-07-05 NOTE — PROVIDER CONTACT NOTE (CHANGE IN STATUS NOTIFICATION) - ACTION/TREATMENT ORDERED:
PA notified and made, at beside, aware of condition, ordered blood gases, UA, cxr, chest precussion provided,tube feed held, call bell c/ in reach, safety & comfort maintained, fall precautions set ,vss, cont to monitor and PA orders

## 2021-07-06 NOTE — PROGRESS NOTE ADULT - SUBJECTIVE AND OBJECTIVE BOX
Patient is a 65y old  Male who presents with a chief complaint of respiratory failure, lethargy (2021 10:03)      INTERVAL HPI/OVERNIGHT EVENTS:    MEDICATIONS  (STANDING):  albuterol/ipratropium for Nebulization 3 milliLiter(s) Nebulizer every 6 hours  enoxaparin Injectable 40 milliGRAM(s) SubCutaneous daily  FIRST- Mouthwash  BLM 5 milliLiter(s) Swish and Swallow every 6 hours  lactated ringers. 1000 milliLiter(s) (50 mL/Hr) IV Continuous <Continuous>  levothyroxine 25 MICROGram(s) Oral daily  meropenem  IVPB 1000 milliGRAM(s) IV Intermittent every 8 hours  pantoprazole  Injectable 40 milliGRAM(s) IV Push daily  valproate sodium IVPB 1500 milliGRAM(s) IV Intermittent at bedtime    MEDICATIONS  (PRN):  acetaminophen    Suspension .. 650 milliGRAM(s) Oral every 6 hours PRN Temp greater or equal to 38C (100.4F), Mild Pain (1 - 3)  acetaminophen  Suppository .. 650 milliGRAM(s) Rectal every 6 hours PRN Temp greater or equal to 38C (100.4F)  ondansetron Injectable 4 milliGRAM(s) IV Push every 4 hours PRN Nausea and/or Vomiting      Allergies    Keppra (Other (Severe))  penicillins (Unknown)    Intolerances    benzodiazepines (Other)      REVIEW OF SYSTEMS:  CONSTITUTIONAL: No fever, weight loss, or fatigue  EYES: No eye pain, visual disturbances, or discharge  ENMT:  No difficulty hearing, tinnitus, vertigo; No sinus or throat pain  NECK: No pain or stiffness  BREASTS: No pain, masses, or nipple discharge  RESPIRATORY: No cough, wheezing, chills or hemoptysis; No shortness of breath  CARDIOVASCULAR: No chest pain, palpitations, lightheadedness, or leg swelling  GASTROINTESTINAL: No abdominal or epigastric pain. No nausea, vomiting, or hematemesis; No diarrhea or constipation. No melena or hematochezia.  GENITOURINARY: No dysuria, frequency, hematuria, or incontinence  NEUROLOGICAL: No headaches, memory loss, vertigo, loss of strength, numbness, or tremors  SKIN: No itching, burning, rashes, or lesions   LYMPH NODES: No enlarged glands  ENDOCRINE: No heat or cold intolerance; No hair loss; No polydipsia or polyuria  MUSCULOSKELETAL: No joint pain or swelling; No muscle, back, or extremity pain  PSYCHIATRIC: No depression, anxiety, or mood swings  HEME/LYMPH: No easy bruising, or bleeding gums  ALLERGY AND IMMUNOLOGIC: No hives or eczema    Vital Signs Last 24 Hrs  T(C): 37.5 (2021 09:38), Max: 38.4 (2021 00:00)  T(F): 99.5 (2021 09:38), Max: 101.2 (2021 00:00)  HR: 107 (2021 08:00) (92 - 190)  BP: 94/69 (2021 08:00) (94/69 - 130/75)  BP(mean): 76 (2021 08:00) (76 - 91)  RR: 31 (2021 08:00) (22 - 32)  SpO2: 96% (2021 08:00) (88% - 97%)    PHYSICAL EXAM:  GENERAL: NAD, well-groomed, well-developed  HEAD:  Atraumatic, Normocephalic  EYES: EOMI, PERRLA, conjunctiva and sclera clear  ENMT: Moist mucous membranes, Good dentition, No lesions; No tonsillar erythema, exudates, or enlargement  NECK: Supple, No JVD, Normal thyroid  NERVOUS SYSTEM:  Alert & Oriented X3, Good concentration; All 4 extremities mobile, no gross sensory deficits.   CHEST/LUNG: Clear to auscultation bilaterally; No rales, rhonchi, wheezing, or rubs  HEART: Regular rate and rhythm; No murmurs, rubs, or gallops  ABDOMEN: Soft, Nontender, Nondistended; Bowel sounds present  EXTREMITIES:  2+ Peripheral Pulses, No clubbing, cyanosis, or edema  LYMPH: No lymphadenopathy noted  SKIN: No rashes or lesions    LABS:                        12.3   5.93  )-----------( 145      ( 2021 07:08 )             37.3     2021 07:08    132    |  93     |  20     ----------------------------<  113    4.8     |  41     |  0.55     Ca    10.0       2021 07:08        Urinalysis Basic - ( 2021 22:34 )    Color: Yellow / Appearance: Clear / S.010 / pH: x  Gluc: x / Ketone: Trace  / Bili: Negative / Urobili: 8 mg/dL   Blood: x / Protein: Negative mg/dL / Nitrite: Negative   Leuk Esterase: Trace / RBC: 3-5 /HPF / WBC 0-2   Sq Epi: x / Non Sq Epi: Negative / Bacteria: Negative      CAPILLARY BLOOD GLUCOSE          RADIOLOGY & ADDITIONAL TESTS:    Imaging Personally Reviewed:  [ ] YES     Consultant(s) Notes Reviewed:      Care Discussed with Consultants/Other Providers:    Advanced Directives: [ ] DNR  [ ] No feeding tube  [ ] MOLST in chart  [ ] MOLST completed today  [ ] Unknown   Patient is a 65y old  Male who presents with a chief complaint of respiratory failure, lethargy (2021 10:03)    INTERVAL HPI/OVERNIGHT EVENTS: since yesterday patient     MEDICATIONS  (STANDING):  albuterol/ipratropium for Nebulization 3 milliLiter(s) Nebulizer every 6 hours  enoxaparin Injectable 40 milliGRAM(s) SubCutaneous daily  FIRST- Mouthwash  BLM 5 milliLiter(s) Swish and Swallow every 6 hours  lactated ringers. 1000 milliLiter(s) (50 mL/Hr) IV Continuous <Continuous>  levothyroxine 25 MICROGram(s) Oral daily  meropenem  IVPB 1000 milliGRAM(s) IV Intermittent every 8 hours  pantoprazole  Injectable 40 milliGRAM(s) IV Push daily  valproate sodium IVPB 1500 milliGRAM(s) IV Intermittent at bedtime    MEDICATIONS  (PRN):  acetaminophen    Suspension .. 650 milliGRAM(s) Oral every 6 hours PRN Temp greater or equal to 38C (100.4F), Mild Pain (1 - 3)  acetaminophen  Suppository .. 650 milliGRAM(s) Rectal every 6 hours PRN Temp greater or equal to 38C (100.4F)  ondansetron Injectable 4 milliGRAM(s) IV Push every 4 hours PRN Nausea and/or Vomiting      Allergies    Keppra (Other (Severe))  penicillins (Unknown)    Intolerances    benzodiazepines (Other)      REVIEW OF SYSTEMS:  CONSTITUTIONAL: No fever, weight loss, or fatigue  EYES: No eye pain, visual disturbances, or discharge  ENMT:  No difficulty hearing, tinnitus, vertigo; No sinus or throat pain  NECK: No pain or stiffness  BREASTS: No pain, masses, or nipple discharge  RESPIRATORY: No cough, wheezing, chills or hemoptysis; No shortness of breath  CARDIOVASCULAR: No chest pain, palpitations, lightheadedness, or leg swelling  GASTROINTESTINAL: No abdominal or epigastric pain. No nausea, vomiting, or hematemesis; No diarrhea or constipation. No melena or hematochezia.  GENITOURINARY: No dysuria, frequency, hematuria, or incontinence  NEUROLOGICAL: No headaches, memory loss, vertigo, loss of strength, numbness, or tremors  SKIN: No itching, burning, rashes, or lesions   LYMPH NODES: No enlarged glands  ENDOCRINE: No heat or cold intolerance; No hair loss; No polydipsia or polyuria  MUSCULOSKELETAL: No joint pain or swelling; No muscle, back, or extremity pain  PSYCHIATRIC: No depression, anxiety, or mood swings  HEME/LYMPH: No easy bruising, or bleeding gums  ALLERGY AND IMMUNOLOGIC: No hives or eczema    Vital Signs Last 24 Hrs  T(C): 37.5 (2021 09:38), Max: 38.4 (2021 00:00)  T(F): 99.5 (2021 09:38), Max: 101.2 (2021 00:00)  HR: 107 (2021 08:00) (92 - 190)  BP: 94/69 (2021 08:00) (94/69 - 130/75)  BP(mean): 76 (2021 08:00) (76 - 91)  RR: 31 (2021 08:00) (22 - 32)  SpO2: 96% (2021 08:00) (88% - 97%)    PHYSICAL EXAM:  GENERAL: NAD, well-groomed, well-developed  HEAD:  Atraumatic, Normocephalic  EYES: EOMI, PERRLA, conjunctiva and sclera clear  ENMT: Moist mucous membranes, Good dentition, No lesions; No tonsillar erythema, exudates, or enlargement  NECK: Supple, No JVD, Normal thyroid  NERVOUS SYSTEM:  Alert & Oriented X3, Good concentration; All 4 extremities mobile, no gross sensory deficits.   CHEST/LUNG: Clear to auscultation bilaterally; No rales, rhonchi, wheezing, or rubs  HEART: Regular rate and rhythm; No murmurs, rubs, or gallops  ABDOMEN: Soft, Nontender, Nondistended; Bowel sounds present  EXTREMITIES:  2+ Peripheral Pulses, No clubbing, cyanosis, or edema  LYMPH: No lymphadenopathy noted  SKIN: No rashes or lesions    LABS:                        12.3   5.93  )-----------( 145      ( 2021 07:08 )             37.3     2021 07:08    132    |  93     |  20     ----------------------------<  113    4.8     |  41     |  0.55     Ca    10.0       2021 07:08        Urinalysis Basic - ( 2021 22:34 )    Color: Yellow / Appearance: Clear / S.010 / pH: x  Gluc: x / Ketone: Trace  / Bili: Negative / Urobili: 8 mg/dL   Blood: x / Protein: Negative mg/dL / Nitrite: Negative   Leuk Esterase: Trace / RBC: 3-5 /HPF / WBC 0-2   Sq Epi: x / Non Sq Epi: Negative / Bacteria: Negative      CAPILLARY BLOOD GLUCOSE          RADIOLOGY & ADDITIONAL TESTS:    Imaging Personally Reviewed:  [ ] YES     Consultant(s) Notes Reviewed:      Care Discussed with Consultants/Other Providers:    Advanced Directives: [ ] DNR  [ ] No feeding tube  [ ] MOLST in chart  [ ] MOLST completed today  [ ] Unknown   Patient is a 65y old  Male who presents with a chief complaint of respiratory failure, lethargy (2021 10:03)    INTERVAL HPI/OVERNIGHT EVENTS: since yesterday patient lethargic, febrile, increased secretions.    Xray shows increased consolidation right upper lobe.     MEDICATIONS  (STANDING):  albuterol/ipratropium for Nebulization 3 milliLiter(s) Nebulizer every 6 hours  enoxaparin Injectable 40 milliGRAM(s) SubCutaneous daily  FIRST- Mouthwash  BLM 5 milliLiter(s) Swish and Swallow every 6 hours  lactated ringers. 1000 milliLiter(s) (50 mL/Hr) IV Continuous <Continuous>  levothyroxine 25 MICROGram(s) Oral daily  meropenem  IVPB 1000 milliGRAM(s) IV Intermittent every 8 hours  pantoprazole  Injectable 40 milliGRAM(s) IV Push daily  valproate sodium IVPB 1500 milliGRAM(s) IV Intermittent at bedtime    MEDICATIONS  (PRN):  acetaminophen    Suspension .. 650 milliGRAM(s) Oral every 6 hours PRN Temp greater or equal to 38C (100.4F), Mild Pain (1 - 3)  acetaminophen  Suppository .. 650 milliGRAM(s) Rectal every 6 hours PRN Temp greater or equal to 38C (100.4F)  ondansetron Injectable 4 milliGRAM(s) IV Push every 4 hours PRN Nausea and/or Vomiting      Allergies  Keppra (Other (Severe))  penicillins (Unknown)    Intolerances  benzodiazepines (Other)      REVIEW OF SYSTEMS:  unable to obtain    Vital Signs Last 24 Hrs  T(C): 37.5 (2021 09:38), Max: 38.4 (2021 00:00)  T(F): 99.5 (2021 09:38), Max: 101.2 (2021 00:00)  HR: 107 (2021 08:00) (92 - 190)  BP: 94/69 (2021 08:00) (94/69 - 130/75)  BP(mean): 76 (2021 08:00) (76 - 91)  RR: 31 (2021 08:00) (22 - 32)  SpO2: 96% (2021 08:00) (88% - 97%)    PHYSICAL EXAM:  HEAD:  Atraumatic, Normocephalic  EYES:  conjunctiva and sclera clear  ENMT: Moist mucous membranes  NECK: Supple, No JVD  NERVOUS SYSTEM:  opens eyes to voice, answers yes/no to very simple questiosn  CHEST/LUNG: wheezing left > right, scattered rhonchi,  HEART: tachy s1s2  ABDOMEN: Soft, Nontender, Nondistended; Bowel sounds present  EXTREMITIES:  2+ Peripheral Pulses, diffuse edema    LABS:                        12.3   5.93  )-----------( 145      ( 2021 07:08 )             37.3     2021 07:08    132    |  93     |  20     ----------------------------<  113    4.8     |  41     |  0.55     Ca    10.0       2021 07:08    Urinalysis Basic - ( 2021 22:34 )  Color: Yellow / Appearance: Clear / S.010 / pH: x  Gluc: x / Ketone: Trace  / Bili: Negative / Urobili: 8 mg/dL   Blood: x / Protein: Negative mg/dL / Nitrite: Negative   Leuk Esterase: Trace / RBC: 3-5 /HPF / WBC 0-2   Sq Epi: x / Non Sq Epi: Negative / Bacteria: Negative      CAPILLARY BLOOD GLUCOSE          RADIOLOGY & ADDITIONAL TESTS:    Imaging Personally Reviewed:  [ x] YES -new right upper lobe consolidation    Consultant(s) Notes Reviewed:  pulm, id, cardio    Care Discussed with Consultants/Other Providers:    Advanced Directives: [ ] DNR  [ ] No feeding tube  [ ] MOLST in chart  [ ] MOLST completed today  [ ] Unknown

## 2021-07-06 NOTE — PROGRESS NOTE ADULT - SUBJECTIVE AND OBJECTIVE BOX
Recurrent Type 1 resp failure due to recurrent aspiration and now inability to clear secretions    Lethargic, escalated to  HFNC 70% 60 lpm with sat 93%.  Moderate resp effort.  No acute hypercapnia on ABG check    Copious tracheal secretions requiring frequent NT suctioning     New airspace infiltrate on CXR.      Febrile.    Abx resumed.    Heading toward the need for intubation.  Will await the arrival of anesthesia this AM as he may be  a difficult airway due to his crowded mouth and retrognathia.      Not a good candidate for NIPPV.      Will call his daughter Kristin.        D/W Dr Pak

## 2021-07-06 NOTE — PROGRESS NOTE ADULT - ASSESSMENT
The patient is a 65 year old male with a history of seizure d/o, BPH, hypothyroidism, sarcoidosis, COPD, LAMBERT, recent SAH who is admitted with respiratory failure in the setting of aspiration PNA.    Plan:  - ECG with anterolateral ST depressions  - Troponin peaked at 0.49. Likely elevated in the setting of demand ischemia from underlying infection, respiratory failure, tachycardia. No need to trend further.  - Echo with normal LV systolic function, no significant valve issues  - Given recent SAH, would hold off with antiplatelets or anticoagulants  - Completed meropenem  - NGT in place  - Likely recurrent aspiration with worsening respiratory status - high risk for further decompensation

## 2021-07-06 NOTE — PROGRESS NOTE ADULT - ASSESSMENT
65M with seizures on keppra, BPH, hypothyroidism, sarcoidosis GERD, COPD, LAMBERT, recent admission to [facility] from 5/3-5/10/2021 for right subarachnoid hemorrhage s/p fall who presents from [facility] for hypoxia.  Found to have acute hypoxic and hypercapnic respiratory failure 2/2 sepsis 2/2 aspiration PNA.  Likely aspiration PNA since patient has been recommended dysphagia I diet from [facility] but reports state that he has been non-compliant with recommendation.  Also location of RLL and RUL also fits with aspiration.      Problems  Acute hypoxic and hypercapnic respiratory failure  Sepsis 2/2 aspiration PNA  Sarcoidosis  Seizures  COPD    Acute hypoxic and hypercapnic respiratory failure secondary to aspiration PNA and underlying interstitial lung disease  - admitted to [facility]  -off high flow:: now on nasal cannula   -CXR followup 6/25:: severe interestial disease +fibrosis  - still with copious secretions    Sepsis from aspiration PNA with metabolic encephalopathy - sepsis resolving  - meropenem total 10 days as per ID, abx course now complete  - f/u blood cultures - NGTD  - speech and swallow eval -failed again on 7/1.  will likely need PEG.  Will need to discuss further with sister.  Dr Boykin called to evaluate.   - OMFS eval appreciated - no signs of oral infection    Troponin Elevation  - likely demand ischemia, no need to trend further  - cardiology f/u appreciated  -  TTE noted - elevated RV pressures likely due to pna    Sarcoidosis/COPD  - started on steroids for history of bronchiectasis as well - being tapered by pulm  - symbicort - patient not able to use properly - can dc.     Seizure history  - cont with depakote    Hypothyroidism  - cont with synthroid - switch to NGT    General anxiety disorder  - restart Effexor    BPH  - [facility] dc'ed 6/30,  flomax cannot be given via NGT.  Proscar started 6/29    Preventive measures  - can start with lovenox 40mg subq for DVT ppx    Patient critically ill, high risk for deterioration.     65M with seizures on keppra, BPH, hypothyroidism, sarcoidosis GERD, COPD, LAMBERT, recent admission to [facility] from 5/3-5/10/2021 for right subarachnoid hemorrhage s/p fall who presents from [facility] for hypoxia.  Found to have acute hypoxic and hypercapnic respiratory failure 2/2 sepsis 2/2 aspiration PNA.  Likely aspiration PNA since patient has been recommended dysphagia I diet from [facility] but reports state that he has been non-compliant with recommendation.  Also location of RLL and RUL also fits with aspiration.      Problems  Acute hypoxic and hypercapnic respiratory failure  Sepsis 2/2 aspiration PNA  Sarcoidosis  Seizures  COPD    Acute hypoxic and hypercapnic respiratory failure secondary to aspiration PNA and underlying interstitial lung disease  - admitted to [facility]  - now back on 100% high genesis oxygen  - still with copious secretions    Sepsis from aspiration PNA with metabolic encephalopathy - sepsis resolving  - completed meropenem but was restarted.   - f/u blood cultures     Troponin Elevation  - likely demand ischemia, no need to trend further  - cardiology f/u appreciated  -  TTE noted - elevated RV pressures likely due to pna    Sarcoidosis/COPD  - on nebs    Seizure history  - cont with depakote    Hypothyroidism  - cont with synthroid     General anxiety disorder  - restart Effexor    BPH  - [facility]ed 6/30, monitor voiding as cannot get oral/ngt meds at this time.    Preventive measures  - can start with lovenox 40mg subq for DVT ppx    Patient critically ill, high risk for deterioration.   GOC discussion with sister and niece at bedside.  Discussing intubation and that would likely lead to trach and peg and long term facility placement.  answered all her questions.  at this time she would like to hold off intubation and be called if he worsens (although she understands that if we are unable to reach her the default will be to intubate).  She is considering DNR/I.   Time spent 40 minutes.

## 2021-07-06 NOTE — PROGRESS NOTE ADULT - ASSESSMENT
recurrent asp event suspected - worsening resp status - fever - on HF NC - prognosis bad and worse - GOC discussion - at risk for intubation -     65M with history of Seizure disorder, Hypothyroidism,  Thrombocytopenia, BPH, recent right SAH from fall May 2021, presents from [facility] for hypoxia and AMS. Found to have severe sepsis, acute hypoxic and hypercapnic respiratory failure      probable ongoing - continued aspiration - with resulting consolidation - asp pna - as seen on CT chest and clinical presentation  at risk for decompensation - resp failure -     s/p broad spectrum ABX - cx noted, biomarkers noted,   assist with all needs and ADL  HOB elev - asp prec  oral hygiene  Prognosis guarded - GOC discussion -

## 2021-07-06 NOTE — PROGRESS NOTE ADULT - SUBJECTIVE AND OBJECTIVE BOX
Date/Time Patient Seen:  		  Referring MD:   Data Reviewed	       Patient is a 65y old  Male who presents with a chief complaint of respiratory failure, lethargy (06 Jul 2021 05:45)      Subjective/HPI     PAST MEDICAL & SURGICAL HISTORY:  No pertinent past medical history    DM (diabetes mellitus)    Sarcoid    Bronchiectasis    Diverticulitis    Subdural hematoma    Inguinal hernia    Cellulitis    Seizure    Hypothyroid    Sleep apnea    LAMBERT and COPD overlap syndrome    BPH without urinary obstruction    No significant past surgical history    Status post Bright&#x27;s procedure    Bilateral subdural hematomas    Status post cholecystectomy    Status post inguinal hernia repair          Medication list         MEDICATIONS  (STANDING):  albuterol/ipratropium for Nebulization 3 milliLiter(s) Nebulizer every 6 hours  diVALproex Sprinkle 750 milliGRAM(s) Oral every 12 hours  doxazosin 1 milliGRAM(s) Oral at bedtime  enoxaparin Injectable 40 milliGRAM(s) SubCutaneous daily  finasteride 5 milliGRAM(s) Oral daily  FIRST- Mouthwash  BLM 5 milliLiter(s) Swish and Swallow every 6 hours  levothyroxine 25 MICROGram(s) Oral daily  meropenem  IVPB 1000 milliGRAM(s) IV Intermittent every 8 hours  midodrine. 2.5 milliGRAM(s) Oral three times a day  pantoprazole  Injectable 40 milliGRAM(s) IV Push daily  venlafaxine 37.5 milliGRAM(s) Oral every 12 hours    MEDICATIONS  (PRN):  acetaminophen    Suspension .. 650 milliGRAM(s) Oral every 6 hours PRN Temp greater or equal to 38C (100.4F), Mild Pain (1 - 3)  acetaminophen  Suppository .. 650 milliGRAM(s) Rectal every 6 hours PRN Temp greater or equal to 38C (100.4F)  ondansetron Injectable 4 milliGRAM(s) IV Push every 4 hours PRN Nausea and/or Vomiting  polyethylene glycol 3350 17 Gram(s) Oral daily PRN Constipation         Vitals log        ICU Vital Signs Last 24 Hrs  T(C): 37.9 (06 Jul 2021 06:00), Max: 38.4 (06 Jul 2021 00:00)  T(F): 100.2 (06 Jul 2021 06:00), Max: 101.2 (06 Jul 2021 00:00)  HR: 102 (06 Jul 2021 06:40) (83 - 190)  BP: 101/63 (06 Jul 2021 06:00) (99/65 - 130/75)  BP(mean): 76 (06 Jul 2021 06:00) (76 - 91)  ABP: --  ABP(mean): --  RR: 28 (06 Jul 2021 06:00) (19 - 32)  SpO2: 96% (06 Jul 2021 06:40) (88% - 97%)           Input and Output:  I&O's Detail    04 Jul 2021 07:01  -  05 Jul 2021 07:00  --------------------------------------------------------  IN:    Enteral Tube Flush: 375 mL    Free Water: 150 mL    Vital1.5: 1422 mL  Total IN: 1947 mL    OUT:    Incontinent per Collection Bag (mL): 750 mL  Total OUT: 750 mL    Total NET: 1197 mL      05 Jul 2021 07:01  -  06 Jul 2021 06:51  --------------------------------------------------------  IN:    Free Water: 225 mL    Vital1.5: 787 mL  Total IN: 1012 mL    OUT:    Incontinent per Collection Bag (mL): 200 mL    Voided (mL): 600 mL  Total OUT: 800 mL    Total NET: 212 mL          Lab Data                        10.8   7.88  )-----------( 148      ( 05 Jul 2021 06:46 )             33.7     07-05    131<L>  |  93<L>  |  16  ----------------------------<  145<H>  4.5   |  40<H>  |  0.38<L>    Ca    8.8      05 Jul 2021 06:46  Phos  3.0     07-05  Mg     2.7     07-05    TPro  6.2  /  Alb  1.9<L>  /  TBili  0.3  /  DBili  x   /  AST  12  /  ALT  <10<L>  /  AlkPhos  65  07-05    ABG - ( 05 Jul 2021 20:32 )  pH, Arterial: 7.40  pH, Blood: x     /  pCO2: 63    /  pO2: 69    / HCO3: 35    / Base Excess: 12.7  /  SaO2: 93                      Review of Systems	      Objective     Physical Examination    heart s1s2  lung dc BS  abd soft      Pertinent Lab findings & Imaging      Sharon:  NO   Adequate UO     I&O's Detail    04 Jul 2021 07:01  -  05 Jul 2021 07:00  --------------------------------------------------------  IN:    Enteral Tube Flush: 375 mL    Free Water: 150 mL    Vital1.5: 1422 mL  Total IN: 1947 mL    OUT:    Incontinent per Collection Bag (mL): 750 mL  Total OUT: 750 mL    Total NET: 1197 mL      05 Jul 2021 07:01  -  06 Jul 2021 06:51  --------------------------------------------------------  IN:    Free Water: 225 mL    Vital1.5: 787 mL  Total IN: 1012 mL    OUT:    Incontinent per Collection Bag (mL): 200 mL    Voided (mL): 600 mL  Total OUT: 800 mL    Total NET: 212 mL               Discussed with:     Cultures:	        Radiology

## 2021-07-06 NOTE — PROGRESS NOTE ADULT - ASSESSMENT
65M with seizures on keppra, BPH, hypothyroidism, sarcoidosis GERD, COPD, LAMBERT, recent admission to [facility] from 5/3-5/10/2021 for right subarachnoid hemorrhage s/p fall admitted from [facility] rehab with acute hypoxic respiratory failure sec severe right lung pneumonia likely recurrent aspiration pneumonia.      Plan    sp 10 days of meropenem  7/5-Meropenem restarted , worsening resp status, ?requiring intubation-cont abx for now, if intubated look at obtaining repeat sputum

## 2021-07-06 NOTE — PROGRESS NOTE ADULT - SUBJECTIVE AND OBJECTIVE BOX
[facility] DIVISION of INFECTIOUS DISEASE  Devon Sharma MD PhD, Violet Mena MD, Jane Ocampo MD, Manuel Meyers MD  and providing coverage with Hailey Bullard MD and Tegan Granados MD  Providing Infectious Disease Consultations at [facility], [facility], [facility]'s    Office# [phone] to schedule follow up appointments  Answering Service for urgent calls or New Consults [phone]  Cell# to text for urgent issues Devon Sharma [phone]     infectious diseases progress note:    JULY FORTE is a 65y y. o. Male patient    No concerning overnight events    Allergies    Keppra (Other (Severe))  penicillins (Unknown)    Intolerances    benzodiazepines (Other)      ANTIBIOTICS/RELEVANT:  antimicrobials  meropenem  IVPB 1000 milliGRAM(s) IV Intermittent every 8 hours    immunologic:    OTHER:  acetaminophen    Suspension .. 650 milliGRAM(s) Oral every 6 hours PRN  acetaminophen  Suppository .. 650 milliGRAM(s) Rectal every 6 hours PRN  albuterol/ipratropium for Nebulization 3 milliLiter(s) Nebulizer every 6 hours  diVALproex Sprinkle 750 milliGRAM(s) Oral every 12 hours  doxazosin 1 milliGRAM(s) Oral at bedtime  enoxaparin Injectable 40 milliGRAM(s) SubCutaneous daily  finasteride 5 milliGRAM(s) Oral daily  FIRST- Mouthwash  BLM 5 milliLiter(s) Swish and Swallow every 6 hours  levothyroxine 25 MICROGram(s) Oral daily  midodrine. 2.5 milliGRAM(s) Oral three times a day  ondansetron Injectable 4 milliGRAM(s) IV Push every 4 hours PRN  pantoprazole  Injectable 40 milliGRAM(s) IV Push daily  polyethylene glycol 3350 17 Gram(s) Oral daily PRN  venlafaxine 37.5 milliGRAM(s) Oral every 12 hours      Objective:  Vital Signs Last 24 Hrs  T(C): 37.5 (2021 09:38), Max: 38.4 (2021 00:00)  T(F): 99.5 (2021 09:38), Max: 101.2 (2021 00:00)  HR: 107 (2021 08:00) (83 - 190)  BP: 94/69 (2021 08:00) (94/69 - 130/75)  BP(mean): 76 (2021 08:00) (76 - 91)  RR: 31 (2021 08:00) (19 - 32)  SpO2: 96% (2021 08:00) (88% - 97%)    T(C): 37.5 (21 @ 09:38), Max: 38.4 (21 @ 00:00)  T(C): 37.5 (21 @ 09:38), Max: 38.4 (21 @ 00:00)  T(C): 37.5 (21 @ 09:38), Max: 38.4 (21 @ 00:00)    PHYSICAL EXAM:  HEENT: NC atraumatic  Neck: supple  Respiratory: noted accessory muscle use, noted breathing comfortably on hihg flow oxygen  Cardiovascular: distant  Gastrointestinal: normal appearing, nondistended  Extremities: no clubbing, no cyanosis,  Neuro: limited responsiveness        LABS:                          12.3   5.93  )-----------( 145      ( 2021 07:08 )             37.3       5.93 07-06 @ 07:08  7.88 07-05 @ 06:46  6.76 07-04 @ 06:49  10.74 07-01 @ 06:16  10.00 06-30 @ 06:32      07-06    132<L>  |  93<L>  |  20  ----------------------------<  113<H>  4.8   |  41<H>  |  0.55    Ca    10.0      2021 07:08  Phos  3.0     07-05  Mg     2.7     07-05    TPro  6.2  /  Alb  1.9<L>  /  TBili  0.3  /  DBili  x   /  AST  12  /  ALT  <10<L>  /  AlkPhos  65  07-05      Creatinine, Serum: 0.55 mg/dL (21 @ 07:08)  Creatinine, Serum: 0.38 mg/dL (21 @ 06:46)  Creatinine, Serum: 0.33 mg/dL (21 @ 06:49)  Creatinine, Serum: 0.31 mg/dL (21 @ 06:16)  Creatinine, Serum: 0.40 mg/dL (21 @ 06:32)        Urinalysis Basic - ( 2021 22:34 )    Color: Yellow / Appearance: Clear / S.010 / pH: x  Gluc: x / Ketone: Trace  / Bili: Negative / Urobili: 8 mg/dL   Blood: x / Protein: Negative mg/dL / Nitrite: Negative   Leuk Esterase: Trace / RBC: 3-5 /HPF / WBC 0-2   Sq Epi: x / Non Sq Epi: Negative / Bacteria: Negative            INFLAMMATORY MARKERS  Auto Neutrophil #: 5.94 K/uL (21 @ 06:46)  Auto Lymphocyte #: 0.69 K/uL (21 @ 06:46)  Auto Lymphocyte #: 1.27 K/uL (21 @ 06:49)  Auto Neutrophil #: 4.19 K/uL (21 @ 06:49)  Auto Neutrophil #: 6.56 K/uL (21 @ 06:38)  Auto Lymphocyte #: 1.66 K/uL (21 @ 06:38)      Auto Eosinophil #: 0.22 K/uL (21 @ 06:46)  Auto Eosinophil #: 0.40 K/uL (21 @ 06:49)  Auto Eosinophil #: 0.27 K/uL (21 @ 06:38)          Troponin I, Serum: .354 ng/mL (21 @ 06:22)  Troponin I, Serum: .494 ng/mL (21 @ 17:22)                        MICROBIOLOGY:              RADIOLOGY & ADDITIONAL STUDIES:

## 2021-07-06 NOTE — CONSULT NOTE ADULT - SUBJECTIVE AND OBJECTIVE BOX
HPI:  ***Patient is currently lethargic and unable to provide any history.  Collateral information obtained from chart.**      65M with seizures on keppra, BPH, hypothyroidism, sarcoidosis GERD, COPD, LAMBERT, recent admission to [facility] from 5/3-5/10/2021 for right subarachnoid hemorrhage s/p fall who presents from [facility] for hypoxia.  Per notes, patient was found to have a RLL PNA at [facility] and was started on IV meropenem yesterday.  Then today, he was found to have O2 sats of 90% while on NRB and therefore transferred here for further evaluation.     I/n the ED, patient's triage vitals were /83, , RR 32, 90%, T 100.3F.  Patient was noted to nearly obtunded with tachypnea and patient was started on BiPAP on 100%.   Labs showed leukocytosis of 25.3 with a left shift and an ABG of 7.28/89/136.  CT eventually showed RUL and RLL consolidation, likely PNA and patient was started on meropenem.  ICU was consulted and patient was accepted to SPCU for acute hypoxic respiratory failure 2/2 sepsis 2/2 possible aspiration PNA.          (2021 04:48)    PERTINENT PM/SXH:   No pertinent past medical history    DM (diabetes mellitus)    Sarcoid    Bronchiectasis    Diverticulitis    Subdural hematoma    Inguinal hernia    Cellulitis    Seizure    Hypothyroid    Sleep apnea    LAMBERT and COPD overlap syndrome    BPH without urinary obstruction      No significant past surgical history    Status post Bright&#x27;s procedure    Bilateral subdural hematomas    Status post cholecystectomy    Status post inguinal hernia repair      FAMILY HISTORY:  Family history of diabetes mellitus    Family history of pulmonary embolism (Grandparent)      ITEMS NOT CHECKED ARE NOT PRESENT    SOCIAL HISTORY:   Significant other/partner[ ]  Children[ ]  Samaritan/Spirituality:  Substance hx:  [ ]   Tobacco hx:  [ ]   Alcohol hx: [ ]   Home Opioid hx:  [ ] I-Stop Reference No:  Living Situation: [ ]Home  [ ]Long term care  [ ]Rehab [ ]Other    ADVANCE DIRECTIVES:    DNR  MOLST  [ ]  Living Will  [ ]   DECISION MAKER(s):  [ ] Health Care Proxy(s)  [ ] Surrogate(s)  [ ] Guardian           Name(s): Phone Number(s):    BASELINE (I)ADL(s) (prior to admission):  [place]: [ ]Total  [ ] Moderate [ ]Dependent    Allergies    Keppra (Other (Severe))  penicillins (Unknown)    Intolerances    benzodiazepines (Other)  MEDICATIONS  (STANDING):  albuterol/ipratropium for Nebulization 3 milliLiter(s) Nebulizer every 6 hours  enoxaparin Injectable 40 milliGRAM(s) SubCutaneous daily  FIRST- Mouthwash  BLM 5 milliLiter(s) Swish and Swallow every 6 hours  lactated ringers. 1000 milliLiter(s) (50 mL/Hr) IV Continuous <Continuous>  levothyroxine 25 MICROGram(s) Oral daily  meropenem  IVPB 1000 milliGRAM(s) IV Intermittent every 8 hours  pantoprazole  Injectable 40 milliGRAM(s) IV Push daily  valproate sodium IVPB 1500 milliGRAM(s) IV Intermittent at bedtime    MEDICATIONS  (PRN):  acetaminophen    Suspension .. 650 milliGRAM(s) Oral every 6 hours PRN Temp greater or equal to 38C (100.4F), Mild Pain (1 - 3)  acetaminophen  Suppository .. 650 milliGRAM(s) Rectal every 6 hours PRN Temp greater or equal to 38C (100.4F)  ondansetron Injectable 4 milliGRAM(s) IV Push every 4 hours PRN Nausea and/or Vomiting    PRESENT SYMPTOMS: [ ]Unable to obtain due to poor mentation   Source if other than patient:  [ ]Family   [ ]Team     Pain: [ ]yes [ ]no  QOL impact -   Location -                    Aggravating factors -  Quality -  Radiation -  Timing-  Severity (0-10 scale):  Minimal acceptable level (0-10 scale):     CPOT:    https://www.[facility].org/getattachment/[email or web address]/Critical-Care-Pain-Observation-Tool-(CPOT)      PAIN AD Score:     http://geriatrictoolkit.[place].[facility] [place]/cog/painad.pdf (press ctrl +  left click to view)    Dyspnea:                           [ ]Mild [ ]Moderate [ ]Severe  Anxiety:                             [ ]Mild [ ]Moderate [ ]Severe  Fatigue:                             [ ]Mild [ ]Moderate [ ]Severe  Nausea:                             [ ]Mild [ ]Moderate [ ]Severe  Loss of appetite:              [ ]Mild [ ]Moderate [ ]Severe  Constipation:                    [ ]Mild [ ]Moderate [ ]Severe    Other Symptoms:  [ ]All other review of systems negative     Palliative Performance Status Version 2:         %    http://[facility].org/files/news/palliative_performance_scale_ppsv2.pdf  PHYSICAL EXAM:  Vital Signs Last 24 Hrs  T(C): 37.5 (2021 09:38), Max: 38.4 (2021 00:00)  T(F): 99.5 (2021 09:38), Max: 101.2 (2021 00:00)  HR: 106 (2021 13:01) (95 - 190)  BP: 93/61 (2021 12:00) (92/68 - 130/75)  BP(mean): 72 (2021 12:00) (72 - 91)  RR: 32 (2021 12:00) (22 - 32)  SpO2: 94% (2021 13:01) (88% - 97%) I&O's Summary    2021 07:01  -  2021 07:00  --------------------------------------------------------  IN: 1012 mL / OUT: 1100 mL / NET: -88 mL      GENERAL:  [ ]Alert  [ ]Oriented x   [ ]Lethargic  [ ]Cachexia  [ ]Unarousable  [ ]Verbal  [ ]Non-Verbal  Behavioral:   [ ] Anxiety  [ ] Delirium [ ] Agitation [ ] Other  HEENT:  [ ]Normal   [ ]Dry mouth   [ ]ET Tube/Trach  [ ]Oral lesions  PULMONARY:   [ ]Clear [ ]Tachypnea  [ ]Audible excessive secretions   [ ]Rhonchi        [ ]Right [ ]Left [ ]Bilateral  [ ]Crackles        [ ]Right [ ]Left [ ]Bilateral  [ ]Wheezing     [ ]Right [ ]Left [ ]Bilateral  [ ]Diminished breath sounds [ ]right [ ]left [ ]bilateral  CARDIOVASCULAR:    [ ]Regular [ ]Irregular [ ]Tachy  [ ]Kenny [ ]Murmur [ ]Other  GASTROINTESTINAL:  [ ]Soft  [ ]Distended   [ ]+BS  [ ]Non tender [ ]Tender  [ ]PEG [ ]OGT/ NGT  Last BM:     GENITOURINARY:  [ ]Normal [ ] Incontinent   [ ]Oliguria/Anuria   [ ]Herrera  MUSCULOSKELETAL:   [ ]Normal   [ ]Weakness  [ ]Bed/Wheelchair bound [ ]Edema  NEUROLOGIC:   [ ]No focal deficits  [ ]Cognitive impairment  [ ]Dysphagia [ ]Dysarthria [ ]Paresis [ ]Other   SKIN:   [ ]Normal    [ ]Rash  [ ]Pressure ulcer(s)       Present on admission [ ]y [ ]n    CRITICAL CARE:  [ ] Shock Present  [ ]Septic [ ]Cardiogenic [ ]Neurologic [ ]Hypovolemic  [ ]  Vasopressors [ ]  Inotropes   [ ]Respiratory failure present [ ]Mechanical ventilation [ ]Non-invasive ventilatory support [ ]High flow    [ ]Acute  [ ]Chronic [ ]Hypoxic  [ ]Hypercarbic [ ]Other  [ ]Other organ failure     LABS:                        12.3   5.93  )-----------( 145      ( 2021 07:08 )             37.3   07-06    132<L>  |  93<L>  |  20  ----------------------------<  113<H>  4.8   |  41<H>  |  0.55    Ca    10.0      2021 07:08  Phos  3.0     07-05  Mg     2.7     07-05    TPro  6.2  /  Alb  1.9<L>  /  TBili  0.3  /  DBili  x   /  AST  12  /  ALT  <10<L>  /  AlkPhos  65  07-05      Urinalysis Basic - ( 2021 22:34 )    Color: Yellow / Appearance: Clear / S.010 / pH: x  Gluc: x / Ketone: Trace  / Bili: Negative / Urobili: 8 mg/dL   Blood: x / Protein: Negative mg/dL / Nitrite: Negative   Leuk Esterase: Trace / RBC: 3-5 /HPF / WBC 0-2   Sq Epi: x / Non Sq Epi: Negative / Bacteria: Negative      RADIOLOGY & ADDITIONAL STUDIES:    PROTEIN CALORIE MALNUTRITION PRESENT: [ ]mild [ ]moderate [ ]severe [ ]underweight [ ]morbid obesity  https://www.andeal.org/vault/9390/web/files/ONC/Table_Clinical%20Characteristics%20to%20Document%20Malnutrition-White%20JV%20et%20al%2020.pdf    Height (cm): 188 (21 @ 01:58), 188 (21 @ 11:00), 188 (21 @ :34)  Weight (kg): 82.6 (21 @ :58), 93 (21 @ 11:00), 90.7 (21 @ :34)  BMI (kg/m2): 23.4 (21 @ :58), 26.3 (21 @ 11:00), 25.7 (21 @ :34)    [ ]PPSV2 < or = to 30% [ ]significant weight loss  [ ]poor nutritional intake  [ ]anasarca     Albumin, Serum: 1.9 g/dL (21 @ 06:46)   [ ]Artificial Nutrition      REFERRALS:   [ ]Chaplaincy  [ ]Hospice  [ ]Child Life  [ ]Social Work  [ ]Case management [ ]Holistic Therapy     Goals of Care Document:  HPI:  ***Patient is currently lethargic and unable to provide any history.  Collateral information obtained from chart.**      65M with seizures on keppra, BPH, hypothyroidism, sarcoidosis GERD, COPD, LAMBERT, recent admission to [facility] from 5/3-5/10/2021 for right subarachnoid hemorrhage s/p fall who presents from [facility] for hypoxia.  Per notes, patient was found to have a RLL PNA at [facility] and was started on IV meropenem yesterday.  Then today, he was found to have O2 sats of 90% while on NRB and therefore transferred here for further evaluation.     I/n the ED, patient's triage vitals were /83, , RR 32, 90%, T 100.3F.  Patient was noted to nearly obtunded with tachypnea and patient was started on BiPAP on 100%.   Labs showed leukocytosis of 25.3 with a left shift and an ABG of 7.28/89/136.  CT eventually showed RUL and RLL consolidation, likely PNA and patient was started on meropenem.  ICU was consulted and patient was accepted to SPCU for acute hypoxic respiratory failure 2/2 sepsis 2/2 possible aspiration PNA.          (2021 04:48)    PERTINENT PM/SXH:   No pertinent past medical history    DM (diabetes mellitus)    Sarcoid    Bronchiectasis    Diverticulitis    Subdural hematoma    Inguinal hernia    Cellulitis    Seizure    Hypothyroid    Sleep apnea    LAMBERT and COPD overlap syndrome    BPH without urinary obstruction      No significant past surgical history    Status post Bright&#x27;s procedure    Bilateral subdural hematomas    Status post cholecystectomy    Status post inguinal hernia repair      FAMILY HISTORY:  Family history of diabetes mellitus    Family history of pulmonary embolism (Grandparent)      ITEMS NOT CHECKED ARE NOT PRESENT    SOCIAL HISTORY:   Significant other/partner[ ]  Children[ ]  Samaritan/Spirituality: unknown  Substance hx:  [ ]   Tobacco hx:  [ ]   Alcohol hx: [ ]   Home Opioid hx:  [ ] I-Stop Reference No:  Living Situation: [x ]Home  [ ]Long term care  [ ]Rehab [ ]Other    ADVANCE DIRECTIVES:    DNR  MOLST  [ ]  Living Will  [ ]   DECISION MAKER(s):  [x ] Health Care Proxy(s)  [ ] Surrogate(s)  [ ] Guardian           Name(s): pt's sister Kristin Swain  Phone Number(s):    BASELINE (I)ADL(s) (prior to admission):  [place]: [ ]Total  [x ] Moderate [ ]Dependent    Allergies    Keppra (Other (Severe))  penicillins (Unknown)    Intolerances    benzodiazepines (Other)  MEDICATIONS  (STANDING):  albuterol/ipratropium for Nebulization 3 milliLiter(s) Nebulizer every 6 hours  enoxaparin Injectable 40 milliGRAM(s) SubCutaneous daily  FIRST- Mouthwash  BLM 5 milliLiter(s) Swish and Swallow every 6 hours  lactated ringers. 1000 milliLiter(s) (50 mL/Hr) IV Continuous <Continuous>  levothyroxine 25 MICROGram(s) Oral daily  meropenem  IVPB 1000 milliGRAM(s) IV Intermittent every 8 hours  pantoprazole  Injectable 40 milliGRAM(s) IV Push daily  valproate sodium IVPB 1500 milliGRAM(s) IV Intermittent at bedtime    MEDICATIONS  (PRN):  acetaminophen    Suspension .. 650 milliGRAM(s) Oral every 6 hours PRN Temp greater or equal to 38C (100.4F), Mild Pain (1 - 3)  acetaminophen  Suppository .. 650 milliGRAM(s) Rectal every 6 hours PRN Temp greater or equal to 38C (100.4F)  ondansetron Injectable 4 milliGRAM(s) IV Push every 4 hours PRN Nausea and/or Vomiting    PRESENT SYMPTOMS: [x ]Unable to obtain due to poor mentation   Source if other than patient:  [ ]Family   [ ]Team     Pain: [ ]yes [ ]no  QOL impact -   Location -                    Aggravating factors -  Quality -  Radiation -  Timing-  Severity (0-10 scale):  Minimal acceptable level (0-10 scale):     CPOT:    https://www.[facility].org/getattachment/[email or web address]/Critical-Care-Pain-Observation-Tool-(CPOT)      PAIN AD Score:     http://geriatrictoolkit.[place].edu/cog/painad.pdf (press ctrl +  left click to view)    Dyspnea:                           [ ]Mild [ ]Moderate [ ]Severe  Anxiety:                             [ ]Mild [ ]Moderate [ ]Severe  Fatigue:                             [ ]Mild [ ]Moderate [ ]Severe  Nausea:                             [ ]Mild [ ]Moderate [ ]Severe  Loss of appetite:              [ ]Mild [ ]Moderate [ ]Severe  Constipation:                    [ ]Mild [ ]Moderate [ ]Severe    Other Symptoms:  [ ]All other review of systems negative     Palliative Performance Status Version 2:       20  %    http://npcrc.org/files/news/palliative_performance_scale_ppsv2.pdf  PHYSICAL EXAM:  Vital Signs Last 24 Hrs  T(C): 37.5 (2021 09:38), Max: 38.4 (2021 00:00)  T(F): 99.5 (2021 09:38), Max: 101.2 (2021 00:00)  HR: 106 (2021 13:01) (95 - 190)  BP: 93/61 (2021 12:00) (92/68 - 130/75)  BP(mean): 72 (2021 12:00) (72 - 91)  RR: 32 (2021 12:00) (22 - 32)  SpO2: 94% (2021 13:01) (88% - 97%) I&O's Summary    2021 07:01  -  2021 07:00  --------------------------------------------------------  IN: 1012 mL / OUT: 1100 mL / NET: -88 mL    HEAD:  Atraumatic, Normocephalic  EYES:  conjunctiva and sclera clear  ENMT: Moist mucous membranes  NECK: Supple, No JVD  NERVOUS SYSTEM:  opens eyes to voice, answers yes/no to very simple questiosn  CHEST/LUNG: wheezing left > right, scattered rhonchi,  HEART: tachy s1s2  ABDOMEN: Soft, Nontender, Nondistended; Bowel sounds present  EXTREMITIES:  2+ Peripheral Pulses, diffuse edema  Psych: no agitation    LABS:                        12.3   5.93  )-----------( 145      ( 2021 07:08 )             37.3   07-    132<L>  |  93<L>  |  20  ----------------------------<  113<H>  4.8   |  41<H>  |  0.55    Ca    10.0      2021 07:08  Phos  3.0     07-05  Mg     2.7     07-05    TPro  6.2  /  Alb  1.9<L>  /  TBili  0.3  /  DBili  x   /  AST  12  /  ALT  <10<L>  /  AlkPhos  65  07-05      Urinalysis Basic - ( 2021 22:34 )    Color: Yellow / Appearance: Clear / S.010 / pH: x  Gluc: x / Ketone: Trace  / Bili: Negative / Urobili: 8 mg/dL   Blood: x / Protein: Negative mg/dL / Nitrite: Negative   Leuk Esterase: Trace / RBC: 3-5 /HPF / WBC 0-2   Sq Epi: x / Non Sq Epi: Negative / Bacteria: Negative      RADIOLOGY & ADDITIONAL STUDIES: reviewed    PROTEIN CALORIE MALNUTRITION PRESENT: [ ]mild [ ]moderate [ ]severe [ ]underweight [ ]morbid obesity  https://www.andeal.org/vault/2440/web/files/ONC/Table_Clinical%20Characteristics%20to%20Document%20Malnutrition-White%20JV%20et%20al%2020.pdf    Height (cm): 188 (21 @ 01:58), 188 (21 @ 11:00), 188 (21 @ :34)  Weight (kg): 82.6 (21 @ 01:58), 93 (21 @ 11:00), 90.7 (21 @ 07:34)  BMI (kg/m2): 23.4 (21 @ 01:58), 26.3 (21 @ 11:00), 25.7 (21 @ :34)    [x ]PPSV2 < or = to 30% [ ]significant weight loss  [ ]poor nutritional intake  [ ]anasarca     Albumin, Serum: 1.9 g/dL (21 @ 06:46)   [ ]Artificial Nutrition      REFERRALS:   [ ]Chaplaincy  [ ]Hospice  [ ]Child Life  [ ]Social Work  [ ]Case management [ ]Holistic Therapy     Goals of Care Document:

## 2021-07-06 NOTE — PROGRESS NOTE ADULT - SUBJECTIVE AND OBJECTIVE BOX
Chief Complaint: Respiratory distress    Interval Events: No events overnight.    Review of Systems:  General: No fevers, chills, weight loss or gain  Skin: No rashes, color changes  Cardiovascular: No chest pain, orthopnea  Respiratory: No shortness of breath, cough  Gastrointestinal: No nausea, abdominal pain  Genitourinary: No incontinence, pain with urination  Musculoskeletal: No pain, swelling, decreased range of motion  Neurological: No headache, weakness  Psychiatric: No depression, anxiety  Endocrine: No weight loss or gain, increased thirst  All other systems are comprehensively negative.    Physical Exam:  Vital Signs Last 24 Hrs  T(C): 37.5 (06 Jul 2021 09:38), Max: 38.4 (06 Jul 2021 00:00)  T(F): 99.5 (06 Jul 2021 09:38), Max: 101.2 (06 Jul 2021 00:00)  HR: 107 (06 Jul 2021 08:00) (92 - 190)  BP: 94/69 (06 Jul 2021 08:00) (94/69 - 130/75)  BP(mean): 76 (06 Jul 2021 08:00) (76 - 91)  RR: 31 (06 Jul 2021 08:00) (22 - 32)  SpO2: 96% (06 Jul 2021 08:00) (88% - 97%)  General: Chronically ill appearing  HEENT: MMM  Neck: No JVD, no carotid bruit  Lungs: CTAB  CV: RRR, nl S1/S2, no M/R/G  Abdomen: S/NT/ND, +BS  Extremities: No LE edema, no cyanosis  Neuro: AAOx3, non-focal  Skin: No rash    Labs:    07-06    132<L>  |  93<L>  |  20  ----------------------------<  113<H>  4.8   |  41<H>  |  0.55    Ca    10.0      06 Jul 2021 07:08  Phos  3.0     07-05  Mg     2.7     07-05    TPro  6.2  /  Alb  1.9<L>  /  TBili  0.3  /  DBili  x   /  AST  12  /  ALT  <10<L>  /  AlkPhos  65  07-05                        12.3   5.93  )-----------( 145      ( 06 Jul 2021 07:08 )             37.3       Telemetry: Sinus rhythm

## 2021-07-06 NOTE — CONSULT NOTE ADULT - ASSESSMENT
DNR/DNI (Molst will be placed in the chart tomorrow)   65M with seizures on keppra, BPH, hypothyroidism, sarcoidosis GERD, COPD, LAMBERT, recent admission to [facility] from 5/3-5/10/2021 for right subarachnoid hemorrhage s/p fall who presents from [facility] for hypoxia.  Found to have acute hypoxic and hypercapnic respiratory failure 2/2 sepsis 2/2 aspiration PNA.  Likely aspiration PNA since patient has been recommended dysphagia I diet from [facility] but reports state that he has been non-compliant with recommendation.  Also location of RLL and RUL also fits with aspiration.      Problems  Acute hypoxic and hypercapnic respiratory failure  Sepsis 2/2 aspiration PNA  Sarcoidosis  Seizures  COPD    1) Acute hypoxic and hypercapnic respiratory failure secondary to aspiration PNA and underlying interstitial lung disease  - now back on 100% high genesis oxygen  - still with copious secretions    2) Sepsis from aspiration PNA with metabolic encephalopathy   - sepsis resolving  - completed meropenem but was restarted.     3) Sarcoidosis/COPD  - on nebs    4) Seizure d/o  - on depakote    5) General anxiety disorder  - restart Effexor    6) Failure to thrive  7) Goals of care/advance care planning  - Palliative performance scale score: 20% (poor)  - Prognosis: days-weeks (pt is hospice eligible)   - Code status: DNR/DNI (Molst will be placed in the chart tomorrow)  - GOC: continue current management plan for the next 1-2 days and reassess progress. Will discuss PEG placement then.   - HCP: pt's sister Kristin   - Psychosocial support provided    Appreciate consult. Discussed with the primary team.    Spencer Moore MD

## 2021-07-06 NOTE — CONSULT NOTE ADULT - CONVERSATION DETAILS
DNR/DNI (Molst will be placed in the chart tomorrow) Reviewed patient's medical and social history as well as events leading to patient's hospitalization. Writer discussed patient's current diagnosis (resp failure, PNA, sepsis, seizure d/o, sarcoidosis, FTT, COPD), medical condition and management,  prognosis, and life expectancy. Inquired about patient's wishes regarding extent of medical care to be provided including escalation of medical care into the ICU and use of vasopressor support. In addition, the writer inquired about thoughts regarding cardiopulmonary resuscitation, artificial nutrition and hydration including use of feeding tubes and IVF, antibiotics, and further investigative studies such as blood draws and radiology. Sister showed some insight into medical condition. She shared that pt had progressive decline over the past 2 months with frequent hospitalizations. She also understands that his lungs are in bad shape and that pulmonolgist recommended DNI. All questions answered. Writer recommended DNR/DNI and continue of current management plan for now for the next 1-2 days to assess pt's progress. Kristin was in agreement. Psychosocial support provided.

## 2021-07-07 NOTE — PROGRESS NOTE ADULT - ASSESSMENT
65M with seizures on keppra, BPH, hypothyroidism, sarcoidosis GERD, COPD, LAMBERT, recent admission to [facility] from 5/3-5/10/2021 for right subarachnoid hemorrhage s/p fall admitted from [facility] rehab with acute hypoxic respiratory failure sec severe right lung pneumonia likely recurrent aspiration pneumonia.      Plan    sp 10 days of meropenem  7/5-Meropenem restarted , worsening resp status,   7/6-on high flow, discussed with hospitalist -concern for possible repeat aspiration continue meropenem for now     Please call us at [phone] or text me directly on my cell# at [phone] with any concerns.

## 2021-07-07 NOTE — PROGRESS NOTE ADULT - SUBJECTIVE AND OBJECTIVE BOX
Patient is a 65y old  Male who presents with a chief complaint of respiratory failure, lethargy (2021 12:58)      BRIEF HOSPITAL COURSE:   Patient is a 65 year old male with a pmhx of seizures on keppra, BPH, hypothyroidism, sarcoidosis GERD, COPD, LAMBERT, recent admission to [facility] from 5/3-5/10/2021 for right subarachnoid hemorrhage s/p fall who presents from [facility] for hypoxia. Pt was found to have spiration pna for which he was admitted to medical floor on NC. Then became hypercapnic as well as hypoxic. his course has now been complicated by further aspiration requiring 100% oxygen on hfnc.    Family made pt dnr/i yesterday    Events last 24 hours:   -dependent on hfnc 100%fi02 and flow of 50      PAST MEDICAL & SURGICAL HISTORY:  DM (diabetes mellitus)    Sarcoid    Bronchiectasis    Subdural hematoma    Inguinal hernia    Seizure    Hypothyroid    Sleep apnea    LAMBERT and COPD overlap syndrome    BPH without urinary obstruction    Status post Bright&#x27;s procedure    Bilateral subdural hematomas    Status post cholecystectomy    Status post inguinal hernia repair      Allergies    Keppra (Other (Severe))  penicillins (Unknown)    Intolerances    benzodiazepines (Other)    FAMILY HISTORY:  Family history of diabetes mellitus    Family history of pulmonary embolism (Grandparent)    social hx:  from [facility]    Review of Systems:  unable to attain 2/2 total body weakness       Medications:  meropenem  IVPB 1000 milliGRAM(s) IV Intermittent every 8 hours      albuterol/ipratropium for Nebulization 3 milliLiter(s) Nebulizer every 6 hours    acetaminophen    Suspension .. 650 milliGRAM(s) Oral every 6 hours PRN  ondansetron Injectable 4 milliGRAM(s) IV Push every 4 hours PRN  valproate sodium IVPB 1500 milliGRAM(s) IV Intermittent at bedtime      enoxaparin Injectable 40 milliGRAM(s) SubCutaneous daily    pantoprazole  Injectable 40 milliGRAM(s) IV Push daily      levothyroxine Injectable 12.5 MICROGram(s) IV Push at bedtime    lactated ringers. 1000 milliLiter(s) IV Continuous <Continuous>                ICU Vital Signs Last 24 Hrs  T(C): 36.9 (2021 16:28), Max: 38.3 (2021 04:13)  T(F): 98.4 (2021 16:28), Max: 101 (2021 04:13)  HR: 80 (2021 17:00) (79 - 111)  BP: 92/62 (2021 18:00) (83/57 - 109/64)  BP(mean): 91 (2021 18:00) (63 - 91)  ABP: --  ABP(mean): --  RR: 17 (2021 18:00) (14 - 27)  SpO2: 97% (2021 18:00) (91% - 98%)    Vital Signs Last 24 Hrs  T(C): 36.9 (2021 16:28), Max: 38.3 (2021 04:13)  T(F): 98.4 (2021 16:28), Max: 101 (2021 04:13)  HR: 80 (2021 17:00) (79 - 111)  BP: 92/62 (2021 18:00) (83/57 - 109/64)  BP(mean): 91 (2021 18:00) (63 - 91)  RR: 17 (2021 18:00) (14 - 27)  SpO2: 97% (2021 18:00) (91% - 98%)    ABG - ( 2021 20:32 )  pH, Arterial: 7.40  pH, Blood: x     /  pCO2: 63    /  pO2: 69    / HCO3: 35    / Base Excess: 12.7  /  SaO2: 93                  I&O's Detail    2021 07:01  -  2021 07:00  --------------------------------------------------------  IN:    IV PiggyBack: 150 mL    Lactated Ringers: 600 mL  Total IN: 750 mL    OUT:    Voided (mL): 350 mL  Total OUT: 350 mL    Total NET: 400 mL      2021 07:01  -  2021 19:24  --------------------------------------------------------  IN:    IV PiggyBack: 50 mL    Lactated Ringers: 600 mL    Lactated Ringers Bolus: 500 mL  Total IN: 1150 mL    OUT:  Total OUT: 0 mL    Total NET: 1150 mL            LABS:                        10.8   8.56  )-----------( 141      ( 2021 06:01 )             33.7     07-07    134<L>  |  96  |  30<H>  ----------------------------<  100<H>  5.0   |  41<H>  |  0.60    Ca    10.2      2021 06:01            CAPILLARY BLOOD GLUCOSE          Urinalysis Basic - ( 2021 22:34 )    Color: Yellow / Appearance: Clear / S.010 / pH: x  Gluc: x / Ketone: Trace  / Bili: Negative / Urobili: 8 mg/dL   Blood: x / Protein: Negative mg/dL / Nitrite: Negative   Leuk Esterase: Trace / RBC: 3-5 /HPF / WBC 0-2   Sq Epi: x / Non Sq Epi: Negative / Bacteria: Negative      CULTURES:      Physical Examination:    General: elderly male, ill appearing, weak, frail, pale    HEENT: Pupils equal, reactive to light.  Symmetric.    PULM: b/l air entry, + tracheal secretions     CVS: +s1/s2    ABD: Soft, nondistended, nontender, normoactive bowel sounds, no masses    EXT: total body anasarca     SKIN: Warm and well perfused    neuro: awake, alert, very weak    RADIOLOGY:   < from: Xray Chest 1 View-PORTABLE IMMEDIATE (Xray Chest 1 View-PORTABLE IMMEDIATE .) (21 @ 20:37) >  INTERPRETATION:  AP chest on 2021 at 8:10 PM. Patient has fever.    Heart magnified by technique. NG tube remains.    There are diffuse advanced bilateral infiltrates again noted. There is slightly increased consolidation right upper lobe. Otherwise no change.    IMPRESSION: Persistent diffuse advanced bilateral infiltrates with some increased consolidation right upper lobe.    < end of copied text >

## 2021-07-07 NOTE — PROVIDER CONTACT NOTE (CRITICAL VALUE NOTIFICATION) - SITUATION
Troponin 0.208
Troponin 5.288
Critical lab value - Troponin 0.492
Critical lab value - Troponin 0.494

## 2021-07-07 NOTE — PROGRESS NOTE ADULT - SUBJECTIVE AND OBJECTIVE BOX
Patient is a 65y old  Male who presents with a chief complaint of respiratory failure, lethargy (2021 09:32)    INTERVAL HPI/OVERNIGHT EVENTS:  events noted.     MEDICATIONS  (STANDING):  albuterol/ipratropium for Nebulization 3 milliLiter(s) Nebulizer every 6 hours  enoxaparin Injectable 40 milliGRAM(s) SubCutaneous daily  lactated ringers. 1000 milliLiter(s) (50 mL/Hr) IV Continuous <Continuous>  levothyroxine Injectable 12.5 MICROGram(s) IV Push at bedtime  meropenem  IVPB 1000 milliGRAM(s) IV Intermittent every 8 hours  pantoprazole  Injectable 40 milliGRAM(s) IV Push daily  valproate sodium IVPB 1500 milliGRAM(s) IV Intermittent at bedtime    MEDICATIONS  (PRN):  acetaminophen    Suspension .. 650 milliGRAM(s) Oral every 6 hours PRN Temp greater or equal to 38C (100.4F), Mild Pain (1 - 3)  ondansetron Injectable 4 milliGRAM(s) IV Push every 4 hours PRN Nausea and/or Vomiting      Allergies  Keppra (Other (Severe))  penicillins (Unknown)    Intolerances  benzodiazepines (Other)      REVIEW OF SYSTEMS:  unable pt lethargic    Vital Signs Last 24 Hrs  T(C): 37 (2021 08:03), Max: 38.3 (2021 04:13)  T(F): 98.6 (2021 08:03), Max: 101 (2021 04:13)  HR: 79 (2021 12:56) (79 - 111)  BP: 84/57 (2021 12:00) (83/57 - 109/64)  BP(mean): 67 (2021 12:00) (63 - 80)  RR: 16 (2021 12:00) (16 - 31)  SpO2: 97% (2021 12:56) (91% - 97%)    PHYSICAL EXAM:  GENERAL: lethargic  HEAD:  Atraumatic, Normocephalic  EYES: conjunctiva and sclera clear  ENMT: Moist mucous membranes  NECK: Supple, No JVD  NERVOUS SYSTEM: lethargic, moving extremities purposefully and withdraws to stimuli  CHEST/LUNG: Bilateral rhonchi  HEART: Regular rate and rhythm;   ABDOMEN: Soft, Nontender, Nondistended; Bowel sounds present  EXTREMITIES:  2+ Peripheral Pulses, diffuse edema    LABS:                        10.8   8.56  )-----------( 141      ( 2021 06:01 )             33.7     2021 06:01    134    |  96     |  30     ----------------------------<  100    5.0     |  41     |  0.60     Ca    10.2       2021 06:01        Urinalysis Basic - ( 2021 22:34 )  Color: Yellow / Appearance: Clear / S.010 / pH: x  Gluc: x / Ketone: Trace  / Bili: Negative / Urobili: 8 mg/dL   Blood: x / Protein: Negative mg/dL / Nitrite: Negative   Leuk Esterase: Trace / RBC: 3-5 /HPF / WBC 0-2   Sq Epi: x / Non Sq Epi: Negative / Bacteria: Negative      CAPILLARY BLOOD GLUCOSE          RADIOLOGY & ADDITIONAL TESTS:    Imaging Personally Reviewed:  [ ] YES     Consultant(s) Notes Reviewed:      Care Discussed with Consultants/Other Providers:    Advanced Directives: [ ] DNR  [ ] No feeding tube  [ ] MOLST in chart  [ ] MOLST completed today  [ ] Unknown

## 2021-07-07 NOTE — PROGRESS NOTE ADULT - SUBJECTIVE AND OBJECTIVE BOX
Date/Time Patient Seen:  		  Referring MD:   Data Reviewed	       Patient is a 65y old  Male who presents with a chief complaint of respiratory failure, lethargy (06 Jul 2021 14:29)      Subjective/HPI     PAST MEDICAL & SURGICAL HISTORY:  No pertinent past medical history    DM (diabetes mellitus)    Sarcoid    Bronchiectasis    Diverticulitis    Subdural hematoma    Inguinal hernia    Cellulitis    Seizure    Hypothyroid    Sleep apnea    LAMBERT and COPD overlap syndrome    BPH without urinary obstruction    No significant past surgical history    Status post Bright&#x27;s procedure    Bilateral subdural hematomas    Status post cholecystectomy    Status post inguinal hernia repair          Medication list         MEDICATIONS  (STANDING):  albuterol/ipratropium for Nebulization 3 milliLiter(s) Nebulizer every 6 hours  enoxaparin Injectable 40 milliGRAM(s) SubCutaneous daily  lactated ringers. 1000 milliLiter(s) (50 mL/Hr) IV Continuous <Continuous>  levothyroxine Injectable 12.5 MICROGram(s) IV Push at bedtime  meropenem  IVPB 1000 milliGRAM(s) IV Intermittent every 8 hours  pantoprazole  Injectable 40 milliGRAM(s) IV Push daily  valproate sodium IVPB 1500 milliGRAM(s) IV Intermittent at bedtime    MEDICATIONS  (PRN):  acetaminophen    Suspension .. 650 milliGRAM(s) Oral every 6 hours PRN Temp greater or equal to 38C (100.4F), Mild Pain (1 - 3)  ondansetron Injectable 4 milliGRAM(s) IV Push every 4 hours PRN Nausea and/or Vomiting         Vitals log        ICU Vital Signs Last 24 Hrs  T(C): 37 (07 Jul 2021 08:03), Max: 38.3 (07 Jul 2021 04:13)  T(F): 98.6 (07 Jul 2021 08:03), Max: 101 (07 Jul 2021 04:13)  HR: 87 (07 Jul 2021 08:03) (87 - 111)  BP: 89/52 (07 Jul 2021 08:03) (83/57 - 109/64)  BP(mean): 64 (07 Jul 2021 08:03) (63 - 80)  ABP: --  ABP(mean): --  RR: 18 (07 Jul 2021 08:03) (18 - 32)  SpO2: 97% (07 Jul 2021 08:03) (91% - 97%)           Input and Output:  I&O's Detail    06 Jul 2021 07:01  -  07 Jul 2021 07:00  --------------------------------------------------------  IN:    IV PiggyBack: 150 mL    Lactated Ringers: 600 mL  Total IN: 750 mL    OUT:    Voided (mL): 350 mL  Total OUT: 350 mL    Total NET: 400 mL          Lab Data                        10.8   8.56  )-----------( 141      ( 07 Jul 2021 06:01 )             33.7     07-07    134<L>  |  96  |  30<H>  ----------------------------<  100<H>  5.0   |  41<H>  |  0.60    Ca    10.2      07 Jul 2021 06:01      ABG - ( 05 Jul 2021 20:32 )  pH, Arterial: 7.40  pH, Blood: x     /  pCO2: 63    /  pO2: 69    / HCO3: 35    / Base Excess: 12.7  /  SaO2: 93                      Review of Systems	      Objective     Physical Examination    heart s1s2  lung dec BS  abd soft  on HF      Pertinent Lab findings & Imaging      Sharon:  NO   Adequate UO     I&O's Detail    06 Jul 2021 07:01  -  07 Jul 2021 07:00  --------------------------------------------------------  IN:    IV PiggyBack: 150 mL    Lactated Ringers: 600 mL  Total IN: 750 mL    OUT:    Voided (mL): 350 mL  Total OUT: 350 mL    Total NET: 400 mL               Discussed with:     Cultures:	        Radiology

## 2021-07-07 NOTE — PROGRESS NOTE ADULT - ASSESSMENT
recurrent asp event suspected - worsening resp status - fever - on HF NC - prognosis bad and worse - GOC discussion - DNR    65M with history of Seizure disorder, Hypothyroidism,  Thrombocytopenia, BPH, recent right SAH from fall May 2021, presents from [facility] for hypoxia and AMS. Found to have severe sepsis, acute hypoxic and hypercapnic respiratory failure      probable ongoing - continued aspiration - with resulting consolidation - asp pna - as seen on CT chest and clinical presentation  at risk for decompensation - resp failure -     s/p broad spectrum ABX - cx noted, biomarkers noted,   assist with all needs and ADL  HOB elev - asp prec  oral hygiene  Prognosis guarded - GOC discussion -

## 2021-07-07 NOTE — PROVIDER CONTACT NOTE (CRITICAL VALUE NOTIFICATION) - ACTION/TREATMENT ORDERED:
MD Birmingham made aware. Cardiology to be consulted.
MD made aware, continue current treatment plant.
orders reviewed and no further orders given
MD Birmingham aware. MD Russell on the case.

## 2021-07-07 NOTE — PROGRESS NOTE ADULT - ASSESSMENT
Patient is a 65 year old male with a pmhx of seizures on keppra, BPH, hypothyroidism, sarcoidosis GERD, COPD, LAMBERT, recent admission to [facility] from 5/3-5/10/2021 for right subarachnoid hemorrhage s/p fall who presents from [facility] for hypoxia, found to have:    1. Acute hypoxic and hypercpanic resp failure   2. aspiration pna  3. seizures   4. copd exacerbation     Plan  Neuro: Iv Depacon for hx of seizures   Cardio: MAPs ~70. if he becomes more hypotensive would trial vasopressors through PIV if needed and then call family for further goals of care  Pulm: cxr with worsening airspace d.x R>L. HAs increased requirements to hfnc 100% fi02. BIPAP not a good choice given significant secretions. If any signs of worsening resp status i would try lasix given + 1L fluid balance for hospital stay. consider NRB in addition to HFNC if needed   GI: strict npo. peg tube discussing ongoing with family   Renal: gently fluid hydration of lr at 50cc/hr, if any signs of worsening resp status i would try lasix given + 1L fluid balance for hospital stay   Id: on prolonged course of meropenum. all cxs negative to date but pt has required more oxygen and has worsening airspace dx on cxr. collect sputum cx if able   Heme: lovenox sq + scds for dvt ppx  Endo: cont iv synthroid     dispo: dnr/I Patient is a 65 year old male with a pmhx of seizures on keppra, BPH, hypothyroidism, sarcoidosis GERD, COPD, LAMBERT, recent admission to [facility] from 5/3-5/10/2021 for right subarachnoid hemorrhage s/p fall who presents from [facility] for hypoxia, found to have:    1. Acute hypoxic and hypercpanic resp failure   2. aspiration pna  3. seizures   4. copd exacerbation     Plan  Neuro: Iv Depacon for hx of seizures   Cardio: MAPs ~70. if he becomes more hypotensive would trial vasopressors vs albumin through PIV if needed and then call family for further goals of care  Pulm: cxr with worsening airspace d.x R>L. Has increased requirements to hfnc 100% fi02. BIPAP not a good choice given significant secretions. If any signs of worsening resp status i would try lasix given + 1L fluid balance for hospital stay. consider NRB in addition to HFNC if needed   GI: strict npo. peg tube discussing ongoing with family   Renal: gently fluid hydration of lr at 50cc/hr, if any signs of worsening resp status i would try lasix given + 1L fluid balance for hospital stay   Id: on prolonged course of meropenum. all cxs negative to date but pt has required more oxygen and has worsening airspace dx on cxr. collect sputum cx if able   Heme: lovenox sq + scds for dvt ppx  Endo: cont iv synthroid     dispo: dnr/I

## 2021-07-07 NOTE — PROGRESS NOTE ADULT - SUBJECTIVE AND OBJECTIVE BOX
[facility] DIVISION of INFECTIOUS DISEASE  Devon Sharma MD PhD, Violet Mena MD, Jane Ocampo MD, Manuel Meyers MD  and providing coverage with Hailey Bullard MD and Tegan Granados MD  Providing Infectious Disease Consultations at [facility], [facility], [facility]'s    Office# [phone] to schedule follow up appointments  Answering Service for urgent calls or New Consults [phone]  Cell# to text for urgent issues Devon Sharma [phone]     infectious diseases progress note:    JULY FORTE is a 65y y. o. Male patient    Patient limited     Allergies    Keppra (Other (Severe))  penicillins (Unknown)    Intolerances    benzodiazepines (Other)      ANTIBIOTICS/RELEVANT:  antimicrobials  meropenem  IVPB 1000 milliGRAM(s) IV Intermittent every 8 hours    immunologic:    OTHER:  acetaminophen    Suspension .. 650 milliGRAM(s) Oral every 6 hours PRN  albuterol/ipratropium for Nebulization 3 milliLiter(s) Nebulizer every 6 hours  enoxaparin Injectable 40 milliGRAM(s) SubCutaneous daily  lactated ringers. 1000 milliLiter(s) IV Continuous <Continuous>  levothyroxine Injectable 12.5 MICROGram(s) IV Push at bedtime  ondansetron Injectable 4 milliGRAM(s) IV Push every 4 hours PRN  pantoprazole  Injectable 40 milliGRAM(s) IV Push daily  valproate sodium IVPB 1500 milliGRAM(s) IV Intermittent at bedtime      Objective:  Last 24-Vital Signs Last 24 Hrs  T(C): 37 (2021 08:03), Max: 38.3 (2021 04:13)  T(F): 98.6 (2021 08:03), Max: 101 (2021 04:13)  HR: 87 (2021 08:03) (87 - 111)  BP: 89/52 (2021 08:03) (83/57 - 109/64)  BP(mean): 64 (2021 08:03) (63 - 80)  RR: 18 (2021 08:03) (18 - 32)  SpO2: 97% (2021 08:03) (91% - 97%)    T(C): 37 (21 @ 08:03), Max: 38.4 (21 @ 00:00)  T(F): 98.6 (21 @ 08:03), Max: 101.2 (21 @ 00:00)  T(C): 37 (21 @ :03), Max: 38.4 (21 @ 00:00)  T(F): 98.6 (21 @ :03), Max: 101.2 (21 @ 00:00)  T(C): 37 (21 @ 08:03), Max: 38.4 (21 @ 00:00)  T(F): 98.6 (21 @ :), Max: 101.2 (21 @ 00:00)    PHYSICAL EXAM:  Constitutional: thin elderly male  Eyes: PERRLA, EOMI  Ear/Nose/Throat: oropharynx normal, on high flow	  Neck: no JVD, no lymphadenopathy, supple  Respiratory: no accessory muscle use, lung fields bilaterally decreased BS  Cardiovascular: distant  Gastrointestinal: soft, NT, no HSM, BS-normal  Extremities: no clubbing, no cyanosis, edema absent  Neuro: decrreased  Skin: no sig lesions        LABS:                        10.8   8.56  )-----------( 141      ( 2021 06:01 )             33.7       WBC 8.56   @ 06:01  WBC 5.93   @ 07:08  WBC 7.88   @ 06:46  WBC 6.76   @ 06:49  WBC 10.74   @ 06:16      07    134<L>  |  96  |  30<H>  ----------------------------<  100<H>  5.0   |  41<H>  |  0.60    Ca    10.2      2021 06:01        Creatinine, Serum: 0.60 mg/dL (21 @ 06:01)  Creatinine, Serum: 0.55 mg/dL (21 @ 07:08)  Creatinine, Serum: 0.38 mg/dL (21 @ 06:46)  Creatinine, Serum: 0.33 mg/dL (21 @ 06:49)  Creatinine, Serum: 0.31 mg/dL (21 @ 06:16)        Urinalysis Basic - ( 2021 22:34 )    Color: Yellow / Appearance: Clear / S.010 / pH: x  Gluc: x / Ketone: Trace  / Bili: Negative / Urobili: 8 mg/dL   Blood: x / Protein: Negative mg/dL / Nitrite: Negative   Leuk Esterase: Trace / RBC: 3-5 /HPF / WBC 0-2   Sq Epi: x / Non Sq Epi: Negative / Bacteria: Negative            MICROBIOLOGY:              RADIOLOGY & ADDITIONAL STUDIES:

## 2021-07-07 NOTE — PROGRESS NOTE ADULT - ASSESSMENT
The patient is a 65 year old male with a history of seizure d/o, BPH, hypothyroidism, sarcoidosis, COPD, LAMBERT, recent SAH who is admitted with respiratory failure in the setting of aspiration PNA.    Plan:  - ECG with anterolateral ST depressions  - Troponin peaked at 0.49. Likely elevated in the setting of demand ischemia from underlying infection, respiratory failure, tachycardia. No need to trend further.  - Echo with normal LV systolic function, no significant valve issues  - Given recent SAH, would hold off with antiplatelets or anticoagulants  - Completed meropenem  - NGT in place  - Likely recurrent aspiration with worsening respiratory status - high risk for further decompensation  - Overall prognosis poor

## 2021-07-07 NOTE — PROGRESS NOTE ADULT - SUBJECTIVE AND OBJECTIVE BOX
Chief Complaint: Respiratory distress    Interval Events: No events overnight.    Review of Systems:  General: No fevers, chills, weight loss or gain  Skin: No rashes, color changes  Cardiovascular: No chest pain, orthopnea  Respiratory: No shortness of breath, cough  Gastrointestinal: No nausea, abdominal pain  Genitourinary: No incontinence, pain with urination  Musculoskeletal: No pain, swelling, decreased range of motion  Neurological: No headache, weakness  Psychiatric: No depression, anxiety  Endocrine: No weight loss or gain, increased thirst  All other systems are comprehensively negative.    Physical Exam:  Vital Signs Last 24 Hrs  T(C): 37 (07 Jul 2021 08:03), Max: 38.3 (07 Jul 2021 04:13)  T(F): 98.6 (07 Jul 2021 08:03), Max: 101 (07 Jul 2021 04:13)  HR: 87 (07 Jul 2021 08:03) (87 - 111)  BP: 89/52 (07 Jul 2021 08:03) (83/57 - 109/64)  BP(mean): 64 (07 Jul 2021 08:03) (63 - 80)  RR: 18 (07 Jul 2021 08:03) (18 - 32)  SpO2: 97% (07 Jul 2021 08:03) (91% - 97%)  General: Chronically ill appearing  HEENT: MMM  Neck: No JVD, no carotid bruit  Lungs: CTAB  CV: RRR, nl S1/S2, no M/R/G  Abdomen: S/NT/ND, +BS  Extremities: No LE edema, no cyanosis  Neuro: AAOx3, non-focal  Skin: No rash    Labs:    07-07    134<L>  |  96  |  30<H>  ----------------------------<  100<H>  5.0   |  41<H>  |  0.60    Ca    10.2      07 Jul 2021 06:01                          10.8   8.56  )-----------( 141      ( 07 Jul 2021 06:01 )             33.7       Telemetry: Sinus rhythm

## 2021-07-07 NOTE — PROGRESS NOTE ADULT - ASSESSMENT
65M with seizures on keppra, BPH, hypothyroidism, sarcoidosis GERD, COPD, LAMBERT, recent admission to [facility] from 5/3-5/10/2021 for right subarachnoid hemorrhage s/p fall who presents from [facility] for hypoxia.  Found to have acute hypoxic and hypercapnic respiratory failure 2/2 sepsis 2/2 aspiration PNA.  Likely aspiration PNA since patient has been recommended dysphagia I diet from [facility] but reports state that he has been non-compliant with recommendation.  Also location of RLL and RUL also fits with aspiration.      Problems  Acute hypoxic and hypercapnic respiratory failure  Sepsis 2/2 aspiration PNA  Sarcoidosis  Seizures  COPD    Acute hypoxic and hypercapnic respiratory failure secondary to aspiration PNA and underlying interstitial lung disease  - requires SPCU care  - now back on 100% high genesis oxygen  - still with copious secretions    Sepsis from aspiration PNA with metabolic encephalopathy   - completed meropenem but was restarted.   - f/u blood cultures     Troponin Elevation  - likely demand ischemia, no need to trend further  - cardiology f/u appreciated  -  TTE noted - elevated RV pressures likely due to pna    Sarcoidosis/COPD  - on nebs    Seizure history  - cont with depakote    Hypothyroidism  - cont with synthroid     General anxiety disorder  - restart Effexor    BPH  - Sharon valdez'ed 6/30, monitor voiding as cannot get oral/ngt meds at this time.    Preventive measures  - can start with lovenox 40mg subq for DVT ppx    Patient critically ill, high risk for deterioration.

## 2021-07-08 NOTE — PROGRESS NOTE ADULT - SUBJECTIVE AND OBJECTIVE BOX
Chief Complaint: Respiratory distress    Interval Events: No events overnight.    Review of Systems:  General: No fevers, chills, weight loss or gain  Skin: No rashes, color changes  Cardiovascular: No chest pain, orthopnea  Respiratory: No shortness of breath, cough  Gastrointestinal: No nausea, abdominal pain  Genitourinary: No incontinence, pain with urination  Musculoskeletal: No pain, swelling, decreased range of motion  Neurological: No headache, weakness  Psychiatric: No depression, anxiety  Endocrine: No weight loss or gain, increased thirst  All other systems are comprehensively negative.    Physical Exam:  Vital Signs Last 24 Hrs  T(C): 36.4 (08 Jul 2021 08:00), Max: 36.9 (07 Jul 2021 16:28)  T(F): 97.6 (08 Jul 2021 08:00), Max: 98.4 (07 Jul 2021 16:28)  HR: 78 (08 Jul 2021 08:00) (74 - 88)  BP: 96/57 (08 Jul 2021 08:00) (79/53 - 102/64)  BP(mean): 70 (08 Jul 2021 08:00) (62 - 91)  RR: 22 (08 Jul 2021 08:00) (14 - 22)  SpO2: 97% (08 Jul 2021 08:00) (96% - 100%)  General: Chronically ill appearing  HEENT: MMM  Neck: No JVD, no carotid bruit  Lungs: CTAB  CV: RRR, nl S1/S2, no M/R/G  Abdomen: S/NT/ND, +BS  Extremities: No LE edema, no cyanosis  Neuro: AAOx3, non-focal  Skin: No rash    Labs:    07-07    134<L>  |  96  |  30<H>  ----------------------------<  100<H>  5.0   |  41<H>  |  0.60    Ca    10.2      07 Jul 2021 06:01                          10.8   8.56  )-----------( 141      ( 07 Jul 2021 06:01 )             33.7       Telemetry: Sinus rhythm

## 2021-07-08 NOTE — PROGRESS NOTE ADULT - ASSESSMENT
65M with seizures on keppra, BPH, hypothyroidism, sarcoidosis GERD, COPD, LAMBERT, recent admission to [facility] from 5/3-5/10/2021 for right subarachnoid hemorrhage s/p fall admitted from [facility] rehab with acute hypoxic respiratory failure sec severe right lung pneumonia likely recurrent aspiration pneumonia.      Plan    sp 10 days of meropenem  7/5-Meropenem restarted , worsening resp status,      -concern for possible repeat aspiration continue meropenem for now, remains at [facility] level of care in SPCU     Please call us at [phone] or text me directly on my cell# at [phone] with any concerns.

## 2021-07-08 NOTE — PROGRESS NOTE ADULT - SUBJECTIVE AND OBJECTIVE BOX
Date/Time Patient Seen:  		  Referring MD:   Data Reviewed	       Patient is a 65y old  Male who presents with a chief complaint of respiratory failure, lethargy (07 Jul 2021 19:24)      Subjective/HPI     PAST MEDICAL & SURGICAL HISTORY:  No pertinent past medical history    DM (diabetes mellitus)    Sarcoid    Bronchiectasis    Diverticulitis    Subdural hematoma    Inguinal hernia    Cellulitis    Seizure    Hypothyroid    Sleep apnea    LAMBERT and COPD overlap syndrome    BPH without urinary obstruction    No significant past surgical history    Status post Bright&#x27;s procedure    Bilateral subdural hematomas    Status post cholecystectomy    Status post inguinal hernia repair          Medication list         MEDICATIONS  (STANDING):  albuterol/ipratropium for Nebulization 3 milliLiter(s) Nebulizer every 6 hours  enoxaparin Injectable 40 milliGRAM(s) SubCutaneous daily  lactated ringers. 1000 milliLiter(s) (50 mL/Hr) IV Continuous <Continuous>  levothyroxine Injectable 12.5 MICROGram(s) IV Push at bedtime  meropenem  IVPB 1000 milliGRAM(s) IV Intermittent every 8 hours  pantoprazole  Injectable 40 milliGRAM(s) IV Push daily  valproate sodium IVPB 1500 milliGRAM(s) IV Intermittent at bedtime    MEDICATIONS  (PRN):  acetaminophen    Suspension .. 650 milliGRAM(s) Oral every 6 hours PRN Temp greater or equal to 38C (100.4F), Mild Pain (1 - 3)  ondansetron Injectable 4 milliGRAM(s) IV Push every 4 hours PRN Nausea and/or Vomiting         Vitals log        ICU Vital Signs Last 24 Hrs  T(C): 36.5 (08 Jul 2021 04:03), Max: 37 (07 Jul 2021 08:03)  T(F): 97.7 (08 Jul 2021 04:03), Max: 98.6 (07 Jul 2021 08:03)  HR: 75 (08 Jul 2021 06:00) (74 - 94)  BP: 87/60 (08 Jul 2021 06:00) (79/53 - 102/64)  BP(mean): 69 (08 Jul 2021 06:00) (62 - 91)  ABP: --  ABP(mean): --  RR: 17 (08 Jul 2021 06:00) (14 - 20)  SpO2: 96% (08 Jul 2021 06:00) (96% - 100%)           Input and Output:  I&O's Detail    06 Jul 2021 07:01  -  07 Jul 2021 07:00  --------------------------------------------------------  IN:    IV PiggyBack: 150 mL    Lactated Ringers: 600 mL  Total IN: 750 mL    OUT:    Voided (mL): 350 mL  Total OUT: 350 mL    Total NET: 400 mL      07 Jul 2021 07:01  -  08 Jul 2021 06:44  --------------------------------------------------------  IN:    IV PiggyBack: 100 mL    Lactated Ringers: 1200 mL    Lactated Ringers Bolus: 500 mL  Total IN: 1800 mL    OUT:    Incontinent per Collection Bag (mL): 450 mL  Total OUT: 450 mL    Total NET: 1350 mL          Lab Data                        10.8   8.56  )-----------( 141      ( 07 Jul 2021 06:01 )             33.7     07-07    134<L>  |  96  |  30<H>  ----------------------------<  100<H>  5.0   |  41<H>  |  0.60    Ca    10.2      07 Jul 2021 06:01              Review of Systems	      Objective     Physical Examination    heart s1s2  lung dec BS  abd soft      Pertinent Lab findings & Imaging      Sharon:  NO   Adequate UO     I&O's Detail    06 Jul 2021 07:01  -  07 Jul 2021 07:00  --------------------------------------------------------  IN:    IV PiggyBack: 150 mL    Lactated Ringers: 600 mL  Total IN: 750 mL    OUT:    Voided (mL): 350 mL  Total OUT: 350 mL    Total NET: 400 mL      07 Jul 2021 07:01  -  08 Jul 2021 06:44  --------------------------------------------------------  IN:    IV PiggyBack: 100 mL    Lactated Ringers: 1200 mL    Lactated Ringers Bolus: 500 mL  Total IN: 1800 mL    OUT:    Incontinent per Collection Bag (mL): 450 mL  Total OUT: 450 mL    Total NET: 1350 mL               Discussed with:     Cultures:	        Radiology

## 2021-07-08 NOTE — CONSULT NOTE ADULT - SUBJECTIVE AND OBJECTIVE BOX
S :   65y year old Male seen at bedside with a chief complaint of   painful thickened, dystrophic, ingrowing  and long toenails digits 1-5 bilaterally  and preventative foot examination. Patient is medically managed  by Medicine/Hospitalists.  Patient denies any history of trauma to both feet.  Patient has no other pedal complaints.  Patient is experiencing pain while standing, walking and in shoe gear.       Patient admits to  (-) Fevers, (-) Chills, (-) Nausea, (-) Vomiting, (-) Shortness of Breath (-) calf pain (-) chest pain       PMH: No pertinent past medical history    DM (diabetes mellitus)    Sarcoid    Bronchiectasis    Diverticulitis    Subdural hematoma    Inguinal hernia    Cellulitis    Seizure    Hypothyroid    Sleep apnea    LAMBERT and COPD overlap syndrome    BPH without urinary obstruction      PSH:No significant past surgical history    Status post Bright&#x27;s procedure    Bilateral subdural hematomas    Status post cholecystectomy    Status post inguinal hernia repair        Allergies:benzodiazepines (Other)  Keppra (Other (Severe))  penicillins (Unknown)      Labs:                        10.8   8.56  )-----------( 141      ( 07 Jul 2021 06:01 )             33.7       WBC Trend  8.56 Date (07-07 @ 06:01)  5.93 Date (07-06 @ 07:08)  7.88 Date (07-05 @ 06:46)  6.76 Date (07-04 @ 06:49)  10.74<H> Date (07-01 @ 06:16)  10.00 Date (06-30 @ 06:32)  12.35<H> Date (06-29 @ 06:34)  9.86 Date (06-26 @ 06:38)  10.61<H> Date (06-25 @ 06:19)  15.37<H> Date (06-23 @ 06:22)  13.71<H> Date (06-22 @ 07:12)  17.71<H> Date (06-21 @ 16:29)  25.29<H> Date (06-21 @ 02:28)      Chem  07-07    134<L>  |  96  |  30<H>  ----------------------------<  100<H>  5.0   |  41<H>  |  0.60    Ca    10.2      07 Jul 2021 06:01            T(F): 97.7 (07-08-21 @ 11:56), Max: 98.4 (07-07-21 @ 16:28)  HR: 79 (07-08-21 @ 13:11) (70 - 88)  BP: 102/62 (07-08-21 @ 12:00) (79/53 - 102/64)  RR: 29 (07-08-21 @ 12:00) (14 - 29)  SpO2: 96% (07-08-21 @ 13:11) (96% - 100%)  Wt(kg): --    O:   Integument:  Skin warm, dry and supple bilateral.  No open lesions or inter-digital macerations noted bilateral.   Toenails 1-5 Right and Left feet thickened, elongated, discolored, and dystrophic with subungual debris. There is pain upon palpation of all fungal and ingrowing nails 1-5 bilaterally.   Vascular: Dorsalis Pedis and Posterior Tibial pulses1/4.  Capillary re-fill time less than 3 seconds digits 1-5 bilateral. Neuro: Protective sensation intact to the level of the digits bilateral.  MSK: Muscle strength 5/5 all major muscle groups bilateral. No structural abnormality, bilaterally      A:   1. bilateral hypertrohic Onychomycosis digits 1-5   2. Pain from Elongated nails, ingrowing  and dystrophic nails  P: Discussed diagnosis and treatment with patient  Aseptic debridement and curretage  of all fungal and ingrowing  nails 1-5 bilateral with sterile nail pack  Discussed importance of daily foot examinations and proper shoe gear  Please re-consult as needed.

## 2021-07-08 NOTE — PROGRESS NOTE ADULT - ASSESSMENT
on Meropenem   on IVF  on asp prec  pt is DNR  GOC documented  prognosis very POOR    65M with history of Seizure disorder, Hypothyroidism,  Thrombocytopenia, BPH, recent right SAH from fall May 2021, presents from [facility] for hypoxia and AMS. Found to have severe sepsis, acute hypoxic and hypercapnic respiratory failure      probable ongoing - continued aspiration - with resulting consolidation - asp pna - as seen on CT chest and clinical presentation  at risk for decompensation - resp failure -   chronic lung disease - sarcoidosis with complications -   s/p broad spectrum ABX once on this admission already - cx noted, biomarkers noted,   assist with all needs and ADL  HOB elev - asp prec  oral hygiene  Prognosis guarded - GOC discussion -

## 2021-07-08 NOTE — CONSULT NOTE ADULT - PROBLEM SELECTOR RECOMMENDATION 9
Please cleanse with NS< Pat dry, paint kathi-wound with Cavilon, cover with SIN BID and PRN, it is ok to leave a thin layer of cream after cleansing this will not impede wound healing.
debridement and curettage of all fungal and ingrowing nails bilaterally with application of alcohol wash  recommend regular Podiatric care  cont heel protection bilaterally  will discuss findings with all attendings

## 2021-07-08 NOTE — PROGRESS NOTE ADULT - SUBJECTIVE AND OBJECTIVE BOX
[facility] DIVISION of INFECTIOUS DISEASE  Devon Sharma MD PhD, Violet Mena MD, Jane Ocampo MD, Manuel Meyers MD  and providing coverage with Hailey Bullard MD and Tegan Granados MD  Providing Infectious Disease Consultations at [facility], [facility], [facility]'s    Office# [phone] to schedule follow up appointments  Answering Service for urgent calls or New Consults [phone]  Cell# to text for urgent issues Devon Sharma [phone]     infectious diseases progress note:    JULY FORTE is a 65y y. o. Male patient    No concerning overnight events    Allergies    Keppra (Other (Severe))  penicillins (Unknown)    Intolerances    benzodiazepines (Other)      ANTIBIOTICS/RELEVANT:  antimicrobials  meropenem  IVPB 1000 milliGRAM(s) IV Intermittent every 8 hours    immunologic:    OTHER:  acetaminophen    Suspension .. 650 milliGRAM(s) Oral every 6 hours PRN  albuterol/ipratropium for Nebulization 3 milliLiter(s) Nebulizer every 6 hours  enoxaparin Injectable 40 milliGRAM(s) SubCutaneous daily  lactated ringers. 1000 milliLiter(s) IV Continuous <Continuous>  levothyroxine Injectable 12.5 MICROGram(s) IV Push at bedtime  ondansetron Injectable 4 milliGRAM(s) IV Push every 4 hours PRN  pantoprazole  Injectable 40 milliGRAM(s) IV Push daily  valproate sodium IVPB 1500 milliGRAM(s) IV Intermittent at bedtime      Objective:  Vital Signs Last 24 Hrs  T(C): 36.4 (08 Jul 2021 08:00), Max: 36.9 (07 Jul 2021 16:28)  T(F): 97.6 (08 Jul 2021 08:00), Max: 98.4 (07 Jul 2021 16:28)  HR: 78 (08 Jul 2021 08:00) (74 - 88)  BP: 96/57 (08 Jul 2021 08:00) (79/53 - 102/64)  BP(mean): 70 (08 Jul 2021 08:00) (62 - 91)  RR: 22 (08 Jul 2021 08:00) (14 - 22)  SpO2: 97% (08 Jul 2021 08:00) (96% - 100%)    T(C): 36.4 (07-08-21 @ 08:00), Max: 38.3 (07-07-21 @ 04:13)  T(C): 36.4 (07-08-21 @ 08:00), Max: 38.4 (07-06-21 @ 00:00)  T(C): 36.4 (07-08-21 @ 08:00), Max: 38.4 (07-06-21 @ 00:00)    PHYSICAL EXAM:  HEENT: NC atraumatic  Neck: supple  Respiratory: no accessory muscle use, breathing comfortably, decreased BS  Cardiovascular: distant  Gastrointestinal: normal appearing, nondistended  Extremities: no clubbing, no cyanosis,  Neuro: decreased        LABS:                          10.8   8.56  )-----------( 141      ( 07 Jul 2021 06:01 )             33.7       8.56 07-07 @ 06:01  5.93 07-06 @ 07:08  7.88 07-05 @ 06:46  6.76 07-04 @ 06:49      07-07    134<L>  |  96  |  30<H>  ----------------------------<  100<H>  5.0   |  41<H>  |  0.60    Ca    10.2      07 Jul 2021 06:01        Creatinine, Serum: 0.60 mg/dL (07-07-21 @ 06:01)  Creatinine, Serum: 0.55 mg/dL (07-06-21 @ 07:08)  Creatinine, Serum: 0.38 mg/dL (07-05-21 @ 06:46)  Creatinine, Serum: 0.33 mg/dL (07-04-21 @ 06:49)                INFLAMMATORY MARKERS  Auto Neutrophil #: 5.94 K/uL (07-05-21 @ 06:46)  Auto Lymphocyte #: 0.69 K/uL (07-05-21 @ 06:46)  Auto Lymphocyte #: 1.27 K/uL (07-04-21 @ 06:49)  Auto Neutrophil #: 4.19 K/uL (07-04-21 @ 06:49)  Auto Neutrophil #: 6.56 K/uL (06-26-21 @ 06:38)  Auto Lymphocyte #: 1.66 K/uL (06-26-21 @ 06:38)      Auto Eosinophil #: 0.22 K/uL (07-05-21 @ 06:46)  Auto Eosinophil #: 0.40 K/uL (07-04-21 @ 06:49)  Auto Eosinophil #: 0.27 K/uL (06-26-21 @ 06:38)                                MICROBIOLOGY:              RADIOLOGY & ADDITIONAL STUDIES:

## 2021-07-08 NOTE — PROGRESS NOTE ADULT - ASSESSMENT
65M with seizures on keppra, BPH, hypothyroidism, sarcoidosis GERD, COPD, LAMBERT, recent admission to [facility] from 5/3-5/10/2021 for right subarachnoid hemorrhage s/p fall who presents from [facility] for hypoxia.  Found to have acute hypoxic and hypercapnic respiratory failure 2/2 sepsis 2/2 aspiration PNA.  Likely aspiration PNA since patient has been recommended dysphagia I diet from [facility] but reports state that he has been non-compliant with recommendation.  Also location of RLL and RUL also fits with aspiration.      Problems  Acute hypoxic and hypercapnic respiratory failure  Sepsis 2/2 aspiration PNA  Sarcoidosis  Seizures  COPD    Acute hypoxic and hypercapnic respiratory failure secondary to aspiration PNA and underlying interstitial lung disease  - requires SPCU care  - High flow oxygen titrated down to 50cc/hr.   - still with copious secretions    Sepsis from aspiration PNA with metabolic encephalopathy   - completed meropenem but was restarted.   - f/u blood cultures     Troponin Elevation  - likely demand ischemia, no need to trend further  - cardiology f/u appreciated  -  TTE noted - elevated RV pressures likely due to pna    Sarcoidosis/COPD  - on nebs    Seizure history  - cont with depakote    Hypothyroidism  - cont with synthroid     General anxiety disorder  - restart Effexor    BPH  - [facility]'ed 6/30, monitor voiding as cannot get oral/ngt meds at this time.    Preventive measures  - can start with lovenox 40mg subq for DVT ppx    NGT placed - easily able to ascultate air bolus.  Xray done - tip below the diaphragm.  will start tube feeds at 20cc/hr and see how he tolerates before raising rate.     Patient critically ill, high risk for deterioration.

## 2021-07-08 NOTE — CONSULT NOTE ADULT - REASON FOR ADMISSION
respiratory failure, lethargy

## 2021-07-08 NOTE — CONSULT NOTE ADULT - CONSULT REQUESTED DATE/TIME
06-Jul-2021 14:30
08-Jul-2021 15:25
21-Jun-2021 13:29
21-Jun-2021 06:50
22-Jun-2021 13:10
22-Jun-2021 17:45
21-Jun-2021 03:20
22-Jun-2021 14:25

## 2021-07-08 NOTE — CONSULT NOTE ADULT - CONSULT REASON
Acute hypoxic respiratory failure sec severe bilateral pneumonia
Hypoxemia, obtundation
Elevated cardiac enzymes
GOC
Pressure Injury
resp distress  ams  OP  OA  GERD
painful fungal nails bilaterally
Rule out dental abscess

## 2021-07-08 NOTE — PROGRESS NOTE ADULT - ASSESSMENT
The patient is a 65 year old male with a history of seizure d/o, BPH, hypothyroidism, sarcoidosis, COPD, LAMBERT, recent SAH who is admitted with respiratory failure in the setting of aspiration PNA.    Plan:  - ECG with anterolateral ST depressions  - Troponin peaked at 0.49. Likely elevated in the setting of demand ischemia from underlying infection, respiratory failure, tachycardia. No need to trend further.  - Echo with normal LV systolic function, no significant valve issues  - Given recent SAH, would hold off with antiplatelets or anticoagulants  - Completed meropenem  - NGT in place  - Overall prognosis poor - comfort care would be most appropriate

## 2021-07-08 NOTE — PROGRESS NOTE ADULT - SUBJECTIVE AND OBJECTIVE BOX
Patient is a 65y old  Male who presents with a chief complaint of respiratory failure, lethargy (08 Jul 2021 09:24)      INTERVAL HPI/OVERNIGHT EVENTS: patient complaining he is hungry and feeling dry.    able to cough up thick beige with brown tinted sputum.     MEDICATIONS  (STANDING):  albuterol/ipratropium for Nebulization 3 milliLiter(s) Nebulizer every 6 hours  enoxaparin Injectable 40 milliGRAM(s) SubCutaneous daily  lactated ringers. 1000 milliLiter(s) (50 mL/Hr) IV Continuous <Continuous>  levothyroxine Injectable 12.5 MICROGram(s) IV Push at bedtime  meropenem  IVPB 1000 milliGRAM(s) IV Intermittent every 8 hours  pantoprazole  Injectable 40 milliGRAM(s) IV Push daily  valproate sodium IVPB 1500 milliGRAM(s) IV Intermittent at bedtime    MEDICATIONS  (PRN):  acetaminophen    Suspension .. 650 milliGRAM(s) Oral every 6 hours PRN Temp greater or equal to 38C (100.4F), Mild Pain (1 - 3)  ondansetron Injectable 4 milliGRAM(s) IV Push every 4 hours PRN Nausea and/or Vomiting      Allergies  Keppra (Other (Severe))  penicillins (Unknown)    Intolerances  benzodiazepines (Other)      REVIEW OF SYSTEMS:  denies other complaints.     Vital Signs Last 24 Hrs  T(C): 36.5 (08 Jul 2021 11:56), Max: 36.9 (07 Jul 2021 16:28)  T(F): 97.7 (08 Jul 2021 11:56), Max: 98.4 (07 Jul 2021 16:28)  HR: 75 (08 Jul 2021 12:00) (70 - 88)  BP: 102/62 (08 Jul 2021 12:00) (79/53 - 102/64)  BP(mean): 75 (08 Jul 2021 12:00) (62 - 91)  RR: 29 (08 Jul 2021 12:00) (14 - 29)  SpO2: 96% (08 Jul 2021 12:00) (96% - 100%)    PHYSICAL EXAM:  GENERAL: NAD  HEAD:  Atraumatic, Normocephalic  EYES: Econjunctiva and sclera clear  ENMT: Moist mucous membranes  NECK: Supple, No JVD, Normal thyroid  NERVOUS SYSTEM:  Alert & Oriented X2; All 4 extremities mobile, no gross sensory deficits.   CHEST/LUNG: Clear to auscultation bilaterally; No rales, rhonchi, wheezing, or rubs  HEART: Regular rate and rhythm; No murmurs, rubs, or gallops  ABDOMEN: Soft, Nontender, Nondistended; Bowel sounds present  EXTREMITIES:  2+ Peripheral Pulses, diffuse edema      LABS:      Ca    10.2       07 Jul 2021 06:01          CAPILLARY BLOOD GLUCOSE          RADIOLOGY & ADDITIONAL TESTS:    Imaging Personally Reviewed:  [ ] YES     Consultant(s) Notes Reviewed:      Care Discussed with Consultants/Other Providers:    Advanced Directives: [ ] DNR  [ ] No feeding tube  [ ] MOLST in chart  [ ] MOLST completed today  [ ] Unknown

## 2021-07-08 NOTE — CHART NOTE - NSCHARTNOTEFT_GEN_A_CORE
Assessment: Pt due for nutrition follow-up.  Pt is a 65 year old M with seizures, BPH, hypothyroidism, sarcoidosis GERD, COPD, LAMBERT, recent admission to [facility] from 5/3-5/10/2021 for right subarachnoid hemorrhage s/p fall who presents from [facility] for hypoxia.  Found to have acute hypoxic and hypercapnic respiratory failure 2/2 sepsis 2/2 aspiration PNA.  It was recommended that pt follow dysphagia I diet from [facility] but reports state that he has been non-compliant with recommendation.  Per SLP evaluation this admission, oral nutrition/hydration contraindicated.  NGT placed for nutrition/meds.  Pt started NGT (bolus) feedings: Jevity 1.5 240ml every 4hrs with 75ml water flush before and after each feed, +NC prosource once daily.  Pt's respiratory status noted to worsen with increased secretions/need for suctioning and concern for aspiration and NGT feedings discontinued on 7/6.  Pt on O2 via high flow NC.  GOC discussion ongoing, noted pt now DNI/DNR.  Per palliative care note, current management plan and progress to be discussed with pt/HCP and peg placement to be discussed.  Pt states he is hungry, NGT placed and continuous low rate tube feed order initiated today (Vital 1.5 @20ml/hr x 24hrs, +nc prosource once daily), tolerance to be assessed before increasing as pt is at high risk for aspiration. +BM 7/8 (fecal incontinence).  RD to follow closely.     Factors impacting intake: [ ] none [ ] nausea  [ ] vomiting [ ] diarrhea [ ] constipation  [ ]chewing problems [ x] swallowing issues  [x ] other: resp status, aspiration risk, npo with ngt    Diet Prescription: Diet, NPO with Tube Feed:   Tube Feeding Modality: Nasogastric  Vital 1.5 Lee  Total Volume for 24 Hours (mL): 480  Continuous  Starting Tube Feed Rate {mL per Hour}: 20  Until Goal Tube Feed Rate (mL per Hour): 20  Tube Feed Duration (in Hours): 24  Tube Feed Start Time: 14:00   Total Volume per Flush (mL): 20  No Carb Prosource (1pkg = 15gms Protein)     Qty per Day:  1 (07-08-21 @ 13:02)    Intake: NPO, low rate TF via NGT     Current Weight: 7/8 168.8#, 7/7 170.8#, generalized +1, L arm edema +1  % Weight Change    Pertinent Medications: MEDICATIONS  (STANDING):  albuterol/ipratropium for Nebulization 3 milliLiter(s) Nebulizer every 6 hours  enoxaparin Injectable 40 milliGRAM(s) SubCutaneous daily  lactated ringers. 1000 milliLiter(s) (50 mL/Hr) IV Continuous <Continuous>  levothyroxine Injectable 12.5 MICROGram(s) IV Push at bedtime  meropenem  IVPB 1000 milliGRAM(s) IV Intermittent every 8 hours  pantoprazole  Injectable 40 milliGRAM(s) IV Push daily  valproate sodium IVPB 1500 milliGRAM(s) IV Intermittent at bedtime    MEDICATIONS  (PRN):  acetaminophen    Suspension .. 650 milliGRAM(s) Oral every 6 hours PRN Temp greater or equal to 38C (100.4F), Mild Pain (1 - 3)  ondansetron Injectable 4 milliGRAM(s) IV Push every 4 hours PRN Nausea and/or Vomiting    Pertinent Labs: 07-07 Na134 mmol/L<L> Glu 100 mg/dL<H> K+ 5.0 mmol/L Cr  0.60 mg/dL BUN 30 mg/dL<H> 07-05 Phos 3.0 mg/dL 07-05 Alb 1.9 g/dL<L>     CAPILLARY BLOOD GLUCOSE        Skin: sacral DTI    Estimated Needs:   [ x] no change since previous assessment  [ ] recalculated:     Previous Nutrition Diagnosis:   [ ] Inadequate Energy Intake [ ]Inadequate Oral Intake [ ] Excessive Energy Intake   [ ] Underweight [ ] Increased Nutrient Needs [ ] Overweight/Obesity   [ ] Altered GI Function [ ] Unintended Weight Loss [ ] Food & Nutrition Related Knowledge Deficit [ x] Malnutrition     Nutrition Diagnosis is [x ] ongoing  [ ] resolved [ ] not applicable     New Nutrition Diagnosis: [ ] not applicable       Interventions: NPO with NGT (20ml/hr x 24hrs for now), GOC discussion   Recommend  [ ] Change Diet To:  [ ] Nutrition Supplement  [ ] Nutrition Support  [ ] Other:     Monitoring and Evaluation:   [ ] PO intake [ x ] Tolerance to TF prescription [ x ] weights [ x ] labs[ x ] follow up per protocol  [ ] other:

## 2021-07-09 NOTE — PROGRESS NOTE ADULT - SUBJECTIVE AND OBJECTIVE BOX
Date/Time Patient Seen:  		  Referring MD:   Data Reviewed	       Patient is a 65y old  Male who presents with a chief complaint of respiratory failure, lethargy (08 Jul 2021 15:24)      Subjective/HPI     PAST MEDICAL & SURGICAL HISTORY:  No pertinent past medical history    DM (diabetes mellitus)    Sarcoid    Bronchiectasis    Diverticulitis    Subdural hematoma    Inguinal hernia    Cellulitis    Seizure    Hypothyroid    Sleep apnea    LAMBERT and COPD overlap syndrome    BPH without urinary obstruction    No significant past surgical history    Status post Bright&#x27;s procedure    Bilateral subdural hematomas    Status post cholecystectomy    Status post inguinal hernia repair          Medication list         MEDICATIONS  (STANDING):  albuterol/ipratropium for Nebulization 3 milliLiter(s) Nebulizer every 6 hours  enoxaparin Injectable 40 milliGRAM(s) SubCutaneous daily  lactated ringers. 1000 milliLiter(s) (50 mL/Hr) IV Continuous <Continuous>  levothyroxine Injectable 12.5 MICROGram(s) IV Push at bedtime  meropenem  IVPB 1000 milliGRAM(s) IV Intermittent every 8 hours  pantoprazole  Injectable 40 milliGRAM(s) IV Push daily  valproate sodium IVPB 1500 milliGRAM(s) IV Intermittent at bedtime    MEDICATIONS  (PRN):  acetaminophen    Suspension .. 650 milliGRAM(s) Oral every 6 hours PRN Temp greater or equal to 38C (100.4F), Mild Pain (1 - 3)  ondansetron Injectable 4 milliGRAM(s) IV Push every 4 hours PRN Nausea and/or Vomiting         Vitals log        ICU Vital Signs Last 24 Hrs  T(C): 36.9 (09 Jul 2021 04:00), Max: 36.9 (08 Jul 2021 20:25)  T(F): 98.4 (09 Jul 2021 04:00), Max: 98.4 (08 Jul 2021 20:25)  HR: 74 (09 Jul 2021 06:05) (70 - 86)  BP: 107/69 (09 Jul 2021 06:05) (96/57 - 112/61)  BP(mean): 81 (09 Jul 2021 06:05) (70 - 83)  ABP: --  ABP(mean): --  RR: 26 (09 Jul 2021 06:05) (19 - 30)  SpO2: 92% (09 Jul 2021 06:05) (91% - 99%)           Input and Output:  I&O's Detail    07 Jul 2021 07:01  -  08 Jul 2021 07:00  --------------------------------------------------------  IN:    IV PiggyBack: 100 mL    Lactated Ringers: 1200 mL    Lactated Ringers Bolus: 500 mL  Total IN: 1800 mL    OUT:    Incontinent per Collection Bag (mL): 450 mL  Total OUT: 450 mL    Total NET: 1350 mL      08 Jul 2021 07:01  -  09 Jul 2021 06:45  --------------------------------------------------------  IN:    Enteral Tube Flush: 340 mL    IV PiggyBack: 200 mL    Lactated Ringers: 1200 mL    Vital1.5: 340 mL  Total IN: 2080 mL    OUT:    Incontinent per Collection Bag (mL): 750 mL    Voided (mL): 400 mL  Total OUT: 1150 mL    Total NET: 930 mL          Lab Data                        10.7   5.95  )-----------( 158      ( 09 Jul 2021 06:25 )             34.1                   Review of Systems	      Objective     Physical Examination    heart s1s2  lung dec BS  abd soft      Pertinent Lab findings & Imaging      Sharon:  NO   Adequate UO     I&O's Detail    07 Jul 2021 07:01  -  08 Jul 2021 07:00  --------------------------------------------------------  IN:    IV PiggyBack: 100 mL    Lactated Ringers: 1200 mL    Lactated Ringers Bolus: 500 mL  Total IN: 1800 mL    OUT:    Incontinent per Collection Bag (mL): 450 mL  Total OUT: 450 mL    Total NET: 1350 mL      08 Jul 2021 07:01  -  09 Jul 2021 06:45  --------------------------------------------------------  IN:    Enteral Tube Flush: 340 mL    IV PiggyBack: 200 mL    Lactated Ringers: 1200 mL    Vital1.5: 340 mL  Total IN: 2080 mL    OUT:    Incontinent per Collection Bag (mL): 750 mL    Voided (mL): 400 mL  Total OUT: 1150 mL    Total NET: 930 mL               Discussed with:     Cultures:	        Radiology

## 2021-07-09 NOTE — PROGRESS NOTE ADULT - SUBJECTIVE AND OBJECTIVE BOX
[facility], Division of Infectious Diseases  SHANEL Tenorio Y. Patel, S. Shah  [phone]  after hours and weekends [phone]    Name: JULY FORTE  Age: 65y  Gender: Male  MRN: [id number]    Interval History--  Notes reviewed  not arousable today      Allergies    Keppra (Other (Severe))  penicillins (Unknown)    Intolerances    benzodiazepines (Other)      Medications--  Antibiotics:  meropenem  IVPB 1000 milliGRAM(s) IV Intermittent every 8 hours    Immunologic:    Other:  acetaminophen    Suspension .. PRN  albuterol/ipratropium for Nebulization  enoxaparin Injectable  lactated ringers.  levothyroxine Injectable  ondansetron Injectable PRN  pantoprazole  Injectable  valproate sodium IVPB      Review of Systems--  A 10-point review of systems was obtained.   unable to obtain   Review of systems otherwise negative except as previously noted.    Physical Examination--  Vital Signs: T(F): 98.3 (07-09-21 @ 08:39), Max: 98.4 (07-08-21 @ 20:25)  HR: 77 (07-09-21 @ 10:00)  BP: 109/68 (07-09-21 @ 10:00)  RR: 24 (07-09-21 @ 10:00)  SpO2: 89% (07-09-21 @ 10:00)  Wt(kg): --  General: Nontoxic-appearing Male in no acute distress.  HEENT: AT/NC. +high flow +NGT  Neck: Not rigid. No sense of mass.  Nodes: None palpable.  Lungs: Clear bilaterally without rales, wheezing or rhonchi  Heart: Regular rate and rhythm. No Murmur  Abdomen: Bowel sounds present and normoactive. Soft. Nondistended. Nontender.   Extremities: No cyanosis or clubbing. trace edema.   Skin: Warm. Dry. Good turgor. No rash. No vasculitic stigmata.  Psychiatric: nonverbal          Laboratory Studies--  CBC                        10.7   5.95  )-----------( 158      ( 09 Jul 2021 06:25 )             34.1       Chemistries  07-09    141  |  100  |  23  ----------------------------<  114<H>  4.0   |  44<H>  |  0.41<L>    Ca    9.9      09 Jul 2021 06:25  Phos  2.3     07-09  Mg     1.7     07-09        Culture Data

## 2021-07-09 NOTE — PROGRESS NOTE ADULT - SUBJECTIVE AND OBJECTIVE BOX
Late entry note- patient was seen multiple times on 7/9    Patient is a 65y old  Male who presents with a chief complaint of respiratory failure, lethargy (12 Jul 2021 11:43)    INTERVAL HPI/OVERNIGHT EVENTS:  feeling better today, tolerating feeds, conversant with sister at bedside.     MEDICATIONS  (STANDING):  albuterol/ipratropium for Nebulization 3 milliLiter(s) Nebulizer every 6 hours  enoxaparin Injectable 40 milliGRAM(s) SubCutaneous daily  lactated ringers. 1000 milliLiter(s) (50 mL/Hr) IV Continuous <Continuous>  levothyroxine Injectable 12.5 MICROGram(s) IV Push at bedtime  meropenem  IVPB 1000 milliGRAM(s) IV Intermittent every 8 hours  pantoprazole  Injectable 40 milliGRAM(s) IV Push daily  sodium chloride 0.65% Nasal 1 Spray(s) Both Nostrils every 4 hours  valproate sodium IVPB 1500 milliGRAM(s) IV Intermittent at bedtime    MEDICATIONS  (PRN):  acetaminophen    Suspension .. 650 milliGRAM(s) Oral every 6 hours PRN Temp greater or equal to 38C (100.4F), Mild Pain (1 - 3)  ondansetron Injectable 4 milliGRAM(s) IV Push every 4 hours PRN Nausea and/or Vomiting      Allergies  Keppra (Other (Severe))  penicillins (Unknown)    Intolerances  benzodiazepines (Other)      REVIEW OF SYSTEMS:  limited but no complaints.     Vital Signs Last 24 Hrs  Vital Signs: T(F): 98.3 (07-09-21 @ 08:39), Max: 98.4 (07-08-21 @ 20:25)  HR: 77 (07-09-21 @ 10:00)  BP: 109/68 (07-09-21 @ 10:00)  RR: 24 (07-09-21 @ 10:00)  SpO2: 89% (07-09-21 @ 10:00)    PHYSICAL EXAM:  GENERAL: NAD  HEAD:  Atraumatic, Normocephalic  EYES: conjunctiva and sclera clear  ENMT: Moist mucous membranes, poor dentition  NERVOUS SYSTEM:  Alert & Oriented X2; All 4 extremities mobile, no gross sensory deficits.   CHEST/LUNG: bilateral rhonchi, no wheeze  HEART: Regular rate and rhythm;   ABDOMEN: Soft, Nontender, Nondistended; Bowel sounds present  EXTREMITIES:  2+ Peripheral Pulses, diffuse edema      LABS:                        12.4   14.96 )-----------( 243      ( 12 Jul 2021 06:24 )             39.5     12 Jul 2021 06:24    139    |  99     |  11     ----------------------------<  132    4.3     |  42     |  0.52     Ca    9.9        12 Jul 2021 06:24          CAPILLARY BLOOD GLUCOSE          RADIOLOGY & ADDITIONAL TESTS:    Imaging Personally Reviewed:  [ ] YES     Consultant(s) Notes Reviewed:      Care Discussed with Consultants/Other Providers:    Advanced Directives: [ ] DNR  [ ] No feeding tube  [ ] MOLST in chart  [ ] MOLST completed today  [ ] Unknown

## 2021-07-09 NOTE — PROGRESS NOTE ADULT - ASSESSMENT
65M with seizures on keppra, BPH, hypothyroidism, sarcoidosis GERD, COPD, LAMBERT, recent admission to [facility] from 5/3-5/10/2021 for right subarachnoid hemorrhage s/p fall who presents from [facility] for hypoxia.  Found to have acute hypoxic and hypercapnic respiratory failure 2/2 sepsis 2/2 aspiration PNA.  Likely aspiration PNA since patient has been recommended dysphagia I diet from [facility] but reports state that he has been non-compliant with recommendation.  Also location of RLL and RUL also fits with aspiration.      Problems  Acute hypoxic and hypercapnic respiratory failure  Sepsis 2/2 aspiration PNA  Sarcoidosis  Seizures  COPD    Acute hypoxic and hypercapnic respiratory failure secondary to aspiration PNA and underlying interstitial lung disease  - requires SPCU care  - High flow oxygen titrated down to 50cc/hr.   - still with copious secretions    Sepsis from aspiration PNA with metabolic encephalopathy   - completed meropenem but was restarted.   - f/u blood cultures     Troponin Elevation  - likely demand ischemia, no need to trend further  - cardiology f/u appreciated  -  TTE noted - elevated RV pressures likely due to pna    Sarcoidosis/COPD  - on nebs    Seizure history  - cont with depakote    Hypothyroidism  - cont with synthroid     General anxiety disorder  - restart Effexor    BPH  - [place]'ed 6/30, monitor voiding as cannot get oral/ngt meds at this time.    Preventive measures  - can start with lovenox 40mg subq for DVT ppx    NGT placed - tolerating low volume tube feeds.     Patient critically ill, high risk for deterioration.

## 2021-07-09 NOTE — PROGRESS NOTE ADULT - ASSESSMENT
on Meropenem   on IVF  on asp prec  pt is DNR  GOC documented  prognosis very POOR    65M with history of Seizure disorder, Hypothyroidism,  Thrombocytopenia, BPH, recent right SAH from fall May 2021, presents from [facility] for hypoxia and AMS. Found to have severe sepsis, acute hypoxic and hypercapnic respiratory failure      probable ongoing - continued aspiration - with resulting consolidation - asp pna - as seen on CT chest and clinical presentation  at risk for decompensation - resp failure -   chronic lung disease - sarcoidosis with complications -   s/p broad spectrum ABX once on this admission already - cx noted, biomarkers noted,   assist with all needs and ADL  HOB elev - asp prec  oral hygiene  Prognosis guarded - GOC discussion -

## 2021-07-09 NOTE — PROGRESS NOTE ADULT - SUBJECTIVE AND OBJECTIVE BOX
Chief Complaint: Respiratory distress    Interval Events: No events overnight.    Review of Systems:  General: No fevers, chills, weight loss or gain  Skin: No rashes, color changes  Cardiovascular: No chest pain, orthopnea  Respiratory: No shortness of breath, cough  Gastrointestinal: No nausea, abdominal pain  Genitourinary: No incontinence, pain with urination  Musculoskeletal: No pain, swelling, decreased range of motion  Neurological: No headache, weakness  Psychiatric: No depression, anxiety  Endocrine: No weight loss or gain, increased thirst  All other systems are comprehensively negative.    Physical Exam:  Vital Signs Last 24 Hrs  T(C): 36.8 (09 Jul 2021 08:39), Max: 36.9 (08 Jul 2021 20:25)  T(F): 98.3 (09 Jul 2021 08:39), Max: 98.4 (08 Jul 2021 20:25)  HR: 80 (09 Jul 2021 08:00) (70 - 86)  BP: 104/80 (09 Jul 2021 08:00) (98/63 - 112/61)  BP(mean): 89 (09 Jul 2021 08:00) (71 - 89)  RR: 26 (09 Jul 2021 08:00) (19 - 30)  SpO2: 87% (09 Jul 2021 08:00) (87% - 97%)  General: Chronically ill appearing  HEENT: MMM  Neck: No JVD, no carotid bruit  Lungs: CTAB  CV: RRR, nl S1/S2, no M/R/G  Abdomen: S/NT/ND, +BS  Extremities: No LE edema, no cyanosis  Neuro: AAOx3, non-focal  Skin: No rash    Labs:    07-09    141  |  100  |  23  ----------------------------<  114<H>  4.0   |  44<H>  |  0.41<L>    Ca    9.9      09 Jul 2021 06:25  Phos  2.3     07-09  Mg     1.7     07-09                          10.7   5.95  )-----------( 158      ( 09 Jul 2021 06:25 )             34.1     Telemetry: Sinus rhythm

## 2021-07-09 NOTE — PROGRESS NOTE ADULT - ASSESSMENT
65M with seizures on keppra, BPH, hypothyroidism, sarcoidosis GERD, COPD, LAMBERT, recent admission to [facility] from 5/3-5/10/2021 for right subarachnoid hemorrhage s/p fall admitted from [facility] rehab with acute hypoxic respiratory failure sec severe right lung pneumonia likely recurrent aspiration pneumonia.      Plan    sp 10 days of meropenem  -Meropenem restarted , worsening resp status, today is day 5      -concern for possible repeat aspiration continue meropenem for now, remains at [facility] level of care in SPCU  strict aspiration precautions  supportive care   remains guarded

## 2021-07-09 NOTE — PROGRESS NOTE ADULT - SUBJECTIVE AND OBJECTIVE BOX
Patient is a 65y old  Male who presents with a chief complaint of respiratory failure, lethargy (09 Jul 2021 13:29)    HPI:  65M with seizures on keppra, BPH, hypothyroidism, sarcoidosis GERD, COPD, LAMBERT, recent admission to [facility] from 5/3-5/10/2021 for right subarachnoid hemorrhage s/p fall who presents from [facility] for hypoxia. Patient admitted with acute hypoxic respiratory failure secondary to aspiration pneumonia. Patient upgraded to ICU for worsening hypoxia/hypercapnia requiring high flow nC    At bedside patient on High flow NC 45L 40% FiO2. Awake and alert however confused    Allergies: Keppra  penicillins    PAST MEDICAL & SURGICAL HISTORY:  DM (diabetes mellitus)    Sarcoid    Bronchiectasis    Subdural hematoma    Inguinal hernia    Seizure    Hypothyroid    Sleep apnea    LAMBERT and COPD overlap syndrome    BPH without urinary obstruction    Status post Bright&#x27;s procedure    Bilateral subdural hematomas    Status post cholecystectomy    Status post inguinal hernia repair      FAMILY HISTORY:  Family history of diabetes mellitus    Family history of pulmonary embolism (Grandparent)      SOCIAL HISTORY:    Home Medications:    Review of Systems:  Poor historian unable to participate in ROS    T(F): 97.8 (07-09-21 @ 16:41), Max: 98.4 (07-09-21 @ 04:00)  HR: 79 (07-09-21 @ 20:00) (72 - 82)  BP: 116/84 (07-09-21 @ 20:00) (99/59 - 123/75)  RR: 25 (07-09-21 @ 20:00) (19 - 27)  SpO2: 93% (07-09-21 @ 20:00)  Wt(kg): --    CAPILLARY BLOOD GLUCOSE        I&O's Summary    08 Jul 2021 07:01  -  09 Jul 2021 07:00  --------------------------------------------------------  IN: 2080 mL / OUT: 1150 mL / NET: 930 mL    09 Jul 2021 07:01  -  09 Jul 2021 21:05  --------------------------------------------------------  IN: 1170 mL / OUT: 350 mL / NET: 820 mL        Physical Exam:     Gen: critically ill appearing  Neuro: awake/alert  CVS: +S1S2  Resp: Coarse  Abd: soft, NT, ND  Ext: warm, dry, no edema    Meds:  meropenem  IVPB 1000 milliGRAM(s) IV Intermittent every 8 hours       levothyroxine Injectable 12.5 MICROGram(s) IV Push at bedtime     albuterol/ipratropium for Nebulization 3 milliLiter(s) Nebulizer every 6 hours     acetaminophen    Suspension .. 650 milliGRAM(s) Oral every 6 hours PRN  ondansetron Injectable 4 milliGRAM(s) IV Push every 4 hours PRN  valproate sodium IVPB 1500 milliGRAM(s) IV Intermittent at bedtime        enoxaparin Injectable 40 milliGRAM(s) SubCutaneous daily     pantoprazole  Injectable 40 milliGRAM(s) IV Push daily        lactated ringers. 1000 milliLiter(s) IV Continuous <Continuous>                                    10.7   5.95  )-----------( 158      ( 09 Jul 2021 06:25 )             34.1       07-09    141  |  100  |  23  ----------------------------<  114<H>  4.0   |  44<H>  |  0.41<L>    Ca    9.9      09 Jul 2021 06:25  Phos  2.3     07-09  Mg     1.7     07-09                        Radiology:     EXAM:  XR CHEST PORTABLE ROUTINE 1V                                  PROCEDURE DATE:  07/08/2021          INTERPRETATION:  Portable chest radiograph    CLINICAL INFORMATION: NG tube placement. Recurrent aspiration pneumonia.    TECHNIQUE:  Portable AP view of the chest was obtained.    COMPARISON: 7/5/2021 chest available for review.    FINDINGS:  NG tube tip in fundus of stomach.    The lungs show unchanged acute on chronic bilateral upper and lower lobe  multifocal airspace disease/consolidation  . RIGHT apical consolidation with volume loss causing  RIGHT sided tracheal deviation.    There is mild cardiomegaly and stable widening of the superior mediastinum.. No pneumothorax.      Visualized osseous structures are intact.    IMPRESSION:  No significant change. NG tube tip remains in fundus of stomach..    --- End of Report ---              MARGARET BARTHOLOMEW MD; Attending Radiologist  This document has been electronically signed. Jul 9 2021  7:53PM    -Acute hypoxic resrpiatory failure  -Aspiration pneumonia    Assessment/Plan:  65M with seizures on keppra, BPH, hypothyroidism, sarcoidosis GERD, COPD, LAMBERT, recent admission to [facility] from 5/3-5/10/2021 for right subarachnoid hemorrhage s/p fall who presents from [facility] for hypoxia. Patient admitted with acute hypoxic respiratory failure secondary to aspiration pneumonia. Patient upgraded to ICU for worsening hypoxia/hypercapnia requiring high flow nC    -Oxygen titrated to 40L 40% FiO2 high flow NC. Poor candidate for NIV  -Concern for recurrent aspiration  -Duonebs and chest PT  -Abx coverage w/ Meropenem. Cultures without growth to date.    CODE STATUS: DNR

## 2021-07-10 NOTE — PROGRESS NOTE ADULT - SUBJECTIVE AND OBJECTIVE BOX
[facility], Division of Infectious Diseases  SHANEL Tenorio Y. Patel, S. Shah  [phone]  after hours and weekends [phone]    Name: JULY FORTE  Age: 65y  Gender: Male  MRN: [id number]    Interval History--  Notes reviewed  arousable   nodding    Allergies    Keppra (Other (Severe))  penicillins (Unknown)    Intolerances    benzodiazepines (Other)      Medications--  Antibiotics:  meropenem  IVPB 1000 milliGRAM(s) IV Intermittent every 8 hours    Immunologic:    Other:  acetaminophen    Suspension .. PRN  albuterol/ipratropium for Nebulization  enoxaparin Injectable  lactated ringers.  levothyroxine Injectable  ondansetron Injectable PRN  pantoprazole  Injectable  valproate sodium IVPB      Review of Systems--  A 10-point review of systems was obtained.   unable to obtian     Review of systems otherwise negative except as previously noted.    Physical Examination--  Vital Signs: T(F): 98.3 (07-10-21 @ 15:32), Max: 98.5 (07-09-21 @ 21:21)  HR: 79 (07-10-21 @ 16:00)  BP: 118/72 (07-10-21 @ 16:00)  RR: 21 (07-10-21 @ 16:00)  SpO2: 95% (07-10-21 @ 16:00)  Wt(kg): --  General: resp distress  HEENT: AT/NC.+NGT and +high flow   Neck: Not rigid. No sense of mass.  Nodes: None palpable.  Lungs: Clear bilaterally without rales, wheezing or rhonchi  Heart: Regular rate and rhythm. No Murmur  Abdomen: Bowel sounds present and normoactive. Soft. Nondistended.   Extremities: No cyanosis or clubbing. trace edema.   Skin: Warm. Dry. Good turgor. No rash. No vasculitic stigmata.  Psychiatric: somnolent        Laboratory Studies--  CBC                        10.7   5.95  )-----------( 158      ( 09 Jul 2021 06:25 )             34.1       Chemistries  07-09    141  |  100  |  23  ----------------------------<  114<H>  4.0   |  44<H>  |  0.41<L>    Ca    9.9      09 Jul 2021 06:25  Phos  2.3     07-09  Mg     1.7     07-09        Culture Data    < from: Xray Chest 1 View- PORTABLE-Routine (Xray Chest 1 View- PORTABLE-Routine .) (07.08.21 @ 14:30) >    EXAM:  XR CHEST PORTABLE ROUTINE 1V                                  PROCEDURE DATE:  07/08/2021          INTERPRETATION:  Portable chest radiograph    CLINICAL INFORMATION: NG tube placement. Recurrent aspiration pneumonia.    TECHNIQUE:  Portable AP view of the chest was obtained.    COMPARISON: 7/5/2021 chest available for review.    FINDINGS:  NG tube tip in fundus of stomach.    The lungs show unchanged acute on chronic bilateral upper and lower lobe  multifocal airspace disease/consolidation  . RIGHT apical consolidation with volume loss causing  RIGHT sided tracheal deviation.    There is mild cardiomegaly and stable widening of the superior mediastinum.. No pneumothorax.      Visualized osseous structures are intact.    IMPRESSION:  No significant change. NG tube tip remains in fundus of stomach..    < end of copied text >

## 2021-07-10 NOTE — PROGRESS NOTE ADULT - SUBJECTIVE AND OBJECTIVE BOX
Date/Time Patient Seen:  		  Referring MD:   Data Reviewed	       Patient is a 65y old  Male who presents with a chief complaint of respiratory failure, lethargy (09 Jul 2021 21:05)      Subjective/HPI     PAST MEDICAL & SURGICAL HISTORY:  No pertinent past medical history    DM (diabetes mellitus)    Sarcoid    Bronchiectasis    Diverticulitis    Subdural hematoma    Inguinal hernia    Cellulitis    Seizure    Hypothyroid    Sleep apnea    LAMBERT and COPD overlap syndrome    BPH without urinary obstruction    No significant past surgical history    Status post Bright&#x27;s procedure    Bilateral subdural hematomas    Status post cholecystectomy    Status post inguinal hernia repair          Medication list         MEDICATIONS  (STANDING):  albuterol/ipratropium for Nebulization 3 milliLiter(s) Nebulizer every 6 hours  enoxaparin Injectable 40 milliGRAM(s) SubCutaneous daily  lactated ringers. 1000 milliLiter(s) (50 mL/Hr) IV Continuous <Continuous>  levothyroxine Injectable 12.5 MICROGram(s) IV Push at bedtime  meropenem  IVPB 1000 milliGRAM(s) IV Intermittent every 8 hours  pantoprazole  Injectable 40 milliGRAM(s) IV Push daily  valproate sodium IVPB 1500 milliGRAM(s) IV Intermittent at bedtime    MEDICATIONS  (PRN):  acetaminophen    Suspension .. 650 milliGRAM(s) Oral every 6 hours PRN Temp greater or equal to 38C (100.4F), Mild Pain (1 - 3)  ondansetron Injectable 4 milliGRAM(s) IV Push every 4 hours PRN Nausea and/or Vomiting         Vitals log        ICU Vital Signs Last 24 Hrs  T(C): 36.7 (10 Jul 2021 04:04), Max: 36.9 (09 Jul 2021 21:21)  T(F): 98 (10 Jul 2021 04:04), Max: 98.5 (09 Jul 2021 21:21)  HR: 81 (10 Jul 2021 06:03) (72 - 85)  BP: 113/74 (10 Jul 2021 06:03) (99/62 - 123/75)  BP(mean): 87 (10 Jul 2021 06:03) (74 - 93)  ABP: --  ABP(mean): --  RR: 29 (10 Jul 2021 06:03) (19 - 29)  SpO2: 94% (10 Jul 2021 06:03) (87% - 98%)           Input and Output:  I&O's Detail    08 Jul 2021 07:01  -  09 Jul 2021 07:00  --------------------------------------------------------  IN:    Enteral Tube Flush: 340 mL    IV PiggyBack: 200 mL    Lactated Ringers: 1200 mL    Vital1.5: 340 mL  Total IN: 2080 mL    OUT:    Incontinent per Collection Bag (mL): 750 mL    Voided (mL): 400 mL  Total OUT: 1150 mL    Total NET: 930 mL      09 Jul 2021 07:01  -  10 Jul 2021 06:47  --------------------------------------------------------  IN:    Enteral Tube Flush: 480 mL    IV PiggyBack: 150 mL    Lactated Ringers: 1200 mL    Vital1.5: 480 mL  Total IN: 2310 mL    OUT:    Incontinent per Collection Bag (mL): 350 mL    Voided (mL): 500 mL  Total OUT: 850 mL    Total NET: 1460 mL          Lab Data                        10.7   5.95  )-----------( 158      ( 09 Jul 2021 06:25 )             34.1     07-09    141  |  100  |  23  ----------------------------<  114<H>  4.0   |  44<H>  |  0.41<L>    Ca    9.9      09 Jul 2021 06:25  Phos  2.3     07-09  Mg     1.7     07-09              Review of Systems	      Objective     Physical Examination    heart s1s2  lung dec BS  abd soft      Pertinent Lab findings & Imaging      Sharon:  NO   Adequate UO     I&O's Detail    08 Jul 2021 07:01  -  09 Jul 2021 07:00  --------------------------------------------------------  IN:    Enteral Tube Flush: 340 mL    IV PiggyBack: 200 mL    Lactated Ringers: 1200 mL    Vital1.5: 340 mL  Total IN: 2080 mL    OUT:    Incontinent per Collection Bag (mL): 750 mL    Voided (mL): 400 mL  Total OUT: 1150 mL    Total NET: 930 mL      09 Jul 2021 07:01  -  10 Jul 2021 06:47  --------------------------------------------------------  IN:    Enteral Tube Flush: 480 mL    IV PiggyBack: 150 mL    Lactated Ringers: 1200 mL    Vital1.5: 480 mL  Total IN: 2310 mL    OUT:    Incontinent per Collection Bag (mL): 350 mL    Voided (mL): 500 mL  Total OUT: 850 mL    Total NET: 1460 mL               Discussed with:     Cultures:	        Radiology

## 2021-07-10 NOTE — PROGRESS NOTE ADULT - SUBJECTIVE AND OBJECTIVE BOX
Patient seen and examined at bedside.  ICU notes overnight reviewed  patient on HF weak ill appearing  vitals otherwise stable  no round 2 of meropenam due to concern for reaspiration    Review of Systems:  General:denies fever chills, headache, weakness  HEENT: denies blurry vision,diffculty swallowing, difficulty hearing, tinnitus  Cardiovascular: denies chest pain  ,palpitations  Pulmonary:denies shortness of breath, cough, wheezing, hemoptysis  Gastrointestinal: denies abdominal pain, constipation, diarrhea,nausea , vomiting, hematochezia  : denies hematuria, dysuria, or incontinence  Neurological: denies weakness, numbness , tingling, dizziness, tremors  MSK: denies muscle pain, difficulty ambulating, swelling, back pain  skin: denies skin rash, itching, burning, or  skin lesions  Psychiatrical: denies mood disturbances, anxierty, feeling depressed, depression , or difficulty sleeping    Objective:  Vitals  T(C): 36.5 (07-10-21 @ 08:02), Max: 36.9 (07-09-21 @ 21:21)  HR: 78 (07-10-21 @ 12:00) (73 - 85)  BP: 122/77 (07-10-21 @ 12:00) (112/80 - 123/75)  RR: 28 (07-10-21 @ 12:00) (25 - 30)  SpO2: 97% (07-10-21 @ 12:00) (90% - 98%)    Physical Exam:  General: comfortable, weak elderly frail  HEAD:  Atraumatic, Normocephalic  EYES: Econjunctiva and sclera clear  ENMT: Moist mucous membranes  NECK: Supple, No JVD, Normal thyroid  NERVOUS SYSTEM:  Alert & Oriented X2; All 4 extremities mobile, no gross sensory deficits.   CHEST/LUNG: Clear to auscultation bilaterally; No rales, rhonchi, wheezing, or rubs  HEART: Regular rate and rhythm; No murmurs, rubs, or gallops  ABDOMEN: Soft, Nontender, Nondistended; Bowel sounds present  EXTREMITIES:  2+ Peripheral Pulses, diffuse edema    Labs:                          10.7   5.95  )-----------( 158      ( 09 Jul 2021 06:25 )             34.1     07-09    141  |  100  |  23  ----------------------------<  114<H>  4.0   |  44<H>  |  0.41<L>    Ca    9.9      09 Jul 2021 06:25  Phos  2.3     07-09  Mg     1.7     07-09              Active Medications  MEDICATIONS  (STANDING):  albuterol/ipratropium for Nebulization 3 milliLiter(s) Nebulizer every 6 hours  enoxaparin Injectable 40 milliGRAM(s) SubCutaneous daily  lactated ringers. 1000 milliLiter(s) (50 mL/Hr) IV Continuous <Continuous>  levothyroxine Injectable 12.5 MICROGram(s) IV Push at bedtime  meropenem  IVPB 1000 milliGRAM(s) IV Intermittent every 8 hours  pantoprazole  Injectable 40 milliGRAM(s) IV Push daily  valproate sodium IVPB 1500 milliGRAM(s) IV Intermittent at bedtime    MEDICATIONS  (PRN):  acetaminophen    Suspension .. 650 milliGRAM(s) Oral every 6 hours PRN Temp greater or equal to 38C (100.4F), Mild Pain (1 - 3)  ondansetron Injectable 4 milliGRAM(s) IV Push every 4 hours PRN Nausea and/or Vomiting

## 2021-07-10 NOTE — PROGRESS NOTE ADULT - ASSESSMENT
65M with seizures on keppra, BPH, hypothyroidism, sarcoidosis GERD, COPD, LAMBERT, recent admission to [facility] from 5/3-5/10/2021 for right subarachnoid hemorrhage s/p fall admitted from [facility] rehab with acute hypoxic respiratory failure sec severe right lung pneumonia likely recurrent aspiration pneumonia.      Plan    sp 10 days of meropenem  -Meropenem restarted , worsening resp status, today is day 6     -concern for possible repeat aspiration continue meropenem for now, remains at [facility] level of care in [facility]  strict aspiration precautions  supportive care   remains guarded

## 2021-07-10 NOTE — PROGRESS NOTE ADULT - SUBJECTIVE AND OBJECTIVE BOX
Patient is a 65y old  Male who presents with a chief complaint of respiratory failure, lethargy (10 Jul 2021 17:01)      BRIEF HOSPITAL COURSE: ***    Events last 24 hours: ***    PAST MEDICAL & SURGICAL HISTORY:  DM (diabetes mellitus)    Sarcoid    Bronchiectasis    Subdural hematoma    Inguinal hernia    Seizure    Hypothyroid    Sleep apnea    LAMBERT and COPD overlap syndrome    BPH without urinary obstruction    Status post Bright&#x27;s procedure    Bilateral subdural hematomas    Status post cholecystectomy    Status post inguinal hernia repair        Review of Systems:  CONSTITUTIONAL: No fever, chills, or fatigue  EYES: No eye pain, visual disturbances, or discharge  ENMT:  No difficulty hearing, tinnitus, vertigo; No sinus or throat pain  NECK: No pain or stiffness  RESPIRATORY: No cough, wheezing, chills or hemoptysis; No shortness of breath  CARDIOVASCULAR: No chest pain, palpitations, dizziness, or leg swelling  GASTROINTESTINAL: No abdominal or epigastric pain. No nausea, vomiting, or hematemesis; No diarrhea or constipation. No melena or hematochezia.  GENITOURINARY: No dysuria, frequency, hematuria, or incontinence  NEUROLOGICAL: No headaches, memory loss, loss of strength, numbness, or tremors  SKIN: No itching, burning, rashes, or lesions   MUSCULOSKELETAL: No joint pain or swelling; No muscle, back, or extremity pain  PSYCHIATRIC: No depression, anxiety, mood swings, or difficulty sleeping      Medications:  meropenem  IVPB 1000 milliGRAM(s) IV Intermittent every 8 hours      albuterol/ipratropium for Nebulization 3 milliLiter(s) Nebulizer every 6 hours    acetaminophen    Suspension .. 650 milliGRAM(s) Oral every 6 hours PRN  ondansetron Injectable 4 milliGRAM(s) IV Push every 4 hours PRN  valproate sodium IVPB 1500 milliGRAM(s) IV Intermittent at bedtime      enoxaparin Injectable 40 milliGRAM(s) SubCutaneous daily    pantoprazole  Injectable 40 milliGRAM(s) IV Push daily      levothyroxine Injectable 12.5 MICROGram(s) IV Push at bedtime    lactated ringers. 1000 milliLiter(s) IV Continuous <Continuous>                ICU Vital Signs Last 24 Hrs  T(C): 36.9 (10 Jul 2021 19:32), Max: 36.9 (09 Jul 2021 21:21)  T(F): 98.5 (10 Jul 2021 19:32), Max: 98.5 (09 Jul 2021 21:21)  HR: 80 (10 Jul 2021 18:00) (72 - 86)  BP: 119/71 (10 Jul 2021 18:00) (113/74 - 122/77)  BP(mean): 86 (10 Jul 2021 18:00) (82 - 94)  ABP: --  ABP(mean): --  RR: 22 (10 Jul 2021 18:00) (21 - 30)  SpO2: 97% (10 Jul 2021 18:00) (90% - 98%)                LABS:                        10.7   5.95  )-----------( 158      ( 09 Jul 2021 06:25 )             34.1     07-09    141  |  100  |  23  ----------------------------<  114<H>  4.0   |  44<H>  |  0.41<L>    Ca    9.9      09 Jul 2021 06:25  Phos  2.3     07-09  Mg     1.7     07-09            CAPILLARY BLOOD GLUCOSE            CULTURES:      Physical Examination:    General: No acute distress.  Alert, oriented, interactive, nonfocal    HEENT: Pupils equal, reactive to light.  Symmetric.    PULM: Clear to auscultation bilaterally, no significant sputum production    CVS: Regular rate and rhythm, no murmurs, rubs, or gallops    ABD: Soft, nondistended, nontender, normoactive bowel sounds, no masses    EXT: No edema, nontender    SKIN: Warm and well perfused, no rashes noted.    RADIOLOGY: ***    CRITICAL CARE TIME SPENT: ***   Patient is a 65y old  Male who presents with a chief complaint of respiratory failure, lethargy (10 Jul 2021 17:01)      BRIEF HOSPITAL COURSE:   65M with PMHx of seizures, BPH, hypothyroidism, sarcoidosis GERD, COPD, LAMBERT, SDH, DM sp fall who presented from NH who was admitted for acute hypoxemic respiratory failure secondary to aspiration. Upgraded following medical admit to ICU for further management and HFNC therapy.    Events last 24 hours: afebrile, hemodynamics stable, remains on HFNC 40L and 45%. Denies CP, abd pain, N/V, states that he feels his breathing is comfortable currently.    PAST MEDICAL & SURGICAL HISTORY:  DM (diabetes mellitus)    Sarcoid    Bronchiectasis    Subdural hematoma    Inguinal hernia    Seizure    Hypothyroid    Sleep apnea    LAMBERT and COPD overlap syndrome    BPH without urinary obstruction    Status post Bright&#x27;s procedure    Bilateral subdural hematomas    Status post cholecystectomy    Status post inguinal hernia repair        Review of Systems:  12 pt ROS negative unless otherwise stated above      Medications:  meropenem  IVPB 1000 milliGRAM(s) IV Intermittent every 8 hours      albuterol/ipratropium for Nebulization 3 milliLiter(s) Nebulizer every 6 hours    acetaminophen    Suspension .. 650 milliGRAM(s) Oral every 6 hours PRN  ondansetron Injectable 4 milliGRAM(s) IV Push every 4 hours PRN  valproate sodium IVPB 1500 milliGRAM(s) IV Intermittent at bedtime      enoxaparin Injectable 40 milliGRAM(s) SubCutaneous daily    pantoprazole  Injectable 40 milliGRAM(s) IV Push daily      levothyroxine Injectable 12.5 MICROGram(s) IV Push at bedtime    lactated ringers. 1000 milliLiter(s) IV Continuous <Continuous>                ICU Vital Signs Last 24 Hrs  T(C): 36.9 (10 Jul 2021 19:32), Max: 36.9 (09 Jul 2021 21:21)  T(F): 98.5 (10 Jul 2021 19:32), Max: 98.5 (09 Jul 2021 21:21)  HR: 80 (10 Jul 2021 18:00) (72 - 86)  BP: 119/71 (10 Jul 2021 18:00) (113/74 - 122/77)  BP(mean): 86 (10 Jul 2021 18:00) (82 - 94)  ABP: --  ABP(mean): --  RR: 22 (10 Jul 2021 18:00) (21 - 30)  SpO2: 97% (10 Jul 2021 18:00) (90% - 98%)                LABS:                        10.7   5.95  )-----------( 158      ( 09 Jul 2021 06:25 )             34.1     07-09    141  |  100  |  23  ----------------------------<  114<H>  4.0   |  44<H>  |  0.41<L>    Ca    9.9      09 Jul 2021 06:25  Phos  2.3     07-09  Mg     1.7     07-09            CAPILLARY BLOOD GLUCOSE            CULTURES:      Physical Examination:    General: No acute distress.  Alert, oriented, interactive, nonfocal    HEENT: Pupils equal, reactive to light.  Symmetric.    PULM: Clear to auscultation bilaterally, no significant sputum production    CVS: Regular rate and rhythm, no murmurs, rubs, or gallops    ABD: Soft, nondistended, nontender, normoactive bowel sounds, no masses    EXT: No edema, nontender    SKIN: Warm and well perfused, no rashes noted.    RADIOLOGY:   EXAM:  XR CHEST PORTABLE ROUTINE 1V                                  PROCEDURE DATE:  07/08/2021          INTERPRETATION:  Portable chest radiograph    CLINICAL INFORMATION: NG tube placement. Recurrent aspiration pneumonia.    TECHNIQUE:  Portable  AP view of the chest was obtained.    COMPARISON: 7/5/2021 chest available for review.    FINDINGS:  NG tube tip in fundus of stomach.    The lungs show unchanged acute on chronic bilateral upper and lower lobe  multifocal airspace disease/consolidation  . RIGHT apical consolidation with volume loss causing  RIGHT sided tracheal deviation.    There is mild cardiomegaly and stable widening of the superior mediastinum.. No pneumothorax.      Visualized osseous structures are intact.    IMPRESSION:   No significant change. NG tube tip remains in fundus of stomach..    --- End of Report ---              MARGARET BARTHOLOMEW MD; Attending Radiologist  This document has been electronically signed. Jul 9 2021  7:53PM    EXAM:  US TTE W DOPPLER COMPLETE                                  PROCEDURE DATE:  06/22/2021          INTERPRETATION:  Indication: Elevated cardiac enzymes    Technician: Shani Bustillo    Height 6 feet 2 inches, weight 180 pounds, blood pressure 102/67 mmHg    Study Quality: Technical difficult study, poor apical window    Measurements: Aortic root size 3.6 cm, left atrial size 2.9 cm, right atrial size 3.6 cm, right ventricular size 2.66 cm, left ventricular end-diastolic diameter 4.7 cm, left ventricular end-systolic diameter 3.5 cm, septal wall thickness 1.0 cm, posterior thickness 0.8 cm, aortic velocity 1.4 m/s, mitral E velocity 0.5 m/s, ejection fraction 55-60%.    Mitral Valve: Appears normal with normal opening.  Aortic Valve: Normal aortic valve with normal cusp excursion trace aortic regurgitation  Left Atrium: Normal size  Left Ventricle: Normal size, normal wall thickness with the normal overall left ventricular systolic function. Mild diastolic dysfunction noted  Pericardium: No pericardial effusion  Tricuspid Valve: Appears normal with the mild tricuspid regurgitation with estimated right femur systolic pressure 36 mmHg  Pulmonic Valve: Appears to be normal  Right Ventricle: Not well-visualized grossly appears normal size with normal right ventricular systolic function  Right Atrium: Normal size    IMPRESSION:  1. technical difficult study, poor apical window  2. grossly normal overall left ventricular systolic function with mild diastolic dysfunction  3. mild tricuspid regurgitation with mildly elevated right ventricular systolic pressure.              [facility]  This document has been electronically signed. Jun 22 2021  4:14PM    EXAM:  CT BRAIN                                  PROCEDURE DATE:  06/21/2021          INTERPRETATION:  CLINICAL INDICATION: AMS. Hx of SAH    COMPARISON: Numerous prior studies most recent on 5/4/2021.    TECHNIQUE: Noncontrast axial CT images of the head were acquired. Coronal and sagittal reformats were obtained.    FINDINGS:    The ventricles and sulci are prominent in size, compatible with generalized parenchymal volume loss. No acute intracranial hemorrhage is identified.  There is no extra-axial fluid collection. No mass effect or midline shift is seen.  There is no evidence of acute territorial infarct.  There are periventricular and subcortical white matter hypodensities, which are nonspecific, but likely reflect moderate microvascular ischemic disease.  The visualized paranasal sinuses are clear. The mastoid air cells are well aerated.  No acute osseous abnormality is seen. Multiple charity holes.      IMPRESSION:    No acute intracranial disease.              SYLVIE RIZZO   This document has been electronically signed. Jun 21 2021  4:10AM       Patient is a 65y old  Male who presents with a chief complaint of respiratory failure, lethargy (10 Jul 2021 17:01)      BRIEF HOSPITAL COURSE:   65M with PMHx of seizures, BPH, hypothyroidism, sarcoidosis GERD, COPD, LAMBERT, SDH, DM sp fall who presented from NH who was admitted for acute hypoxemic respiratory failure secondary to aspiration. Upgraded following medical admit to ICU for further management and HFNC therapy.    Events last 24 hours: afebrile, hemodynamics stable, remains on HFNC 40L and 45%. Denies CP, abd pain, N/V, states that he feels his breathing is comfortable currently.    PAST MEDICAL & SURGICAL HISTORY:  DM (diabetes mellitus)    Sarcoid    Bronchiectasis    Subdural hematoma    Inguinal hernia    Seizure    Hypothyroid    Sleep apnea    LAMBERT and COPD overlap syndrome    BPH without urinary obstruction    Status post Bright&#x27;s procedure    Bilateral subdural hematomas    Status post cholecystectomy    Status post inguinal hernia repair        Review of Systems:  12 pt ROS negative unless otherwise stated above      Medications:  meropenem  IVPB 1000 milliGRAM(s) IV Intermittent every 8 hours      albuterol/ipratropium for Nebulization 3 milliLiter(s) Nebulizer every 6 hours    acetaminophen    Suspension .. 650 milliGRAM(s) Oral every 6 hours PRN  ondansetron Injectable 4 milliGRAM(s) IV Push every 4 hours PRN  valproate sodium IVPB 1500 milliGRAM(s) IV Intermittent at bedtime      enoxaparin Injectable 40 milliGRAM(s) SubCutaneous daily    pantoprazole  Injectable 40 milliGRAM(s) IV Push daily      levothyroxine Injectable 12.5 MICROGram(s) IV Push at bedtime    lactated ringers. 1000 milliLiter(s) IV Continuous <Continuous>                ICU Vital Signs Last 24 Hrs  T(C): 36.9 (10 Jul 2021 19:32), Max: 36.9 (09 Jul 2021 21:21)  T(F): 98.5 (10 Jul 2021 19:32), Max: 98.5 (09 Jul 2021 21:21)  HR: 80 (10 Jul 2021 18:00) (72 - 86)  BP: 119/71 (10 Jul 2021 18:00) (113/74 - 122/77)  BP(mean): 86 (10 Jul 2021 18:00) (82 - 94)  ABP: --  ABP(mean): --  RR: 22 (10 Jul 2021 18:00) (21 - 30)  SpO2: 97% (10 Jul 2021 18:00) (90% - 98%)                LABS:                        10.7   5.95  )-----------( 158      ( 09 Jul 2021 06:25 )             34.1     07-09    141  |  100  |  23  ----------------------------<  114<H>  4.0   |  44<H>  |  0.41<L>    Ca    9.9      09 Jul 2021 06:25  Phos  2.3     07-09  Mg     1.7     07-09            CAPILLARY BLOOD GLUCOSE            CULTURES:      Physical Examination:    General: No acute distress.  Alert, oriented, interactive, nonfocal but weak throughout    HEENT: Pupils equal, reactive to light.  Symmetric.    PULM: Course BS bilaterally, worse at bases, no wheezing noted     CVS: Regular rate and rhythm    ABD: Soft, nondistended, nontender, normoactive bowel sounds, no rebound or masses    EXT:  trace edema, SCDs in place    SKIN: Warm and well perfused, no rashes noted.    RADIOLOGY:   EXAM:  XR CHEST PORTABLE ROUTINE 1V                                  PROCEDURE DATE:  07/08/2021          INTERPRETATION:  Portable chest radiograph    CLINICAL INFORMATION: NG tube placement. Recurrent aspiration pneumonia.    TECHNIQUE:  Portable  AP view of the chest was obtained.    COMPARISON: 7/5/2021 chest available for review.    FINDINGS:  NG tube tip in fundus of stomach.    The lungs show unchanged acute on chronic bilateral upper and lower lobe  multifocal airspace disease/consolidation  . RIGHT apical consolidation with volume loss causing  RIGHT sided tracheal deviation.    There is mild cardiomegaly and stable widening of the superior mediastinum.. No pneumothorax.      Visualized osseous structures are intact.    IMPRESSION:   No significant change. NG tube tip remains in fundus of stomach..    --- End of Report ---              MARGARET BARTHOLOMEW MD; Attending Radiologist  This document has been electronically signed. Jul 9 2021  7:53PM    EXAM:  US TTE W DOPPLER COMPLETE                                  PROCEDURE DATE:  06/22/2021          INTERPRETATION:  Indication: Elevated cardiac enzymes    Technician: Shani Bustillo    Height 6 feet 2 inches, weight 180 pounds, blood pressure 102/67 mmHg    Study Quality: Technical difficult study, poor apical window    Measurements: Aortic root size 3.6 cm, left atrial size 2.9 cm, right atrial size 3.6 cm, right ventricular size 2.66 cm, left ventricular end-diastolic diameter 4.7 cm, left ventricular end-systolic diameter 3.5 cm, septal wall thickness 1.0 cm, posterior thickness 0.8 cm, aortic velocity 1.4 m/s, mitral E velocity 0.5 m/s, ejection fraction 55-60%.    Mitral Valve: Appears normal with normal opening.  Aortic Valve: Normal aortic valve with normal cusp excursion trace aortic regurgitation  Left Atrium: Normal size  Left Ventricle: Normal size, normal wall thickness with the normal overall left ventricular systolic function. Mild diastolic dysfunction noted  Pericardium: No pericardial effusion  Tricuspid Valve: Appears normal with the mild tricuspid regurgitation with estimated right femur systolic pressure 36 mmHg  Pulmonic Valve: Appears to be normal  Right Ventricle: Not well-visualized grossly appears normal size with normal right ventricular systolic function  Right Atrium: Normal size    IMPRESSION:  1. technical difficult study, poor apical window  2. grossly normal overall left ventricular systolic function with mild diastolic dysfunction  3. mild tricuspid regurgitation with mildly elevated right ventricular systolic pressure.              [facility]  This document has been electronically signed. Jun 22 2021  4:14PM    EXAM:  CT BRAIN                                  PROCEDURE DATE:  06/21/2021          INTERPRETATION:  CLINICAL INDICATION: AMS. Hx of SAH    COMPARISON: Numerous prior studies most recent on 5/4/2021.    TECHNIQUE: Noncontrast axial CT images of the head were acquired. Coronal and sagittal reformats were obtained.    FINDINGS:    The ventricles and sulci are prominent in size, compatible with generalized parenchymal volume loss. No acute intracranial hemorrhage is identified.  There is no extra-axial fluid collection. No mass effect or midline shift is seen.  There is no evidence of acute territorial infarct.  There are periventricular and subcortical white matter hypodensities, which are nonspecific, but likely reflect moderate microvascular ischemic disease.  The visualized paranasal sinuses are clear. The mastoid air cells are well aerated.  No acute osseous abnormality is seen. Multiple charity holes.      IMPRESSION:    No acute intracranial disease.              SYLVIE RIZZO   This document has been electronically signed. Jun 21 2021  4:10AM

## 2021-07-10 NOTE — PROGRESS NOTE ADULT - ASSESSMENT
65M with seizures on keppra, BPH, hypothyroidism, sarcoidosis GERD, COPD, LAMBERT, recent admission to [facility] from 5/3-5/10/2021 for right subarachnoid hemorrhage s/p fall who presents from [facility] for hypoxia.  Found to have acute hypoxic and hypercapnic respiratory failure 2/2 sepsis 2/2 aspiration PNA.  Likely aspiration PNA since patient has been recommended dysphagia I diet from [facility] but reports state that he has been non-compliant with recommendation.  Also location of RLL and RUL also fits with aspiration.      Problems  Acute hypoxic and hypercapnic respiratory failure  Sepsis 2/2 aspiration PNA  Sarcoidosis  Seizures  COPD    Acute hypoxic and hypercapnic respiratory failure secondary to aspiration PNA and underlying interstitial lung disease  - requires SPCU care  - High flow oxygen titrated down to 50cc/hr.   - still with copious secretions    Sepsis from aspiration PNA with metabolic encephalopathy   - completed meropenem but was restarted.   - f/u blood cultures     Troponin Elevation  - likely demand ischemia, no need to trend further  - cardiology f/u appreciated  -  TTE noted - elevated RV pressures likely due to pna    Sarcoidosis/COPD  - on nebs    Seizure history  - cont with depakote    Hypothyroidism  - cont with synthroid     General anxiety disorder  - restart Effexor    BPH  - Sharon NC'ed 6/30, monitor voiding as cannot get oral/ngt meds at this time.    Preventive measures  - can start with lovenox 40mg subq for DVT ppx    NGT placed - on tube feeds t 20cc/hr and see how he tolerates before raising rate.     Patient remains critically ill, high risk for deterioration, and cardiac arrest/death/ will need to remain in ICU

## 2021-07-10 NOTE — PROGRESS NOTE ADULT - ASSESSMENT
Impression:  1. acute hypoxemic respiratory failure on chronic hypercapnic respiratory failure  2. aspiration PNA  3. severe protein malnutrition  4. oropharyngeal dysphagia    Plan:  Neuro - avoiding neurosuppressants, no signs of seizure activity, cont AET    CV -  hemodynamics stable, NSR on tele    Pulm -  cont HFNC with utilization of high flow for PEEP effect             actively titrating FiO2 for sats>88%             chest PT             SCx NG, cont empiric IV abx             bronchodialators    GI -  PPI  Enteric feeds via corpack, reassessment by RD for increasing nutritional feeds, GOC for ?PEG vs palliative, strict NPO    Renal - Cr stable, strict I/Os, trend BMP    Heme -  Pharmacologic DVT PPx  in addition to SCD's, no signs of active bleeding    ID - empiric IV abx, CXs NGTD, afebrile, WBC stable,  Abx adjustment/discontinuation based on discussion with ID in conjunction with clinical features and culture data    Endo -  A1c 5.2, BS stable, TSH normal, cont synthroid.     GOC: DNR/DNI

## 2021-07-10 NOTE — PROGRESS NOTE ADULT - SUBJECTIVE AND OBJECTIVE BOX
Chief Complaint: Respiratory distress    Interval Events: No events overnight.    Review of Systems:  General: No fevers, chills, weight loss or gain  Skin: No rashes, color changes  Cardiovascular: No chest pain, orthopnea  Respiratory: No shortness of breath, cough  Gastrointestinal: No nausea, abdominal pain  Genitourinary: No incontinence, pain with urination  Musculoskeletal: No pain, swelling, decreased range of motion  Neurological: No headache, weakness  Psychiatric: No depression, anxiety  Endocrine: No weight loss or gain, increased thirst  All other systems are comprehensively negative.    Physical Exam:  Vital Signs Last 24 Hrs  T(C): 36.5 (10 Jul 2021 08:02), Max: 36.9 (09 Jul 2021 21:21)  T(F): 97.7 (10 Jul 2021 08:02), Max: 98.5 (09 Jul 2021 21:21)  HR: 79 (10 Jul 2021 08:00) (72 - 85)  BP: 118/67 (10 Jul 2021 08:00) (99/62 - 123/75)  BP(mean): 82 (10 Jul 2021 08:00) (74 - 93)  RR: 30 (10 Jul 2021 08:00) (19 - 30)  SpO2: 98% (10 Jul 2021 08:00) (87% - 98%)  General: Chronically ill appearing  HEENT: MMM  Neck: No JVD, no carotid bruit  Lungs: CTAB  CV: RRR, nl S1/S2, no M/R/G  Abdomen: S/NT/ND, +BS  Extremities: No LE edema, no cyanosis  Neuro: AAOx3, non-focal  Skin: No rash    Labs:    07-09    141  |  100  |  23  ----------------------------<  114<H>  4.0   |  44<H>  |  0.41<L>    Ca    9.9      09 Jul 2021 06:25  Phos  2.3     07-09  Mg     1.7     07-09                          10.7   5.95  )-----------( 158      ( 09 Jul 2021 06:25 )             34.1     Telemetry: Sinus rhythm

## 2021-07-10 NOTE — PROGRESS NOTE ADULT - ASSESSMENT
on Meropenem   on IVF  on asp prec  pt is DNR  GOC documented  prognosis very POOR    65M with history of Seizure disorder, Hypothyroidism,  Thrombocytopenia, BPH, recent right SAH from fall May 2021, presents from [facility] for hypoxia and AMS. Found to have severe sepsis, acute hypoxic and hypercapnic respiratory failure      probable ongoing - continued aspiration - with resulting consolidation - asp pna - as seen on CT chest and clinical presentation  at risk for decompensation - resp failure -   chronic lung disease - sarcoidosis with complications -   s/p broad spectrum ABX once on this admission already - cx noted, biomarkers noted,   assist with all needs and ADL  HOB elev - asp prec  oral hygiene  Prognosis guarded - GOC discussion - DNR

## 2021-07-11 NOTE — PROGRESS NOTE ADULT - ASSESSMENT
66 y/o M with a h/o seizures, BPH, hypothyroidism, sarcoidosis GERD, COPD, LAMBERT, SDH, DM, with acute hypoxemic respiratory failure, aspiration pneumonia, oropharyngeal dysphagia, severe protein-calorie malnutrition.    - actively titrating HiFlo nasal oxygen to maintain SpO2 > 92%  - high risk for further life threatening respiratory deterioration given his issues with secretion management and apparent ongoing aspiration vs microaspiration  - poor candidate for NIPPV  - keep HOB elevated > 30 degrees  - aggressive suctioning  - strictly NPO  - empiric meropenem, sputum culture growing normal resp bryan  - advance tube feedings via NGT as tolerated  - gentle IVF hydration  - plan for PEG ?

## 2021-07-11 NOTE — PROGRESS NOTE ADULT - SUBJECTIVE AND OBJECTIVE BOX
Patient seen and examined at bedside. no acute overnights  patient continues to be on high flow   patiett reports shortness of breath  On meropenam  + NGT tube feeds,. 20/cc hr  ++ upper air rhonchi noted. concerning for secretions    Review of Systems:  General:denies fever chills, headache, +weakness  HEENT: denies blurry vision,diffculty swallowing, difficulty hearing, tinnitus  Cardiovascular: denies chest pain  ,palpitations  Pulmonary: +++shortness of breath, +++cough, ++wheezing, hemoptysis  Gastrointestinal: denies abdominal pain, constipation, diarrhea,nausea , vomiting, hematochezia  : denies hematuria, dysuria, or incontinence  Neurological: denies weakness, numbness , tingling, dizziness, tremors  MSK: denies muscle pain, difficulty ambulating, swelling, back pain  skin: denies skin rash, itching, burning, or  skin lesions  Psychiatrical: denies mood disturbances, anxierty, feeling depressed, depression , or difficulty sleeping    Objective:  Vitals  T(C): 37.1 (07-11-21 @ 08:12), Max: 37.1 (07-11-21 @ 04:00)  HR: 81 (07-11-21 @ 12:33) (73 - 89)  BP: 113/77 (07-11-21 @ 12:00) (110/70 - 122/81)  RR: 25 (07-11-21 @ 12:00) (21 - 36)  SpO2: 91% (07-11-21 @ 12:33) (89% - 99%)    Physical Exam:  General: respiratory distres, ill appearing, elderly  HEENT: Atraumatic, no LAD, trachea midline, PERRLA  Cardiovascular: normal s1s2, no murmurs, gallops or fricition rubs  Pulmonary: no wheezing , ++rhonchi  Gastrointestinal: soft non tender non distended, no masses felt, no organomegally ++ NGT tube  Muscloskeletal: no lower extremity edema, intact bilateral lower extremity pulses  Neurological: CN II-12 intact. No focal weakness  Psychiatrical: normal mood, cooperative  SKIN: no rash, lesions or ulcers    Labs:                          11.7   9.47  )-----------( 182      ( 11 Jul 2021 05:59 )             36.4     07-11    140  |  100  |  11  ----------------------------<  122<H>  4.3   |  44<H>  |  0.46<L>    Ca    8.9      11 Jul 2021 05:59              Active Medications  MEDICATIONS  (STANDING):  albuterol/ipratropium for Nebulization 3 milliLiter(s) Nebulizer every 6 hours  enoxaparin Injectable 40 milliGRAM(s) SubCutaneous daily  lactated ringers. 1000 milliLiter(s) (50 mL/Hr) IV Continuous <Continuous>  levothyroxine Injectable 12.5 MICROGram(s) IV Push at bedtime  meropenem  IVPB 1000 milliGRAM(s) IV Intermittent every 8 hours  pantoprazole  Injectable 40 milliGRAM(s) IV Push daily  valproate sodium IVPB 1500 milliGRAM(s) IV Intermittent at bedtime    MEDICATIONS  (PRN):  acetaminophen    Suspension .. 650 milliGRAM(s) Oral every 6 hours PRN Temp greater or equal to 38C (100.4F), Mild Pain (1 - 3)  ondansetron Injectable 4 milliGRAM(s) IV Push every 4 hours PRN Nausea and/or Vomiting

## 2021-07-11 NOTE — PROGRESS NOTE ADULT - SUBJECTIVE AND OBJECTIVE BOX
Date/Time Patient Seen:  		  Referring MD:   Data Reviewed	       Patient is a 65y old  Male who presents with a chief complaint of respiratory failure, lethargy (10 Jul 2021 19:45)      Subjective/HPI     PAST MEDICAL & SURGICAL HISTORY:  No pertinent past medical history    DM (diabetes mellitus)    Sarcoid    Bronchiectasis    Diverticulitis    Subdural hematoma    Inguinal hernia    Cellulitis    Seizure    Hypothyroid    Sleep apnea    LAMBERT and COPD overlap syndrome    BPH without urinary obstruction    No significant past surgical history    Status post Bright&#x27;s procedure    Bilateral subdural hematomas    Status post cholecystectomy    Status post inguinal hernia repair          Medication list         MEDICATIONS  (STANDING):  albuterol/ipratropium for Nebulization 3 milliLiter(s) Nebulizer every 6 hours  enoxaparin Injectable 40 milliGRAM(s) SubCutaneous daily  lactated ringers. 1000 milliLiter(s) (50 mL/Hr) IV Continuous <Continuous>  levothyroxine Injectable 12.5 MICROGram(s) IV Push at bedtime  meropenem  IVPB 1000 milliGRAM(s) IV Intermittent every 8 hours  pantoprazole  Injectable 40 milliGRAM(s) IV Push daily  valproate sodium IVPB 1500 milliGRAM(s) IV Intermittent at bedtime    MEDICATIONS  (PRN):  acetaminophen    Suspension .. 650 milliGRAM(s) Oral every 6 hours PRN Temp greater or equal to 38C (100.4F), Mild Pain (1 - 3)  ondansetron Injectable 4 milliGRAM(s) IV Push every 4 hours PRN Nausea and/or Vomiting         Vitals log        ICU Vital Signs Last 24 Hrs  T(C): 37.1 (11 Jul 2021 04:00), Max: 37.1 (11 Jul 2021 04:00)  T(F): 98.8 (11 Jul 2021 04:00), Max: 98.8 (11 Jul 2021 04:00)  HR: 84 (11 Jul 2021 06:00) (72 - 88)  BP: 122/73 (11 Jul 2021 06:00) (113/83 - 122/81)  BP(mean): 86 (11 Jul 2021 06:00) (82 - 94)  ABP: --  ABP(mean): --  RR: 33 (11 Jul 2021 06:00) (21 - 36)  SpO2: 92% (11 Jul 2021 06:00) (91% - 99%)           Input and Output:  I&O's Detail    09 Jul 2021 07:01  -  10 Jul 2021 07:00  --------------------------------------------------------  IN:    Enteral Tube Flush: 480 mL    IV PiggyBack: 150 mL    Lactated Ringers: 1200 mL    Vital1.5: 480 mL  Total IN: 2310 mL    OUT:    Incontinent per Collection Bag (mL): 350 mL    Voided (mL): 500 mL  Total OUT: 850 mL    Total NET: 1460 mL      10 Jul 2021 07:01  -  11 Jul 2021 06:33  --------------------------------------------------------  IN:    Enteral Tube Flush: 480 mL    IV PiggyBack: 200 mL    Lactated Ringers: 1200 mL    Vital1.5: 480 mL  Total IN: 2360 mL    OUT:    Incontinent per Collection Bag (mL): 750 mL    Voided (mL): 500 mL  Total OUT: 1250 mL    Total NET: 1110 mL          Lab Data                        11.7   9.47  )-----------( 182      ( 11 Jul 2021 05:59 )             36.4                   Review of Systems	      Objective     Physical Examination  heart s1s2  lung dec BS  abd soft        Pertinent Lab findings & Imaging      Sharon:  NO   Adequate UO     I&O's Detail    09 Jul 2021 07:01  -  10 Jul 2021 07:00  --------------------------------------------------------  IN:    Enteral Tube Flush: 480 mL    IV PiggyBack: 150 mL    Lactated Ringers: 1200 mL    Vital1.5: 480 mL  Total IN: 2310 mL    OUT:    Incontinent per Collection Bag (mL): 350 mL    Voided (mL): 500 mL  Total OUT: 850 mL    Total NET: 1460 mL      10 Jul 2021 07:01  -  11 Jul 2021 06:33  --------------------------------------------------------  IN:    Enteral Tube Flush: 480 mL    IV PiggyBack: 200 mL    Lactated Ringers: 1200 mL    Vital1.5: 480 mL  Total IN: 2360 mL    OUT:    Incontinent per Collection Bag (mL): 750 mL    Voided (mL): 500 mL  Total OUT: 1250 mL    Total NET: 1110 mL               Discussed with:     Cultures:	        Radiology

## 2021-07-11 NOTE — PROGRESS NOTE ADULT - SUBJECTIVE AND OBJECTIVE BOX
Chief Complaint: Respiratory distress    Interval Events: No events overnight.    Review of Systems:  General: No fevers, chills, weight loss or gain  Skin: No rashes, color changes  Cardiovascular: No chest pain, orthopnea  Respiratory: No shortness of breath, cough  Gastrointestinal: No nausea, abdominal pain  Genitourinary: No incontinence, pain with urination  Musculoskeletal: No pain, swelling, decreased range of motion  Neurological: No headache, weakness  Psychiatric: No depression, anxiety  Endocrine: No weight loss or gain, increased thirst  All other systems are comprehensively negative.    Physical Exam:  Vital Signs Last 24 Hrs  T(C): 37.1 (11 Jul 2021 08:12), Max: 37.1 (11 Jul 2021 04:00)  T(F): 98.8 (11 Jul 2021 08:12), Max: 98.8 (11 Jul 2021 04:00)  HR: 88 (11 Jul 2021 08:12) (73 - 89)  BP: 110/70 (11 Jul 2021 08:00) (110/70 - 122/81)  BP(mean): 83 (11 Jul 2021 08:00) (83 - 94)  RR: 29 (11 Jul 2021 08:12) (21 - 36)  SpO2: 89% (11 Jul 2021 08:12) (89% - 99%)  General: Chronically ill appearing  HEENT: MMM  Neck: No JVD, no carotid bruit  Lungs: CTAB  CV: RRR, nl S1/S2, no M/R/G  Abdomen: S/NT/ND, +BS  Extremities: No LE edema, no cyanosis  Neuro: AAOx3, non-focal  Skin: No rash    Labs:    07-11    140  |  100  |  11  ----------------------------<  122<H>  4.3   |  44<H>  |  0.46<L>    Ca    8.9      11 Jul 2021 05:59                          11.7   9.47  )-----------( 182      ( 11 Jul 2021 05:59 )             36.4       Telemetry: Sinus rhythm

## 2021-07-11 NOTE — PROGRESS NOTE ADULT - ASSESSMENT
65M with seizures on keppra, BPH, hypothyroidism, sarcoidosis GERD, COPD, LAMBERT, recent admission to [facility] from 5/3-5/10/2021 for right subarachnoid hemorrhage s/p fall who presents from [facility] for hypoxia.  Found to have acute hypoxic and hypercapnic respiratory failure 2/2 sepsis 2/2 aspiration PNA.  Likely aspiration PNA since patient has been recommended dysphagia I diet from [facility] but reports state that he has been non-compliant with recommendation.  Also location of RLL and RUL also fits with aspiration.      Problems  Acute hypoxic and hypercapnic respiratory failure  Sepsis 2/2 aspiration PNA  Sarcoidosis  Seizures  COPD    Acute hypoxic and hypercapnic respiratory failure secondary to aspiration PNA and underlying interstitial lung disease  - requires SPCU care  - High flow oxygen titrated down to 50cc/hr.   - still with copious secretions    Sepsis from aspiration PNA with metabolic encephalopathy   - completed meropenem but was restarted.   - f/u blood cultures     Troponin Elevation  - likely demand ischemia, no need to trend further  - cardiology f/u appreciated  -  TTE noted - elevated RV pressures likely due to pna    Sarcoidosis/COPD  - on nebs    Seizure history  - cont with depakote    Hypothyroidism  - cont with synthroid     General anxiety disorder  - restart Effexor    BPH  - [facility]'ed 6/30, monitor voiding as cannot get oral/ngt meds at this time.    Preventive measures  - can start with lovenox 40mg subq for DVT ppx    NGT placed - on tube feeds t 20cc/hr and see how he tolerates before raising rate.     Patient remains critically ill, high risk for deterioration, and cardiac arrest/death/ will need to remain in ICU     Called Kristin on phone, left message for update. Provided contact number on  for call back.

## 2021-07-11 NOTE — PROGRESS NOTE ADULT - SUBJECTIVE AND OBJECTIVE BOX
Patient is a 65y old  Male who presents with a chief complaint of respiratory failure, lethargy (11 Jul 2021 13:32)      BRIEF HOSPITAL COURSE: 66 y/o M with a h/o seizures, BPH, hypothyroidism, sarcoidosis GERD, COPD, LAMBERT, SDH, DM, admitted on 6/21 from NH for acute hypoxemic respiratory failure secondary to aspiration pneumonia. Requiring HiFlo nasal oxygen.    Events last 24 hours: Patient remains on HiFlo nasal oxygen @ 50% FiO2 and 40LPM. Tonight he endorses SOB and appears to be continuing to struggle with managing secretions. NGT in place, on tube feedings.        PAST MEDICAL & SURGICAL HISTORY:  DM (diabetes mellitus)    Sarcoid    Bronchiectasis    Subdural hematoma    Inguinal hernia    Seizure    Hypothyroid    Sleep apnea    LAMBERT and COPD overlap syndrome    BPH without urinary obstruction    Status post Bright&#x27;s procedure    Bilateral subdural hematomas    Status post cholecystectomy    Status post inguinal hernia repair        Review of Systems:  CONSTITUTIONAL: No fever, chills, or fatigue  EYES: No eye pain, visual disturbances, or discharge  ENMT:  No difficulty hearing, tinnitus, vertigo; No sinus or throat pain  NECK: No pain or stiffness  RESPIRATORY: No cough, wheezing, chills or hemoptysis; (+) shortness of breath  CARDIOVASCULAR: No chest pain, palpitations, dizziness, or leg swelling  GASTROINTESTINAL: No abdominal or epigastric pain. No nausea, vomiting, or hematemesis; No diarrhea or constipation. No melena or hematochezia.  GENITOURINARY: No dysuria, frequency, hematuria, or incontinence  NEUROLOGICAL: No headaches, memory loss, loss of strength, numbness, or tremors  SKIN: No itching, burning, rashes, or lesions   MUSCULOSKELETAL: No joint pain or swelling; No muscle, back, or extremity pain  PSYCHIATRIC: No depression, anxiety, mood swings, or difficulty sleeping      Medications:  meropenem  IVPB 1000 milliGRAM(s) IV Intermittent every 8 hours  albuterol/ipratropium for Nebulization 3 milliLiter(s) Nebulizer every 6 hours  acetaminophen    Suspension .. 650 milliGRAM(s) Oral every 6 hours PRN  ondansetron Injectable 4 milliGRAM(s) IV Push every 4 hours PRN  valproate sodium IVPB 1500 milliGRAM(s) IV Intermittent at bedtime  enoxaparin Injectable 40 milliGRAM(s) SubCutaneous daily  pantoprazole  Injectable 40 milliGRAM(s) IV Push daily  levothyroxine Injectable 12.5 MICROGram(s) IV Push at bedtime  lactated ringers. 1000 milliLiter(s) IV Continuous <Continuous>        ICU Vital Signs Last 24 Hrs  T(C): 36.4 (11 Jul 2021 20:02), Max: 37.1 (11 Jul 2021 04:00)  T(F): 97.6 (11 Jul 2021 20:02), Max: 98.8 (11 Jul 2021 04:00)  HR: 94 (11 Jul 2021 20:00) (80 - 94)  BP: 119/77 (11 Jul 2021 20:00) (110/70 - 122/81)  BP(mean): 90 (11 Jul 2021 20:00) (82 - 94)  ABP: --  ABP(mean): --  RR: 31 (11 Jul 2021 20:00) (24 - 36)  SpO2: 92% (11 Jul 2021 20:00) (89% - 99%)          I&O's Detail    10 Jul 2021 07:01  -  11 Jul 2021 07:00  --------------------------------------------------------  IN:    Enteral Tube Flush: 480 mL    IV PiggyBack: 200 mL    Lactated Ringers: 1200 mL    Vital1.5: 480 mL  Total IN: 2360 mL    OUT:    Incontinent per Collection Bag (mL): 750 mL    Voided (mL): 500 mL  Total OUT: 1250 mL    Total NET: 1110 mL      11 Jul 2021 07:01  -  11 Jul 2021 22:05  --------------------------------------------------------  IN:    Enteral Tube Flush: 280 mL    IV PiggyBack: 50 mL    Lactated Ringers: 700 mL    Vital1.5: 340 mL  Total IN: 1370 mL    OUT:    Incontinent per Collection Bag (mL): 600 mL  Total OUT: 600 mL    Total NET: 770 mL            LABS:                        11.7   9.47  )-----------( 182      ( 11 Jul 2021 05:59 )             36.4     07-11    140  |  100  |  11  ----------------------------<  122<H>  4.3   |  44<H>  |  0.46<L>    Ca    8.9      11 Jul 2021 05:59            CAPILLARY BLOOD GLUCOSE            CULTURES:        Physical Examination:    General: No acute distress.  Alert, oriented, interactive, nonfocal, on HFNC, frail appearing    HEENT: Pupils equal, reactive to light.  Symmetric. (+)NGT    PULM: Coarse breath sounds on right side, (+) sputum production    CVS: Regular rate and rhythm, no murmurs, rubs, or gallops    ABD: Soft, nondistended, nontender, normoactive bowel sounds, no masses    EXT: anasarca    SKIN: Warm and well perfused, no rashes noted.    NEURO: A&Ox3, generalized weakness, difficulty with speaking at length, no focal deficits        RADIOLOGY:     < from: Xray Chest 1 View- PORTABLE-Routine (Xray Chest 1 View- PORTABLE-Routine .) (07.08.21 @ 14:30) >  FINDINGS:  NG tube tip in fundus of stomach.    The lungs show unchanged acute on chronic bilateral upper and lower lobe  multifocal airspace disease/consolidation  . RIGHT apical consolidation with volume loss causing  RIGHT sided tracheal deviation.    There is mild cardiomegaly and stable widening of the superior mediastinum.. No pneumothorax.      Visualized osseous structures are intact.    IMPRESSION:  No significant change. NG tube tip remains in fundus of stomach..

## 2021-07-11 NOTE — PROGRESS NOTE ADULT - ASSESSMENT
on Meropenem   on IVF  on asp prec  pt is DNR  GOC documented  prognosis very POOR    65M with history of Seizure disorder, Hypothyroidism,  Thrombocytopenia, BPH, recent right SAH from fall May 2021, presents from [facility] for hypoxia and AMS. Found to have severe sepsis, acute hypoxic and hypercapnic respiratory failure      probable ongoing - continued aspiration - with resulting consolidation - asp pna - as seen on CT chest and clinical presentation  at risk for decompensation - resp failure -   chronic lung disease - sarcoidosis with complications -   s/p broad spectrum ABX once on this admission already - cx noted, biomarkers noted,   assist with all needs and ADL  HOB elev - asp prec  oral hygiene  Prognosis guarded - GOC discussion - DNR

## 2021-07-11 NOTE — CHART NOTE - NSCHARTNOTEFT_GEN_A_CORE
Assessment:       65 year old male with a history of seizure d/o, BPH, hypothyroidism, sarcoidosis, COPD, LAMBERT, recent SAH who is admitted with respiratory failure in the setting of aspiration PNA.     65 year old M with seizures , BPH, hypothyroidism, sarcoidosis GERD, COPD, LAMBERT, recent admission to [facility] from 5/3-5/10/2021 for right subarachnoid hemorrhage s/p fall who presents from [facility] for hypoxia.  Found to have acute hypoxic and hypercapnic respiratory failure 2/2 sepsis 2/2 aspiration PNA.  It was recommended that pt follow dysphagia I diet from [facility] but reports state that he has been non-compliant with recommendation.  Per SLP evaluation this admission, oral nutrition/hydration contraindicated.  NGT placed for nutrition/meds.  Pt started NGT feedings: Vital 1.5 q 6 hrs with 100ml water before/after each feed. Bolus feedings d/c ed 2/2 increased secretions/need for suctioning and concern for aspiration. NGT feedings started 7/8. Per RN pt has been tolerating without s/sx aspiration. However pt currently not meeting estimated needs. Pt requires Vital 1.5 to rate of 60ml/hr x 24 hrs. (provides 2160 kcal, 97 gm protein) along with prosource  supplement. Given pt high risk for aspiration would increase rate slowly. If pt unable to tolerate increase would consider 2cal to goal 45ml/hr. Noted-per MD notes pt may be appropriate for comfort care given overall status.         Factors impacting intake: [ ] none [ ] nausea  [ ] vomiting [ ] diarrhea [ ] constipation  [ ]chewing problems [ x] swallowing issues  [ ] other:     Diet Presciption: Diet, NPO with Tube Feed:   Tube Feeding Modality: Nasogastric  Vital 1.5 Lee  Total Volume for 24 Hours (mL): 480  Continuous  Starting Tube Feed Rate {mL per Hour}: 20  Until Goal Tube Feed Rate (mL per Hour): 20  Tube Feed Duration (in Hours): 24  Tube Feed Start Time: 14:00   Total Volume per Flush (mL): 20  No Carb Prosource (1pkg = 15gms Protein)     Qty per Day:  1 (07-08-21 @ 13:02)    Intake: NA    Current Weight: generalized edema +1,  168#  current 173.7#  % Weight Change    Pertinent Medications: MEDICATIONS  (STANDING):  albuterol/ipratropium for Nebulization 3 milliLiter(s) Nebulizer every 6 hours  enoxaparin Injectable 40 milliGRAM(s) SubCutaneous daily  lactated ringers. 1000 milliLiter(s) (50 mL/Hr) IV Continuous <Continuous>  levothyroxine Injectable 12.5 MICROGram(s) IV Push at bedtime  meropenem  IVPB 1000 milliGRAM(s) IV Intermittent every 8 hours  pantoprazole  Injectable 40 milliGRAM(s) IV Push daily  valproate sodium IVPB 1500 milliGRAM(s) IV Intermittent at bedtime    MEDICATIONS  (PRN):  acetaminophen    Suspension .. 650 milliGRAM(s) Oral every 6 hours PRN Temp greater or equal to 38C (100.4F), Mild Pain (1 - 3)  ondansetron Injectable 4 milliGRAM(s) IV Push every 4 hours PRN Nausea and/or Vomiting    Pertinent Labs: 07-11 Na140 mmol/L Glu 122 mg/dL<H> K+ 4.3 mmol/L Cr  0.46 mg/dL<L> BUN 11 mg/dL 07-09 Phos 2.3 mg/dL<L> 07-05 Alb 1.9 g/dL<L>     CAPILLARY BLOOD GLUCOSE        Skin: DTI sacrum   7/8    Estimated Needs:   [x ] no change since previous assessment  [ ] recalculated:     Previous Nutrition Diagnosis:   [ ] Inadequate Energy Intake [ ]Inadequate Oral Intake [ ] Excessive Energy Intake   [ ] Underweight [ ] Increased Nutrient Needs [ ] Overweight/Obesity   [ ] Altered GI Function [ ] Unintended Weight Loss [ ] Food & Nutrition Related Knowledge Deficit [x ] Malnutrition     Nutrition Diagnosis is [x ] ongoing  [ ] resolved [ ] not applicable     New Nutrition Diagnosis: [ ] not applicable       Interventions:   Recommend  [ ] Change Diet To:  [ ] Nutrition Supplement  [ x] Nutrition Support  trial of TF increase to goal 60ml/hr  [ ] Other:     Monitoring and Evaluation:   [ ] PO intake [ x ] Tolerance to diet prescription [ x ] weights [ x ] labs[ x ] follow up per protocol  [ ] other:

## 2021-07-12 NOTE — PROGRESS NOTE ADULT - ASSESSMENT
65M with seizures on keppra, BPH, hypothyroidism, sarcoidosis GERD, COPD, LAMBERT, recent admission to [facility] from 5/3-5/10/2021 for right subarachnoid hemorrhage s/p fall who presents from [facility] for hypoxia.  Found to have acute hypoxic and hypercapnic respiratory failure 2/2 sepsis 2/2 aspiration PNA.  Likely aspiration PNA since patient has been recommended dysphagia I diet from [facility] but reports state that he has been non-compliant with recommendation.  Also location of RLL and RUL also fits with aspiration.      Problems  Acute hypoxic and hypercapnic respiratory failure  Sepsis 2/2 aspiration PNA  Sarcoidosis  Seizures  COPD    Acute hypoxic and hypercapnic respiratory failure secondary to aspiration PNA and underlying interstitial lung disease  - requires SPCU care  - High flow oxygen now up to 75%, and still tachypneic  - as per RT suctioning dry blood.     Sepsis from aspiration PNA with metabolic encephalopathy   - completed meropenem but was restarted - continue per ID  - f/u blood cultures     Troponin Elevation  - likely demand ischemia, no need to trend further  - cardiology f/u appreciated  -  TTE noted - elevated RV pressures likely due to pna    Sarcoidosis/COPD  - on nebs    Seizure history  - cont with depakote    Hypothyroidism  - cont with synthroid     General anxiety disorder  - restart Effexor if able.     BPH  - [facility]'ed 6/30, monitor voiding as cannot get oral/ngt meds at this time.    Preventive measures  - can start with lovenox 40mg subq for DVT ppx    NGT placed - tolerating feeding.      Patient critically ill, high risk for deterioration.

## 2021-07-12 NOTE — CHART NOTE - NSCHARTNOTEFT_GEN_A_CORE
Assessment: Pt seen for nutrition follow-up.  Pt is a 65 year old M with seizures, BPH, hypothyroidism, sarcoidosis GERD, COPD, LAMBERT, recent admission to [facility] from 5/3-5/10/2021 for right subarachnoid hemorrhage s/p fall who presents from [facility] for hypoxia.  Found to have acute hypoxic and hypercapnic respiratory failure 2/2 sepsis 2/2 aspiration PNA.  It was recommended that pt follow dysphagia I diet from [facility] but reports state that he has been non-compliant with recommendation.  Per SLP evaluation this admission, oral nutrition/hydration contraindicated.  NGT placed for nutrition/meds.  Pt started NGT (bolus) feedings: Jevity 1.5 240ml every 4hrs, with water flushes before and after each feed.  Pt's respiratory status noted to worsen with increased secretions/need for suctioning and concern for aspiration and NGT feedings discontinued on 7/6.  Pt restarted on low volume tube feed 7/8: Vital 1.5 @20ml/hr x 24hrs, +nc prosource once daily and tolerance is to be assessed before increasing as pt is at high risk for aspiration.  Per provider to RN order, TF rate increased today to 30ml/hr; pt tolerating increase per nursing, although pt has been requiring increased amounts of O2 via high flow NC (up to 80% as of 7/12) and has been more lethargic.  Recommend consider increase by 10ml as pt continues to not meet estimated needs (pt requires Vital 1.5 to rate of 60ml/hr x 24 hrs); if unable to tolerate increase recommend consider 2cal to goal 45ml/hr x24hrs.  RD to continue to follow closely.     Factors impacting intake: [ ] none [ ] nausea  [ ] vomiting [ ] diarrhea [ ] constipation  [ ]chewing problems [ x] swallowing issues  [x ] other: aspiration, npo with ngt feedings    Diet Prescription: Diet, NPO with Tube Feed:   Tube Feeding Modality: Nasogastric  Vital 1.5 Lee  Total Volume for 24 Hours (mL): 480  Continuous  Starting Tube Feed Rate {mL per Hour}: 20  Until Goal Tube Feed Rate (mL per Hour): 20  Tube Feed Duration (in Hours): 24  Tube Feed Start Time: 14:00   Total Volume per Flush (mL): 20  No Carb Prosource (1pkg = 15gms Protein)     Qty per Day:  1 (07-08-21 @ 13:02)    Intake: NGT feedings increased to 30ml/hr    Current Weight: 7/11 173# (2+ generalized, BL arm, 7/8 168.8#, 7/7 170.8#  % Weight Change    Pertinent Medications: MEDICATIONS  (STANDING):  albuterol/ipratropium for Nebulization 3 milliLiter(s) Nebulizer every 6 hours  enoxaparin Injectable 40 milliGRAM(s) SubCutaneous daily  lactated ringers Bolus 500 milliLiter(s) IV Bolus once  lactated ringers. 1000 milliLiter(s) (50 mL/Hr) IV Continuous <Continuous>  levothyroxine Injectable 12.5 MICROGram(s) IV Push at bedtime  pantoprazole  Injectable 40 milliGRAM(s) IV Push daily  sodium chloride 0.65% Nasal 1 Spray(s) Both Nostrils every 4 hours  valproate sodium IVPB 1500 milliGRAM(s) IV Intermittent at bedtime    MEDICATIONS  (PRN):  acetaminophen    Suspension .. 650 milliGRAM(s) Oral every 6 hours PRN Temp greater or equal to 38C (100.4F), Mild Pain (1 - 3)  ondansetron Injectable 4 milliGRAM(s) IV Push every 4 hours PRN Nausea and/or Vomiting    Pertinent Labs: 07-12 Na139 mmol/L Glu 132 mg/dL<H> K+ 4.3 mmol/L Cr  0.52 mg/dL BUN 11 mg/dL 07-09 Phos 2.3 mg/dL<L>     CAPILLARY BLOOD GLUCOSE        Skin: sacral DTI    Estimated Needs:   [x ] no change since previous assessment  [ ] recalculated:     Previous Nutrition Diagnosis:   [ ] Inadequate Energy Intake [ ]Inadequate Oral Intake [ ] Excessive Energy Intake   [ ] Underweight [ ] Increased Nutrient Needs [ ] Overweight/Obesity   [ ] Altered GI Function [ ] Unintended Weight Loss [ ] Food & Nutrition Related Knowledge Deficit [ x] Malnutrition     Nutrition Diagnosis is [x ] ongoing  [ ] resolved [ ] not applicable     New Nutrition Diagnosis: [ ] not applicable       Interventions: NPO with NGT (30ml/hr x 24hrs for now), ongoing GOC discussion  Recommend  [ ] Change Diet To:  [ ] Nutrition Supplement  [ x] Nutrition Support - consider increase to 40ml with next 24hrs  [ ] Other:     Monitoring and Evaluation:   [] PO intake [ x ] Tolerance to diet prescription [ x ] weights [ x ] labs[ x ] follow up per protocol  [ ] other:

## 2021-07-12 NOTE — PROGRESS NOTE ADULT - REASON FOR ADMISSION
respiratory failure, lethargy

## 2021-07-12 NOTE — PROGRESS NOTE ADULT - NUTRITIONAL ASSESSMENT
This patient has been assessed with a concern for Malnutrition and has been determined to have a diagnosis/diagnoses of Severe protein-calorie malnutrition.    This patient is being managed with:   Diet NPO with Tube Feed-  Tube Feeding Modality: Nasogastric  Vital 1.5 Lee  Total Volume for 24 Hours (mL): 1422  Bolus  Total Volume of Bolus (mL):  237  Tube Feed Frequency: Every 4 hours   Tube Feed Start Time: 12:00  Bolus Feed Rate (mL per Hour): 237   Bolus Feed Duration (in Hours): 1  Bolus Feed Instructions:  bolus feed 4x day with 75cc water flush.  patient should be sitting upright for at least 30min post feed to minimize aspiration risk.  Free Water Flush   Total Volume per Flush (mL): 75   Frequency: Every 4 Hours  No Carb Prosource (1pkg = 15gms Protein)     Qty per Day:  1  Entered: Jun 29 2021 11:02AM    
This patient has been assessed with a concern for Malnutrition and has been determined to have a diagnosis/diagnoses of Severe protein-calorie malnutrition.    This patient is being managed with:   Diet NPO with Tube Feed-  Tube Feeding Modality: Nasogastric  Vital 1.5 Lee  Total Volume for 24 Hours (mL): 480  Continuous  Starting Tube Feed Rate {mL per Hour}: 20  Until Goal Tube Feed Rate (mL per Hour): 20  Tube Feed Duration (in Hours): 24  Tube Feed Start Time: 14:00   Total Volume per Flush (mL): 20  No Carb Prosource (1pkg = 15gms Protein)     Qty per Day:  1  Entered: Jul 8 2021  1:01PM    
This patient has been assessed with a concern for Malnutrition and has been determined to have a diagnosis/diagnoses of Severe protein-calorie malnutrition.    This patient is being managed with:   Diet NPO-  Entered: Jun 21 2021  6:20AM    
This patient has been assessed with a concern for Malnutrition and has been determined to have a diagnosis/diagnoses of Severe protein-calorie malnutrition.    This patient is being managed with:   Diet NPO with Tube Feed-  Tube Feeding Modality: Nasogastric  Vital 1.5 Lee  Total Volume for 24 Hours (mL): 1422  Bolus  Total Volume of Bolus (mL):  237  Tube Feed Frequency: Every 4 hours   Tube Feed Start Time: 12:00  Bolus Feed Rate (mL per Hour): 237   Bolus Feed Duration (in Hours): 1  Bolus Feed Instructions:  bolus feed 4x day with 75cc water flush.  patient should be sitting upright for at least 30min post feed to minimize aspiration risk.  Free Water Flush   Total Volume per Flush (mL): 75   Frequency: Every 4 Hours  No Carb Prosource (1pkg = 15gms Protein)     Qty per Day:  1  Entered: Jun 29 2021 11:02AM    
This patient has been assessed with a concern for Malnutrition and has been determined to have a diagnosis/diagnoses of Severe protein-calorie malnutrition.    This patient is being managed with:   Diet NPO with Tube Feed-  Tube Feeding Modality: Nasogastric  Vital 1.5 Lee  Total Volume for 24 Hours (mL): 1422  Bolus  Total Volume of Bolus (mL):  237  Tube Feed Frequency: Every 4 hours   Tube Feed Start Time: 12:00  Bolus Feed Rate (mL per Hour): 237   Bolus Feed Duration (in Hours): 1  Bolus Feed Instructions:  bolus feed 4x day with 75cc water flush.  patient should be sitting upright for at least 30min post feed to minimize aspiration risk.  Free Water Flush   Total Volume per Flush (mL): 75   Frequency: Every 4 Hours  No Carb Prosource (1pkg = 15gms Protein)     Qty per Day:  1  Entered: Jun 29 2021 11:02AM    
This patient has been assessed with a concern for Malnutrition and has been determined to have a diagnosis/diagnoses of Severe protein-calorie malnutrition.      
This patient has been assessed with a concern for Malnutrition and has been determined to have a diagnosis/diagnoses of Severe protein-calorie malnutrition.    This patient is being managed with:   Diet NPO with Tube Feed-  Tube Feeding Modality: Nasogastric  Vital 1.5 Lee  Total Volume for 24 Hours (mL): 1422  Bolus  Total Volume of Bolus (mL):  237  Tube Feed Frequency: Every 4 hours   Tube Feed Start Time: 12:00  Bolus Feed Rate (mL per Hour): 237   Bolus Feed Duration (in Hours): 1  Bolus Feed Instructions:  bolus feed 4x day with 75cc water flush.  patient should be sitting upright for at least 30min post feed to minimize aspiration risk.  Free Water Flush   Total Volume per Flush (mL): 75   Frequency: Every 4 Hours  No Carb Prosource (1pkg = 15gms Protein)     Qty per Day:  1  Entered: Jun 29 2021 11:02AM    
This patient has been assessed with a concern for Malnutrition and has been determined to have a diagnosis/diagnoses of Severe protein-calorie malnutrition.    This patient is being managed with:   Diet NPO with Tube Feed-  Tube Feeding Modality: Nasogastric  Vital 1.5 Lee  Total Volume for 24 Hours (mL): 1422  Bolus  Total Volume of Bolus (mL):  237  Tube Feed Frequency: Every 4 hours   Tube Feed Start Time: 12:00  Bolus Feed Rate (mL per Hour): 237   Bolus Feed Duration (in Hours): 1  Bolus Feed Instructions:  bolus feed 4x day with 75cc water flush.  patient should be sitting upright for at least 30min post feed to minimize aspiration risk.  Free Water Flush   Total Volume per Flush (mL): 75   Frequency: Every 4 Hours  No Carb Prosource (1pkg = 15gms Protein)     Qty per Day:  1  Entered: Jun 29 2021 11:02AM    
This patient has been assessed with a concern for Malnutrition and has been determined to have a diagnosis/diagnoses of Severe protein-calorie malnutrition.    This patient is being managed with:   Diet NPO with Tube Feed-  Tube Feeding Modality: Nasogastric  Vital 1.5 Lee  Total Volume for 24 Hours (mL): 480  Continuous  Starting Tube Feed Rate {mL per Hour}: 20  Until Goal Tube Feed Rate (mL per Hour): 20  Tube Feed Duration (in Hours): 24  Tube Feed Start Time: 14:00   Total Volume per Flush (mL): 20  No Carb Prosource (1pkg = 15gms Protein)     Qty per Day:  1  Entered: Jul 8 2021  1:01PM    
This patient has been assessed with a concern for Malnutrition and has been determined to have a diagnosis/diagnoses of Severe protein-calorie malnutrition.    This patient is being managed with:   Diet NPO with Tube Feed-  Tube Feeding Modality: Nasogastric  Vital 1.5 Lee  Total Volume for 24 Hours (mL): 960  Bolus  Total Volume of Bolus (mL):  240  Total # of Feeds: 1  Tube Feed Frequency: Every 6 hours   Tube Feed Start Time: 18:00  Bolus Feed Rate (mL per Hour): 240   Bolus Feed Duration (in Hours): 1  Bolus Feed Instructions:  vital 1 can via NGT 4x day.  leave patient sitting upright for at least 30 min after feeding to minimize aspiration flush with 100cc free water after each feed.  Free Water Flush  Bolus   Total Volume per Flush (mL): 100   Frequency: Every 6 Hours   Total Daily Volume of Flush (mL): 400    Start Time: 18:00  Entered: Jun 28 2021  4:59PM    
This patient has been assessed with a concern for Malnutrition and has been determined to have a diagnosis/diagnoses of Severe protein-calorie malnutrition.    This patient is being managed with:   Diet NPO-  Entered: Jun 21 2021  6:20AM    
This patient has been assessed with a concern for Malnutrition and has been determined to have a diagnosis/diagnoses of Severe protein-calorie malnutrition.      NGT placed, will start Tube feeds at 20cc per hour and see how tolerates considering he recently aspirated.
This patient has been assessed with a concern for Malnutrition and has been determined to have a diagnosis/diagnoses of Severe protein-calorie malnutrition.    This patient is being managed with:   Diet NPO with Tube Feed-  Tube Feeding Modality: Nasogastric  Vital 1.5 Lee  Total Volume for 24 Hours (mL): 1422  Bolus  Total Volume of Bolus (mL):  237  Tube Feed Frequency: Every 4 hours   Tube Feed Start Time: 12:00  Bolus Feed Rate (mL per Hour): 237   Bolus Feed Duration (in Hours): 1  Bolus Feed Instructions:  bolus feed 4x day with 75cc water flush.  patient should be sitting upright for at least 30min post feed to minimize aspiration risk.  Free Water Flush   Total Volume per Flush (mL): 75   Frequency: Every 4 Hours  No Carb Prosource (1pkg = 15gms Protein)     Qty per Day:  1  Entered: Jun 29 2021 11:02AM    
This patient has been assessed with a concern for Malnutrition and has been determined to have a diagnosis/diagnoses of Severe protein-calorie malnutrition.    This patient is being managed with:   Diet NPO with Tube Feed-  Tube Feeding Modality: Nasogastric  Vital 1.5 Lee  Total Volume for 24 Hours (mL): 1422  Bolus  Total Volume of Bolus (mL):  237  Tube Feed Frequency: Every 4 hours   Tube Feed Start Time: 12:00  Bolus Feed Rate (mL per Hour): 237   Bolus Feed Duration (in Hours): 1  Bolus Feed Instructions:  bolus feed 4x day with 75cc water flush.  patient should be sitting upright for at least 30min post feed to minimize aspiration risk.  Free Water Flush   Total Volume per Flush (mL): 75   Frequency: Every 4 Hours  No Carb Prosource (1pkg = 15gms Protein)     Qty per Day:  1  Entered: Jun 29 2021 11:02AM    
This patient has been assessed with a concern for Malnutrition and has been determined to have a diagnosis/diagnoses of Severe protein-calorie malnutrition.    This patient is being managed with:   Diet NPO with Tube Feed-  Tube Feeding Modality: Nasogastric  Vital 1.5 Lee  Total Volume for 24 Hours (mL): 480  Continuous  Starting Tube Feed Rate {mL per Hour}: 20  Until Goal Tube Feed Rate (mL per Hour): 20  Tube Feed Duration (in Hours): 24  Tube Feed Start Time: 14:00   Total Volume per Flush (mL): 20  No Carb Prosource (1pkg = 15gms Protein)     Qty per Day:  1  Entered: Jul 8 2021  1:01PM    
This patient has been assessed with a concern for Malnutrition and has been determined to have a diagnosis/diagnoses of Severe protein-calorie malnutrition.      
This patient has been assessed with a concern for Malnutrition and has been determined to have a diagnosis/diagnoses of Severe protein-calorie malnutrition.      
This patient has been assessed with a concern for Malnutrition and has been determined to have a diagnosis/diagnoses of Severe protein-calorie malnutrition.    This patient is being managed with:   Diet NPO with Tube Feed-  Tube Feeding Modality: Nasogastric  Vital 1.5 Lee  Total Volume for 24 Hours (mL): 1422  Bolus  Total Volume of Bolus (mL):  237  Tube Feed Frequency: Every 4 hours   Tube Feed Start Time: 12:00  Bolus Feed Rate (mL per Hour): 237   Bolus Feed Duration (in Hours): 1  Bolus Feed Instructions:  bolus feed 4x day with 75cc water flush.  patient should be sitting upright for at least 30min post feed to minimize aspiration risk.  Free Water Flush   Total Volume per Flush (mL): 75   Frequency: Every 4 Hours  No Carb Prosource (1pkg = 15gms Protein)     Qty per Day:  1  Entered: Jun 29 2021 11:02AM    
This patient has been assessed with a concern for Malnutrition and has been determined to have a diagnosis/diagnoses of Severe protein-calorie malnutrition.    This patient is being managed with:   Diet NPO with Tube Feed-  Tube Feeding Modality: Nasogastric  Vital 1.5 Lee  Total Volume for 24 Hours (mL): 480  Continuous  Starting Tube Feed Rate {mL per Hour}: 20  Until Goal Tube Feed Rate (mL per Hour): 20  Tube Feed Duration (in Hours): 24  Tube Feed Start Time: 14:00   Total Volume per Flush (mL): 20  No Carb Prosource (1pkg = 15gms Protein)     Qty per Day:  1  Entered: Jul 8 2021  1:01PM    
This patient has been assessed with a concern for Malnutrition and has been determined to have a diagnosis/diagnoses of Severe protein-calorie malnutrition.    This patient is being managed with:   Diet NPO with Tube Feed-  Tube Feeding Modality: Nasogastric  Vital 1.5 Lee  Total Volume for 24 Hours (mL): 480  Continuous  Starting Tube Feed Rate {mL per Hour}: 20  Until Goal Tube Feed Rate (mL per Hour): 20  Tube Feed Duration (in Hours): 24  Tube Feed Start Time: 14:00   Total Volume per Flush (mL): 20  No Carb Prosource (1pkg = 15gms Protein)     Qty per Day:  1  Entered: Jul 8 2021  1:01PM    
This patient has been assessed with a concern for Malnutrition and has been determined to have a diagnosis/diagnoses of Severe protein-calorie malnutrition.    This patient is being managed with:   Diet NPO-  Entered: Jun 21 2021  6:20AM    
This patient has been assessed with a concern for Malnutrition and has been determined to have a diagnosis/diagnoses of Severe protein-calorie malnutrition.    This patient is being managed with:   Diet NPO with Tube Feed-  Tube Feeding Modality: Nasogastric  Vital 1.5 Lee  Total Volume for 24 Hours (mL): 1422  Bolus  Total Volume of Bolus (mL):  237  Tube Feed Frequency: Every 4 hours   Tube Feed Start Time: 12:00  Bolus Feed Rate (mL per Hour): 237   Bolus Feed Duration (in Hours): 1  Bolus Feed Instructions:  bolus feed 4x day with 75cc water flush.  patient should be sitting upright for at least 30min post feed to minimize aspiration risk.  Free Water Flush   Total Volume per Flush (mL): 75   Frequency: Every 4 Hours  No Carb Prosource (1pkg = 15gms Protein)     Qty per Day:  1  Entered: Jun 29 2021 11:02AM    
This patient has been assessed with a concern for Malnutrition and has been determined to have a diagnosis/diagnoses of Severe protein-calorie malnutrition.    This patient is being managed with:   Diet NPO with Tube Feed-  Tube Feeding Modality: Nasogastric  Vital 1.5 Lee  Total Volume for 24 Hours (mL): 480  Continuous  Starting Tube Feed Rate {mL per Hour}: 20  Until Goal Tube Feed Rate (mL per Hour): 20  Tube Feed Duration (in Hours): 24  Tube Feed Start Time: 14:00   Total Volume per Flush (mL): 20  No Carb Prosource (1pkg = 15gms Protein)     Qty per Day:  1  Entered: Jul 8 2021  1:01PM    
This patient has been assessed with a concern for Malnutrition and has been determined to have a diagnosis/diagnoses of Severe protein-calorie malnutrition.    This patient is being managed with:   Diet NPO with Tube Feed-  Tube Feeding Modality: Nasogastric  Vital 1.5 Lee  Total Volume for 24 Hours (mL): 480  Continuous  Starting Tube Feed Rate {mL per Hour}: 20  Until Goal Tube Feed Rate (mL per Hour): 20  Tube Feed Duration (in Hours): 24  Tube Feed Start Time: 14:00   Total Volume per Flush (mL): 20  No Carb Prosource (1pkg = 15gms Protein)     Qty per Day:  1  Entered: Jul 8 2021  1:01PM    
Diet, NPO with Tube Feed:   Tube Feeding Modality: Nasogastric  Vital 1.5 Lee  Total Volume for 24 Hours (mL): 1422  Bolus  Total Volume of Bolus (mL):  237  Tube Feed Frequency: Every 4 hours   Tube Feed Start Time: 12:00  Bolus Feed Rate (mL per Hour): 237   Bolus Feed Duration (in Hours): 1  Bolus Feed Instructions:  bolus feed 4x day with 75cc water flush.  patient should be sitting upright for at least 30min post feed to minimize aspiration risk.  Free Water Flush   Total Volume per Flush (mL): 75   Frequency: Every 4 Hours  No Carb Prosource (1pkg = 15gms Protein)     Qty per Day:  1 (06-29-21 @ 11:02) [Active]

## 2021-07-12 NOTE — PROGRESS NOTE ADULT - ASSESSMENT
65M with seizures on keppra, BPH, hypothyroidism, sarcoidosis GERD, COPD, LAMBERT, recent admission to [facility] from 5/3-5/10/2021 for right subarachnoid hemorrhage s/p fall admitted from [facility] rehab with acute hypoxic respiratory failure sec severe right lung pneumonia likely recurrent aspiration pneumonia.      Plan    sp 10 days of meropenem  -July 5th Meropenem restarted , worsening resp status,     -concern for possible repeat aspiration continue meropenem for now, remains at high level of care in [facility]  strict aspiration precautions  supportive care   remains guarded so would look at goals of care and order repeat sputum to help guide therapy

## 2021-07-12 NOTE — PROGRESS NOTE ADULT - PROVIDER SPECIALTY LIST ADULT
Cardiology
Critical Care
Critical Care
Hospitalist
Infectious Disease
MICU
Pulmonology
Cardiology
Critical Care
Hospitalist
Infectious Disease
Infectious Disease
Pulmonology
Cardiology
Critical Care
Hospitalist
Infectious Disease
Internal Medicine
Pulmonology
Cardiology
Critical Care
Hospitalist
Hospitalist
Infectious Disease
Critical Care
Critical Care
Hospitalist
Infectious Disease
Internal Medicine
Pulmonology
Cardiology
Hospitalist
Hospitalist
Pulmonology
Hospitalist
Wound Care

## 2021-07-12 NOTE — PROGRESS NOTE ADULT - SUBJECTIVE AND OBJECTIVE BOX
Date/Time Patient Seen:  		  Referring MD:   Data Reviewed	       Patient is a 65y old  Male who presents with a chief complaint of respiratory failure, lethargy (11 Jul 2021 22:05)      Subjective/HPI     PAST MEDICAL & SURGICAL HISTORY:  No pertinent past medical history    DM (diabetes mellitus)    Sarcoid    Bronchiectasis    Diverticulitis    Subdural hematoma    Inguinal hernia    Cellulitis    Seizure    Hypothyroid    Sleep apnea    LAMBERT and COPD overlap syndrome    BPH without urinary obstruction    No significant past surgical history    Status post Bright&#x27;s procedure    Bilateral subdural hematomas    Status post cholecystectomy    Status post inguinal hernia repair          Medication list         MEDICATIONS  (STANDING):  albuterol/ipratropium for Nebulization 3 milliLiter(s) Nebulizer every 6 hours  enoxaparin Injectable 40 milliGRAM(s) SubCutaneous daily  lactated ringers. 1000 milliLiter(s) (50 mL/Hr) IV Continuous <Continuous>  levothyroxine Injectable 12.5 MICROGram(s) IV Push at bedtime  meropenem  IVPB 1000 milliGRAM(s) IV Intermittent every 8 hours  pantoprazole  Injectable 40 milliGRAM(s) IV Push daily  valproate sodium IVPB 1500 milliGRAM(s) IV Intermittent at bedtime    MEDICATIONS  (PRN):  acetaminophen    Suspension .. 650 milliGRAM(s) Oral every 6 hours PRN Temp greater or equal to 38C (100.4F), Mild Pain (1 - 3)  ondansetron Injectable 4 milliGRAM(s) IV Push every 4 hours PRN Nausea and/or Vomiting         Vitals log        ICU Vital Signs Last 24 Hrs  T(C): 36.9 (12 Jul 2021 04:00), Max: 37.1 (11 Jul 2021 08:12)  T(F): 98.4 (12 Jul 2021 04:00), Max: 98.8 (11 Jul 2021 08:12)  HR: 101 (12 Jul 2021 06:00) (80 - 106)  BP: 111/74 (12 Jul 2021 06:00) (110/70 - 128/74)  BP(mean): 86 (12 Jul 2021 06:00) (82 - 94)  ABP: --  ABP(mean): --  RR: 31 (12 Jul 2021 06:00) (24 - 36)  SpO2: 91% (12 Jul 2021 06:00) (89% - 96%)           Input and Output:  I&O's Detail    10 Jul 2021 07:01  -  11 Jul 2021 07:00  --------------------------------------------------------  IN:    Enteral Tube Flush: 480 mL    IV PiggyBack: 200 mL    Lactated Ringers: 1200 mL    Vital1.5: 480 mL  Total IN: 2360 mL    OUT:    Incontinent per Collection Bag (mL): 750 mL    Voided (mL): 500 mL  Total OUT: 1250 mL    Total NET: 1110 mL      11 Jul 2021 07:01  -  12 Jul 2021 06:49  --------------------------------------------------------  IN:    Enteral Tube Flush: 500 mL    IV PiggyBack: 200 mL    Lactated Ringers: 1250 mL    Vital1.5: 670 mL  Total IN: 2620 mL    OUT:    Incontinent per Collection Bag (mL): 1150 mL  Total OUT: 1150 mL    Total NET: 1470 mL          Lab Data                        11.7   9.47  )-----------( 182      ( 11 Jul 2021 05:59 )             36.4     07-11    140  |  100  |  11  ----------------------------<  122<H>  4.3   |  44<H>  |  0.46<L>    Ca    8.9      11 Jul 2021 05:59              Review of Systems	      Objective     Physical Examination    heart s1s2  lung dec BS  abd soft      Pertinent Lab findings & Imaging      Sharon:  NO   Adequate UO     I&O's Detail    10 Jul 2021 07:01  -  11 Jul 2021 07:00  --------------------------------------------------------  IN:    Enteral Tube Flush: 480 mL    IV PiggyBack: 200 mL    Lactated Ringers: 1200 mL    Vital1.5: 480 mL  Total IN: 2360 mL    OUT:    Incontinent per Collection Bag (mL): 750 mL    Voided (mL): 500 mL  Total OUT: 1250 mL    Total NET: 1110 mL      11 Jul 2021 07:01  -  12 Jul 2021 06:49  --------------------------------------------------------  IN:    Enteral Tube Flush: 500 mL    IV PiggyBack: 200 mL    Lactated Ringers: 1250 mL    Vital1.5: 670 mL  Total IN: 2620 mL    OUT:    Incontinent per Collection Bag (mL): 1150 mL  Total OUT: 1150 mL    Total NET: 1470 mL               Discussed with:     Cultures:	        Radiology

## 2021-07-12 NOTE — CHART NOTE - NSCHARTNOTESELECT_GEN_ALL_CORE
Nutrition Services

## 2021-07-12 NOTE — PROGRESS NOTE ADULT - NSICDXPILOT_GEN_ALL_CORE
[place]

## 2021-07-12 NOTE — DISCHARGE NOTE FOR THE EXPIRED PATIENT - HOSPITAL COURSE
65M with seizures on keppra, BPH, hypothyroidism, sarcoidosis, GERD, COPD, LAMBERT, recent admission to [facility] from 5/3-5/10/2021 for right subarachnoid hemorrhage s/p fall who presents from [facility] for hypoxia.  Found to have acute hypoxic and hypercapnic respiratory failure 2/2 sepsis 2/2 aspiration PNA. Aspiration PNA since patient has been recommended dysphagia I diet from [facility] but reports state that he has been non-compliant with recommendation.    Patient admitted to [facility].  Treated with High flow oxygen, meropenem as per ID.  Patient improved.  NGT was placed and patient started feeds and plan for PEG.   Patient then suddenly became febrile and hypoxic again.  Likely pneumonia gain due to recurrent aspiration. was treated with high flow and antibiotics again.    GOC discussed with sister and some input from patient that he would not want trach and if intubated would likely require trach.  Patient was made DNR/DNI.  Patient continued to deteriorate.   suddenly became hypoxic, then bradycardic.   Pronounced at 1800.  Sister Kristin aware and coming to the hospital.

## 2021-07-12 NOTE — PROGRESS NOTE ADULT - ASSESSMENT
on Meropenem   on IVF  on asp prec  pt is DNR  GOC documented  prognosis very POOR    65M with history of Seizure disorder, Hypothyroidism,  Thrombocytopenia, BPH, recent right SAH from fall May 2021, presents from [facility] for hypoxia and AMS. Found to have severe sepsis, acute hypoxic and hypercapnic respiratory failure      probable ongoing - continued aspiration - with resulting consolidation - asp pna - as seen on CT chest and clinical presentation  at risk for decompensation - resp failure -   chronic lung disease - sarcoidosis with complications -   s/p broad spectrum ABX once on this admission already - cx noted, biomarkers noted,   assist with all needs and ADL  HOB elev - asp prec  oral hygiene  Prognosis guarded - GOC discussion - DNR

## 2021-07-12 NOTE — PROGRESS NOTE ADULT - SUBJECTIVE AND OBJECTIVE BOX
[facility] DIVISION of INFECTIOUS DISEASE  Devon Sharma MD PhD, Violet Mena MD, Jane Ocampo MD, Manuel Meyers MD  and providing coverage with Hailey Bullard MD and Tegan Granados MD  Providing Infectious Disease Consultations at [facility], [facility], [facility]'s    Office# [phone] to schedule follow up appointments  Answering Service for urgent calls or New Consults [phone]  Cell# to text for urgent issues Devon Sharma [phone]     infectious diseases progress note:    JULY FORTE is a 65y y. o. Male patient    Patient remains on HFNC  Allergies    Keppra (Other (Severe))  penicillins (Unknown)    Intolerances    benzodiazepines (Other)      ANTIBIOTICS/RELEVANT:  antimicrobials  meropenem  IVPB 1000 milliGRAM(s) IV Intermittent every 8 hours    immunologic:    OTHER:  acetaminophen    Suspension .. 650 milliGRAM(s) Oral every 6 hours PRN  albuterol/ipratropium for Nebulization 3 milliLiter(s) Nebulizer every 6 hours  enoxaparin Injectable 40 milliGRAM(s) SubCutaneous daily  lactated ringers. 1000 milliLiter(s) IV Continuous <Continuous>  levothyroxine Injectable 12.5 MICROGram(s) IV Push at bedtime  ondansetron Injectable 4 milliGRAM(s) IV Push every 4 hours PRN  pantoprazole  Injectable 40 milliGRAM(s) IV Push daily  valproate sodium IVPB 1500 milliGRAM(s) IV Intermittent at bedtime      Objective:  Last 24-Vital Signs Last 24 Hrs  T(C): 37.2 (12 Jul 2021 08:24), Max: 37.2 (12 Jul 2021 08:24)  T(F): 98.9 (12 Jul 2021 08:24), Max: 98.9 (12 Jul 2021 08:24)  HR: 92 (12 Jul 2021 08:00) (80 - 106)  BP: 94/57 (12 Jul 2021 08:00) (94/57 - 128/74)  BP(mean): 69 (12 Jul 2021 08:00) (69 - 94)  RR: 27 (12 Jul 2021 08:00) (24 - 36)  SpO2: 92% (12 Jul 2021 08:00) (90% - 96%)    T(C): 37.2 (07-12-21 @ 08:24), Max: 37.2 (07-12-21 @ 08:24)  T(F): 98.9 (07-12-21 @ 08:24), Max: 98.9 (07-12-21 @ 08:24)  T(C): 37.2 (07-12-21 @ 08:24), Max: 37.2 (07-12-21 @ 08:24)  T(F): 98.9 (07-12-21 @ 08:24), Max: 98.9 (07-12-21 @ 08:24)  T(C): 37.2 (07-12-21 @ 08:24), Max: 37.2 (07-12-21 @ 08:24)  T(F): 98.9 (07-12-21 @ 08:24), Max: 98.9 (07-12-21 @ 08:24)    PHYSICAL EXAM:  Constitutional: Well-developed, well nourished  Eyes: PERRLA, EOMI  Ear/Nose/Throat: oropharynx normal	  Neck: no JVD, no lymphadenopathy, supple  Respiratory: no accessory muscle use, lung fields with crackles on right, decreased on left  Cardiovascular: distant  Gastrointestinal: soft, NT, no HSM, BS-normal  Extremities: no clubbing, no cyanosis, edema absent  Neuro: patient not alert        LABS:                        12.4   14.96 )-----------( 243      ( 12 Jul 2021 06:24 )             39.5       WBC 14.96  07-12 @ 06:24  WBC 9.47  07-11 @ 05:59  WBC 5.95  07-09 @ 06:25  WBC 8.56  07-07 @ 06:01  WBC 5.93  07-06 @ 07:08      07-12    139  |  99  |  11  ----------------------------<  132<H>  4.3   |  42<H>  |  0.52    Ca    9.9      12 Jul 2021 06:24        Creatinine, Serum: 0.52 mg/dL (07-12-21 @ 06:24)  Creatinine, Serum: 0.46 mg/dL (07-11-21 @ 05:59)  Creatinine, Serum: 0.41 mg/dL (07-09-21 @ 06:25)  Creatinine, Serum: 0.60 mg/dL (07-07-21 @ 06:01)  Creatinine, Serum: 0.55 mg/dL (07-06-21 @ 07:08)                MICROBIOLOGY:              RADIOLOGY & ADDITIONAL STUDIES:

## 2021-07-12 NOTE — PROGRESS NOTE ADULT - SUBJECTIVE AND OBJECTIVE BOX
Patient is a 65y old  Male who presents with a chief complaint of respiratory failure, lethargy (12 Jul 2021 08:52)    INTERVAL HPI/OVERNIGHT EVENTS: events noted, patient more tachypneic and lethargic today.      MEDICATIONS  (STANDING):  albuterol/ipratropium for Nebulization 3 milliLiter(s) Nebulizer every 6 hours  enoxaparin Injectable 40 milliGRAM(s) SubCutaneous daily  lactated ringers. 1000 milliLiter(s) (50 mL/Hr) IV Continuous <Continuous>  levothyroxine Injectable 12.5 MICROGram(s) IV Push at bedtime  meropenem  IVPB 1000 milliGRAM(s) IV Intermittent every 8 hours  pantoprazole  Injectable 40 milliGRAM(s) IV Push daily  sodium chloride 0.65% Nasal 1 Spray(s) Both Nostrils every 4 hours  valproate sodium IVPB 1500 milliGRAM(s) IV Intermittent at bedtime    MEDICATIONS  (PRN):  acetaminophen    Suspension .. 650 milliGRAM(s) Oral every 6 hours PRN Temp greater or equal to 38C (100.4F), Mild Pain (1 - 3)  ondansetron Injectable 4 milliGRAM(s) IV Push every 4 hours PRN Nausea and/or Vomiting      Allergies  Keppra (Other (Severe))  penicillins (Unknown)    Intolerances  benzodiazepines (Other)      REVIEW OF SYSTEMS:  unable to obtain    Vital Signs Last 24 Hrs  T(C): 37.2 (12 Jul 2021 08:24), Max: 37.2 (12 Jul 2021 08:24)  T(F): 98.9 (12 Jul 2021 08:24), Max: 98.9 (12 Jul 2021 08:24)  HR: 95 (12 Jul 2021 10:00) (80 - 106)  BP: 102/62 (12 Jul 2021 10:00) (94/57 - 128/74)  BP(mean): 75 (12 Jul 2021 10:00) (69 - 94)  RR: 31 (12 Jul 2021 10:00) (25 - 36)  SpO2: 90% (12 Jul 2021 10:00) (90% - 96%)    PHYSICAL EXAM:  GENERAL: diaphoretic  HEAD:  Atraumatic, Normocephalic  EYES: conjunctiva and sclera clear  ENMT: dry membranes.  NERVOUS SYSTEM:  Awake, non-conversant today but follows simple commands.   CHEST/LUNG: bilateral rhonchi  HEART: mildly tachycardic  ABDOMEN: Soft, Nontender, Nondistended; Bowel sounds present  EXTREMITIES:  2+ Peripheral Pulses, diffuse edema    LABS:                        12.4   14.96 )-----------( 243      ( 12 Jul 2021 06:24 )             39.5     12 Jul 2021 06:24    139    |  99     |  11     ----------------------------<  132    4.3     |  42     |  0.52     Ca    9.9        12 Jul 2021 06:24          CAPILLARY BLOOD GLUCOSE          RADIOLOGY & ADDITIONAL TESTS:    Imaging Personally Reviewed:  [ ] YES     Consultant(s) Notes Reviewed:      Care Discussed with Consultants/Other Providers:    Advanced Directives: [ ] DNR  [ ] No feeding tube  [ ] MOLST in chart  [ ] MOLST completed today  [ ] Unknown

## 2021-07-12 NOTE — PROGRESS NOTE ADULT - SUBJECTIVE AND OBJECTIVE BOX
Chief Complaint: Respiratory distress    Interval Events: No events overnight.    Review of Systems:  General: No fevers, chills, weight loss or gain  Skin: No rashes, color changes  Cardiovascular: No chest pain, orthopnea  Respiratory: No shortness of breath, cough  Gastrointestinal: No nausea, abdominal pain  Genitourinary: No incontinence, pain with urination  Musculoskeletal: No pain, swelling, decreased range of motion  Neurological: No headache, weakness  Psychiatric: No depression, anxiety  Endocrine: No weight loss or gain, increased thirst  All other systems are comprehensively negative.    Physical Exam:  Vital Signs Last 24 Hrs  T(C): 37.2 (12 Jul 2021 08:24), Max: 37.2 (12 Jul 2021 08:24)  T(F): 98.9 (12 Jul 2021 08:24), Max: 98.9 (12 Jul 2021 08:24)  HR: 97 (12 Jul 2021 06:53) (80 - 106)  BP: 111/74 (12 Jul 2021 06:00) (111/74 - 128/74)  BP(mean): 86 (12 Jul 2021 06:00) (82 - 94)  RR: 31 (12 Jul 2021 06:00) (24 - 36)  SpO2: 92% (12 Jul 2021 06:53) (90% - 96%)  General: Chronically ill appearing  HEENT: MMM  Neck: No JVD, no carotid bruit  Lungs: CTAB  CV: RRR, nl S1/S2, no M/R/G  Abdomen: S/NT/ND, +BS  Extremities: No LE edema, no cyanosis  Neuro: AAOx3, non-focal  Skin: No rash    Labs:    07-12    139  |  99  |  11  ----------------------------<  132<H>  4.3   |  42<H>  |  0.52    Ca    9.9      12 Jul 2021 06:24                          12.4   14.96 )-----------( 243      ( 12 Jul 2021 06:24 )             39.5       Telemetry: Sinus rhythm

## 2021-07-14 LAB
CULTURE RESULTS: SIGNIFICANT CHANGE UP
SPECIMEN SOURCE: SIGNIFICANT CHANGE UP

## 2021-08-13 NOTE — PROGRESS NOTE ADULT - SUBJECTIVE AND OBJECTIVE BOX
Patient is a 65y old  Male who presents with a chief complaint of respiratory failure, lethargy (28 Jun 2021 13:10)      BRIEF HOSPITAL COURSE:   Pt is a 66 y/o male with PMHx of COPD, sarcoidosis, LAMBERT, seizure d/o on keppra, BPH, hypothyroidism and recent SAH s/p mechanical fall in May comes in rom [facility] w/ acute hypercarbic-hypoxic respiratory failure requiring BiPAP. Secondary to pneumonia, severe sepsis criteria met. Was transitioned to HFNC and eventually NC. Admitted to [facility]. On abx, s/p course of steroids.     Events last 24 hours:     PAST MEDICAL & SURGICAL HISTORY:  DM (diabetes mellitus)    Sarcoid    Bronchiectasis    Subdural hematoma    Inguinal hernia    Seizure    Hypothyroid    Sleep apnea    LAMBERT and COPD overlap syndrome    BPH without urinary obstruction    Status post Bright&#x27;s procedure    Bilateral subdural hematomas    Status post cholecystectomy    Status post inguinal hernia repair        Review of Systems:  CONSTITUTIONAL: No fever, chills, or fatigue  EYES: No eye pain, visual disturbances, or discharge  ENMT:  No difficulty hearing, tinnitus, vertigo; No sinus or throat pain  NECK: No pain or stiffness  RESPIRATORY: No cough, wheezing, chills or hemoptysis; No shortness of breath  CARDIOVASCULAR: No chest pain, palpitations, dizziness, or leg swelling  GASTROINTESTINAL: No abdominal or epigastric pain. No nausea, vomiting, or hematemesis; No diarrhea or constipation. No melena or hematochezia.  GENITOURINARY: No dysuria, frequency, hematuria, or incontinence  NEUROLOGICAL: No headaches, memory loss, loss of strength, numbness, or tremors  SKIN: No itching, burning, rashes, or lesions   MUSCULOSKELETAL: No joint pain or swelling; No muscle, back, or extremity pain  PSYCHIATRIC: No depression, anxiety, mood swings, or difficulty sleeping    Medications:  meropenem  IVPB 1000 milliGRAM(s) IV Intermittent every 8 hours      albuterol/ipratropium for Nebulization 3 milliLiter(s) Nebulizer every 6 hours    valproate sodium IVPB 1500 milliGRAM(s) IV Intermittent at bedtime      enoxaparin Injectable 40 milliGRAM(s) SubCutaneous daily    pantoprazole  Injectable 40 milliGRAM(s) IV Push daily  polyethylene glycol 3350 17 Gram(s) Oral daily PRN      levothyroxine Injectable 12.5 MICROGram(s) IV Push at bedtime    lactated ringers. 1000 milliLiter(s) IV Continuous <Continuous>                ICU Vital Signs Last 24 Hrs  T(C): 36.9 (28 Jun 2021 16:04), Max: 37.2 (27 Jun 2021 22:00)  T(F): 98.4 (28 Jun 2021 16:04), Max: 98.9 (27 Jun 2021 22:00)  HR: 84 (28 Jun 2021 18:00) (68 - 86)  BP: 105/73 (28 Jun 2021 18:00) (96/68 - 154/86)  BP(mean): 85 (28 Jun 2021 18:00) (72 - 99)  ABP: --  ABP(mean): --  RR: 16 (28 Jun 2021 18:00) (14 - 25)  SpO2: 100% (28 Jun 2021 18:00) (91% - 100%)          I&O's Detail    27 Jun 2021 07:01  -  28 Jun 2021 07:00  --------------------------------------------------------  IN:    IV PiggyBack: 200 mL    Lactated Ringers: 1100 mL  Total IN: 1300 mL    OUT:    Indwelling Catheter - Urethral (mL): 1400 mL  Total OUT: 1400 mL    Total NET: -100 mL      28 Jun 2021 07:01  -  28 Jun 2021 19:02  --------------------------------------------------------  IN:    Free Water: 100 mL    IV PiggyBack: 50 mL    Lactated Ringers: 600 mL    Vital1.5: 240 mL  Total IN: 990 mL    OUT:    Indwelling Catheter - Urethral (mL): 400 mL  Total OUT: 400 mL    Total NET: 590 mL          LABS:                CAPILLARY BLOOD GLUCOSE            CULTURES:  Culture Results:   Normal Respiratory Vangie present (06-23 @ 14:04)      Physical Examination:    General: Well appearing, lying in bed in NAD      HEENT: Pupils equal, reactive to light.  Symmetric. No scleral icterus or injection    PULM: Clear to auscultation bilaterally, no wheezes, rales or rhonchi, no significant sputum production or increased respiratory effort    NECK: Supple, no lymphadenopathy, trachea midline    CVS: Regular rate and rhythm, no murmurs, +s1/s2    ABD: Soft, nondistended, nontender, normoactive bowel sounds    EXT: No edema, nontender    SKIN: Warm and well perfused    NEURO: Alert, oriented, interactive, nonfocal      RADIOLOGY: < from: CT Chest No Cont (06.27.21 @ 10:45) >  IMPRESSION:    When compared with June 21, 2021:     Small bilateral pleural effusions are noted, right greater than left, new since the previous exam.    Underlying architectural distortion predominantly in the upper lobes with airway dilatation and traction bronchiectasis is without change. Bibasilar honeycombing is noted. Overall the underlying fibrosis is unchanged since the previous study. Mild interval improvement in patchy peribronchial vascular consolidations seen on previous examination in the left lower lobe while a right lower lobe dominant consolidation is unchanged.    < end of copied text >    < from: US Transthoracic Echocardiogram w/Doppler Complete (06.22.21 @ 11:46) >  IMPRESSION:  1. technical difficult study, poor apical window  2. grossly normal overall left ventricular systolic function with mild diastolic dysfunction  3. mild tricuspid regurgitation with mildly elevated right ventricular systolic pressure.    < end of copied text >     Patient is a 65y old  Male who presents with a chief complaint of respiratory failure, lethargy (28 Jun 2021 13:10)      BRIEF HOSPITAL COURSE:   Pt is a 64 y/o male with PMHx of COPD, sarcoidosis, LAMBERT, seizure d/o on keppra, BPH, hypothyroidism and recent SAH s/p mechanical fall in May comes in rom [facility] w/ acute hypercarbic-hypoxic respiratory failure requiring BiPAP. Secondary to pneumonia, severe sepsis criteria met. Was transitioned to HFNC and eventually NC. Admitted to [facility]. On abx, s/p course of steroids.     Events last 24 hours: Sitting upright in bed, saturating 95% on 3L NC. Receiving chest PT. NAD, no acute complaints. Afebrile. Tolerating tube feeds.     PAST MEDICAL & SURGICAL HISTORY:  DM (diabetes mellitus)    Sarcoid    Bronchiectasis    Subdural hematoma    Inguinal hernia    Seizure    Hypothyroid    Sleep apnea    LAMBERT and COPD overlap syndrome    BPH without urinary obstruction    Status post Bright&#x27;s procedure    Bilateral subdural hematomas    Status post cholecystectomy    Status post inguinal hernia repair        Review of Systems:  CONSTITUTIONAL: No fever, chills, or fatigue  EYES: No eye pain, visual disturbances, or discharge  ENMT:  No difficulty hearing, tinnitus, vertigo; No sinus or throat pain  NECK: No pain or stiffness  RESPIRATORY: No cough, wheezing, chills or hemoptysis; No shortness of breath  CARDIOVASCULAR: No chest pain, palpitations, dizziness, or leg swelling  GASTROINTESTINAL: No abdominal or epigastric pain. No nausea, vomiting, or hematemesis; No diarrhea or constipation. No melena or hematochezia.  GENITOURINARY: No dysuria, frequency, hematuria, or incontinence  NEUROLOGICAL: No headaches, memory loss, loss of strength, numbness, or tremors  SKIN: No itching, burning, rashes, or lesions   MUSCULOSKELETAL: No joint pain or swelling; No muscle, back, or extremity pain  PSYCHIATRIC: No depression, anxiety, mood swings, or difficulty sleeping    Medications:  meropenem  IVPB 1000 milliGRAM(s) IV Intermittent every 8 hours      albuterol/ipratropium for Nebulization 3 milliLiter(s) Nebulizer every 6 hours    valproate sodium IVPB 1500 milliGRAM(s) IV Intermittent at bedtime      enoxaparin Injectable 40 milliGRAM(s) SubCutaneous daily    pantoprazole  Injectable 40 milliGRAM(s) IV Push daily  polyethylene glycol 3350 17 Gram(s) Oral daily PRN      levothyroxine Injectable 12.5 MICROGram(s) IV Push at bedtime    lactated ringers. 1000 milliLiter(s) IV Continuous <Continuous>                ICU Vital Signs Last 24 Hrs  T(C): 36.9 (28 Jun 2021 16:04), Max: 37.2 (27 Jun 2021 22:00)  T(F): 98.4 (28 Jun 2021 16:04), Max: 98.9 (27 Jun 2021 22:00)  HR: 84 (28 Jun 2021 18:00) (68 - 86)  BP: 105/73 (28 Jun 2021 18:00) (96/68 - 154/86)  BP(mean): 85 (28 Jun 2021 18:00) (72 - 99)  ABP: --  ABP(mean): --  RR: 16 (28 Jun 2021 18:00) (14 - 25)  SpO2: 100% (28 Jun 2021 18:00) (91% - 100%)          I&O's Detail    27 Jun 2021 07:01  -  28 Jun 2021 07:00  --------------------------------------------------------  IN:    IV PiggyBack: 200 mL    Lactated Ringers: 1100 mL  Total IN: 1300 mL    OUT:    Indwelling Catheter - Urethral (mL): 1400 mL  Total OUT: 1400 mL    Total NET: -100 mL      28 Jun 2021 07:01  -  28 Jun 2021 19:02  --------------------------------------------------------  IN:    Free Water: 100 mL    IV PiggyBack: 50 mL    Lactated Ringers: 600 mL    Vital1.5: 240 mL  Total IN: 990 mL    OUT:    Indwelling Catheter - Urethral (mL): 400 mL  Total OUT: 400 mL    Total NET: 590 mL          LABS:                CAPILLARY BLOOD GLUCOSE            CULTURES:  Culture Results:   Normal Respiratory Vangie present (06-23 @ 14:04)      Physical Examination:    General: Well appearing, lying in bed in NAD      HEENT: Pupils equal, reactive to light.  Symmetric. No scleral icterus or injection. Abnormal growths in mouth noted. Teeth alignment abnormalities as well.     PULM: Clear to auscultation bilaterally, rare scattered rhonchi, no wheezes, rales, no significant sputum production or increased respiratory effort    NECK: Supple, no lymphadenopathy, trachea midline    CVS: Regular rate and rhythm, no murmurs, +s1/s2    ABD: Soft, nondistended, nontender, normoactive bowel sounds    EXT: +edema    SKIN: Warm and well perfused    NEURO: Alert, oriented, interactive, nonfocal      RADIOLOGY: < from: CT Chest No Cont (06.27.21 @ 10:45) >  IMPRESSION:    When compared with June 21, 2021:     Small bilateral pleural effusions are noted, right greater than left, new since the previous exam.    Underlying architectural distortion predominantly in the upper lobes with airway dilatation and traction bronchiectasis is without change. Bibasilar honeycombing is noted. Overall the underlying fibrosis is unchanged since the previous study. Mild interval improvement in patchy peribronchial vascular consolidations seen on previous examination in the left lower lobe while a right lower lobe dominant consolidation is unchanged.    < end of copied text >    < from: US Transthoracic Echocardiogram w/Doppler Complete (06.22.21 @ 11:46) >  IMPRESSION:  1. technical difficult study, poor apical window  2. grossly normal overall left ventricular systolic function with mild diastolic dysfunction  3. mild tricuspid regurgitation with mildly elevated right ventricular systolic pressure.    < end of copied text >     not examined

## 2021-08-29 NOTE — ED PROVIDER NOTE - CARE PLAN
Progress Note    Admit Date:  8/27/2021    Subjective:  Mr. Nurys To is doing better. Wound culture negative. No fever. Objective:   /78   Pulse 79   Temp 97.4 °F (36.3 °C) (Oral)   Resp 18   Ht 6' (1.829 m)   Wt 227 lb 9.6 oz (103.2 kg)   SpO2 100%   BMI 30.87 kg/m²        Intake/Output Summary (Last 24 hours) at 8/29/2021 1046  Last data filed at 8/28/2021 1230  Gross per 24 hour   Intake 360 ml   Output --   Net 360 ml       Physical Exam:    General appearance: alert, appears stated age and cooperative  Head: Normocephalic, without obvious abnormality, atraumatic  Eyes: conjunctivae/corneas clear. PERRL, EOM's intact. Neck: no adenopathy, no carotid bruit, no JVD, supple, symmetrical, trachea midline and thyroid not enlarged, symmetric, no tenderness/mass/nodules  Lungs: clear to auscultation bilaterally  Heart: regular rate and rhythm, S1, S2 normal, no murmur, click, rub or gallop  Abdomen: soft, non-tender; bowel sounds normal; no masses,  no organomegaly  Extremities: RUE is covered. See surgery notes.   Pulses: 2+ and symmetric  Skin: Skin color, texture, turgor normal. No rashes or lesions  Neurologic: Grossly normal    Scheduled Meds:   cefepime  2,000 mg IntraVENous Q12H    clindamycin (CLEOCIN) IV  600 mg IntraVENous Q8H    sodium chloride flush  5-40 mL IntraVENous 2 times per day    enoxaparin  40 mg Subcutaneous Daily    vancomycin  1,000 mg IntraVENous Q12H    melatonin  3 mg Oral Nightly    sodium chloride  1,000 mL IntraVENous Once    cefepime  2,000 mg IntraVENous Once       Continuous Infusions:   sodium chloride      sodium chloride 75 mL/hr at 08/28/21 2234       PRN Meds:  ketorolac, sodium chloride flush, sodium chloride, ondansetron **OR** ondansetron, polyethylene glycol, acetaminophen **OR** acetaminophen, traMADol **AND** cloNIDine, gabapentin, promethazine, hydrOXYzine      Data:  CBC:   Recent Labs     08/27/21 2128   WBC 8.6   HGB 10.3*   HCT 30.8*   MCV 82.4        BMP:   Recent Labs     08/27/21 2128   *   K 5.2*   CL 98*   CO2 21   BUN 14   CREATININE 1.1     LIVER PROFILE:   Recent Labs     08/27/21 2128   AST 63*   ALT 45*   BILITOT <0.2   ALKPHOS 105     PT/INR: No results for input(s): PROTIME, INR in the last 72 hours. Cultures:   WOUND/ABSCESS 08/27/2021  9:00 PM [facility] Lab   Mixed skin daniel,  Moderate growth   No further workup    Gram Stain Result 08/27/2021  9:00 PM [address] [place] [address] Lab   No WBCs or organisms seen          Assessment:  Principal Problem:    Abscess of antecubital fossa  Active Problems:    IVDU (intravenous drug user)    Hepatitis C    Cellulitis    Cellulitis of right arm    Abscess of right arm  Resolved Problems:    * No resolved hospital problems. *      Plan:    #Antecubital fossa abscess. Patient was emergency with complaints of pain and purulent drainage from the right antecubital fossa. He is an IV drug user but denies using the right side for injections. Work-up showed an abscess in the antecubital fossa without any evidence of osteomyelitis. Surgery saw the patient and did a local I&D. The area has now been wrapped. Cultures have been sent and show no grwoth. He is on broad-spectrum antibiotics including Vanco and cefepime. We will continue for now. Control pain with Toradol. Avoid narcotics. Can likely be discharge on Keflex and Bactrim in am. Surgery has made him NPO after midnight in case he needs further I and D but per surgery wound is improved. Labs pending this am. He has a Vancomycin trough due at noon. Lab unable to get blood and clinical will draw labs at noon.      #Cellulitis of the right upper extremity. On Vanco and cefepime. See above.     #History of hepatitis C.     #IV drug user. He still an active IV drug user. I started COWS protocol for now. No major signs of withdrawal.      #Lovenox for DVT prophylaxis.     Rene Sanabria MD, MD 8/29/2021 10:46 AM Principal Discharge DX:	Encephalopathy

## 2022-02-15 NOTE — ED ADULT NURSE NOTE - CHIEF COMPLAINT
SUBJECTIVE:  Gloria Santos is a 33 year old female presents to the podiatry clinic with continued pain after slant back yesterday.     General:   NAD, Alert and oriented x4     Vascular:  Peripheral Vascular:  DP pulse palpable 2/4 bilateral  PT pulse palpable 2/4 bilateral  Capillary refill < 3 sec x 10  Hair present to digits  Edema present to the fracture site        Neurological:   Sensation intact to light touch linda     Dermatologic:  Skin warm, dry and supple, without open lesions bilateral foot  Distal medial corner left hallux ingrowing with pain to palpation, no acute SOI, small pocket in the distal skin where nail had ingrown     Musculoskeletal:  Palpatory tenderness left hallux     REVIEW OF LABORATORY, PATHOLOGY, AND RADIOLOGY DATA:     ASSESSMENT:  Ingrowing nail medial border left halux  S/p I&D left hallux open fracture  Subsequent encounter     PLAN:  Pt. was seen and evaluated today.   Slant back revised today with nail nipper, found small pocket where nail had punctured skin. This is a revision of the trimming that was done yesterday to give the relief of symptoms she needed     Primary care physician:   MD Josue Irizarry DPM   The patient is a 64y Male complaining of cough.

## 2022-03-09 NOTE — CONSULT NOTE ADULT - PROBLEM SELECTOR PROBLEM 2
If child's condition worsens we recommend that you receive another evaluation at the emergency room immediately or contact your primary medical clinics after hours call service to discuss your concerns. You must understand that you've received an Urgent Care treatment only and that you may be released before all of your medical problems are known or treated. You, the patient/caregiver, will arrange for follow up care as instructed.  Drink plenty of Fluids  Wash hands frequently using mild antibacterial soap lathering for at least 15 seconds then rinse  Get plenty of Rest  Follow up in 1-2 weeks with Primary Care physician if not significantly better.   If not allergic please take Tylenol every 4-6 hours as needed and/or Ibuprofen every 6-8 hours as needed, over the counter for pain or fever.  Take OTC Cough/Congestion medicine as needed. Talk to your pharmacist about the best options.  OTC Normal saline drops and suction few times a day help breath better.    Seizure disorder

## 2022-05-16 NOTE — HISTORY OF PRESENT ILLNESS
Called and spoke to patient with above information
Patient was told to only use the Nasal Decongestant for 4 days only  She wants to know If she can use Flonase now and if that will be ok      [phone]
[FreeTextEntry1] : Patient presents for f/u abdominal and LLE wounds. Feels well, LLE wound healed, but has some excoriated/scabbed areas from hitting on small table etc. Otherwise feels well. \par 
[FreeTextEntry1] : Patient presents for f/u abdominal and LLE wounds. Feels well, LLE wound healed, but has some excoriated/scabbed areas from hitting on small table etc. Otherwise feels well. \par

## 2022-06-05 NOTE — PHYSICAL THERAPY INITIAL EVALUATION ADULT - ASSISTIVE DEVICE:SUPINE/SIT, REHAB EVAL
[facility]  Progress Note Mery Carter 1946, 67 y o  male MRN: [id number]  Unit/Bed#: S -01 Encounter: [id number]  Primary Care Provider: Abundio Richards MD   Date and time admitted to hospital: 6/4/2022 12:01 PM    * Atrial fibrillation with RVR ([address] )  Assessment & Plan  · POA with shortness of breath and chest discomfort, found to be in afib with RVR  · Received cardizem bolus with improvement in rates  Maintained on cardizem infusion   · Now in NSR with heart rate in the 60s the time of my evaluation  · Later in the day noted to have a 14 beat run of an SVT, asymptomatic  · Likely not a candidate for amiodarone given restrictive lung disease    Plan:  · Cardiology consulted-recommendations appreciated  · Metoprolol succinate decreased to 25 mg daily per Cardiology  · Received K-Dur 20 mEq to maintain potassium >4  · Continue telemetry monitoring  · Heparin discontinued, patient started on Eliquis 5 mg b i d  Type 1 diabetes mellitus with diabetic peripheral angiopathy without gangrene [facility])  Assessment & Plan  Lab Results   Component Value Date    HGBA1C 8 4 (H) 05/10/2022       Recent Labs     06/04/22  1553 06/04/22  2046 06/05/22  0734   POCGLU 239* 175* 86       Blood Sugar Average: Last 72 hrs:  (P) 698 [id number] longstanding history of diabetes  Takes Lantus 40 units q a m  And 8 units q p m  Have entered carb correction scale as non formulary  Endocrinology consult - recommendations pending    Pleural effusion  Assessment & Plan  As evidenced by right-sided pleural effusion noted on chest x-ray  This has been present on prior imaging, however it looks like it has worsened  Suspect this is likely in the setting of restrictive lung disease  Patient follows up with pulmonology on outpatient basis  Will give patient a dose of IV Lasix        Chronic systolic congestive heart failure (HCC)  Assessment & Plan  Wt Readings from Last 3 Encounters: 06/05/22 91 9 kg (202 lb 9 6 oz)   05/24/22 92 5 kg (204 lb)   05/23/22 93 1 kg (205 lb 3 2 oz)      Appears euvolemic and is at dry weight  Continue oral diuretics          Coronary artery disease of native artery of native heart with stable angina pectoris ([facility] [address] )  Assessment & Plan  · Extensive history of PCI with OJ placements (reports 11 stents in total)  · Cont plavix  · On toprol; will hold until rate control strategy determined by cardiology  · Cont icosapent     Chronic respiratory failure with hypoxia ([facility] [address] )  Assessment & Plan  · On 3L of O2 at home  At baseline  Anxiety with depression  Assessment & Plan  · Continue cymbalta     CKD (chronic kidney disease) stage 3, GFR 30-59 ml/min [facility])  Assessment & Plan  Lab Results   Component Value Date    EGFR 43 06/05/2022    EGFR 50 06/04/2022    EGFR 42 05/10/2022    CREATININE 1 52 (H) 06/05/2022    CREATININE 1 35 (H) 06/04/2022    CREATININE 1 56 (H) 05/10/2022   noted, stable   Monitor with treatment     Restrictive lung disease  Assessment & Plan  · Follows with Dr Juan Grossman     Obstructive sleep apnea of adult  Assessment & Plan  · CPAP ordered at night     VTE Pharmacologic Prophylaxis: VTE Score: 4 Moderate Risk (Score 3-4) - Pharmacological DVT Prophylaxis Ordered: apixaban (Eliquis)  Patient Centered Rounds: I performed bedside rounds with nursing staff today  Discussions with Specialists or Other Care Team Provider: Cardiology    Education and Discussions with Family / Patient: Updated  (wife) at bedside  Current Length of Stay: 1 day(s)  Current Patient Status: Inpatient   Discharge Plan: Anticipate discharge tomorrow to home  Code Status: Level 1 - Full Code    Subjective:   Patient found laying in bed, not in acute distress, wearing a CPAP at the time my evaluation  He still endorses shortness of breath, states that this is chronic and he has had for over 2 years and is at baseline    Denies chest pain, palpitations, abdominal tenderness or distension, changes in urinary or bowel habits, lower extremity edema  He had no other complaints today  Objective:     Vitals:   Temp (24hrs), Av 8 °F (36 6 °C), Min:97 5 °F (36 4 °C), Max:98 °F (36 7 °C)    Temp:  [97 5 °F (36 4 °C)-98 °F (36 7 °C)] 97 8 °F (36 6 °C)  HR:  [67-80] 69  Resp:  [20] 20  BP: (101-149)/(57-75) 110/57  SpO2:  [92 %-95 %] 93 %  Body mass index is 31 73 kg/m²  Input and Output Summary (last 24 hours): Intake/Output Summary (Last 24 hours) at 2022 1342  Last data filed at 2022 1214  Gross per 24 hour   Intake --   Output 975 ml   Net -975 ml       Physical Exam:   Physical Exam  Vitals and nursing note reviewed  Constitutional:       General: He is not in acute distress  Appearance: Normal appearance  He is obese  He is not ill-appearing, toxic-appearing or diaphoretic  HENT:      Head: Normocephalic and atraumatic  Nose: Nose normal       Mouth/Throat:      Mouth: Mucous membranes are moist       Pharynx: Oropharynx is clear  Eyes:      General: No scleral icterus  Right eye: No discharge  Left eye: No discharge  Extraocular Movements: Extraocular movements intact  Conjunctiva/sclera: Conjunctivae normal    Cardiovascular:      Rate and Rhythm: Normal rate and regular rhythm  Pulses: Normal pulses  Heart sounds: Normal heart sounds  No murmur heard  Pulmonary:      Effort: Pulmonary effort is normal  No respiratory distress  Breath sounds: Normal breath sounds  No wheezing, rhonchi or rales  Abdominal:      General: Abdomen is flat  Bowel sounds are normal  There is no distension  Palpations: Abdomen is soft  Tenderness: There is no abdominal tenderness  There is no guarding or rebound  Musculoskeletal:      Cervical back: Normal range of motion and neck supple  Right lower leg: No edema  Left lower leg: No edema  Skin:     General: Skin is warm and dry  Coloration: Skin is not jaundiced or pale  Neurological:      Mental Status: He is alert and oriented to person, place, and time  Mental status is at baseline  Psychiatric:         Mood and Affect: Mood normal          Behavior: Behavior normal          Thought Content: Thought content normal          Judgment: Judgment normal         Additional Data:     Labs:  Results from last 7 days   Lab Units 06/05/22  0519   WBC Thousand/uL 6 17   HEMOGLOBIN g/dL 11 8*   HEMATOCRIT % 36 3*   PLATELETS Thousands/uL 103*   NEUTROS PCT % 54   LYMPHS PCT % 28   MONOS PCT % 12   EOS PCT % 5     Results from last 7 days   Lab Units 06/05/22  0519 06/04/22  1222   SODIUM mmol/L 139 143   POTASSIUM mmol/L 3 9 4 5   CHLORIDE mmol/L 107 105   CO2 mmol/L 25 27   BUN mg/dL 39* 28*   CREATININE mg/dL 1 52* 1 35*   ANION GAP mmol/L 7 11   CALCIUM mg/dL 8 6 9 5   ALBUMIN g/dL  --  4 1   TOTAL BILIRUBIN mg/dL  --  0 50   ALK PHOS U/L  --  108*   ALT U/L  --  11   AST U/L  --  18   GLUCOSE RANDOM mg/dL 105 152*     Results from last 7 days   Lab Units 06/04/22  1222   INR  0 95     Results from last 7 days   Lab Units 06/05/22  1132 06/05/22  0734 06/04/22  2046 06/04/22  1553   POC GLUCOSE mg/dl 259* 86 175* 239*               Lines/Drains:  Invasive Devices  Report    Peripheral Intravenous Line  Duration           Peripheral IV 06/04/22 Left Antecubital 1 day    Peripheral IV 06/04/22 Right Antecubital 1 day                  Telemetry:  Telemetry Orders (From admission, onward)             48 Hour Telemetry Monitoring  (ED Bridging Orders Panel)  Continuous x 48 hours        References:    Telemetry Guidelines   Question:  Reason for 48 Hour Telemetry  Answer:  Arrhythmias Requiring Medical Therapy (eg  SVT, Vtach/fib, Bradycardia, Uncontrolled A-fib)                 Telemetry Reviewed: Normal Sinus Rhythm however had a 14 beat run of an SVT  Asymptomatic at the time    Indication for Continued Telemetry Use: Arrthymias requiring medical therapy             Imaging: Personally reviewed the following imaging: chest xray    Recent Cultures (last 7 days):         Last 24 Hours Medication List:   Current Facility-Administered Medications   Medication Dose Route Frequency Provider Last Rate    apixaban  5 mg Oral BID Saniya Blake PA-C      aspirin  81 mg Oral HS Maria Del Carmen Contreras MD      calcium carbonate  500 mg Oral TID PRN Maria Del Carmen Contreras MD      DULoxetine  30 mg Oral Daily Maria Del Carmen Contreras MD      fish oil  1,000 mg Oral Q12H Maria Del Carmen Contreras MD      gabapentin  300 mg Oral BID Maria Del Carmen Contreras MD      insulin glargine  40 Units Subcutaneous QAM Maria Del Carmen Contreras MD      insulin glargine  8 Units Subcutaneous HS Maria Del Carmen Contreras MD      insulin lispro   Subcutaneous 4x Daily (AC & HS) Maria Del Carmen Contreras MD      levothyroxine  175 mcg Oral Early Morning Maria Del Carmen Contreras MD      metoprolol succinate  25 mg Oral Daily Saniya Blake PA-C      rosuvastatin  20 mg Oral Daily With Nathalie Carlos PA-C          Today, Patient Was Seen By: Jere Garcia MD    **Please Note: This note may have been constructed using a voice recognition system  ** bed rails

## 2022-07-20 NOTE — ED PROVIDER NOTE - ENMT, MLM
Detail Level: Detailed Airway patent, Nasal mucosa clear. Mouth with normal mucosa. Throat has no vesicles, no oropharyngeal exudates and uvula is midline. Depth Of Biopsy: dermis Was A Bandage Applied: Yes Size Of Lesion In Cm: 0.7 X Size Of Lesion In Cm: 0 Biopsy Type: H and E Biopsy Method: Dermablade Anesthesia Type: 1% lidocaine without epinephrine and a 1:10 solution of 8.4% sodium bicarbonate Anesthesia Volume In Cc (Will Not Render If 0): 0.5 Hemostasis: Drysol Wound Care: Petrolatum Dressing: bandage Destruction After The Procedure: No Type Of Destruction Used: Curettage Curettage Text: The wound bed was treated with curettage after the biopsy was performed. Cryotherapy Text: The wound bed was treated with cryotherapy after the biopsy was performed. Electrodesiccation Text: The wound bed was treated with electrodesiccation after the biopsy was performed. Electrodesiccation And Curettage Text: The wound bed was treated with electrodesiccation and curettage after the biopsy was performed. Silver Nitrate Text: The wound bed was treated with silver nitrate after the biopsy was performed. Lab: [id number] Lab Facility: [id number] Consent: Written consent was obtained and risks were reviewed including but not limited to scarring, infection, bleeding, scabbing, incomplete removal, nerve damage and allergy to anesthesia. Post-Care Instructions: I reviewed with the patient in detail post-care instructions. Patient is to keep the biopsy site dry overnight, and then apply petrolatum jelly twice daily until healed. Notification Instructions: Patient will be notified of biopsy results. However, patient instructed to call the office if not contacted within 2 weeks. Billing Type: Third-Party Bill Information: Selecting Yes will display possible errors in your note based on the variables you have selected. This validation is only offered as a suggestion for you. PLEASE NOTE THAT THE VALIDATION TEXT WILL BE REMOVED WHEN YOU FINALIZE YOUR NOTE. IF YOU WANT TO FAX A PRELIMINARY NOTE YOU WILL NEED TO TOGGLE THIS TO 'NO' IF YOU DO NOT WANT IT IN YOUR FAXED NOTE. Size Of Lesion In Cm: 0.6 Lab: [id number] Lab Facility: [id number] Billing Type: Third-Party Bill

## 2023-01-12 NOTE — PROGRESS NOTE ADULT - PROBLEM SELECTOR PLAN 1
How Severe Is Your Skin Lesion?: severe Has Your Skin Lesion Been Treated?: not been treated Is This A New Presentation, Or A Follow-Up?: Skin Lesion unclear etiology, currently AAOx3 but inappropriate tangential speech, negative LP and unremarkable MRI at [place]. Keppra induced psychosis on differential dx.  - Pt's home olanzapine has been held since admission but has remained calm. - per Psych c/s, haldol 5mg PO bid   - Obtain further records from [place]: differential included keppra induced psychosis, undiagnosed psychiatric disease, but cause remains unclear  - suspicious for worsening psychiatric condition due to prolonged hospitalization. currently AAOx3 but inappropriate tangential speech, negative LP and unremarkable MRI at [place]. Keppra induced psychosis still a possibility.  - Pt's home olanzapine has been held since admission but has remained calm. - per Psych c/s, haldol 5mg PO bid - no indication for in-patient psychiatry admission.  - suspicious for worsening psychiatric condition due to prolonged hospitalization.

## 2023-03-27 NOTE — ED PROVIDER NOTE - CPE EDP PSYCH NORM
Consent: Written consent was obtained and risks were reviewed including but not limited to scarring, infection, bleeding, scabbing, incomplete removal, nerve damage and allergy to anesthesia. normal...

## 2023-04-11 NOTE — PATIENT PROFILE ADULT - NSPROMEDSPATCH_GEN_A_NUR
Attempted to reach pt to convey neg covid and strep results. No answer and unable to leave VM due to mailbox full. Will try calling again.    none

## 2023-05-24 NOTE — PROGRESS NOTE ADULT - PROBLEM SELECTOR PLAN 2
Pt has a salazar catheter and noticed the bag is not draining today.    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    Salazar inserted April 27    Saw urologist may 19   sepsis with bronchitis due to coronavirus s/p levaquin 250mg qd and prednisone 40 mg daily x 5days. now resolved.  - c/w duo nebs prn q6h and symbicort.   - saturating well off nasal canula- goal 88-92%  reported COPD with history of smoking though reviewing CT chest concern for recurrent aspiration  Pulmonary consult appreciated - c/w symbicort, nebs ongoing discussions held with patient regarding risks of aspiration  He states he understands the risks of aspiration including developing pneumonia, respiratory distress, low oxygen levels, could require intubation, and lead to death. patient wants remain full code.  now agreeable to dysphagia diet (dyspahgia III, nectar liquids)  appropriate aspiration reduction techniques have been instructed.   OMF surgery consult (previous oral surgery) appreciated - no surgery recommended at this time as it would not improve dysphagia- outpatient f/u as surgery in future could improve LAMBERT   ENT consult appreciated -lots of secretions but no edema, no vocal cord dysfunction  GI consult appreciated  patient will need swallow therapy on discharge.

## 2023-06-23 NOTE — ED ADULT NURSE REASSESSMENT NOTE - NS ED NURSE REASSESS COMMENT FT1
pt resting comfortable in stretcher, no change in status, pending blood gas post 1L bolus Home Suture Removal Text: Patient was provided instructions on removing sutures and will remove their sutures at home.  If they have any questions or difficulties they will call the office.

## 2023-08-17 NOTE — ED PROVIDER NOTE - CRTICAL CARE TIME SPENT (MIN)
[age]y Male s/p R distal tibia revision of external fixation, ORIF w/ supplemental pinning, wound vac application over traumatic wound

## 2023-08-22 NOTE — SWALLOW BEDSIDE ASSESSMENT ADULT - ORAL PREPARATORY PHASE
Reduced oral grading/Decreased mastication ability
Within functional limits
Fluconazole Counseling:  Patient counseled regarding adverse effects of fluconazole including but not limited to headache, diarrhea, nausea, upset stomach, liver function test abnormalities, taste disturbance, and stomach pain.  There is a rare possibility of liver failure that can occur when taking fluconazole.  The patient understands that monitoring of LFTs and kidney function test may be required, especially at baseline. The patient verbalized understanding of the proper use and possible adverse effects of fluconazole.  All of the patient's questions and concerns were addressed.

## 2023-10-12 NOTE — CONSULT NOTE ADULT - NSHPATTENDINGPLANDISCUSS_GEN_ALL_CORE
How Severe Are Your Spot(S)?: mild
What Is The Reason For Today's Visit?: Full Body Skin Examination
What Is The Reason For Today's Visit? (Being Monitored For X): concerning skin lesions on an annual basis
Dr. Jeter

## 2023-11-08 NOTE — ED ADULT NURSE NOTE - NSFALLRSKPSTHSTOCCUR_ED_ALL_ED
INR is subtherapeutic at 1.8.  Previous warfarin instructions were verified and followed.  No s/s or other changes reported.  Will send to PharmD for review and dosing.     Single Mechanical/Accidental Fall

## 2023-11-20 NOTE — PHYSICAL THERAPY INITIAL EVALUATION ADULT - PHYSICAL ASSIST/NONPHYSICAL ASSIST, REHAB EVAL
1 person assist Z Plasty Text: The lesion was extirpated to the level of the fat with a #15 scalpel blade. Given the location of the defect, shape of the defect and the proximity to free margins a Z-plasty was deemed most appropriate for repair. Using a sterile surgical marker, the appropriate transposition arms of the Z-plasty were drawn incorporating the defect and placing the expected incisions within the relaxed skin tension lines where possible. The area thus outlined was incised deep to adipose tissue with a #15 scalpel blade. The skin margins were undermined to an appropriate distance in all directions utilizing iris scissors. The opposing transposition arms were then transposed and carried over into place in opposite direction and anchored with interrupted buried subcutaneous sutures.

## 2024-01-04 NOTE — PHYSICAL THERAPY INITIAL EVALUATION ADULT - ASR WT BEARING STATUS EVAL
[facility] - Pediatric Surgery  Progress Note    Patient Name: Jada Lantigua  MRN: [id number]  Admission Date: 1/3/2024  Hospital Length of Stay: 1 days  Attending Physician: Jason Samuels MD  Primary Care Provider: Renée Rock NP-C    Subjective:     Interval History: NAEON. HDS. AF. She states she is doing well. Pain controlled. Tolerating clears with nausea/vomiting. Zeke drain with 70cc overnight.     Post-Op Info:  Procedure(s) (LRB):  TETHERING, ANTERIOR VERTEBRAL BODY T9-L3 (Left)   1 Day Post-Op       Medications:  Continuous Infusions:   lactated ringers 80 mL/hr at 01/03/24 1637     Scheduled Meds:   acetaminophen  15 mg/kg Oral Q8H    ceFAZolin (ANCEF) IVPB  1 g Intravenous Q8H    ketorolac  10 mg Oral Q8H    methocarbamoL  500 mg Oral Q8H     PRN Meds:morphine, ondansetron, oxyCODONE     Review of patient's allergies indicates:   Allergen Reactions    Casein Diarrhea and Hives    Milk containing products (dairy)        Objective:     Vital Signs (Most Recent):  Temp: 99 °F (37.2 °C) (01/04/24 0416)  Pulse: 108 (01/04/24 0416)  Resp: 18 (01/04/24 0416)  BP: 117/62 (01/04/24 0416)  SpO2: 96 % (01/04/24 0416) Vital Signs (24h Range):  Temp:  [97.9 °F (36.6 °C)-99.7 °F (37.6 °C)] 99 °F (37.2 °C)  Pulse:  [] 108  Resp:  [18-29] 18  SpO2:  [96 %-100 %] 96 %  BP: (115-149)/(60-89) 117/62       Intake/Output Summary (Last 24 hours) at 1/4/2024 0831  Last data filed at 1/4/2024 0548  Gross per 24 hour   Intake 1175.67 ml   Output 1970 ml   Net -794.33 ml          Physical Exam  Vitals and nursing note reviewed.   Constitutional:       General: She is active.   Cardiovascular:      Rate and Rhythm: Normal rate and regular rhythm.      Pulses: Normal pulses.      Heart sounds: Normal heart sounds.   Pulmonary:      Effort: Pulmonary effort is normal.      Breath sounds: Normal breath sounds.   Chest:      Comments: Incisions CDI.  Zeke drain with ~ 50cc SS output.   Abdominal:      General:  Abdomen is flat. Bowel sounds are normal.      Palpations: Abdomen is soft.   Skin:     General: Skin is warm.      Capillary Refill: Capillary refill takes less than 2 seconds.   Neurological:      General: No focal deficit present.      Mental Status: She is alert.            Significant Labs:  I have reviewed all pertinent lab results within the past 24 hours.    Significant Diagnostics:  I have reviewed all pertinent imaging results/findings within the past 24 hours.  CXR looks good post-op  Assessment/Plan:     * Adolescent idiopathic scoliosis  Jada Lantigua is a 12 yo F s/p T9 - L3 VBT on 1/3/23. Recovering well.    - Keep Zeke drain in today  - incentive spirometry  - OOB, ambulate  - will reassess tomorrow for drain removal  - Pain control  - Rest of care per primary.       Stevan Hood MD  Pediatric Surgery PGY-2  _________________________________________    Pediatric Surgery Staff    I have seen and examined the patient and have edited the resident's note accordingly.      Doing well. Appears very comfortable. Lungs are clear on exam. Incisions are clean/dry/intact. Zeke drain output is serosanguinous. Will keep drain in place today as she gets OOB and ambulates. Asked her nurse to get her an incentive spirometer. Spoke with her mother and grandmother at the bedside.    Stacie Lim     no weight-bearing restrictions

## 2024-01-06 NOTE — H&P CARDIOLOGY - EXTREMITIES
Interval History:NAEON. Patient states swelling on right side of jaw some what improved. States he urinated after lasix challenge but had a bowel movement at the same time so it was not measured.     Continuous Infusions:   sodium chloride 0.9%      dextrose 10 % in water (D10W)       Scheduled Meds:   acetaminophen  1,000 mg Per NG tube TID    amiodarone  400 mg Per NG tube Daily    balsam peru-castor oiL   Topical (Top) BID    cholecalciferol (vitamin D3)  2,000 Units Per NG tube Daily    epoetin zohaib (PROCRIT) injection  50 Units/kg Subcutaneous Every Tues, Thurs, Sat    famotidine  20 mg Per G Tube Daily    gabapentin  125 mg Per NG tube Q12H    insulin aspart U-100  4 Units Subcutaneous Q24H    insulin aspart U-100  4 Units Subcutaneous Q24H    insulin aspart U-100  4 Units Subcutaneous Q24H    insulin aspart U-100  6 Units Subcutaneous Q24H    insulin aspart U-100  6 Units Subcutaneous Q24H    insulin aspart U-100  6 Units Subcutaneous Q24H    oseltamivir  30 mg Oral Every Tues, Thurs, Sat    piperacillin-tazobactam (Zosyn) IV (PEDS and ADULTS) (extended infusion is not appropriate)  4.5 g Intravenous Q12H    polyethylene glycol  17 g Per NG tube Daily    psyllium husk (aspartame)  1 packet Per NG tube Daily    senna-docusate 8.6-50 mg  2 tablet Per NG tube Daily    traMADoL  50 mg Oral Once    traZODone  50 mg Per NG tube QHS    venlafaxine  37.5 mg Per G Tube Daily    warfarin  2.5 mg Per G Tube Every Mon, Wed, Fri    warfarin  5 mg Per G Tube Every Tues, Thurs, Sat, Sun     PRN Meds:bisacodyL, dextrose 10 % in water (D10W), dextrose 10%, dextrose 10%, dextrose 10%, dextrose 10%, glucagon (human recombinant), heparin (porcine), hydrALAZINE, insulin aspart U-100, ondansetron, Flushing PICC/Midline Protocol **AND** [DISCONTINUED] sodium chloride 0.9% **AND** sodium chloride 0.9%, traMADoL, zolpidem    Review of patient's allergies indicates:  No Known Allergies  Objective:     Vital Signs (Most Recent):  Temp:  98.1 °F (36.7 °C) (01/06/24 1200)  Pulse: 87 (01/06/24 1200)  Resp: 18 (01/06/24 1200)  BP: (!) 68/0 (01/06/24 1200)  SpO2: 99 % (01/06/24 1200) Vital Signs (24h Range):  Temp:  [97.4 °F (36.3 °C)-98.1 °F (36.7 °C)] 98.1 °F (36.7 °C)  Pulse:  [] 87  Resp:  [16-19] 18  SpO2:  [97 %-100 %] 99 %  BP: ()/(0-80) 68/0     Patient Vitals for the past 72 hrs (Last 3 readings):   Weight   01/06/24 0915 66.8 kg (147 lb 4.3 oz)   01/05/24 0814 72.6 kg (160 lb)   01/04/24 0746 73.5 kg (162 lb)       Body mass index is 19.97 kg/m².      Intake/Output Summary (Last 24 hours) at 1/6/2024 1325  Last data filed at 1/6/2024 0800  Gross per 24 hour   Intake 1201 ml   Output 150 ml   Net 1051 ml         Hemodynamic Parameters:  CVP:  [7 mmHg] 7 mmHg    Telemetry: SR       Physical Exam  Constitutional:       Comments: Cachectic, temporal wasting   HENT:      Head: Normocephalic and atraumatic.   Eyes:      Conjunctiva/sclera: Conjunctivae normal.      Pupils: Pupils are equal, round, and reactive to light.   Neck:      Comments: Do not appreciate elevated JVP  Cardiovascular:      Rate and Rhythm: Normal rate and regular rhythm.      Comments: Smooth VAD hum  Pulmonary:      Effort: Pulmonary effort is normal.      Breath sounds: Normal breath sounds.   Abdominal:      General: Bowel sounds are normal.      Palpations: Abdomen is soft.   Musculoskeletal:         General: No swelling. Normal range of motion.      Cervical back: Normal range of motion and neck supple.   Skin:     General: Skin is warm and dry.      Capillary Refill: Capillary refill takes 2 to 3 seconds.   Neurological:      General: No focal deficit present.      Mental Status: He is alert and oriented to person, place, and time.            Significant Labs:  CBC:  Recent Labs   Lab 01/04/24  0453 01/05/24  1059 01/06/24  0426   WBC 6.99 7.21 8.02   RBC 2.29* 2.70* 2.49*   HGB 6.5* 7.6* 6.8*   HCT 19.8* 24.9* 22.9*    484* 450   MCV 87 92 92   MCH  28.4 28.1 27.3   MCHC 32.8 30.5* 29.7*       BNP:  Recent Labs   Lab 01/01/24  0412 01/03/24  0500 01/05/24  1059   * 600* 670*       CMP:  Recent Labs   Lab 01/04/24  0453 01/05/24  1059 01/06/24  0426   * 113* 136*   CALCIUM 8.5* 9.1 8.8   ALBUMIN 1.9* 2.2* 2.0*   PROT 6.9 8.0 7.3   * 142 136   K 4.5 4.3 4.1   CO2 23 23 23    100 100   BUN 74* 46* 60*   CREATININE 4.4* 3.6* 4.3*   ALKPHOS 181* 181* 181*   ALT 29 41 38   AST 52* 68* 56*   BILITOT 0.3 0.4 0.3        Coagulation:   Recent Labs   Lab 01/04/24  0453 01/05/24  1059 01/06/24  0426   INR 2.1* 2.1* 2.3*       LDH:  Recent Labs   Lab 01/04/24  0453 01/06/24  0426   * 342*       Microbiology:  Microbiology Results (last 7 days)       Procedure Component Value Units Date/Time    Throat culture [[id number]]  (Abnormal) Collected: 01/04/24 1942    Order Status: Completed Specimen: Throat Updated: 01/06/24 0949     RESPIRATORY CULTURE - THROAT PRESUMPTIVE PSEUDOMONAS SPECIES  Many  Identification and susceptibility pending  Normal respiratory bobby also present      Influenza A & B by Molecular [[id number]] Collected: 01/05/24 1607    Order Status: Completed Specimen: Nasopharyngeal Swab Updated: 01/05/24 1647     Influenza A, Molecular Negative     Influenza B, Molecular Negative     Flu A & B Source NP    Group A Strep, Molecular [[id number]] Collected: 01/05/24 1004    Order Status: Completed Specimen: Throat Updated: 01/05/24 1039     Group A Strep, Molecular Negative     Comment: Arcanobacterium haemolyticum and Beta Streptococcus group C   and G will not be detected by this test method.  Please order   Throat Culture ([id number]) if suspected.         Respiratory Infection Panel (PCR), Nasopharyngeal [[id number]] Collected: 01/04/24 1942    Order Status: Completed Specimen: Nasopharyngeal Swab Updated: 01/04/24 2101     Respiratory Infection Panel Source NP Swab     Adenovirus Not Detected     Coronavirus 229E, Common Cold  Virus Not Detected     Coronavirus HKU1, Common Cold Virus Not Detected     Coronavirus NL63, Common Cold Virus Not Detected     Coronavirus OC43, Common Cold Virus Not Detected     Comment: The Coronavirus strains detected in this test cause the common cold.  These strains are not the COVID-19 (novel Coronavirus)strain   associated with the respiratory disease outbreak.          SARS-CoV2 (COVID-19) Qualitative PCR Not Detected     Human Metapneumovirus Not Detected     Human Rhinovirus/Enterovirus Not Detected     Influenza A (subtypes H1, H1-2009,H3) Not Detected     Influenza B Not Detected     Parainfluenza Virus 1 Not Detected     Parainfluenza Virus 2 Not Detected     Parainfluenza Virus 3 Not Detected     Parainfluenza Virus 4 Not Detected     Respiratory Syncytial Virus Not Detected     Bordetella Parapertussis (AQ7899) Not Detected     Bordetella pertussis (ptxP) Not Detected     Chlamydia pneumoniae Not Detected     Mycoplasma pneumoniae Not Detected    Narrative:      For all other respiratory sources, order [id number] -  Respiratory Viral Panel by PCR            I have reviewed all pertinent labs within the past 24 hours.    Estimated Creatinine Clearance: 17 mL/min (A) (based on SCr of 4.3 mg/dL (H)).    Diagnostic Results:  I have reviewed and interpreted all pertinent imaging results/findings within the past 24 hours.   detailed exam

## 2024-01-12 NOTE — ED ADULT TRIAGE NOTE - STATUS:
her to work on wrist motion.  She has done very well has had considerable improvement in pain and feels more functional wrist now.    Physical Examination:  Left upper extremity:  The patient appears to be their stated age, is in no apparent distress, and is oriented x3. The patients mood and affect are appropriate. The patients gait is normal. The examination of the limb in question was performed in comparison to the contralateral limb.    On musculoskeletal examination, there is no tenderness to the scaphoid tubercle or the snuffbox.  She does have tenderness palpation over the thumb CMC..  Sensation and motor function are intact in the radial, and median nerve distribution. There is no obvious thenar atrophy, and thenar strength is 5/5. There is no intrinsic atrophy, and intrinsic strength is 5/5.  The patient can make a full composite fist. The hand itself is warm and well perfused. The skin is intact throughout. The contralateral hand/wrist are normal to inspection, range of motion, stability, and strength.    Radiographs  Demonstrate a interval consolidation of the left scaphoid fracture.  Considerable CMC arthritis as seen on prior films.    Assessment:  Patient with healing left scaphoid fracture from October.  Clinically doing very well.    Plan:   Continue activities as tolerated.  Discussed that at some point she may experience more pain from the base of her thumb and her CMC arthritis.  I am happy to see her at any point to provide her injections, therapy.  At this point she will follow-up as needed    Lashay Fowler MD      [facility]

## 2024-03-01 NOTE — SWALLOW BEDSIDE ASSESSMENT ADULT - H & P REVIEW
yes Detail Level: Generalized Detail Level: Simple Detail Level: Detailed Skin Checks Recommendations: Full body skin check annually Sunscreen Recommendations: applied daily to exposed areas

## 2024-03-05 NOTE — BEHAVIORAL HEALTH ASSESSMENT NOTE - NSBHSUICRISKFACTOR_PSY_A_CORE
Chief complaint: 2 weeks out L3-4 lateral lumbar fusion and L4-5 and L5-S1 MIS TLIF    HPI: Leg pain on the left side is 50% better compared to before surgery.  Still having back pain about equivalent to before surgery.  Has been ambulating is much as possible.  There is been some mild drainage from the left-sided MIS TLIF incision.  He started antibiotics yesterday.  Those were called in for him March 1 but apparently had the patient was unaware that were called in.  We did leave a voicemail for them.  There is no numbness tingling fevers chills.  He is got no bowel or bladder changes.  All in all he is happy with how he is doing.    Physical exam: That left incision looks almost dry to me today.  There is minimal soilage on the dressing replaced this morning but not see any significant dehiscence redness puffiness warmth or signs of infection at this time.  The right incision is perfectly dry.  The lateral incision has Steri-Strips and is well-healed.  He has minimal hip flexion weakness in the left compared to the right but 4+/5 strength.  Otherwise 5/5 motor bilateral lower extremities.  He walks slowly but normally.  X-rays look very good.  He said significant improvement in his scoliosis and the hardware is in good position.    Assessment/plan: Patient doing well after an L3-4 lateral lumbar fusion and L4-5/L5-S1 MIS TLIF.  Will let him continue doing gauged with his lifting restrictions and no excessive BLT.  He will walk is much as possible.  He will continue with antibiotics.  Will see him back in 10 days just to make sure his wound is drying completely.   None Known

## 2024-05-23 NOTE — BEHAVIORAL HEALTH ASSESSMENT NOTE - NSBHCONSULTDISCUSS_PSY_A_CORE
Detail Level: Zone Render In Strict Bullet Format?: No Initiate Treatment: :\\n- minoxidil 2.5 mg tablet 1 pill PO daily. yes

## 2024-06-26 NOTE — PATIENT PROFILE ADULT - OVER THE PAST TWO WEEKS HAVE YOU FELT DOWN, DEPRESSED OR HOPELESS?
The patient has been examined and the H&P has been reviewed:        I concur with the findings and no changes have occurred since H&P was written.        Patient cleared for Anesthesia: General        Anesthesia/Surgery risks, benefits and alternative options discussed and understood by patient/family.      There are no hospital problems to display for this patient.    
no

## 2025-01-16 NOTE — ED PROVIDER NOTE - CROS ED HEME ALL NEG
1. Walk plenty  2. No lifting over 5 lbs  3. Keep bandage on, clean and dry. Change to a new one if gets wet or dirty. If you had low back surgery, sponge bathe until cleared by your surgeon to shower.  4. Pain meds: eRx sent to your pharmacy electronically, you need to pick it up  5. No driving on pain meds  6. Follow up with Dr. James in about 7 days. Call to schedule.
negative...

## 2025-01-21 NOTE — PROGRESS NOTE BEHAVIORAL HEALTH - NSBHCONSULTMEDS_PSY_A_CORE FT
Writer called Dr. Chambers's office and spoke with Lakeisha regarding getting patient set up for his pre op exam for his upcoming IR shoulder lesion biopsy on 1/28. Office to fax pre op to IR upon completion. Lakeisha verbalized understanding.   
Haldol 5mg po bid
Zyprexa 2.5 mg qAM and 5 mg HS

## 2025-04-07 NOTE — ED ADULT NURSE NOTE - NSSISCREENINGQ1_ED_A_ED
[FreeTextEntry1] : The patient was advised of the diagnosis. The natural history of the pathology was explained in full to the patient in layman's terms. All questions were answered. The risks and benefits of surgical and non-surgical treatment alternatives were explained in full to the patient.  acute on chronic R thumb MP sprain- recommend hand based thumb spica brace   Return in 3-4 weeks.
Isolating in bedroom
No

## 2025-07-10 NOTE — ED PROVIDER NOTE - PHYSICAL EXAMINATION
[de-identified] : IMP: 40 year old male with well-functioning left total knee replacement and severe right knee osteoarthritis, in the setting of morbid obesity and questionably controlled diabetes. - He will require a right total knee replacement to resolve his right knee symptoms, but is not currently a candidate for surgery considering his morbid obesity.  - He's made very good progress with weight loss in the last several months, and I encourage him to continue with diet and Ozempic to continue lowering his weight.  - We reviewed that my target weight for him would be about 300 pounds, to reach a BMI of 40, which I think that he could potentially achieve by the end of this calendar year.  - For the moment, I recommended that we continue with non-surgical treatment, including physical therapy, home exercise, as well as Tylenol and Ibuprofen, which he is currently taking and tolerating.  - We will obtain serum A1C and fructosamine levels today to assess his level of diabetic control.  - If they're within acceptable range, we'll bring him back shortly for a right knee cortisone injection for some more short-term pain relief.  - But otherwise, we'll plan to follow up in another 3 months with no new x-rays needed. He should be reweighed upon every subsequent follow-up. Apoorva Godfrey M.D.:   patient awake alert seen lying on stretcher NAD .   LUNGS crackles at right baseCTAB no wheeze no crackle.   CARD RRR no m/r/g.    Abdomen soft NT ND no rebound no guarding no CVA tenderness.   EXT WWP no edema no calf tenderness CV 2+DP/PT bilaterally.   neuro A&Ox3 gait normal.    skin warm and dry no rash  HEENT: moist mucous membranes, PERRL, EOMI Apoorva Godfrey M.D.:   patient awake alert seen lying on stretcher NAD .   LUNGS crackles at right base.   CARD RRR no m/r/g.  +JVD  Abdomen soft NT distended no rebound no guarding no CVA tenderness.   EXT WWP significant non-pitting edema in b/l LE up to knee no calf tenderness CV 2+DP/PT bilaterally.   neuro A&Ox3 gait normal.    skin warm and dry no rash  HEENT: moist mucous membranes, PERRL, EOMI